# Patient Record
Sex: FEMALE | Race: WHITE | NOT HISPANIC OR LATINO | Employment: OTHER | URBAN - METROPOLITAN AREA
[De-identification: names, ages, dates, MRNs, and addresses within clinical notes are randomized per-mention and may not be internally consistent; named-entity substitution may affect disease eponyms.]

---

## 2017-01-03 ENCOUNTER — HISTORICAL (OUTPATIENT)
Dept: ADMINISTRATIVE | Facility: HOSPITAL | Age: 72
End: 2017-01-03

## 2017-01-20 ENCOUNTER — HISTORICAL (OUTPATIENT)
Dept: HEMATOLOGY/ONCOLOGY | Facility: CLINIC | Age: 72
End: 2017-01-20

## 2017-01-23 ENCOUNTER — HISTORICAL (OUTPATIENT)
Dept: RADIOLOGY | Facility: HOSPITAL | Age: 72
End: 2017-01-23

## 2017-01-25 ENCOUNTER — HISTORICAL (OUTPATIENT)
Dept: RADIOLOGY | Facility: HOSPITAL | Age: 72
End: 2017-01-25

## 2017-01-27 ENCOUNTER — HISTORICAL (OUTPATIENT)
Dept: RADIOLOGY | Facility: HOSPITAL | Age: 72
End: 2017-01-27

## 2017-04-28 ENCOUNTER — HISTORICAL (OUTPATIENT)
Dept: HEMATOLOGY/ONCOLOGY | Facility: CLINIC | Age: 72
End: 2017-04-28

## 2017-04-28 LAB
ABS NEUT (OLG): 5.53 X10(3)/MCL (ref 2.1–9.2)
ALBUMIN SERPL-MCNC: 3.5 GM/DL (ref 3.4–5)
ALBUMIN/GLOB SERPL: 1.2 {RATIO}
ALP SERPL-CCNC: 78 UNIT/L (ref 38–126)
ALT SERPL-CCNC: 15 UNIT/L (ref 12–78)
AST SERPL-CCNC: 7 UNIT/L (ref 15–37)
BASOPHILS # BLD AUTO: 0 X10(3)/MCL (ref 0–0.2)
BASOPHILS NFR BLD AUTO: 0.4 %
BILIRUB SERPL-MCNC: 0.4 MG/DL (ref 0.2–1)
BILIRUBIN DIRECT+TOT PNL SERPL-MCNC: 0.1 MG/DL (ref 0–0.2)
BILIRUBIN DIRECT+TOT PNL SERPL-MCNC: 0.3 MG/DL (ref 0–0.8)
BUN SERPL-MCNC: 15 MG/DL (ref 7–18)
CALCIUM SERPL-MCNC: 9 MG/DL (ref 8.5–10.1)
CHLORIDE SERPL-SCNC: 103 MMOL/L (ref 98–107)
CO2 SERPL-SCNC: 28 MMOL/L (ref 21–32)
CREAT SERPL-MCNC: 0.75 MG/DL (ref 0.55–1.02)
EOSINOPHIL # BLD AUTO: 0.2 X10(3)/MCL (ref 0–0.9)
EOSINOPHIL NFR BLD AUTO: 2 %
ERYTHROCYTE [DISTWIDTH] IN BLOOD BY AUTOMATED COUNT: 16.8 % (ref 11.5–17)
GLOBULIN SER-MCNC: 2.8 GM/DL (ref 2.4–3.5)
GLUCOSE SERPL-MCNC: 89 MG/DL (ref 74–106)
HCT VFR BLD AUTO: 37.1 % (ref 37–47)
HGB BLD-MCNC: 12 GM/DL (ref 12–16)
LYMPHOCYTES # BLD AUTO: 1.3 X10(3)/MCL (ref 0.6–4.6)
LYMPHOCYTES NFR BLD AUTO: 17 %
MCH RBC QN AUTO: 28.6 PG (ref 27–31)
MCHC RBC AUTO-ENTMCNC: 32.3 GM/DL (ref 33–36)
MCV RBC AUTO: 88.3 FL (ref 80–94)
MONOCYTES # BLD AUTO: 0.8 X10(3)/MCL (ref 0.1–1.3)
MONOCYTES NFR BLD AUTO: 10.5 %
NEUTROPHILS # BLD AUTO: 5.5 X10(3)/MCL (ref 2.1–9.2)
NEUTROPHILS NFR BLD AUTO: 70.1 %
PLATELET # BLD AUTO: 250 X10(3)/MCL (ref 130–400)
PMV BLD AUTO: 8.6 FL (ref 9.4–12.4)
POTASSIUM SERPL-SCNC: 4.7 MMOL/L (ref 3.5–5.1)
PROT SERPL-MCNC: 6.3 GM/DL (ref 6.4–8.2)
RBC # BLD AUTO: 4.2 X10(6)/MCL (ref 4.2–5.4)
SODIUM SERPL-SCNC: 138 MMOL/L (ref 136–145)
WBC # SPEC AUTO: 7.9 X10(3)/MCL (ref 4.5–11.5)

## 2017-05-15 ENCOUNTER — HISTORICAL (OUTPATIENT)
Dept: ANESTHESIOLOGY | Facility: HOSPITAL | Age: 72
End: 2017-05-15

## 2017-07-12 ENCOUNTER — HISTORICAL (OUTPATIENT)
Dept: HEMATOLOGY/ONCOLOGY | Facility: CLINIC | Age: 72
End: 2017-07-12

## 2017-07-12 LAB
ABS NEUT (OLG): 5.61 X10(3)/MCL (ref 2.1–9.2)
ALBUMIN SERPL-MCNC: 3.8 GM/DL (ref 3.4–5)
ALBUMIN/GLOB SERPL: 1.3 {RATIO}
ALP SERPL-CCNC: 85 UNIT/L (ref 38–126)
ALT SERPL-CCNC: 16 UNIT/L (ref 12–78)
AST SERPL-CCNC: 12 UNIT/L (ref 15–37)
BASOPHILS # BLD AUTO: 0 X10(3)/MCL (ref 0–0.2)
BASOPHILS NFR BLD AUTO: 0.4 %
BILIRUB SERPL-MCNC: 0.5 MG/DL (ref 0.2–1)
BILIRUBIN DIRECT+TOT PNL SERPL-MCNC: 0.2 MG/DL (ref 0–0.2)
BILIRUBIN DIRECT+TOT PNL SERPL-MCNC: 0.3 MG/DL (ref 0–0.8)
BUN SERPL-MCNC: 13 MG/DL (ref 7–18)
CALCIUM SERPL-MCNC: 9.3 MG/DL (ref 8.5–10.1)
CHLORIDE SERPL-SCNC: 103 MMOL/L (ref 98–107)
CO2 SERPL-SCNC: 28 MMOL/L (ref 21–32)
CREAT SERPL-MCNC: 0.69 MG/DL (ref 0.55–1.02)
EOSINOPHIL # BLD AUTO: 0.1 X10(3)/MCL (ref 0–0.9)
EOSINOPHIL NFR BLD AUTO: 1.6 %
ERYTHROCYTE [DISTWIDTH] IN BLOOD BY AUTOMATED COUNT: 15.8 % (ref 11.5–17)
GLOBULIN SER-MCNC: 2.9 GM/DL (ref 2.4–3.5)
GLUCOSE SERPL-MCNC: 88 MG/DL (ref 74–106)
HCT VFR BLD AUTO: 36.7 % (ref 37–47)
HGB BLD-MCNC: 11.9 GM/DL (ref 12–16)
LYMPHOCYTES # BLD AUTO: 1.9 X10(3)/MCL (ref 0.6–4.6)
LYMPHOCYTES NFR BLD AUTO: 22 %
MCH RBC QN AUTO: 29.5 PG (ref 27–31)
MCHC RBC AUTO-ENTMCNC: 32.4 GM/DL (ref 33–36)
MCV RBC AUTO: 90.8 FL (ref 80–94)
MONOCYTES # BLD AUTO: 0.9 X10(3)/MCL (ref 0.1–1.3)
MONOCYTES NFR BLD AUTO: 10.4 %
NEUTROPHILS # BLD AUTO: 5.6 X10(3)/MCL (ref 2.1–9.2)
NEUTROPHILS NFR BLD AUTO: 65.6 %
PLATELET # BLD AUTO: 275 X10(3)/MCL (ref 130–400)
PMV BLD AUTO: 8.5 FL (ref 9.4–12.4)
POTASSIUM SERPL-SCNC: 5 MMOL/L (ref 3.5–5.1)
PROT SERPL-MCNC: 6.7 GM/DL (ref 6.4–8.2)
RBC # BLD AUTO: 4.04 X10(6)/MCL (ref 4.2–5.4)
SODIUM SERPL-SCNC: 137 MMOL/L (ref 136–145)
WBC # SPEC AUTO: 8.6 X10(3)/MCL (ref 4.5–11.5)

## 2017-10-13 ENCOUNTER — HISTORICAL (OUTPATIENT)
Dept: INFUSION THERAPY | Facility: HOSPITAL | Age: 72
End: 2017-10-13

## 2017-10-17 ENCOUNTER — HISTORICAL (OUTPATIENT)
Dept: ADMINISTRATIVE | Facility: HOSPITAL | Age: 72
End: 2017-10-17

## 2017-10-17 LAB
ABS NEUT (OLG): 6.09 X10(3)/MCL (ref 2.1–9.2)
ALBUMIN SERPL-MCNC: 3.4 GM/DL (ref 3.4–5)
ALBUMIN/GLOB SERPL: 1.1 RATIO (ref 1.1–2)
ALP SERPL-CCNC: 79 UNIT/L (ref 38–126)
ALT SERPL-CCNC: 15 UNIT/L (ref 12–78)
AST SERPL-CCNC: 13 UNIT/L (ref 15–37)
BASOPHILS # BLD AUTO: 0 X10(3)/MCL (ref 0–0.2)
BASOPHILS NFR BLD AUTO: 0.2 %
BILIRUB SERPL-MCNC: 0.5 MG/DL (ref 0.2–1)
BILIRUBIN DIRECT+TOT PNL SERPL-MCNC: 0.2 MG/DL (ref 0–0.5)
BILIRUBIN DIRECT+TOT PNL SERPL-MCNC: 0.3 MG/DL (ref 0–0.8)
BUN SERPL-MCNC: 16 MG/DL (ref 7–18)
CALCIUM SERPL-MCNC: 9.1 MG/DL (ref 8.5–10.1)
CHLORIDE SERPL-SCNC: 102 MMOL/L (ref 98–107)
CO2 SERPL-SCNC: 26 MMOL/L (ref 21–32)
CREAT SERPL-MCNC: 0.76 MG/DL (ref 0.55–1.02)
EOSINOPHIL # BLD AUTO: 0.1 X10(3)/MCL (ref 0–0.9)
EOSINOPHIL NFR BLD AUTO: 1.5 %
ERYTHROCYTE [DISTWIDTH] IN BLOOD BY AUTOMATED COUNT: 15.4 % (ref 11.5–17)
GLOBULIN SER-MCNC: 3.1 GM/DL (ref 2.4–3.5)
GLUCOSE SERPL-MCNC: 91 MG/DL (ref 74–106)
HCT VFR BLD AUTO: 35.2 % (ref 37–47)
HGB BLD-MCNC: 11.5 GM/DL (ref 12–16)
LYMPHOCYTES # BLD AUTO: 1.7 X10(3)/MCL (ref 0.6–4.6)
LYMPHOCYTES NFR BLD AUTO: 19 %
MCH RBC QN AUTO: 29.8 PG (ref 27–31)
MCHC RBC AUTO-ENTMCNC: 32.7 GM/DL (ref 33–36)
MCV RBC AUTO: 91.2 FL (ref 80–94)
MONOCYTES # BLD AUTO: 0.9 X10(3)/MCL (ref 0.1–1.3)
MONOCYTES NFR BLD AUTO: 10.5 %
NEUTROPHILS # BLD AUTO: 6.1 X10(3)/MCL (ref 2.1–9.2)
NEUTROPHILS NFR BLD AUTO: 68.8 %
PLATELET # BLD AUTO: 267 X10(3)/MCL (ref 130–400)
PMV BLD AUTO: 8.4 FL (ref 9.4–12.4)
POTASSIUM SERPL-SCNC: 4.2 MMOL/L (ref 3.5–5.1)
PROT SERPL-MCNC: 6.5 GM/DL (ref 6.4–8.2)
RBC # BLD AUTO: 3.86 X10(6)/MCL (ref 4.2–5.4)
SODIUM SERPL-SCNC: 136 MMOL/L (ref 136–145)
WBC # SPEC AUTO: 8.8 X10(3)/MCL (ref 4.5–11.5)

## 2017-12-08 ENCOUNTER — HISTORICAL (OUTPATIENT)
Dept: INFUSION THERAPY | Facility: HOSPITAL | Age: 72
End: 2017-12-08

## 2017-12-08 ENCOUNTER — HISTORICAL (OUTPATIENT)
Dept: HEMATOLOGY/ONCOLOGY | Facility: CLINIC | Age: 72
End: 2017-12-08

## 2017-12-08 LAB
ABS NEUT (OLG): 4.69 X10(3)/MCL (ref 2.1–9.2)
ALBUMIN SERPL-MCNC: 3.8 GM/DL (ref 3.4–5)
ALBUMIN/GLOB SERPL: 1.2 {RATIO}
ALP SERPL-CCNC: 78 UNIT/L (ref 38–126)
ALT SERPL-CCNC: 16 UNIT/L (ref 12–78)
AST SERPL-CCNC: 11 UNIT/L (ref 15–37)
BASOPHILS # BLD AUTO: 0 X10(3)/MCL (ref 0–0.2)
BASOPHILS NFR BLD AUTO: 0.3 %
BILIRUB SERPL-MCNC: 0.4 MG/DL (ref 0.2–1)
BILIRUBIN DIRECT+TOT PNL SERPL-MCNC: 0.1 MG/DL (ref 0–0.2)
BILIRUBIN DIRECT+TOT PNL SERPL-MCNC: 0.3 MG/DL (ref 0–0.8)
BUN SERPL-MCNC: 15 MG/DL (ref 7–18)
CALCIUM SERPL-MCNC: 9.2 MG/DL (ref 8.5–10.1)
CHLORIDE SERPL-SCNC: 103 MMOL/L (ref 98–107)
CO2 SERPL-SCNC: 26 MMOL/L (ref 21–32)
CREAT SERPL-MCNC: 0.62 MG/DL (ref 0.55–1.02)
EOSINOPHIL # BLD AUTO: 0.2 X10(3)/MCL (ref 0–0.9)
EOSINOPHIL NFR BLD AUTO: 2.1 %
ERYTHROCYTE [DISTWIDTH] IN BLOOD BY AUTOMATED COUNT: 14.6 % (ref 11.5–17)
GLOBULIN SER-MCNC: 3.2 GM/DL (ref 2.4–3.5)
GLUCOSE SERPL-MCNC: 85 MG/DL (ref 74–106)
HCT VFR BLD AUTO: 36.3 % (ref 37–47)
HGB BLD-MCNC: 11.9 GM/DL (ref 12–16)
LYMPHOCYTES # BLD AUTO: 1.9 X10(3)/MCL (ref 0.6–4.6)
LYMPHOCYTES NFR BLD AUTO: 25.1 %
MCH RBC QN AUTO: 29.7 PG (ref 27–31)
MCHC RBC AUTO-ENTMCNC: 32.8 GM/DL (ref 33–36)
MCV RBC AUTO: 90.5 FL (ref 80–94)
MONOCYTES # BLD AUTO: 0.8 X10(3)/MCL (ref 0.1–1.3)
MONOCYTES NFR BLD AUTO: 11 %
NEUTROPHILS # BLD AUTO: 4.7 X10(3)/MCL (ref 2.1–9.2)
NEUTROPHILS NFR BLD AUTO: 61.5 %
PLATELET # BLD AUTO: 253 X10(3)/MCL (ref 130–400)
PMV BLD AUTO: 8.6 FL (ref 9.4–12.4)
POTASSIUM SERPL-SCNC: 4.2 MMOL/L (ref 3.5–5.1)
PROT SERPL-MCNC: 7 GM/DL (ref 6.4–8.2)
RBC # BLD AUTO: 4.01 X10(6)/MCL (ref 4.2–5.4)
SODIUM SERPL-SCNC: 135 MMOL/L (ref 136–145)
WBC # SPEC AUTO: 7.6 X10(3)/MCL (ref 4.5–11.5)

## 2017-12-29 ENCOUNTER — HISTORICAL (OUTPATIENT)
Dept: HEMATOLOGY/ONCOLOGY | Facility: CLINIC | Age: 72
End: 2017-12-29

## 2018-02-02 ENCOUNTER — HISTORICAL (OUTPATIENT)
Dept: INFUSION THERAPY | Facility: HOSPITAL | Age: 73
End: 2018-02-02

## 2018-04-06 ENCOUNTER — HISTORICAL (OUTPATIENT)
Dept: INFUSION THERAPY | Facility: HOSPITAL | Age: 73
End: 2018-04-06

## 2018-05-25 ENCOUNTER — HISTORICAL (OUTPATIENT)
Dept: INFUSION THERAPY | Facility: HOSPITAL | Age: 73
End: 2018-05-25

## 2018-06-08 LAB
ABS NEUT (OLG): 5.71 X10(3)/MCL (ref 2.1–9.2)
ALBUMIN SERPL-MCNC: 3.7 GM/DL (ref 3.4–5)
ALBUMIN/GLOB SERPL: 1.3 {RATIO}
ALP SERPL-CCNC: 73 UNIT/L (ref 38–126)
ALT SERPL-CCNC: 17 UNIT/L (ref 12–78)
AST SERPL-CCNC: 13 UNIT/L (ref 15–37)
BASOPHILS # BLD AUTO: 0 X10(3)/MCL (ref 0–0.2)
BASOPHILS NFR BLD AUTO: 0.2 %
BILIRUB SERPL-MCNC: 0.4 MG/DL (ref 0.2–1)
BILIRUBIN DIRECT+TOT PNL SERPL-MCNC: 0.2 MG/DL (ref 0–0.2)
BILIRUBIN DIRECT+TOT PNL SERPL-MCNC: 0.2 MG/DL (ref 0–0.8)
BUN SERPL-MCNC: 17 MG/DL (ref 7–18)
CALCIUM SERPL-MCNC: 9 MG/DL (ref 8.5–10.1)
CHLORIDE SERPL-SCNC: 105 MMOL/L (ref 98–107)
CO2 SERPL-SCNC: 25 MMOL/L (ref 21–32)
CREAT SERPL-MCNC: 0.84 MG/DL (ref 0.55–1.02)
EOSINOPHIL # BLD AUTO: 0.2 X10(3)/MCL (ref 0–0.9)
EOSINOPHIL NFR BLD AUTO: 2 %
ERYTHROCYTE [DISTWIDTH] IN BLOOD BY AUTOMATED COUNT: 14 % (ref 11.5–17)
GLOBULIN SER-MCNC: 2.9 GM/DL (ref 2.4–3.5)
GLUCOSE SERPL-MCNC: 95 MG/DL (ref 74–106)
HCT VFR BLD AUTO: 37.1 % (ref 37–47)
HGB BLD-MCNC: 12.4 GM/DL (ref 12–16)
LYMPHOCYTES # BLD AUTO: 2.2 X10(3)/MCL (ref 0.6–4.6)
LYMPHOCYTES NFR BLD AUTO: 24.6 %
MCH RBC QN AUTO: 30 PG (ref 27–31)
MCHC RBC AUTO-ENTMCNC: 33.4 GM/DL (ref 33–36)
MCV RBC AUTO: 89.6 FL (ref 80–94)
MONOCYTES # BLD AUTO: 0.8 X10(3)/MCL (ref 0.1–1.3)
MONOCYTES NFR BLD AUTO: 9.1 %
NEUTROPHILS # BLD AUTO: 5.7 X10(3)/MCL (ref 2.1–9.2)
NEUTROPHILS NFR BLD AUTO: 64.1 %
PLATELET # BLD AUTO: 238 X10(3)/MCL (ref 130–400)
PMV BLD AUTO: 8.3 FL (ref 9.4–12.4)
POTASSIUM SERPL-SCNC: 4.4 MMOL/L (ref 3.5–5.1)
PROT SERPL-MCNC: 6.6 GM/DL (ref 6.4–8.2)
RBC # BLD AUTO: 4.14 X10(6)/MCL (ref 4.2–5.4)
SODIUM SERPL-SCNC: 140 MMOL/L (ref 136–145)
WBC # SPEC AUTO: 8.9 X10(3)/MCL (ref 4.5–11.5)

## 2018-07-20 ENCOUNTER — HISTORICAL (OUTPATIENT)
Dept: INFUSION THERAPY | Facility: HOSPITAL | Age: 73
End: 2018-07-20

## 2018-11-09 ENCOUNTER — HISTORICAL (OUTPATIENT)
Dept: INFUSION THERAPY | Facility: HOSPITAL | Age: 73
End: 2018-11-09

## 2018-12-10 ENCOUNTER — HISTORICAL (OUTPATIENT)
Dept: RADIOLOGY | Facility: HOSPITAL | Age: 73
End: 2018-12-10

## 2019-01-04 ENCOUNTER — HISTORICAL (OUTPATIENT)
Dept: INFUSION THERAPY | Facility: HOSPITAL | Age: 74
End: 2019-01-04

## 2019-03-01 ENCOUNTER — HISTORICAL (OUTPATIENT)
Dept: INFUSION THERAPY | Facility: HOSPITAL | Age: 74
End: 2019-03-01

## 2019-04-26 ENCOUNTER — HISTORICAL (OUTPATIENT)
Dept: INFUSION THERAPY | Facility: HOSPITAL | Age: 74
End: 2019-04-26

## 2019-06-20 ENCOUNTER — HISTORICAL (OUTPATIENT)
Dept: RADIOLOGY | Facility: HOSPITAL | Age: 74
End: 2019-06-20

## 2019-06-20 ENCOUNTER — HISTORICAL (OUTPATIENT)
Dept: HEMATOLOGY/ONCOLOGY | Facility: CLINIC | Age: 74
End: 2019-06-20

## 2019-06-20 LAB
ABS NEUT (OLG): 4.4 X10(3)/MCL (ref 2.1–9.2)
ALBUMIN SERPL-MCNC: 3.8 GM/DL (ref 3.4–5)
ALBUMIN/GLOB SERPL: 1.2 {RATIO}
ALP SERPL-CCNC: 78 UNIT/L (ref 38–126)
ALT SERPL-CCNC: 15 UNIT/L (ref 12–78)
AST SERPL-CCNC: 11 UNIT/L (ref 15–37)
BASOPHILS # BLD AUTO: 0 X10(3)/MCL (ref 0–0.2)
BASOPHILS NFR BLD AUTO: 0.5 %
BILIRUB SERPL-MCNC: 0.5 MG/DL (ref 0.2–1)
BILIRUBIN DIRECT+TOT PNL SERPL-MCNC: 0.1 MG/DL (ref 0–0.2)
BILIRUBIN DIRECT+TOT PNL SERPL-MCNC: 0.4 MG/DL (ref 0–0.8)
BUN SERPL-MCNC: 17 MG/DL (ref 7–18)
CALCIUM SERPL-MCNC: 9.3 MG/DL (ref 8.5–10.1)
CHLORIDE SERPL-SCNC: 103 MMOL/L (ref 98–107)
CO2 SERPL-SCNC: 28 MMOL/L (ref 21–32)
CREAT SERPL-MCNC: 0.84 MG/DL (ref 0.55–1.02)
EOSINOPHIL # BLD AUTO: 0.2 X10(3)/MCL (ref 0–0.9)
EOSINOPHIL NFR BLD AUTO: 2.2 %
ERYTHROCYTE [DISTWIDTH] IN BLOOD BY AUTOMATED COUNT: 13.2 % (ref 11.5–17)
GLOBULIN SER-MCNC: 3.1 GM/DL (ref 2.4–3.5)
GLUCOSE SERPL-MCNC: 94 MG/DL (ref 74–106)
HCT VFR BLD AUTO: 38.4 % (ref 37–47)
HGB BLD-MCNC: 12.5 GM/DL (ref 12–16)
LYMPHOCYTES # BLD AUTO: 2.2 X10(3)/MCL (ref 0.6–4.6)
LYMPHOCYTES NFR BLD AUTO: 28.8 %
MCH RBC QN AUTO: 30.1 PG (ref 27–31)
MCHC RBC AUTO-ENTMCNC: 32.6 GM/DL (ref 33–36)
MCV RBC AUTO: 92.5 FL (ref 80–94)
MONOCYTES # BLD AUTO: 0.8 X10(3)/MCL (ref 0.1–1.3)
MONOCYTES NFR BLD AUTO: 10.6 %
NEUTROPHILS # BLD AUTO: 4.4 X10(3)/MCL (ref 2.1–9.2)
NEUTROPHILS NFR BLD AUTO: 57.8 %
PLATELET # BLD AUTO: 244 X10(3)/MCL (ref 130–400)
PMV BLD AUTO: 7.9 FL (ref 9.4–12.4)
POTASSIUM SERPL-SCNC: 4.4 MMOL/L (ref 3.5–5.1)
PROT SERPL-MCNC: 6.9 GM/DL (ref 6.4–8.2)
RBC # BLD AUTO: 4.15 X10(6)/MCL (ref 4.2–5.4)
SODIUM SERPL-SCNC: 135 MMOL/L (ref 136–145)
WBC # SPEC AUTO: 7.6 X10(3)/MCL (ref 4.5–11.5)

## 2019-06-21 ENCOUNTER — HISTORICAL (OUTPATIENT)
Dept: INFUSION THERAPY | Facility: HOSPITAL | Age: 74
End: 2019-06-21

## 2019-08-16 ENCOUNTER — HISTORICAL (OUTPATIENT)
Dept: INFUSION THERAPY | Facility: HOSPITAL | Age: 74
End: 2019-08-16

## 2019-08-26 ENCOUNTER — HISTORICAL (OUTPATIENT)
Dept: HEMATOLOGY/ONCOLOGY | Facility: CLINIC | Age: 74
End: 2019-08-26

## 2019-08-26 LAB
ABS NEUT (OLG): 5 X10(3)/MCL (ref 2.1–9.2)
ALBUMIN SERPL-MCNC: 3.5 GM/DL (ref 3.4–5)
ALBUMIN/GLOB SERPL: 1.4 {RATIO}
ALP SERPL-CCNC: 70 UNIT/L (ref 38–126)
ALT SERPL-CCNC: 14 UNIT/L (ref 12–78)
AST SERPL-CCNC: 12 UNIT/L (ref 15–37)
BASOPHILS # BLD AUTO: 0 X10(3)/MCL (ref 0–0.2)
BASOPHILS NFR BLD AUTO: 0.5 %
BILIRUB SERPL-MCNC: 0.6 MG/DL (ref 0.2–1)
BILIRUBIN DIRECT+TOT PNL SERPL-MCNC: 0.1 MG/DL (ref 0–0.2)
BILIRUBIN DIRECT+TOT PNL SERPL-MCNC: 0.5 MG/DL (ref 0–0.8)
BUN SERPL-MCNC: 17 MG/DL (ref 7–18)
CALCIUM SERPL-MCNC: 9 MG/DL (ref 8.5–10.1)
CHLORIDE SERPL-SCNC: 98 MMOL/L (ref 98–107)
CO2 SERPL-SCNC: 29 MMOL/L (ref 21–32)
CREAT SERPL-MCNC: 0.87 MG/DL (ref 0.55–1.02)
EOSINOPHIL # BLD AUTO: 0.1 X10(3)/MCL (ref 0–0.9)
EOSINOPHIL NFR BLD AUTO: 1.6 %
ERYTHROCYTE [DISTWIDTH] IN BLOOD BY AUTOMATED COUNT: 13.6 % (ref 11.5–17)
GLOBULIN SER-MCNC: 2.5 GM/DL (ref 2.4–3.5)
GLUCOSE SERPL-MCNC: 87 MG/DL (ref 74–106)
HCT VFR BLD AUTO: 35.2 % (ref 37–47)
HGB BLD-MCNC: 11.5 GM/DL (ref 12–16)
LYMPHOCYTES # BLD AUTO: 1.8 X10(3)/MCL (ref 0.6–4.6)
LYMPHOCYTES NFR BLD AUTO: 23.1 %
MCH RBC QN AUTO: 29.9 PG (ref 27–31)
MCHC RBC AUTO-ENTMCNC: 32.7 GM/DL (ref 33–36)
MCV RBC AUTO: 91.7 FL (ref 80–94)
MONOCYTES # BLD AUTO: 1 X10(3)/MCL (ref 0.1–1.3)
MONOCYTES NFR BLD AUTO: 11.9 %
NEUTROPHILS # BLD AUTO: 5 X10(3)/MCL (ref 2.1–9.2)
NEUTROPHILS NFR BLD AUTO: 62.8 %
PLATELET # BLD AUTO: 234 X10(3)/MCL (ref 130–400)
PMV BLD AUTO: 8.6 FL (ref 9.4–12.4)
POTASSIUM SERPL-SCNC: 4.1 MMOL/L (ref 3.5–5.1)
PROT SERPL-MCNC: 6 GM/DL (ref 6.4–8.2)
RBC # BLD AUTO: 3.84 X10(6)/MCL (ref 4.2–5.4)
SODIUM SERPL-SCNC: 135 MMOL/L (ref 136–145)
WBC # SPEC AUTO: 8 X10(3)/MCL (ref 4.5–11.5)

## 2019-09-10 ENCOUNTER — HISTORICAL (OUTPATIENT)
Dept: ADMINISTRATIVE | Facility: HOSPITAL | Age: 74
End: 2019-09-10

## 2019-09-10 LAB
ABS NEUT (OLG): 4.97 X10(3)/MCL (ref 2.1–9.2)
ANION GAP SERPL CALC-SCNC: 18 MMOL/L
BASOPHILS # BLD AUTO: 0 X10(3)/MCL (ref 0–0.2)
BASOPHILS NFR BLD AUTO: 0.4 %
BUN SERPL-MCNC: 19 MG/DL (ref 8–26)
CHLORIDE SERPL-SCNC: 97 MMOL/L (ref 98–109)
CREAT SERPL-MCNC: 1 MG/DL (ref 0.6–1.3)
EOSINOPHIL # BLD AUTO: 0.2 X10(3)/MCL (ref 0–0.9)
EOSINOPHIL NFR BLD AUTO: 2.1 %
ERYTHROCYTE [DISTWIDTH] IN BLOOD BY AUTOMATED COUNT: 14.2 % (ref 11.5–17)
GLUCOSE SERPL-MCNC: 78 MG/DL (ref 70–105)
HCT VFR BLD AUTO: 35.8 % (ref 37–47)
HCT VFR BLD CALC: 34 % (ref 38–51)
HGB BLD-MCNC: 11.5 GM/DL (ref 12–16)
HGB BLD-MCNC: 11.6 MG/DL (ref 12–17)
LYMPHOCYTES # BLD AUTO: 1.6 X10(3)/MCL (ref 0.6–4.6)
LYMPHOCYTES NFR BLD AUTO: 21 %
MCH RBC QN AUTO: 29.8 PG (ref 27–31)
MCHC RBC AUTO-ENTMCNC: 32.1 GM/DL (ref 33–36)
MCV RBC AUTO: 92.7 FL (ref 80–94)
MONOCYTES # BLD AUTO: 0.8 X10(3)/MCL (ref 0.1–1.3)
MONOCYTES NFR BLD AUTO: 10.2 %
NEUTROPHILS # BLD AUTO: 5 X10(3)/MCL (ref 2.1–9.2)
NEUTROPHILS NFR BLD AUTO: 66.2 %
PLATELET # BLD AUTO: 226 X10(3)/MCL (ref 130–400)
PMV BLD AUTO: 8.5 FL (ref 9.4–12.4)
POC IONIZED CALCIUM: 1.16 MMOL/L (ref 1.12–1.32)
POC TCO2: 25 MMOL/L (ref 24–29)
POTASSIUM BLD-SCNC: 3.9 MMOL/L (ref 3.5–4.9)
RBC # BLD AUTO: 3.86 X10(6)/MCL (ref 4.2–5.4)
SODIUM BLD-SCNC: 134 MMOL/L (ref 138–146)
WBC # SPEC AUTO: 7.5 X10(3)/MCL (ref 4.5–11.5)

## 2019-09-11 ENCOUNTER — HISTORICAL (OUTPATIENT)
Dept: INFUSION THERAPY | Facility: HOSPITAL | Age: 74
End: 2019-09-11

## 2019-09-17 ENCOUNTER — HISTORICAL (OUTPATIENT)
Dept: ADMINISTRATIVE | Facility: HOSPITAL | Age: 74
End: 2019-09-17

## 2019-09-17 LAB
ABS NEUT (OLG): 4.48 X10(3)/MCL (ref 2.1–9.2)
ANION GAP SERPL CALC-SCNC: 14 MMOL/L
BASOPHILS # BLD AUTO: 0 X10(3)/MCL (ref 0–0.2)
BASOPHILS NFR BLD AUTO: 0.5 %
BUN SERPL-MCNC: 27 MG/DL (ref 8–26)
CHLORIDE SERPL-SCNC: 95 MMOL/L (ref 98–109)
CREAT SERPL-MCNC: 1.1 MG/DL (ref 0.6–1.3)
EOSINOPHIL # BLD AUTO: 0.1 X10(3)/MCL (ref 0–0.9)
EOSINOPHIL NFR BLD AUTO: 2.2 %
ERYTHROCYTE [DISTWIDTH] IN BLOOD BY AUTOMATED COUNT: 13.8 % (ref 11.5–17)
GLUCOSE SERPL-MCNC: 86 MG/DL (ref 70–105)
HCT VFR BLD AUTO: 36.4 % (ref 37–47)
HCT VFR BLD CALC: 36 % (ref 38–51)
HGB BLD-MCNC: 11.9 GM/DL (ref 12–16)
HGB BLD-MCNC: 12.2 MG/DL (ref 12–17)
LYMPHOCYTES # BLD AUTO: 1.3 X10(3)/MCL (ref 0.6–4.6)
LYMPHOCYTES NFR BLD AUTO: 20.7 %
MCH RBC QN AUTO: 30.2 PG (ref 27–31)
MCHC RBC AUTO-ENTMCNC: 32.7 GM/DL (ref 33–36)
MCV RBC AUTO: 92.4 FL (ref 80–94)
MONOCYTES # BLD AUTO: 0.4 X10(3)/MCL (ref 0.1–1.3)
MONOCYTES NFR BLD AUTO: 6.7 %
NEUTROPHILS # BLD AUTO: 4.5 X10(3)/MCL (ref 2.1–9.2)
NEUTROPHILS NFR BLD AUTO: 69.3 %
PLATELET # BLD AUTO: 210 X10(3)/MCL (ref 130–400)
PMV BLD AUTO: 8.6 FL (ref 9.4–12.4)
POC IONIZED CALCIUM: 1.19 MMOL/L (ref 1.12–1.32)
POC TCO2: 26 MMOL/L (ref 24–29)
POTASSIUM BLD-SCNC: 4.4 MMOL/L (ref 3.5–4.9)
RBC # BLD AUTO: 3.94 X10(6)/MCL (ref 4.2–5.4)
SODIUM BLD-SCNC: 130 MMOL/L (ref 138–146)
WBC # SPEC AUTO: 6.5 X10(3)/MCL (ref 4.5–11.5)

## 2019-09-18 ENCOUNTER — HISTORICAL (OUTPATIENT)
Dept: INFUSION THERAPY | Facility: HOSPITAL | Age: 74
End: 2019-09-18

## 2019-09-25 ENCOUNTER — HISTORICAL (OUTPATIENT)
Dept: INFUSION THERAPY | Facility: HOSPITAL | Age: 74
End: 2019-09-25

## 2019-09-25 LAB
ABS NEUT (OLG): 3.55 X10(3)/MCL (ref 2.1–9.2)
ALBUMIN SERPL-MCNC: 3.4 GM/DL (ref 3.4–5)
ALP SERPL-CCNC: 60 UNIT/L (ref 38–126)
ALT SERPL-CCNC: 18 UNIT/L (ref 12–78)
ANION GAP SERPL CALC-SCNC: 15 MMOL/L
AST SERPL-CCNC: 12 UNIT/L (ref 15–37)
BASOPHILS # BLD AUTO: 0 X10(3)/MCL (ref 0–0.2)
BASOPHILS NFR BLD AUTO: 0.4 %
BILIRUB SERPL-MCNC: 0.5 MG/DL (ref 0.2–1)
BILIRUBIN DIRECT+TOT PNL SERPL-MCNC: 0.1 MG/DL (ref 0–0.2)
BILIRUBIN DIRECT+TOT PNL SERPL-MCNC: 0.4 MG/DL (ref 0–0.8)
BUN SERPL-MCNC: 18 MG/DL (ref 8–26)
CHLORIDE SERPL-SCNC: 97 MMOL/L (ref 98–109)
CREAT SERPL-MCNC: 0.9 MG/DL (ref 0.6–1.3)
EOSINOPHIL # BLD AUTO: 0.1 X10(3)/MCL (ref 0–0.9)
EOSINOPHIL NFR BLD AUTO: 1.6 %
ERYTHROCYTE [DISTWIDTH] IN BLOOD BY AUTOMATED COUNT: 14.3 % (ref 11.5–17)
GLUCOSE SERPL-MCNC: 100 MG/DL (ref 70–105)
HCT VFR BLD AUTO: 33.5 % (ref 37–47)
HCT VFR BLD CALC: 33 % (ref 38–51)
HGB BLD-MCNC: 10.9 GM/DL (ref 12–16)
HGB BLD-MCNC: 11.2 MG/DL (ref 12–17)
LIVER PROFILE INTERP: ABNORMAL
LYMPHOCYTES # BLD AUTO: 1.5 X10(3)/MCL (ref 0.6–4.6)
LYMPHOCYTES NFR BLD AUTO: 26.8 %
MCH RBC QN AUTO: 30.2 PG (ref 27–31)
MCHC RBC AUTO-ENTMCNC: 32.5 GM/DL (ref 33–36)
MCV RBC AUTO: 92.8 FL (ref 80–94)
MONOCYTES # BLD AUTO: 0.5 X10(3)/MCL (ref 0.1–1.3)
MONOCYTES NFR BLD AUTO: 8.4 %
NEUTROPHILS # BLD AUTO: 3.6 X10(3)/MCL (ref 2.1–9.2)
NEUTROPHILS NFR BLD AUTO: 62.1 %
PLATELET # BLD AUTO: 208 X10(3)/MCL (ref 130–400)
PMV BLD AUTO: 8.3 FL (ref 9.4–12.4)
POC IONIZED CALCIUM: 1.07 MMOL/L (ref 1.12–1.32)
POC TCO2: 25 MMOL/L (ref 24–29)
POTASSIUM BLD-SCNC: 3.9 MMOL/L (ref 3.5–4.9)
PROT SERPL-MCNC: 6.1 GM/DL (ref 6.4–8.2)
RBC # BLD AUTO: 3.61 X10(6)/MCL (ref 4.2–5.4)
SODIUM BLD-SCNC: 132 MMOL/L (ref 138–146)
WBC # SPEC AUTO: 5.7 X10(3)/MCL (ref 4.5–11.5)

## 2019-10-01 ENCOUNTER — HISTORICAL (OUTPATIENT)
Dept: HEMATOLOGY/ONCOLOGY | Facility: CLINIC | Age: 74
End: 2019-10-01

## 2019-10-01 LAB
ABS NEUT (OLG): 4.44 X10(3)/MCL (ref 2.1–9.2)
ALBUMIN SERPL-MCNC: 3.4 GM/DL (ref 3.4–5)
ALP SERPL-CCNC: 55 UNIT/L (ref 38–126)
ALT SERPL-CCNC: 21 UNIT/L (ref 12–78)
ANION GAP SERPL CALC-SCNC: 13 MMOL/L
AST SERPL-CCNC: 13 UNIT/L (ref 15–37)
BASOPHILS # BLD AUTO: 0 X10(3)/MCL (ref 0–0.2)
BASOPHILS NFR BLD AUTO: 0.5 %
BILIRUB SERPL-MCNC: 0.5 MG/DL (ref 0.2–1)
BILIRUBIN DIRECT+TOT PNL SERPL-MCNC: 0.1 MG/DL (ref 0–0.5)
BILIRUBIN DIRECT+TOT PNL SERPL-MCNC: 0.4 MG/DL (ref 0–0.8)
BUN SERPL-MCNC: 17 MG/DL (ref 8–26)
CHLORIDE SERPL-SCNC: 99 MMOL/L (ref 98–109)
CREAT SERPL-MCNC: 0.9 MG/DL (ref 0.6–1.3)
EOSINOPHIL # BLD AUTO: 0 X10(3)/MCL (ref 0–0.9)
EOSINOPHIL NFR BLD AUTO: 0.9 %
ERYTHROCYTE [DISTWIDTH] IN BLOOD BY AUTOMATED COUNT: 14.5 % (ref 11.5–17)
GLUCOSE SERPL-MCNC: 91 MG/DL (ref 70–105)
HCT VFR BLD AUTO: 33.2 % (ref 37–47)
HCT VFR BLD CALC: 33 % (ref 38–51)
HGB BLD-MCNC: 11.1 GM/DL (ref 12–16)
HGB BLD-MCNC: 11.2 MG/DL (ref 12–17)
LIVER PROFILE INTERP: ABNORMAL
LYMPHOCYTES # BLD AUTO: 0.9 X10(3)/MCL (ref 0.6–4.6)
LYMPHOCYTES NFR BLD AUTO: 15.3 %
MCH RBC QN AUTO: 30.9 PG (ref 27–31)
MCHC RBC AUTO-ENTMCNC: 33.4 GM/DL (ref 33–36)
MCV RBC AUTO: 92.5 FL (ref 80–94)
MONOCYTES # BLD AUTO: 0.3 X10(3)/MCL (ref 0.1–1.3)
MONOCYTES NFR BLD AUTO: 5.1 %
NEUTROPHILS # BLD AUTO: 4.4 X10(3)/MCL (ref 2.1–9.2)
NEUTROPHILS NFR BLD AUTO: 77.3 %
PLATELET # BLD AUTO: 194 X10(3)/MCL (ref 130–400)
PMV BLD AUTO: 8.2 FL (ref 9.4–12.4)
POC IONIZED CALCIUM: 1.23 MMOL/L (ref 1.12–1.32)
POC TCO2: 28 MMOL/L (ref 24–29)
POTASSIUM BLD-SCNC: 4.4 MMOL/L (ref 3.5–4.9)
PROT SERPL-MCNC: 6.3 GM/DL (ref 6.4–8.2)
RBC # BLD AUTO: 3.59 X10(6)/MCL (ref 4.2–5.4)
SODIUM BLD-SCNC: 135 MMOL/L (ref 138–146)
WBC # SPEC AUTO: 5.7 X10(3)/MCL (ref 4.5–11.5)

## 2019-10-02 ENCOUNTER — HISTORICAL (OUTPATIENT)
Dept: INFUSION THERAPY | Facility: HOSPITAL | Age: 74
End: 2019-10-02

## 2019-10-09 ENCOUNTER — HISTORICAL (OUTPATIENT)
Dept: INFUSION THERAPY | Facility: HOSPITAL | Age: 74
End: 2019-10-09

## 2019-10-09 LAB
ABS NEUT (OLG): 5.47 X10(3)/MCL (ref 2.1–9.2)
ALBUMIN SERPL-MCNC: 3.5 GM/DL (ref 3.4–5)
ALP SERPL-CCNC: 49 UNIT/L (ref 38–126)
ALT SERPL-CCNC: 25 UNIT/L (ref 12–78)
ANION GAP SERPL CALC-SCNC: 12 MMOL/L
AST SERPL-CCNC: 17 UNIT/L (ref 15–37)
BASOPHILS # BLD AUTO: 0 X10(3)/MCL (ref 0–0.2)
BASOPHILS NFR BLD AUTO: 0.6 %
BILIRUB SERPL-MCNC: 0.5 MG/DL (ref 0.2–1)
BILIRUBIN DIRECT+TOT PNL SERPL-MCNC: 0.1 MG/DL (ref 0–0.5)
BILIRUBIN DIRECT+TOT PNL SERPL-MCNC: 0.4 MG/DL (ref 0–0.8)
BUN SERPL-MCNC: 20 MG/DL (ref 8–26)
CHLORIDE SERPL-SCNC: 99 MMOL/L (ref 98–109)
CREAT SERPL-MCNC: 1.2 MG/DL (ref 0.6–1.3)
EOSINOPHIL # BLD AUTO: 0.1 X10(3)/MCL (ref 0–0.9)
EOSINOPHIL NFR BLD AUTO: 1.1 %
ERYTHROCYTE [DISTWIDTH] IN BLOOD BY AUTOMATED COUNT: 15.4 % (ref 11.5–17)
GLUCOSE SERPL-MCNC: 123 MG/DL (ref 70–105)
HCT VFR BLD AUTO: 33.4 % (ref 37–47)
HCT VFR BLD CALC: 34 % (ref 38–51)
HGB BLD-MCNC: 10.9 GM/DL (ref 12–16)
HGB BLD-MCNC: 11.6 MG/DL (ref 12–17)
LIVER PROFILE INTERP: ABNORMAL
LYMPHOCYTES # BLD AUTO: 0.9 X10(3)/MCL (ref 0.6–4.6)
LYMPHOCYTES NFR BLD AUTO: 13 %
MCH RBC QN AUTO: 30.6 PG (ref 27–31)
MCHC RBC AUTO-ENTMCNC: 32.6 GM/DL (ref 33–36)
MCV RBC AUTO: 93.8 FL (ref 80–94)
MONOCYTES # BLD AUTO: 0.5 X10(3)/MCL (ref 0.1–1.3)
MONOCYTES NFR BLD AUTO: 6.6 %
NEUTROPHILS # BLD AUTO: 5.5 X10(3)/MCL (ref 2.1–9.2)
NEUTROPHILS NFR BLD AUTO: 77.3 %
PLATELET # BLD AUTO: 187 X10(3)/MCL (ref 130–400)
PMV BLD AUTO: 8.5 FL (ref 9.4–12.4)
POC IONIZED CALCIUM: 1.24 MMOL/L (ref 1.12–1.32)
POC TCO2: 30 MMOL/L (ref 24–29)
POTASSIUM BLD-SCNC: 4.1 MMOL/L (ref 3.5–4.9)
PROT SERPL-MCNC: 6.3 GM/DL (ref 6.4–8.2)
RBC # BLD AUTO: 3.56 X10(6)/MCL (ref 4.2–5.4)
SODIUM BLD-SCNC: 136 MMOL/L (ref 138–146)
WBC # SPEC AUTO: 7.1 X10(3)/MCL (ref 4.5–11.5)

## 2019-10-15 ENCOUNTER — HISTORICAL (OUTPATIENT)
Dept: ADMINISTRATIVE | Facility: HOSPITAL | Age: 74
End: 2019-10-15

## 2019-10-15 LAB
ABS NEUT (OLG): 3.51 X10(3)/MCL (ref 2.1–9.2)
ANION GAP SERPL CALC-SCNC: 14 MMOL/L
BASOPHILS # BLD AUTO: 0 X10(3)/MCL (ref 0–0.2)
BASOPHILS NFR BLD AUTO: 0.4 %
BUN SERPL-MCNC: 23 MG/DL (ref 8–26)
CHLORIDE SERPL-SCNC: 101 MMOL/L (ref 98–109)
CREAT SERPL-MCNC: 1 MG/DL (ref 0.6–1.3)
EOSINOPHIL # BLD AUTO: 0.1 X10(3)/MCL (ref 0–0.9)
EOSINOPHIL NFR BLD AUTO: 1.4 %
ERYTHROCYTE [DISTWIDTH] IN BLOOD BY AUTOMATED COUNT: 15.9 % (ref 11.5–17)
GLUCOSE SERPL-MCNC: 94 MG/DL (ref 70–105)
HCT VFR BLD AUTO: 32.1 % (ref 37–47)
HCT VFR BLD CALC: 31 % (ref 38–51)
HGB BLD-MCNC: 10.4 GM/DL (ref 12–16)
HGB BLD-MCNC: 10.5 MG/DL (ref 12–17)
LYMPHOCYTES # BLD AUTO: 1.1 X10(3)/MCL (ref 0.6–4.6)
LYMPHOCYTES NFR BLD AUTO: 20.5 %
MCH RBC QN AUTO: 30.8 PG (ref 27–31)
MCHC RBC AUTO-ENTMCNC: 32.4 GM/DL (ref 33–36)
MCV RBC AUTO: 95 FL (ref 80–94)
MONOCYTES # BLD AUTO: 0.5 X10(3)/MCL (ref 0.1–1.3)
MONOCYTES NFR BLD AUTO: 9.5 %
NEUTROPHILS # BLD AUTO: 3.5 X10(3)/MCL (ref 2.1–9.2)
NEUTROPHILS NFR BLD AUTO: 67.6 %
PLATELET # BLD AUTO: 165 X10(3)/MCL (ref 130–400)
PMV BLD AUTO: 8.7 FL (ref 9.4–12.4)
POC IONIZED CALCIUM: 1.19 MMOL/L (ref 1.12–1.32)
POC TCO2: 27 MMOL/L (ref 24–29)
POTASSIUM BLD-SCNC: 4.2 MMOL/L (ref 3.5–4.9)
RBC # BLD AUTO: 3.38 X10(6)/MCL (ref 4.2–5.4)
SODIUM BLD-SCNC: 137 MMOL/L (ref 138–146)
WBC # SPEC AUTO: 5.2 X10(3)/MCL (ref 4.5–11.5)

## 2019-10-16 ENCOUNTER — HISTORICAL (OUTPATIENT)
Dept: INFUSION THERAPY | Facility: HOSPITAL | Age: 74
End: 2019-10-16

## 2019-10-23 ENCOUNTER — HISTORICAL (OUTPATIENT)
Dept: INFUSION THERAPY | Facility: HOSPITAL | Age: 74
End: 2019-10-23

## 2019-10-23 LAB
ABS NEUT (OLG): 2.22 X10(3)/MCL (ref 2.1–9.2)
ALBUMIN SERPL-MCNC: 3.4 GM/DL (ref 3.4–5)
ALP SERPL-CCNC: 47 UNIT/L (ref 38–126)
ALT SERPL-CCNC: 28 UNIT/L (ref 12–78)
ANION GAP SERPL CALC-SCNC: 17 MMOL/L
AST SERPL-CCNC: 20 UNIT/L (ref 15–37)
BASOPHILS # BLD AUTO: 0 X10(3)/MCL (ref 0–0.2)
BASOPHILS NFR BLD AUTO: 0.8 %
BILIRUB SERPL-MCNC: 0.4 MG/DL (ref 0.2–1)
BILIRUBIN DIRECT+TOT PNL SERPL-MCNC: 0.1 MG/DL (ref 0–0.5)
BILIRUBIN DIRECT+TOT PNL SERPL-MCNC: 0.3 MG/DL (ref 0–0.8)
BUN SERPL-MCNC: 17 MG/DL (ref 8–26)
CHLORIDE SERPL-SCNC: 99 MMOL/L (ref 98–109)
CREAT SERPL-MCNC: 0.9 MG/DL (ref 0.6–1.3)
EOSINOPHIL # BLD AUTO: 0 X10(3)/MCL (ref 0–0.9)
EOSINOPHIL NFR BLD AUTO: 1.4 %
ERYTHROCYTE [DISTWIDTH] IN BLOOD BY AUTOMATED COUNT: 16.7 % (ref 11.5–17)
GLUCOSE SERPL-MCNC: 131 MG/DL (ref 70–105)
HCT VFR BLD AUTO: 32.6 % (ref 37–47)
HCT VFR BLD CALC: 33 % (ref 38–51)
HGB BLD-MCNC: 10.6 GM/DL (ref 12–16)
HGB BLD-MCNC: 11.2 MG/DL (ref 12–17)
LIVER PROFILE INTERP: ABNORMAL
LYMPHOCYTES # BLD AUTO: 0.9 X10(3)/MCL (ref 0.6–4.6)
LYMPHOCYTES NFR BLD AUTO: 25.5 %
MCH RBC QN AUTO: 31.3 PG (ref 27–31)
MCHC RBC AUTO-ENTMCNC: 32.5 GM/DL (ref 33–36)
MCV RBC AUTO: 96.2 FL (ref 80–94)
MONOCYTES # BLD AUTO: 0.4 X10(3)/MCL (ref 0.1–1.3)
MONOCYTES NFR BLD AUTO: 9.9 %
NEUTROPHILS # BLD AUTO: 2.2 X10(3)/MCL (ref 2.1–9.2)
NEUTROPHILS NFR BLD AUTO: 60.8 %
PLATELET # BLD AUTO: 175 X10(3)/MCL (ref 130–400)
PMV BLD AUTO: 8.2 FL (ref 9.4–12.4)
POC IONIZED CALCIUM: 1.16 MMOL/L (ref 1.12–1.32)
POC TCO2: 25 MMOL/L (ref 24–29)
POTASSIUM BLD-SCNC: 4 MMOL/L (ref 3.5–4.9)
PROT SERPL-MCNC: 6.3 GM/DL (ref 6.4–8.2)
RBC # BLD AUTO: 3.39 X10(6)/MCL (ref 4.2–5.4)
SODIUM BLD-SCNC: 135 MMOL/L (ref 138–146)
WBC # SPEC AUTO: 3.6 X10(3)/MCL (ref 4.5–11.5)

## 2019-10-31 ENCOUNTER — HISTORICAL (OUTPATIENT)
Dept: HEMATOLOGY/ONCOLOGY | Facility: CLINIC | Age: 74
End: 2019-10-31

## 2019-10-31 LAB
ABS NEUT (OLG): 4.84 X10(3)/MCL (ref 2.1–9.2)
ALBUMIN SERPL-MCNC: 3.2 GM/DL (ref 3.4–5)
ALBUMIN/GLOB SERPL: 1.1 RATIO (ref 1.1–2)
ALP SERPL-CCNC: 45 UNIT/L (ref 38–126)
ALT SERPL-CCNC: 27 UNIT/L (ref 12–78)
AST SERPL-CCNC: 19 UNIT/L (ref 15–37)
BASOPHILS # BLD AUTO: 0 X10(3)/MCL (ref 0–0.2)
BASOPHILS NFR BLD AUTO: 0.3 %
BILIRUB SERPL-MCNC: 0.3 MG/DL (ref 0.2–1)
BILIRUBIN DIRECT+TOT PNL SERPL-MCNC: 0.1 MG/DL (ref 0–0.5)
BILIRUBIN DIRECT+TOT PNL SERPL-MCNC: 0.2 MG/DL (ref 0–0.8)
BUN SERPL-MCNC: 23 MG/DL (ref 7–18)
CALCIUM SERPL-MCNC: 8.7 MG/DL (ref 8.5–10.1)
CHLORIDE SERPL-SCNC: 102 MMOL/L (ref 98–107)
CO2 SERPL-SCNC: 31 MMOL/L (ref 21–32)
CREAT SERPL-MCNC: 0.92 MG/DL (ref 0.55–1.02)
EOSINOPHIL # BLD AUTO: 0 X10(3)/MCL (ref 0–0.9)
EOSINOPHIL NFR BLD AUTO: 0.6 %
ERYTHROCYTE [DISTWIDTH] IN BLOOD BY AUTOMATED COUNT: 17.6 % (ref 11.5–17)
GLOBULIN SER-MCNC: 2.8 GM/DL (ref 2.4–3.5)
GLUCOSE SERPL-MCNC: 94 MG/DL (ref 74–106)
HCT VFR BLD AUTO: 30.6 % (ref 37–47)
HGB BLD-MCNC: 10.1 GM/DL (ref 12–16)
LYMPHOCYTES # BLD AUTO: 1.2 X10(3)/MCL (ref 0.6–4.6)
LYMPHOCYTES NFR BLD AUTO: 17.5 %
MCH RBC QN AUTO: 32.1 PG (ref 27–31)
MCHC RBC AUTO-ENTMCNC: 33 GM/DL (ref 33–36)
MCV RBC AUTO: 97.1 FL (ref 80–94)
MONOCYTES # BLD AUTO: 0.8 X10(3)/MCL (ref 0.1–1.3)
MONOCYTES NFR BLD AUTO: 11.2 %
NEUTROPHILS # BLD AUTO: 4.8 X10(3)/MCL (ref 2.1–9.2)
NEUTROPHILS NFR BLD AUTO: 67.5 %
PLATELET # BLD AUTO: 247 X10(3)/MCL (ref 130–400)
PMV BLD AUTO: 8.3 FL (ref 9.4–12.4)
POTASSIUM SERPL-SCNC: 4.2 MMOL/L (ref 3.5–5.1)
PROT SERPL-MCNC: 6 GM/DL (ref 6.4–8.2)
RBC # BLD AUTO: 3.15 X10(6)/MCL (ref 4.2–5.4)
SODIUM SERPL-SCNC: 137 MMOL/L (ref 136–145)
TSH SERPL-ACNC: 3.92 MIU/L (ref 0.36–3.74)
WBC # SPEC AUTO: 7.2 X10(3)/MCL (ref 4.5–11.5)

## 2019-11-05 ENCOUNTER — HISTORICAL (OUTPATIENT)
Dept: INFUSION THERAPY | Facility: HOSPITAL | Age: 74
End: 2019-11-05

## 2019-12-03 ENCOUNTER — HISTORICAL (OUTPATIENT)
Dept: HEMATOLOGY/ONCOLOGY | Facility: CLINIC | Age: 74
End: 2019-12-03

## 2019-12-03 LAB
ABS NEUT (OLG): 3.59 X10(3)/MCL (ref 2.1–9.2)
ALBUMIN SERPL-MCNC: 3.1 GM/DL (ref 3.4–5)
ALBUMIN/GLOB SERPL: 1.1 RATIO (ref 1.1–2)
ALP SERPL-CCNC: 52 UNIT/L (ref 38–126)
ALT SERPL-CCNC: 19 UNIT/L (ref 12–78)
AST SERPL-CCNC: 17 UNIT/L (ref 15–37)
BASOPHILS # BLD AUTO: 0 X10(3)/MCL (ref 0–0.2)
BASOPHILS NFR BLD AUTO: 0.5 %
BILIRUB SERPL-MCNC: 0.4 MG/DL (ref 0.2–1)
BILIRUBIN DIRECT+TOT PNL SERPL-MCNC: 0.1 MG/DL (ref 0–0.5)
BILIRUBIN DIRECT+TOT PNL SERPL-MCNC: 0.3 MG/DL (ref 0–0.8)
BUN SERPL-MCNC: 12 MG/DL (ref 7–18)
CALCIUM SERPL-MCNC: 8.3 MG/DL (ref 8.5–10.1)
CHLORIDE SERPL-SCNC: 105 MMOL/L (ref 98–107)
CO2 SERPL-SCNC: 27 MMOL/L (ref 21–32)
CREAT SERPL-MCNC: 0.81 MG/DL (ref 0.55–1.02)
EOSINOPHIL # BLD AUTO: 0.1 X10(3)/MCL (ref 0–0.9)
EOSINOPHIL NFR BLD AUTO: 2.4 %
ERYTHROCYTE [DISTWIDTH] IN BLOOD BY AUTOMATED COUNT: 16.8 % (ref 11.5–17)
GLOBULIN SER-MCNC: 2.7 GM/DL (ref 2.4–3.5)
GLUCOSE SERPL-MCNC: 106 MG/DL (ref 74–106)
HCT VFR BLD AUTO: 32.9 % (ref 37–47)
HGB BLD-MCNC: 10.6 GM/DL (ref 12–16)
LYMPHOCYTES # BLD AUTO: 1.3 X10(3)/MCL (ref 0.6–4.6)
LYMPHOCYTES NFR BLD AUTO: 21.7 %
MCH RBC QN AUTO: 31.6 PG (ref 27–31)
MCHC RBC AUTO-ENTMCNC: 32.2 GM/DL (ref 33–36)
MCV RBC AUTO: 98.2 FL (ref 80–94)
MONOCYTES # BLD AUTO: 0.8 X10(3)/MCL (ref 0.1–1.3)
MONOCYTES NFR BLD AUTO: 13.7 %
NEUTROPHILS # BLD AUTO: 3.6 X10(3)/MCL (ref 2.1–9.2)
NEUTROPHILS NFR BLD AUTO: 61.4 %
PLATELET # BLD AUTO: 191 X10(3)/MCL (ref 130–400)
PMV BLD AUTO: 8.1 FL (ref 9.4–12.4)
POTASSIUM SERPL-SCNC: 3.7 MMOL/L (ref 3.5–5.1)
PROT SERPL-MCNC: 5.8 GM/DL (ref 6.4–8.2)
RBC # BLD AUTO: 3.35 X10(6)/MCL (ref 4.2–5.4)
SODIUM SERPL-SCNC: 138 MMOL/L (ref 136–145)
WBC # SPEC AUTO: 5.8 X10(3)/MCL (ref 4.5–11.5)

## 2020-01-09 ENCOUNTER — HISTORICAL (OUTPATIENT)
Dept: RADIOLOGY | Facility: HOSPITAL | Age: 75
End: 2020-01-09

## 2020-01-10 ENCOUNTER — HISTORICAL (OUTPATIENT)
Dept: INFUSION THERAPY | Facility: HOSPITAL | Age: 75
End: 2020-01-10

## 2020-01-10 LAB
ABS NEUT (OLG): 5.1 X10(3)/MCL (ref 2.1–9.2)
ALBUMIN SERPL-MCNC: 3.2 GM/DL (ref 3.4–5)
ALBUMIN/GLOB SERPL: 1.1 RATIO (ref 1.1–2)
ALP SERPL-CCNC: 57 UNIT/L (ref 38–126)
ALT SERPL-CCNC: 16 UNIT/L (ref 12–78)
AST SERPL-CCNC: 10 UNIT/L (ref 15–37)
BASOPHILS # BLD AUTO: 0 X10(3)/MCL (ref 0–0.2)
BASOPHILS NFR BLD AUTO: 0.6 %
BILIRUB SERPL-MCNC: 0.4 MG/DL (ref 0.2–1)
BILIRUBIN DIRECT+TOT PNL SERPL-MCNC: 0.1 MG/DL (ref 0–0.5)
BILIRUBIN DIRECT+TOT PNL SERPL-MCNC: 0.3 MG/DL (ref 0–0.8)
BUN SERPL-MCNC: 18 MG/DL (ref 7–18)
CALCIUM SERPL-MCNC: 8.6 MG/DL (ref 8.5–10.1)
CHLORIDE SERPL-SCNC: 102 MMOL/L (ref 98–107)
CO2 SERPL-SCNC: 27 MMOL/L (ref 21–32)
CREAT SERPL-MCNC: 0.9 MG/DL (ref 0.55–1.02)
EOSINOPHIL # BLD AUTO: 0.1 X10(3)/MCL (ref 0–0.9)
EOSINOPHIL NFR BLD AUTO: 1.5 %
ERYTHROCYTE [DISTWIDTH] IN BLOOD BY AUTOMATED COUNT: 13.8 % (ref 11.5–17)
GLOBULIN SER-MCNC: 2.8 GM/DL (ref 2.4–3.5)
GLUCOSE SERPL-MCNC: 96 MG/DL (ref 74–106)
HCT VFR BLD AUTO: 34.9 % (ref 37–47)
HGB BLD-MCNC: 11.3 GM/DL (ref 12–16)
LYMPHOCYTES # BLD AUTO: 1.1 X10(3)/MCL (ref 0.6–4.6)
LYMPHOCYTES NFR BLD AUTO: 15.5 %
MCH RBC QN AUTO: 31.4 PG (ref 27–31)
MCHC RBC AUTO-ENTMCNC: 32.4 GM/DL (ref 33–36)
MCV RBC AUTO: 96.9 FL (ref 80–94)
MONOCYTES # BLD AUTO: 0.9 X10(3)/MCL (ref 0.1–1.3)
MONOCYTES NFR BLD AUTO: 11.8 %
NEUTROPHILS # BLD AUTO: 5.1 X10(3)/MCL (ref 2.1–9.2)
NEUTROPHILS NFR BLD AUTO: 70.2 %
PLATELET # BLD AUTO: 219 X10(3)/MCL (ref 130–400)
PMV BLD AUTO: 8.9 FL (ref 9.4–12.4)
POTASSIUM SERPL-SCNC: 4.1 MMOL/L (ref 3.5–5.1)
PROT SERPL-MCNC: 6 GM/DL (ref 6.4–8.2)
RBC # BLD AUTO: 3.6 X10(6)/MCL (ref 4.2–5.4)
SODIUM SERPL-SCNC: 135 MMOL/L (ref 136–145)
WBC # SPEC AUTO: 7.3 X10(3)/MCL (ref 4.5–11.5)

## 2020-03-06 ENCOUNTER — HISTORICAL (OUTPATIENT)
Dept: INFUSION THERAPY | Facility: HOSPITAL | Age: 75
End: 2020-03-06

## 2020-04-09 ENCOUNTER — HISTORICAL (OUTPATIENT)
Dept: HEMATOLOGY/ONCOLOGY | Facility: CLINIC | Age: 75
End: 2020-04-09

## 2020-04-09 LAB
ABS NEUT (OLG): 5 X10(3)/MCL (ref 2.1–9.2)
ALBUMIN SERPL-MCNC: 3.8 GM/DL (ref 3.4–5)
ALBUMIN/GLOB SERPL: 1.4 RATIO (ref 1.1–2)
ALP SERPL-CCNC: 71 UNIT/L (ref 40–150)
ALT SERPL-CCNC: 8 UNIT/L (ref 0–55)
AST SERPL-CCNC: 13 UNIT/L (ref 5–34)
BASOPHILS # BLD AUTO: 0 X10(3)/MCL (ref 0–0.2)
BASOPHILS NFR BLD AUTO: 0.4 %
BILIRUB SERPL-MCNC: 0.6 MG/DL
BILIRUBIN DIRECT+TOT PNL SERPL-MCNC: 0.2 MG/DL (ref 0–0.5)
BILIRUBIN DIRECT+TOT PNL SERPL-MCNC: 0.4 MG/DL (ref 0–0.8)
BUN SERPL-MCNC: 16 MG/DL (ref 9.8–20.1)
CALCIUM SERPL-MCNC: 9 MG/DL (ref 8.4–10.2)
CHLORIDE SERPL-SCNC: 101 MMOL/L (ref 98–107)
CO2 SERPL-SCNC: 27 MMOL/L (ref 23–31)
CREAT SERPL-MCNC: 0.91 MG/DL (ref 0.55–1.02)
EOSINOPHIL # BLD AUTO: 0.1 X10(3)/MCL (ref 0–0.9)
EOSINOPHIL NFR BLD AUTO: 1.3 %
ERYTHROCYTE [DISTWIDTH] IN BLOOD BY AUTOMATED COUNT: 14.6 % (ref 11.5–17)
GLOBULIN SER-MCNC: 2.8 GM/DL (ref 2.4–3.5)
GLUCOSE SERPL-MCNC: 113 MG/DL (ref 82–115)
HCT VFR BLD AUTO: 39.8 % (ref 37–47)
HGB BLD-MCNC: 12.9 GM/DL (ref 12–16)
LYMPHOCYTES # BLD AUTO: 1.7 X10(3)/MCL (ref 0.6–4.6)
LYMPHOCYTES NFR BLD AUTO: 22.4 %
MCH RBC QN AUTO: 29.9 PG (ref 27–31)
MCHC RBC AUTO-ENTMCNC: 32.4 GM/DL (ref 33–36)
MCV RBC AUTO: 92.3 FL (ref 80–94)
MONOCYTES # BLD AUTO: 0.8 X10(3)/MCL (ref 0.1–1.3)
MONOCYTES NFR BLD AUTO: 10.2 %
NEUTROPHILS # BLD AUTO: 5 X10(3)/MCL (ref 2.1–9.2)
NEUTROPHILS NFR BLD AUTO: 65.6 %
PLATELET # BLD AUTO: 221 X10(3)/MCL (ref 130–400)
PMV BLD AUTO: 8.8 FL (ref 9.4–12.4)
POC CREATININE: 0.8 MG/DL (ref 0.6–1.3)
POTASSIUM SERPL-SCNC: 4.4 MMOL/L (ref 3.5–5.1)
PROT SERPL-MCNC: 6.6 GM/DL (ref 5.8–7.6)
RBC # BLD AUTO: 4.31 X10(6)/MCL (ref 4.2–5.4)
SODIUM SERPL-SCNC: 138 MMOL/L (ref 136–145)
TSH SERPL-ACNC: 4.11 UIU/ML (ref 0.35–4.94)
WBC # SPEC AUTO: 7.6 X10(3)/MCL (ref 4.5–11.5)

## 2020-05-01 ENCOUNTER — HISTORICAL (OUTPATIENT)
Dept: INFUSION THERAPY | Facility: HOSPITAL | Age: 75
End: 2020-05-01

## 2020-07-10 ENCOUNTER — HISTORICAL (OUTPATIENT)
Dept: INFUSION THERAPY | Facility: HOSPITAL | Age: 75
End: 2020-07-10

## 2020-07-10 LAB
ABS NEUT (OLG): 4.51 X10(3)/MCL (ref 2.1–9.2)
ALBUMIN SERPL-MCNC: 3.7 GM/DL (ref 3.4–5)
ALBUMIN/GLOB SERPL: 1.4 RATIO (ref 1.1–2)
ALP SERPL-CCNC: 77 UNIT/L (ref 40–150)
ALT SERPL-CCNC: 8 UNIT/L (ref 0–55)
AST SERPL-CCNC: 14 UNIT/L (ref 5–34)
BASOPHILS # BLD AUTO: 0 X10(3)/MCL (ref 0–0.2)
BASOPHILS NFR BLD AUTO: 0.4 %
BILIRUB SERPL-MCNC: 0.6 MG/DL
BILIRUBIN DIRECT+TOT PNL SERPL-MCNC: 0.3 MG/DL (ref 0–0.5)
BILIRUBIN DIRECT+TOT PNL SERPL-MCNC: 0.3 MG/DL (ref 0–0.8)
BUN SERPL-MCNC: 13.1 MG/DL (ref 9.8–20.1)
CALCIUM SERPL-MCNC: 8.8 MG/DL (ref 8.4–10.2)
CHLORIDE SERPL-SCNC: 104 MMOL/L (ref 98–107)
CO2 SERPL-SCNC: 27 MMOL/L (ref 23–31)
CREAT SERPL-MCNC: 0.81 MG/DL (ref 0.55–1.02)
EOSINOPHIL # BLD AUTO: 0.2 X10(3)/MCL (ref 0–0.9)
EOSINOPHIL NFR BLD AUTO: 2.2 %
ERYTHROCYTE [DISTWIDTH] IN BLOOD BY AUTOMATED COUNT: 13.8 % (ref 11.5–17)
GLOBULIN SER-MCNC: 2.7 GM/DL (ref 2.4–3.5)
GLUCOSE SERPL-MCNC: 90 MG/DL (ref 82–115)
HCT VFR BLD AUTO: 38.7 % (ref 37–47)
HGB BLD-MCNC: 12.5 GM/DL (ref 12–16)
LYMPHOCYTES # BLD AUTO: 1.5 X10(3)/MCL (ref 0.6–4.6)
LYMPHOCYTES NFR BLD AUTO: 21.7 %
MCH RBC QN AUTO: 30.5 PG (ref 27–31)
MCHC RBC AUTO-ENTMCNC: 32.3 GM/DL (ref 33–36)
MCV RBC AUTO: 94.4 FL (ref 80–94)
MONOCYTES # BLD AUTO: 0.7 X10(3)/MCL (ref 0.1–1.3)
MONOCYTES NFR BLD AUTO: 10.3 %
NEUTROPHILS # BLD AUTO: 4.5 X10(3)/MCL (ref 2.1–9.2)
NEUTROPHILS NFR BLD AUTO: 65.3 %
PLATELET # BLD AUTO: 216 X10(3)/MCL (ref 130–400)
PMV BLD AUTO: 8.7 FL (ref 9.4–12.4)
POTASSIUM SERPL-SCNC: 4.1 MMOL/L (ref 3.5–5.1)
PROT SERPL-MCNC: 6.4 GM/DL (ref 5.8–7.6)
RBC # BLD AUTO: 4.1 X10(6)/MCL (ref 4.2–5.4)
SODIUM SERPL-SCNC: 142 MMOL/L (ref 136–145)
WBC # SPEC AUTO: 6.9 X10(3)/MCL (ref 4.5–11.5)

## 2020-08-11 ENCOUNTER — HISTORICAL (OUTPATIENT)
Dept: ADMINISTRATIVE | Facility: HOSPITAL | Age: 75
End: 2020-08-11

## 2020-08-11 LAB
ABS NEUT (OLG): 6.91 X10(3)/MCL (ref 2.1–9.2)
ALBUMIN SERPL-MCNC: 3.7 GM/DL (ref 3.4–4.8)
ALP SERPL-CCNC: 65 UNIT/L (ref 40–150)
ALT SERPL-CCNC: 15 UNIT/L (ref 0–55)
ANION GAP SERPL CALC-SCNC: 17 MMOL/L
AST SERPL-CCNC: 14 UNIT/L (ref 5–34)
BASOPHILS # BLD AUTO: 0 X10(3)/MCL (ref 0–0.2)
BASOPHILS NFR BLD AUTO: 0.2 %
BILIRUB SERPL-MCNC: 0.5 MG/DL
BILIRUBIN DIRECT+TOT PNL SERPL-MCNC: 0.2 MG/DL (ref 0–0.5)
BILIRUBIN DIRECT+TOT PNL SERPL-MCNC: 0.3 MG/DL (ref 0–0.8)
BUN SERPL-MCNC: 21 MG/DL (ref 8–26)
CHLORIDE SERPL-SCNC: 102 MMOL/L (ref 98–109)
CREAT SERPL-MCNC: 0.9 MG/DL (ref 0.6–1.3)
EOSINOPHIL # BLD AUTO: 0.3 X10(3)/MCL (ref 0–0.9)
EOSINOPHIL NFR BLD AUTO: 2.8 %
ERYTHROCYTE [DISTWIDTH] IN BLOOD BY AUTOMATED COUNT: 14.4 % (ref 11.5–17)
GLUCOSE SERPL-MCNC: 134 MG/DL (ref 70–105)
HCT VFR BLD AUTO: 40 % (ref 37–47)
HCT VFR BLD CALC: 40 % (ref 38–51)
HGB BLD-MCNC: 12.9 GM/DL (ref 12–16)
HGB BLD-MCNC: 13.6 MG/DL (ref 12–17)
LIVER PROFILE INTERP: NORMAL
LYMPHOCYTES # BLD AUTO: 1.5 X10(3)/MCL (ref 0.6–4.6)
LYMPHOCYTES NFR BLD AUTO: 16.4 %
MCH RBC QN AUTO: 30.5 PG (ref 27–31)
MCHC RBC AUTO-ENTMCNC: 32.3 GM/DL (ref 33–36)
MCV RBC AUTO: 94.6 FL (ref 80–94)
MONOCYTES # BLD AUTO: 0.4 X10(3)/MCL (ref 0.1–1.3)
MONOCYTES NFR BLD AUTO: 4.9 %
NEUTROPHILS # BLD AUTO: 6.9 X10(3)/MCL (ref 2.1–9.2)
NEUTROPHILS NFR BLD AUTO: 75.4 %
PLATELET # BLD AUTO: 259 X10(3)/MCL (ref 130–400)
PMV BLD AUTO: 8.6 FL (ref 9.4–12.4)
POC IONIZED CALCIUM: 1.19 MMOL/L (ref 1.12–1.32)
POC TCO2: 27 MMOL/L (ref 24–29)
POTASSIUM BLD-SCNC: 4.2 MMOL/L (ref 3.5–4.9)
PROT SERPL-MCNC: 6.4 GM/DL (ref 5.8–7.6)
RBC # BLD AUTO: 4.23 X10(6)/MCL (ref 4.2–5.4)
SODIUM BLD-SCNC: 141 MMOL/L (ref 138–146)
WBC # SPEC AUTO: 9.2 X10(3)/MCL (ref 4.5–11.5)

## 2020-10-08 ENCOUNTER — HISTORICAL (OUTPATIENT)
Dept: RADIOLOGY | Facility: HOSPITAL | Age: 75
End: 2020-10-08

## 2020-10-12 ENCOUNTER — HISTORICAL (OUTPATIENT)
Dept: ADMINISTRATIVE | Facility: HOSPITAL | Age: 75
End: 2020-10-12

## 2020-10-12 LAB
ABS NEUT (OLG): 4.1 X10(3)/MCL (ref 2.1–9.2)
ALBUMIN SERPL-MCNC: 3.5 GM/DL (ref 3.4–4.8)
ALBUMIN/GLOB SERPL: 1.4 RATIO (ref 1.1–2)
ALP SERPL-CCNC: 77 UNIT/L (ref 40–150)
ALT SERPL-CCNC: 8 UNIT/L (ref 0–55)
AST SERPL-CCNC: 11 UNIT/L (ref 5–34)
BASOPHILS # BLD AUTO: 0 X10(3)/MCL (ref 0–0.2)
BASOPHILS NFR BLD AUTO: 0.5 %
BILIRUB SERPL-MCNC: 0.6 MG/DL
BILIRUBIN DIRECT+TOT PNL SERPL-MCNC: 0.3 MG/DL (ref 0–0.5)
BILIRUBIN DIRECT+TOT PNL SERPL-MCNC: 0.3 MG/DL (ref 0–0.8)
BUN SERPL-MCNC: 15.9 MG/DL (ref 9.8–20.1)
CALCIUM SERPL-MCNC: 8.4 MG/DL (ref 8.4–10.2)
CHLORIDE SERPL-SCNC: 104 MMOL/L (ref 98–107)
CO2 SERPL-SCNC: 27 MMOL/L (ref 23–31)
CREAT SERPL-MCNC: 0.85 MG/DL (ref 0.55–1.02)
EOSINOPHIL # BLD AUTO: 0.2 X10(3)/MCL (ref 0–0.9)
EOSINOPHIL NFR BLD AUTO: 2.4 %
ERYTHROCYTE [DISTWIDTH] IN BLOOD BY AUTOMATED COUNT: 13.6 % (ref 11.5–17)
GLOBULIN SER-MCNC: 2.5 GM/DL (ref 2.4–3.5)
GLUCOSE SERPL-MCNC: 93 MG/DL (ref 82–115)
HCT VFR BLD AUTO: 34.8 % (ref 37–47)
HGB BLD-MCNC: 11.6 GM/DL (ref 12–16)
LYMPHOCYTES # BLD AUTO: 1.4 X10(3)/MCL (ref 0.6–4.6)
LYMPHOCYTES NFR BLD AUTO: 21.9 %
MCH RBC QN AUTO: 30.4 PG (ref 27–31)
MCHC RBC AUTO-ENTMCNC: 33.3 GM/DL (ref 33–36)
MCV RBC AUTO: 91.1 FL (ref 80–94)
MONOCYTES # BLD AUTO: 0.7 X10(3)/MCL (ref 0.1–1.3)
MONOCYTES NFR BLD AUTO: 10.6 %
NEUTROPHILS # BLD AUTO: 4.1 X10(3)/MCL (ref 2.1–9.2)
NEUTROPHILS NFR BLD AUTO: 64.4 %
PLATELET # BLD AUTO: 225 X10(3)/MCL (ref 130–400)
PMV BLD AUTO: 8.6 FL (ref 9.4–12.4)
POTASSIUM SERPL-SCNC: 4 MMOL/L (ref 3.5–5.1)
PROT SERPL-MCNC: 6 GM/DL (ref 5.8–7.6)
RBC # BLD AUTO: 3.82 X10(6)/MCL (ref 4.2–5.4)
SODIUM SERPL-SCNC: 141 MMOL/L (ref 136–145)
WBC # SPEC AUTO: 6.4 X10(3)/MCL (ref 4.5–11.5)

## 2020-10-23 LAB
ERYTHROCYTE [DISTWIDTH] IN BLOOD BY AUTOMATED COUNT: 14.1 % (ref 11.5–17)
HCT VFR BLD AUTO: 35.2 % (ref 37–47)
HGB BLD-MCNC: 11.6 GM/DL (ref 12–16)
INR PPP: 1 (ref 0–1.3)
MCH RBC QN AUTO: 30.1 PG (ref 27–31)
MCHC RBC AUTO-ENTMCNC: 33 GM/DL (ref 33–36)
MCV RBC AUTO: 91.2 FL (ref 80–94)
PLATELET # BLD AUTO: 232 X10(3)/MCL (ref 130–400)
PMV BLD AUTO: 9 FL (ref 9.4–12.4)
PROTHROMBIN TIME: 13.4 SECOND(S) (ref 11.1–13.7)
RBC # BLD AUTO: 3.86 X10(6)/MCL (ref 4.2–5.4)
WBC # SPEC AUTO: 8.9 X10(3)/MCL (ref 4.5–11.5)

## 2020-10-27 ENCOUNTER — HISTORICAL (OUTPATIENT)
Dept: CARDIOLOGY | Facility: HOSPITAL | Age: 75
End: 2020-10-27

## 2020-10-30 ENCOUNTER — HISTORICAL (OUTPATIENT)
Dept: INFUSION THERAPY | Facility: HOSPITAL | Age: 75
End: 2020-10-30

## 2020-11-03 ENCOUNTER — HISTORICAL (OUTPATIENT)
Dept: CARDIOLOGY | Facility: HOSPITAL | Age: 75
End: 2020-11-03

## 2020-11-09 ENCOUNTER — HISTORICAL (OUTPATIENT)
Dept: ADMINISTRATIVE | Facility: HOSPITAL | Age: 75
End: 2020-11-09

## 2020-11-09 LAB
ABS NEUT (OLG): 5.01 X10(3)/MCL (ref 2.1–9.2)
ALBUMIN SERPL-MCNC: 3.7 GM/DL (ref 3.4–4.8)
ALBUMIN/GLOB SERPL: 1.3 RATIO (ref 1.1–2)
ALP SERPL-CCNC: 78 UNIT/L (ref 40–150)
ALT SERPL-CCNC: 7 UNIT/L (ref 0–55)
AST SERPL-CCNC: 12 UNIT/L (ref 5–34)
BASOPHILS # BLD AUTO: 0 X10(3)/MCL (ref 0–0.2)
BASOPHILS NFR BLD AUTO: 0.3 %
BILIRUB SERPL-MCNC: 0.4 MG/DL
BILIRUBIN DIRECT+TOT PNL SERPL-MCNC: 0.2 MG/DL (ref 0–0.5)
BILIRUBIN DIRECT+TOT PNL SERPL-MCNC: 0.2 MG/DL (ref 0–0.8)
BUN SERPL-MCNC: 16.7 MG/DL (ref 9.8–20.1)
CALCIUM SERPL-MCNC: 9 MG/DL (ref 8.4–10.2)
CHLORIDE SERPL-SCNC: 103 MMOL/L (ref 98–107)
CO2 SERPL-SCNC: 29 MMOL/L (ref 23–31)
CREAT SERPL-MCNC: 0.81 MG/DL (ref 0.55–1.02)
EOSINOPHIL # BLD AUTO: 0.1 X10(3)/MCL (ref 0–0.9)
EOSINOPHIL NFR BLD AUTO: 1.9 %
ERYTHROCYTE [DISTWIDTH] IN BLOOD BY AUTOMATED COUNT: 13.9 % (ref 11.5–17)
GLOBULIN SER-MCNC: 2.8 GM/DL (ref 2.4–3.5)
GLUCOSE SERPL-MCNC: 113 MG/DL (ref 82–115)
HCT VFR BLD AUTO: 36.1 % (ref 37–47)
HGB BLD-MCNC: 11.9 GM/DL (ref 12–16)
LYMPHOCYTES # BLD AUTO: 1.7 X10(3)/MCL (ref 0.6–4.6)
LYMPHOCYTES NFR BLD AUTO: 23 %
MCH RBC QN AUTO: 30.1 PG (ref 27–31)
MCHC RBC AUTO-ENTMCNC: 33 GM/DL (ref 33–36)
MCV RBC AUTO: 91.4 FL (ref 80–94)
MONOCYTES # BLD AUTO: 0.5 X10(3)/MCL (ref 0.1–1.3)
MONOCYTES NFR BLD AUTO: 6.9 %
NEUTROPHILS # BLD AUTO: 5 X10(3)/MCL (ref 2.1–9.2)
NEUTROPHILS NFR BLD AUTO: 67.6 %
PLATELET # BLD AUTO: 233 X10(3)/MCL (ref 130–400)
PMV BLD AUTO: 8.4 FL (ref 9.4–12.4)
POTASSIUM SERPL-SCNC: 4.2 MMOL/L (ref 3.5–5.1)
PROT SERPL-MCNC: 6.5 GM/DL (ref 5.8–7.6)
RBC # BLD AUTO: 3.95 X10(6)/MCL (ref 4.2–5.4)
SODIUM SERPL-SCNC: 141 MMOL/L (ref 136–145)
WBC # SPEC AUTO: 7.4 X10(3)/MCL (ref 4.5–11.5)

## 2020-12-22 ENCOUNTER — HISTORICAL (OUTPATIENT)
Dept: ADMINISTRATIVE | Facility: HOSPITAL | Age: 75
End: 2020-12-22

## 2020-12-22 LAB
ABS NEUT (OLG): 4.67 X10(3)/MCL (ref 2.1–9.2)
ALBUMIN SERPL-MCNC: 3.8 GM/DL (ref 3.4–4.8)
ALP SERPL-CCNC: 66 UNIT/L (ref 40–150)
ALT SERPL-CCNC: 9 UNIT/L (ref 0–55)
ANION GAP SERPL CALC-SCNC: 16 MMOL/L
AST SERPL-CCNC: 13 UNIT/L (ref 5–34)
BASOPHILS # BLD AUTO: 0 X10(3)/MCL (ref 0–0.2)
BASOPHILS NFR BLD AUTO: 0.4 %
BILIRUB SERPL-MCNC: 0.4 MG/DL
BILIRUBIN DIRECT+TOT PNL SERPL-MCNC: 0.2 MG/DL (ref 0–0.5)
BILIRUBIN DIRECT+TOT PNL SERPL-MCNC: 0.2 MG/DL (ref 0–0.8)
BUN SERPL-MCNC: 23 MG/DL (ref 8–26)
CHLORIDE SERPL-SCNC: 98 MMOL/L (ref 98–109)
CREAT SERPL-MCNC: 1 MG/DL (ref 0.6–1.3)
EOSINOPHIL # BLD AUTO: 0.2 X10(3)/MCL (ref 0–0.9)
EOSINOPHIL NFR BLD AUTO: 2.8 %
ERYTHROCYTE [DISTWIDTH] IN BLOOD BY AUTOMATED COUNT: 14.5 % (ref 11.5–17)
GLUCOSE SERPL-MCNC: 115 MG/DL (ref 70–105)
HCT VFR BLD AUTO: 35.2 % (ref 37–47)
HCT VFR BLD CALC: 36 % (ref 38–51)
HGB BLD-MCNC: 11.3 GM/DL (ref 12–16)
HGB BLD-MCNC: 12.2 MG/DL (ref 12–17)
LIVER PROFILE INTERP: NORMAL
LYMPHOCYTES # BLD AUTO: 1.5 X10(3)/MCL (ref 0.6–4.6)
LYMPHOCYTES NFR BLD AUTO: 21 %
MCH RBC QN AUTO: 29.5 PG (ref 27–31)
MCHC RBC AUTO-ENTMCNC: 32.1 GM/DL (ref 33–36)
MCV RBC AUTO: 91.9 FL (ref 80–94)
MONOCYTES # BLD AUTO: 0.8 X10(3)/MCL (ref 0.1–1.3)
MONOCYTES NFR BLD AUTO: 10.7 %
NEUTROPHILS # BLD AUTO: 4.7 X10(3)/MCL (ref 2.1–9.2)
NEUTROPHILS NFR BLD AUTO: 65 %
PLATELET # BLD AUTO: 216 X10(3)/MCL (ref 130–400)
PMV BLD AUTO: 8.3 FL (ref 9.4–12.4)
POC IONIZED CALCIUM: 1.2 MMOL/L (ref 1.12–1.32)
POC TCO2: 26 MMOL/L (ref 24–29)
POTASSIUM BLD-SCNC: 4.4 MMOL/L (ref 3.5–4.9)
PROT SERPL-MCNC: 6.5 GM/DL (ref 5.8–7.6)
RBC # BLD AUTO: 3.83 X10(6)/MCL (ref 4.2–5.4)
SODIUM BLD-SCNC: 134 MMOL/L (ref 138–146)
WBC # SPEC AUTO: 7.2 X10(3)/MCL (ref 4.5–11.5)

## 2020-12-28 ENCOUNTER — HISTORICAL (OUTPATIENT)
Dept: INFUSION THERAPY | Facility: HOSPITAL | Age: 75
End: 2020-12-28

## 2021-02-26 ENCOUNTER — HISTORICAL (OUTPATIENT)
Dept: INFUSION THERAPY | Facility: HOSPITAL | Age: 76
End: 2021-02-26

## 2021-03-26 ENCOUNTER — HISTORICAL (OUTPATIENT)
Dept: RADIOLOGY | Facility: HOSPITAL | Age: 76
End: 2021-03-26

## 2021-03-30 ENCOUNTER — HISTORICAL (OUTPATIENT)
Dept: ADMINISTRATIVE | Facility: HOSPITAL | Age: 76
End: 2021-03-30

## 2021-03-30 LAB
ABS NEUT (OLG): 3.31 X10(3)/MCL (ref 2.1–9.2)
ANION GAP SERPL CALC-SCNC: 17 MMOL/L
BASOPHILS # BLD AUTO: 0 X10(3)/MCL (ref 0–0.2)
BASOPHILS NFR BLD AUTO: 0.6 %
BUN SERPL-MCNC: 16 MG/DL (ref 8–26)
CHLORIDE SERPL-SCNC: 99 MMOL/L (ref 98–109)
CREAT SERPL-MCNC: 1.2 MG/DL (ref 0.6–1.3)
EOSINOPHIL # BLD AUTO: 0.2 X10(3)/MCL (ref 0–0.9)
EOSINOPHIL NFR BLD AUTO: 3.7 %
ERYTHROCYTE [DISTWIDTH] IN BLOOD BY AUTOMATED COUNT: 15.1 % (ref 11.5–17)
GLUCOSE SERPL-MCNC: 83 MG/DL (ref 70–105)
HCT VFR BLD AUTO: 35.4 % (ref 37–47)
HCT VFR BLD CALC: 36 % (ref 38–51)
HGB BLD-MCNC: 11.5 GM/DL (ref 12–16)
HGB BLD-MCNC: 12.2 MG/DL (ref 12–17)
LYMPHOCYTES # BLD AUTO: 1.2 X10(3)/MCL (ref 0.6–4.6)
LYMPHOCYTES NFR BLD AUTO: 22.9 %
MCH RBC QN AUTO: 29.6 PG (ref 27–31)
MCHC RBC AUTO-ENTMCNC: 32.5 GM/DL (ref 33–36)
MCV RBC AUTO: 91.2 FL (ref 80–94)
MONOCYTES # BLD AUTO: 0.6 X10(3)/MCL (ref 0.1–1.3)
MONOCYTES NFR BLD AUTO: 10.8 %
NEUTROPHILS # BLD AUTO: 3.3 X10(3)/MCL (ref 2.1–9.2)
NEUTROPHILS NFR BLD AUTO: 61.6 %
PLATELET # BLD AUTO: 204 X10(3)/MCL (ref 130–400)
PMV BLD AUTO: 8.9 FL (ref 9.4–12.4)
POC IONIZED CALCIUM: 1.24 MMOL/L (ref 1.12–1.32)
POC TCO2: 28 MMOL/L (ref 24–29)
POTASSIUM BLD-SCNC: 3.8 MMOL/L (ref 3.5–4.9)
RBC # BLD AUTO: 3.88 X10(6)/MCL (ref 4.2–5.4)
SODIUM BLD-SCNC: 138 MMOL/L (ref 138–146)
WBC # SPEC AUTO: 5.4 X10(3)/MCL (ref 4.5–11.5)

## 2021-04-23 ENCOUNTER — HISTORICAL (OUTPATIENT)
Dept: INFUSION THERAPY | Facility: HOSPITAL | Age: 76
End: 2021-04-23

## 2021-06-18 ENCOUNTER — HISTORICAL (OUTPATIENT)
Dept: INFUSION THERAPY | Facility: HOSPITAL | Age: 76
End: 2021-06-18

## 2021-06-28 ENCOUNTER — HISTORICAL (OUTPATIENT)
Dept: HEMATOLOGY/ONCOLOGY | Facility: CLINIC | Age: 76
End: 2021-06-28

## 2021-06-28 LAB
ABS NEUT (OLG): 4.08 X10(3)/MCL (ref 2.1–9.2)
ALBUMIN SERPL-MCNC: 3.2 GM/DL (ref 3.4–4.8)
ALBUMIN/GLOB SERPL: 1 RATIO (ref 1.1–2)
ALP SERPL-CCNC: 82 UNIT/L (ref 40–150)
ALT SERPL-CCNC: 12 UNIT/L (ref 0–55)
AST SERPL-CCNC: 16 UNIT/L (ref 5–34)
BASOPHILS # BLD AUTO: 0 X10(3)/MCL (ref 0–0.2)
BASOPHILS NFR BLD AUTO: 0.3 %
BILIRUB SERPL-MCNC: 0.5 MG/DL
BILIRUBIN DIRECT+TOT PNL SERPL-MCNC: 0.2 MG/DL (ref 0–0.8)
BILIRUBIN DIRECT+TOT PNL SERPL-MCNC: 0.3 MG/DL (ref 0–0.5)
BUN SERPL-MCNC: 9.8 MG/DL (ref 9.8–20.1)
CALCIUM SERPL-MCNC: 9.4 MG/DL (ref 8.4–10.2)
CHLORIDE SERPL-SCNC: 101 MMOL/L (ref 98–107)
CO2 SERPL-SCNC: 26 MMOL/L (ref 23–31)
CREAT SERPL-MCNC: 0.73 MG/DL (ref 0.55–1.02)
EOSINOPHIL # BLD AUTO: 0.2 X10(3)/MCL (ref 0–0.9)
EOSINOPHIL NFR BLD AUTO: 2.5 %
ERYTHROCYTE [DISTWIDTH] IN BLOOD BY AUTOMATED COUNT: 14.5 % (ref 11.5–17)
GLOBULIN SER-MCNC: 3.2 GM/DL (ref 2.4–3.5)
GLUCOSE SERPL-MCNC: 87 MG/DL (ref 82–115)
HCT VFR BLD AUTO: 36.3 % (ref 37–47)
HGB BLD-MCNC: 12 GM/DL (ref 12–16)
LYMPHOCYTES # BLD AUTO: 1.5 X10(3)/MCL (ref 0.6–4.6)
LYMPHOCYTES NFR BLD AUTO: 22.9 %
MCH RBC QN AUTO: 29.3 PG (ref 27–31)
MCHC RBC AUTO-ENTMCNC: 33.1 GM/DL (ref 33–36)
MCV RBC AUTO: 88.5 FL (ref 80–94)
MONOCYTES # BLD AUTO: 0.7 X10(3)/MCL (ref 0.1–1.3)
MONOCYTES NFR BLD AUTO: 11.3 %
NEUTROPHILS # BLD AUTO: 4.1 X10(3)/MCL (ref 2.1–9.2)
NEUTROPHILS NFR BLD AUTO: 62.5 %
PLATELET # BLD AUTO: 234 X10(3)/MCL (ref 130–400)
PMV BLD AUTO: 8.5 FL (ref 9.4–12.4)
POTASSIUM SERPL-SCNC: 4.6 MMOL/L (ref 3.5–5.1)
PROT SERPL-MCNC: 6.4 GM/DL (ref 5.8–7.6)
RBC # BLD AUTO: 4.1 X10(6)/MCL (ref 4.2–5.4)
SODIUM SERPL-SCNC: 136 MMOL/L (ref 136–145)
WBC # SPEC AUTO: 6.5 X10(3)/MCL (ref 4.5–11.5)

## 2021-08-27 ENCOUNTER — HISTORICAL (OUTPATIENT)
Dept: HEMATOLOGY/ONCOLOGY | Facility: CLINIC | Age: 76
End: 2021-08-27

## 2021-08-27 LAB
ABS NEUT (OLG): 4.36 X10(3)/MCL (ref 2.1–9.2)
ALBUMIN SERPL-MCNC: 4 GM/DL (ref 3.4–4.8)
ALBUMIN/GLOB SERPL: 1.4 RATIO (ref 1.1–2)
ALP SERPL-CCNC: 79 UNIT/L (ref 40–150)
ALT SERPL-CCNC: 7 UNIT/L (ref 0–55)
AST SERPL-CCNC: 13 UNIT/L (ref 5–34)
BASOPHILS # BLD AUTO: 0 X10(3)/MCL (ref 0–0.2)
BASOPHILS NFR BLD AUTO: 0.6 %
BILIRUB SERPL-MCNC: 0.5 MG/DL
BILIRUBIN DIRECT+TOT PNL SERPL-MCNC: 0.2 MG/DL (ref 0–0.5)
BILIRUBIN DIRECT+TOT PNL SERPL-MCNC: 0.3 MG/DL (ref 0–0.8)
BUN SERPL-MCNC: 14.9 MG/DL (ref 9.8–20.1)
CALCIUM SERPL-MCNC: 9.4 MG/DL (ref 8.4–10.2)
CHLORIDE SERPL-SCNC: 103 MMOL/L (ref 98–107)
CO2 SERPL-SCNC: 27 MMOL/L (ref 23–31)
CREAT SERPL-MCNC: 0.8 MG/DL (ref 0.55–1.02)
EOSINOPHIL # BLD AUTO: 0.2 X10(3)/MCL (ref 0–0.9)
EOSINOPHIL NFR BLD AUTO: 2.6 %
ERYTHROCYTE [DISTWIDTH] IN BLOOD BY AUTOMATED COUNT: 15.4 % (ref 11.5–17)
GLOBULIN SER-MCNC: 2.8 GM/DL (ref 2.4–3.5)
GLUCOSE SERPL-MCNC: 87 MG/DL (ref 82–115)
HCT VFR BLD AUTO: 35.2 % (ref 37–47)
HGB BLD-MCNC: 11.7 GM/DL (ref 12–16)
LYMPHOCYTES # BLD AUTO: 1.6 X10(3)/MCL (ref 0.6–4.6)
LYMPHOCYTES NFR BLD AUTO: 23.5 %
MCH RBC QN AUTO: 30.3 PG (ref 27–31)
MCHC RBC AUTO-ENTMCNC: 33.2 GM/DL (ref 33–36)
MCV RBC AUTO: 91.2 FL (ref 80–94)
MONOCYTES # BLD AUTO: 0.7 X10(3)/MCL (ref 0.1–1.3)
MONOCYTES NFR BLD AUTO: 9.7 %
NEUTROPHILS # BLD AUTO: 4.4 X10(3)/MCL (ref 2.1–9.2)
NEUTROPHILS NFR BLD AUTO: 63.3 %
PLATELET # BLD AUTO: 239 X10(3)/MCL (ref 130–400)
PMV BLD AUTO: 8.6 FL (ref 9.4–12.4)
POTASSIUM SERPL-SCNC: 4.6 MMOL/L (ref 3.5–5.1)
PROT SERPL-MCNC: 6.8 GM/DL (ref 5.8–7.6)
RBC # BLD AUTO: 3.86 X10(6)/MCL (ref 4.2–5.4)
SODIUM SERPL-SCNC: 140 MMOL/L (ref 136–145)
WBC # SPEC AUTO: 6.9 X10(3)/MCL (ref 4.5–11.5)

## 2021-11-10 ENCOUNTER — HISTORICAL (OUTPATIENT)
Dept: RADIOLOGY | Facility: HOSPITAL | Age: 76
End: 2021-11-10

## 2021-11-17 ENCOUNTER — HISTORICAL (OUTPATIENT)
Dept: INFUSION THERAPY | Facility: HOSPITAL | Age: 76
End: 2021-11-17

## 2021-11-17 LAB
ABS NEUT (OLG): 3.87 X10(3)/MCL (ref 2.1–9.2)
ALBUMIN SERPL-MCNC: 3.5 GM/DL (ref 3.4–4.8)
ALBUMIN/GLOB SERPL: 1.4 RATIO (ref 1.1–2)
ALP SERPL-CCNC: 74 UNIT/L (ref 40–150)
ALT SERPL-CCNC: 10 UNIT/L (ref 0–55)
AST SERPL-CCNC: 16 UNIT/L (ref 5–34)
BASOPHILS # BLD AUTO: 0 X10(3)/MCL (ref 0–0.2)
BASOPHILS NFR BLD AUTO: 0.6 %
BILIRUB SERPL-MCNC: 0.4 MG/DL
BILIRUBIN DIRECT+TOT PNL SERPL-MCNC: 0.2 MG/DL (ref 0–0.5)
BILIRUBIN DIRECT+TOT PNL SERPL-MCNC: 0.2 MG/DL (ref 0–0.8)
BUN SERPL-MCNC: 13.3 MG/DL (ref 9.8–20.1)
CALCIUM SERPL-MCNC: 9 MG/DL (ref 8.7–10.5)
CHLORIDE SERPL-SCNC: 103 MMOL/L (ref 98–107)
CO2 SERPL-SCNC: 27 MMOL/L (ref 23–31)
CREAT SERPL-MCNC: 0.93 MG/DL (ref 0.55–1.02)
EOSINOPHIL # BLD AUTO: 0.2 X10(3)/MCL (ref 0–0.9)
EOSINOPHIL NFR BLD AUTO: 3.7 %
ERYTHROCYTE [DISTWIDTH] IN BLOOD BY AUTOMATED COUNT: 14.6 % (ref 11.5–17)
GLOBULIN SER-MCNC: 2.5 GM/DL (ref 2.4–3.5)
GLUCOSE SERPL-MCNC: 153 MG/DL (ref 82–115)
HCT VFR BLD AUTO: 37.2 % (ref 37–47)
HGB BLD-MCNC: 12.3 GM/DL (ref 12–16)
LYMPHOCYTES # BLD AUTO: 1.5 X10(3)/MCL (ref 0.6–4.6)
LYMPHOCYTES NFR BLD AUTO: 24.3 %
MCH RBC QN AUTO: 30.1 PG (ref 27–31)
MCHC RBC AUTO-ENTMCNC: 33.1 GM/DL (ref 33–36)
MCV RBC AUTO: 91 FL (ref 80–94)
MONOCYTES # BLD AUTO: 0.6 X10(3)/MCL (ref 0.1–1.3)
MONOCYTES NFR BLD AUTO: 8.9 %
NEUTROPHILS # BLD AUTO: 3.9 X10(3)/MCL (ref 2.1–9.2)
NEUTROPHILS NFR BLD AUTO: 62.3 %
PLATELET # BLD AUTO: 208 X10(3)/MCL (ref 130–400)
PMV BLD AUTO: 8.5 FL (ref 9.4–12.4)
POTASSIUM SERPL-SCNC: 3.8 MMOL/L (ref 3.5–5.1)
PROT SERPL-MCNC: 6 GM/DL (ref 5.8–7.6)
RBC # BLD AUTO: 4.09 X10(6)/MCL (ref 4.2–5.4)
SODIUM SERPL-SCNC: 139 MMOL/L (ref 136–145)
WBC # SPEC AUTO: 6.2 X10(3)/MCL (ref 4.5–11.5)

## 2022-01-12 ENCOUNTER — HISTORICAL (OUTPATIENT)
Dept: INFUSION THERAPY | Facility: HOSPITAL | Age: 77
End: 2022-01-12

## 2022-03-15 ENCOUNTER — HISTORICAL (OUTPATIENT)
Dept: RADIOLOGY | Facility: HOSPITAL | Age: 77
End: 2022-03-15

## 2022-03-16 LAB — CBG: 84 (ref 70–115)

## 2022-03-24 ENCOUNTER — HISTORICAL (OUTPATIENT)
Dept: ADMINISTRATIVE | Facility: HOSPITAL | Age: 77
End: 2022-03-24

## 2022-03-24 LAB
ABS NEUT (OLG): 3.22 (ref 2.1–9.2)
ALBUMIN SERPL-MCNC: 3.4 G/DL (ref 3.4–4.8)
ALBUMIN/GLOB SERPL: 1.3 {RATIO} (ref 1.1–2)
ALP SERPL-CCNC: 81 U/L (ref 40–150)
ALT SERPL-CCNC: 10 U/L (ref 0–55)
AST SERPL-CCNC: 16 U/L (ref 5–34)
BASOPHILS # BLD AUTO: 0 10*3/UL (ref 0–0.2)
BASOPHILS NFR BLD AUTO: 0.7 %
BILIRUB SERPL-MCNC: 0.4 MG/DL
BILIRUBIN DIRECT+TOT PNL SERPL-MCNC: 0.2 (ref 0–0.5)
BILIRUBIN DIRECT+TOT PNL SERPL-MCNC: 0.2 (ref 0–0.8)
BUN SERPL-MCNC: 11.5 MG/DL (ref 9.8–20.1)
CALCIUM SERPL-MCNC: 9.1 MG/DL (ref 8.7–10.5)
CHLORIDE SERPL-SCNC: 102 MMOL/L (ref 98–107)
CO2 SERPL-SCNC: 27 MMOL/L (ref 23–31)
CREAT SERPL-MCNC: 0.78 MG/DL (ref 0.55–1.02)
EOSINOPHIL # BLD AUTO: 0.1 10*3/UL (ref 0–0.9)
EOSINOPHIL NFR BLD AUTO: 2.5 %
ERYTHROCYTE [DISTWIDTH] IN BLOOD BY AUTOMATED COUNT: 15.7 % (ref 11.5–17)
GLOBULIN SER-MCNC: 2.7 G/DL (ref 2.4–3.5)
GLUCOSE SERPL-MCNC: 128 MG/DL (ref 82–115)
HCT VFR BLD AUTO: 36 % (ref 37–47)
HEMOLYSIS INTERF INDEX SERPL-ACNC: 6
HGB BLD-MCNC: 11.6 G/DL (ref 12–16)
ICTERIC INTERF INDEX SERPL-ACNC: 0
LIPEMIC INTERF INDEX SERPL-ACNC: 0
LYMPHOCYTES # BLD AUTO: 1.5 10*3/UL (ref 0.6–4.6)
LYMPHOCYTES NFR BLD AUTO: 28 %
MANUAL DIFF? (OHS): NO
MCH RBC QN AUTO: 29.7 PG (ref 27–31)
MCHC RBC AUTO-ENTMCNC: 32.2 G/DL (ref 33–36)
MCV RBC AUTO: 92.3 FL (ref 80–94)
MONOCYTES # BLD AUTO: 0.6 10*3/UL (ref 0.1–1.3)
MONOCYTES NFR BLD AUTO: 10 %
NEUTROPHILS # BLD AUTO: 3.2 10*3/UL (ref 2.1–9.2)
NEUTROPHILS NFR BLD AUTO: 58.6 %
PLATELET # BLD AUTO: 242 10*3/UL (ref 130–400)
PMV BLD AUTO: 8.5 FL (ref 9.4–12.4)
POTASSIUM SERPL-SCNC: 4.1 MMOL/L (ref 3.5–5.1)
PROT SERPL-MCNC: 6.1 G/DL (ref 5.8–7.6)
RBC # BLD AUTO: 3.9 10*6/UL (ref 4.2–5.4)
SODIUM SERPL-SCNC: 138 MMOL/L (ref 136–145)
WBC # SPEC AUTO: 5.5 10*3/UL (ref 4.5–11.5)

## 2022-04-11 ENCOUNTER — HISTORICAL (OUTPATIENT)
Dept: ADMINISTRATIVE | Facility: HOSPITAL | Age: 77
End: 2022-04-11
Payer: MEDICARE

## 2022-04-28 VITALS
BODY MASS INDEX: 31.65 KG/M2 | OXYGEN SATURATION: 98 % | DIASTOLIC BLOOD PRESSURE: 72 MMHG | WEIGHT: 190 LBS | SYSTOLIC BLOOD PRESSURE: 124 MMHG | HEIGHT: 65 IN

## 2022-04-30 NOTE — PROGRESS NOTES
Patient:   Pratibha Patel            MRN: [id number]            FIN: [id number]               Age:   74 years     Sex:  Female     :  1945   Associated Diagnoses:   None   Author:   Marilou Patton NP      PCP: Graham Nguyen NP  Cardiologist: Dr. Danny Sánchez  Referring Physician: Dr. Anthony Hutson  Endocrine: Dr. Werner  Radiation oncology: Dr. Boss  ENT: Dr. Rosales      Visit Information   Problem List:  1. 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 ? metastatic findings could not be biopsied.    TTF negative/CK 7 +.    Diagnosed 2014  EGFR mutation negative/ALK gene rearrangement negative. Repeat NGS panel on 19 TP53 (+), BRAF (-), KRAS WT, ROS 1 (-), ALK (-), PDL1 <1%   2.  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 Patient has reportedly been referred to ENT for resection and lymph node dissection (per patient report).   Subsequent open biopsy done by Dr. Rosales on 2019 confirmed the same.  Patient was unable to undergo definitive surgical resection for oligo metastatic disease.  3.  Hoarseness and swallowing difficulties secondary to #2      Current Therapy:  observation      Treatment History:  1. RT with weekly Carbo/Taxol started 14--Completed 2014  2. Single agent 'Maintenance' Avastin q 3 weeks.  Started 4/9/15--last given 2016  3. Radiation + weekly Carbo/Taxol at 2AUC ----19-10/22/19    HPI:  Ms. Patel is a very pleasant 71-year-old female kindly referred by Dr. Anthony uHtson for newly diagnosed adenocarcinoma of the left  upper lobe. The patient's history is remarkable for vague left shoulder discomfort that began some time in the summer of 2013.  She also had associated unintentional weight loss which was partially attributed to hypothyroidism, in addition to some increasing shortness of breath and nonproductive cough.  These latter symptoms began over 3-4 month period prior to her presentation in June of 2014. Chest x-ray was done which showed left apical lung mass and subsequent CT scan done 05/26/2014 showed a large mass involving the apex of the left lung measuring 7.5 x 5.8 x 5 cm with localized obstruction of the left second rib and soft tissue extension superiorly and posteriorly. There is no evidence of hilar or mediastinal lymphadenopathy. There were also 2 additional noncalcified nodules measuring 6 mm in the left upper lobe and right middle lobe.  She then underwent bronchoscopic evaluation by Dr. Hutson on 06/10/2014. Pathology returned positive for moderately differentiated adenocarcinoma TTF-1 negative and CK 7 positive.   ALK gene rearrangement and EGFR mutation both negative.  She was then referred to Dr. Boss and Dr. Carl for further evaluation and treatment planning.  PET/CT done 06/25/2014 Showed extensive left upper lobe mass with extrathoracic chest wall involvement including destruction of the first and second ribs on the left, as well as uptake in the left lower and left infraclavicular region consistent with metastatic disease.  Additional 6 mm left upper lobe lesion  and  right middle lobe lesion without SUV uptake--Indeterminate in nature.  MRI of the brain done 06/25/2014 showed no evidence of intracranial metastases  She then completed radiation with weekly chemotherapy approximately the third week of August 2014   Restaging PET/CT done 10/21/2014 shows marked tumor regression with the bulk of the left upper lobe mass seen in the prior study essentially having resolved now. Near the apex there  still prominent soft tissue thickening involving the pleura with SUV now measuring 2.8 previously 17.2. No evidence of metastatic disease.  Restaging PET/CT done February 18, 2015 revealed stable/slightly decreased left apical pleural thickening compared to prior study with SUV uptake of 2.3, previously 2.8.  Thin linear opacity in the medial portion of the left upper lobe with SUV of 2.0.  Subcentimeter nodules in the medial portion of the left upper lobe are stable.  1 x 0.8 cm nodular soft tissue density anterior to the right external iliac artery has SUV of 3.6  MRI of the brain done February 12, 2015 shows no evidence of metastatic disease with chronic ischemic changes noted.  MRI of the left shoulder showed no definitive evidence of tumor infiltration with marked degenerative changes and tendinosis.  Restaging PET/CT from June 25, 2015 show stable findings with the left upper lobe with stable noncalcified soft tissue component measuring 2.9 x 2.3 cm.  Corresponding SUV of 3.8.  No new lesions.  No new sites of metastatic disease.  Colonoscopy done September 17, 2015 showed diverticulosis in the sigmoid colon only.  No bleeding lesions were found  PET/CT done September 18, 2015 showed further improvement in left apical lesion/residual soft tissue component with further improvement hypermetabolism.  Unchanged sclerotic appearance of left posterior first and second ribs with improvement hypermetabolism as well.  PET/CT done December 17, 2015 showed stable size and configuration of left apical soft tissue component and associated calcifications withi further improved metabolism indicative of positive response.  Stable heterogeneous sclerosis of the left posterior first and second ribs without significant difference of mild metabolism.    Restaging PET/CT and brain MRI done March 10, 2016 shows soft tissue within the left lung apex stable at 2.9 x 2.5 cm without any appreciable change. Current SUV 2.5 which is  stable. 5 mm nodule in the right minor fissure and linear density in the lateral portion of the left upper lobe are unchanged. Sclerosis involving the posterior portion of the left 1st and 2nd ribs are stable in appearance. No other new sites of metastatic disease noted.  MRI of the brain shows no evidence of intraparenchymal disease.  24-hour urine which was repeated on March 7, 2016 showed 24 urine protein of 1231, which is an improvement from prior 24-hour urine protein done January 15, 2016 which came back at 1878. The patient was also seen by Dr. Dunn who ordered the repeat testing  Repeat PET/CT done July 22, 2016 essentially shows stable disease.  No evidence of any increasing lesions.  Slight increase in FDG activity noted at the left lung apex/pleura compared to March 2016.  However no definitive increase in size of this mass.  Restaging PET/CT done October 28, 2016 shows stable pleural parenchymal findings in the left apex with interval decrease in FDG activity.  Sclerotic changes in the first through third ribs are stable with no FDG activity.  No additional sites of metastatic disease in the neck mediastinum right lung abdomen or pelvis  The Patient underwent left hip replacement on 11/28/2016.  Postop was complicated with an ileus requiring an open exploration with adhesion removals.  Rught shoulder XRAY negative for any mets.    PET/CT done 1/25/17 showed stable WILFREDO paramediastinal soft tissue.  No new dz.  Uptake in thryoid - decreased overall.        Ultrasound of the thyroid done 1/27/2017 showed multiple bilateral thyroid solid nodules, one of which in the right lobe appears to contain calcificationspatient referred back to Dr. Werner  Restaging PET/CT done April 20, 2017 shows overall stability with slight increase in SUV uptake at the left apex measuring 4.1 previously 3.1.  CT appearance is unchanged with stable pleural-parenchymal findings.  Thyroid is unchanged in appearance on CT.  Diffuse  activity in the right lobe persists with SUV of 5.2 previously 3.9  No other findings concerning for metastatic disease  Follow-up PET/CT done July 12, 2017 shows stable left upper lobe scarring in the apex with no evidence of disease recurrence.  Chest x-ray done October 13, 2017 shows changes in the left lung with apical pleural capping and soft tissue density in the paramediastinal region.  PET/CT done 12/8/17 showed stable WILFREDO pleural-parenchymal complex without change and with stable SUV of 3.19.   Stable RML nodule measuring 4mm, non FDG avid.   PET/CT done 6/8/18 showed stable treatment related changes in WILFREDO, without changes, with SUV of 2.9 (previously 3.2).  Stable 0.6cm density in right minor fissure, likely intrapulm LN.   PET/CT done 12/10/18:   persistent uptake in the right thyroid (nodule) with SUV now 9.8, previously 7.4.   Partially calcified soft tissue area left lung apex which is stable with SUV of 2.9  PET/CT on 6/20/19 showed: similar irregular left upper lung, consolidation, max SUV at 2.5, previously 2.9. Stable small right lung nodule; persistent hypermetabolic right thyroid lobe enhancement with max SUV 10.1 previously 9.8.   Patient reported seen by Dr Werner in early June for thyroid biopsy after c/o progressive dsyphagia over the last 2 months. FNA was performed on 6/3/19 which showed metastatic carcinoma, CK7 (+) and TTF1 (-) favored to be metastatic adenocarcinoma from patient's known lung primary. Per patient report, she has been referred to [facility] for tentative resection and lymph node biopsy- awaiting day/time of appt. Records request has been sent to Dr Werner's office as well.   Patient was referred to Dr. Rosales and underwent planned surgical resection on 7/29/2019.  However, at the time of surgery, the right lobe of the thyroid had diffuse desmoplasia and was extremely fibrotic from prior radiation and rockhard on palpation.  In reviewing the operative notes it was  difficult for Dr. Rosales to retract it medially and given the risk of recurrent laryngeal nerve sacrifice and injury to the esophagus, the decision was made to proceed with an open biopsy of the right thyroid rather than attempting resection of this area.  Biopsy was done on 7/29/2019 and came back showing poorly differentiated non-small cell carcinoma, consistent with patient's original diagnosis of metastatic lung cancer.  PDL 1 testing came back negative at less than 1%.  NGS panel was sent off with results pending.  Patient came back to see us in clinic on August 8, 2019  New baseline restaging CT scans of the neck chest abdomen and pelvis done 8/26/2019 showed diffuse enlargement of the left lobe of the thyroid with lesion measuring 4.4 x 3.6 cm extending to the paratracheal space.  Unchanged stable 0.4 cm left upper lobe nodule and unchanged 0.6 cm intrapulmonary lymph node along the right minor fissure.  No evidence of metastatic disease in the chest abdomen or pelvis.  Brain MRI also done 8/26/2019 shows no evidence of metastatic disease  Case discussed with radiation oncology with plan to start radiation with weekly chemotherapy with carboplatin and Taxol the week of September 9, 2019.  Patient states she had a swallowing evaluation done [facility] by Dr. Rosales- showed no aspiration during study  NGS panel with TP53 (+), BRAF (-), KRAS WT, ROS 1 (-), ALK (-), PDL1 <1%   Chemo/XRT started 9/11/19      Past medical history: Lung cancer with bone metastases, hypertension, COPD, hyperthyroidism, PVD, DVT2005, anxiety, depression, anemia, hypercholesterolemia, wears glasses  Procedure history: Rt. hip replacement. Mediport placement, cardiac stent to right leg, bladder lift ×2, bilateral knee surgery, hysterectomy, cholecystectomy, spinal surgery to lower back and neck  Family history: Motherlung cancer, brotherthroat cancer  Social history: Lives at home with spouse.  Daughter and grandson also lives with them.   "Patient is unemployed.  Denies alcohol or substance use.  Regular diet, no exercise.  Current every day smoker "1 pack last 2 days".  Patient has been smoking since the age of 13      Chief Complaint   Follow Up      Interval History   Patient here for scheduled follow-up. She is s/p completion of chemoradiation on 10/22/19. Doing well overall although she reports worsening fatigue and weakness since completion of treatment. Mildly hypotensive today with BP noted at 111/41. She reports that she get short of breath with exertion but denies dyspnea at rest. She denies HA, CP, N/V/C/D. She does note cough but reports it is not productive at this time. Denies fever or chills. Denies pain/discomfort at radiation site- mild erythema noted but no skin breakdown noted. Pre visit labs reviewed- mild TSH elevation at 3.9, remainder of labs stable/unremarkable overall as noted below. She is scheduled for upcoming follow up with both Rad/Onc and endocrinology.   No other questions or concerns at this time.       Review of Systems   14  point review of systems done in full with pertinent positives as described in interval history. Remainder of review of systems done in full and unremarkable.      Health Status   Allergies:    Allergic Reactions (Selected)  No Known Medication Allergies,    Allergies (1) Active Reaction  No Known Medication Allergies None Documented     Current medications:  (Selected)   Inpatient Medications  Ordered  Aloxi (for IVPB): 0.25 mg, form: Infusion, IV Piggyback, Once-chemo, Infuse over: 20 minute(s), first dose 08/07/14 9:00:00 CDT, stop date 08/07/14 9:00:00 CDT  Benadryl 50mg/ml Inj: 25 mg, form: Infusion, IV Piggyback, Once-chemo, Infuse over: 20 minute(s), first dose 08/07/14 9:00:00 CDT, stop date 08/07/14 9:00:00 CDT  Heparin Flush 100 U/mL - 5 mL: 500 units, form: Injection, IV Push, Once-chemo, first dose 08/07/14 11:20:00 CDT, stop date 08/07/14 11:20:00 CDT  Heparin Flush 100 U/mL - 5 mL: " 500 units, form: Injection, IV Push, Once-chemo, first dose 10/16/19 11:54:00 CDT, stop date 10/16/19 11:54:00 CDT, Routine, Week 6  Sodium Chloride 0.9% 500ml 500 mL: 500 mL, 500 mL, IV, 500 mL/hr, start date 12/16/15 20:11:00 CST  Taxol (for IVPB): 88 mg, form: Infusion, IV Piggyback, Once-chemo, Infuse over: 1 hr, first dose 08/07/14 9:20:00 CDT, stop date 08/07/14 9:20:00 CDT  Zantac additive for IV: 50 mg, form: Infusion, IV Piggyback, Once-chemo, Infuse over: 20 minute(s), first dose 08/07/14 9:00:00 CDT, stop date 08/07/14 9:00:00 CDT  carboplatin (for IVPB): 200 mg, form: Infusion, IV Piggyback, Once-chemo, Infuse over: 30 minute(s), first dose 08/07/14 10:20:00 CDT, stop date 08/07/14 10:20:00 CDT, Waste Sort Code: CHEMO  dexamethasone (for IVPB): 10 mg, form: Infusion, IV Piggyback, Once-chemo, Infuse over: 20 minute(s), first dose 08/07/14 9:00:00 CDT, stop date 08/07/14 9:00:00 CDT  heparin flush 100 units/mL (500 Unit  Flush): 500 units, form: Injection, IV Push, Once-chemo, first dose 07/20/18 11:00:00 CDT, stop date 07/20/18 11:00:00 CDT, Routine, Heparin Flush for Medi-port, Weeks 9  Future  heparin flush 100 units/mL (500 Unit  Flush): 500 units, form: Injection, IV Push, Once-chemo, *Est. first dose 02/05/20 14:23:00 CST, *Est. stop date 02/05/20 14:23:00 CST, Routine, Heparin Flush for Medi-port, Weeks 25, Future Order  heparin flush 100 units/mL (500 Unit  Flush): 500 units, form: Injection, IV Push, Once-chemo, *Est. first dose 08/21/19 14:23:00 CDT, *Est. stop date 08/21/19 14:23:00 CDT, Routine, Heparin Flush for Medi-port, Weeks 1, Future Order  heparin flush 100 units/mL (500 Unit  Flush): 500 units, form: Injection, IV Push, Once-chemo, *Est. first dose 10/16/19 14:23:00 CDT, *Est. stop date 10/16/19 14:23:00 CDT, Routine, Heparin Flush for Medi-port, Weeks 9, Future Order  heparin flush 100 units/mL (500 Unit  Flush): 500 units, form: Injection, IV Push, Once-chemo, *Est. first dose  19 14:23:00 CST, *Est. stop date 19 14:23:00 CST, Routine, Heparin Flush for Medi-port, Weeks 17, Future Order  Pending Complete  midazolam: 2 mg, form: Injection, IV Push, q5min, Order duration: 2 dose(s), first dose 04/09/15 9:16:00 CDT, stop date 04/09/15 9:25:00 CDT, (up to 5 mg for moderate anxiety), 30,025  Prescriptions  Prescribed  Bactroban 2% topical cream: 1 renate, TOP, TID, # 15 gm, 0 Refill(s)  Phenergan 12.5 mg Tab: 12.5 mg = 1 tab(s), Oral, q6hr, PRN PRN as needed for nausea/vomiting, # 60 tab(s), 0 Refill(s), Pharmacy: [facility]  Ultram 50 mg oral tablet: 50 mg = 1 tab(s), Oral, q8hr, PRN as needed for pain, # 12 tab(s), 0 Refill(s)  Zofran ODT 8 mg oral tablet, disintegratin mg = 1 tab(s), Oral, TID, PRN PRN nausea/vomiting, # 90 tab(s), 1 Refill(s), Pharmacy: [facility]  albuterol 0.083% inhalation solution: 5 mg = 6 mL, INH, q6hr, PRN PRN as needed for wheezing, # 60 EA, 0 Refill(s), other reason (Rx)  Documented Medications  Documented  Coreg 6.25 mg oral tablet: 6.25 mg = 1 tab(s), Oral, BID  Fe C 250 mg-100 mg oral tablet: 1 tab(s), Oral, BID, 0 Refill(s)  HYDROCHLOROT CAP 12.5M.5 mg = 1 cap(s), Oral, Daily  LOSARTAN POT TAB 50M mg = 1 tab(s), Oral, Daily  LYRICA CAP 50M mg = 1 cap(s), Oral, BID  SIMVASTATIN TAB 80M mg = 1 tab(s), Oral, Once a day (at bedtime)  SUPREP BOWEL SOL PREP: 177 mL, Oral  Vitamin D3 2000 intl units oral tablet: 2,000 IntUnit = 1 tab(s), Oral, Daily, 0 Refill(s)  aspirin 81 mg oral tablet, CHEWABLE: 81 mg = 1 tab(s), Oral, Daily  cilostazol 100 mg oral tablet: 100 mg = 1 tab(s), Oral, BID  citalopram 10 mg oral tablet: 10 mg = 1 tab(s), Oral, Daily, q am  citalopram 20 mg oral tablet: 20 mg = 1 tab(s), Oral, Once a day (at bedtime)  enalapril 20 mg oral tablet: 20 mg = 1 tab(s), Oral, Daily  felodipine 5 mg oral tablet, extended release: 5 mg = 1 tab(s), Oral, Daily  methIMAzole 5 mg  oral tablet: 5 mg = 1 tab(s), Oral, Daily  methimazole 10 mg oral tablet: 10 mg = 1 tab(s), Oral, Daily  oxycodone 5 mg oral tablet: 5 mg = 1 tab(s), Oral, q4-6hr  simvastatin 40 mg oral tablet: 40 mg = 1 tab(s), Oral, qPM   Problem list:    All Problems  Able to lie down / SNOMED CT 449190041 / Confirmed  Adenocarcinoma of lung / SNOMED CT 751525018 / Confirmed  Anemia / SNOMED CT 758129170 / Confirmed  currently on iron tabs  Anxiety depression / SNOMED CT 145774956 / Confirmed  Elevated serum cholesterol / SNOMED CT [id number] / Confirmed  Exercise tolerance / SNOMED CT 310935262 / Confirmed  able to walk 2 blocks w/o CP or SOB  Cancer of upper lobe of left lung / SNOMED CT 047422094 / Confirmed  Bone metastases / SNOMED CT 5905544886 / Confirmed  Obesity / SNOMED CT 5576902567 / Probable  Cough / SNOMED CT 8276856838 / Confirmed  Pancoast tumor / SNOMED CT 645574665 / Confirmed  Proteinuria / SNOMED CT 02735388 / Confirmed  Review of care plan / SNOMED CT 0593043014 / Confirmed  Tobacco user / SNOMED CT 993284488 / Probable  Wears glasses / SNOMED CT 289873073 / Confirmed,    Active Problems (15)  Able to lie down   Adenocarcinoma of lung   Anemia   Anxiety depression   Bone metastases   Cancer of upper lobe of left lung   Cough   Elevated serum cholesterol   Exercise tolerance   Obesity   Pancoast tumor   Proteinuria   Review of care plan   Tobacco user   Wears glasses         Physical Examination   Vital Signs   11/5/2019 13:28 CST      Temperature Oral          36.8 DegC                             Temperature Oral (calculated)             98.24 DegF                             Peripheral Pulse Rate     88 bpm                             Respiratory Rate          20 br/min                             SpO2                      97 %                             Oxygen Therapy            Room air                             Systolic Blood Pressure   111 mmHg                              Diastolic Blood Pressure  41 mmHg  LOW                             Blood Pressure Location   Right arm                             Manual Cuff BP            No                             O2 SAT at rest            97 %     Measurements from flowsheet : Measurements   11/5/2019 13:28 CST      Weight Dosing             90 kg                             Weight Measured           90 kg                             Weight Measured and Calculated in Lbs     198.41 lb                             Height/Length Dosing      175 cm                             Height/Length Measured    175 cm                             BSA Measured              2.09 m2                             Body Mass Index Measured  29.39 kg/m2        No qualifying data available   General:  Alert and oriented, No acute distress.    Eye:  Pupils are equal, round and reactive to light, Extraocular movements are intact, Normal conjunctiva.    HENT:  Normocephalic, Oral mucosa is moist, No pharyngeal erythema.    Neck:  Supple, No lymphadenopathy, Well-healing incisional site base of the neck.    Respiratory:  Respirations are non-labored, Breath sounds are equal, Symmetrical chest wall expansion, mild intermittent wheezing bilaterally .    Cardiovascular:  Normal rate, Regular rhythm, bilateral trace lower extremity edema.    Gastrointestinal:  Soft, Non-tender, Non-distended, Normal bowel sounds.    Musculoskeletal:  Normal strength, No swelling, Normal gait.    Integumentary:  Warm, Dry, Intact, mild erythema noted to base of throat s/p recent XRT- no skin breakdown or drainage noted.    Neurologic:  Alert, Oriented, No focal deficits.    Psychiatric:  Cooperative, Appropriate mood & affect.    Genitourinary:  No costovertebral angle tenderness.    Lymphatics:  No lymphadenopathy neck, axilla, groin.    Cognition and Speech:  Oriented, Speech clear and coherent.    ECOG Performance Scale: 1- Strenuous physical activity restricted; fully ambulatory and  able to carry out light work.      Review / Management   Results review:  Last 10 Days Lab Results : Lab View   10/31/2019 11:20 CDT     Sodium Lvl                137 mmol/L                             Potassium Lvl             4.2 mmol/L                             Chloride                  102 mmol/L                             CO2                       31.0 mmol/L                             Calcium Lvl               8.7 mg/dL                             Glucose Lvl               94 mg/dL                             BUN                       23.0 mg/dL  HI                             Creatinine                0.92 mg/dL                             eGFR-AA                   >60 mL/min/1.73 m2  NA                             eGFR-ROBLES                  >60 mL/min/1.73 m2  NA                             Bili Total                0.3 mg/dL                             Bili Direct               0.10 mg/dL                             Bili Indirect             0.20 mg/dL                             AST                       19 unit/L                             ALT                       27 unit/L                             Alk Phos                  45 unit/L                             Total Protein             6.0 gm/dL  LOW                             Albumin Lvl               3.20 gm/dL  LOW                             Globulin                  2.80 gm/dL                             A/G Ratio                 1.1 ratio                             TSH                       3.920 mIU/L  HI    10/31/2019 11:18 CDT     WBC                       7.2 x10(3)/mcL                             RBC                       3.15 x10(6)/mcL  LOW                             Hgb                       10.1 gm/dL  LOW                             Hct                       30.6 %  LOW                             Platelet                  247 x10(3)/mcL                             MCV                       97.1 fL  HI                              MCH                       32.1 pg  HI                             MCHC                      33.0 gm/dL                             RDW                       17.6 %  HI                             MPV                       8.3 fL  LOW                             Abs Neut                  4.84 x10(3)/mcL                             NEUT%                     67.5 %  NA                             NEUT#                     4.8 x10(3)/mcL                             LYMPH%                    17.5 %  NA                             LYMPH#                    1.2 x10(3)/mcL                             MONO%                     11.2 %  NA                             MONO#                     0.8 x10(3)/mcL                             EOS%                      0.6 %  NA                             EOS#                      0.0 x10(3)/mcL                             BASO%                     0.3 %  NA                             BASO#                     0.0 x10(3)/mcL  .       Impression and Plan   Diagnosis     1. 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 ? metastatic findings could not be biopsied.    TTF negative/CK 7 +.    Diagnosed June 2014  EGFR mutation negative/ALK gene rearrangement negative.  2.  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 Patient has reportedly been referred to ENT for resection and lymph node dissection (per patient report).   Subsequent open biopsy done by Dr. Rosales on 7/29/2019 confirmed the same.  Patient was unable to undergo definitive surgical resection for oligo metastatic  disease.  3.  Treatment induced anemia  4. Hoarseness .     Plan:  Mildy weak and hypotensive today- will set patient up for IV hydration today with 1000ml bolus  Instructed to maintain adequate hydration daily   Recommended patient start OTC Mucinex to help with congestion   Follow up with Rad/Onc and endocrinology as scheduled  Will plan for repeat PET/CT to be completed approximately 10-12 weeks after completion of XRT--- to be scheduled in early part of January, orders written for   RTC in 4 weeks for labs only -CBC, CMP   RTC in 8 weeks for OV and clinical examination to review PET/CT   [id number]   Call clinic with any questions or concerns     Marilou BEVERLY, KORIP-C.

## 2022-04-30 NOTE — PROGRESS NOTES
Patient:   Pratibha Patel            MRN: [id number]            FIN: [id number]               Age:   74 years     Sex:  Female     :  1945   Associated Diagnoses:   None   Author:   Marilou Patton NP      PCP: Graham Nguyen NP  Cardiologist: Dr. Danny Sánchez  Referring Physician: Dr. Anthony Hutson  Endocrine: Dr. Werner  Radiation oncology: Dr. Boss  ENT: Dr. Rosales      Visit Information   Problem List:  1. 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 ? metastatic findings could not be biopsied.    TTF negative/CK 7 +.    Diagnosed 2014  EGFR mutation negative/ALK gene rearrangement negative. Repeat NGS panel on 19 TP53 (+), BRAF (-), KRAS WT, ROS 1 (-), ALK (-), PDL1 <1%   2.  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 Patient has reportedly been referred to ENT for resection and lymph node dissection (per patient report).   Subsequent open biopsy done by Dr. Rosales on 2019 confirmed the same.  Patient was unable to undergo definitive surgical resection for oligo metastatic disease.  3.  Hoarseness and swallowing difficulties secondary to #2      Current Therapy:  Radiation x 30 fractions with weekly carboplatin and Taxol at 2 AUC and 45 mg/m² respectively, for isolated recurrence to the thyroid.  --- start date 19  Due for week #4 on 10/2/19       Treatment History:  1. RT with weekly Carbo/Taxol started 14--Completed 2014  2. Single agent 'Maintenance' Avastin q 3 weeks.  Started 4/9/15--last given 2016    HPI:  Ms. Patel is a very  pleasant 71-year-old female kindly referred by Dr. Anthony Hutson for newly diagnosed adenocarcinoma of the left upper lobe. The patient's history is remarkable for vague left shoulder discomfort that began some time in the summer of 2013.  She also had associated unintentional weight loss which was partially attributed to hypothyroidism, in addition to some increasing shortness of breath and nonproductive cough.  These latter symptoms began over 3-4 month period prior to her presentation in June of 2014. Chest x-ray was done which showed left apical lung mass and subsequent CT scan done 05/26/2014 showed a large mass involving the apex of the left lung measuring 7.5 x 5.8 x 5 cm with localized obstruction of the left second rib and soft tissue extension superiorly and posteriorly. There is no evidence of hilar or mediastinal lymphadenopathy. There were also 2 additional noncalcified nodules measuring 6 mm in the left upper lobe and right middle lobe.  She then underwent bronchoscopic evaluation by Dr. Hutson on 06/10/2014. Pathology returned positive for moderately differentiated adenocarcinoma TTF-1 negative and CK 7 positive.   ALK gene rearrangement and EGFR mutation both negative.  She was then referred to Dr. Boss and Dr. Carl for further evaluation and treatment planning.  PET/CT done 06/25/2014 Showed extensive left upper lobe mass with extrathoracic chest wall involvement including destruction of the first and second ribs on the left, as well as uptake in the left lower and left infraclavicular region consistent with metastatic disease.  Additional 6 mm left upper lobe lesion  and  right middle lobe lesion without SUV uptake--Indeterminate in nature.  MRI of the brain done 06/25/2014 showed no evidence of intracranial metastases  She then completed radiation with weekly chemotherapy approximately the third week of August 2014   Restaging PET/CT done 10/21/2014 shows marked tumor regression with the  bulk of the left upper lobe mass seen in the prior study essentially having resolved now. Near the apex there still prominent soft tissue thickening involving the pleura with SUV now measuring 2.8 previously 17.2. No evidence of metastatic disease.  Restaging PET/CT done February 18, 2015 revealed stable/slightly decreased left apical pleural thickening compared to prior study with SUV uptake of 2.3, previously 2.8.  Thin linear opacity in the medial portion of the left upper lobe with SUV of 2.0.  Subcentimeter nodules in the medial portion of the left upper lobe are stable.  1 x 0.8 cm nodular soft tissue density anterior to the right external iliac artery has SUV of 3.6  MRI of the brain done February 12, 2015 shows no evidence of metastatic disease with chronic ischemic changes noted.  MRI of the left shoulder showed no definitive evidence of tumor infiltration with marked degenerative changes and tendinosis.  Restaging PET/CT from June 25, 2015 show stable findings with the left upper lobe with stable noncalcified soft tissue component measuring 2.9 x 2.3 cm.  Corresponding SUV of 3.8.  No new lesions.  No new sites of metastatic disease.  Colonoscopy done September 17, 2015 showed diverticulosis in the sigmoid colon only.  No bleeding lesions were found  PET/CT done September 18, 2015 showed further improvement in left apical lesion/residual soft tissue component with further improvement hypermetabolism.  Unchanged sclerotic appearance of left posterior first and second ribs with improvement hypermetabolism as well.  PET/CT done December 17, 2015 showed stable size and configuration of left apical soft tissue component and associated calcifications withi further improved metabolism indicative of positive response.  Stable heterogeneous sclerosis of the left posterior first and second ribs without significant difference of mild metabolism.    Restaging PET/CT and brain MRI done March 10, 2016 shows soft tissue  within the left lung apex stable at 2.9 x 2.5 cm without any appreciable change. Current SUV 2.5 which is stable. 5 mm nodule in the right minor fissure and linear density in the lateral portion of the left upper lobe are unchanged. Sclerosis involving the posterior portion of the left 1st and 2nd ribs are stable in appearance. No other new sites of metastatic disease noted.  MRI of the brain shows no evidence of intraparenchymal disease.  24-hour urine which was repeated on March 7, 2016 showed 24 urine protein of 1231, which is an improvement from prior 24-hour urine protein done January 15, 2016 which came back at 1878. The patient was also seen by Dr. Dunn who ordered the repeat testing  Repeat PET/CT done July 22, 2016 essentially shows stable disease.  No evidence of any increasing lesions.  Slight increase in FDG activity noted at the left lung apex/pleura compared to March 2016.  However no definitive increase in size of this mass.  Restaging PET/CT done October 28, 2016 shows stable pleural parenchymal findings in the left apex with interval decrease in FDG activity.  Sclerotic changes in the first through third ribs are stable with no FDG activity.  No additional sites of metastatic disease in the neck mediastinum right lung abdomen or pelvis  The Patient underwent left hip replacement on 11/28/2016.  Postop was complicated with an ileus requiring an open exploration with adhesion removals.  Rught shoulder XRAY negative for any mets.    PET/CT done 1/25/17 showed stable WILFREDO paramediastinal soft tissue.  No new dz.  Uptake in thryoid - decreased overall.        Ultrasound of the thyroid done 1/27/2017 showed multiple bilateral thyroid solid nodules, one of which in the right lobe appears to contain calcificationspatient referred back to Dr. Werner  Restaging PET/CT done April 20, 2017 shows overall stability with slight increase in SUV uptake at the left apex measuring 4.1 previously 3.1.  CT appearance  is unchanged with stable pleural-parenchymal findings.  Thyroid is unchanged in appearance on CT.  Diffuse activity in the right lobe persists with SUV of 5.2 previously 3.9  No other findings concerning for metastatic disease  Follow-up PET/CT done July 12, 2017 shows stable left upper lobe scarring in the apex with no evidence of disease recurrence.  Chest x-ray done October 13, 2017 shows changes in the left lung with apical pleural capping and soft tissue density in the paramediastinal region.  PET/CT done 12/8/17 showed stable WILFREDO pleural-parenchymal complex without change and with stable SUV of 3.19.   Stable RML nodule measuring 4mm, non FDG avid.   PET/CT done 6/8/18 showed stable treatment related changes in WILFREDO, without changes, with SUV of 2.9 (previously 3.2).  Stable 0.6cm density in right minor fissure, likely intrapulm LN.   PET/CT done 12/10/18:   persistent uptake in the right thyroid (nodule) with SUV now 9.8, previously 7.4.   Partially calcified soft tissue area left lung apex which is stable with SUV of 2.9  PET/CT on 6/20/19 showed: similar irregular left upper lung, consolidation, max SUV at 2.5, previously 2.9. Stable small right lung nodule; persistent hypermetabolic right thyroid lobe enhancement with max SUV 10.1 previously 9.8.   Patient reported seen by Dr Werner in early June for thyroid biopsy after c/o progressive dsyphagia over the last 2 months. FNA was performed on 6/3/19 which showed metastatic carcinoma, CK7 (+) and TTF1 (-) favored to be metastatic adenocarcinoma from patient's known lung primary. Per patient report, she has been referred to [facility] for tentative resection and lymph node biopsy- awaiting day/time of appt. Records request has been sent to Dr Werner's office as well.   Patient was referred to Dr. Rosales and underwent planned surgical resection on 7/29/2019.  However, at the time of surgery, the right lobe of the thyroid had diffuse desmoplasia and was  extremely fibrotic from prior radiation and rockhard on palpation.  In reviewing the operative notes it was difficult for Dr. Rosales to retract it medially and given the risk of recurrent laryngeal nerve sacrifice and injury to the esophagus, the decision was made to proceed with an open biopsy of the right thyroid rather than attempting resection of this area.  Biopsy was done on 7/29/2019 and came back showing poorly differentiated non-small cell carcinoma, consistent with patient's original diagnosis of metastatic lung cancer.  PDL 1 testing came back negative at less than 1%.  NGS panel was sent off with results pending.  Patient came back to see us in clinic on August 8, 2019  New baseline restaging CT scans of the neck chest abdomen and pelvis done 8/26/2019 showed diffuse enlargement of the left lobe of the thyroid with lesion measuring 4.4 x 3.6 cm extending to the paratracheal space.  Unchanged stable 0.4 cm left upper lobe nodule and unchanged 0.6 cm intrapulmonary lymph node along the right minor fissure.  No evidence of metastatic disease in the chest abdomen or pelvis.  Brain MRI also done 8/26/2019 shows no evidence of metastatic disease  Case discussed with radiation oncology with plan to start radiation with weekly chemotherapy with carboplatin and Taxol the week of September 9, 2019.  Patient states she had a swallowing evaluation done [facility] by Dr. Rosales- showed no aspiration during study  NGS panel with TP53 (+), BRAF (-), KRAS WT, ROS 1 (-), ALK (-), PDL1 <1%       Past medical history: Lung cancer with bone metastases, hypertension, COPD, hyperthyroidism, PVD, DVT2005, anxiety, depression, anemia, hypercholesterolemia, wears glasses  Procedure history: Rt. hip replacement. Mediport placement, cardiac stent to right leg, bladder lift ×2, bilateral knee surgery, hysterectomy, cholecystectomy, spinal surgery to lower back and neck  Family history: Motherlung cancer, brotherthroat cancer  Social  "history: Lives at home with spouse.  Daughter and grandson also lives with them.  Patient is unemployed.  Denies alcohol or substance use.  Regular diet, no exercise.  Current every day smoker "1 pack last 2 days".  Patient has been smoking since the age of 13      Chief Complaint   Follow Up      Interval History   Patient here for scheduled follow-up- currently due for Week #4 of treatment tomorrow. She is currently about half way through planned 30 fractions of radiotherapy- tolerating well overall. Reports some mild dysphagia which is manageable to her at this time. She is maintaining adequate nutrition/hydration intake daily. Denies erythema. Denies mouth ulcerations. Tolerating weekly chemotherapy w/o issue. Denies HA, SOB, CP, N/V/C/D. Denies new neurological symptoms. Energy is stable overall. Reports intermittent cough but denies fevers, chills or other signs of infection. She is continuing to smoke. Pre-treatment labs are stable/unremarkble. Denies need for medication refill today. No other questions noted.       Review of Systems   14  point review of systems done in full with pertinent positives as described in interval history. Remainder of review of systems done in full and unremarkable.      Health Status   Allergies:    Allergic Reactions (Selected)  No Known Medication Allergies,    Allergies (1) Active Reaction  No Known Medication Allergies None Documented     Current medications:  (Selected)   Outpatient Medications  Ordered  Aloxi (for IVPB): 0.25 mg, form: Infusion, IV Piggyback, Once-chemo, Infuse over: 20 minute(s), first dose 08/07/14 9:00:00 CDT, stop date 08/07/14 9:00:00 CDT  Benadryl 50mg/ml Inj: 25 mg, form: Infusion, IV Piggyback, Once-chemo, Infuse over: 20 minute(s), first dose 08/07/14 9:00:00 CDT, stop date 08/07/14 9:00:00 CDT  Heparin Flush 100 U/mL - 5 mL: 500 units, form: Injection, IV Push, Once-chemo, first dose 08/07/14 11:20:00 CDT, stop date 08/07/14 11:20:00 CDT  Sodium " Chloride 0.9% 500ml 500 mL: 500 mL, 500 mL, IV, 500 mL/hr, start date 12/16/15 20:11:00 CST  Taxol (for IVPB): 88 mg, form: Infusion, IV Piggyback, Once-chemo, Infuse over: 1 hr, first dose 08/07/14 9:20:00 CDT, stop date 08/07/14 9:20:00 CDT  Zantac additive for IV: 50 mg, form: Infusion, IV Piggyback, Once-chemo, Infuse over: 20 minute(s), first dose 08/07/14 9:00:00 CDT, stop date 08/07/14 9:00:00 CDT  carboplatin (for IVPB): 200 mg, form: Infusion, IV Piggyback, Once-chemo, Infuse over: 30 minute(s), first dose 08/07/14 10:20:00 CDT, stop date 08/07/14 10:20:00 CDT, Waste Sort Code: CHEMO  dexamethasone (for IVPB): 10 mg, form: Infusion, IV Piggyback, Once-chemo, Infuse over: 20 minute(s), first dose 08/07/14 9:00:00 CDT, stop date 08/07/14 9:00:00 CDT  heparin flush 100 units/mL (500 Unit  Flush): 500 units, form: Injection, IV Push, Once-chemo, first dose 07/20/18 11:00:00 CDT, stop date 07/20/18 11:00:00 CDT, Routine, Heparin Flush for Medi-port, Weeks 9  Future  Aloxi (for IVPB): 0.25 mg, IV Piggyback, Once-chemo, Infuse over: 20 minute(s), first dose 10/02/19 10:00:00 CDT, stop date 10/02/19 10:00:00 CDT, Future Order, Week 4  Aloxi (for IVPB): 0.25 mg, IV Piggyback, Once-chemo, Infuse over: 20 minute(s), first dose 10/09/19 10:00:00 CDT, stop date 10/09/19 10:00:00 CDT, Future Order, Week 5  Aloxi (for IVPB): 0.25 mg, IV Piggyback, Once-chemo, Infuse over: 20 minute(s), first dose 10/16/19 10:00:00 CDT, stop date 10/16/19 10:00:00 CDT, Future Order, Week 6  Aloxi (for IVPB): 0.25 mg, IV Piggyback, Once-chemo, Infuse over: 20 minute(s), first dose 10/23/19 10:00:00 CDT, stop date 10/23/19 10:00:00 CDT, Future Order, Week 7  Aloxi (for IVPB): 0.25 mg, IV Piggyback, Once-chemo, Infuse over: 20 minute(s), first dose 10/30/19 10:00:00 CDT, stop date 10/30/19 10:00:00 CDT, Future Order, Week 8  Benadryl 50mg/ml Inj: 25 mg, IV Piggyback, Once-chemo, Infuse over: 20 minute(s), first dose 10/02/19 10:00:00 CDT,  stop date 10/02/19 10:00:00 CDT, Routine, Week 4, Future Order  Benadryl 50mg/ml Inj: 25 mg, IV Piggyback, Once-chemo, Infuse over: 20 minute(s), first dose 10/09/19 10:00:00 CDT, stop date 10/09/19 10:00:00 CDT, Routine, Week 5, Future Order  Benadryl 50mg/ml Inj: 25 mg, IV Piggyback, Once-chemo, Infuse over: 20 minute(s), first dose 10/16/19 10:00:00 CDT, stop date 10/16/19 10:00:00 CDT, Routine, Week 6, Future Order  Benadryl 50mg/ml Inj: 25 mg, IV Piggyback, Once-chemo, Infuse over: 20 minute(s), first dose 10/23/19 10:00:00 CDT, stop date 10/23/19 10:00:00 CDT, Routine, Week 7, Future Order  Benadryl 50mg/ml Inj: 25 mg, IV Piggyback, Once-chemo, Infuse over: 20 minute(s), first dose 10/30/19 10:00:00 CDT, stop date 10/30/19 10:00:00 CDT, Routine, Week 8, Future Order  Heparin Flush 100 U/mL - 5 mL: 500 units, form: Injection, IV Push, Once-chemo, first dose 10/02/19 12:20:00 CDT, stop date 10/02/19 12:20:00 CDT, Routine, Week 4, Future Order  Heparin Flush 100 U/mL - 5 mL: 500 units, form: Injection, IV Push, Once-chemo, first dose 10/09/19 12:20:00 CDT, stop date 10/09/19 12:20:00 CDT, Routine, Week 5, Future Order  Heparin Flush 100 U/mL - 5 mL: 500 units, form: Injection, IV Push, Once-chemo, first dose 10/16/19 12:20:00 CDT, stop date 10/16/19 12:20:00 CDT, Routine, Week 6, Future Order  Heparin Flush 100 U/mL - 5 mL: 500 units, form: Injection, IV Push, Once-chemo, first dose 10/23/19 12:20:00 CDT, stop date 10/23/19 12:20:00 CDT, Routine, Week 7, Future Order  Heparin Flush 100 U/mL - 5 mL: 500 units, form: Injection, IV Push, Once-chemo, first dose 10/30/19 12:20:00 CDT, stop date 10/30/19 12:20:00 CDT, Routine, Week 8, Future Order  Taxol (for IVPB): 84 mg, IV Piggyback, Once-chemo, Infuse over: 1 hr, first dose 10/02/19 10:20:00 CDT, stop date 10/02/19 10:20:00 CDT, Future Order, Week 4  Taxol (for IVPB): 84 mg, IV Piggyback, Once-chemo, Infuse over: 1 hr, first dose 10/09/19 10:20:00 CDT, stop date  10/09/19 10:20:00 CDT, Future Order, Week 5  Taxol (for IVPB): 84 mg, IV Piggyback, Once-chemo, Infuse over: 1 hr, first dose 10/16/19 10:20:00 CDT, stop date 10/16/19 10:20:00 CDT, Future Order, Week 6  Taxol (for IVPB): 84 mg, IV Piggyback, Once-chemo, Infuse over: 1 hr, first dose 10/23/19 10:20:00 CDT, stop date 10/23/19 10:20:00 CDT, Future Order, Week 7  Taxol (for IVPB): 84 mg, IV Piggyback, Once-chemo, Infuse over: 1 hr, first dose 10/30/19 10:20:00 CDT, stop date 10/30/19 10:20:00 CDT, Future Order, Week 8  Zantac additive for IV: 50 mg, IV Piggyback, Once-chemo, Infuse over: 20 minute(s), first dose 10/02/19 10:00:00 CDT, stop date 10/02/19 10:00:00 CDT, Routine, Week 4, Future Order  Zantac additive for IV: 50 mg, IV Piggyback, Once-chemo, Infuse over: 20 minute(s), first dose 10/09/19 10:00:00 CDT, stop date 10/09/19 10:00:00 CDT, Routine, Week 5, Future Order  Zantac additive for IV: 50 mg, IV Piggyback, Once-chemo, Infuse over: 20 minute(s), first dose 10/16/19 10:00:00 CDT, stop date 10/16/19 10:00:00 CDT, Routine, Week 6, Future Order  Zantac additive for IV: 50 mg, IV Piggyback, Once-chemo, Infuse over: 20 minute(s), first dose 10/23/19 10:00:00 CDT, stop date 10/23/19 10:00:00 CDT, Routine, Week 7, Future Order  Zantac additive for IV: 50 mg, IV Piggyback, Once-chemo, Infuse over: 20 minute(s), first dose 10/30/19 10:00:00 CDT, stop date 10/30/19 10:00:00 CDT, Routine, Week 8, Future Order  carboplatin (for IVPB): 170 mg, IV Piggyback, Once-chemo, Infuse over: 30 minute(s), first dose 10/02/19 11:20:00 CDT, stop date 10/02/19 11:20:00 CDT, Future Order, Waste Sort Code: CHEMO, Week 4  carboplatin (for IVPB): 170 mg, IV Piggyback, Once-chemo, Infuse over: 30 minute(s), first dose 10/09/19 11:20:00 CDT, stop date 10/09/19 11:20:00 CDT, Future Order, Waste Sort Code: CHEMO, Week 5  carboplatin (for IVPB): 170 mg, IV Piggyback, Once-chemo, Infuse over: 30 minute(s), first dose 10/16/19 11:20:00 CDT,  stop date 10/16/19 11:20:00 CDT, Future Order, Waste Sort Code: CHEMO, Week 6  carboplatin (for IVPB): 170 mg, IV Piggyback, Once-chemo, Infuse over: 30 minute(s), first dose 10/23/19 11:20:00 CDT, stop date 10/23/19 11:20:00 CDT, Future Order, Waste Sort Code: CHEMO, Week 7  carboplatin (for IVPB): 170 mg, IV Piggyback, Once-chemo, Infuse over: 30 minute(s), first dose 10/30/19 11:20:00 CDT, stop date 10/30/19 11:20:00 CDT, Future Order, Waste Sort Code: CHEMO, Week 8  dexamethasone (for IVPB): 10 mg, IV Piggyback, Once-chemo, Infuse over: 20 minute(s), first dose 10/02/19 10:00:00 CDT, stop date 10/02/19 10:00:00 CDT, Future Order, Week 4  dexamethasone (for IVPB): 10 mg, IV Piggyback, Once-chemo, Infuse over: 20 minute(s), first dose 10/09/19 10:00:00 CDT, stop date 10/09/19 10:00:00 CDT, Future Order, Week 5  dexamethasone (for IVPB): 10 mg, IV Piggyback, Once-chemo, Infuse over: 20 minute(s), first dose 10/16/19 10:00:00 CDT, stop date 10/16/19 10:00:00 CDT, Future Order, Week 6  dexamethasone (for IVPB): 10 mg, IV Piggyback, Once-chemo, Infuse over: 20 minute(s), first dose 10/23/19 10:00:00 CDT, stop date 10/23/19 10:00:00 CDT, Future Order, Week 7  dexamethasone (for IVPB): 10 mg, IV Piggyback, Once-chemo, Infuse over: 20 minute(s), first dose 10/30/19 10:00:00 CDT, stop date 10/30/19 10:00:00 CDT, Future Order, Week 8  heparin flush 100 units/mL (500 Unit  Flush): 500 units, form: Injection, IV Push, Once-chemo, *Est. first dose 02/05/20 14:23:00 CST, *Est. stop date 02/05/20 14:23:00 CST, Routine, Heparin Flush for Medi-port, Weeks 25, Future Order  heparin flush 100 units/mL (500 Unit  Flush): 500 units, form: Injection, IV Push, Once-chemo, *Est. first dose 08/21/19 14:23:00 CDT, *Est. stop date 08/21/19 14:23:00 CDT, Routine, Heparin Flush for Medi-port, Weeks 1, Future Order  heparin flush 100 units/mL (500 Unit  Flush): 500 units, form: Injection, IV Push, Once-chemo, *Est. first dose 10/16/19  14:23:00 CDT, *Est. stop date 10/16/19 14:23:00 CDT, Routine, Heparin Flush for Medi-port, Weeks 9, Future Order  heparin flush 100 units/mL (500 Unit  Flush): 500 units, form: Injection, IV Push, Once-chemo, *Est. first dose 19 14:23:00 CST, *Est. stop date 19 14:23:00 CST, Routine, Heparin Flush for Medi-port, Weeks 17, Future Order  Pending Complete  midazolam: 2 mg, form: Injection, IV Push, q5min, Order duration: 2 dose(s), first dose 04/09/15 9:16:00 CDT, stop date 04/09/15 9:25:00 CDT, (up to 5 mg for moderate anxiety), 30,025  Prescriptions  Prescribed  Bactroban 2% topical cream: 1 renate, TOP, TID, # 15 gm, 0 Refill(s)  Phenergan 12.5 mg Tab: 12.5 mg = 1 tab(s), Oral, q6hr, PRN PRN as needed for nausea/vomiting, # 60 tab(s), 0 Refill(s), Pharmacy: [facility]  Ultram 50 mg oral tablet: 50 mg = 1 tab(s), Oral, q8hr, PRN as needed for pain, # 12 tab(s), 0 Refill(s)  Zofran ODT 8 mg oral tablet, disintegratin mg = 1 tab(s), Oral, TID, PRN PRN nausea/vomiting, # 90 tab(s), 1 Refill(s), Pharmacy: [facility]  albuterol 0.083% inhalation solution: 5 mg = 6 mL, INH, q6hr, PRN PRN as needed for wheezing, # 60 EA, 0 Refill(s), other reason (Rx)  Documented Medications  Documented  Coreg 6.25 mg oral tablet: 6.25 mg = 1 tab(s), Oral, BID  Fe C 250 mg-100 mg oral tablet: 1 tab(s), Oral, BID, 0 Refill(s)  HYDROCHLOROT CAP 12.5M.5 mg = 1 cap(s), Oral, Daily  LOSARTAN POT TAB 50M mg = 1 tab(s), Oral, Daily  LYRICA CAP 50M mg = 1 cap(s), Oral, BID  SIMVASTATIN TAB 80M mg = 1 tab(s), Oral, Once a day (at bedtime)  SUPREP BOWEL SOL PREP: 177 mL, Oral  Vitamin D3 2000 intl units oral tablet: 2,000 IntUnit = 1 tab(s), Oral, Daily, 0 Refill(s)  aspirin 81 mg oral tablet, CHEWABLE: 81 mg = 1 tab(s), Oral, Daily  cilostazol 100 mg oral tablet: 100 mg = 1 tab(s), Oral, BID  citalopram 10 mg oral tablet: 10 mg = 1 tab(s), Oral, Daily, q  am  citalopram 20 mg oral tablet: 20 mg = 1 tab(s), Oral, Once a day (at bedtime)  enalapril 20 mg oral tablet: 20 mg = 1 tab(s), Oral, Daily  felodipine 5 mg oral tablet, extended release: 5 mg = 1 tab(s), Oral, Daily  methIMAzole 5 mg oral tablet: 5 mg = 1 tab(s), Oral, Daily  methimazole 10 mg oral tablet: 10 mg = 1 tab(s), Oral, Daily  oxycodone 5 mg oral tablet: 5 mg = 1 tab(s), Oral, q4-6hr  simvastatin 40 mg oral tablet: 40 mg = 1 tab(s), Oral, qPM   Problem list:    All Problems  Able to lie down / SNOMED CT [id number] / Confirmed  Adenocarcinoma of lung / SNOMED CT [id number] / Confirmed  Anemia / SNOMED CT 475665734 / Confirmed  currently on iron tabs  Anxiety depression / SNOMED CT 729179016 / Confirmed  Elevated serum cholesterol / SNOMED CT [id number] / Confirmed  Exercise tolerance / SNOMED CT 290614706 / Confirmed  able to walk 2 blocks w/o CP or SOB  Cancer of upper lobe of left lung / SNOMED CT 439373476 / Confirmed  Bone metastases / SNOMED CT 2702901449 / Confirmed  Obesity / SNOMED CT 4645634515 / Probable  Cough / SNOMED CT 9596694678 / Confirmed  Pancoast tumor / SNOMED CT 634307271 / Confirmed  Proteinuria / SNOMED CT 26927307 / Confirmed  Review of care plan / SNOMED CT 2610791049 / Confirmed  Tobacco user / SNOMED CT 613335214 / Probable  Wears glasses / SNOMED CT 625332722 / Confirmed  Resolved: ARTHROPATHY / SNOMED CT 3717569986  Resolved: Blood clot / SNOMED CT 057836154  left breast  Resolved: COPD W/ CHRONIC BRONCHITIS / SNOMED CT 2240423634  Resolved: HTN / SNOMED CT 03792722  Resolved: HYPERTHYROIDISM / SNOMED CT 03803992  Resolved: PVD / SNOMED CT 4864923217  stents placed. to groin and Rt leg  Resolved: Cancer of lung / SNOMED CT 668912544  diag 2014, treats complete  in the lymph nodes. only treated with chemo and radiation.  Canceled: BLOCKAGE TO LEFT LEG  Canceled: MIXED HYPERLIPIDEMIA,    Active Problems (15)  Able to lie down   Adenocarcinoma of lung    Anemia   Anxiety depression   Bone metastases   Cancer of upper lobe of left lung   Cough   Elevated serum cholesterol   Exercise tolerance   Obesity   Pancoast tumor   Proteinuria   Review of care plan   Tobacco user   Wears glasses         Physical Examination   Vital Signs   10/1/2019 12:51 CDT      Temperature Oral          36.8 DegC                             Temperature Oral (calculated)             98.24 DegF                             Peripheral Pulse Rate     87 bpm                             Respiratory Rate          20 br/min                             SpO2                      94 %                             Oxygen Therapy            Room air                             Systolic Blood Pressure   116 mmHg                             Diastolic Blood Pressure  54 mmHg  LOW                             Blood Pressure Location   Right arm                             Manual Cuff BP            No                             O2 SAT at rest            94 %     Measurements from flowsheet : Measurements   10/1/2019 12:51 CDT      Weight Dosing             89.6 kg                             Weight Measured           89.6 kg                             Weight Measured and Calculated in Lbs     197.53 lb                             Height/Length Dosing      175 cm                             Height/Length Measured    175 cm                             BSA Measured              2.09 m2                             Body Mass Index Measured  29.26 kg/m2               General:  Alert and oriented, No acute distress.    Eye:  Pupils are equal, round and reactive to light, Extraocular movements are intact, Normal conjunctiva.    HENT:  Normocephalic, Oral mucosa is moist, No pharyngeal erythema.    Neck:  Supple, No lymphadenopathy, Well-healing incisional site base of the neck.    Respiratory:  Respirations are non-labored, Breath sounds are equal, Symmetrical chest wall expansion, mild intermittent wheezing  bilaterally .    Cardiovascular:  Normal rate, Regular rhythm, Systolic murmur, bilateral trace lower extremity edema.    Gastrointestinal:  Soft, Non-tender, Non-distended, Normal bowel sounds.    Musculoskeletal:  Normal strength, No swelling, Normal gait.    Integumentary:  Warm, Dry, Intact.    Neurologic:  Alert, Oriented, No focal deficits.    Psychiatric:  Cooperative, Appropriate mood & affect.    Genitourinary:  No costovertebral angle tenderness.    Lymphatics:  No lymphadenopathy neck, axilla, groin.    Cognition and Speech:  Oriented, Speech clear and coherent.    ECOG Performance Scale: 0 - Fully active; no performance restrictions.      Review / Management   Results review:  Last 10 Days Lab Results : Lab View   10/1/2019 10:10 CDT      POC Sodium                135 mmol/L  LOW                             POC Potassium             4.4 mmol/L                             POC Chloride              99 mmol/L                             POC Ion Calcium           1.23 mmol/L                             POC Glucose               91 mg/dL                             POC BUN                   17.0 mg/dL                             POC Creatinine            0.9 mg/dL                             POC AGAP                  13.0  NA                             POC Hb                    11.2 mg/dL  LOW                             POC Hct                   33.0 %  LOW                             POC TCO2                  28.0 mmol/L    10/1/2019 9:44 CDT       WBC                       5.7 x10(3)/mcL                             RBC                       3.59 x10(6)/mcL  LOW                             Hgb                       11.1 gm/dL  LOW                             Hct                       33.2 %  LOW                             Platelet                  194 x10(3)/mcL                             MCV                       92.5 fL                             MCH                       30.9 pg                              MCHC                      33.4 gm/dL                             RDW                       14.5 %                             MPV                       8.2 fL  LOW                             Abs Neut                  4.44 x10(3)/mcL                             NEUT%                     77.3 %  NA                             NEUT#                     4.4 x10(3)/mcL                             LYMPH%                    15.3 %  NA                             LYMPH#                    0.9 x10(3)/mcL                             MONO%                     5.1 %  NA                             MONO#                     0.3 x10(3)/mcL                             EOS%                      0.9 %  NA                             EOS#                      0.0 x10(3)/mcL                             BASO%                     0.5 %  NA                             BASO#                     0.0 x10(3)/mcL  .    Radiology results      Impression and Plan   Diagnosis     1. 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 ? metastatic findings could not be biopsied.    TTF negative/CK 7 +.    Diagnosed June 2014  EGFR mutation negative/ALK gene rearrangement negative.  2.  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 Patient has reportedly been referred to ENT for resection and lymph node dissection (per patient report).   Subsequent open biopsy done by Dr. Rosales on 7/29/2019 confirmed the same.  Patient was unable to undergo definitive surgical resection for oligo metastatic disease.  3.  Hoarseness secondary to #2.     Plan:  OK  to proceed with Week #4 of weekly Carboplatin/Taxol tomorrow as schedule- continue with 10% dose reduction   Tolerated very well overall  Continue planned radiotherapy per Rad/Onc --- plan for total 30 fraction  Continue PRN antiemetics as needed   RTC in 2 weeks for TD visit prior to week #6  CBC, BMP same day lab   Call with questions or concerns     Marilou BEVERLY, FNP-C.

## 2022-06-01 DIAGNOSIS — C34.90 ADENOCARCINOMA OF LUNG, STAGE 4, UNSPECIFIED LATERALITY: Primary | ICD-10-CM

## 2022-07-05 ENCOUNTER — HOSPITAL ENCOUNTER (OUTPATIENT)
Dept: RADIOLOGY | Facility: HOSPITAL | Age: 77
Discharge: HOME | End: 2022-07-05
Attending: INTERNAL MEDICINE
Payer: MEDICARE

## 2022-07-05 DIAGNOSIS — C77.9 SECONDARY MALIGNANT NEOPLASM OF LYMPH NODES: ICD-10-CM

## 2022-07-05 DIAGNOSIS — C78.00 SECONDARY MALIGNANT NEOPLASM OF LUNG: ICD-10-CM

## 2022-07-05 DIAGNOSIS — C34.90 LUNG CANCER: ICD-10-CM

## 2022-07-05 DIAGNOSIS — C34.12 MALIGNANT NEOPLASM OF UPPER LOBE, LEFT BRONCHUS OR LUNG: ICD-10-CM

## 2022-07-05 PROCEDURE — 78815 PET IMAGE W/CT SKULL-THIGH: CPT | Mod: PS,TC

## 2022-07-08 NOTE — PROGRESS NOTES
Subjective:       Patient ID: Pratibha Patel is a 77 y.o. female.    Chief Complaint: Follow-up (Scan results )    PCP: Graham Nguyen NP  Cardiologist: Dr. Danny Sánchez  Referring Physician: Dr. Anthony Hutson  Endocrine: Dr. Werner  Radiation oncology: Dr. Boss  ENT: Dr. Rosales     Diagnosis:  1. 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 ? metastatic findings could not be biopsied.    TTF negative/CK 7 +.    Diagnosed June 2014  EGFR mutation negative/ALK gene rearrangement negative. Repeat NGS panel on 8/5/19 TP53 (+), BRAF (-), KRAS WT, ROS 1 (-), ALK (-), PDL1 <1%   2.  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 Patient has reportedly been referred to ENT for resection and lymph node dissection (per patient report).   Subsequent open biopsy done by Dr. Rosales on 7/29/2019 confirmed the same.  Patient was unable to undergo definitive surgical resection for oligo metastatic disease.  3.  Hoarseness and swallowing difficulties secondary to #2  4.  Biopsy of the left lung lesion showed recurrent non-small cell lung cancer, Caris was sent and showed positivity for PD-L1.  No other targetable or actionable mutations present.    Current Treatment:   1.  Observation     Treatment History:  1.  RT with weekly Carbo/Taxol started 7/2/14--Completed August 2014  2.  Single agent 'Maintenance' Avastin q 3 weeks.  Started 4/9/15--last given September 21, 2016  3.  Radiation + weekly Carbo/Taxol at 2AUC ----9/11/19-10/22/19  4.  Patient underwent radiation from 12/17/2020 through 12/21/2020.  Radiation to  the left and right lung per Dr. Robert Mortensen.    HPI   Please see Oncology history in the diagnosis of adenocarcinoma of the lung, stage IV on the problem list for full detail.  Patient originally seen in 2014 with unintentional weight loss, shortness of breath, nonproductive cough.  Patient had imaging findings suggestive of lung cancer, bronchoscopy done in June of 2014 revealed moderately differentiated adenocarcinoma of the lung.  She originally underwent chemo radiation from July 2014 through August 2014.  She was placed on maintenance Avastin from April 2015 through September 2016. Patient then underwent a thyroid biopsy in June of 2019 that showed metastatic carcinoma favored to be adenocarcinoma lung primary.  She underwent surgical resection in July 2019, however full surgical dissection was not able to be done and the patient underwent open biopsy of right thyroid gland.  Pathology revealed poorly differentiated non-small cell carcinoma consistent with metastatic lung cancer.  NGS panel done at that time unremarkable.  She started chemoradiation with carbo Taxol in September 2019, completed in October 2019. Patient was doing well, imaging in October 2020 revealed an abnormality in the left lung, CT-guided lung biopsy on 11/03/2020 showed recurrent non-small cell lung cancer, Caris revealed PDL1 positivity.  She underwent SPRT from 12/17/2020 through 12/21/2020.  Patient then placed on surveillance imaging, most recently done on 07/05/2022 showing worsening disease in the right lung with a previous lesion that measured 0.6 cm and had an SUV of 1.0 now measuring 1.5 cm and an SUV of 8.4.         Interval History:   Patient presents to clinic for scheduled follow up appointment to review scan results.  She is overall doing well, s/p right hip replacement; recovering well.  Denies CP, Shortness of breath, N/V/C/D, fever, chills, or night sweats.  She does have a cough which is chronic and unchanged.  Denies any  pain.  No other questions or concerns.       Past Medical History:   Diagnosis Date    Adenocarcinoma of lung     Anemia     Anxiety     COPD (chronic obstructive pulmonary disease)     Depression     HLD (hyperlipidemia)     Hypertension     Lung cancer     Secondary malignant neoplasm of lymph nodes     Secondary malignant neoplasm of right lung       Past Surgical History:   Procedure Laterality Date    BLADDER SURGERY      CHOLECYSTECTOMY      COLONOSCOPY  2018    HYSTERECTOMY      KNEE SURGERY Bilateral     PARTIAL HIP ARTHROPLASTY       Social History     Socioeconomic History    Marital status: Single   Tobacco Use    Smoking status: Current Every Day Smoker     Packs/day: 0.25     Types: Cigarettes    Smokeless tobacco: Never Used   Substance and Sexual Activity    Alcohol use: Not Currently    Drug use: Never      Family History   Problem Relation Age of Onset    Lung cancer Mother     Lung cancer Brother       Review of patient's allergies indicates:  No Known Allergies   Review of Systems   Constitutional: Negative for chills, diaphoresis, fatigue, fever and unexpected weight change.   HENT: Negative for nasal congestion, mouth sores, sinus pressure/congestion and sore throat.    Eyes: Negative for pain and visual disturbance.   Respiratory: Negative for cough, chest tightness and shortness of breath.    Cardiovascular: Negative for chest pain, palpitations and leg swelling.   Gastrointestinal: Negative for abdominal distention, abdominal pain, blood in stool, constipation and diarrhea.   Genitourinary: Negative for dysuria, frequency and hematuria.   Musculoskeletal: Negative for arthralgias and back pain.   Integumentary:  Negative for rash.   Neurological: Negative for dizziness, weakness, numbness and headaches.   Hematological: Negative for adenopathy.   Psychiatric/Behavioral: Negative for confusion.         Objective:      Physical Exam  Vitals reviewed. Exam conducted with a  chaperone present.   Constitutional:       General: She is not in acute distress.     Appearance: Normal appearance.   HENT:      Head: Normocephalic and atraumatic.      Nose: Nose normal.      Mouth/Throat:      Mouth: Mucous membranes are moist.   Eyes:      Extraocular Movements: Extraocular movements intact.      Conjunctiva/sclera: Conjunctivae normal.   Cardiovascular:      Rate and Rhythm: Normal rate and regular rhythm.      Pulses: Normal pulses.      Heart sounds: Normal heart sounds.   Pulmonary:      Effort: Pulmonary effort is normal.      Breath sounds: Normal breath sounds.   Abdominal:      General: Bowel sounds are normal.      Palpations: Abdomen is soft.   Musculoskeletal:         General: No swelling. Normal range of motion.      Cervical back: Normal range of motion and neck supple.      Right lower leg: No edema.      Left lower leg: No edema.   Lymphadenopathy:      Cervical: No cervical adenopathy.   Skin:     General: Skin is warm and dry.   Neurological:      General: No focal deficit present.      Mental Status: She is alert and oriented to person, place, and time. Mental status is at baseline.   Psychiatric:         Mood and Affect: Mood normal.         Behavior: Behavior normal.         LABS AND IMAGING REVIEWED IN EPIC          Assessment:     1. 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 ? metastatic findings could not be biopsied.    TTF negative/CK 7 +.    Diagnosed June 2014.  EGFR mutation negative/ALK gene rearrangement negative.  2.  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 Patient has reportedly been referred to ENT for resection and lymph node dissection (per patient report).   Subsequent open biopsy done by Dr. Rosales on 7/29/2019 confirmed the same.  Patient was unable to undergo definitive surgical resection for oligo metastatic disease.  3.  Treatment induced anemia   4. Hoarseness .      Plan:          Patient's biopsy did return as recurrent non-small cell lung cancer in the left lung.  I feel that the right lung will be similar pathology.  She completed radiation with Dr. Mortensen on 12/24/2020.     Caris did reveal that PD-L1 was 2% positive suggesting benefit from pembrolizumab or nivolumab/ipilimumab.  We will use 1 of these 2 regimens when she does need systemic therapy.     Clinically patient is stable w/o any complaints today.  Her PET CT scan showed worsening right lung nodule.    Will send referral to Dr. Robert Mortensen with radiation oncology for possible SBRT.    PET/CT scan prior to follow up    Return to clinic in 4 months to review scan results     Labs: CBC, CMP     All questions were answered to the best of my ability and she understands the plan moving forward.  She knows to call sooner if any new signs or symptoms occur.      Enrrique Gudino II, MD

## 2022-07-11 ENCOUNTER — OFFICE VISIT (OUTPATIENT)
Dept: HEMATOLOGY/ONCOLOGY | Facility: CLINIC | Age: 77
End: 2022-07-11
Payer: MEDICARE

## 2022-07-11 VITALS
HEART RATE: 60 BPM | WEIGHT: 167.13 LBS | BODY MASS INDEX: 28.53 KG/M2 | DIASTOLIC BLOOD PRESSURE: 69 MMHG | OXYGEN SATURATION: 97 % | HEIGHT: 64 IN | RESPIRATION RATE: 18 BRPM | SYSTOLIC BLOOD PRESSURE: 145 MMHG | TEMPERATURE: 98 F

## 2022-07-11 DIAGNOSIS — C34.90 ADENOCARCINOMA OF LUNG, STAGE 4, UNSPECIFIED LATERALITY: Primary | ICD-10-CM

## 2022-07-11 PROCEDURE — 99999 PR PBB SHADOW E&M-EST. PATIENT-LVL V: CPT | Mod: PBBFAC,,, | Performed by: INTERNAL MEDICINE

## 2022-07-11 PROCEDURE — 99214 PR OFFICE/OUTPT VISIT, EST, LEVL IV, 30-39 MIN: ICD-10-PCS | Mod: S$PBB,,, | Performed by: INTERNAL MEDICINE

## 2022-07-11 PROCEDURE — 99214 OFFICE O/P EST MOD 30 MIN: CPT | Mod: S$PBB,,, | Performed by: INTERNAL MEDICINE

## 2022-07-11 PROCEDURE — 99215 OFFICE O/P EST HI 40 MIN: CPT | Mod: PBBFAC | Performed by: INTERNAL MEDICINE

## 2022-07-11 PROCEDURE — 99999 PR PBB SHADOW E&M-EST. PATIENT-LVL V: ICD-10-PCS | Mod: PBBFAC,,, | Performed by: INTERNAL MEDICINE

## 2022-07-11 RX ORDER — CARVEDILOL 6.25 MG/1
6.25 TABLET ORAL 2 TIMES DAILY
COMMUNITY
Start: 2022-06-19

## 2022-07-11 RX ORDER — CILOSTAZOL 100 MG/1
100 TABLET ORAL 2 TIMES DAILY
Status: ON HOLD | COMMUNITY
Start: 2022-02-19 | End: 2022-11-16

## 2022-07-11 RX ORDER — FUROSEMIDE 20 MG/1
20 TABLET ORAL DAILY
Status: ON HOLD | COMMUNITY
Start: 2022-06-19 | End: 2024-03-12 | Stop reason: HOSPADM

## 2022-07-11 RX ORDER — TRAMADOL HYDROCHLORIDE 50 MG/1
50 TABLET ORAL EVERY 6 HOURS PRN
Status: ON HOLD | COMMUNITY
Start: 2022-05-28 | End: 2022-11-16

## 2022-07-11 RX ORDER — LEVOTHYROXINE SODIUM 75 UG/1
75 TABLET ORAL EVERY MORNING
COMMUNITY
Start: 2022-05-24 | End: 2023-03-13 | Stop reason: SDUPTHER

## 2022-07-11 RX ORDER — HYDROCODONE BITARTRATE AND ACETAMINOPHEN 10; 325 MG/1; MG/1
1 TABLET ORAL
Status: ON HOLD | COMMUNITY
Start: 2022-06-21 | End: 2022-11-16

## 2022-07-11 RX ORDER — SIMVASTATIN 40 MG/1
40 TABLET, FILM COATED ORAL NIGHTLY
COMMUNITY
Start: 2022-06-19

## 2022-07-11 RX ORDER — IPRATROPIUM BROMIDE AND ALBUTEROL SULFATE 2.5; .5 MG/3ML; MG/3ML
SOLUTION RESPIRATORY (INHALATION) 4 TIMES DAILY
COMMUNITY
Start: 2022-02-08 | End: 2022-07-21 | Stop reason: SDUPTHER

## 2022-07-11 RX ORDER — LEVOCETIRIZINE DIHYDROCHLORIDE 5 MG/1
TABLET, FILM COATED ORAL
Status: ON HOLD | COMMUNITY
Start: 2022-04-25 | End: 2022-11-16

## 2022-07-11 RX ORDER — CHOLECALCIFEROL (VITAMIN D3) 50 MCG
TABLET ORAL
COMMUNITY
End: 2024-03-09 | Stop reason: CLARIF

## 2022-07-11 RX ORDER — LOSARTAN POTASSIUM 50 MG/1
50 TABLET ORAL DAILY
Status: ON HOLD | COMMUNITY
Start: 2022-06-19 | End: 2024-03-12 | Stop reason: HOSPADM

## 2022-07-11 RX ORDER — ONDANSETRON 4 MG/1
4 TABLET, FILM COATED ORAL EVERY 8 HOURS PRN
Status: ON HOLD | COMMUNITY
Start: 2022-02-03 | End: 2022-11-16

## 2022-09-27 DIAGNOSIS — C34.10: Primary | ICD-10-CM

## 2022-11-08 ENCOUNTER — HOSPITAL ENCOUNTER (OUTPATIENT)
Dept: RADIOLOGY | Facility: HOSPITAL | Age: 77
Discharge: HOME | End: 2022-11-08
Attending: INTERNAL MEDICINE
Payer: MEDICARE

## 2022-11-08 DIAGNOSIS — C34.90 ADENOCARCINOMA OF LUNG, STAGE 4, UNSPECIFIED LATERALITY: ICD-10-CM

## 2022-11-08 PROCEDURE — 78815 PET IMAGE W/CT SKULL-THIGH: CPT | Mod: TC

## 2022-11-10 ENCOUNTER — OFFICE VISIT (OUTPATIENT)
Dept: HEMATOLOGY/ONCOLOGY | Facility: CLINIC | Age: 77
End: 2022-11-10
Payer: MEDICARE

## 2022-11-10 VITALS
DIASTOLIC BLOOD PRESSURE: 72 MMHG | SYSTOLIC BLOOD PRESSURE: 190 MMHG | WEIGHT: 167.13 LBS | TEMPERATURE: 98 F | OXYGEN SATURATION: 97 % | HEIGHT: 64 IN | BODY MASS INDEX: 28.53 KG/M2 | RESPIRATION RATE: 18 BRPM | HEART RATE: 62 BPM

## 2022-11-10 DIAGNOSIS — C34.90 ADENOCARCINOMA OF LUNG, STAGE 4, UNSPECIFIED LATERALITY: Primary | ICD-10-CM

## 2022-11-10 PROCEDURE — 99999 PR PBB SHADOW E&M-EST. PATIENT-LVL V: ICD-10-PCS | Mod: PBBFAC,,, | Performed by: INTERNAL MEDICINE

## 2022-11-10 PROCEDURE — 99214 PR OFFICE/OUTPT VISIT, EST, LEVL IV, 30-39 MIN: ICD-10-PCS | Mod: S$PBB,,, | Performed by: INTERNAL MEDICINE

## 2022-11-10 PROCEDURE — 99215 OFFICE O/P EST HI 40 MIN: CPT | Mod: PBBFAC | Performed by: INTERNAL MEDICINE

## 2022-11-10 PROCEDURE — 99214 OFFICE O/P EST MOD 30 MIN: CPT | Mod: S$PBB,,, | Performed by: INTERNAL MEDICINE

## 2022-11-10 PROCEDURE — 99999 PR PBB SHADOW E&M-EST. PATIENT-LVL V: CPT | Mod: PBBFAC,,, | Performed by: INTERNAL MEDICINE

## 2022-11-10 NOTE — PROGRESS NOTES
Subjective:       Patient ID: Pratibha Patel is a 77 y.o. female.    Chief Complaint: Follow-up (Patient stated no new problems )    PCP: Graham Nguyen NP  Cardiologist: Dr. Danny Sánchez  Referring Physician: Dr. Anthony Hutson  Endocrine: Dr. Werner  Radiation oncology: Dr. Boss  ENT: Dr. Rosales     Diagnosis:  1. 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 ? metastatic findings could not be biopsied.    TTF negative/CK 7 +.    Diagnosed June 2014  EGFR mutation negative/ALK gene rearrangement negative. Repeat NGS panel on 8/5/19 TP53 (+), BRAF (-), KRAS WT, ROS 1 (-), ALK (-), PDL1 <1%   2.  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 Patient has reportedly been referred to ENT for resection and lymph node dissection (per patient report).   Subsequent open biopsy done by Dr. Rosales on 7/29/2019 confirmed the same.  Patient was unable to undergo definitive surgical resection for oligo metastatic disease.  3.  Hoarseness and swallowing difficulties secondary to #2  4.  Biopsy of the left lung lesion showed recurrent non-small cell lung cancer, Caris was sent and showed positivity for PD-L1, 2%.  No other targetable or actionable mutations present.    Current Treatment:   1.  Observation     Treatment History:  1.  RT with weekly Carbo/Taxol started 7/2/14--Completed August 2014  2.  Single agent 'Maintenance' Avastin q 3 weeks.  Started 4/9/15--last given September 21, 2016  3.  Radiation + weekly Carbo/Taxol at 2AUC ----9/11/19-10/22/19  4.  Patient underwent radiation from 12/17/2020 through  12/21/2020.  Radiation to the left and right lung per Dr. Robert Mortensen.    HPI:   Please see Oncology history in the diagnosis of adenocarcinoma of the lung, stage IV on the problem list for full detail.  Patient originally seen in 2014 with unintentional weight loss, shortness of breath, nonproductive cough.  Patient had imaging findings suggestive of lung cancer, bronchoscopy done in June of 2014 revealed moderately differentiated adenocarcinoma of the lung.  She originally underwent chemo radiation from July 2014 through August 2014.  She was placed on maintenance Avastin from April 2015 through September 2016. Patient then underwent a thyroid biopsy in June of 2019 that showed metastatic carcinoma favored to be adenocarcinoma lung primary.  She underwent surgical resection in July 2019, however full surgical dissection was not able to be done and the patient underwent open biopsy of right thyroid gland.  Pathology revealed poorly differentiated non-small cell carcinoma consistent with metastatic lung cancer.  NGS panel done at that time unremarkable.  She started chemoradiation with carbo Taxol in September 2019, completed in October 2019. Patient was doing well, imaging in October 2020 revealed an abnormality in the left lung, CT-guided lung biopsy on 11/03/2020 showed recurrent non-small cell lung cancer, Caris revealed PDL1 positivity.  She underwent SPRT from 12/17/2020 through 12/21/2020.  Patient then placed on surveillance imaging, most recently done on 07/05/2022 showing worsening disease in the right lung with a previous lesion that measured 0.6 cm and had an SUV of 1.0 now measuring 1.5 cm and an SUV of 8.4. PET/CT scan done on 11/8/2022 showed mild increased radiotracer activity within the left apical consolidation, SUV 5.5.  There was a newly hypermetabolic subcentimeter subcarinal lymph node.  A 1 cm right lower lobe nodule was smaller, he right upper lobe nodule was slightly larger measuring 6 mm.   No sites of FDG avid metastatic disease in the neck, abdomen, or pelvis.    Interval History:   Patient presents to clinic for scheduled follow up appointment to review scan results.  She is doing well overall. However, she reports a fall about two months ago in her home, where she passed out on the floor and hit her head. She doesn't know what caused this or how long she was out.  Otherwise, denies shortness of breath, chest pain, and headaches.       Past Medical History:   Diagnosis Date    Adenocarcinoma of lung     Anemia     Anxiety     COPD (chronic obstructive pulmonary disease)     Depression     HLD (hyperlipidemia)     Hypertension     Lung cancer     Secondary malignant neoplasm of lymph nodes     Secondary malignant neoplasm of right lung       Past Surgical History:   Procedure Laterality Date    BLADDER SURGERY      CHOLECYSTECTOMY      COLONOSCOPY  2018    HYSTERECTOMY      KNEE SURGERY Bilateral     PARTIAL HIP ARTHROPLASTY       Social History     Socioeconomic History    Marital status: Single   Tobacco Use    Smoking status: Every Day     Packs/day: 0.25     Types: Cigarettes    Smokeless tobacco: Never   Substance and Sexual Activity    Alcohol use: Not Currently    Drug use: Never      Family History   Problem Relation Age of Onset    Lung cancer Mother     Lung cancer Brother       Review of patient's allergies indicates:  No Known Allergies   Review of Systems   Constitutional:  Negative for chills, diaphoresis, fatigue, fever and unexpected weight change.   HENT:  Negative for nasal congestion, mouth sores, sinus pressure/congestion and sore throat.    Eyes:  Negative for pain and visual disturbance.   Respiratory:  Negative for cough, chest tightness and shortness of breath.    Cardiovascular:  Negative for chest pain, palpitations and leg swelling.   Gastrointestinal:  Negative for abdominal distention, abdominal pain, blood in stool, constipation and diarrhea.   Genitourinary:  Negative  for dysuria, frequency and hematuria.   Musculoskeletal:  Negative for arthralgias and back pain.   Integumentary:  Negative for rash.   Neurological:  Negative for dizziness, weakness, numbness and headaches.   Hematological:  Negative for adenopathy.   Psychiatric/Behavioral:  Negative for confusion.        Objective:      Physical Exam  Vitals reviewed. Exam conducted with a chaperone present.   Constitutional:       General: She is not in acute distress.     Appearance: Normal appearance.   HENT:      Head: Normocephalic and atraumatic.      Nose: Nose normal.      Mouth/Throat:      Mouth: Mucous membranes are moist.   Eyes:      Extraocular Movements: Extraocular movements intact.      Conjunctiva/sclera: Conjunctivae normal.   Cardiovascular:      Rate and Rhythm: Normal rate and regular rhythm.      Pulses: Normal pulses.      Heart sounds: Normal heart sounds.   Pulmonary:      Effort: Pulmonary effort is normal.      Breath sounds: Normal breath sounds.   Abdominal:      General: Bowel sounds are normal.      Palpations: Abdomen is soft.   Musculoskeletal:         General: No swelling. Normal range of motion.      Cervical back: Normal range of motion and neck supple.      Right lower leg: No edema.      Left lower leg: No edema.   Lymphadenopathy:      Cervical: No cervical adenopathy.   Skin:     General: Skin is warm and dry.   Neurological:      General: No focal deficit present.      Mental Status: She is alert and oriented to person, place, and time. Mental status is at baseline.   Psychiatric:         Mood and Affect: Mood normal.         Behavior: Behavior normal.       LABS AND IMAGING REVIEWED IN EPIC          Assessment:     1. Stage IV-- T3NxM1 moderately differentiated adenocarcinoma of the [place]O with contiguous extrathoracic extension, left infraclavicular and axillary hypermetabolic metastatic lymph nodes and destruction of the left first and second ribs compatible with metastatic involvement  per baseline PET/CT. ? metastatic findings could not be biopsied.    TTF negative/CK 7 +.    Diagnosed June 2014.  EGFR mutation negative/ALK gene rearrangement negative.  2.  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 Patient has reportedly been referred to ENT for resection and lymph node dissection (per patient report).   Subsequent open biopsy done by Dr. Rosales on 7/29/2019 confirmed the same.  Patient was unable to undergo definitive surgical resection for oligo metastatic disease.  3.  Treatment induced anemia   4. Hoarseness .      Plan:          Patient's biopsy did return as recurrent non-small cell lung cancer in the left lung.  I feel that the right lung will be similar pathology.  She completed radiation with Dr. Mortensen on 12/24/2020.     Caris did reveal that PD-L1 was 2% positive suggesting benefit from pembrolizumab or nivolumab/ipilimumab.  We will use 1 of these 2 regimens when she does need systemic therapy.     Her PET/CT scan does show a new hypermetabolic subcentimeter subcarinal lymph node and increase in a left apical consolidation.  The left apical consolidation is close to the aorta and I do not think would be amenable to biopsy, however this subcarinal lymph node may be amenable to bronchoscopy.  We will refer to Pulmonary.    Return to clinic in 4 weeks to review results        All questions were answered to the best of my ability and she understands the plan moving forward.  She knows to call sooner if any new signs or symptoms occur.      Enrrique Gudino II, MD

## 2022-11-14 ENCOUNTER — ANESTHESIA EVENT (OUTPATIENT)
Dept: ENDOSCOPY | Facility: HOSPITAL | Age: 77
End: 2022-11-14
Payer: MEDICARE

## 2022-11-16 ENCOUNTER — HOSPITAL ENCOUNTER (OUTPATIENT)
Facility: HOSPITAL | Age: 77
Discharge: HOME | End: 2022-11-16
Attending: INTERNAL MEDICINE | Admitting: INTERNAL MEDICINE
Payer: MEDICARE

## 2022-11-16 ENCOUNTER — ANESTHESIA (OUTPATIENT)
Dept: ENDOSCOPY | Facility: HOSPITAL | Age: 77
End: 2022-11-16
Payer: MEDICARE

## 2022-11-16 PROCEDURE — 88161 CYTOPATH SMEAR OTHER SOURCE: CPT | Mod: 59 | Performed by: INTERNAL MEDICINE

## 2022-11-16 PROCEDURE — 25000003 PHARM REV CODE 250: Performed by: NURSE ANESTHETIST, CERTIFIED REGISTERED

## 2022-11-16 PROCEDURE — 27201423 OPTIME MED/SURG SUP & DEVICES STERILE SUPPLY: Performed by: INTERNAL MEDICINE

## 2022-11-16 PROCEDURE — 25000003 PHARM REV CODE 250: Performed by: INTERNAL MEDICINE

## 2022-11-16 PROCEDURE — 63600175 PHARM REV CODE 636 W HCPCS: Performed by: NURSE ANESTHETIST, CERTIFIED REGISTERED

## 2022-11-16 PROCEDURE — 37000008 HC ANESTHESIA 1ST 15 MINUTES: Performed by: INTERNAL MEDICINE

## 2022-11-16 PROCEDURE — C1726 CATH, BAL DIL, NON-VASCULAR: HCPCS | Performed by: INTERNAL MEDICINE

## 2022-11-16 PROCEDURE — 88173 CYTOPATH EVAL FNA REPORT: CPT | Mod: 59 | Performed by: INTERNAL MEDICINE

## 2022-11-16 PROCEDURE — 37000009 HC ANESTHESIA EA ADD 15 MINS: Performed by: INTERNAL MEDICINE

## 2022-11-16 PROCEDURE — 31652 BRONCH EBUS SAMPLNG 1/2 NODE: CPT | Performed by: INTERNAL MEDICINE

## 2022-11-16 RX ORDER — SODIUM CHLORIDE, SODIUM GLUCONATE, SODIUM ACETATE, POTASSIUM CHLORIDE AND MAGNESIUM CHLORIDE 30; 37; 368; 526; 502 MG/100ML; MG/100ML; MG/100ML; MG/100ML; MG/100ML
INJECTION, SOLUTION INTRAVENOUS CONTINUOUS
Status: CANCELLED | OUTPATIENT
Start: 2022-11-16 | End: 2022-12-16

## 2022-11-16 RX ORDER — LIDOCAINE HYDROCHLORIDE 40 MG/ML
4 SOLUTION TOPICAL
Status: DISCONTINUED | OUTPATIENT
Start: 2022-11-16 | End: 2022-11-16 | Stop reason: HOSPADM

## 2022-11-16 RX ORDER — PHENYLEPHRINE HCL IN 0.9% NACL 1 MG/10 ML
SYRINGE (ML) INTRAVENOUS
Status: DISCONTINUED | OUTPATIENT
Start: 2022-11-16 | End: 2022-11-16

## 2022-11-16 RX ORDER — SODIUM CHLORIDE, SODIUM LACTATE, POTASSIUM CHLORIDE, CALCIUM CHLORIDE 600; 310; 30; 20 MG/100ML; MG/100ML; MG/100ML; MG/100ML
INJECTION, SOLUTION INTRAVENOUS CONTINUOUS
Status: DISCONTINUED | OUTPATIENT
Start: 2022-11-16 | End: 2022-11-16 | Stop reason: HOSPADM

## 2022-11-16 RX ORDER — PROPOFOL 10 MG/ML
VIAL (ML) INTRAVENOUS CONTINUOUS PRN
Status: DISCONTINUED | OUTPATIENT
Start: 2022-11-16 | End: 2022-11-16

## 2022-11-16 RX ORDER — PROPOFOL 10 MG/ML
INJECTION, EMULSION INTRAVENOUS
Status: DISCONTINUED | OUTPATIENT
Start: 2022-11-16 | End: 2022-11-16

## 2022-11-16 RX ORDER — GLYCOPYRROLATE 0.2 MG/ML
INJECTION INTRAMUSCULAR; INTRAVENOUS
Status: DISCONTINUED | OUTPATIENT
Start: 2022-11-16 | End: 2022-11-16

## 2022-11-16 RX ORDER — LIDOCAINE HYDROCHLORIDE 20 MG/ML
15 SOLUTION OROPHARYNGEAL ONCE
Status: COMPLETED | OUTPATIENT
Start: 2022-11-16 | End: 2022-11-16

## 2022-11-16 RX ORDER — SODIUM CHLORIDE, SODIUM GLUCONATE, SODIUM ACETATE, POTASSIUM CHLORIDE AND MAGNESIUM CHLORIDE 30; 37; 368; 526; 502 MG/100ML; MG/100ML; MG/100ML; MG/100ML; MG/100ML
INJECTION, SOLUTION INTRAVENOUS CONTINUOUS
Status: DISCONTINUED | OUTPATIENT
Start: 2022-11-16 | End: 2022-11-16 | Stop reason: HOSPADM

## 2022-11-16 RX ORDER — ONDANSETRON 2 MG/ML
4 INJECTION INTRAMUSCULAR; INTRAVENOUS DAILY PRN
Status: CANCELLED | OUTPATIENT
Start: 2022-11-16

## 2022-11-16 RX ADMIN — PROPOFOL 125 MCG/KG/MIN: 10 INJECTION, EMULSION INTRAVENOUS at 09:11

## 2022-11-16 RX ADMIN — PROPOFOL 50 MG: 10 INJECTION, EMULSION INTRAVENOUS at 09:11

## 2022-11-16 RX ADMIN — PROPOFOL 40 MG: 10 INJECTION, EMULSION INTRAVENOUS at 09:11

## 2022-11-16 RX ADMIN — GLYCOPYRROLATE 0.2 MG: 0.2 INJECTION INTRAMUSCULAR; INTRAVENOUS at 09:11

## 2022-11-16 RX ADMIN — PROPOFOL 120 MG: 10 INJECTION, EMULSION INTRAVENOUS at 09:11

## 2022-11-16 RX ADMIN — LIDOCAINE HYDROCHLORIDE 15 ML: 20 SOLUTION ORAL at 09:11

## 2022-11-16 RX ADMIN — SODIUM CHLORIDE, SODIUM GLUCONATE, SODIUM ACETATE, POTASSIUM CHLORIDE AND MAGNESIUM CHLORIDE: 526; 502; 368; 37; 30 INJECTION, SOLUTION INTRAVENOUS at 09:11

## 2022-11-16 RX ADMIN — Medication 100 MCG: at 09:11

## 2022-11-16 RX ADMIN — LIDOCAINE HYDROCHLORIDE 4 ML: 40 SOLUTION TOPICAL at 09:11

## 2022-11-16 NOTE — DISCHARGE INSTRUCTIONS
No driving or alcohol consumption for 24 hours after procedure.    Monitor for signs of infection such as fever and chills.    Frequent coughing and light blood tinged sputum may be common for the first 24-48 hours. Notify your doctor immediately with any increased shortness of breath/difficulty breathing, coughing up of a large amount of blood, or continued coughing up of blood after 48 hours.    Your doctor's office should be reaching out to you with your results/follow up care.

## 2022-11-16 NOTE — ANESTHESIA PREPROCEDURE EVALUATION
"                                                                                                             11/16/2022  Pratibha Patel is a 77 y.o., female with ----------------------------  Adenocarcinoma of lung  Anemia  Anxiety  Arthritis  COPD (chronic obstructive pulmonary disease)  Depression  HLD (hyperlipidemia)  Hypertension  Lung cancer  Secondary malignant neoplasm of lymph nodes  Secondary malignant neoplasm of right lung  Thyroid disease      Comment:  lymph nodes malignant    And ----------------------------  Bladder surgery  Cholecystectomy  Colonoscopy  Hysterectomy  Knee surgery      Comment:  replaced knees  Partial hip arthroplasty    Referred by oncologist for EBUS with known metastatic adenocarcinoma from 2014    "  Assessment:      1. 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 ? metastatic findings could not be biopsied.    TTF negative/CK 7 +.    Diagnosed June 2014.  EGFR mutation negative/ALK gene rearrangement negative.  2.  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 Patient has reportedly been referred to ENT for resection and lymph node dissection (per patient report).   Subsequent open biopsy done by Dr. Rosales on 7/29/2019 confirmed the same.  Patient was unable to undergo definitive surgical resection for oligo metastatic disease.  3.  Treatment induced anemia      4. Hoarseness .      Plan:          Patient's biopsy did return as recurrent non-small cell lung cancer in the left lung.  I feel that the right lung will be similar pathology.  She completed radiation with Dr. Mortensen " "on 12/24/2020.     Caris did reveal that PD-L1 was 2% positive suggesting benefit from pembrolizumab or nivolumab/ipilimumab.  We will use 1 of these 2 regimens when she does need systemic therapy.     Her PET/CT scan does show a new hypermetabolic subcentimeter subcarinal lymph node and increase in a left apical consolidation.  The left apical consolidation is close to the aorta and I do not think would be amenable to biopsy, however this subcarinal lymph node may be amenable to bronchoscopy.  We will refer to Pulmonary."        Pre-op Assessment    I have reviewed the NPO Status.      Review of Systems      Physical Exam  General: Well nourished, Cooperative, Alert and Oriented    Airway:  Mallampati: II   Mouth Opening: Normal  TM Distance: Normal  Tongue: Normal  Neck ROM: Normal ROM  Hoarse voice   Dental:  Edentulous    Chest/Lungs:  Clear to auscultation, Normal Respiratory Rate    Heart:  Rate: Normal  Rhythm: Regular Rhythm        Anesthesia Plan  Type of Anesthesia, risks & benefits discussed:    Anesthesia Type: Gen Supraglottic Airway  Intra-op Monitoring Plan: Standard ASA Monitors  Post Op Pain Control Plan: IV/PO Opioids PRN  Induction:  IV  Airway Plan: Direct  Informed Consent: Informed consent signed with the Patient and all parties understand the risks and agree with anesthesia plan.  All questions answered. Patient consented to blood products? No  ASA Score: 4  Day of Surgery Review of History & Physical: H&P Update referred to the surgeon/provider.  Anesthesia Plan Notes: Premedication: Nebulizer given by Respiratory Therapist Preprocedure  Technique: General IV with propfol/remifentanyl infusion  Airway: I-Gel LMA compatible with bronchoscope      Ready For Surgery From Anesthesia Perspective.     .      "

## 2022-11-16 NOTE — TRANSFER OF CARE
"Anesthesia Transfer of Care Note    Patient: Pratibha Patel    Procedure(s) Performed: Procedure(s) (LRB):  ENDOBRONCHIAL ULTRASOUND (EBUS) (N/A)    Patient location: OPS    Anesthesia Type: general    Transport from OR: Transported from OR on room air with adequate spontaneous ventilation    Post pain: adequate analgesia    Post assessment: no apparent anesthetic complications and tolerated procedure well    Post vital signs: stable    Level of consciousness: awake, alert and oriented    Nausea/Vomiting: no nausea/vomiting    Complications: none    Transfer of care protocol was followed      Last vitals:   Visit Vitals  BP (!) 175/71   Pulse 64   Temp 36.3 °C (97.4 °F) (Oral)   Resp 19   Ht 5' 4" (1.626 m)   Wt 74.3 kg (163 lb 12.8 oz)   SpO2 98%   Breastfeeding No   BMI 28.12 kg/m²     "

## 2022-11-16 NOTE — PROCEDURES
[facility]  Bronchoscopy   Procedure Note    SUMMARY     Date of Procedure: 11/16/2022    Procedure: Bronchoscopy, Diagnostic  Garcia Needle biopsy  EBUS    Performed by: Anthony Hutson MD    Pre-Procedure Diagnosis:  Hypermetabolic subcarinal lymph node    Post-Procedure Diagnosis:  Hypermetabolic subcarinal lymph node    Anesthesia: General Anesthesia    ASA 3    Description of the Findings of the Procedure:     Patient was consented for the procedure with all risk and benefit of the procedure explained in detail.  Patient was given the opportunity to ask questions and all concerns were answered.  The bronchocope was inserted into the main airway via the LMA. An anatomical survey was done of the main airways and the subsegmental bronchus.  There was a moderate amount of tenuous mucus scattered throughout all of the airways.  This was suctioned clear.  An EBUS enabled bronchoscope was used to visualize the subcarinal area.  Particular attention was paid to the corresponding area that was hypermetabolic on the PET scan.  A poorly defined  area appearing to be lymph node was identified and biopsied once and sent for rapid path. that call back was nondiagnostic.  Repeat sample was taken and sent for rapid path which returned suggestive of adenocarcinoma.  Procedure was concluded at that point      Complications: None; patient tolerated the procedure well.    Estimated Blood Loss (EBL): Minimal           Specimens: 7R LN aspirate and biopsy       Condition: Good        Disposition: PACU - hemodynamically stable.    Oncology to follow up on path results    Anthony Hutson MD  [facility]

## 2022-11-16 NOTE — ANESTHESIA POSTPROCEDURE EVALUATION
Anesthesia Post Evaluation    Patient: Pratibha Patel    Procedure(s) Performed: Procedure(s) (LRB):  ENDOBRONCHIAL ULTRASOUND (EBUS) (N/A)    Final Anesthesia Type: general      Patient location during evaluation: PACU  Patient participation: Yes- Able to Participate  Level of consciousness: awake and alert  Post-procedure vital signs: reviewed and stable  Pain management: adequate  Airway patency: patent    PONV status at discharge: No PONV  Anesthetic complications: no      Cardiovascular status: hemodynamically stable  Respiratory status: unassisted  Hydration status: euvolemic  Follow-up not needed.      No case tracking events are documented in the log.      Pain/Pavel Score: Pavel Score: 10 (11/16/2022 10:52 AM)

## 2022-11-17 VITALS
HEIGHT: 64 IN | OXYGEN SATURATION: 92 % | SYSTOLIC BLOOD PRESSURE: 162 MMHG | DIASTOLIC BLOOD PRESSURE: 82 MMHG | TEMPERATURE: 97 F | BODY MASS INDEX: 27.96 KG/M2 | HEART RATE: 71 BPM | RESPIRATION RATE: 19 BRPM | WEIGHT: 163.81 LBS

## 2022-11-25 LAB
DHEA SERPL-MCNC: NORMAL
ESTROGEN SERPL-MCNC: NORMAL PG/ML
INSULIN SERPL-ACNC: NORMAL U[IU]/ML
LAB AP CLINICAL INFORMATION: NORMAL
LAB AP GROSS DESCRIPTION: NORMAL
LAB AP INTRA OP: NORMAL
LAB AP REPORT FOOTNOTES: NORMAL
T3RU NFR SERPL: NORMAL %

## 2022-12-09 ENCOUNTER — OFFICE VISIT (OUTPATIENT)
Dept: HEMATOLOGY/ONCOLOGY | Facility: CLINIC | Age: 77
End: 2022-12-09
Payer: MEDICARE

## 2022-12-09 ENCOUNTER — INFUSION (OUTPATIENT)
Dept: INFUSION THERAPY | Facility: HOSPITAL | Age: 77
End: 2022-12-09
Attending: NURSE PRACTITIONER
Payer: MEDICARE

## 2022-12-09 VITALS
TEMPERATURE: 98 F | SYSTOLIC BLOOD PRESSURE: 194 MMHG | OXYGEN SATURATION: 99 % | DIASTOLIC BLOOD PRESSURE: 72 MMHG | BODY MASS INDEX: 28.49 KG/M2 | WEIGHT: 166.88 LBS | HEART RATE: 65 BPM | RESPIRATION RATE: 16 BRPM | HEIGHT: 64 IN

## 2022-12-09 DIAGNOSIS — E03.2 HYPOTHYROIDISM DUE TO DRUGS: ICD-10-CM

## 2022-12-09 DIAGNOSIS — C34.90 ADENOCARCINOMA OF LUNG, STAGE 4, UNSPECIFIED LATERALITY: Primary | ICD-10-CM

## 2022-12-09 PROCEDURE — 99999 PR PBB SHADOW E&M-EST. PATIENT-LVL V: ICD-10-PCS | Mod: PBBFAC,,, | Performed by: INTERNAL MEDICINE

## 2022-12-09 PROCEDURE — 99214 PR OFFICE/OUTPT VISIT, EST, LEVL IV, 30-39 MIN: ICD-10-PCS | Mod: S$PBB,,, | Performed by: INTERNAL MEDICINE

## 2022-12-09 PROCEDURE — 63600175 PHARM REV CODE 636 W HCPCS: Performed by: INTERNAL MEDICINE

## 2022-12-09 PROCEDURE — 99214 OFFICE O/P EST MOD 30 MIN: CPT | Mod: S$PBB,,, | Performed by: INTERNAL MEDICINE

## 2022-12-09 PROCEDURE — 99215 OFFICE O/P EST HI 40 MIN: CPT | Mod: PBBFAC | Performed by: INTERNAL MEDICINE

## 2022-12-09 PROCEDURE — 96523 IRRIG DRUG DELIVERY DEVICE: CPT | Mod: 59

## 2022-12-09 PROCEDURE — 99999 PR PBB SHADOW E&M-EST. PATIENT-LVL V: CPT | Mod: PBBFAC,,, | Performed by: INTERNAL MEDICINE

## 2022-12-09 RX ORDER — SODIUM CHLORIDE 0.9 % (FLUSH) 0.9 %
10 SYRINGE (ML) INJECTION
Status: DISCONTINUED | OUTPATIENT
Start: 2022-12-09 | End: 2022-12-09 | Stop reason: HOSPADM

## 2022-12-09 RX ORDER — SODIUM CHLORIDE 0.9 % (FLUSH) 0.9 %
10 SYRINGE (ML) INJECTION
Status: CANCELLED | OUTPATIENT
Start: 2022-12-09

## 2022-12-09 RX ORDER — HEPARIN 100 UNIT/ML
500 SYRINGE INTRAVENOUS
Status: DISCONTINUED | OUTPATIENT
Start: 2022-12-09 | End: 2022-12-09 | Stop reason: HOSPADM

## 2022-12-09 RX ORDER — HEPARIN 100 UNIT/ML
500 SYRINGE INTRAVENOUS
Status: CANCELLED | OUTPATIENT
Start: 2022-12-09

## 2022-12-09 RX ADMIN — HEPARIN 500 UNITS: 100 SYRINGE at 11:12

## 2022-12-09 NOTE — PROGRESS NOTES
Subjective:       Patient ID: Pratibha Patel is a 77 y.o. female.    Chief Complaint: Follow-up (Patient stated no new problems )    PCP: Graham Nguyen NP  Cardiologist: Dr. Danny Sánchez  Referring Physician: Dr. Anthony Hutson  Endocrine: Dr. Werner  Radiation oncology: Dr. Boss  ENT: Dr. Rosales     Diagnosis:  1. 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 ? metastatic findings could not be biopsied.    TTF negative/CK 7 +.    Diagnosed June 2014  EGFR mutation negative/ALK gene rearrangement negative. Repeat NGS panel on 8/5/19 TP53 (+), BRAF (-), KRAS WT, ROS 1 (-), ALK (-), PDL1 <1%   2.  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 Patient has reportedly been referred to ENT for resection and lymph node dissection (per patient report).   Subsequent open biopsy done by Dr. Rosales on 7/29/2019 confirmed the same.  Patient was unable to undergo definitive surgical resection for oligo metastatic disease.  3.  Hoarseness and swallowing difficulties secondary to #2  4.  Biopsy of the left lung lesion showed recurrent non-small cell lung cancer, Caris was sent and showed positivity for PD-L1, 2%.  No other targetable or actionable mutations present.    Current Treatment:   Combination chemo immunotherapy with carboplatin, pemetrexed, nivolumab, ipilimumab based off of checkmate [id number]     Treatment History:  1.  RT with weekly Carbo/Taxol started 7/2/14--Completed August 2014  2.  Single agent 'Maintenance' Avastin q 3 weeks.  Started 4/9/15--last given September 21, 2016  3.  Radiation + weekly  Carbo/Taxol at 2AUC ----9/11/19-10/22/19  4.  Patient underwent radiation from 12/17/2020 through 12/21/2020.  Radiation to the left and right lung per Dr. Robert Mortensen.    HPI:   Please see Oncology history in the diagnosis of adenocarcinoma of the lung, stage IV on the problem list for full detail.  Patient originally seen in 2014 with unintentional weight loss, shortness of breath, nonproductive cough.  Patient had imaging findings suggestive of lung cancer, bronchoscopy done in June of 2014 revealed moderately differentiated adenocarcinoma of the lung.  She originally underwent chemo radiation from July 2014 through August 2014.  She was placed on maintenance Avastin from April 2015 through September 2016. Patient then underwent a thyroid biopsy in June of 2019 that showed metastatic carcinoma favored to be adenocarcinoma lung primary.  She underwent surgical resection in July 2019, however full surgical dissection was not able to be done and the patient underwent open biopsy of right thyroid gland.  Pathology revealed poorly differentiated non-small cell carcinoma consistent with metastatic lung cancer.  NGS panel done at that time unremarkable.  She started chemoradiation with carbo Taxol in September 2019, completed in October 2019. Patient was doing well, imaging in October 2020 revealed an abnormality in the left lung, CT-guided lung biopsy on 11/03/2020 showed recurrent non-small cell lung cancer, Caris revealed PDL1 positivity.  She underwent SBRT from 12/17/2020 through 12/21/2020.  Patient then placed on surveillance imaging, most recently done on 07/05/2022 showing worsening disease in the right lung with a previous lesion that measured 0.6 cm and had an SUV of 1.0 now measuring 1.5 cm and an SUV of 8.4.  This was treated with SBRT in August of 2022.  PET/CT scan done on 11/8/2022 showed mild increased radiotracer activity within the left apical consolidation, SUV 5.5.  There was a newly hypermetabolic  subcentimeter subcarinal lymph node.  A 1 cm right lower lobe nodule was smaller, he right upper lobe nodule was slightly larger measuring 6 mm.  No sites of FDG avid metastatic disease in the neck, abdomen, or pelvis. Bronchoscopy done on 11/16/2022, pathology revealed malignant cells consistent with adenocarcinoma.  Planning on chemo immunotherapy with carboplatin, pemetrexed, nivolumab, ipilimumab based off of checkmate [id number].      Interval History:   Patient presents to clinic for scheduled follow up appointment to review pathology results. Overall, she is doing well.  No other issues since I last saw her.         Past Medical History:   Diagnosis Date    Adenocarcinoma of lung     Anemia     Anxiety     Arthritis     COPD (chronic obstructive pulmonary disease)     Depression     HLD (hyperlipidemia)     Hypertension     Lung cancer     Secondary malignant neoplasm of lymph nodes     Secondary malignant neoplasm of right lung     Thyroid disease     lymph nodes malignant      Past Surgical History:   Procedure Laterality Date    BLADDER SURGERY      CHOLECYSTECTOMY      COLONOSCOPY  2018    ENDOBRONCHIAL ULTRASOUND N/A 11/16/2022    Procedure: ENDOBRONCHIAL ULTRASOUND (EBUS);  Surgeon: Anthony Hutson MD;  Location: [facility];  Service: Pulmonary;  Laterality: N/A;    HYSTERECTOMY      KNEE SURGERY Bilateral     replaced knees    PARTIAL HIP ARTHROPLASTY Bilateral      Social History     Socioeconomic History    Marital status:    Tobacco Use    Smoking status: Every Day     Packs/day: 0.25     Types: Cigarettes    Smokeless tobacco: Never   Substance and Sexual Activity    Alcohol use: Not Currently    Drug use: Never      Family History   Problem Relation Age of Onset    Lung cancer Mother     Lung cancer Brother       Review of patient's allergies indicates:  No Known Allergies   Review of Systems   Constitutional:  Negative for chills, diaphoresis, fatigue, fever and unexpected weight  change.   HENT:  Negative for nasal congestion, mouth sores, sinus pressure/congestion and sore throat.    Eyes:  Negative for pain and visual disturbance.   Respiratory:  Negative for cough, chest tightness and shortness of breath.    Cardiovascular:  Negative for chest pain, palpitations and leg swelling.   Gastrointestinal:  Negative for abdominal distention, abdominal pain, blood in stool, constipation and diarrhea.   Genitourinary:  Negative for dysuria, frequency and hematuria.   Musculoskeletal:  Negative for arthralgias and back pain.   Integumentary:  Negative for rash.   Neurological:  Negative for dizziness, weakness, numbness and headaches.   Hematological:  Negative for adenopathy.   Psychiatric/Behavioral:  Negative for confusion.        Objective:      Physical Exam  Vitals reviewed. Exam conducted with a chaperone present.   Constitutional:       General: She is not in acute distress.     Appearance: Normal appearance.   HENT:      Head: Normocephalic and atraumatic.      Nose: Nose normal.      Mouth/Throat:      Mouth: Mucous membranes are moist.   Eyes:      Extraocular Movements: Extraocular movements intact.      Conjunctiva/sclera: Conjunctivae normal.   Cardiovascular:      Rate and Rhythm: Normal rate and regular rhythm.      Pulses: Normal pulses.      Heart sounds: Normal heart sounds.   Pulmonary:      Effort: Pulmonary effort is normal.      Breath sounds: Normal breath sounds.   Abdominal:      General: Bowel sounds are normal.      Palpations: Abdomen is soft.   Musculoskeletal:         General: No swelling. Normal range of motion.      Cervical back: Normal range of motion and neck supple.      Right lower leg: No edema.      Left lower leg: No edema.   Lymphadenopathy:      Cervical: No cervical adenopathy.   Skin:     General: Skin is warm and dry.   Neurological:      General: No focal deficit present.      Mental Status: She is alert and oriented to person, place, and time. Mental  status is at baseline.   Psychiatric:         Mood and Affect: Mood normal.         Behavior: Behavior normal.       LABS AND IMAGING REVIEWED IN EPIC          Assessment:     1. 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 ? metastatic findings could not be biopsied.    TTF negative/CK 7 +.    Diagnosed June 2014.  EGFR mutation negative/ALK gene rearrangement negative.  2.  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 Patient has reportedly been referred to ENT for resection and lymph node dissection (per patient report).   Subsequent open biopsy done by Dr. Rosales on 7/29/2019 confirmed the same.  Patient was unable to undergo definitive surgical resection for oligo metastatic disease.  3.  Treatment induced anemia   4. Hoarseness .      Plan:          Patient's biopsy did return as recurrent non-small cell lung cancer in the left lung.  I feel that the right lung will be similar pathology.  She completed radiation with Dr. Mortensen on 12/24/2020.     Caris did reveal that PD-L1 was 2% positive suggesting benefit from pembrolizumab or nivolumab/ipilimumab.      Her PET/CT scan does show a new hypermetabolic subcentimeter subcarinal lymph node and increase in a left apical consolidation.  The left apical consolidation is close to the aorta and I do not think would be amenable to biopsy, however this subcarinal lymph node may be amenable to bronchoscopy.      Bronchoscopy done on 11/16/2022 showed metastatic adenocarcinoma.  She is no longer a candidate for full dose SBRT.  We will treat with carboplatin,  pemetrexed, nivolumab, and ipilimumab based on the CHECKMATE 9LA study.      Follow up with radiation oncology as scheduled, Dr. Mortensen    Port-A-Cath in place, will set up for a flush today    Plan to start treatment on 12/20/2022.  Will set up for patient education and labs.    Return to clinic prior to cycle 2, same day labs    Consented the patient to the treatment plan and the patient was educated on the planned duration of the treatment and schedule of the treatment administration.     Enrrique Gudino II, MD I, Makenzie Gaytan LPN, acted solely as a scribe for and in the presence of, Dr. Enrrique Gudino who performed the service.

## 2022-12-14 NOTE — PATIENT INSTRUCTIONS
OPDIVO (NIVOLUMAB)/YERVOY (IPILUMUMAB)/CARBOPLATIN/ALIMTA (PEMETREXED)  Instructions for Anti nausea regimen/reaction prevention  NO NSAIDS (Ibuprofen, naproxen sodium, Advil, Aleve, Motrin) for 5 days before and for 2 days after chemotherapy. It can increase side effects of the Alimta affecting the kidneys.  Day before treatment: Dexamethasone (Decadron) 4mg tab 1 with breakfast and tab 1 with lunch  Day 1 of treatment: Eat breakfast. Take your regular medication and folic acid. If you have a mediport: Wear a button down shirt and apply LMX numbing cream to mediport site and least 30 minutes prior to treatment. Cover with plastic wrap or a large band aid. Bring something to read, listen to music or laptop computer. There is free wireless internet available.  Eating ice starting 30 minutes before chemotherapy and continuing for at least 30 minutes after completion may help reduce risk of mouth sores.  Day 1 (night of treatment): Olanzapine (Zyprexa) tab 1 at bedtime.  Day 2, 3, 4 of treatment: Dexamethasone (Decadron) 4mg tab 1 with breakfast and tab 1 with lunch      Ondansetron (Zofran) 8mg 1 tab in AM. Continue taking 3 times a day as  needed throughout treatment.  Olanzapine (Zyprexa) tab 1 at bedtime.  Continue throughout treatment:    Folic Acid 1 daily throughout treatment and after  Nausea: Ondansetron (Zofran) 8mg three times a day as needed for nausea  Diarrhea: Imodium AD as needed  Constipation: Stool softener twice a day (Dulcolax, Senokot S, Colace, Surfak, Miralax) may take generic of any medication. If no bowel movement in 2 days, take a laxative like Milk of Magnesia, Ex-lax or Magnesium citrate  Indigestion/Heartburn: Over the counter Pepcid, Prilosec, Rolaids, Tums, Mylanta   Mouth rinse 3 to 4 times a day. Swish and spit every day throughout treatment  1 cup warm water  ½ tsp salt & ½ tsp baking soda  And/or Use Biotene toothpaste and/or mouthwash or any alcohol free mouthwash  Rash and itching:  Hydrocortisone 1% to rash 4 times a day    Benadryl Itch Stopping Cream    Emollients (creams for extra dry skin or Aquaphor, Petroleum Jelly)  Use sensitive skin soaps with moisturizer: Examples: Dove, Cetaphil, Aveeno    Itching scalp: Head & Shoulders shampoo, Selsun Blue    Aveeno Oatmeal bath for itching    Benadryl 25mg every 4 hours, Claritin or Zyrtec 10mg daily as needed for itching  If rash and/or itching worsens and not relieved with over the counter products, call the clinic.  Drink 4-6 pints of liquids a day (non-caffeine, non alcohol). Can have caffeine but does not count toward liquid intake.    What safety precautions must I take while on chemotherapy?  Chemotherapy medicines are very strong. They will be in all of your bodily fluids, including your urine, stool, saliva, sweat, tears, vaginal secretions, and semen. You must carefully follow some safety precautions to prevent harm to others while you are using these medicines. Here are some recommended precautions:     Make sure that people who help care for you wear disposable gloves if they are going to come into contact with any of your bodily fluids for 48 hours. Women who are pregnant or breastfeeding should not handle any of your bodily fluids.     Wash any clothes, towels, and linens that may have your bodily fluids on them twice in a washing machine using very hot water.     Dispose of adult diapers, tampons, and sanitary napkins by first sealing them in a plastic bag.     Use a condom when having sex for at least 2 weeks after receiving your chemotherapy.     Do not share beverages or food.     If you are issued a hazardous waste container, make sure you understand the directions for using it.     Wash your hands thoroughly with warm water and soap after using the bathroom. Dry your hands with disposable paper towels.    When using the toilet for 48 hours after chemotherapy:     Close the lid of the toilet prior to flushing. This helps to  avoid splashing.     Both men and women should sit to use the toilet. This helps avoid splashing.      Flush it twice after each use, including after vomiting.        Patient Treatment Information  Pemetrexed + Carboplatin  Your chemotherapy treatment is called pemetrexed + carboplatin. It is commonly used to treat lung cancer and  has also been used to treat other diseases. This treatment is made up of two drugs:   pemetrexed (ogj-r-YRFT-ed) or AlimtaÂ® (uh-MOYER-tuh)   carboplatin (KEM-zee-xtc-tin)  These drugs prevent cancer cells from dividing and growing, and can cause the cancer cells to shrink and die.  What Do I Need to Know Before Starting Treatment?  Be sure to tell your healthcare provider about any prescription or over-the-counter products you are taking,  including dietary supplements, vitamins, herbal medicines and homeopathic remedies.  Use an effective birth control method during your treatment. These drugs can cause harm to a fetus, so tell your  healthcare provider right away if you or your partner becomes pregnant.  Do not breastfeed during treatment. It is not known if these drugs pass into breast milk.  Some chemotherapy drugs can cause sterility. Talk with your healthcare provider about your options if you want  to have children in the future.  Do not get any immunizations or vaccinations during your treatment without the approval of your healthcare  provider.  Chemotherapy can sometimes cause changes in the amount of electrolytes in your body, such as sodium,  potassium and magnesium. Your healthcare provider will check your blood for these changes and treat any  problems that are found.  What Do I Need to Know Before Starting Carboplatin + Pemetrexed?  This treatment can cause an allergic reaction. Tell your healthcare provider right away if you have a rash,  itching, facial flushing, chest pain, trouble breathing, dizziness, fainting, or swelling of the lips, throat or tongue.  This treatment  can increase your risk of anemia, bleeding and infection, including pneumonia (see What Are  the Possible Side Effects?). Symptoms can include paleness; shortness of breath; stomach pain or fullness;  sudden loss of vision; fever; cough; flu-like symptoms; tiredness; painful or frequent urination; warm, red or  painful skin; or red bumps or bruises on your skin. Tell your healthcare provider if you have any of these  symptoms. Also tell your healthcare provider if you have an illness that puts you at high risk for infections, such  as other cancers, HIV or diabetes that is not well controlled. Your healthcare provider will check your blood cells  during treatment and decide if your doses need to be changed or stopped.  Tell your healthcare provider if you have a history of kidney disease or kidney problems or if you have ever  used the drug cisplatin. This treatment can cause kidney problems, including kidney failure. You may have a  higher risk for side effects or kidney problems if you have used cisplatin in the past, already have kidney  problems, take pemetrexed with non-steroidal anti-inflammatory drugs (NSAIDS), or use carboplatin with other  drugs that can harm your kidneys (see Your treatment can interact with other substances, including:). Your  healthcare provider will check your kidneys during your treatment and decide if your doses need to be changed  or stopped.  Tell your healthcare provider if you have a history of liver disease or liver problems. In some cases, this  treatment can cause liver problems. Tell your healthcare provider if your skin or the whites of your eyes look  yellow, your urine turns a tea color, or you have pain on the right side of your stomach. Your healthcare provider  will check your liver during treatment and decide if your doses need to be changed or stopped.  [facility]  Phone Number: [phone]  Pemetrexed + Carboplatin (NSCLC) 2  Pemetrexed can cause  a skin rash (see What Are the Possible Side Effects?). Your healthcare provider will  give you a steroid before treatment to reduce your chance of a skin rash. It can also help lessen the severity of  the rash (see How Is the Treatment Given?).  In very rare cases, pemetrexed can cause Mcconnell-Saul syndrome or toxic epidermal necrolysis, two  serious skin conditions. Tell your healthcare provider right away if you have swelling around the eyes or a rash  that looks like pimples or blisters on the skin, in the mouth or on the genitals.  In rare cases, carboplatin can cause some of the nerve cells in your hands and feet to stop working properly.  This condition is called neuropathy (see What Are the Possible Side Effects?). You may have a higher risk for  severe neuropathy if you already had this condition before starting treatment, are age 65 or older, or have used  the drug cisplatin in the past. Neuropathy usually improves slowly a few weeks after treatment is done, but it  can be permanent. Tell your healthcare provider if you have numbness, tingling, pain or a burning sensation in  your hands or feet during treatment.  Carboplatin commonly causes damage to the ear. You may have an increased risk of ear damage if you take  high doses of carboplatin or use carboplatin over a period of time. The hearing loss first occurs in the high  frequency range. You may also have trouble hearing normal conversation. Tell your healthcare provider if you  have ringing in the ears, hearing loss in one or both ears, dizziness or vertigo.  In rare cases, carboplatin can cause loss of vision, especially of light and colors. This symptom usually gets  better a few weeks after treatment ends.  In rare cases, some of the side effects caused by this treatment can be severe and life threatening.  Your treatment can interact with other substances, including:   Drugs that cause harm to the kidney, such as amphotericin B and  aminoglycosides (tobramycin, gentamicin  or amikacin)   Aspirin and other NSAIDs, such as ibuprofen (AdvilÂ® or MotrinÂ®) or naproxen (AleveÂ®, NaprosynÂ® or  AnaproxÂ®). You should stop taking aspirin and NSAIDs for at least five days before treatment and not take  them again until at least two days after treatment. You may be able to take ibuprofen, a short-acting NSAID,  during treatment if you have normal kidney function. If you are taking an NSAID, talk with your healthcare  provider about your options.  Please note this list is a summary and does not contain all possible interactions. Tell your healthcare provider if  you are taking any substances that can interact with your treatment.  You should not take this treatment if you:   Are allergic to carboplatin, cisplatin, other platinum-containing medicines, mannitol or any components of  these drugs. Not all forms of carboplatin contain mannitol, so be sure to ask your healthcare provider for an  alternative if you have an allergy to mannitol.   Are allergic to pemetrexed or any of its components.   Have kidney disease or very poor kidney function.   Have hearing loss.  How Is the Treatment Given?  Your healthcare provider will give you your treatment by injection into a vein. Your doses will be based on your  weight, height and how well your kidneys work. Your healthcare provider will determine your number of  treatments.  Your healthcare provider will prescribe folic acid and vitamin B12 to reduce the side effects of pemetrexed. You  will take folic acid by mouth and vitamin B12 by injection into the muscle. It is very important to begin taking folic  acid seven days before your first dose of pemetrexed and continue taking it every day until 21 days after your  last dose. Your healthcare provider will give you a vitamin B12 injection about every nine weeks starting one  week before your first dose of pemetrexed. Tell your healthcare provider if you have an  allergy to folic acid or  vitamin B12.  Your healthcare provider will prescribe a medicine called dexamethasone (dex-a-METH-a-sone) for you to take  by mouth at home. This medicine also helps prevent side effects from pemetrexed. It is important to take  dexamethasone exactly as prescribed. If you miss a dose or take the medicine at the wrong time, tell your  healthcare provider before your treatment. Tell your healthcare provider if you have an allergy to  dexamethasone.  Pemetrexed + Carboplatin (NSCLC) 3  Your healthcare provider may give you medicines before your treatment for nausea and vomiting. You may take  these medicines either by mouth or by injection into a vein.  Store folic acid and dexamethasone at room temperature away from children and pets. If you take too much  folic acid or dexamethasone, call your healthcare provider, local poison control center or emergency room right  away.  Do not share any medicine with others. Sharing medicine with anyone else could be harmful.  When Should I Call My Healthcare Provider?  Call your healthcare provider right away if you have any of the following symptoms:   Shaking chills or fever of 100.5Âº F or higher   Unusual bleeding, easy bruising or pinpoint red spots on your skin   Vomiting that is severe or that lasts several hours   Painful or frequent urination or blood in your urine   Urinating less or not being able to urinate   Constipation that lasts more than two to three days or constipation with stomach pain   Diarrhea that causes an extra four bowel movements a day, diarrhea that lasts more than one day, diarrhea  at night, or diarrhea with fever, cramps or bloody stools   Irregular or rapid heartbeat, chest pain, chest tightness, shortness of breath or difficulty breathing   Severe fluid retention that causes sudden weight gain or swelling in the stomach, hands or feet   Pain or blisters in your mouth, in your stomach or on your genitals   Swelling around  "the eyes   Pain in the arms or legs or sudden shortness of breath   Rash with itching, trouble breathing, or swelling of the lips, throat or tongue   Paralysis or drooping on one side of the body or face   Dizziness or feeling lightheaded   Inability to eat or weight loss   Depression or thoughts of suicide  What Are the Possible Side Effects?  All drugs can cause side effects, but every person reacts differently to each drug. The following chart lists the  possible side effects that can occur with your treatment, how to recognize and minimize symptoms, and  possible treatments. Side effects listed as Common usually occur in more than 25 percent of patients; those  listed as Less Common usually occur in about 5 to 25 percent of patients; and those that are "Rare" usually  occur in fewer than 5 percent of patients. Other side effects may vary in how often they occur.  Side Effect How to Minimize Side Effect Possible Treatments  Risk of Infection (Common)   Fever and chills   Painful urination   Sore throat   Cough, shortness of breath or  difficulty breathing   Nasal congestion or runny nose   Swelling or redness of the skin at the  site of a wound   Wash your hands often.   Brush and floss your teeth daily.   Clean cuts right away with warm water, soap  and antiseptic.   When your white blood cell count is low, stay  away from crowds and people with colds or  other illnesses.   Your healthcare provider may give you  an antibiotic to treat or prevent infection  or a medicine to increase your white  blood cell count.   Your healthcare provider may decrease  your dose or delay further treatment.  Nausea/Vomiting (Common.  Symptoms can be mild, moderate or  severe.)   Feeling queasy or sick to your  stomach   Eat small, frequent meals and bland foods,  such as bananas, rice, applesauce and toast.   Eat food cold or at room temperature so the  smell of food will not bother you.   Do not eat fried, spicy or fatty " foods.   Eat and drink slowly.   Drink plenty of liquids during the day, but to  avoid bloating, drink small amounts of liquid  during meals.   Your healthcare provider will give you  medicine to help reduce nausea and  vomiting.  Pemetrexed + Carboplatin (NSCLC) 4  Side Effect How to Minimize Side Effect Possible Treatments  Mouth Sores and Pain (Common.  Symptoms are generally mild to  moderate but can be severe.)   Pain, swelling or redness of the  mouth, tongue and throat   Coated tongue   Difficulty talking, swallowing or eating   Bleeding ulcers and infection   Brush teeth two to four times a day using a  soft bristle brush and fluoride toothpaste.   Use dental floss daily.   Ask your healthcare provider to suggest a  mouthwash that does not contain alcohol.   Sip water during the day and use sugar-free  candy or gum to keep your mouth wet.   Eat food cold or at room temperature.   Eat soft or pureed food.   Do not eat food that is acidic, spicy, salty, dry  or rough, such as toast.   Your healthcare provider may give you  medicine to help treat pain.   Your healthcare provider may give you  medicine to treat fungal or viral  infections.  Diarrhea (Common. Symptoms are  generally mild to moderate but can  be severe.)   Loose or watery stools several times  a day   Stomach cramping, gas and bloating   Eat small, frequent meals and bland foods,  such as bananas, rice, applesauce and toast.   Do not have caffeine; alcohol; raw fruits and  vegetables; raw eggs; undercooked meats;  spicy, fatty and greasy foods; milk and dairy  products; foods that cause gas, such as beans  and other legumes; high fiber and high-fat  foods; foods left un-refrigerated for more than  two hours (one hour for egg dishes and cream  or mayonnaise-based foods); bulk laxatives;  and stool softeners.   Drink eight to ten glasses of clear  liquids every day.   Your healthcare provider may prescribe  medicine to help treat  diarrhea.  Alopecia or Hair Loss (Common.  Hair loss is usually minimal, but can  cause complete hair loss.)   More than normal amount of hair loss  in your brush, in the shower or on  your pillow after sleeping   Loss of body hair  You may not be able to prevent alopecia, but here are tips to help with hair loss:   Use a soft hairbrush. Do not use brush rollers, color treat your hair or get a permanent.   Do not wash your hair every day. When you do wash, use a mild shampoo.   Do not use a hairdryer, or use a low setting if you must use one.   Have your hair cut short; this will make it look sewell.   Your insurance might cover a wig. Ask your healthcare provider for a prescription for a hair  prosthesis. You can match your hair color and style more easily if you shop for a wig before  losing a lot of hair.   Use sunscreen or wear a hat or scarf to protect your scalp from the sun.  Neuropathy (Less Common.  Symptoms are generally mild to  moderate. )   Numbness or tingling feeling in the  hands or feet   Muscle cramps   Loss of balance   Difficulty buttoning buttons or picking  up objects   Decreased awareness of heat or  cold in fingertips and toes   Difficulty hearing   Try to stay out of the cold and extreme heat.   Wear mittens or gloves, and socks and  scarves.   If your fingers are numb, be careful with sharp  objects.   Beware of hot coffee mugs, pots and pans  and dishwater--you may not feel the heat  until you are burned.   If you feel unsteady, be careful on stairs and  in the shower.   Your healthcare provider may decrease  your dose or delay further treatment.  Constipation (Less Common.  Symptoms are generally mild to  moderate.)   No bowel movement for one to two  days   Small, hard, dry stools   Bloating, gas, cramps and pain   Drink plenty of fluids to help loosen your  bowels. Drink warm or hot liquids if you do not  have mouth sores.   Your healthcare provider may suggest eating  foods that are  high in fiber, such as bran,  vegetables, whole wheat breads and fruit.   Have prunes or prune juice, which act as  laxatives.   Try exercising to help loosen bowels.   Your healthcare provider may suggest a  stool softener.  Anemia (Less Common)   Fatigue or weakness   Dizziness   Pale skin   Feeling out of breath   Feeling cold   Plan rest periods throughout the day.   Organize daily activities to conserve your  energy.   Try to eat a well balanced diet and drink  plenty of fluids.   Stand up slowly to avoid getting dizzy.   Your healthcare provider may give you  a medicine to increase your red blood  cell count.   Your healthcare provider may decrease  your dose or delay further treatment.  Pemetrexed + Carboplatin (NSCLC) 5  Side Effect How to Minimize Side Effect Possible Treatments  Rash (Less Common. Symptoms are  generally mild to moderate.)   Usually mild and short-lived   Generally appears on the arms and  trunk (occasionally on the face)   May be itchy   May appear as a flat, discolored area  on the skin or as a small raised bump   Do not stay in the heat for long periods of time.   Use creams or moisturizers often. Try wearing  cotton gloves.   Do not use perfume, cologne or aftershave;  these products can irritate the skin.   Your healthcare provider may prescribe  creams (mild steroids, antihistamines or  antibiotics) to help treat the rash.   The rash may improve on its own  without any treatment.  Anorexia or Appetite Loss (Less  Common. Symptoms are generally  mild to moderate.)   Not having an appetite   Feeling too nauseated to eat   Metallic or medicinal taste   Change in taste causing dislike for  certain foods   Try eating six to eight small meals or snacks  each day instead of three larger meals.   Vary your diet and try new foods and recipes.   Take a walk before meals, when possible.  This may make you feel hungrier.   Eat with friends or family. When eating alone,  listen to the radio or  watch TV.   Cook dinners ahead of time and freeze  them in small portions to minimize  cooking smells.   Let others help with food, but ask that  foods be prepared in small portions  that can be frozen. Do not hesitate to  let them know which foods you do not  like.   Add mild spices to change flavor.   Have meals delivered to you through a  program such as Meals on Wheels.  Risk of Bleeding (Less Common)   Unusual bleeding or easy bruising   Black or tar-like stools   Blood in your urine   Pinpoint red spots on your skin   Bleeding gums or nosebleeds   Do not take aspirin or aspirin-like drugs, such  as ibuprofen.   Use caution with sharp objects, such as  razors and nail cutters.   Do not do any activities that can cause cuts,  bumps and bruises.   Your healthcare provider may give you  medicine to increase your platelet  count.   Your healthcare provider may decrease  your dose or delay further treatment.  The following side effects can occur with the individual drugs in this treatment but were not reported in the clinical  trials.  Fluid Retention   Swelling around the eyes, lower legs,  ankles, feet or stomach area   Slight weight gain   Check your weight often.   Try not to eat salty foods, as this can cause  fluid retention.   Your healthcare provider may give you  a diuretic (water pill) to reduce the  amount of fluid in your body.   Your healthcare provider may decrease  your dose or delay further treatment.  Radiation Recall   Redness, tenderness or swelling on  areas of the skin that have been  treated with radiation in the past   May cause wet sores, peeling skin or  discoloration after the skin has healed   Stay out of the sun and do not use tanning  beds.   If you are in the sun, wear protective clothing  and use sunscreen with an SPF of 30 or  higher.   Your healthcare provider may give you  a corticosteroid to reduce swelling.  What Are the Other Possible Side Effects?  The following chart lists  other side effects found with the individual drugs in this treatment. It does not list all  possible side effects. For more information, talk with your healthcare provider.  Common Side Effects Less Common Side Effects Rare Side Effects   Watery eyes   Fatigue   Weakness   Generalized pain   Stomach pain   Dry eyes   Irritable bowel or swelling of the colon   Pancreatitis   Lung swelling and scarring   Eye irritation, increased tearing or conjunctivitis   Abnormal heartbeat or heart attack   Dehydration or fainting   Injection site reactions   High blood pressure   Depression   Blood clots in the lungs  Pemetrexed + Carboplatin (NSCLC) 6  Notes  The information presented herein is prepared by [employer] and/or its affiliates (collectively, [employer]) and is furnished for informational purposes  only and for your personal use only. All rights herein are reserved by [employer] and you may not publish, copy or otherwise use or disseminate the contents  hereof without permission.  Disclaimer. The drug information is presented as a guide for reference purposes only and is not intended as a substitute for specific advice from a physician or  other health care professional. Each patients situation is unique and the information presented herein may not be applicable to your situation. In addition,  [employer] makes no warranty as to the accuracy or completeness of information contained herein. This document was prepared as of the date indicated below.  You should be aware that information regarding medical conditions and their treatment, including the information in this document, changes frequently, and  [employer] undertakes no responsibility to update the information contained in this document. This document is not a substitute for the expertise, skill,  knowledge and judgment of healthcare practitioners. Additional, important information about this product, including black box warnings and precautions,  is  available from the  and other sources. THIS DRUG INFORMATION DOES NOT OFFER OR PROVIDE, AND IS NOT A REPLACEMENT FOR, MEDICAL  EVALUATION, ADVICE, DIAGNOSIS, OR TREATMENT. ADDITIONALLY, NO PART OF THIS DOCUMENT SHALL BE DEEMED TO CREATE A PHYSICIAN-PATIENT  RELATIONSHIP OR SIMILAR RELATIONSHIP BETWEEN [facility] (OR ANY [facility] AFFILIATED PARTY) AND YOU. YOU SHOULD CONSULT WITH AN  APPROPRIATE PROFESSIONAL FOR SPECIFIC EVALUATION, ADVICE, DIAGNOSIS, AND TREATMENT (AS APPLICABLE) TAILORED TO YOUR  SITUATION.[facility] PROVIDES THIS DRUG INFORMATION ON AN AS IS BASIS. ALL WARRANTIES OF ANY KIND, EXPRESS OR IMPLIED, INCLUDING BUT  NOT LIMITED TO THE IMPLIED WARRANTIES OF MERCHANTABILITY AND FITNESS FOR A PARTICULAR PURPOSE ARE DISCLAIMED.  Limitation of Liability. Neither [employer] nor any of its employees, agents, contractors, directors or affiliates shall be liable for any improper or incorrect use of  the information described and contained herein, nor do any of them assume any responsibility for anyones use of the information. IN NO EVENT SHALL  [facility] BE LIABLE FOR ANY DIRECT, INDIRECT, INCIDENTAL, SPECIAL, EXEMPLARY, OR CONSEQUENTIAL DAMAGES (INCLUDING, BUT NOT LIMITED TO,  PROCUREMENT OR SUBSTITUTE GOODS OR SERVICES; LOSS OF USE, DATA OR PROFITS; OR BUSINESS INTERRUPTION) HOWEVER CAUSED AND ON ANY  THEORY OF LIABILITY, WHETHER IN CONTRACT, STRICT LIABILITY, OR TORT (INCLUDING NEGLIGENCE OR OTHERWISE) ARISING IN ANY WAY OUT OF THE  USE OF THIS INFORMATION, EVEN IF ADVISED OF THE POSSIBILITY OF SUCH DAMAGE.  January 2014  Â© 2014 [employer]. All rights reserved.  References:  1. Alimta [package insert]. [place], [place]: [employer].; 2013.  2. Carboplatin [package insert]. [place], [place]: [employer]; 2010.  3. Luh FONATNEZ, [place] LEVIN, Rob O, et al. Phase III Study by the [facility] Study [facility]: Pemetrexed  Plus Carboplatin Compared With Gemcitabine Plus  "Carboplatin As First-Line Chemotherapy in Advanced Non-Small-  Cell Lung Cancer. J Clin Oncol. 2009; 27:1297-7058.  4. April RG, Brenden FV, Russell GW, et al. Phase II study of pemetrexed in combination with carboplatin in the first line  treatment of advanced nonsmall cell lung cancer. Cancer. 2005; 104:3453-2149.    Opdivo      Generic Name: Nivolumab  (reggie VOL ue mab)  Trade name: Opdivo®  Nivolumab is the generic name for the trade drug name Opdivo®. In some cases, health care professionals may use the generic name nivolumab when referring to the trade name Opdivo®.  Nivolumab is a targeted therapy. It is a human programmed death receptor-1 (PD-1) blocking antibody. (For more detail, see "How this drugs works," below).  What This Drug Is Used For:  For the treatment of patients with unresectable or metastatic melanoma and disease progression in combination with ipilimumab and, if BRAF V600 mutation positive, a BRAF inhibitor.  For the treatment of patients with metastatic non-small cell lung cancer (NSCLC) with progression on or after platinum-based chemotherapy.  Treatment of advanced renal cell cancer in patients who have received prior anti-angiogenic therapy. Or in combination with Ipilimumab.  Treatment of patients with classical Hodgkins lymphoma (cHL) that has relapsed or progressed after autologous hematopoietic stem cell transplantation (HSCT) and post-transplantation brentuximab vedotin.  Treatment of patients with recurrent or metastatic squamous cell carcinoma of the head and neck (SCCHN) with disease progression on or after a platinum-based therapy.  Treatment of locally advanced or metastatic urothelial carcinoma who have disease progression during or following platinum-containing chemotherapy.  Treatment of metastatic small cell lung cancer (SCLC) with progression after platinum-based chemotherapy and at least one other line of therapy.  For treatment of adult and pediatric patients with " unresectable or metastatic, mircosatellite instability-high (MSI-H) or mismatch repair deficient solid tumors or colorectal cancer that have progressed following prior treatment.  For treatment of patients with hepatocellular cancer (HCC) who have been previously treated with sorafenib.  Note: if a drug has been approved for one use, physicians may elect to use this same drug for other problems if they believe it may be helpful.  How This Drug Is Given:  Nivolumab is administered intravenously (IV) into the vein over 60 minutes every 2 weeks.  The amount of nivolumab that you will receive depends on many factors, your general health or other health problems, and the type of cancer or condition being treated. Higher doses do not give a better response and may cause increased toxicity. Your doctor will determine your dose and schedule.  If you miss any appointments call your healthcare provider as soon as possible to reschedule your appointment.  Side Effects:  Things to remember about the side effects of nivolumab:  Most people do not experience all of the side effects listed.  Side effects are often predictable in terms of their onset and duration.  Side effects are almost always reversible and will go away after treatment is complete.  There are many options to help minimize or prevent side effects.  There is no relationship between the presence or severity or side effects and the effectiveness of the medications  The following side effects are common (occurring in greater than 30%) for patients taking nivolumab:  Fatigue  Lymphocytopenia (low white blood cells)  Low sodium  Shortness of breath  Musculoskeletal pain  Decreased appetite  Cough  These side effects are less common side effects (occurring in about 10-29%) of patients receiving nivolumab:  Nausea  Anemia  Constipation  Increased serum creatinine  Colitis  Low potassium  Low magnesium  High calcium  Vomiting  Weakness  Diarrhea  High potassium  Low  calcium  Swelling  Fever  Rash  Abdominal pain  Increased serum AST  Thrombocytopenia  Increased serum alkaline phosphatase  Chest pain  Weight loss  Joint pain  Increased serum ALT  Itching  Pneumonia  Pain  Not all side effects are listed above. Some that are rare (occurring in less than 10% of patients) are not listed here. However, you should always inform your health care provider if you experience any unusual symptoms.  A few rare but severe side effects include:  Immune-mediated Pneumonitis  Immune-mediated Colitis  Immune-mediated Hepatitis  Immune-mediated Nephritis and Renal Dysfunction  Immune-mediated Hypothyroidism and Hyperthyroidism  When to contact your doctor or health care provider:  Contact your health care provider immediately, day or night, if you should experience any of the following symptoms:  Fever of 100.4° F (38° C) or higher, chills  Signs of an allergic reaction, like rash; hives; itching; red, peeling, or blistered skin with or without fever; tightness in the chest or throat; wheezing; trouble breathing or talking; unusual hoarseness; or swelling of the lips, mouth, face, throat, or tongue.  Always inform your health care provider if you experience any unusual symptoms.  The following symptoms require medical attention, but are not an emergency. Contact your health care provider within 24 hours of noticing any of the following:  Diarrhea should be reported to your health care provider  Nausea (interferes with ability to eat and unrelieved with prescribed medication)  Vomiting (vomiting more than 4-5 times in a 24 hour period)  Unable to eat or drink for 24 hours or have signs of dehydration: tiredness, thirst, dry mouth, dark and decrease amount of urine, or dizziness  Sudden change in eyesight  Sudden onset of shortness of breath, accompanied by cough and/or fever  Skin or whites of your eyes turn yellow  Light colored stools  Blood in stools  Dark, tarry or sticky stools  Urine turns  dark or brown (tea color)  Blood in urine  Big weight gain  Unable to pass urine or change in the amount of urine passed  Decreased appetite  Stomach pain or upset stomach  Bleed or bruise more easily than normal  Fast heartbeat  Cough with or without mucus  Any skin change, irritation, itching or rash  Very bad muscle or weakness  Very bad joint pain  Swelling in the arms or legs  Signs of trouble with your thyroid or pituitary gland (change in mood or the way you act, change in weight, constipation, dizziness, deeper voice, feeling cold, fainting, hair loss, feeling very tired, headache or loss of sex drive)  Always inform your health care provider if you experience any unusual symptoms.  Precautions:  Before starting nivolumab treatment, make sure you tell your doctor about any other medications you are taking (including prescription, over-the-counter, vitamins, herbal remedies, etc.).  Do not receive any kind of immunization or vaccination without your doctor's approval while taking nivolumab.  Inform your health care professional if you are pregnant or may be pregnant prior to starting this treatment. Pregnancy category D (nivolumab may be hazardous to the fetus. Women who are pregnant or become pregnant must be advised of the potential hazard to the fetus).  For both men and women: Do not conceive a child (get pregnant) while taking nivolumab. Barrier methods of contraception, such as condoms, are recommended while you are taking this drug and for 5 months after you stop taking it. Discuss with your doctor when you may safely become pregnant or conceive a child after therapy.  Do not breast feed while taking this medication.  Self-Care Tips:  Drink at least two to three quarts of fluid every 24 hours, unless you are instructed otherwise.  Nivolumab can cause visual changes, dizziness and tiredness. If you have any of these symptoms, use caution when driving a car, using machinery, or anything that requires you  to be alert.  Wash your hands often.  Avoid contact sports or activities that could cause injury  To reduce any potential nausea eat small, frequent meals. Sucking on lozenges and chewing gum may also help.  Eat foods that may help reduce diarrhea (see managing side effects - diarrhea).  Avoid sun exposure. Wear SPF 30 (or higher) sunblock and protective clothing.  In general, drinking alcoholic beverages should be kept to a minimum or avoided completely. You should discuss this with your doctor.  Get plenty of rest.  Maintain good nutrition. (see eating well during chemotherapy)  If you experience symptoms or side effects, be sure to discuss them with your health care team. They can prescribe medications and/or offer other suggestions that are effective in managing such problems.  Monitoring and Testing:  You will be checked regularly by your health care professional while you are taking nivolumab to monitor side effects and check your response to therapy.  How This Drug Works:  Targeted therapy is the result of about 100 years of research dedicated to understanding the difference between cancer cells and normal cells. To date, cancer treatment has focused primarily on killing rapidly dividing cells because one feature of cancer cells is that they divide rapidly. Unfortunately, some of our normal cells divide rapidly too, causing multiple side effects.  Targeted therapy is about identifying other features of cancer cells. Scientists look for specific difference in the cancer cells and the normal cells. This information is used to create a targeted therapy to attack the cancer cells without damaging the normal cells, thus leading to fewer side effects. Each type of targeted therapy works a little bit differently but all interfere with the ability of the cancer cell to grow, divide, repair and/or communicate with other cells.  There are different types of targeted therapies, defined in three broad categories. Some  targeted therapies focus on the internal components and function of the cancer cell. The targeted therapies use small molecules that can get into the cell and disrupt the function of the cells, causing them to die. There are several types of targeted therapy that focus on the inner parts of the cells. Other targeted therapies target receptors that are on the outside of the cell. Therapies that target receptors are also known as monoclonal antibodies. Antiangiogenisis inhibitors target the blood vessels that supply oxygen to the cells, ultimately causing the cells to starve.  Nivolumab is a human monoclonal antibody that blocks the interaction between PD-1 and it's ligands, PD-L1 and PD-L2. Binding these ligands to the PD-1 receptor found on T cells, inhibits T cell proliferation and cytokine production. Upregulation of the PD-1 ligands occurs in some tumors and signaling through this pathway can contribute to inhibition of active T-cell immune surveillance of tumors. Nivolumab is a human immunoglobulin G4 (IgG4) monoclonal antibody that binds to the PD-1 receptor and blocks its interaction with PD-L1 and PD-L2, releasing PD-1 pathway-mediated inhibition of the immune response, including the anti-tumor immune response, resulting in decreased tumor growth.  Note: We strongly encourage you to talk with your health care professional about your specific medical condition and treatments. The information contained in this website is meant to be helpful and educational, but is not a substitute for medical advice.      Patient Treatment Information  Ipilimumab  Your chemotherapy treatment is called ipilimumab (z-[id number]-ZXL-[id number]-mab) or YervoyÂ® (YUR-voi). It is commonly used  to treat advanced melanoma and has also been used to treat other diseases. Ipilimumab is a type of medicine  called a monoclonal antibody, which targets cancer cells more precisely than chemotherapy drugs. This drug  prevents cancer cells from dividing and  growing, and can eventually cause the cancer cells to shrink and die.  What Do I Need to Know Before Starting Treatment?  Be sure to tell your healthcare provider about any prescription or over-the-counter products you are taking,  including dietary supplements, vitamins, herbal medicines and homeopathic remedies.  Use an effective birth control method while you are taking these drugs. Ipilimumab may cause harm to a fetus,  so be sure to tell your healthcare provider right away if you or your partner becomes pregnant.  Avoid breastfeeding during treatment. It is not known if ipilimumab passes into breast milk.  Some chemotherapy drugs can cause sterility. Talk with your healthcare provider about your options if you want  to have children in the future.  Do not get any immunizations or vaccinations while taking chemotherapy drugs without the approval of your  healthcare provider. You should also avoid contact with anyone receiving a live vaccination during treatment.  Chemotherapy can sometimes cause changes in the amount of electrolytes in your body, such as sodium,  calcium, potassium and magnesium. Your healthcare provider will check your blood for these changes and will  treat any problems that are found.  What Do I Need to Know Before Starting Ipilimumab?  Tell your healthcare provider if you have any condition that can affect your immune system, such as ulcerative  colitis, Crohns disease, lupus or sarcoidosis. Also tell your healthcare provider if you have had an organ  transplant, such as a kidney transplant. You may be at higher risk for side effects from ipilimumab if you have a  condition that affects your immune system.  Ipilimumab can cause swelling of the intestines, called enterocolitis. Tell your healthcare provider immediately if  you have any changes in your bowel movements, especially if you have an additional four bowel movements in  one day, if you have diarrhea in the middle of the night or if  your diarrhea is severe (see What Are the Possible  Side Effects?). Also tell your healthcare provider right away if you have bloating or swelling of the stomach,  constipation, vomiting, fever, abdominal pain, mucus in your stool, or dark, black or bloody stools. Your  healthcare provider will check you for enterocolitis and decide if your treatment needs to be stopped.  Tell your healthcare provider if you have a history of liver disease or decreased liver function. Ipilimumab can  cause problems with liver function or make liver problems worse. Tell your healthcare provider if your skin or the  whites of your eyes look yellow, if you have pain on the right side of your stomach, if your urine is dark or teacolored,  if you are nauseated or start vomiting or if you bruise more easily than normal. Your healthcare provider  will check your liver during treatment and decide if your dose needs to be changed or stopped.  In very rare cases, ipilimumab can cause serious skin conditions, such as Mcconnell-Saul syndrome, toxic  epidermal necrolysis, or a rash with open or bleeding sores. This side effect usually occurs about three weeks  after starting treatment, but you may notice it as late as four months after beginning treatment. Tell your  healthcare provider right away if you have any skin changes, such as skin ulcers, peeling skin, swelling around  the eyes or a rash that looks like pimples or blisters on the skin, in the mouth or on the genitals.  In very rare cases, ipilimumab can cause Gullain-Barr© syndrome or myasthenia gravis. It can also cause some  of the nerve cells in your hands and feet to stop working properly. This condition is called neuropathy (see What  Are the Possible Side Effects?). These conditions cause severe muscle weakness. Tell your healthcare provider  right away if you have vision changes or difficulty breathing, chewing food, swallowing, talking, standing up or  walking. Also tell  your healthcare provider if you have numbness, tingling, pain or a burning sensation in your  hands or feet.  [facility]  Phone Number: [phone]  Ipilimumab 2  Ipilimumab can cause swelling of certain hormone glands, including the pituitary, adrenal and thyroid glands.  This usually occurs about three months after starting treatment, but can start as late as five months after  beginning treatment. Tell your healthcare provider if you have constipation, abdominal pain, dizziness, constant  headaches, weight gain or a decreased sex drive. Also tell your healthcare provider if you feel irritable or more  tired or cold than usual.  In very rare cases, ipilimumab can cause swelling of the eyes. Tell your healthcare provider if you have eye pain,  redness, irritation, tearing or vision changes, such as blurred vision or double vision. Your healthcare provider  may give you eye drops for these symptoms.  In some cases, the enterocolitis and liver, muscle and hormonal problems caused by ipilimumab can be severe  and life threatening.  It is not known if this drug interacts with any other medicines.  You should not take this treatment if you are allergic to ipilimumab or any of its components.  How Is the Treatment Given?  Your healthcare provider will give you your treatment by injection into a vein. The dose you receive will be based  on your weight and height. Your healthcare provider will determine the number of treatments you receive.  You may be given medicines to help prevent and control nausea and vomiting before you receive your  treatment. These medicines may be given either by mouth or by injection into a vein.  If you are given any medicine to take at home, do not share it with others. Sharing this medication with anyone  else could be harmful.  When Should I Call My Healthcare Provider?  Call your healthcare provider right away if you have any of the following symptoms:  Â· Any changes in  bowel movements such as diarrhea that causes an additional four bowel movements a day,  diarrhea that lasts more than one day, diarrhea at night or diarrhea with fever, cramps or bloody stools  Â· Abdominal pain with nausea, vomiting, blood in your stool, constipation or fever  Â· Dizziness, lightheadedness, muscle weakness, paralysis, vision changes, or difficulty breathing, speaking or  chewing food  Â· Any skin changes such as pain or blisters in your mouth or on your stomach or genitals  Â· Shaking chills or fever of 100.5Âº F or higher  Â· Vomiting that is severe, lasts several hours or contains blood  Â· Decreased urination, not being able to urinate or dark, tea-colored urine  Â· Constipation that lasts more than two to three days or constipation with abdominal pain  Â· Swelling around the eyes  Â· Inability to eat or weight loss  What Are the Possible Side Effects?  All drugs can cause side effects, but every person reacts differently to each drug. The following chart lists the  possible side effects that can occur with your treatment, how to recognize and minimize symptoms, and possible  treatments. The side effects are grouped by how often the side effect occurs: Common (occurs in more than 25  percent of patients), Less Common (occurs in 5 to 25 percent of patients) or Rare (occurs in less than 5 percent  of patients).Side Effect How to Minimize Side Effect Possible Treatments  Diarrhea (Common. Symptoms are  generally mild to moderate but can  be severe.)  Â· Loose or watery stools several times  a day  Â· Abdominal cramping, gas and  bloating  Â· Eat small, frequent meals and bland foods--  such as bananas, rice, applesauce and toast.  Â· Avoid caffeine; alcohol; raw fruits and  vegetables; raw eggs; undercooked meats;  spicy, fatty and greasy foods; milk and dairy  products; foods that cause gas, such as  beans and other legumes; high fiber and  high-fat foods; foods left un-refrigerated for  more than  two hours (one hour for egg dishes  and cream or mayonnaise-based foods); bulk  laxatives; and stool softeners.  Â· Tell your healthcare provider  immediately about any changes in  bowel movements.  Â· Drink eight to ten glasses of clear  liquids every day.  Â· Your healthcare provider may prescribe  corticosteroids to help treat diarrhea.  Â· Your healthcare provider may decrease  your dose or delay further therapy.  Ipilimumab 3  Side Effect How to Minimize Side Effect Possible Treatments  Nausea/Vomiting (Common.  Symptoms are generally mild to  moderate but can be severe.)  Â· Feeling queasy or sick to your  stomach  Â· Eat small, frequent meals and bland foods--  such as bananas, rice, applesauce and toast.  Â· Eat food cold or at room temperature so the  smell of food will not bother you.  Â· Avoid fried, spicy or fatty foods.  Â· Eat and drink slowly.  Â· Drink plenty of liquids during the day, but to  avoid bloating, drink small amounts of liquid  during meals.  Â· You will be given medicine to help  reduce nausea and vomiting.  Rash (Common. Symptoms are  generally mild to moderate but can  be severe.)  Â· Usually mild and short-lived  Â· Generally appears on the arms and  trunk (occasionally on the face)  Â· May be itchy  Â· May appear as a flat, discolored area  on the skin or as a small raised bump  Â· Avoid prolonged exposure to heat.  Â· Use creams or moisturizers regularly. Try  wearing cotton gloves on your hands.  Â· Avoid using perfume, cologne or aftershave  since these products can be irritating to the  skin.  Â· Your healthcare provider may prescribe  corticosteroids to use on your skin or  take by mouth to help treat the rash.  Anorexia or Appetite Loss (Common.  Symptoms are generally mild to  moderate but can be severe.)  Â· Not having an appetite  Â· Feeling too nauseated to eat  Â· Metallic or medicinal taste  Â· Change in taste causing dislike for  certain foods  Â· Try eating six to eight  small meals or snacks  each day instead of three larger meals.  Â· Vary your diet and try new foods and recipes.  Â· Take a walk before meals, when possible.  This may make you feel hungrier.  Â· Eat with friends or family. When eating alone,  listen to the radio or watch TV.  Â· Cook dinners ahead of time and freeze  them in small portions so that cooking  smells are minimized.  Â· Let others help with food, but ask that  foods be prepared in small portions that  can be frozen. And dont hesitate to let  them know which foods to avoid.  Â· Add mild spices to change flavor.  Â· It might be helpful to have a program,  such as Meals on Wheels, deliver food  to you.  Constipation (Less Common.  Symptoms are generally mild to  moderate but can be severe.)  Â· No bowel movement for one to two  days  Â· Small, hard, dry stools  Â· Bloating, gas, cramps and pain  Â· Drink plenty of fluids to help loosen your  bowels.  Â· Drink warm or hot liquids if you do not have  mouth sores.  Â· Your healthcare provider may suggest eating  foods that are high in fiber, such as bran,  vegetables, whole wheat breads and fruit.  Â· Have prunes or prune juice, which act like  laxatives.  Â· Exercise can help loosen bowels.  Â· Your healthcare provider may  recommend a stool softener.  Neuropathy  Â· Numbness or tingling feeling in the  hands or feet  Â· Muscle cramps  Â· Loss of balance  Â· Difficulty buttoning buttons or picking  up objects  Â· Decreased awareness of heat or  cold in fingertips and toes  Â· Difficulty hearing  Â· Try to avoid the cold or extreme heat.  Â· Wear mittens or gloves, socks and scarves.  Â· If your fingers are numb, be careful with sharp  objects.  Â· Beware of hot coffee mugs, pots and pans  and dishwater--you may not feel the heat  until you are burned.  Â· If you feel unsteady, be careful on stairs and  in the shower.  Â· Your healthcare provider may decrease  your dose or delay further treatment.  Anemia  (Rare)  Â· Fatigue or weakness  Â· Dizziness  Â· Pale skin  Â· Feeling out of breath  Â· Feeling cold  Â· Plan rest periods throughout the day.  Â· Organize daily activities so that you conserve  your energy.  Â· Try to eat a well balanced diet and drink  plenty of fluids.  Â· Stand up slowly to avoid getting dizzy.  Â· You may be given medicine to increase  your red blood cell count.  Â· Your healthcare provider may decrease  your dose or delay further treatment.  What Are the Other Possible Side Effects?  The chart below lists additional side effects found with this treatment. It does not list all possible side effects. For  more information, talk with your healthcare provider.  Ipilimumab 4  Common Side Effects Less Common Side Effects Rare Side Effects  Â· Fatigue Â· Abdominal pain  Â· Fever  Â· Headache  Â· Cough or shortness of breath  Â· Lung failure  Â· Kidney failure  Â· Loss of skin color (vitiligo)  Notes  The information presented herein is prepared by [employer] and/or its affiliates (collectively, [employer]) and is furnished for informational purposes  only and for your personal use only. All rights herein are reserved by [employer] and you may not publish, copy or otherwise use or disseminate the contents hereof  without permission.  Disclaimer. The drug information is presented as a guide for reference purposes only and is not intended as a substitute for specific advice from a physician or  other health care professional. Each patients situation is unique and the information presented herein may not be applicable to your situation. In addition,  [employer] makes no warranty as to the accuracy or completeness of information contained herein. This document was prepared as of the date indicated below.  You should be aware that information regarding medical conditions and their treatment, including the information in this document, changes frequently, and  [employer] undertakes no responsibility  to update the information contained in this document. This document is not a substitute for the expertise, skill, knowledge  and judgment of healthcare practitioners. Additional, important information about this product, including black box warnings and precautions, is available from  the  and other sources. THIS DRUG INFORMATION DOES NOT OFFER OR PROVIDE, AND IS NOT A REPLACEMENT FOR, MEDICAL EVALUATION,  ADVICE, DIAGNOSIS, OR TREATMENT. ADDITIONALLY, NO PART OF THIS DOCUMENT SHALL BE DEEMED TO CREATE A PHYSICIAN-PATIENT RELATIONSHIP OR  SIMILAR RELATIONSHIP BETWEEN [employer] (OR ANY [employer] AFFILIATED PARTY) AND YOU. YOU SHOULD CONSULT WITH AN APPROPRIATE  PROFESSIONAL FOR SPECIFIC EVALUATION, ADVICE, DIAGNOSIS, AND TREATMENT (AS APPLICABLE) TAILORED TO YOUR SITUATION. [employer] PROVIDES  THIS DRUG INFORMATION ON AN AS IS BASIS. ALL WARRANTIES OF ANY KIND, EXPRESS OR IMPLIED, INCLUDING BUT NOT LIMITED TO THE IMPLIED  WARRANTIES OF MERCHANTABILITY AND FITNESS FOR A PARTICULAR PURPOSE ARE DISCLAIMED.  Limitation of Liability. Neither [employer] nor any of its employees, agents, contractors, directors or affiliates shall be liable for any improper or incorrect use of the  information described and contained herein, nor do any of them assume any responsibility for anyones use of the information. IN NO EVENT SHALL [employer]  BE LIABLE FOR ANY DIRECT, INDIRECT, INCIDENTAL, SPECIAL, EXEMPLARY, OR CONSEQUENTIAL DAMAGES (INCLUDING, BUT NOT LIMITED TO,  PROCUREMENT OR SUBSTITUTE GOODS OR SERVICES; LOSS OF USE, DATA OR PROFITS; OR BUSINESS INTERRUPTION) HOWEVER CAUSED AND ON ANY  THEORY OF LIABILITY, WHETHER IN CONTRACT, STRICT LIABILITY, OR TORT (INCLUDING NEGLIGENCE OR OTHERWISE) ARISING IN ANY WAY OUT OF THE  USE OF THIS INFORMATION, EVEN IF ADVISED OF THE POSSIBILITY OF SUCH DAMAGE.  Â© 2011 [employer]. All rights reserved. July 2011  References:  1. Norma [package insert]. [place], [place]:  [employer]; 2011.  2. Sarita FS, Jamaal SJ, Ruma DF, et al. Improved survival with ipilimumab in patients with metastatic melanoma. N Engl J Med.  2010; 363:711-723.

## 2022-12-15 ENCOUNTER — CLINICAL SUPPORT (OUTPATIENT)
Dept: HEMATOLOGY/ONCOLOGY | Facility: CLINIC | Age: 77
End: 2022-12-15
Payer: MEDICARE

## 2022-12-15 ENCOUNTER — OFFICE VISIT (OUTPATIENT)
Dept: HEMATOLOGY/ONCOLOGY | Facility: CLINIC | Age: 77
End: 2022-12-15
Payer: MEDICARE

## 2022-12-15 VITALS
BODY MASS INDEX: 28.53 KG/M2 | WEIGHT: 167.13 LBS | HEIGHT: 64 IN | DIASTOLIC BLOOD PRESSURE: 59 MMHG | SYSTOLIC BLOOD PRESSURE: 128 MMHG | RESPIRATION RATE: 18 BRPM | TEMPERATURE: 97 F | HEART RATE: 72 BPM | OXYGEN SATURATION: 99 %

## 2022-12-15 DIAGNOSIS — C34.90 ADENOCARCINOMA OF LUNG, STAGE 4, UNSPECIFIED LATERALITY: ICD-10-CM

## 2022-12-15 PROCEDURE — 99215 OFFICE O/P EST HI 40 MIN: CPT | Mod: S$PBB,,, | Performed by: CLINICAL NURSE SPECIALIST

## 2022-12-15 PROCEDURE — 99999 PR PBB SHADOW E&M-EST. PATIENT-LVL IV: CPT | Mod: PBBFAC,,, | Performed by: CLINICAL NURSE SPECIALIST

## 2022-12-15 PROCEDURE — 99215 PR OFFICE/OUTPT VISIT, EST, LEVL V, 40-54 MIN: ICD-10-PCS | Mod: S$PBB,,, | Performed by: CLINICAL NURSE SPECIALIST

## 2022-12-15 PROCEDURE — 99999 PR PBB SHADOW E&M-EST. PATIENT-LVL IV: ICD-10-PCS | Mod: PBBFAC,,, | Performed by: CLINICAL NURSE SPECIALIST

## 2022-12-15 PROCEDURE — 99214 OFFICE O/P EST MOD 30 MIN: CPT | Mod: PBBFAC | Performed by: CLINICAL NURSE SPECIALIST

## 2022-12-15 RX ORDER — CILOSTAZOL 100 MG/1
50 TABLET ORAL 2 TIMES DAILY
COMMUNITY
End: 2024-03-09 | Stop reason: CLARIF

## 2022-12-15 RX ORDER — ONDANSETRON 8 MG/1
8 TABLET, ORALLY DISINTEGRATING ORAL SEE ADMIN INSTRUCTIONS
COMMUNITY
End: 2023-03-13

## 2022-12-15 RX ORDER — OLANZAPINE 5 MG/1
5 TABLET ORAL SEE ADMIN INSTRUCTIONS
COMMUNITY
End: 2023-03-13

## 2022-12-15 RX ORDER — FOLIC ACID 1 MG/1
1 TABLET ORAL DAILY
COMMUNITY
End: 2023-03-13

## 2022-12-15 RX ORDER — DEXAMETHASONE 4 MG/1
4 TABLET ORAL SEE ADMIN INSTRUCTIONS
COMMUNITY
End: 2023-03-13

## 2022-12-15 NOTE — PROGRESS NOTES
Subjective:       Patient ID: Pratibha Patel is a 77 y.o. female.      Chief Complaint: Chemotherapy Education       Diagnosis:  1. 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 ? metastatic findings could not be biopsied.    TTF negative/CK 7 +.    Diagnosed June 2014  EGFR mutation negative/ALK gene rearrangement negative. Repeat NGS panel on 8/5/19 TP53 (+), BRAF (-), KRAS WT, ROS 1 (-), ALK (-), PDL1 <1%   2.  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 Patient has reportedly been referred to ENT for resection and lymph node dissection (per patient report).   Subsequent open biopsy done by Dr. Rosales on 7/29/2019 confirmed the same.  Patient was unable to undergo definitive surgical resection for oligo metastatic disease.  3.  Hoarseness and swallowing difficulties secondary to #2  4.  Biopsy of the left lung lesion showed recurrent non-small cell lung cancer, Caris was sent and showed positivity for PD-L1, 2%.  No other targetable or actionable mutations present.     Current Treatment:   Combination chemo immunotherapy with carboplatin, pemetrexed, nivolumab, ipilimumab based off of checkmate [id number]     Treatment History:  1.  RT with weekly Carbo/Taxol started 7/2/14--Completed August 2014  2.  Single agent 'Maintenance' Avastin q 3 weeks.  Started 4/9/15--last given September 21, 2016  3.  Radiation + weekly Carbo/Taxol at 2AUC ----9/11/19-10/22/19  4.  Patient underwent radiation from 12/17/2020 through 12/21/2020.  Radiation to the left and right lung per Dr. Robert Mortensen.    HPI:   Please see Oncology history  in the diagnosis of adenocarcinoma of the lung, stage IV on the problem list for full detail.  Patient originally seen in 2014 with unintentional weight loss, shortness of breath, nonproductive cough.  Patient had imaging findings suggestive of lung cancer, bronchoscopy done in June of 2014 revealed moderately differentiated adenocarcinoma of the lung.  She originally underwent chemo radiation from July 2014 through August 2014.  She was placed on maintenance Avastin from April 2015 through September 2016. Patient then underwent a thyroid biopsy in June of 2019 that showed metastatic carcinoma favored to be adenocarcinoma lung primary.  She underwent surgical resection in July 2019, however full surgical dissection was not able to be done and the patient underwent open biopsy of right thyroid gland.  Pathology revealed poorly differentiated non-small cell carcinoma consistent with metastatic lung cancer.  NGS panel done at that time unremarkable.  She started chemoradiation with carbo Taxol in September 2019, completed in October 2019. Patient was doing well, imaging in October 2020 revealed an abnormality in the left lung, CT-guided lung biopsy on 11/03/2020 showed recurrent non-small cell lung cancer, Caris revealed PDL1 positivity.  She underwent SBRT from 12/17/2020 through 12/21/2020.  Patient then placed on surveillance imaging, most recently done on 07/05/2022 showing worsening disease in the right lung with a previous lesion that measured 0.6 cm and had an SUV of 1.0 now measuring 1.5 cm and an SUV of 8.4.  This was treated with SBRT in August of 2022.  PET/CT scan done on 11/8/2022 showed mild increased radiotracer activity within the left apical consolidation, SUV 5.5.  There was a newly hypermetabolic subcentimeter subcarinal lymph node.  A 1 cm right lower lobe nodule was smaller, he right upper lobe nodule was slightly larger measuring 6 mm.  No sites of FDG avid metastatic disease in the neck, abdomen,  or pelvis. Bronchoscopy done on 11/16/2022, pathology revealed malignant cells consistent with adenocarcinoma.  Planning on chemo immunotherapy with carboplatin, pemetrexed, nivolumab, ipilimumab based off of checkmate [id number].       Past Medical History:   Diagnosis Date    Adenocarcinoma of lung     Anemia     Anxiety     Arthritis     COPD (chronic obstructive pulmonary disease)     Depression     HLD (hyperlipidemia)     Hypertension     Lung cancer     Secondary malignant neoplasm of lymph nodes     Secondary malignant neoplasm of right lung     Thyroid disease     lymph nodes malignant      Review of patient's allergies indicates:  No Known Allergies     Current Outpatient Medications:     albuterol-ipratropium (DUO-NEB) 2.5 mg-0.5 mg/3 mL nebulizer solution, Take 3 mLs by nebulization 4 (four) times daily., Disp: 75 mL, Rfl: 6    carvediloL (COREG) 6.25 MG tablet, , Disp: , Rfl:     cholecalciferol, vitamin D3, (VITAMIN D3) 50 mcg (2,000 unit) Tab, Take by mouth., Disp: , Rfl:     cilostazoL (PLETAL) 100 MG Tab, Take 50 mg by mouth 2 (two) times daily., Disp: , Rfl:     dexAMETHasone (DECADRON) 4 MG Tab, Take 4 mg by mouth As instructed. Tab 1 with breakfast and tab 1 with lunch day before and for 3 days after chemotherapy., Disp: , Rfl:     folic acid (FOLVITE) 1 MG tablet, Take 1 mg by mouth once daily., Disp: , Rfl:     furosemide (LASIX) 20 MG tablet, , Disp: , Rfl:     levothyroxine (SYNTHROID) 75 MCG tablet, Take 75 mcg by mouth every morning., Disp: , Rfl:     losartan (COZAAR) 50 MG tablet, , Disp: , Rfl:     OLANZapine (ZYPREXA) 5 MG tablet, Take 5 mg by mouth As instructed. Tab 1 at HS Days 1-4 of chemotherapy, Disp: , Rfl:     ondansetron (ZOFRAN-ODT) 8 MG TbDL, Take 8 mg by mouth As instructed. Tab 1 in AM for 3 days after chemotherapy and every 8 hours PRN nausea, Disp: , Rfl:     simvastatin (ZOCOR) 40 MG tablet, , Disp: , Rfl:   Review of Systems   Constitutional:  Negative for activity change,  appetite change, chills, diaphoresis, fatigue, fever and unexpected weight change.   HENT: Negative.  Negative for mouth dryness, mouth sores, sore throat and trouble swallowing.    Eyes:  Negative for photophobia and visual disturbance.   Respiratory:  Positive for cough, shortness of breath and wheezing. Negative for chest tightness.         SKAGGS. Wheezing relieved with nebulizer treatment   Cardiovascular:  Negative for chest pain and leg swelling.        Right upper chest wall mediport   Gastrointestinal: Negative.  Negative for abdominal pain, constipation, diarrhea and nausea.   Endocrine: Negative.    Genitourinary: Negative.  Negative for difficulty urinating and hot flashes.   Musculoskeletal:  Positive for arthralgias. Negative for back pain.        Wears a lower right arm brace for arthritis pain and swelling   Integumentary:  Negative.   Allergic/Immunologic: Negative.  Negative for food allergies.   Neurological: Negative.  Negative for dizziness, weakness, light-headedness and headaches.   Hematological: Negative.    Psychiatric/Behavioral: Negative.  Negative for sleep disturbance. The patient is not nervous/anxious.             ECOG SCORE    1 - Restricted in strenuous activity-ambulatory and able to carry out work of a light nature       Lab Visit on 12/15/2022   Component Date Value    WBC 12/15/2022 14.4 (H)     RBC 12/15/2022 3.90 (L)     Hgb 12/15/2022 11.4 (L)     Hct 12/15/2022 35.6 (L)     MCV 12/15/2022 91.3     MCH 12/15/2022 29.2     MCHC 12/15/2022 32.0 (L)     RDW 12/15/2022 16.1 (H)     Platelet 12/15/2022 261     MPV 12/15/2022 8.8 (L)     Neut % 12/15/2022 81.6     Lymph % 12/15/2022 10.7     Mono % 12/15/2022 7.1     Eos % 12/15/2022 0.4     Basophil % 12/15/2022 0.0     Lymph # 12/15/2022 1.55     Neut # 12/15/2022 11.77 (H)     Mono # 12/15/2022 1.03     Eos # 12/15/2022 0.06     Baso # 12/15/2022 0.00     IG# 12/15/2022 0.03     IG% 12/15/2022 0.2             Chemotherapy Patient  Education  Education  Chemo Medications: Ipilumumab/Nivolumab/Carboplatin/Pemetrexed  Intoduction to Unit Hours, Services, Routines, After Hours Telephone Number: Verbal Instructions given to patient/family, Written Instructions given to patient/family, Patient/Family verbalizes understanding  Consent Form Signed: Verbal Instructions given to patient/family, Written Instructions given to patient/family, Patient/Family verbalizes understanding  Chemo Teaching Packet: Verbal Instructions given to patient/family, Written Instructions given to patient/family, Patient/Family verbalizes understanding  Community Resources: Verbal Instructions given to patient/family, Patient/Family verbalizes understanding  Dental Care During Chemo: Verbal Instructions given to patient/family, Written Instructions given to patient/family, Patient/Family verbalizes understanding  Vascular Access/Intravenous Therapy: Verbal Instructions given to patient/family, Written Instructions given to patient/family, Patient/Family verbalizes understanding  Bowel Prophylaxis/Protocol: Verbal Instructions given to patient/family, Written Instructions given to patient/family, Patient/Family verbalizes understanding    Instructions in expected side effects  Nausea/Vomiting : Verbal Instructions given to patient/family, Written Instructions given to patient/family, Patient/Family verbalizes understanding  Myelosuppression: Verbal Instructions given to patient/family, Written Instructions given to patient/family, Patient/Family verbalizes understanding  Fatigue: Verbal Instructions given to patient/family, Written Instructions given to patient/family, Patient/Family verbalizes understanding  Stomatitis: Verbal Instructions given to patient/family, Written Instructions given to patient/family, Patient/Family verbalizes understanding  Diarrhea: Verbal Instructions given to patient/family, Written Instructions given to patient/family, Patient/Family verbalizes  understanding  Gastritis: Verbal Instructions given to patient/family, Written Instructions given to patient/family, Patient/Family verbalizes understanding  Instructed to report increase of 2-3 loose stools/day over pretreatment: Verbal Instructions given to patient/family, Written Instructions given to patient/family, Patient/Family verbalizes understanding    S/S Infection   Report Temperature of 100.4 or >: Verbal Instructions given to patient/family, Written Instructions given to patient/family, Patient/Family verbalizes understanding    Skin Care  Rash, Hyperkeratosis: Written Instructions given to patient/family, Patient/Family verbalizes understanding    Nutritional Screening   Nutritional Screening for Dietary Consult:  (Seen by Dietician)         Hydration Instructions  Renal Toxicity: Verbal Instructions given to patient/family, Written Instructions given to patient/family, Patient/Family verbalizes understanding  Drink 2 Liters of fluids daily: Verbal Instructions given to patient/family, Written Instructions given to patient/family, Patient/Family verbalizes understanding    Thrombocytopenia Precautions  Avoid ASA: Verbal Instructions given to patient/family, Written Instructions given to patient/family, Patient/Family verbalizes understanding    Assessment:       Problem List Items Addressed This Visit          Oncology    Adenocarcinoma of lung, stage 4          Plan:           Prescriptions per Dr. Gudino called in to [facility] in [place]: Dexamethasone 4mg tab 1 with breakfast and tab 1 with lunch day before and for 3 days after chemotherapy. Dispense 24 refill 0; Zofran ODT tab 1 PO in AM for 3 days after chemotherapy and every 8 hours PRN nausea. Dispense 30 refill 4; Folic acid 1mg tab 1 PO Daily. Dispense 90 refill 1.      RTC 12/20/2022 at 1000 cycle #1 of chemotherapy. RTC 1/9/2022 at 0900 lab and at 0930 visit with KASI Myrick NP.      Treatment Plan Information   OP NSCLC NIVOLUMAB 360 MG  D1 & D22 WITH IPILIMUMAB 1 MG/KG Q6W PLUS CARBOPLATIN (AUC) PEMETREXED Q3W X2 DOSES   Enrrique Gudino II, MD   Upcoming Treatment Dates - OP NSCLC NIVOLUMAB 360 MG D1 & D22 WITH IPILIMUMAB 1 MG/KG Q6W PLUS CARBOPLATIN (AUC) PEMETREXED Q3W X2 DOSES    12/20/2022       Chemotherapy       PEMEtrexed disodium (ALIMTA) 925 mg in sodium chloride 0.9% 100 mL chemo infusion       CARBOplatin (PARAPLATIN) in sodium chloride 0.9% 250 mL chemo infusion       Supportive Care       cyanocobalamin injection 1,000 mcg       Antiemetics       aprepitant (CINVANTI) injection 130 mg       palonosetron (ALOXI) 0.25 mg with Dexamethasone (DECADRON) 12 mg in NS 50 mL IVPB       Immunotherapy       nivolumab 360 mg in sodium chloride 0.9% 100 mL infusion       ipilimumab (YERVOY) 1 mg/kg = 75.5 mg in sodium chloride 0.9% 100 mL chemo infusion  1/10/2023       Chemotherapy       PEMEtrexed disodium (ALIMTA) 925 mg in sodium chloride 0.9% 100 mL chemo infusion       CARBOplatin (PARAPLATIN) in sodium chloride 0.9% 250 mL chemo infusion       Antiemetics       aprepitant (CINVANTI) injection 130 mg       palonosetron (ALOXI) 0.25 mg with Dexamethasone (DECADRON) 12 mg in NS 50 mL IVPB       Immunotherapy       nivolumab 360 mg in sodium chloride 0.9% 100 mL infusion  1/31/2023       Immunotherapy       nivolumab 360 mg in sodium chloride 0.9% 100 mL infusion       ipilimumab (YERVOY) 1 mg/kg = 75.5 mg in sodium chloride 0.9% 100 mL chemo infusion  2/21/2023       Immunotherapy       nivolumab 360 mg in sodium chloride 0.9% 100 mL infusion    Therapy Plan Information  None         I spent 20 minutes preparing for education session reviewing clinic note, labs, scans, pathology and treatment plan.        I spent 40 minutes face-to-face with patient.

## 2022-12-15 NOTE — PROGRESS NOTES
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  RT with weekly Carbo/Taxol started 7/2/14--Completed August 2014  2.  Single agent 'Maintenance' Avastin q 3 weeks.  Started 4/9/15--last given September 21, 2016  3.  Radiation + weekly Carbo/Taxol at 2AUC ----9/11/19-10/22/19  4.  Patient underwent radiation from 12/17/2020 through 12/21/2020.  Radiation to the left and right lung per Dr. Robert Mortensen.    Nutrition Recommendations:  1. Regular diet as tolerated    Nutrition Assessment    12/15/22: This is a 77 y.o.female with a medical diagnosis of metastatic lung CA. She reports good appetite and po intake. No c/o n/v/c/d. Wt has been stable.    Nutrition Factors Affecting Intake  none identified    PMHx: COPD, HLD, HTN, jeremiah    Allergies: Patient has no known allergies.    Current Medications:    Current Outpatient Medications:     albuterol-ipratropium (DUO-NEB) 2.5 mg-0.5 mg/3 mL nebulizer solution, Take 3 mLs by nebulization 4 (four) times daily., Disp: 75 mL, Rfl: 6    carvediloL (COREG) 6.25 MG tablet, , Disp: , Rfl:     cholecalciferol, vitamin D3, (VITAMIN D3) 50 mcg (2,000 unit) Tab, Take by mouth., Disp: , Rfl:     cilostazoL (PLETAL) 100 MG Tab, Take 50 mg by mouth 2 (two) times daily., Disp: , Rfl:     dexAMETHasone (DECADRON) 4 MG Tab, Take 4 mg by mouth As instructed. Tab 1 with breakfast and tab 1 with lunch day before and for 3 days after chemotherapy., Disp: , Rfl:     folic acid (FOLVITE) 1 MG tablet, Take 1 mg by mouth once daily., Disp: , Rfl:     furosemide (LASIX) 20 MG tablet, , Disp: , Rfl:     levothyroxine (SYNTHROID) 75 MCG tablet, Take 75 mcg by mouth every  "morning., Disp: , Rfl:     losartan (COZAAR) 50 MG tablet, , Disp: , Rfl:     OLANZapine (ZYPREXA) 5 MG tablet, Take 5 mg by mouth As instructed. Tab 1 at HS Days 1-4 of chemotherapy, Disp: , Rfl:     ondansetron (ZOFRAN-ODT) 8 MG TbDL, Take 8 mg by mouth As instructed. Tab 1 in AM for 3 days after chemotherapy and every 8 hours PRN nausea, Disp: , Rfl:     simvastatin (ZOCOR) 40 MG tablet, , Disp: , Rfl:     Labs: 12/15 BUN 22.7 (H)    Anthropometrics    Height:   Ht Readings from Last 1 Encounters:   12/15/22 5' 4" (1.626 m)      Weight:   Wt Readings from Last 3 Encounters:   12/15/22 75.8 kg (167 lb 1.6 oz)   12/09/22 75.7 kg (166 lb 14.2 oz)   11/16/22 74.3 kg (163 lb 12.8 oz)        Usual Body Weight: 75.8 kg (167 lb)  % Weight Change: 0    BMI: 28.6 (overweight)    Ideal Weight: 54.5 kg (120 lb)  % Ideal Weight: 139%      Nutrition Diagnosis    PES: No nutrition diagnosis at this time.     Nutrition Risk  low    Nutrition Intervention    Interventions(treatment strategy):  None at this time.      Nutrition Monitoring and Evaluation    Ongoing monitoring not warranted at this time. Please consult RAMBO natarajann.        Chani Bojorquez MS, RD, , LDN                                                                                                                                                                                                                                                                                 "

## 2022-12-15 NOTE — NURSING
I met with Pratibha for her patient education to introduce myself, explain my role and discuss her emotional well being. She stated she was handling things fine. She feels hopeful she stated. I provided her with a gas card. She knows she can reach out if any needs arise.

## 2022-12-20 ENCOUNTER — INFUSION (OUTPATIENT)
Dept: INFUSION THERAPY | Facility: HOSPITAL | Age: 77
End: 2022-12-20
Attending: NURSE PRACTITIONER
Payer: MEDICARE

## 2022-12-20 VITALS
DIASTOLIC BLOOD PRESSURE: 72 MMHG | RESPIRATION RATE: 18 BRPM | HEART RATE: 75 BPM | BODY MASS INDEX: 28.67 KG/M2 | OXYGEN SATURATION: 96 % | TEMPERATURE: 98 F | SYSTOLIC BLOOD PRESSURE: 176 MMHG | WEIGHT: 167 LBS

## 2022-12-20 DIAGNOSIS — C34.90 ADENOCARCINOMA OF LUNG, STAGE 4, UNSPECIFIED LATERALITY: Primary | ICD-10-CM

## 2022-12-20 PROCEDURE — 96376 TX/PRO/DX INJ SAME DRUG ADON: CPT

## 2022-12-20 PROCEDURE — 63600175 PHARM REV CODE 636 W HCPCS: Performed by: INTERNAL MEDICINE

## 2022-12-20 PROCEDURE — 96365 THER/PROPH/DIAG IV INF INIT: CPT

## 2022-12-20 PROCEDURE — 96417 CHEMO IV INFUS EACH ADDL SEQ: CPT

## 2022-12-20 PROCEDURE — 96413 CHEMO IV INFUSION 1 HR: CPT

## 2022-12-20 PROCEDURE — 25000003 PHARM REV CODE 250: Performed by: INTERNAL MEDICINE

## 2022-12-20 PROCEDURE — 96367 TX/PROPH/DG ADDL SEQ IV INF: CPT

## 2022-12-20 RX ORDER — DEXAMETHASONE 4 MG/1
8 TABLET ORAL SEE ADMIN INSTRUCTIONS
Qty: 24 TABLET | Refills: 0 | Status: SHIPPED | OUTPATIENT
Start: 2022-12-20 | End: 2024-03-09 | Stop reason: CLARIF

## 2022-12-20 RX ORDER — EPINEPHRINE 0.3 MG/.3ML
0.3 INJECTION SUBCUTANEOUS ONCE AS NEEDED
Status: DISCONTINUED | OUTPATIENT
Start: 2022-12-20 | End: 2022-12-20 | Stop reason: HOSPADM

## 2022-12-20 RX ORDER — CYANOCOBALAMIN 1000 UG/ML
1000 INJECTION, SOLUTION INTRAMUSCULAR; SUBCUTANEOUS
Status: COMPLETED | OUTPATIENT
Start: 2022-12-20 | End: 2022-12-20

## 2022-12-20 RX ORDER — OLANZAPINE 5 MG/1
5 TABLET ORAL NIGHTLY
Qty: 30 TABLET | Refills: 1 | Status: SHIPPED | OUTPATIENT
Start: 2022-12-20 | End: 2024-03-09 | Stop reason: CLARIF

## 2022-12-20 RX ORDER — CYANOCOBALAMIN 1000 UG/ML
1000 INJECTION, SOLUTION INTRAMUSCULAR; SUBCUTANEOUS
Status: CANCELLED | OUTPATIENT
Start: 2022-12-20

## 2022-12-20 RX ORDER — FOLIC ACID 1 MG/1
1 TABLET ORAL DAILY
Qty: 90 TABLET | Refills: 0 | Status: SHIPPED | OUTPATIENT
Start: 2022-12-20 | End: 2024-03-09 | Stop reason: CLARIF

## 2022-12-20 RX ORDER — FOLIC ACID 1 MG/1
1 TABLET ORAL DAILY
Qty: 90 TABLET | Refills: 0 | Status: CANCELLED | OUTPATIENT
Start: 2022-12-20

## 2022-12-20 RX ORDER — DIPHENHYDRAMINE HYDROCHLORIDE 50 MG/ML
50 INJECTION INTRAMUSCULAR; INTRAVENOUS ONCE AS NEEDED
Status: DISCONTINUED | OUTPATIENT
Start: 2022-12-20 | End: 2022-12-20 | Stop reason: HOSPADM

## 2022-12-20 RX ORDER — HEPARIN 100 UNIT/ML
500 SYRINGE INTRAVENOUS
Status: CANCELLED | OUTPATIENT
Start: 2022-12-20

## 2022-12-20 RX ORDER — OLANZAPINE 5 MG/1
5 TABLET ORAL NIGHTLY
Qty: 30 TABLET | Refills: 1 | Status: CANCELLED | OUTPATIENT
Start: 2022-12-20

## 2022-12-20 RX ORDER — SODIUM CHLORIDE 0.9 % (FLUSH) 0.9 %
10 SYRINGE (ML) INJECTION
Status: DISCONTINUED | OUTPATIENT
Start: 2022-12-20 | End: 2022-12-20 | Stop reason: HOSPADM

## 2022-12-20 RX ORDER — DIPHENHYDRAMINE HYDROCHLORIDE 50 MG/ML
50 INJECTION INTRAMUSCULAR; INTRAVENOUS ONCE AS NEEDED
Status: CANCELLED | OUTPATIENT
Start: 2022-12-20

## 2022-12-20 RX ORDER — ONDANSETRON 8 MG/1
8 TABLET, ORALLY DISINTEGRATING ORAL EVERY 8 HOURS PRN
Qty: 60 TABLET | Refills: 5 | Status: SHIPPED | OUTPATIENT
Start: 2022-12-20 | End: 2024-03-09 | Stop reason: CLARIF

## 2022-12-20 RX ORDER — HEPARIN 100 UNIT/ML
500 SYRINGE INTRAVENOUS
Status: DISCONTINUED | OUTPATIENT
Start: 2022-12-20 | End: 2022-12-20 | Stop reason: HOSPADM

## 2022-12-20 RX ORDER — ONDANSETRON 8 MG/1
8 TABLET, ORALLY DISINTEGRATING ORAL EVERY 8 HOURS PRN
Qty: 60 TABLET | Refills: 5 | Status: CANCELLED | OUTPATIENT
Start: 2022-12-20

## 2022-12-20 RX ORDER — DEXAMETHASONE 4 MG/1
8 TABLET ORAL SEE ADMIN INSTRUCTIONS
Qty: 24 TABLET | Refills: 0 | Status: CANCELLED | OUTPATIENT
Start: 2022-12-20

## 2022-12-20 RX ORDER — DIPHENHYDRAMINE HYDROCHLORIDE 50 MG/ML
50 INJECTION INTRAMUSCULAR; INTRAVENOUS ONCE AS NEEDED
Status: CANCELLED | OUTPATIENT
Start: 2023-01-10

## 2022-12-20 RX ORDER — HEPARIN 100 UNIT/ML
500 SYRINGE INTRAVENOUS
Status: CANCELLED | OUTPATIENT
Start: 2023-01-10

## 2022-12-20 RX ORDER — SODIUM CHLORIDE 0.9 % (FLUSH) 0.9 %
10 SYRINGE (ML) INJECTION
Status: CANCELLED | OUTPATIENT
Start: 2023-01-10

## 2022-12-20 RX ORDER — EPINEPHRINE 0.3 MG/.3ML
0.3 INJECTION SUBCUTANEOUS ONCE AS NEEDED
Status: CANCELLED | OUTPATIENT
Start: 2023-01-10

## 2022-12-20 RX ORDER — EPINEPHRINE 0.3 MG/.3ML
0.3 INJECTION SUBCUTANEOUS ONCE AS NEEDED
Status: CANCELLED | OUTPATIENT
Start: 2022-12-20

## 2022-12-20 RX ORDER — SODIUM CHLORIDE 0.9 % (FLUSH) 0.9 %
10 SYRINGE (ML) INJECTION
Status: CANCELLED | OUTPATIENT
Start: 2022-12-20

## 2022-12-20 RX ADMIN — SODIUM CHLORIDE 75.5 MG: 9 INJECTION, SOLUTION INTRAVENOUS at 12:12

## 2022-12-20 RX ADMIN — CARBOPLATIN 485 MG: 10 INJECTION, SOLUTION INTRAVENOUS at 01:12

## 2022-12-20 RX ADMIN — SODIUM CHLORIDE 900 MG: 9 INJECTION, SOLUTION INTRAVENOUS at 01:12

## 2022-12-20 RX ADMIN — HEPARIN 500 UNITS: 100 SYRINGE at 01:12

## 2022-12-20 RX ADMIN — PALONOSETRON 0.25 MG: 0.25 INJECTION, SOLUTION INTRAVENOUS at 10:12

## 2022-12-20 RX ADMIN — CYANOCOBALAMIN 1000 MCG: 1000 INJECTION, SOLUTION INTRAMUSCULAR at 01:12

## 2022-12-20 RX ADMIN — SODIUM CHLORIDE 360 MG: 9 INJECTION, SOLUTION INTRAVENOUS at 11:12

## 2022-12-20 RX ADMIN — APREPITANT 130 MG: 130 INJECTION, EMULSION INTRAVENOUS at 10:12

## 2022-12-29 ENCOUNTER — DOCUMENTATION ONLY (OUTPATIENT)
Dept: HEMATOLOGY/ONCOLOGY | Facility: CLINIC | Age: 77
End: 2022-12-29
Payer: MEDICARE

## 2022-12-29 NOTE — PROGRESS NOTES
Patients daughter, Melvi, called to inform us that patient is currently vomiting, off balanced and has temp of 100.5. Per Lauren Myrick, it is recommended for patient to report to the ER for further evaluation and possibly getting fluids. Patients daughter voiced understanding and stated she will bring patient in.

## 2023-01-08 NOTE — PROGRESS NOTES
Please see the Oncology office note 11/10/22 for full details. No changes to the exam or condition prior to the procedure

## 2023-01-09 ENCOUNTER — OFFICE VISIT (OUTPATIENT)
Dept: HEMATOLOGY/ONCOLOGY | Facility: CLINIC | Age: 78
End: 2023-01-09
Payer: MEDICARE

## 2023-01-09 VITALS — HEIGHT: 64 IN | WEIGHT: 164.88 LBS | BODY MASS INDEX: 28.15 KG/M2

## 2023-01-09 DIAGNOSIS — C34.90 ADENOCARCINOMA OF LUNG, UNSPECIFIED LATERALITY: Primary | ICD-10-CM

## 2023-01-09 DIAGNOSIS — K13.79 MOUTH SORES: ICD-10-CM

## 2023-01-09 DIAGNOSIS — Z51.12 ENCOUNTER FOR ANTINEOPLASTIC CHEMOTHERAPY AND IMMUNOTHERAPY: ICD-10-CM

## 2023-01-09 DIAGNOSIS — Z51.11 ENCOUNTER FOR ANTINEOPLASTIC CHEMOTHERAPY AND IMMUNOTHERAPY: ICD-10-CM

## 2023-01-09 DIAGNOSIS — D64.9 ANEMIA, UNSPECIFIED TYPE: ICD-10-CM

## 2023-01-09 DIAGNOSIS — C79.51 MALIGNANT NEOPLASM METASTATIC TO BONE: ICD-10-CM

## 2023-01-09 DIAGNOSIS — C77.9 MALIGNANT NEOPLASM METASTATIC TO LYMPH NODES, UNSPECIFIED LYMPH NODE REGION: ICD-10-CM

## 2023-01-09 DIAGNOSIS — E03.2 HYPOTHYROIDISM DUE TO DRUGS: ICD-10-CM

## 2023-01-09 PROBLEM — C73 MALIGNANT NEOPLASM OF THYROID GLAND: Status: ACTIVE | Noted: 2023-01-09

## 2023-01-09 PROBLEM — C78.00 MALIGNANT NEOPLASM METASTATIC TO LUNG: Status: ACTIVE | Noted: 2023-01-09

## 2023-01-09 PROBLEM — C34.10 MALIGNANT NEOPLASM OF UPPER LOBE OF LUNG: Status: ACTIVE | Noted: 2023-01-09

## 2023-01-09 PROBLEM — R91.8 LUNG MASS: Status: ACTIVE | Noted: 2023-01-09

## 2023-01-09 PROBLEM — Z90.710 H/O TOTAL HYSTERECTOMY: Status: ACTIVE | Noted: 2023-01-09

## 2023-01-09 PROBLEM — C34.12 PRIMARY MALIGNANT NEOPLASM OF LEFT UPPER LOBE OF LUNG: Status: ACTIVE | Noted: 2023-01-09

## 2023-01-09 PROCEDURE — 99215 PR OFFICE/OUTPT VISIT, EST, LEVL V, 40-54 MIN: ICD-10-PCS | Mod: S$PBB,,, | Performed by: NURSE PRACTITIONER

## 2023-01-09 PROCEDURE — 99214 OFFICE O/P EST MOD 30 MIN: CPT | Mod: PBBFAC | Performed by: NURSE PRACTITIONER

## 2023-01-09 PROCEDURE — 99999 PR PBB SHADOW E&M-EST. PATIENT-LVL IV: CPT | Mod: PBBFAC,,, | Performed by: NURSE PRACTITIONER

## 2023-01-09 PROCEDURE — 99999 PR PBB SHADOW E&M-EST. PATIENT-LVL IV: ICD-10-PCS | Mod: PBBFAC,,, | Performed by: NURSE PRACTITIONER

## 2023-01-09 PROCEDURE — 99215 OFFICE O/P EST HI 40 MIN: CPT | Mod: S$PBB,,, | Performed by: NURSE PRACTITIONER

## 2023-01-09 NOTE — PROGRESS NOTES
Subjective:       Patient ID: Pratibha Patel is a 77 y.o. female.    Chief Complaint: Follow-up (Patient stated no new problems )      PCP: Graham Nguyen NP  Cardiologist: Dr. Danny Sánchez  Referring Physician: Dr. Anthony Hutson  Endocrine: Dr. Werner  Radiation oncology: Dr. Boss  ENT: Dr. Rosales     Diagnosis:  1. 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 ? metastatic findings could not be biopsied.    TTF negative/CK 7 +.    Diagnosed June 2014  EGFR mutation negative/ALK gene rearrangement negative. Repeat NGS panel on 8/5/19 TP53 (+), BRAF (-), KRAS WT, ROS 1 (-), ALK (-), PDL1 <1%   2.  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 Patient has reportedly been referred to ENT for resection and lymph node dissection (per patient report).   Subsequent open biopsy done by Dr. Rosales on 7/29/2019 confirmed the same.  Patient was unable to undergo definitive surgical resection for oligo metastatic disease.  3.  Hoarseness and swallowing difficulties secondary to #2  4.  Biopsy of the left lung lesion showed recurrent non-small cell lung cancer, Caris was sent and showed positivity for PD-L1, 2%.  No other targetable or actionable mutations present.    Current Treatment:   Combination chemo immunotherapy with carboplatin, pemetrexed, nivolumab, ipilimumab based off of checkmate [id number]     Treatment History:  1.  RT with weekly Carbo/Taxol started 7/2/14--Completed August 2014  2.  Single agent 'Maintenance' Avastin q 3 weeks.  Started 4/9/15--last given September 21, 2016  3.  Radiation + weekly  Carbo/Taxol at 2AUC ----9/11/19-10/22/19  4.  Patient underwent radiation from 12/17/2020 through 12/21/2020.  Radiation to the left and right lung per Dr. Robert Mortensen.    HPI:   Please see Oncology history in the diagnosis of adenocarcinoma of the lung, stage IV on the problem list for full detail.  Patient originally seen in 2014 with unintentional weight loss, shortness of breath, nonproductive cough.  Patient had imaging findings suggestive of lung cancer, bronchoscopy done in June of 2014 revealed moderately differentiated adenocarcinoma of the lung.  She originally underwent chemo radiation from July 2014 through August 2014.  She was placed on maintenance Avastin from April 2015 through September 2016. Patient then underwent a thyroid biopsy in June of 2019 that showed metastatic carcinoma favored to be adenocarcinoma lung primary.  She underwent surgical resection in July 2019, however full surgical dissection was not able to be done and the patient underwent open biopsy of right thyroid gland.  Pathology revealed poorly differentiated non-small cell carcinoma consistent with metastatic lung cancer.  NGS panel done at that time unremarkable.  She started chemoradiation with carbo Taxol in September 2019, completed in October 2019. Patient was doing well, imaging in October 2020 revealed an abnormality in the left lung, CT-guided lung biopsy on 11/03/2020 showed recurrent non-small cell lung cancer, Caris revealed PDL1 positivity.  She underwent SBRT from 12/17/2020 through 12/21/2020.  Patient then placed on surveillance imaging, most recently done on 07/05/2022 showing worsening disease in the right lung with a previous lesion that measured 0.6 cm and had an SUV of 1.0 now measuring 1.5 cm and an SUV of 8.4.  This was treated with SBRT in August of 2022.  PET/CT scan done on 11/8/2022 showed mild increased radiotracer activity within the left apical consolidation, SUV 5.5.  There was a newly hypermetabolic  subcentimeter subcarinal lymph node.  A 1 cm right lower lobe nodule was smaller, he right upper lobe nodule was slightly larger measuring 6 mm.  No sites of FDG avid metastatic disease in the neck, abdomen, or pelvis. Bronchoscopy done on 11/16/2022, pathology revealed malignant cells consistent with adenocarcinoma.  Planning on chemo immunotherapy with carboplatin, pemetrexed, nivolumab, ipilimumab based off of checkmate [id number].      Interval History:   Patient presents to clinic for scheduled treatment visit.  She is on treatment with chemo and immunotherapy for lung cancer.  She does report a few side effects from treatment including mouth sores, elevated temps, and diarrhea. The diarrhea only lasted 1 day and she had 4 episodes of loose stool. She used magic mouthwash, which helped with the mouth sores. Her highest temp was 100.4 and she denies any associated signs of infection. Overall today she is feeling good.          Past Medical History:   Diagnosis Date    Adenocarcinoma of lung     Anemia     Anxiety     Arthritis     COPD (chronic obstructive pulmonary disease)     Depression     HLD (hyperlipidemia)     Hypertension     Lung cancer     Secondary malignant neoplasm of lymph nodes     Secondary malignant neoplasm of right lung     Thyroid disease     lymph nodes malignant      Past Surgical History:   Procedure Laterality Date    BLADDER SURGERY      CHOLECYSTECTOMY      COLONOSCOPY  2018    ENDOBRONCHIAL ULTRASOUND N/A 11/16/2022    Procedure: ENDOBRONCHIAL ULTRASOUND (EBUS);  Surgeon: Anthony Hutson MD;  Location: [facility]ONCHOSCOPY [facility];  Service: Pulmonary;  Laterality: N/A;    HYSTERECTOMY      KNEE SURGERY Bilateral     replaced knees    PARTIAL HIP ARTHROPLASTY Bilateral      Social History     Socioeconomic History    Marital status:    Tobacco Use    Smoking status: Every Day     Packs/day: 0.25     Types: Cigarettes    Smokeless tobacco: Never   Substance and Sexual Activity    Alcohol  use: Not Currently    Drug use: Never      Family History   Problem Relation Age of Onset    Lung cancer Mother     Lung cancer Brother       Review of patient's allergies indicates:  No Known Allergies   Review of Systems   Constitutional:  Negative for chills, diaphoresis, fatigue, fever and unexpected weight change.   HENT:  Negative for nasal congestion, mouth sores, sinus pressure/congestion and sore throat.    Eyes:  Negative for pain and visual disturbance.   Respiratory:  Negative for cough, chest tightness and shortness of breath.    Cardiovascular:  Negative for chest pain, palpitations and leg swelling.   Gastrointestinal:  Negative for abdominal distention, abdominal pain, blood in stool, constipation and diarrhea.   Genitourinary:  Negative for dysuria, frequency and hematuria.   Musculoskeletal:  Negative for arthralgias and back pain.   Integumentary:  Negative for rash.   Neurological:  Negative for dizziness, weakness, numbness and headaches.   Hematological:  Negative for adenopathy.   Psychiatric/Behavioral:  Negative for confusion.        Objective:      Physical Exam  Vitals reviewed.   Constitutional:       General: She is not in acute distress.     Appearance: Normal appearance.   HENT:      Head: Normocephalic and atraumatic.      Nose: Nose normal.      Mouth/Throat:      Mouth: Mucous membranes are moist.   Eyes:      Extraocular Movements: Extraocular movements intact.      Conjunctiva/sclera: Conjunctivae normal.   Cardiovascular:      Rate and Rhythm: Normal rate and regular rhythm.      Pulses: Normal pulses.      Heart sounds: Normal heart sounds.   Pulmonary:      Effort: Pulmonary effort is normal.      Breath sounds: Normal breath sounds.   Abdominal:      General: Bowel sounds are normal.      Palpations: Abdomen is soft.   Musculoskeletal:         General: No swelling. Normal range of motion.      Cervical back: Normal range of motion and neck supple.      Right lower leg: No  edema.      Left lower leg: No edema.   Lymphadenopathy:      Cervical: No cervical adenopathy.   Skin:     General: Skin is warm and dry.   Neurological:      General: No focal deficit present.      Mental Status: She is alert and oriented to person, place, and time. Mental status is at baseline.   Psychiatric:         Mood and Affect: Mood normal.         Behavior: Behavior normal.       LABS AND IMAGING REVIEWED IN EPIC          Assessment:     1. 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 ? metastatic findings could not be biopsied.    TTF negative/CK 7 +.    Diagnosed June 2014.  EGFR mutation negative/ALK gene rearrangement negative.  2.  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 Patient has reportedly been referred to ENT for resection and lymph node dissection (per patient report).   Subsequent open biopsy done by Dr. Rosales on 7/29/2019 confirmed the same.  Patient was unable to undergo definitive surgical resection for oligo metastatic disease.  3.  Treatment induced anemia   4. Hoarseness .      Plan:          Patient's biopsy did return as recurrent non-small cell lung cancer in the left lung.  I feel that the right lung will be similar pathology.  She completed radiation with Dr. Mortensen on 12/24/2020.     Caris did reveal that PD-L1 was 2% positive suggesting benefit from pembrolizumab or nivolumab/ipilimumab.      Her PET/CT scan does show a new hypermetabolic subcentimeter subcarinal lymph node and increase in a left apical consolidation.  The left apical consolidation is close to the aorta and  I do not think would be amenable to biopsy, however this subcarinal lymph node may be amenable to bronchoscopy.      Bronchoscopy done on 11/16/2022 showed metastatic adenocarcinoma.  She is no longer a candidate for full dose SBRT.  We will treat with carboplatin, pemetrexed, nivolumab, and ipilimumab based on the CHECKMATE 9LA study.      Follow up with radiation oncology as scheduled, Dr. Mortensen    Treatment started on 12/20/2022.     Continue Magic mouthwash as needed for mouth sores.   Imodium as needed for diarrhea    She knows to call for any temps >100.4    Return to clinic prior to cycle 3, same day labs    GADIEL Martinez

## 2023-01-10 ENCOUNTER — INFUSION (OUTPATIENT)
Dept: INFUSION THERAPY | Facility: HOSPITAL | Age: 78
End: 2023-01-10
Attending: NURSE PRACTITIONER
Payer: MEDICARE

## 2023-01-10 VITALS
DIASTOLIC BLOOD PRESSURE: 70 MMHG | SYSTOLIC BLOOD PRESSURE: 161 MMHG | TEMPERATURE: 98 F | BODY MASS INDEX: 28.47 KG/M2 | HEART RATE: 67 BPM | OXYGEN SATURATION: 97 % | WEIGHT: 164.69 LBS

## 2023-01-10 DIAGNOSIS — C34.90 ADENOCARCINOMA OF LUNG, STAGE 4, UNSPECIFIED LATERALITY: Primary | ICD-10-CM

## 2023-01-10 PROCEDURE — 96375 TX/PRO/DX INJ NEW DRUG ADDON: CPT

## 2023-01-10 PROCEDURE — 63600175 PHARM REV CODE 636 W HCPCS: Performed by: INTERNAL MEDICINE

## 2023-01-10 PROCEDURE — 96367 TX/PROPH/DG ADDL SEQ IV INF: CPT

## 2023-01-10 PROCEDURE — 25000003 PHARM REV CODE 250: Performed by: INTERNAL MEDICINE

## 2023-01-10 PROCEDURE — 96411 CHEMO IV PUSH ADDL DRUG: CPT

## 2023-01-10 PROCEDURE — 63600175 PHARM REV CODE 636 W HCPCS: Mod: JG | Performed by: NURSE PRACTITIONER

## 2023-01-10 PROCEDURE — 96417 CHEMO IV INFUS EACH ADDL SEQ: CPT

## 2023-01-10 PROCEDURE — 25000003 PHARM REV CODE 250: Performed by: NURSE PRACTITIONER

## 2023-01-10 PROCEDURE — 96413 CHEMO IV INFUSION 1 HR: CPT

## 2023-01-10 RX ORDER — EPINEPHRINE 0.3 MG/.3ML
0.3 INJECTION SUBCUTANEOUS ONCE AS NEEDED
Status: DISCONTINUED | OUTPATIENT
Start: 2023-01-10 | End: 2023-01-10 | Stop reason: HOSPADM

## 2023-01-10 RX ORDER — HEPARIN 100 UNIT/ML
500 SYRINGE INTRAVENOUS
Status: DISCONTINUED | OUTPATIENT
Start: 2023-01-10 | End: 2023-01-10 | Stop reason: HOSPADM

## 2023-01-10 RX ORDER — PROCHLORPERAZINE EDISYLATE 5 MG/ML
10 INJECTION INTRAMUSCULAR; INTRAVENOUS
Status: COMPLETED | OUTPATIENT
Start: 2023-01-10 | End: 2023-01-10

## 2023-01-10 RX ORDER — DIPHENHYDRAMINE HYDROCHLORIDE 50 MG/ML
50 INJECTION INTRAMUSCULAR; INTRAVENOUS ONCE AS NEEDED
Status: DISCONTINUED | OUTPATIENT
Start: 2023-01-10 | End: 2023-01-10 | Stop reason: HOSPADM

## 2023-01-10 RX ORDER — SODIUM CHLORIDE 0.9 % (FLUSH) 0.9 %
10 SYRINGE (ML) INJECTION
Status: DISCONTINUED | OUTPATIENT
Start: 2023-01-10 | End: 2023-01-10 | Stop reason: HOSPADM

## 2023-01-10 RX ADMIN — PROCHLORPERAZINE EDISYLATE 10 MG: 5 INJECTION INTRAMUSCULAR; INTRAVENOUS at 12:01

## 2023-01-10 RX ADMIN — PALONOSETRON 0.25 MG: 0.25 INJECTION, SOLUTION INTRAVENOUS at 11:01

## 2023-01-10 RX ADMIN — APREPITANT 130 MG: 130 INJECTION, EMULSION INTRAVENOUS at 10:01

## 2023-01-10 RX ADMIN — SODIUM CHLORIDE 360 MG: 9 INJECTION, SOLUTION INTRAVENOUS at 10:01

## 2023-01-10 RX ADMIN — HEPARIN 500 UNITS: 100 SYRINGE at 01:01

## 2023-01-10 RX ADMIN — CARBOPLATIN 510 MG: 10 INJECTION, SOLUTION INTRAVENOUS at 11:01

## 2023-01-10 RX ADMIN — SODIUM CHLORIDE 900 MG: 9 INJECTION, SOLUTION INTRAVENOUS at 11:01

## 2023-01-10 NOTE — PLAN OF CARE
Pt received c1 d22; did get nauseated toward end of carbo gtt, given additional compazine iv x1 per MD instruction, tolerated remainder of tx.

## 2023-01-10 NOTE — NURSING
1230-pt sitting up in infusion chair, c/c nausea, pt appears uncomfortable, carbo paused.  Pt denies sob, cp, flushing.  Vs obtained 134/61 hr 88.  Offered ice chips and comforted.    1235-message left for MD/NP/LPN.  Awaiting response, pt states nausea about the same, starting to gag at times, no further symptoms.  Awaiting response.  Continuing to address pt needs for comfort.      1250- able to reach Dr. Gudino per cellphone.  RBVO received for compazine 10 mg iv x1. See mar for details.    1305-pt stating starting to feel better.  Carbo restarted at 150cc/hr, approx 50 cc remaining.  Will monitor closely.    1340-pt tolerated remaining tx, appears well and states feeling much better, dc home per fly.  Instructed on s/s to call and verb unstg.

## 2023-01-27 ENCOUNTER — HOSPITAL ENCOUNTER (OUTPATIENT)
Dept: RADIOLOGY | Facility: HOSPITAL | Age: 78
Discharge: HOME | End: 2023-01-27
Attending: RADIOLOGY
Payer: MEDICARE

## 2023-01-27 DIAGNOSIS — C34.10: ICD-10-CM

## 2023-01-27 PROCEDURE — 71260 CT THORAX DX C+: CPT | Mod: TC

## 2023-01-27 PROCEDURE — 25500020 PHARM REV CODE 255

## 2023-01-27 RX ADMIN — IOPAMIDOL 100 ML: 755 INJECTION, SOLUTION INTRAVENOUS at 08:01

## 2023-01-30 ENCOUNTER — OFFICE VISIT (OUTPATIENT)
Dept: HEMATOLOGY/ONCOLOGY | Facility: CLINIC | Age: 78
End: 2023-01-30
Payer: MEDICARE

## 2023-01-30 VITALS
HEIGHT: 64 IN | HEART RATE: 77 BPM | SYSTOLIC BLOOD PRESSURE: 105 MMHG | WEIGHT: 167.19 LBS | OXYGEN SATURATION: 96 % | TEMPERATURE: 98 F | BODY MASS INDEX: 28.54 KG/M2 | RESPIRATION RATE: 18 BRPM | DIASTOLIC BLOOD PRESSURE: 63 MMHG

## 2023-01-30 DIAGNOSIS — C77.9 MALIGNANT NEOPLASM METASTATIC TO LYMPH NODES, UNSPECIFIED LYMPH NODE REGION: ICD-10-CM

## 2023-01-30 DIAGNOSIS — C34.90 ADENOCARCINOMA OF LUNG, UNSPECIFIED LATERALITY: Primary | ICD-10-CM

## 2023-01-30 DIAGNOSIS — E03.2 HYPOTHYROIDISM DUE TO DRUGS: ICD-10-CM

## 2023-01-30 PROCEDURE — 99214 OFFICE O/P EST MOD 30 MIN: CPT | Mod: S$PBB,,, | Performed by: INTERNAL MEDICINE

## 2023-01-30 PROCEDURE — 99999 PR PBB SHADOW E&M-EST. PATIENT-LVL V: CPT | Mod: PBBFAC,,, | Performed by: INTERNAL MEDICINE

## 2023-01-30 PROCEDURE — 99214 PR OFFICE/OUTPT VISIT, EST, LEVL IV, 30-39 MIN: ICD-10-PCS | Mod: S$PBB,,, | Performed by: INTERNAL MEDICINE

## 2023-01-30 PROCEDURE — 99999 PR PBB SHADOW E&M-EST. PATIENT-LVL V: ICD-10-PCS | Mod: PBBFAC,,, | Performed by: INTERNAL MEDICINE

## 2023-01-30 PROCEDURE — 99215 OFFICE O/P EST HI 40 MIN: CPT | Mod: PBBFAC | Performed by: INTERNAL MEDICINE

## 2023-01-30 RX ORDER — SODIUM CHLORIDE 0.9 % (FLUSH) 0.9 %
10 SYRINGE (ML) INJECTION
Status: CANCELLED | OUTPATIENT
Start: 2023-01-31

## 2023-01-30 RX ORDER — HEPARIN 100 UNIT/ML
500 SYRINGE INTRAVENOUS
Status: CANCELLED | OUTPATIENT
Start: 2023-02-21

## 2023-01-30 RX ORDER — HEPARIN 100 UNIT/ML
500 SYRINGE INTRAVENOUS
Status: CANCELLED | OUTPATIENT
Start: 2023-01-31

## 2023-01-30 RX ORDER — DIPHENHYDRAMINE HYDROCHLORIDE 50 MG/ML
50 INJECTION INTRAMUSCULAR; INTRAVENOUS ONCE AS NEEDED
Status: CANCELLED | OUTPATIENT
Start: 2023-02-21

## 2023-01-30 RX ORDER — EPINEPHRINE 0.3 MG/.3ML
0.3 INJECTION SUBCUTANEOUS ONCE AS NEEDED
Status: CANCELLED | OUTPATIENT
Start: 2023-02-21

## 2023-01-30 RX ORDER — HEPARIN 100 UNIT/ML
500 SYRINGE INTRAVENOUS
Status: CANCELLED | OUTPATIENT
Start: 2023-02-02

## 2023-01-30 RX ORDER — SODIUM CHLORIDE 0.9 % (FLUSH) 0.9 %
10 SYRINGE (ML) INJECTION
Status: CANCELLED | OUTPATIENT
Start: 2023-02-02

## 2023-01-30 RX ORDER — DIPHENHYDRAMINE HYDROCHLORIDE 50 MG/ML
50 INJECTION INTRAMUSCULAR; INTRAVENOUS ONCE AS NEEDED
Status: CANCELLED | OUTPATIENT
Start: 2023-01-31

## 2023-01-30 RX ORDER — SODIUM CHLORIDE 0.9 % (FLUSH) 0.9 %
10 SYRINGE (ML) INJECTION
Status: CANCELLED | OUTPATIENT
Start: 2023-02-21

## 2023-01-30 RX ORDER — EPINEPHRINE 0.3 MG/.3ML
0.3 INJECTION SUBCUTANEOUS ONCE AS NEEDED
Status: CANCELLED | OUTPATIENT
Start: 2023-01-31

## 2023-01-30 NOTE — PROGRESS NOTES
Subjective:       Patient ID: Pratibha Patel is a 77 y.o. female.    Chief Complaint: Follow-up (Patient stated no new problems )      PCP: Graham Nguyen NP  Cardiologist: Dr. Danny Sánchez  Referring Physician: Dr. Anthony Hutson  Endocrine: Dr. Werner  Radiation oncology: Dr. Boss in the past, now Dr. Mortensen.  ENT: Dr. Rosales     Diagnosis:  1. 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 ? metastatic findings could not be biopsied.    TTF negative/CK 7 +.    Diagnosed June 2014  EGFR mutation negative/ALK gene rearrangement negative. Repeat NGS panel on 8/5/19 TP53 (+), BRAF (-), KRAS WT, ROS 1 (-), ALK (-), PDL1 <1%   2.  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 Patient has reportedly been referred to ENT for resection and lymph node dissection (per patient report).   Subsequent open biopsy done by Dr. Rosales on 7/29/2019 confirmed the same.  Patient was unable to undergo definitive surgical resection for oligo metastatic disease.  3.  Hoarseness and swallowing difficulties secondary to #2  4.  Biopsy of the left lung lesion showed recurrent non-small cell lung cancer, Caris was sent and showed positivity for PD-L1, 2%.  No other targetable or actionable mutations present.    Current Treatment:   Combination chemo immunotherapy with carboplatin, pemetrexed, nivolumab, ipilimumab based off of checkmate [id number]     Treatment History:  1.  RT with weekly Carbo/Taxol started 7/2/14--Completed August 2014  2.  Single agent 'Maintenance' Avastin q 3 weeks.  Started 4/9/15--last given September 21,  2016  3.  Radiation + weekly Carbo/Taxol at 2AUC ----9/11/19-10/22/19  4.  Patient underwent radiation from 12/17/2020 through 12/21/2020.  Radiation to the left and right lung per Dr. Robert Mortensen.    HPI:   Please see Oncology history in the diagnosis of adenocarcinoma of the lung, stage IV on the problem list for full detail.  Patient originally seen in 2014 with unintentional weight loss, shortness of breath, nonproductive cough.  Patient had imaging findings suggestive of lung cancer, bronchoscopy done in June of 2014 revealed moderately differentiated adenocarcinoma of the lung.  She originally underwent chemo radiation from July 2014 through August 2014.  She was placed on maintenance Avastin from April 2015 through September 2016. Patient then underwent a thyroid biopsy in June of 2019 that showed metastatic carcinoma favored to be adenocarcinoma lung primary.  She underwent surgical resection in July 2019, however full surgical dissection was not able to be done and the patient underwent open biopsy of right thyroid gland.  Pathology revealed poorly differentiated non-small cell carcinoma consistent with metastatic lung cancer.  NGS panel done at that time unremarkable.  She started chemoradiation with carbo Taxol in September 2019, completed in October 2019. Patient was doing well, imaging in October 2020 revealed an abnormality in the left lung, CT-guided lung biopsy on 11/03/2020 showed recurrent non-small cell lung cancer, Caris revealed PDL1 positivity.  She underwent SBRT from 12/17/2020 through 12/21/2020.  Patient then placed on surveillance imaging, most recently done on 07/05/2022 showing worsening disease in the right lung with a previous lesion that measured 0.6 cm and had an SUV of 1.0 now measuring 1.5 cm and an SUV of 8.4.  This was treated with SBRT in August of 2022.  PET/CT scan done on 11/8/2022 showed mild increased radiotracer activity within the left apical consolidation, SUV 5.5.  There  was a newly hypermetabolic subcentimeter subcarinal lymph node.  A 1 cm right lower lobe nodule was smaller, he right upper lobe nodule was slightly larger measuring 6 mm.  No sites of FDG avid metastatic disease in the neck, abdomen, or pelvis. Bronchoscopy done on 11/16/2022, pathology revealed malignant cells consistent with adenocarcinoma.  Planning on chemo immunotherapy with carboplatin, pemetrexed, nivolumab, ipilimumab based off of checkmate [id number].      Interval History:   Patient presents to clinic for scheduled treatment visit.  She is on treatment with chemo and immunotherapy for lung cancer.  She does report a few side effects from treatment. Otherwise, she is doing well.        Past Medical History:   Diagnosis Date    Adenocarcinoma of lung     Anemia     Anxiety     Arthritis     COPD (chronic obstructive pulmonary disease)     Depression     HLD (hyperlipidemia)     Hypertension     Lung cancer     Secondary malignant neoplasm of lymph nodes     Secondary malignant neoplasm of right lung     Thyroid disease     lymph nodes malignant      Past Surgical History:   Procedure Laterality Date    BLADDER SURGERY      CHOLECYSTECTOMY      COLONOSCOPY  2018    ENDOBRONCHIAL ULTRASOUND N/A 11/16/2022    Procedure: ENDOBRONCHIAL ULTRASOUND (EBUS);  Surgeon: Anthony Hutson MD;  Location: [facility];  Service: Pulmonary;  Laterality: N/A;    HYSTERECTOMY      KNEE SURGERY Bilateral     replaced knees    PARTIAL HIP ARTHROPLASTY Bilateral      Social History     Socioeconomic History    Marital status:    Tobacco Use    Smoking status: Every Day     Packs/day: 0.25     Types: Cigarettes    Smokeless tobacco: Never   Substance and Sexual Activity    Alcohol use: Not Currently    Drug use: Never      Family History   Problem Relation Age of Onset    Lung cancer Mother     Lung cancer Brother       Review of patient's allergies indicates:  No Known Allergies   Review of Systems   Constitutional:   Negative for chills, diaphoresis, fatigue, fever and unexpected weight change.   HENT:  Negative for nasal congestion, mouth sores, sinus pressure/congestion and sore throat.    Eyes:  Negative for pain and visual disturbance.   Respiratory:  Negative for cough, chest tightness and shortness of breath.    Cardiovascular:  Negative for chest pain, palpitations and leg swelling.   Gastrointestinal:  Negative for abdominal distention, abdominal pain, blood in stool, constipation and diarrhea.   Genitourinary:  Negative for dysuria, frequency and hematuria.   Musculoskeletal:  Negative for arthralgias and back pain.   Integumentary:  Negative for rash.   Neurological:  Negative for dizziness, weakness, numbness and headaches.   Hematological:  Negative for adenopathy.   Psychiatric/Behavioral:  Negative for confusion.        Objective:      Physical Exam  Vitals reviewed.   Constitutional:       General: She is not in acute distress.     Appearance: Normal appearance.   HENT:      Head: Normocephalic and atraumatic.      Nose: Nose normal.      Mouth/Throat:      Mouth: Mucous membranes are moist.   Eyes:      Extraocular Movements: Extraocular movements intact.      Conjunctiva/sclera: Conjunctivae normal.   Cardiovascular:      Rate and Rhythm: Normal rate and regular rhythm.      Pulses: Normal pulses.      Heart sounds: Normal heart sounds.   Pulmonary:      Effort: Pulmonary effort is normal.      Breath sounds: Normal breath sounds.   Abdominal:      General: Bowel sounds are normal.      Palpations: Abdomen is soft.   Musculoskeletal:         General: No swelling. Normal range of motion.      Cervical back: Normal range of motion and neck supple.      Right lower leg: No edema.      Left lower leg: No edema.   Lymphadenopathy:      Cervical: No cervical adenopathy.   Skin:     General: Skin is warm and dry.   Neurological:      General: No focal deficit present.      Mental Status: She is alert and oriented to  person, place, and time. Mental status is at baseline.   Psychiatric:         Mood and Affect: Mood normal.         Behavior: Behavior normal.       LABS AND IMAGING REVIEWED IN EPIC          Assessment:     1. 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 ? metastatic findings could not be biopsied.    TTF negative/CK 7 +.    Diagnosed June 2014.  EGFR mutation negative/ALK gene rearrangement negative.  2.  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 Patient has reportedly been referred to ENT for resection and lymph node dissection (per patient report).   Subsequent open biopsy done by Dr. Rosales on 7/29/2019 confirmed the same.  Patient was unable to undergo definitive surgical resection for oligo metastatic disease.  3.  Treatment induced anemia   4. Hoarseness .      Plan:          Patient's biopsy did return as recurrent non-small cell lung cancer in the left lung.  I feel that the right lung will be similar pathology.  She completed radiation with Dr. Mortensen on 12/24/2020.     Caris did reveal that PD-L1 was 2% positive suggesting benefit from pembrolizumab or nivolumab/ipilimumab.      Her PET/CT scan does show a new hypermetabolic subcentimeter subcarinal lymph node and increase in a left apical consolidation.  The left apical consolidation is close to the aorta and I do not think would be amenable to biopsy, however this subcarinal lymph node may be amenable to bronchoscopy.      Bronchoscopy done on 11/16/2022 showed metastatic adenocarcinoma.  She is no longer a candidate for full dose SBRT.  We started  with carboplatin, pemetrexed, nivolumab, and ipilimumab based on the CHECKMATE 9LA study.  Cycle 1 day 1 given 12/20/2022.     Follow up with radiation oncology as scheduled, Dr. Mortensen.    Continue Magic mouthwash as needed for mouth sores.   Imodium as needed for diarrhea    She knows to call for any temps >100.4    Will set up for MVI for Thursdays X 3 doses    Return to clinic prior to cycle 3, same day labs    Enrrique Gudino II, MD I, Makenzie Gaytan LPN, acted solely as a scribe for and in the presence of, Dr. Enrrique Gudino who performed the service.

## 2023-02-02 ENCOUNTER — INFUSION (OUTPATIENT)
Dept: INFUSION THERAPY | Facility: HOSPITAL | Age: 78
End: 2023-02-02
Attending: NURSE PRACTITIONER
Payer: MEDICARE

## 2023-02-02 VITALS
RESPIRATION RATE: 18 BRPM | HEART RATE: 71 BPM | DIASTOLIC BLOOD PRESSURE: 85 MMHG | OXYGEN SATURATION: 94 % | TEMPERATURE: 98 F | SYSTOLIC BLOOD PRESSURE: 173 MMHG

## 2023-02-02 DIAGNOSIS — C34.90 ADENOCARCINOMA OF LUNG, STAGE 4, UNSPECIFIED LATERALITY: Primary | ICD-10-CM

## 2023-02-02 PROCEDURE — 96417 CHEMO IV INFUS EACH ADDL SEQ: CPT

## 2023-02-02 PROCEDURE — 63600175 PHARM REV CODE 636 W HCPCS: Performed by: INTERNAL MEDICINE

## 2023-02-02 PROCEDURE — 96413 CHEMO IV INFUSION 1 HR: CPT

## 2023-02-02 PROCEDURE — 25000003 PHARM REV CODE 250: Performed by: INTERNAL MEDICINE

## 2023-02-02 PROCEDURE — A4216 STERILE WATER/SALINE, 10 ML: HCPCS | Performed by: INTERNAL MEDICINE

## 2023-02-02 RX ORDER — SODIUM CHLORIDE 0.9 % (FLUSH) 0.9 %
10 SYRINGE (ML) INJECTION
Status: DISCONTINUED | OUTPATIENT
Start: 2023-02-02 | End: 2023-02-02 | Stop reason: HOSPADM

## 2023-02-02 RX ORDER — HEPARIN 100 UNIT/ML
500 SYRINGE INTRAVENOUS
Status: DISCONTINUED | OUTPATIENT
Start: 2023-02-02 | End: 2023-02-02 | Stop reason: HOSPADM

## 2023-02-02 RX ORDER — HEPARIN 100 UNIT/ML
500 SYRINGE INTRAVENOUS
Status: CANCELLED | OUTPATIENT
Start: 2023-02-16

## 2023-02-02 RX ORDER — HEPARIN 100 UNIT/ML
500 SYRINGE INTRAVENOUS
Status: CANCELLED | OUTPATIENT
Start: 2023-02-09

## 2023-02-02 RX ORDER — SODIUM CHLORIDE 0.9 % (FLUSH) 0.9 %
10 SYRINGE (ML) INJECTION
Status: CANCELLED | OUTPATIENT
Start: 2023-02-09

## 2023-02-02 RX ORDER — SODIUM CHLORIDE 0.9 % (FLUSH) 0.9 %
10 SYRINGE (ML) INJECTION
Status: CANCELLED | OUTPATIENT
Start: 2023-02-16

## 2023-02-02 RX ORDER — HEPARIN 100 UNIT/ML
500 SYRINGE INTRAVENOUS
OUTPATIENT
Start: 2023-02-23

## 2023-02-02 RX ORDER — HYDRALAZINE HYDROCHLORIDE 25 MG/1
25 TABLET, FILM COATED ORAL
Status: COMPLETED | OUTPATIENT
Start: 2023-02-02 | End: 2023-02-02

## 2023-02-02 RX ORDER — DIPHENHYDRAMINE HYDROCHLORIDE 50 MG/ML
50 INJECTION INTRAMUSCULAR; INTRAVENOUS ONCE AS NEEDED
Status: DISCONTINUED | OUTPATIENT
Start: 2023-02-02 | End: 2023-02-02 | Stop reason: HOSPADM

## 2023-02-02 RX ORDER — EPINEPHRINE 0.3 MG/.3ML
0.3 INJECTION SUBCUTANEOUS ONCE AS NEEDED
Status: DISCONTINUED | OUTPATIENT
Start: 2023-02-02 | End: 2023-02-02 | Stop reason: HOSPADM

## 2023-02-02 RX ORDER — SODIUM CHLORIDE 0.9 % (FLUSH) 0.9 %
10 SYRINGE (ML) INJECTION
OUTPATIENT
Start: 2023-02-23

## 2023-02-02 RX ADMIN — HYDRALAZINE HYDROCHLORIDE 25 MG: 25 TABLET, FILM COATED ORAL at 02:02

## 2023-02-02 RX ADMIN — SODIUM CHLORIDE 360 MG: 9 INJECTION, SOLUTION INTRAVENOUS at 02:02

## 2023-02-02 RX ADMIN — SODIUM CHLORIDE 75.5 MG: 9 INJECTION, SOLUTION INTRAVENOUS at 03:02

## 2023-02-02 RX ADMIN — HEPARIN 500 UNITS: 100 SYRINGE at 03:02

## 2023-02-02 RX ADMIN — Medication 10 ML: at 03:02

## 2023-02-02 NOTE — PLAN OF CARE
C2D1 Opdivo/Yervoy given. Tolerated well. BP elevated on arrival. MD notified and stated to give Hydralazine and continue with treatment. Next appt confirmed with pt. Dc'd home in stable condition.

## 2023-02-03 ENCOUNTER — INFUSION (OUTPATIENT)
Dept: INFUSION THERAPY | Facility: HOSPITAL | Age: 78
End: 2023-02-03
Attending: NURSE PRACTITIONER
Payer: MEDICARE

## 2023-02-03 VITALS
RESPIRATION RATE: 20 BRPM | BODY MASS INDEX: 28.49 KG/M2 | TEMPERATURE: 98 F | WEIGHT: 166.88 LBS | SYSTOLIC BLOOD PRESSURE: 167 MMHG | DIASTOLIC BLOOD PRESSURE: 76 MMHG | HEART RATE: 78 BPM | HEIGHT: 64 IN

## 2023-02-03 DIAGNOSIS — C77.9 MALIGNANT NEOPLASM METASTATIC TO LYMPH NODES, UNSPECIFIED LYMPH NODE REGION: ICD-10-CM

## 2023-02-03 DIAGNOSIS — C34.10 MALIGNANT NEOPLASM OF UPPER LOBE OF LUNG, UNSPECIFIED LATERALITY: ICD-10-CM

## 2023-02-03 DIAGNOSIS — C79.51 MALIGNANT NEOPLASM METASTATIC TO BONE: Primary | ICD-10-CM

## 2023-02-03 DIAGNOSIS — C73 MALIGNANT NEOPLASM OF THYROID GLAND: ICD-10-CM

## 2023-02-03 PROCEDURE — 96365 THER/PROPH/DIAG IV INF INIT: CPT

## 2023-02-03 PROCEDURE — 96366 THER/PROPH/DIAG IV INF ADDON: CPT

## 2023-02-03 PROCEDURE — 25000003 PHARM REV CODE 250: Performed by: NURSE PRACTITIONER

## 2023-02-03 RX ORDER — SODIUM CHLORIDE 0.9 % (FLUSH) 0.9 %
10 SYRINGE (ML) INJECTION
Status: DISCONTINUED | OUTPATIENT
Start: 2023-02-03 | End: 2023-02-03 | Stop reason: HOSPADM

## 2023-02-03 RX ORDER — HEPARIN 100 UNIT/ML
500 SYRINGE INTRAVENOUS
Status: DISCONTINUED | OUTPATIENT
Start: 2023-02-03 | End: 2023-02-03 | Stop reason: HOSPADM

## 2023-02-03 RX ADMIN — ASCORBIC ACID, VITAMIN A PALMITATE, CHOLECALCIFEROL, THIAMINE HYDROCHLORIDE, RIBOFLAVIN-5 PHOSPHATE SODIUM, PYRIDOXINE HYDROCHLORIDE, NIACINAMIDE, DEXPANTHENOL, ALPHA-TOCOPHEROL ACETATE, VITAMIN K1, FOLIC ACID, BIOTIN, CYANOCOBALAMIN: 200; 3300; 200; 6; 3.6; 6; 40; 15; 10; 150; 600; 60; 5 INJECTION, SOLUTION INTRAVENOUS at 11:02

## 2023-02-09 ENCOUNTER — INFUSION (OUTPATIENT)
Dept: INFUSION THERAPY | Facility: HOSPITAL | Age: 78
End: 2023-02-09
Attending: NURSE PRACTITIONER
Payer: MEDICARE

## 2023-02-09 VITALS
SYSTOLIC BLOOD PRESSURE: 180 MMHG | RESPIRATION RATE: 20 BRPM | TEMPERATURE: 98 F | HEART RATE: 79 BPM | DIASTOLIC BLOOD PRESSURE: 64 MMHG

## 2023-02-09 DIAGNOSIS — C34.10 MALIGNANT NEOPLASM OF UPPER LOBE OF LUNG, UNSPECIFIED LATERALITY: ICD-10-CM

## 2023-02-09 DIAGNOSIS — C79.51 MALIGNANT NEOPLASM METASTATIC TO BONE: Primary | ICD-10-CM

## 2023-02-09 DIAGNOSIS — C73 MALIGNANT NEOPLASM OF THYROID GLAND: ICD-10-CM

## 2023-02-09 DIAGNOSIS — C77.9 MALIGNANT NEOPLASM METASTATIC TO LYMPH NODES, UNSPECIFIED LYMPH NODE REGION: ICD-10-CM

## 2023-02-09 PROCEDURE — 96365 THER/PROPH/DIAG IV INF INIT: CPT

## 2023-02-09 PROCEDURE — 25000003 PHARM REV CODE 250: Performed by: NURSE PRACTITIONER

## 2023-02-09 PROCEDURE — 96366 THER/PROPH/DIAG IV INF ADDON: CPT

## 2023-02-09 RX ORDER — SODIUM CHLORIDE 0.9 % (FLUSH) 0.9 %
10 SYRINGE (ML) INJECTION
Status: DISCONTINUED | OUTPATIENT
Start: 2023-02-09 | End: 2023-02-09 | Stop reason: HOSPADM

## 2023-02-09 RX ORDER — HEPARIN 100 UNIT/ML
500 SYRINGE INTRAVENOUS
Status: DISCONTINUED | OUTPATIENT
Start: 2023-02-09 | End: 2023-02-09 | Stop reason: HOSPADM

## 2023-02-09 RX ADMIN — ASCORBIC ACID, VITAMIN A PALMITATE, CHOLECALCIFEROL, THIAMINE HYDROCHLORIDE, RIBOFLAVIN-5 PHOSPHATE SODIUM, PYRIDOXINE HYDROCHLORIDE, NIACINAMIDE, DEXPANTHENOL, ALPHA-TOCOPHEROL ACETATE, VITAMIN K1, FOLIC ACID, BIOTIN, CYANOCOBALAMIN: 200; 3300; 200; 6; 3.6; 6; 40; 15; 10; 150; 600; 60; 5 INJECTION, SOLUTION INTRAVENOUS at 08:02

## 2023-02-17 ENCOUNTER — INFUSION (OUTPATIENT)
Dept: INFUSION THERAPY | Facility: HOSPITAL | Age: 78
End: 2023-02-17
Attending: NURSE PRACTITIONER
Payer: MEDICARE

## 2023-02-17 VITALS
WEIGHT: 164 LBS | OXYGEN SATURATION: 95 % | TEMPERATURE: 98 F | HEIGHT: 64 IN | RESPIRATION RATE: 20 BRPM | DIASTOLIC BLOOD PRESSURE: 65 MMHG | HEART RATE: 71 BPM | BODY MASS INDEX: 28 KG/M2 | SYSTOLIC BLOOD PRESSURE: 148 MMHG

## 2023-02-17 DIAGNOSIS — C77.9 MALIGNANT NEOPLASM METASTATIC TO LYMPH NODES, UNSPECIFIED LYMPH NODE REGION: ICD-10-CM

## 2023-02-17 DIAGNOSIS — C73 MALIGNANT NEOPLASM OF THYROID GLAND: ICD-10-CM

## 2023-02-17 DIAGNOSIS — C79.51 MALIGNANT NEOPLASM METASTATIC TO BONE: Primary | ICD-10-CM

## 2023-02-17 DIAGNOSIS — C34.10 MALIGNANT NEOPLASM OF UPPER LOBE OF LUNG, UNSPECIFIED LATERALITY: ICD-10-CM

## 2023-02-17 PROCEDURE — A4216 STERILE WATER/SALINE, 10 ML: HCPCS | Performed by: NURSE PRACTITIONER

## 2023-02-17 PROCEDURE — 63600175 PHARM REV CODE 636 W HCPCS: Performed by: NURSE PRACTITIONER

## 2023-02-17 PROCEDURE — 96366 THER/PROPH/DIAG IV INF ADDON: CPT

## 2023-02-17 PROCEDURE — 25000003 PHARM REV CODE 250: Performed by: NURSE PRACTITIONER

## 2023-02-17 PROCEDURE — 96365 THER/PROPH/DIAG IV INF INIT: CPT

## 2023-02-17 RX ORDER — SODIUM CHLORIDE 0.9 % (FLUSH) 0.9 %
10 SYRINGE (ML) INJECTION
Status: DISCONTINUED | OUTPATIENT
Start: 2023-02-17 | End: 2023-02-17 | Stop reason: HOSPADM

## 2023-02-17 RX ORDER — HEPARIN 100 UNIT/ML
500 SYRINGE INTRAVENOUS
Status: DISCONTINUED | OUTPATIENT
Start: 2023-02-17 | End: 2023-02-17 | Stop reason: HOSPADM

## 2023-02-17 RX ADMIN — ASCORBIC ACID, VITAMIN A PALMITATE, CHOLECALCIFEROL, THIAMINE HYDROCHLORIDE, RIBOFLAVIN-5 PHOSPHATE SODIUM, PYRIDOXINE HYDROCHLORIDE, NIACINAMIDE, DEXPANTHENOL, ALPHA-TOCOPHEROL ACETATE, VITAMIN K1, FOLIC ACID, BIOTIN, CYANOCOBALAMIN: 200; 3300; 200; 6; 3.6; 6; 40; 15; 10; 150; 600; 60; 5 INJECTION, SOLUTION INTRAVENOUS at 09:02

## 2023-02-17 RX ADMIN — HEPARIN 500 UNITS: 100 SYRINGE at 11:02

## 2023-02-17 RX ADMIN — Medication 10 ML: at 11:02

## 2023-02-20 ENCOUNTER — LAB VISIT (OUTPATIENT)
Dept: LAB | Facility: HOSPITAL | Age: 78
End: 2023-02-20
Attending: INTERNAL MEDICINE
Payer: MEDICARE

## 2023-02-20 DIAGNOSIS — E03.2 HYPOTHYROIDISM DUE TO DRUGS: ICD-10-CM

## 2023-02-20 DIAGNOSIS — C34.90 ADENOCARCINOMA OF LUNG, UNSPECIFIED LATERALITY: ICD-10-CM

## 2023-02-20 DIAGNOSIS — C77.9 MALIGNANT NEOPLASM METASTATIC TO LYMPH NODES, UNSPECIFIED LYMPH NODE REGION: ICD-10-CM

## 2023-02-20 LAB
ALBUMIN SERPL-MCNC: 2.8 G/DL (ref 3.4–4.8)
ALBUMIN/GLOB SERPL: 1.1 RATIO (ref 1.1–2)
ALP SERPL-CCNC: 58 UNIT/L (ref 40–150)
ALT SERPL-CCNC: 19 UNIT/L (ref 0–55)
AST SERPL-CCNC: 18 UNIT/L (ref 5–34)
BASOPHILS # BLD AUTO: 0.03 X10(3)/MCL (ref 0–0.2)
BASOPHILS NFR BLD AUTO: 0.2 %
BILIRUBIN DIRECT+TOT PNL SERPL-MCNC: 0.4 MG/DL
BUN SERPL-MCNC: 28.7 MG/DL (ref 9.8–20.1)
CALCIUM SERPL-MCNC: 8.6 MG/DL (ref 8.4–10.2)
CHLORIDE SERPL-SCNC: 99 MMOL/L (ref 98–107)
CO2 SERPL-SCNC: 30 MMOL/L (ref 23–31)
CORTIS SERPL-SCNC: 11.4 UG/DL
CREAT SERPL-MCNC: 0.76 MG/DL (ref 0.55–1.02)
EOSINOPHIL # BLD AUTO: 0.25 X10(3)/MCL (ref 0–0.9)
EOSINOPHIL NFR BLD AUTO: 2 %
ERYTHROCYTE [DISTWIDTH] IN BLOOD BY AUTOMATED COUNT: 19.6 % (ref 11.5–17)
GFR SERPLBLD CREATININE-BSD FMLA CKD-EPI: >60 MLS/MIN/1.73/M2
GLOBULIN SER-MCNC: 2.5 GM/DL (ref 2.4–3.5)
GLUCOSE SERPL-MCNC: 85 MG/DL (ref 82–115)
HCT VFR BLD AUTO: 33 % (ref 37–47)
HGB BLD-MCNC: 10.3 G/DL (ref 12–16)
IMM GRANULOCYTES # BLD AUTO: 0.12 X10(3)/MCL (ref 0–0.04)
IMM GRANULOCYTES NFR BLD AUTO: 1 %
LYMPHOCYTES # BLD AUTO: 0.79 X10(3)/MCL (ref 0.6–4.6)
LYMPHOCYTES NFR BLD AUTO: 6.4 %
MCH RBC QN AUTO: 29.8 PG
MCHC RBC AUTO-ENTMCNC: 31.2 G/DL (ref 33–36)
MCV RBC AUTO: 95.4 FL (ref 80–94)
MONOCYTES # BLD AUTO: 0.68 X10(3)/MCL (ref 0.1–1.3)
MONOCYTES NFR BLD AUTO: 5.5 %
NEUTROPHILS # BLD AUTO: 10.51 X10(3)/MCL (ref 2.1–9.2)
NEUTROPHILS NFR BLD AUTO: 84.9 %
PLATELET # BLD AUTO: 196 X10(3)/MCL (ref 130–400)
PMV BLD AUTO: 9.8 FL (ref 7.4–10.4)
POTASSIUM SERPL-SCNC: 4.5 MMOL/L (ref 3.5–5.1)
PROT SERPL-MCNC: 5.3 GM/DL (ref 5.8–7.6)
RBC # BLD AUTO: 3.46 X10(6)/MCL (ref 4.2–5.4)
SODIUM SERPL-SCNC: 137 MMOL/L (ref 136–145)
TSH SERPL-ACNC: 3.61 UIU/ML (ref 0.35–4.94)
WBC # SPEC AUTO: 12.4 X10(3)/MCL (ref 4.5–11.5)

## 2023-02-20 PROCEDURE — 84443 ASSAY THYROID STIM HORMONE: CPT

## 2023-02-20 PROCEDURE — 82533 TOTAL CORTISOL: CPT

## 2023-02-20 PROCEDURE — 36415 COLL VENOUS BLD VENIPUNCTURE: CPT

## 2023-02-20 PROCEDURE — 85025 COMPLETE CBC W/AUTO DIFF WBC: CPT

## 2023-02-20 PROCEDURE — 80053 COMPREHEN METABOLIC PANEL: CPT

## 2023-02-22 ENCOUNTER — INFUSION (OUTPATIENT)
Dept: INFUSION THERAPY | Facility: HOSPITAL | Age: 78
End: 2023-02-22
Attending: NURSE PRACTITIONER
Payer: MEDICARE

## 2023-02-22 VITALS
WEIGHT: 166.88 LBS | HEIGHT: 64 IN | RESPIRATION RATE: 20 BRPM | BODY MASS INDEX: 28.49 KG/M2 | DIASTOLIC BLOOD PRESSURE: 62 MMHG | HEART RATE: 74 BPM | SYSTOLIC BLOOD PRESSURE: 136 MMHG

## 2023-02-22 DIAGNOSIS — C34.90 ADENOCARCINOMA OF LUNG, STAGE 4, UNSPECIFIED LATERALITY: Primary | ICD-10-CM

## 2023-02-22 PROCEDURE — 63600175 PHARM REV CODE 636 W HCPCS: Mod: JG | Performed by: INTERNAL MEDICINE

## 2023-02-22 PROCEDURE — 96413 CHEMO IV INFUSION 1 HR: CPT

## 2023-02-22 PROCEDURE — 25000003 PHARM REV CODE 250: Performed by: INTERNAL MEDICINE

## 2023-02-22 RX ORDER — EPINEPHRINE 0.3 MG/.3ML
0.3 INJECTION SUBCUTANEOUS ONCE AS NEEDED
Status: DISCONTINUED | OUTPATIENT
Start: 2023-02-22 | End: 2023-02-22 | Stop reason: HOSPADM

## 2023-02-22 RX ORDER — DIPHENHYDRAMINE HYDROCHLORIDE 50 MG/ML
50 INJECTION INTRAMUSCULAR; INTRAVENOUS ONCE AS NEEDED
Status: DISCONTINUED | OUTPATIENT
Start: 2023-02-22 | End: 2023-02-22 | Stop reason: HOSPADM

## 2023-02-22 RX ORDER — HEPARIN 100 UNIT/ML
500 SYRINGE INTRAVENOUS
Status: DISCONTINUED | OUTPATIENT
Start: 2023-02-22 | End: 2023-02-22 | Stop reason: HOSPADM

## 2023-02-22 RX ORDER — SODIUM CHLORIDE 0.9 % (FLUSH) 0.9 %
10 SYRINGE (ML) INJECTION
Status: DISCONTINUED | OUTPATIENT
Start: 2023-02-22 | End: 2023-02-22 | Stop reason: HOSPADM

## 2023-02-22 RX ADMIN — HEPARIN 500 UNITS: 100 SYRINGE at 11:02

## 2023-02-22 RX ADMIN — SODIUM CHLORIDE 360 MG: 9 INJECTION, SOLUTION INTRAVENOUS at 10:02

## 2023-03-13 ENCOUNTER — HOSPITAL ENCOUNTER (OUTPATIENT)
Dept: CARDIOLOGY | Facility: HOSPITAL | Age: 78
Discharge: HOME | End: 2023-03-13
Attending: NURSE PRACTITIONER
Payer: MEDICARE

## 2023-03-13 ENCOUNTER — OFFICE VISIT (OUTPATIENT)
Dept: HEMATOLOGY/ONCOLOGY | Facility: CLINIC | Age: 78
End: 2023-03-13
Payer: MEDICARE

## 2023-03-13 VITALS
HEIGHT: 64 IN | WEIGHT: 170.63 LBS | OXYGEN SATURATION: 96 % | BODY MASS INDEX: 29.13 KG/M2 | SYSTOLIC BLOOD PRESSURE: 115 MMHG | TEMPERATURE: 98 F | HEART RATE: 72 BPM | DIASTOLIC BLOOD PRESSURE: 63 MMHG

## 2023-03-13 DIAGNOSIS — R60.1 GENERALIZED EDEMA: ICD-10-CM

## 2023-03-13 DIAGNOSIS — C34.90 ADENOCARCINOMA OF LUNG, UNSPECIFIED LATERALITY: ICD-10-CM

## 2023-03-13 DIAGNOSIS — M79.89 LEG SWELLING: ICD-10-CM

## 2023-03-13 DIAGNOSIS — E03.2 HYPOTHYROIDISM DUE TO DRUGS: ICD-10-CM

## 2023-03-13 DIAGNOSIS — M79.89 LEG SWELLING: Primary | ICD-10-CM

## 2023-03-13 DIAGNOSIS — Z51.12 ENCOUNTER FOR ANTINEOPLASTIC IMMUNOTHERAPY: ICD-10-CM

## 2023-03-13 DIAGNOSIS — C79.51 MALIGNANT NEOPLASM METASTATIC TO BONE: ICD-10-CM

## 2023-03-13 PROCEDURE — 93970 EXTREMITY STUDY: CPT | Mod: 26,,, | Performed by: SURGERY

## 2023-03-13 PROCEDURE — 99999 PR PBB SHADOW E&M-EST. PATIENT-LVL IV: CPT | Mod: PBBFAC,,, | Performed by: NURSE PRACTITIONER

## 2023-03-13 PROCEDURE — 99214 OFFICE O/P EST MOD 30 MIN: CPT | Mod: PBBFAC,25 | Performed by: NURSE PRACTITIONER

## 2023-03-13 PROCEDURE — 99215 OFFICE O/P EST HI 40 MIN: CPT | Mod: S$PBB,,, | Performed by: NURSE PRACTITIONER

## 2023-03-13 PROCEDURE — 99999 PR PBB SHADOW E&M-EST. PATIENT-LVL IV: ICD-10-PCS | Mod: PBBFAC,,, | Performed by: NURSE PRACTITIONER

## 2023-03-13 PROCEDURE — 99215 PR OFFICE/OUTPT VISIT, EST, LEVL V, 40-54 MIN: ICD-10-PCS | Mod: S$PBB,,, | Performed by: NURSE PRACTITIONER

## 2023-03-13 PROCEDURE — 93970 CV US DOPPLER VENOUS LEGS BILATERAL (CUPID ONLY): ICD-10-PCS | Mod: 26,,, | Performed by: SURGERY

## 2023-03-13 PROCEDURE — 93970 EXTREMITY STUDY: CPT

## 2023-03-13 RX ORDER — DIPHENHYDRAMINE HYDROCHLORIDE 50 MG/ML
50 INJECTION INTRAMUSCULAR; INTRAVENOUS ONCE AS NEEDED
Status: CANCELLED | OUTPATIENT
Start: 2023-04-04

## 2023-03-13 RX ORDER — HEPARIN 100 UNIT/ML
500 SYRINGE INTRAVENOUS
Status: CANCELLED | OUTPATIENT
Start: 2023-04-04

## 2023-03-13 RX ORDER — EPINEPHRINE 0.3 MG/.3ML
0.3 INJECTION SUBCUTANEOUS ONCE AS NEEDED
Status: CANCELLED | OUTPATIENT
Start: 2023-03-14

## 2023-03-13 RX ORDER — EPINEPHRINE 0.3 MG/.3ML
0.3 INJECTION SUBCUTANEOUS ONCE AS NEEDED
Status: CANCELLED | OUTPATIENT
Start: 2023-04-04

## 2023-03-13 RX ORDER — SODIUM CHLORIDE 0.9 % (FLUSH) 0.9 %
10 SYRINGE (ML) INJECTION
Status: CANCELLED | OUTPATIENT
Start: 2023-04-04

## 2023-03-13 RX ORDER — LEVOTHYROXINE SODIUM 88 UG/1
88 TABLET ORAL EVERY MORNING
Qty: 30 TABLET | Refills: 2 | Status: SHIPPED | OUTPATIENT
Start: 2023-03-13 | End: 2023-08-22 | Stop reason: SDUPTHER

## 2023-03-13 RX ORDER — SODIUM CHLORIDE 0.9 % (FLUSH) 0.9 %
10 SYRINGE (ML) INJECTION
Status: CANCELLED | OUTPATIENT
Start: 2023-03-14

## 2023-03-13 RX ORDER — HEPARIN 100 UNIT/ML
500 SYRINGE INTRAVENOUS
Status: CANCELLED | OUTPATIENT
Start: 2023-03-14

## 2023-03-13 RX ORDER — DIPHENHYDRAMINE HYDROCHLORIDE 50 MG/ML
50 INJECTION INTRAMUSCULAR; INTRAVENOUS ONCE AS NEEDED
Status: CANCELLED | OUTPATIENT
Start: 2023-03-14

## 2023-03-13 NOTE — PROGRESS NOTES
Subjective:       Patient ID: Pratibha Patel is a 78 y.o. female.    Chief Complaint: Follow-up (6 week f/u)      PCP: Graham Nguyen NP  Cardiologist: Dr. Danny Sánchez  Referring Physician: Dr. Anthony Hutson  Endocrine: Dr. Werner  Radiation oncology: Dr. Boss in the past, now Dr. Mortensen.  ENT: Dr. Rosales     Diagnosis:  1. 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 ? metastatic findings could not be biopsied.    TTF negative/CK 7 +.    Diagnosed June 2014  EGFR mutation negative/ALK gene rearrangement negative. Repeat NGS panel on 8/5/19 TP53 (+), BRAF (-), KRAS WT, ROS 1 (-), ALK (-), PDL1 <1%   2.  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 Patient has reportedly been referred to ENT for resection and lymph node dissection (per patient report).   Subsequent open biopsy done by Dr. Rosales on 7/29/2019 confirmed the same.  Patient was unable to undergo definitive surgical resection for oligo metastatic disease.  3.  Hoarseness and swallowing difficulties secondary to #2  4.  Biopsy of the left lung lesion showed recurrent non-small cell lung cancer, Caris was sent and showed positivity for PD-L1, 2%.  No other targetable or actionable mutations present.    Current Treatment:   Combination chemo immunotherapy with carboplatin, pemetrexed, nivolumab, ipilimumab based off of checkmate [id number]     Treatment History:  1.  RT with weekly Carbo/Taxol started 7/2/14--Completed August 2014  2.  Single agent 'Maintenance' Avastin q 3 weeks.  Started 4/9/15--last given September 21, 2016  3.  Radiation + weekly  Carbo/Taxol at 2AUC ----9/11/19-10/22/19  4.  Patient underwent radiation from 12/17/2020 through 12/21/2020.  Radiation to the left and right lung per Dr. Robert Mortensen.    HPI:   Please see Oncology history in the diagnosis of adenocarcinoma of the lung, stage IV on the problem list for full detail.  Patient originally seen in 2014 with unintentional weight loss, shortness of breath, nonproductive cough.  Patient had imaging findings suggestive of lung cancer, bronchoscopy done in June of 2014 revealed moderately differentiated adenocarcinoma of the lung.  She originally underwent chemo radiation from July 2014 through August 2014.  She was placed on maintenance Avastin from April 2015 through September 2016. Patient then underwent a thyroid biopsy in June of 2019 that showed metastatic carcinoma favored to be adenocarcinoma lung primary.  She underwent surgical resection in July 2019, however full surgical dissection was not able to be done and the patient underwent open biopsy of right thyroid gland.  Pathology revealed poorly differentiated non-small cell carcinoma consistent with metastatic lung cancer.  NGS panel done at that time unremarkable.  She started chemoradiation with carbo Taxol in September 2019, completed in October 2019. Patient was doing well, imaging in October 2020 revealed an abnormality in the left lung, CT-guided lung biopsy on 11/03/2020 showed recurrent non-small cell lung cancer, Caris revealed PDL1 positivity.  She underwent SBRT from 12/17/2020 through 12/21/2020.  Patient then placed on surveillance imaging, most recently done on 07/05/2022 showing worsening disease in the right lung with a previous lesion that measured 0.6 cm and had an SUV of 1.0 now measuring 1.5 cm and an SUV of 8.4.  This was treated with SBRT in August of 2022.  PET/CT scan done on 11/8/2022 showed mild increased radiotracer activity within the left apical consolidation, SUV 5.5.  There was a newly hypermetabolic  subcentimeter subcarinal lymph node.  A 1 cm right lower lobe nodule was smaller, he right upper lobe nodule was slightly larger measuring 6 mm.  No sites of FDG avid metastatic disease in the neck, abdomen, or pelvis. Bronchoscopy done on 11/16/2022, pathology revealed malignant cells consistent with adenocarcinoma.  Planning on chemo immunotherapy with carboplatin, pemetrexed, nivolumab, ipilimumab based off of checkmate [id number].      Interval History:   Patient presents to clinic for scheduled treatment visit.  She is on treatment with chemo and immunotherapy for lung cancer.  She does report a few side effects from treatment. Otherwise, she is doing well.        Past Medical History:   Diagnosis Date    Adenocarcinoma of lung     Anemia     Anxiety     Arthritis     COPD (chronic obstructive pulmonary disease)     Depression     HLD (hyperlipidemia)     Hypertension     Lung cancer     Secondary malignant neoplasm of lymph nodes     Secondary malignant neoplasm of right lung     Thyroid disease     lymph nodes malignant      Past Surgical History:   Procedure Laterality Date    BLADDER SURGERY      CHOLECYSTECTOMY      COLONOSCOPY  2018    ENDOBRONCHIAL ULTRASOUND N/A 11/16/2022    Procedure: ENDOBRONCHIAL ULTRASOUND (EBUS);  Surgeon: Anthony Hutson MD;  Location: [facility];  Service: Pulmonary;  Laterality: N/A;    HYSTERECTOMY      KNEE SURGERY Bilateral     replaced knees    PARTIAL HIP ARTHROPLASTY Bilateral      Social History     Socioeconomic History    Marital status:    Tobacco Use    Smoking status: Every Day     Packs/day: 0.25     Types: Cigarettes    Smokeless tobacco: Never   Substance and Sexual Activity    Alcohol use: Not Currently    Drug use: Never      Family History   Problem Relation Age of Onset    Lung cancer Mother     Lung cancer Brother       Review of patient's allergies indicates:  No Known Allergies   Review of Systems   Constitutional:  Negative for chills,  diaphoresis, fatigue, fever and unexpected weight change.   HENT:  Negative for nasal congestion, mouth sores, sinus pressure/congestion and sore throat.    Eyes:  Negative for pain and visual disturbance.   Respiratory:  Negative for cough, chest tightness and shortness of breath.    Cardiovascular:  Negative for chest pain, palpitations and leg swelling.   Gastrointestinal:  Negative for abdominal distention, abdominal pain, blood in stool, constipation and diarrhea.   Genitourinary:  Negative for dysuria, frequency and hematuria.   Musculoskeletal:  Negative for arthralgias and back pain.   Integumentary:  Negative for rash.   Neurological:  Negative for dizziness, weakness, numbness and headaches.   Hematological:  Negative for adenopathy.   Psychiatric/Behavioral:  Negative for confusion.        Objective:      Physical Exam  Vitals reviewed.   Constitutional:       General: She is not in acute distress.     Appearance: Normal appearance.   HENT:      Head: Normocephalic and atraumatic.      Nose: Nose normal.      Mouth/Throat:      Mouth: Mucous membranes are moist.   Eyes:      Extraocular Movements: Extraocular movements intact.      Conjunctiva/sclera: Conjunctivae normal.   Cardiovascular:      Rate and Rhythm: Normal rate and regular rhythm.      Pulses: Normal pulses.      Heart sounds: Normal heart sounds.   Pulmonary:      Effort: Pulmonary effort is normal.      Breath sounds: Normal breath sounds.   Abdominal:      General: Bowel sounds are normal.      Palpations: Abdomen is soft.   Musculoskeletal:         General: No swelling. Normal range of motion.      Cervical back: Normal range of motion and neck supple.      Right lower leg: No edema.      Left lower leg: No edema.   Lymphadenopathy:      Cervical: No cervical adenopathy.   Skin:     General: Skin is warm and dry.   Neurological:      General: No focal deficit present.      Mental Status: She is alert and oriented to person, place, and  time. Mental status is at baseline.   Psychiatric:         Mood and Affect: Mood normal.         Behavior: Behavior normal.       LABS AND IMAGING REVIEWED IN EPIC          Assessment:     1. 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 ? metastatic findings could not be biopsied.    TTF negative/CK 7 +.    Diagnosed June 2014.  EGFR mutation negative/ALK gene rearrangement negative.  2.  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 Patient has reportedly been referred to ENT for resection and lymph node dissection (per patient report).   Subsequent open biopsy done by Dr. Rosales on 7/29/2019 confirmed the same.  Patient was unable to undergo definitive surgical resection for oligo metastatic disease.  3.  Treatment induced anemia   4. Hoarseness .      Plan:          Patient's biopsy did return as recurrent non-small cell lung cancer in the left lung.  I feel that the right lung will be similar pathology.  She completed radiation with Dr. Mortensen on 12/24/2020.     Caris did reveal that PD-L1 was 2% positive suggesting benefit from pembrolizumab or nivolumab/ipilimumab.      Her PET/CT scan does show a new hypermetabolic subcentimeter subcarinal lymph node and increase in a left apical consolidation.  The left apical consolidation is close to the aorta and I do not think would be amenable to biopsy, however this subcarinal lymph node may be amenable to bronchoscopy.      Bronchoscopy done on 11/16/2022 showed metastatic adenocarcinoma.  She is no longer a candidate for full dose SBRT.  We started with carboplatin,  pemetrexed, nivolumab, and ipilimumab based on the CHECKMATE 9LA study.  Cycle 1 day 1 given 12/20/2022.     Follow up with radiation oncology as scheduled, Dr. Mortensen.    We will get lower extremity ultrasounds of both legs today due to swelling.    Will increase levothyroxine to 88mcg daily.       Return to clinic prior to cycle 3, same day labs, plan to repeat scans before return visit    GADIEL Martinez

## 2023-03-14 ENCOUNTER — INFUSION (OUTPATIENT)
Dept: INFUSION THERAPY | Facility: HOSPITAL | Age: 78
End: 2023-03-14
Attending: NURSE PRACTITIONER
Payer: MEDICARE

## 2023-03-14 VITALS
TEMPERATURE: 97 F | WEIGHT: 170.63 LBS | OXYGEN SATURATION: 96 % | DIASTOLIC BLOOD PRESSURE: 74 MMHG | RESPIRATION RATE: 18 BRPM | SYSTOLIC BLOOD PRESSURE: 148 MMHG | HEIGHT: 64 IN | BODY MASS INDEX: 29.13 KG/M2 | HEART RATE: 71 BPM

## 2023-03-14 DIAGNOSIS — C34.90 ADENOCARCINOMA OF LUNG, STAGE 4, UNSPECIFIED LATERALITY: Primary | ICD-10-CM

## 2023-03-14 PROCEDURE — 25000003 PHARM REV CODE 250: Performed by: NURSE PRACTITIONER

## 2023-03-14 PROCEDURE — 96417 CHEMO IV INFUS EACH ADDL SEQ: CPT

## 2023-03-14 PROCEDURE — 96413 CHEMO IV INFUSION 1 HR: CPT

## 2023-03-14 PROCEDURE — 63600175 PHARM REV CODE 636 W HCPCS: Mod: JZ,JG | Performed by: NURSE PRACTITIONER

## 2023-03-14 RX ORDER — SODIUM CHLORIDE 0.9 % (FLUSH) 0.9 %
10 SYRINGE (ML) INJECTION
Status: DISCONTINUED | OUTPATIENT
Start: 2023-03-14 | End: 2023-03-14 | Stop reason: HOSPADM

## 2023-03-14 RX ORDER — EPINEPHRINE 0.3 MG/.3ML
0.3 INJECTION SUBCUTANEOUS ONCE AS NEEDED
Status: DISCONTINUED | OUTPATIENT
Start: 2023-03-14 | End: 2023-03-14 | Stop reason: HOSPADM

## 2023-03-14 RX ORDER — DIPHENHYDRAMINE HYDROCHLORIDE 50 MG/ML
50 INJECTION INTRAMUSCULAR; INTRAVENOUS ONCE AS NEEDED
Status: DISCONTINUED | OUTPATIENT
Start: 2023-03-14 | End: 2023-03-14 | Stop reason: HOSPADM

## 2023-03-14 RX ORDER — HEPARIN 100 UNIT/ML
500 SYRINGE INTRAVENOUS
Status: DISCONTINUED | OUTPATIENT
Start: 2023-03-14 | End: 2023-03-14 | Stop reason: HOSPADM

## 2023-03-14 RX ADMIN — SODIUM CHLORIDE 360 MG: 9 INJECTION, SOLUTION INTRAVENOUS at 12:03

## 2023-03-14 RX ADMIN — HEPARIN 500 UNITS: 100 SYRINGE at 01:03

## 2023-03-14 RX ADMIN — SODIUM CHLORIDE: 9 INJECTION, SOLUTION INTRAVENOUS at 11:03

## 2023-03-14 RX ADMIN — SODIUM CHLORIDE 75.5 MG: 9 INJECTION, SOLUTION INTRAVENOUS at 12:03

## 2023-03-29 ENCOUNTER — HOSPITAL ENCOUNTER (OUTPATIENT)
Dept: RADIOLOGY | Facility: HOSPITAL | Age: 78
Discharge: HOME | End: 2023-03-29
Attending: NURSE PRACTITIONER
Payer: MEDICARE

## 2023-03-29 DIAGNOSIS — C34.90 ADENOCARCINOMA OF LUNG, UNSPECIFIED LATERALITY: ICD-10-CM

## 2023-03-29 DIAGNOSIS — E03.2 HYPOTHYROIDISM DUE TO DRUGS: ICD-10-CM

## 2023-03-29 DIAGNOSIS — R60.1 GENERALIZED EDEMA: ICD-10-CM

## 2023-03-29 DIAGNOSIS — C79.51 MALIGNANT NEOPLASM METASTATIC TO BONE: ICD-10-CM

## 2023-03-29 DIAGNOSIS — M79.89 LEG SWELLING: ICD-10-CM

## 2023-03-29 PROCEDURE — 78815 PET IMAGE W/CT SKULL-THIGH: CPT | Mod: TC,PS

## 2023-03-29 PROCEDURE — A9552 F18 FDG: HCPCS

## 2023-04-03 ENCOUNTER — OFFICE VISIT (OUTPATIENT)
Dept: HEMATOLOGY/ONCOLOGY | Facility: CLINIC | Age: 78
End: 2023-04-03
Payer: MEDICARE

## 2023-04-03 ENCOUNTER — LAB VISIT (OUTPATIENT)
Dept: LAB | Facility: HOSPITAL | Age: 78
End: 2023-04-03
Attending: NURSE PRACTITIONER
Payer: MEDICARE

## 2023-04-03 VITALS
OXYGEN SATURATION: 95 % | BODY MASS INDEX: 29.34 KG/M2 | HEIGHT: 64 IN | TEMPERATURE: 97 F | HEART RATE: 70 BPM | WEIGHT: 171.88 LBS | SYSTOLIC BLOOD PRESSURE: 121 MMHG | RESPIRATION RATE: 18 BRPM | DIASTOLIC BLOOD PRESSURE: 68 MMHG

## 2023-04-03 DIAGNOSIS — E03.2 HYPOTHYROIDISM DUE TO DRUGS: ICD-10-CM

## 2023-04-03 DIAGNOSIS — M79.89 LEG SWELLING: ICD-10-CM

## 2023-04-03 DIAGNOSIS — C79.51 MALIGNANT NEOPLASM METASTATIC TO BONE: ICD-10-CM

## 2023-04-03 DIAGNOSIS — C34.90 ADENOCARCINOMA OF LUNG, UNSPECIFIED LATERALITY: ICD-10-CM

## 2023-04-03 DIAGNOSIS — R60.1 GENERALIZED EDEMA: ICD-10-CM

## 2023-04-03 DIAGNOSIS — C34.90 ADENOCARCINOMA OF LUNG, STAGE 4, UNSPECIFIED LATERALITY: Primary | ICD-10-CM

## 2023-04-03 LAB
ALBUMIN SERPL-MCNC: 3 G/DL (ref 3.4–4.8)
ALBUMIN/GLOB SERPL: 1.1 RATIO (ref 1.1–2)
ALP SERPL-CCNC: 69 UNIT/L (ref 40–150)
ALT SERPL-CCNC: 9 UNIT/L (ref 0–55)
AST SERPL-CCNC: 16 UNIT/L (ref 5–34)
BASOPHILS # BLD AUTO: 0.03 X10(3)/MCL (ref 0–0.2)
BASOPHILS NFR BLD AUTO: 0.3 %
BILIRUBIN DIRECT+TOT PNL SERPL-MCNC: 0.4 MG/DL
BUN SERPL-MCNC: 10.5 MG/DL (ref 9.8–20.1)
CALCIUM SERPL-MCNC: 9.2 MG/DL (ref 8.4–10.2)
CHLORIDE SERPL-SCNC: 101 MMOL/L (ref 98–107)
CO2 SERPL-SCNC: 30 MMOL/L (ref 23–31)
CORTIS SERPL-SCNC: 12.7 UG/DL
CREAT SERPL-MCNC: 0.82 MG/DL (ref 0.55–1.02)
EOSINOPHIL # BLD AUTO: 0.13 X10(3)/MCL (ref 0–0.9)
EOSINOPHIL NFR BLD AUTO: 1.5 %
ERYTHROCYTE [DISTWIDTH] IN BLOOD BY AUTOMATED COUNT: 17 % (ref 11.5–17)
GFR SERPLBLD CREATININE-BSD FMLA CKD-EPI: >60 MLS/MIN/1.73/M2
GLOBULIN SER-MCNC: 2.7 GM/DL (ref 2.4–3.5)
GLUCOSE SERPL-MCNC: 127 MG/DL (ref 82–115)
HCT VFR BLD AUTO: 34.7 % (ref 37–47)
HGB BLD-MCNC: 10.8 G/DL (ref 12–16)
IMM GRANULOCYTES # BLD AUTO: 0.07 X10(3)/MCL (ref 0–0.04)
IMM GRANULOCYTES NFR BLD AUTO: 0.8 %
LYMPHOCYTES # BLD AUTO: 1.64 X10(3)/MCL (ref 0.6–4.6)
LYMPHOCYTES NFR BLD AUTO: 18.3 %
MCH RBC QN AUTO: 29.6 PG (ref 27–31)
MCHC RBC AUTO-ENTMCNC: 31.1 G/DL (ref 33–36)
MCV RBC AUTO: 95.1 FL (ref 80–94)
MONOCYTES # BLD AUTO: 0.85 X10(3)/MCL (ref 0.1–1.3)
MONOCYTES NFR BLD AUTO: 9.5 %
NEUTROPHILS # BLD AUTO: 6.24 X10(3)/MCL (ref 2.1–9.2)
NEUTROPHILS NFR BLD AUTO: 69.6 %
PLATELET # BLD AUTO: 250 X10(3)/MCL (ref 130–400)
PMV BLD AUTO: 8.5 FL (ref 7.4–10.4)
POTASSIUM SERPL-SCNC: 4.5 MMOL/L (ref 3.5–5.1)
PROT SERPL-MCNC: 5.7 GM/DL (ref 5.8–7.6)
RBC # BLD AUTO: 3.65 X10(6)/MCL (ref 4.2–5.4)
SODIUM SERPL-SCNC: 138 MMOL/L (ref 136–145)
TSH SERPL-ACNC: 0.92 UIU/ML (ref 0.35–4.94)
WBC # SPEC AUTO: 9 X10(3)/MCL (ref 4.5–11.5)

## 2023-04-03 PROCEDURE — 99999 PR PBB SHADOW E&M-EST. PATIENT-LVL IV: CPT | Mod: PBBFAC,,, | Performed by: INTERNAL MEDICINE

## 2023-04-03 PROCEDURE — 99214 OFFICE O/P EST MOD 30 MIN: CPT | Mod: PBBFAC | Performed by: INTERNAL MEDICINE

## 2023-04-03 PROCEDURE — 99999 PR PBB SHADOW E&M-EST. PATIENT-LVL IV: ICD-10-PCS | Mod: PBBFAC,,, | Performed by: INTERNAL MEDICINE

## 2023-04-03 PROCEDURE — 84443 ASSAY THYROID STIM HORMONE: CPT

## 2023-04-03 PROCEDURE — 99214 PR OFFICE/OUTPT VISIT, EST, LEVL IV, 30-39 MIN: ICD-10-PCS | Mod: S$PBB,,, | Performed by: INTERNAL MEDICINE

## 2023-04-03 PROCEDURE — 80053 COMPREHEN METABOLIC PANEL: CPT

## 2023-04-03 PROCEDURE — 99214 OFFICE O/P EST MOD 30 MIN: CPT | Mod: S$PBB,,, | Performed by: INTERNAL MEDICINE

## 2023-04-03 PROCEDURE — 36415 COLL VENOUS BLD VENIPUNCTURE: CPT

## 2023-04-03 PROCEDURE — 85025 COMPLETE CBC W/AUTO DIFF WBC: CPT

## 2023-04-03 PROCEDURE — 82533 TOTAL CORTISOL: CPT

## 2023-04-03 NOTE — PROGRESS NOTES
Subjective:       Patient ID: Pratibha Patel is a 78 y.o. female.    Chief Complaint: Lung Cancer (No complaints.)      PCP: Graham Nguyen NP  Cardiologist: Dr. Danny Sánchez  Referring Physician: Dr. Anthony Hutson  Endocrine: Dr. Werner  Radiation oncology: Dr. Boss in the past, now Dr. Mortensen.  ENT: Dr. 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 ? metastatic findings could not be biopsied. TTF negative/CK 7 +.    Diagnosed June 2014 EGFR mutation negative/ALK gene rearrangement negative. 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 Patient has reportedly been referred to ENT for resection and lymph node dissection (per patient report).   Subsequent open biopsy done by Dr. Rosales on 7/29/2019 confirmed the same.  Patient was unable to undergo definitive surgical resection for oligo metastatic disease.  Hoarseness and swallowing difficulties secondary to #2  Biopsy of the left lung lesion showed recurrent non-small cell lung cancer, Caris was sent and showed positivity for PD-L1, 2%.  No other targetable or actionable mutations present.    Current Treatment:   Combination chemo immunotherapy with carboplatin, pemetrexed, nivolumab, ipilimumab based off of checkmate [id number]     Treatment History:  RT with weekly Carbo/Taxol started 7/2/14--Completed August 2014  Single agent 'Maintenance' Avastin q 3 weeks.  Started 4/9/15--last given September 21, 2016  Radiation + weekly Carbo/Taxol at 2AUC  ----9/11/19-10/22/19  Patient underwent radiation from 12/17/2020 through 12/21/2020.  Radiation to the left and right lung per Dr. Robert Mortensen.    HPI:   Please see Oncology history in the diagnosis of adenocarcinoma of the lung, stage IV on the problem list for full detail.  Patient originally seen in 2014 with unintentional weight loss, shortness of breath, nonproductive cough.  Patient had imaging findings suggestive of lung cancer, bronchoscopy done in June of 2014 revealed moderately differentiated adenocarcinoma of the lung.  She originally underwent chemo radiation from July 2014 through August 2014.  She was placed on maintenance Avastin from April 2015 through September 2016. Patient then underwent a thyroid biopsy in June of 2019 that showed metastatic carcinoma favored to be adenocarcinoma lung primary.  She underwent surgical resection in July 2019, however full surgical dissection was not able to be done and the patient underwent open biopsy of right thyroid gland.  Pathology revealed poorly differentiated non-small cell carcinoma consistent with metastatic lung cancer.  NGS panel done at that time unremarkable.  She started chemoradiation with carbo Taxol in September 2019, completed in October 2019. Patient was doing well, imaging in October 2020 revealed an abnormality in the left lung, CT-guided lung biopsy on 11/03/2020 showed recurrent non-small cell lung cancer, Caris revealed PDL1 positivity.  She underwent SBRT from 12/17/2020 through 12/21/2020.  Patient then placed on surveillance imaging, most recently done on 07/05/2022 showing worsening disease in the right lung with a previous lesion that measured 0.6 cm and had an SUV of 1.0 now measuring 1.5 cm and an SUV of 8.4.  This was treated with SBRT in August of 2022.  PET/CT scan done on 11/8/2022 showed mild increased radiotracer activity within the left apical consolidation, SUV 5.5.  There was a newly hypermetabolic subcentimeter subcarinal  lymph node.  A 1 cm right lower lobe nodule was smaller, he right upper lobe nodule was slightly larger measuring 6 mm.  No sites of FDG avid metastatic disease in the neck, abdomen, or pelvis. Bronchoscopy done on 11/16/2022, pathology revealed malignant cells consistent with adenocarcinoma.  Planning on chemo immunotherapy with carboplatin, pemetrexed, nivolumab, ipilimumab based off of checkmate 9LA.  PET/CT on 03/29/2023 showed similar left apical consolidative changes in right lower lobe nodules with similar FDG uptake and slight decrease in size of the subcarinal lymph node.  There was some new right perihilar uptake with associated bronchial wall thickening representing infectious or inflammatory changes.      Interval History:   Patient presents to clinic for scheduled treatment visit.  She is now on immunotherapy alone, completed the chemotherapy arm of the treatment protocol.  She is tolerating immunotherapy well.       Past Medical History:   Diagnosis Date    Adenocarcinoma of lung     Anemia     Anxiety     Arthritis     COPD (chronic obstructive pulmonary disease)     Depression     HLD (hyperlipidemia)     Hypertension     Lung cancer     Secondary malignant neoplasm of lymph nodes     Secondary malignant neoplasm of right lung     Thyroid disease     lymph nodes malignant      Past Surgical History:   Procedure Laterality Date    BLADDER SURGERY      CHOLECYSTECTOMY      COLONOSCOPY  2018    ENDOBRONCHIAL ULTRASOUND N/A 11/16/2022    Procedure: ENDOBRONCHIAL ULTRASOUND (EBUS);  Surgeon: Anthony Hutosn MD;  Location: [facility]ONCH[facility]COPY [facility];  Service: Pulmonary;  Laterality: N/A;    HYSTERECTOMY      KNEE SURGERY Bilateral     replaced knees    PARTIAL HIP ARTHROPLASTY Bilateral      Social History     Socioeconomic History    Marital status:    Tobacco Use    Smoking status: Every Day     Packs/day: 0.25     Types: Cigarettes    Smokeless tobacco: Never   Substance and Sexual Activity     Alcohol use: Not Currently    Drug use: Never      Family History   Problem Relation Age of Onset    Lung cancer Mother     Lung cancer Brother       Review of patient's allergies indicates:  No Known Allergies   Review of Systems   Constitutional:  Negative for chills, diaphoresis, fatigue, fever and unexpected weight change.   HENT:  Negative for nasal congestion, mouth sores, sinus pressure/congestion and sore throat.    Eyes:  Negative for pain and visual disturbance.   Respiratory:  Negative for cough, chest tightness and shortness of breath.    Cardiovascular:  Negative for chest pain, palpitations and leg swelling.   Gastrointestinal:  Negative for abdominal distention, abdominal pain, blood in stool, constipation and diarrhea.   Genitourinary:  Negative for dysuria, frequency and hematuria.   Musculoskeletal:  Negative for arthralgias and back pain.   Integumentary:  Negative for rash.   Neurological:  Negative for dizziness, weakness, numbness and headaches.   Hematological:  Negative for adenopathy.   Psychiatric/Behavioral:  Negative for confusion.        Objective:      Physical Exam  Vitals reviewed.   Constitutional:       General: She is not in acute distress.     Appearance: Normal appearance.   HENT:      Head: Normocephalic and atraumatic.      Nose: Nose normal.      Mouth/Throat:      Mouth: Mucous membranes are moist.   Eyes:      Extraocular Movements: Extraocular movements intact.      Conjunctiva/sclera: Conjunctivae normal.   Cardiovascular:      Rate and Rhythm: Normal rate and regular rhythm.      Pulses: Normal pulses.      Heart sounds: Normal heart sounds.   Pulmonary:      Effort: Pulmonary effort is normal.      Breath sounds: Normal breath sounds.   Abdominal:      General: Bowel sounds are normal.      Palpations: Abdomen is soft.   Musculoskeletal:         General: No swelling. Normal range of motion.      Cervical back: Normal range of motion and neck supple.      Right lower leg:  No edema.      Left lower leg: No edema.   Lymphadenopathy:      Cervical: No cervical adenopathy.   Skin:     General: Skin is warm and dry.   Neurological:      General: No focal deficit present.      Mental Status: She is alert and oriented to person, place, and time. Mental status is at baseline.   Psychiatric:         Mood and Affect: Mood normal.         Behavior: Behavior normal.       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 ? metastatic findings could not be biopsied.    TTF negative/CK 7 +.    Diagnosed June 2014.  EGFR mutation negative/ALK gene rearrangement negative.  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 Patient has reportedly been referred to ENT for resection and lymph node dissection (per patient report). Subsequent open biopsy done by Dr. Rosales on 7/29/2019 confirmed the same.  Patient was unable to undergo definitive surgical resection for oligo metastatic disease.  Treatment induced anemia  Hoarseness .      Plan:          Patient's biopsy did return as recurrent non-small cell lung cancer in the left lung.  I feel that the right lung will be similar pathology.  She completed radiation with Dr. Mortensen on 12/24/2020.     Caris did reveal that PD-L1 was 2% positive suggesting benefit from pembrolizumab or nivolumab/ipilimumab.      Her PET/CT scan does show a new hypermetabolic subcentimeter subcarinal lymph node and increase in a left apical consolidation.  The left apical consolidation is close to the aorta and I do not think  would be amenable to biopsy, however this subcarinal lymph node may be amenable to bronchoscopy.      Bronchoscopy done on 11/16/2022 showed metastatic adenocarcinoma.  She is no longer a candidate for full dose SBRT.  We started with carboplatin, pemetrexed, nivolumab, and ipilimumab based on the CHECKMATE 9LA study.  Cycle 1 day 1 given 12/20/2022.     Follow up with radiation oncology as scheduled, Dr. Mortensen.    Continue levothyroxine to 88mcg daily.     Return to clinic prior to cycle 4, same day labs    Enrrique Gudino II, MD

## 2023-04-04 ENCOUNTER — INFUSION (OUTPATIENT)
Dept: INFUSION THERAPY | Facility: HOSPITAL | Age: 78
End: 2023-04-04
Attending: NURSE PRACTITIONER
Payer: MEDICARE

## 2023-04-04 VITALS
SYSTOLIC BLOOD PRESSURE: 146 MMHG | OXYGEN SATURATION: 99 % | HEIGHT: 64 IN | BODY MASS INDEX: 29.32 KG/M2 | TEMPERATURE: 98 F | HEART RATE: 65 BPM | RESPIRATION RATE: 18 BRPM | DIASTOLIC BLOOD PRESSURE: 72 MMHG | WEIGHT: 171.75 LBS

## 2023-04-04 DIAGNOSIS — C34.90 ADENOCARCINOMA OF LUNG, STAGE 4, UNSPECIFIED LATERALITY: Primary | ICD-10-CM

## 2023-04-04 PROCEDURE — 25000003 PHARM REV CODE 250: Performed by: NURSE PRACTITIONER

## 2023-04-04 PROCEDURE — 63600175 PHARM REV CODE 636 W HCPCS: Mod: JZ,JG | Performed by: NURSE PRACTITIONER

## 2023-04-04 PROCEDURE — 96413 CHEMO IV INFUSION 1 HR: CPT

## 2023-04-04 RX ORDER — HEPARIN 100 UNIT/ML
500 SYRINGE INTRAVENOUS
Status: DISCONTINUED | OUTPATIENT
Start: 2023-04-04 | End: 2023-04-04 | Stop reason: HOSPADM

## 2023-04-04 RX ORDER — EPINEPHRINE 0.3 MG/.3ML
0.3 INJECTION SUBCUTANEOUS ONCE AS NEEDED
Status: DISCONTINUED | OUTPATIENT
Start: 2023-04-04 | End: 2023-04-04 | Stop reason: HOSPADM

## 2023-04-04 RX ORDER — DIPHENHYDRAMINE HYDROCHLORIDE 50 MG/ML
50 INJECTION INTRAMUSCULAR; INTRAVENOUS ONCE AS NEEDED
Status: DISCONTINUED | OUTPATIENT
Start: 2023-04-04 | End: 2023-04-04 | Stop reason: HOSPADM

## 2023-04-04 RX ORDER — SODIUM CHLORIDE 0.9 % (FLUSH) 0.9 %
10 SYRINGE (ML) INJECTION
Status: DISCONTINUED | OUTPATIENT
Start: 2023-04-04 | End: 2023-04-04 | Stop reason: HOSPADM

## 2023-04-04 RX ADMIN — SODIUM CHLORIDE: 9 INJECTION, SOLUTION INTRAVENOUS at 11:04

## 2023-04-04 RX ADMIN — HEPARIN 500 UNITS: 100 SYRINGE at 12:04

## 2023-04-24 ENCOUNTER — OFFICE VISIT (OUTPATIENT)
Dept: HEMATOLOGY/ONCOLOGY | Facility: CLINIC | Age: 78
End: 2023-04-24
Payer: MEDICARE

## 2023-04-24 VITALS
WEIGHT: 167 LBS | HEIGHT: 64 IN | RESPIRATION RATE: 18 BRPM | SYSTOLIC BLOOD PRESSURE: 123 MMHG | HEART RATE: 62 BPM | OXYGEN SATURATION: 95 % | BODY MASS INDEX: 28.51 KG/M2 | DIASTOLIC BLOOD PRESSURE: 61 MMHG | TEMPERATURE: 98 F

## 2023-04-24 DIAGNOSIS — E03.2 HYPOTHYROIDISM DUE TO DRUGS: ICD-10-CM

## 2023-04-24 DIAGNOSIS — C34.90 ADENOCARCINOMA OF LUNG, UNSPECIFIED LATERALITY: Primary | ICD-10-CM

## 2023-04-24 DIAGNOSIS — Z51.12 ENCOUNTER FOR ANTINEOPLASTIC IMMUNOTHERAPY: ICD-10-CM

## 2023-04-24 DIAGNOSIS — C79.51 MALIGNANT NEOPLASM METASTATIC TO BONE: ICD-10-CM

## 2023-04-24 DIAGNOSIS — D64.9 ANEMIA, UNSPECIFIED TYPE: ICD-10-CM

## 2023-04-24 PROCEDURE — 99999 PR PBB SHADOW E&M-EST. PATIENT-LVL IV: CPT | Mod: PBBFAC,,, | Performed by: NURSE PRACTITIONER

## 2023-04-24 PROCEDURE — 99214 OFFICE O/P EST MOD 30 MIN: CPT | Mod: S$PBB,,, | Performed by: NURSE PRACTITIONER

## 2023-04-24 PROCEDURE — 99999 PR PBB SHADOW E&M-EST. PATIENT-LVL IV: ICD-10-PCS | Mod: PBBFAC,,, | Performed by: NURSE PRACTITIONER

## 2023-04-24 PROCEDURE — 99214 OFFICE O/P EST MOD 30 MIN: CPT | Mod: PBBFAC | Performed by: NURSE PRACTITIONER

## 2023-04-24 PROCEDURE — 99214 PR OFFICE/OUTPT VISIT, EST, LEVL IV, 30-39 MIN: ICD-10-PCS | Mod: S$PBB,,, | Performed by: NURSE PRACTITIONER

## 2023-04-24 RX ORDER — DIPHENHYDRAMINE HYDROCHLORIDE 50 MG/ML
50 INJECTION INTRAMUSCULAR; INTRAVENOUS ONCE AS NEEDED
Status: CANCELLED | OUTPATIENT
Start: 2023-04-25

## 2023-04-24 RX ORDER — SODIUM CHLORIDE 0.9 % (FLUSH) 0.9 %
10 SYRINGE (ML) INJECTION
Status: CANCELLED | OUTPATIENT
Start: 2023-05-16

## 2023-04-24 RX ORDER — DIPHENHYDRAMINE HYDROCHLORIDE 50 MG/ML
50 INJECTION INTRAMUSCULAR; INTRAVENOUS ONCE AS NEEDED
Status: CANCELLED | OUTPATIENT
Start: 2023-05-16

## 2023-04-24 RX ORDER — HEPARIN 100 UNIT/ML
500 SYRINGE INTRAVENOUS
Status: CANCELLED | OUTPATIENT
Start: 2023-04-25

## 2023-04-24 RX ORDER — EPINEPHRINE 0.3 MG/.3ML
0.3 INJECTION SUBCUTANEOUS ONCE AS NEEDED
Status: CANCELLED | OUTPATIENT
Start: 2023-04-25

## 2023-04-24 RX ORDER — HEPARIN 100 UNIT/ML
500 SYRINGE INTRAVENOUS
Status: CANCELLED | OUTPATIENT
Start: 2023-05-16

## 2023-04-24 RX ORDER — SODIUM CHLORIDE 0.9 % (FLUSH) 0.9 %
10 SYRINGE (ML) INJECTION
Status: CANCELLED | OUTPATIENT
Start: 2023-04-25

## 2023-04-24 RX ORDER — EPINEPHRINE 0.3 MG/.3ML
0.3 INJECTION SUBCUTANEOUS ONCE AS NEEDED
Status: CANCELLED | OUTPATIENT
Start: 2023-05-16

## 2023-04-24 NOTE — PROGRESS NOTES
Subjective:       Patient ID: Pratibha Patel is a 78 y.o. female.    Chief Complaint: Lung Cancer (Pt reports no issues or concerns today)      PCP: Graham Nguyen NP  Cardiologist: Dr. Danny Sánchez  Referring Physician: Dr. Anthony Hutson  Endocrine: Dr. Werner  Radiation oncology: Dr. Boss in the past, now Dr. Mortensen.  ENT: Dr. 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 ? metastatic findings could not be biopsied. TTF negative/CK 7 +.    Diagnosed June 2014 EGFR mutation negative/ALK gene rearrangement negative. 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 Patient has reportedly been referred to ENT for resection and lymph node dissection (per patient report).   Subsequent open biopsy done by Dr. Rosales on 7/29/2019 confirmed the same.  Patient was unable to undergo definitive surgical resection for oligo metastatic disease.  Hoarseness and swallowing difficulties secondary to #2  Biopsy of the left lung lesion showed recurrent non-small cell lung cancer, Caris was sent and showed positivity for PD-L1, 2%.  No other targetable or actionable mutations present.    Current Treatment:   Combination chemo immunotherapy with carboplatin, pemetrexed, nivolumab, ipilimumab based off of checkmate 9LA     Treatment History:  RT with weekly Carbo/Taxol started 7/2/14--Completed August 2014  Single agent 'Maintenance' Avastin q 3 weeks.  Started 4/9/15--last given September 21, 2016  Radiation + weekly  Carbo/Taxol at 2AUC ----9/11/19-10/22/19  Patient underwent radiation from 12/17/2020 through 12/21/2020.  Radiation to the left and right lung per Dr. Robert Mortensen.    HPI:   Please see Oncology history in the diagnosis of adenocarcinoma of the lung, stage IV on the problem list for full detail.  Patient originally seen in 2014 with unintentional weight loss, shortness of breath, nonproductive cough.  Patient had imaging findings suggestive of lung cancer, bronchoscopy done in June of 2014 revealed moderately differentiated adenocarcinoma of the lung.  She originally underwent chemo radiation from July 2014 through August 2014.  She was placed on maintenance Avastin from April 2015 through September 2016. Patient then underwent a thyroid biopsy in June of 2019 that showed metastatic carcinoma favored to be adenocarcinoma lung primary.  She underwent surgical resection in July 2019, however full surgical dissection was not able to be done and the patient underwent open biopsy of right thyroid gland.  Pathology revealed poorly differentiated non-small cell carcinoma consistent with metastatic lung cancer.  NGS panel done at that time unremarkable.  She started chemoradiation with carbo Taxol in September 2019, completed in October 2019. Patient was doing well, imaging in October 2020 revealed an abnormality in the left lung, CT-guided lung biopsy on 11/03/2020 showed recurrent non-small cell lung cancer, Caris revealed PDL1 positivity.  She underwent SBRT from 12/17/2020 through 12/21/2020.  Patient then placed on surveillance imaging, most recently done on 07/05/2022 showing worsening disease in the right lung with a previous lesion that measured 0.6 cm and had an SUV of 1.0 now measuring 1.5 cm and an SUV of 8.4.  This was treated with SBRT in August of 2022.  PET/CT scan done on 11/8/2022 showed mild increased radiotracer activity within the left apical consolidation, SUV 5.5.  There was a newly hypermetabolic  subcentimeter subcarinal lymph node.  A 1 cm right lower lobe nodule was smaller, he right upper lobe nodule was slightly larger measuring 6 mm.  No sites of FDG avid metastatic disease in the neck, abdomen, or pelvis. Bronchoscopy done on 11/16/2022, pathology revealed malignant cells consistent with adenocarcinoma.  Planning on chemo immunotherapy with carboplatin, pemetrexed, nivolumab, ipilimumab based off of checkmate 9LA.  PET/CT on 03/29/2023 showed similar left apical consolidative changes in right lower lobe nodules with similar FDG uptake and slight decrease in size of the subcarinal lymph node.  There was some new right perihilar uptake with associated bronchial wall thickening representing infectious or inflammatory changes.      Interval History:   Patient presents to clinic for scheduled treatment visit.  She is now on immunotherapy alone, completed the chemotherapy arm of the treatment protocol.  She is tolerating immunotherapy well. She feels well overall and denies complaints. Appetite and energy level are good.       Past Medical History:   Diagnosis Date    Adenocarcinoma of lung     Anemia     Anxiety     Arthritis     COPD (chronic obstructive pulmonary disease)     Depression     HLD (hyperlipidemia)     Hypertension     Lung cancer     Secondary malignant neoplasm of lymph nodes     Secondary malignant neoplasm of right lung     Thyroid disease     lymph nodes malignant      Past Surgical History:   Procedure Laterality Date    BLADDER SURGERY      CHOLECYSTECTOMY      COLONOSCOPY  2018    ENDOBRONCHIAL ULTRASOUND N/A 11/16/2022    Procedure: ENDOBRONCHIAL ULTRASOUND (EBUS);  Surgeon: Anthony Hutson MD;  Location: [facility]CH[facility]COPY [facility];  Service: Pulmonary;  Laterality: N/A;    HYSTERECTOMY      KNEE SURGERY Bilateral     replaced knees    PARTIAL HIP ARTHROPLASTY Bilateral      Social History     Socioeconomic History    Marital status:    Tobacco Use    Smoking status: Every Day      Packs/day: 0.25     Types: Cigarettes    Smokeless tobacco: Never   Substance and Sexual Activity    Alcohol use: Not Currently    Drug use: Never      Family History   Problem Relation Age of Onset    Lung cancer Mother     Lung cancer Brother       Review of patient's allergies indicates:  No Known Allergies   Review of Systems   Constitutional:  Negative for chills, diaphoresis, fatigue, fever and unexpected weight change.   HENT:  Negative for nasal congestion, mouth sores, sinus pressure/congestion and sore throat.    Eyes:  Negative for pain and visual disturbance.   Respiratory:  Negative for cough, chest tightness and shortness of breath.    Cardiovascular:  Negative for chest pain, palpitations and leg swelling.   Gastrointestinal:  Negative for abdominal distention, abdominal pain, blood in stool, constipation and diarrhea.   Genitourinary:  Negative for dysuria, frequency and hematuria.   Musculoskeletal:  Negative for arthralgias and back pain.   Integumentary:  Negative for rash.   Neurological:  Negative for dizziness, weakness, numbness and headaches.   Hematological:  Negative for adenopathy.   Psychiatric/Behavioral:  Negative for confusion.        Objective:      Physical Exam  Vitals reviewed.   Constitutional:       General: She is not in acute distress.     Appearance: Normal appearance.   HENT:      Head: Normocephalic and atraumatic.      Nose: Nose normal.      Mouth/Throat:      Mouth: Mucous membranes are moist.   Eyes:      Extraocular Movements: Extraocular movements intact.      Conjunctiva/sclera: Conjunctivae normal.   Cardiovascular:      Rate and Rhythm: Normal rate and regular rhythm.      Pulses: Normal pulses.      Heart sounds: Normal heart sounds.   Pulmonary:      Effort: Pulmonary effort is normal.      Breath sounds: Examination of the right-lower field reveals decreased breath sounds. Examination of the left-lower field reveals decreased breath sounds. Decreased breath  sounds and wheezing present.   Abdominal:      General: Bowel sounds are normal.      Palpations: Abdomen is soft.   Musculoskeletal:         General: No swelling. Normal range of motion.      Cervical back: Normal range of motion and neck supple.      Right lower leg: No edema.      Left lower leg: No edema.   Lymphadenopathy:      Cervical: No cervical adenopathy.      Upper Body:      Right upper body: No axillary adenopathy.      Left upper body: No axillary adenopathy.   Skin:     General: Skin is warm and dry.   Neurological:      General: No focal deficit present.      Mental Status: She is alert and oriented to person, place, and time. Mental status is at baseline.   Psychiatric:         Mood and Affect: Mood normal.         Behavior: Behavior normal.       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 ? metastatic findings could not be biopsied.    TTF negative/CK 7 +.    Diagnosed June 2014.  EGFR mutation negative/ALK gene rearrangement negative.  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 Patient has reportedly been referred to ENT for resection and lymph node dissection (per patient report). Subsequent open biopsy done by Dr. Rosales on 7/29/2019 confirmed the same.  Patient was unable to undergo definitive surgical resection for oligo metastatic disease.  Treatment induced anemia  Hoarseness .      Plan:          Patient's biopsy did return as recurrent non-small cell lung cancer in the left lung.  I feel that the right lung will be similar  pathology.  She completed radiation with Dr. Mortensen on 12/24/2020.     Caris did reveal that PD-L1 was 2% positive suggesting benefit from pembrolizumab or nivolumab/ipilimumab.      Her PET/CT scan does show a new hypermetabolic subcentimeter subcarinal lymph node and increase in a left apical consolidation.  The left apical consolidation is close to the aorta and I do not think would be amenable to biopsy, however this subcarinal lymph node may be amenable to bronchoscopy.      Bronchoscopy done on 11/16/2022 showed metastatic adenocarcinoma.  She is no longer a candidate for full dose SBRT.  We started with carboplatin, pemetrexed, nivolumab, and ipilimumab based on the CHECKMATE 9LA study.  Cycle 1 day 1 given 12/20/2022.     Follow up with radiation oncology as scheduled, Dr. oMrtensen.    Continue levothyroxine to 88mcg daily.     Return to clinic prior to cycle 5, same day labs      GADIEL Martinez

## 2023-04-25 ENCOUNTER — INFUSION (OUTPATIENT)
Dept: INFUSION THERAPY | Facility: HOSPITAL | Age: 78
End: 2023-04-25
Attending: NURSE PRACTITIONER
Payer: MEDICARE

## 2023-04-25 VITALS
SYSTOLIC BLOOD PRESSURE: 123 MMHG | RESPIRATION RATE: 18 BRPM | TEMPERATURE: 98 F | HEART RATE: 68 BPM | DIASTOLIC BLOOD PRESSURE: 65 MMHG

## 2023-04-25 DIAGNOSIS — C34.90 ADENOCARCINOMA OF LUNG, STAGE 4, UNSPECIFIED LATERALITY: Primary | ICD-10-CM

## 2023-04-25 PROCEDURE — 25000003 PHARM REV CODE 250: Performed by: NURSE PRACTITIONER

## 2023-04-25 PROCEDURE — 96417 CHEMO IV INFUS EACH ADDL SEQ: CPT

## 2023-04-25 PROCEDURE — 96413 CHEMO IV INFUSION 1 HR: CPT

## 2023-04-25 PROCEDURE — 63600175 PHARM REV CODE 636 W HCPCS: Mod: JZ,JG | Performed by: NURSE PRACTITIONER

## 2023-04-25 RX ORDER — EPINEPHRINE 0.3 MG/.3ML
0.3 INJECTION SUBCUTANEOUS ONCE AS NEEDED
Status: DISCONTINUED | OUTPATIENT
Start: 2023-04-25 | End: 2023-04-25 | Stop reason: HOSPADM

## 2023-04-25 RX ORDER — SODIUM CHLORIDE 0.9 % (FLUSH) 0.9 %
10 SYRINGE (ML) INJECTION
Status: DISCONTINUED | OUTPATIENT
Start: 2023-04-25 | End: 2023-04-25 | Stop reason: HOSPADM

## 2023-04-25 RX ORDER — DIPHENHYDRAMINE HYDROCHLORIDE 50 MG/ML
50 INJECTION INTRAMUSCULAR; INTRAVENOUS ONCE AS NEEDED
Status: DISCONTINUED | OUTPATIENT
Start: 2023-04-25 | End: 2023-04-25 | Stop reason: HOSPADM

## 2023-04-25 RX ORDER — HEPARIN 100 UNIT/ML
500 SYRINGE INTRAVENOUS
Status: DISCONTINUED | OUTPATIENT
Start: 2023-04-25 | End: 2023-04-25 | Stop reason: HOSPADM

## 2023-04-25 RX ADMIN — SODIUM CHLORIDE 360 MG: 9 INJECTION, SOLUTION INTRAVENOUS at 11:04

## 2023-04-25 RX ADMIN — SODIUM CHLORIDE 75.5 MG: 9 INJECTION, SOLUTION INTRAVENOUS at 12:04

## 2023-05-15 ENCOUNTER — LAB VISIT (OUTPATIENT)
Dept: LAB | Facility: HOSPITAL | Age: 78
End: 2023-05-15
Attending: NURSE PRACTITIONER
Payer: MEDICARE

## 2023-05-15 DIAGNOSIS — C79.51 MALIGNANT NEOPLASM METASTATIC TO BONE: ICD-10-CM

## 2023-05-15 DIAGNOSIS — Z51.12 ENCOUNTER FOR ANTINEOPLASTIC IMMUNOTHERAPY: ICD-10-CM

## 2023-05-15 DIAGNOSIS — D64.9 ANEMIA, UNSPECIFIED TYPE: ICD-10-CM

## 2023-05-15 DIAGNOSIS — C34.90 ADENOCARCINOMA OF LUNG, UNSPECIFIED LATERALITY: ICD-10-CM

## 2023-05-15 DIAGNOSIS — E03.2 HYPOTHYROIDISM DUE TO DRUGS: ICD-10-CM

## 2023-05-15 LAB
ALBUMIN SERPL-MCNC: 3.5 G/DL (ref 3.4–4.8)
ALBUMIN/GLOB SERPL: 1.2 RATIO (ref 1.1–2)
ALP SERPL-CCNC: 80 UNIT/L (ref 40–150)
ALT SERPL-CCNC: 6 UNIT/L (ref 0–55)
AST SERPL-CCNC: 15 UNIT/L (ref 5–34)
BASOPHILS # BLD AUTO: 0.04 X10(3)/MCL
BASOPHILS NFR BLD AUTO: 0.5 %
BILIRUBIN DIRECT+TOT PNL SERPL-MCNC: 0.3 MG/DL
BUN SERPL-MCNC: 12 MG/DL (ref 9.8–20.1)
CALCIUM SERPL-MCNC: 10.3 MG/DL (ref 8.4–10.2)
CHLORIDE SERPL-SCNC: 99 MMOL/L (ref 98–107)
CO2 SERPL-SCNC: 29 MMOL/L (ref 23–31)
CORTIS SERPL-SCNC: 9.2 UG/DL
CREAT SERPL-MCNC: 0.85 MG/DL (ref 0.55–1.02)
EOSINOPHIL # BLD AUTO: 0.24 X10(3)/MCL (ref 0–0.9)
EOSINOPHIL NFR BLD AUTO: 3.2 %
ERYTHROCYTE [DISTWIDTH] IN BLOOD BY AUTOMATED COUNT: 14.4 % (ref 11.5–17)
GFR SERPLBLD CREATININE-BSD FMLA CKD-EPI: >60 MLS/MIN/1.73/M2
GLOBULIN SER-MCNC: 2.9 GM/DL (ref 2.4–3.5)
GLUCOSE SERPL-MCNC: 86 MG/DL (ref 82–115)
HCT VFR BLD AUTO: 36.7 % (ref 37–47)
HGB BLD-MCNC: 11.5 G/DL (ref 12–16)
IMM GRANULOCYTES # BLD AUTO: 0.01 X10(3)/MCL (ref 0–0.04)
IMM GRANULOCYTES NFR BLD AUTO: 0.1 %
LYMPHOCYTES # BLD AUTO: 1.89 X10(3)/MCL (ref 0.6–4.6)
LYMPHOCYTES NFR BLD AUTO: 25.2 %
MCH RBC QN AUTO: 29.3 PG (ref 27–31)
MCHC RBC AUTO-ENTMCNC: 31.3 G/DL (ref 33–36)
MCV RBC AUTO: 93.4 FL (ref 80–94)
MONOCYTES # BLD AUTO: 0.94 X10(3)/MCL (ref 0.1–1.3)
MONOCYTES NFR BLD AUTO: 12.5 %
NEUTROPHILS # BLD AUTO: 4.39 X10(3)/MCL (ref 2.1–9.2)
NEUTROPHILS NFR BLD AUTO: 58.5 %
PLATELET # BLD AUTO: 237 X10(3)/MCL (ref 130–400)
PMV BLD AUTO: 9.2 FL (ref 7.4–10.4)
POTASSIUM SERPL-SCNC: 4.6 MMOL/L (ref 3.5–5.1)
PROT SERPL-MCNC: 6.4 GM/DL (ref 5.8–7.6)
RBC # BLD AUTO: 3.93 X10(6)/MCL (ref 4.2–5.4)
SODIUM SERPL-SCNC: 138 MMOL/L (ref 136–145)
TSH SERPL-ACNC: 1.41 UIU/ML (ref 0.35–4.94)
WBC # SPEC AUTO: 7.51 X10(3)/MCL (ref 4.5–11.5)

## 2023-05-15 PROCEDURE — 82533 TOTAL CORTISOL: CPT

## 2023-05-15 PROCEDURE — 84443 ASSAY THYROID STIM HORMONE: CPT

## 2023-05-15 PROCEDURE — 85025 COMPLETE CBC W/AUTO DIFF WBC: CPT

## 2023-05-15 PROCEDURE — 36415 COLL VENOUS BLD VENIPUNCTURE: CPT

## 2023-05-15 PROCEDURE — 80053 COMPREHEN METABOLIC PANEL: CPT

## 2023-05-16 ENCOUNTER — INFUSION (OUTPATIENT)
Dept: INFUSION THERAPY | Facility: HOSPITAL | Age: 78
End: 2023-05-16
Attending: NURSE PRACTITIONER
Payer: MEDICARE

## 2023-05-16 VITALS
HEART RATE: 103 BPM | SYSTOLIC BLOOD PRESSURE: 125 MMHG | DIASTOLIC BLOOD PRESSURE: 61 MMHG | TEMPERATURE: 98 F | RESPIRATION RATE: 18 BRPM

## 2023-05-16 DIAGNOSIS — C34.90 ADENOCARCINOMA OF LUNG, STAGE 4, UNSPECIFIED LATERALITY: Primary | ICD-10-CM

## 2023-05-16 PROCEDURE — 63600175 PHARM REV CODE 636 W HCPCS: Mod: JZ,JG | Performed by: NURSE PRACTITIONER

## 2023-05-16 PROCEDURE — 25000003 PHARM REV CODE 250: Performed by: NURSE PRACTITIONER

## 2023-05-16 PROCEDURE — 96413 CHEMO IV INFUSION 1 HR: CPT

## 2023-05-16 RX ORDER — SODIUM CHLORIDE 0.9 % (FLUSH) 0.9 %
10 SYRINGE (ML) INJECTION
Status: DISCONTINUED | OUTPATIENT
Start: 2023-05-16 | End: 2023-05-16 | Stop reason: HOSPADM

## 2023-05-16 RX ORDER — EPINEPHRINE 0.3 MG/.3ML
0.3 INJECTION SUBCUTANEOUS ONCE AS NEEDED
Status: DISCONTINUED | OUTPATIENT
Start: 2023-05-16 | End: 2023-05-16 | Stop reason: HOSPADM

## 2023-05-16 RX ORDER — DIPHENHYDRAMINE HYDROCHLORIDE 50 MG/ML
50 INJECTION INTRAMUSCULAR; INTRAVENOUS ONCE AS NEEDED
Status: DISCONTINUED | OUTPATIENT
Start: 2023-05-16 | End: 2023-05-16 | Stop reason: HOSPADM

## 2023-05-16 RX ORDER — HEPARIN 100 UNIT/ML
500 SYRINGE INTRAVENOUS
Status: DISCONTINUED | OUTPATIENT
Start: 2023-05-16 | End: 2023-05-16 | Stop reason: HOSPADM

## 2023-05-16 RX ADMIN — SODIUM CHLORIDE 360 MG: 9 INJECTION, SOLUTION INTRAVENOUS at 10:05

## 2023-05-31 ENCOUNTER — TELEPHONE (OUTPATIENT)
Dept: HEMATOLOGY/ONCOLOGY | Facility: CLINIC | Age: 78
End: 2023-05-31
Payer: MEDICARE

## 2023-06-15 ENCOUNTER — OFFICE VISIT (OUTPATIENT)
Dept: HEMATOLOGY/ONCOLOGY | Facility: CLINIC | Age: 78
End: 2023-06-15
Payer: MEDICARE

## 2023-06-15 VITALS
SYSTOLIC BLOOD PRESSURE: 108 MMHG | HEIGHT: 64 IN | BODY MASS INDEX: 27.83 KG/M2 | DIASTOLIC BLOOD PRESSURE: 64 MMHG | OXYGEN SATURATION: 95 % | TEMPERATURE: 98 F | WEIGHT: 163 LBS | RESPIRATION RATE: 14 BRPM | HEART RATE: 65 BPM

## 2023-06-15 DIAGNOSIS — Z51.12 ENCOUNTER FOR ANTINEOPLASTIC IMMUNOTHERAPY: ICD-10-CM

## 2023-06-15 DIAGNOSIS — C34.90 ADENOCARCINOMA OF LUNG, UNSPECIFIED LATERALITY: Primary | ICD-10-CM

## 2023-06-15 DIAGNOSIS — C79.51 MALIGNANT NEOPLASM METASTATIC TO BONE: ICD-10-CM

## 2023-06-15 DIAGNOSIS — E03.2 HYPOTHYROIDISM DUE TO DRUGS: ICD-10-CM

## 2023-06-15 PROCEDURE — 99999 PR PBB SHADOW E&M-EST. PATIENT-LVL IV: ICD-10-PCS | Mod: PBBFAC,,, | Performed by: NURSE PRACTITIONER

## 2023-06-15 PROCEDURE — 99214 OFFICE O/P EST MOD 30 MIN: CPT | Mod: S$PBB,,, | Performed by: NURSE PRACTITIONER

## 2023-06-15 PROCEDURE — 99214 OFFICE O/P EST MOD 30 MIN: CPT | Mod: PBBFAC | Performed by: NURSE PRACTITIONER

## 2023-06-15 PROCEDURE — 99214 PR OFFICE/OUTPT VISIT, EST, LEVL IV, 30-39 MIN: ICD-10-PCS | Mod: S$PBB,,, | Performed by: NURSE PRACTITIONER

## 2023-06-15 PROCEDURE — 99999 PR PBB SHADOW E&M-EST. PATIENT-LVL IV: CPT | Mod: PBBFAC,,, | Performed by: NURSE PRACTITIONER

## 2023-06-15 RX ORDER — SODIUM CHLORIDE 0.9 % (FLUSH) 0.9 %
10 SYRINGE (ML) INJECTION
Status: CANCELLED | OUTPATIENT
Start: 2023-06-15

## 2023-06-15 RX ORDER — HEPARIN 100 UNIT/ML
500 SYRINGE INTRAVENOUS
Status: CANCELLED | OUTPATIENT
Start: 2023-06-15

## 2023-06-15 RX ORDER — EPINEPHRINE 0.3 MG/.3ML
0.3 INJECTION SUBCUTANEOUS ONCE AS NEEDED
Status: CANCELLED | OUTPATIENT
Start: 2023-06-27

## 2023-06-15 RX ORDER — DIPHENHYDRAMINE HYDROCHLORIDE 50 MG/ML
50 INJECTION INTRAMUSCULAR; INTRAVENOUS ONCE AS NEEDED
Status: CANCELLED | OUTPATIENT
Start: 2023-06-15

## 2023-06-15 RX ORDER — SODIUM CHLORIDE 0.9 % (FLUSH) 0.9 %
10 SYRINGE (ML) INJECTION
Status: CANCELLED | OUTPATIENT
Start: 2023-06-27

## 2023-06-15 RX ORDER — EPINEPHRINE 0.3 MG/.3ML
0.3 INJECTION SUBCUTANEOUS ONCE AS NEEDED
Status: CANCELLED | OUTPATIENT
Start: 2023-06-15

## 2023-06-15 RX ORDER — DIPHENHYDRAMINE HYDROCHLORIDE 50 MG/ML
50 INJECTION INTRAMUSCULAR; INTRAVENOUS ONCE AS NEEDED
Status: CANCELLED | OUTPATIENT
Start: 2023-06-27

## 2023-06-15 RX ORDER — HEPARIN 100 UNIT/ML
500 SYRINGE INTRAVENOUS
Status: CANCELLED | OUTPATIENT
Start: 2023-06-27

## 2023-06-15 NOTE — PROGRESS NOTES
Subjective:       Patient ID: Pratibha Patel is a 78 y.o. female.    Chief Complaint: Other Misc (Pt reports no new concerns today.)      PCP: Graham Nguyen NP  Cardiologist: Dr. Danny Sánchez  Referring Physician: Dr. Anthony Hutson  Endocrine: Dr. Werner  Radiation oncology: Dr. Boss in the past, now Dr. Mortensen.  ENT: Dr. 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 ? metastatic findings could not be biopsied. TTF negative/CK 7 +.    Diagnosed June 2014 EGFR mutation negative/ALK gene rearrangement negative. 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 Patient has reportedly been referred to ENT for resection and lymph node dissection (per patient report).   Subsequent open biopsy done by Dr. Rosales on 7/29/2019 confirmed the same.  Patient was unable to undergo definitive surgical resection for oligo metastatic disease.  Hoarseness and swallowing difficulties secondary to #2  Biopsy of the left lung lesion showed recurrent non-small cell lung cancer, Caris was sent and showed positivity for PD-L1, 2%.  No other targetable or actionable mutations present.    Current Treatment:   Combination chemo immunotherapy with carboplatin, pemetrexed, nivolumab, ipilimumab based off of checkmate 9LA     Treatment History:  RT with weekly Carbo/Taxol started 7/2/14--Completed August 2014  Single agent 'Maintenance' Avastin q 3 weeks.  Started 4/9/15--last given September 21, 2016  Radiation + weekly  Carbo/Taxol at 2AUC ----9/11/19-10/22/19  Patient underwent radiation from 12/17/2020 through 12/21/2020.  Radiation to the left and right lung per Dr. Robert Mortensen.    HPI:   Please see Oncology history in the diagnosis of adenocarcinoma of the lung, stage IV on the problem list for full detail.  Patient originally seen in 2014 with unintentional weight loss, shortness of breath, nonproductive cough.  Patient had imaging findings suggestive of lung cancer, bronchoscopy done in June of 2014 revealed moderately differentiated adenocarcinoma of the lung.  She originally underwent chemo radiation from July 2014 through August 2014.  She was placed on maintenance Avastin from April 2015 through September 2016. Patient then underwent a thyroid biopsy in June of 2019 that showed metastatic carcinoma favored to be adenocarcinoma lung primary.  She underwent surgical resection in July 2019, however full surgical dissection was not able to be done and the patient underwent open biopsy of right thyroid gland.  Pathology revealed poorly differentiated non-small cell carcinoma consistent with metastatic lung cancer.  NGS panel done at that time unremarkable.  She started chemoradiation with carbo Taxol in September 2019, completed in October 2019. Patient was doing well, imaging in October 2020 revealed an abnormality in the left lung, CT-guided lung biopsy on 11/03/2020 showed recurrent non-small cell lung cancer, Caris revealed PDL1 positivity.  She underwent SBRT from 12/17/2020 through 12/21/2020.  Patient then placed on surveillance imaging, most recently done on 07/05/2022 showing worsening disease in the right lung with a previous lesion that measured 0.6 cm and had an SUV of 1.0 now measuring 1.5 cm and an SUV of 8.4.  This was treated with SBRT in August of 2022.  PET/CT scan done on 11/8/2022 showed mild increased radiotracer activity within the left apical consolidation, SUV 5.5.  There was a newly hypermetabolic  subcentimeter subcarinal lymph node.  A 1 cm right lower lobe nodule was smaller, he right upper lobe nodule was slightly larger measuring 6 mm.  No sites of FDG avid metastatic disease in the neck, abdomen, or pelvis. Bronchoscopy done on 11/16/2022, pathology revealed malignant cells consistent with adenocarcinoma.  Planning on chemo immunotherapy with carboplatin, pemetrexed, nivolumab, ipilimumab based off of checkmate 9LA.  PET/CT on 03/29/2023 showed similar left apical consolidative changes in right lower lobe nodules with similar FDG uptake and slight decrease in size of the subcarinal lymph node.  There was some new right perihilar uptake with associated bronchial wall thickening representing infectious or inflammatory changes.      Interval History:   Patient presents to clinic for scheduled treatment visit.  She is now on immunotherapy alone, completed the chemotherapy arm of the treatment protocol.  She continues to tolerate immunotherapy well. She feels well overall and denies and new complaints. Appetite and energy level are good.  Often shortness of breath remains stable.  She denies any pain.       Past Medical History:   Diagnosis Date    Adenocarcinoma of lung     Anemia     Anxiety     Arthritis     COPD (chronic obstructive pulmonary disease)     Depression     HLD (hyperlipidemia)     Hypertension     Lung cancer     Secondary malignant neoplasm of lymph nodes     Secondary malignant neoplasm of right lung     Thyroid disease     lymph nodes malignant      Past Surgical History:   Procedure Laterality Date    BLADDER SURGERY      CHOLECYSTECTOMY      COLONOSCOPY  2018    ENDOBRONCHIAL ULTRASOUND N/A 11/16/2022    Procedure: ENDOBRONCHIAL ULTRASOUND (EBUS);  Surgeon: Anthony Hutson MD;  Location: [facility];  Service: Pulmonary;  Laterality: N/A;    HYSTERECTOMY      KNEE SURGERY Bilateral     replaced knees    PARTIAL HIP ARTHROPLASTY Bilateral      Social History     Socioeconomic  History    Marital status:    Tobacco Use    Smoking status: Every Day     Packs/day: 0.25     Types: Cigarettes    Smokeless tobacco: Never   Substance and Sexual Activity    Alcohol use: Not Currently    Drug use: Never      Family History   Problem Relation Age of Onset    Lung cancer Mother     Lung cancer Brother       Review of patient's allergies indicates:  No Known Allergies   Review of Systems   Constitutional:  Negative for chills, diaphoresis, fatigue, fever and unexpected weight change.   HENT:  Negative for nasal congestion, mouth sores, sinus pressure/congestion and sore throat.    Eyes:  Negative for pain and visual disturbance.   Respiratory:  Negative for cough, chest tightness and shortness of breath.    Cardiovascular:  Negative for chest pain, palpitations and leg swelling.   Gastrointestinal:  Negative for abdominal distention, abdominal pain, blood in stool, constipation and diarrhea.   Genitourinary:  Negative for dysuria, frequency and hematuria.   Musculoskeletal:  Negative for arthralgias and back pain.   Integumentary:  Negative for rash.   Neurological:  Negative for dizziness, weakness, numbness and headaches.   Hematological:  Negative for adenopathy.   Psychiatric/Behavioral:  Negative for confusion.        Objective:      Physical Exam  Vitals reviewed.   Constitutional:       General: She is not in acute distress.     Appearance: Normal appearance.   HENT:      Head: Normocephalic and atraumatic.      Nose: Nose normal.      Mouth/Throat:      Mouth: Mucous membranes are moist.   Eyes:      Extraocular Movements: Extraocular movements intact.      Conjunctiva/sclera: Conjunctivae normal.   Neck:      Comments: Radiation changes to the left supraclavicular area  Cardiovascular:      Rate and Rhythm: Normal rate and regular rhythm.      Pulses: Normal pulses.      Heart sounds: Normal heart sounds.   Pulmonary:      Effort: Pulmonary effort is normal.      Breath sounds:  Examination of the right-lower field reveals decreased breath sounds. Examination of the left-lower field reveals decreased breath sounds. Decreased breath sounds and wheezing present.   Abdominal:      General: Bowel sounds are normal.      Palpations: Abdomen is soft.   Musculoskeletal:         General: No swelling. Normal range of motion.      Cervical back: Normal range of motion and neck supple.      Right lower leg: No edema.      Left lower leg: No edema.   Lymphadenopathy:      Cervical: No cervical adenopathy.      Upper Body:      Right upper body: No axillary adenopathy.      Left upper body: No axillary adenopathy.   Skin:     General: Skin is warm and dry.   Neurological:      General: No focal deficit present.      Mental Status: She is alert and oriented to person, place, and time. Mental status is at baseline.   Psychiatric:         Mood and Affect: Mood normal.         Behavior: Behavior normal.       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 ? metastatic findings could not be biopsied.    TTF negative/CK 7 +.    Diagnosed June 2014.  EGFR mutation negative/ALK gene rearrangement negative.  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 Patient has reportedly been referred to ENT for resection and lymph node dissection (per patient report). Subsequent open biopsy done by Dr. Rosales on 7/29/2019 confirmed the same.  Patient was unable to undergo definitive surgical resection for oligo metastatic disease.  Treatment induced  anemia  Hoarseness .      Plan:          Patient's biopsy did return as recurrent non-small cell lung cancer in the left lung.  I feel that the right lung will be similar pathology.  She completed radiation with Dr. Mortensen on 12/24/2020.     Caris did reveal that PD-L1 was 2% positive suggesting benefit from pembrolizumab or nivolumab/ipilimumab.      Her PET/CT scan does show a new hypermetabolic subcentimeter subcarinal lymph node and increase in a left apical consolidation.  The left apical consolidation is close to the aorta and I do not think would be amenable to biopsy, however this subcarinal lymph node may be amenable to bronchoscopy.      Bronchoscopy done on 11/16/2022 showed metastatic adenocarcinoma.  She is no longer a candidate for full dose SBRT.  We started with carboplatin, pemetrexed, nivolumab, and ipilimumab based on the CHECKMATE 9LA study.  Cycle 1 day 1 given 12/20/2022.     Follow up with radiation oncology as scheduled, Dr. Mortensen.    Continue levothyroxine to 88mcg daily.     Return to clinic prior to cycle 6, same day labs, repeat PET prior      GADIEL Martinez

## 2023-06-16 ENCOUNTER — INFUSION (OUTPATIENT)
Dept: INFUSION THERAPY | Facility: HOSPITAL | Age: 78
End: 2023-06-16
Attending: NURSE PRACTITIONER
Payer: MEDICARE

## 2023-06-16 VITALS
BODY MASS INDEX: 27.82 KG/M2 | OXYGEN SATURATION: 97 % | HEART RATE: 80 BPM | HEIGHT: 64 IN | TEMPERATURE: 98 F | WEIGHT: 162.94 LBS | DIASTOLIC BLOOD PRESSURE: 53 MMHG | SYSTOLIC BLOOD PRESSURE: 114 MMHG | RESPIRATION RATE: 18 BRPM

## 2023-06-16 DIAGNOSIS — C34.90 ADENOCARCINOMA OF LUNG, STAGE 4, UNSPECIFIED LATERALITY: Primary | ICD-10-CM

## 2023-06-16 PROCEDURE — 96413 CHEMO IV INFUSION 1 HR: CPT

## 2023-06-16 PROCEDURE — 25000003 PHARM REV CODE 250: Performed by: NURSE PRACTITIONER

## 2023-06-16 PROCEDURE — 96417 CHEMO IV INFUS EACH ADDL SEQ: CPT

## 2023-06-16 PROCEDURE — 63600175 PHARM REV CODE 636 W HCPCS: Mod: JZ,JG | Performed by: NURSE PRACTITIONER

## 2023-06-16 RX ORDER — SODIUM CHLORIDE 0.9 % (FLUSH) 0.9 %
10 SYRINGE (ML) INJECTION
Status: DISCONTINUED | OUTPATIENT
Start: 2023-06-16 | End: 2023-06-16 | Stop reason: HOSPADM

## 2023-06-16 RX ORDER — HEPARIN 100 UNIT/ML
500 SYRINGE INTRAVENOUS
Status: DISCONTINUED | OUTPATIENT
Start: 2023-06-16 | End: 2023-06-16 | Stop reason: HOSPADM

## 2023-06-16 RX ORDER — EPINEPHRINE 0.3 MG/.3ML
0.3 INJECTION SUBCUTANEOUS ONCE AS NEEDED
Status: DISCONTINUED | OUTPATIENT
Start: 2023-06-16 | End: 2023-06-16 | Stop reason: HOSPADM

## 2023-06-16 RX ORDER — DIPHENHYDRAMINE HYDROCHLORIDE 50 MG/ML
50 INJECTION INTRAMUSCULAR; INTRAVENOUS ONCE AS NEEDED
Status: DISCONTINUED | OUTPATIENT
Start: 2023-06-16 | End: 2023-06-16 | Stop reason: HOSPADM

## 2023-06-16 RX ADMIN — SODIUM CHLORIDE: 9 INJECTION, SOLUTION INTRAVENOUS at 10:06

## 2023-06-16 RX ADMIN — HEPARIN 500 UNITS: 100 SYRINGE at 12:06

## 2023-06-16 RX ADMIN — SODIUM CHLORIDE 75.5 MG: 9 INJECTION, SOLUTION INTRAVENOUS at 11:06

## 2023-06-16 RX ADMIN — SODIUM CHLORIDE 360 MG: 9 INJECTION, SOLUTION INTRAVENOUS at 10:06

## 2023-07-07 ENCOUNTER — INFUSION (OUTPATIENT)
Dept: INFUSION THERAPY | Facility: HOSPITAL | Age: 78
End: 2023-07-07
Attending: NURSE PRACTITIONER
Payer: MEDICARE

## 2023-07-07 VITALS
DIASTOLIC BLOOD PRESSURE: 58 MMHG | HEART RATE: 62 BPM | OXYGEN SATURATION: 96 % | WEIGHT: 165 LBS | BODY MASS INDEX: 28.17 KG/M2 | SYSTOLIC BLOOD PRESSURE: 103 MMHG | HEIGHT: 64 IN | RESPIRATION RATE: 18 BRPM | TEMPERATURE: 97 F

## 2023-07-07 DIAGNOSIS — C34.90 ADENOCARCINOMA OF LUNG, STAGE 4, UNSPECIFIED LATERALITY: Primary | ICD-10-CM

## 2023-07-07 PROCEDURE — 96413 CHEMO IV INFUSION 1 HR: CPT

## 2023-07-07 PROCEDURE — 25000003 PHARM REV CODE 250: Performed by: NURSE PRACTITIONER

## 2023-07-07 PROCEDURE — 63600175 PHARM REV CODE 636 W HCPCS: Mod: JZ,JG | Performed by: NURSE PRACTITIONER

## 2023-07-07 PROCEDURE — A4216 STERILE WATER/SALINE, 10 ML: HCPCS | Performed by: NURSE PRACTITIONER

## 2023-07-07 RX ORDER — SODIUM CHLORIDE 0.9 % (FLUSH) 0.9 %
10 SYRINGE (ML) INJECTION
Status: DISCONTINUED | OUTPATIENT
Start: 2023-07-07 | End: 2023-07-07 | Stop reason: HOSPADM

## 2023-07-07 RX ORDER — HEPARIN 100 UNIT/ML
500 SYRINGE INTRAVENOUS
Status: DISCONTINUED | OUTPATIENT
Start: 2023-07-07 | End: 2023-07-07 | Stop reason: HOSPADM

## 2023-07-07 RX ORDER — EPINEPHRINE 0.3 MG/.3ML
0.3 INJECTION SUBCUTANEOUS ONCE AS NEEDED
Status: DISCONTINUED | OUTPATIENT
Start: 2023-07-07 | End: 2023-07-07 | Stop reason: HOSPADM

## 2023-07-07 RX ORDER — DIPHENHYDRAMINE HYDROCHLORIDE 50 MG/ML
50 INJECTION INTRAMUSCULAR; INTRAVENOUS ONCE AS NEEDED
Status: DISCONTINUED | OUTPATIENT
Start: 2023-07-07 | End: 2023-07-07 | Stop reason: HOSPADM

## 2023-07-07 RX ADMIN — Medication 10 ML: at 10:07

## 2023-07-07 RX ADMIN — SODIUM CHLORIDE 360 MG: 9 INJECTION, SOLUTION INTRAVENOUS at 09:07

## 2023-07-07 RX ADMIN — SODIUM CHLORIDE: 9 INJECTION, SOLUTION INTRAVENOUS at 09:07

## 2023-07-07 RX ADMIN — HEPARIN 500 UNITS: 100 SYRINGE at 10:07

## 2023-07-07 NOTE — PLAN OF CARE
Plan of care reviewed with patient; patient in agreement. [id number] Opdivo tolerated. Appts printed and given to patient.

## 2023-07-21 ENCOUNTER — HOSPITAL ENCOUNTER (OUTPATIENT)
Dept: RADIOLOGY | Facility: HOSPITAL | Age: 78
Discharge: HOME | End: 2023-07-21
Attending: NURSE PRACTITIONER
Payer: MEDICARE

## 2023-07-21 DIAGNOSIS — E03.2 HYPOTHYROIDISM DUE TO DRUGS: ICD-10-CM

## 2023-07-21 DIAGNOSIS — Z51.12 ENCOUNTER FOR ANTINEOPLASTIC IMMUNOTHERAPY: ICD-10-CM

## 2023-07-21 DIAGNOSIS — C79.51 MALIGNANT NEOPLASM METASTATIC TO BONE: ICD-10-CM

## 2023-07-21 DIAGNOSIS — C34.90 ADENOCARCINOMA OF LUNG, UNSPECIFIED LATERALITY: ICD-10-CM

## 2023-07-21 PROCEDURE — A9552 F18 FDG: HCPCS

## 2023-07-26 NOTE — PROGRESS NOTES
Subjective:       Patient ID: Pratibha Patel is a 78 y.o. female.    Chief Complaint: Follow-up (No concerns today)      PCP: Graham Nguyen NP  Cardiologist: Dr. Danny Sánchez  Referring Physician: Dr. Anthony Hutson  Endocrine: Dr. Werner  Radiation oncology: Dr. Boss in the past, now Dr. Mortensen.  ENT: Dr. 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 ? metastatic findings could not be biopsied. TTF negative/CK 7 +.    Diagnosed June 2014 EGFR mutation negative/ALK gene rearrangement negative. 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 Patient has reportedly been referred to ENT for resection and lymph node dissection (per patient report).   Subsequent open biopsy done by Dr. Rosales on 7/29/2019 confirmed the same.  Patient was unable to undergo definitive surgical resection for oligo metastatic disease.  Hoarseness and swallowing difficulties secondary to #2  Biopsy of the left lung lesion showed recurrent non-small cell lung cancer, Caris was sent and showed positivity for PD-L1, 2%.  No other targetable or actionable mutations present.    Current Treatment:   Combination chemo immunotherapy with carboplatin, pemetrexed, nivolumab, ipilimumab based off of checkmate 9LA     Treatment History:  RT with weekly Carbo/Taxol started 7/2/14--Completed August 2014  Single agent 'Maintenance' Avastin q 3 weeks.  Started 4/9/15--last given September 21, 2016  Radiation + weekly Carbo/Taxol at 2AUC  ----9/11/19-10/22/19  Patient underwent radiation from 12/17/2020 through 12/21/2020.  Radiation to the left and right lung per Dr. Robert Mortensen.    HPI:   Please see Oncology history in the diagnosis of adenocarcinoma of the lung, stage IV on the problem list for full detail.  Patient originally seen in 2014 with unintentional weight loss, shortness of breath, nonproductive cough.  Patient had imaging findings suggestive of lung cancer, bronchoscopy done in June of 2014 revealed moderately differentiated adenocarcinoma of the lung.  She originally underwent chemo radiation from July 2014 through August 2014.  She was placed on maintenance Avastin from April 2015 through September 2016. Patient then underwent a thyroid biopsy in June of 2019 that showed metastatic carcinoma favored to be adenocarcinoma lung primary.  She underwent surgical resection in July 2019, however full surgical dissection was not able to be done and the patient underwent open biopsy of right thyroid gland.  Pathology revealed poorly differentiated non-small cell carcinoma consistent with metastatic lung cancer.  NGS panel done at that time unremarkable.  She started chemoradiation with carbo Taxol in September 2019, completed in October 2019. Patient was doing well, imaging in October 2020 revealed an abnormality in the left lung, CT-guided lung biopsy on 11/03/2020 showed recurrent non-small cell lung cancer, Caris revealed PDL1 positivity.  She underwent SBRT from 12/17/2020 through 12/21/2020.  Patient then placed on surveillance imaging, most recently done on 07/05/2022 showing worsening disease in the right lung with a previous lesion that measured 0.6 cm and had an SUV of 1.0 now measuring 1.5 cm and an SUV of 8.4.  This was treated with SBRT in August of 2022.  PET/CT scan done on 11/8/2022 showed mild increased radiotracer activity within the left apical consolidation, SUV 5.5.  There was a newly hypermetabolic subcentimeter subcarinal  lymph node.  A 1 cm right lower lobe nodule was smaller, he right upper lobe nodule was slightly larger measuring 6 mm.  No sites of FDG avid metastatic disease in the neck, abdomen, or pelvis. Bronchoscopy done on 11/16/2022, pathology revealed malignant cells consistent with adenocarcinoma.  Planning on chemo immunotherapy with carboplatin, pemetrexed, nivolumab, ipilimumab based off of checkmate 9LA.  PET/CT on 07/25/2023 showed infectious or inflammatory changes in the right lower lobe with no other findings to suggest new or worsening disease.      Interval History:   Patient presents to clinic for scheduled treatment visit.  She is now on immunotherapy alone, completed the chemotherapy arm of the treatment protocol.  She continues to tolerate immunotherapy well. She feels well overall and denies and new complaints. Appetite and energy level are good.  She does have some family members with her today, they stated that she does get a little bit weak for a few days after treatment.  Also, she has had some signs and symptoms of aspiration.       Past Medical History:   Diagnosis Date    Adenocarcinoma of lung     Anemia     Anxiety     Arthritis     COPD (chronic obstructive pulmonary disease)     Depression     HLD (hyperlipidemia)     Hypertension     Lung cancer     Secondary malignant neoplasm of lymph nodes     Secondary malignant neoplasm of right lung     Thyroid disease     lymph nodes malignant      Past Surgical History:   Procedure Laterality Date    BLADDER SURGERY      CHOLECYSTECTOMY      COLONOSCOPY  2018    ENDOBRONCHIAL ULTRASOUND N/A 11/16/2022    Procedure: ENDOBRONCHIAL ULTRASOUND (EBUS);  Surgeon: Anthony Hutson MD;  Location: [facility]CH[facility];  Service: Pulmonary;  Laterality: N/A;    HYSTERECTOMY      KNEE SURGERY Bilateral     replaced knees    PARTIAL HIP ARTHROPLASTY Bilateral      Social History     Socioeconomic History    Marital status:    Tobacco Use    Smoking status:  Every Day     Packs/day: 0.25     Types: Cigarettes    Smokeless tobacco: Never   Substance and Sexual Activity    Alcohol use: Not Currently    Drug use: Never      Family History   Problem Relation Age of Onset    Lung cancer Mother     Lung cancer Brother       Review of patient's allergies indicates:  No Known Allergies   Review of Systems   Constitutional:  Negative for chills, diaphoresis, fatigue, fever and unexpected weight change.   HENT:  Negative for nasal congestion, mouth sores, sinus pressure/congestion and sore throat.    Eyes:  Negative for pain and visual disturbance.   Respiratory:  Negative for cough, chest tightness and shortness of breath.    Cardiovascular:  Negative for chest pain, palpitations and leg swelling.   Gastrointestinal:  Negative for abdominal distention, abdominal pain, blood in stool, constipation and diarrhea.   Genitourinary:  Negative for dysuria, frequency and hematuria.   Musculoskeletal:  Negative for arthralgias and back pain.   Integumentary:  Negative for rash.   Neurological:  Negative for dizziness, weakness, numbness and headaches.   Hematological:  Negative for adenopathy.   Psychiatric/Behavioral:  Negative for confusion.        Objective:      Physical Exam  Vitals reviewed.   Constitutional:       General: She is not in acute distress.     Appearance: Normal appearance.   HENT:      Head: Normocephalic and atraumatic.      Nose: Nose normal.      Mouth/Throat:      Mouth: Mucous membranes are moist.   Eyes:      Extraocular Movements: Extraocular movements intact.      Conjunctiva/sclera: Conjunctivae normal.   Neck:      Comments: Radiation changes to the left supraclavicular area  Cardiovascular:      Rate and Rhythm: Normal rate and regular rhythm.      Pulses: Normal pulses.      Heart sounds: Normal heart sounds.   Pulmonary:      Effort: Pulmonary effort is normal.      Breath sounds: Examination of the right-lower field reveals decreased breath sounds.  Examination of the left-lower field reveals decreased breath sounds. Decreased breath sounds and wheezing present.   Abdominal:      General: Bowel sounds are normal.      Palpations: Abdomen is soft.   Musculoskeletal:         General: No swelling. Normal range of motion.      Cervical back: Normal range of motion and neck supple.      Right lower leg: No edema.      Left lower leg: No edema.   Lymphadenopathy:      Cervical: No cervical adenopathy.      Upper Body:      Right upper body: No axillary adenopathy.      Left upper body: No axillary adenopathy.   Skin:     General: Skin is warm and dry.   Neurological:      General: No focal deficit present.      Mental Status: She is alert and oriented to person, place, and time. Mental status is at baseline.   Psychiatric:         Mood and Affect: Mood normal.         Behavior: Behavior normal.       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 ? metastatic findings could not be biopsied.    TTF negative/CK 7 +.    Diagnosed June 2014.  EGFR mutation negative/ALK gene rearrangement negative.  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 Patient has reportedly been referred to ENT for resection and lymph node dissection (per patient report). Subsequent open biopsy done by Dr. Rosales on 7/29/2019 confirmed the same.  Patient was unable to undergo definitive surgical resection for oligo metastatic disease.  Treatment induced anemia  Hoarseness .      Plan:          Patient's biopsy did return as recurrent  non-small cell lung cancer in the left lung.  I feel that the right lung will be similar pathology.  She completed radiation with Dr. Mortensen on 12/24/2020.     Caris did reveal that PD-L1 was 2% positive suggesting benefit from pembrolizumab or nivolumab/ipilimumab.      Her PET/CT scan does show a new hypermetabolic subcentimeter subcarinal lymph node and increase in a left apical consolidation.  The left apical consolidation is close to the aorta and I do not think would be amenable to biopsy, however this subcarinal lymph node may be amenable to bronchoscopy.      Bronchoscopy done on 11/16/2022 showed metastatic adenocarcinoma.  She is no longer a candidate for full dose SBRT.  We started with carboplatin, pemetrexed, nivolumab, and ipilimumab based on the CHECKMATE 9LA study.  Cycle 1 day 1 given 12/20/2022.     Follow up with radiation oncology as scheduled, Dr. Mortensen.    Continue levothyroxine to 88mcg daily.     We will go ahead and continue with immunotherapy.  It is okay for the patient to have surgery with orthopedics while on immunotherapy.    We will get a swallow study and due to inflammatory changes in the lung, treat with 10 days of levofloxacin.    We will repeat a PET/CT scan in 3 months.    I will also call in a PPI.    Return to clinic prior to cycle 6, same day labs, repeat PET prior    Enrrique Gudino II, MD

## 2023-07-27 ENCOUNTER — OFFICE VISIT (OUTPATIENT)
Dept: HEMATOLOGY/ONCOLOGY | Facility: CLINIC | Age: 78
End: 2023-07-27
Payer: MEDICARE

## 2023-07-27 VITALS
SYSTOLIC BLOOD PRESSURE: 136 MMHG | TEMPERATURE: 97 F | DIASTOLIC BLOOD PRESSURE: 69 MMHG | WEIGHT: 158.75 LBS | BODY MASS INDEX: 27.1 KG/M2 | HEIGHT: 64 IN | RESPIRATION RATE: 14 BRPM | OXYGEN SATURATION: 93 % | HEART RATE: 61 BPM

## 2023-07-27 DIAGNOSIS — C79.51 MALIGNANT NEOPLASM METASTATIC TO BONE: ICD-10-CM

## 2023-07-27 DIAGNOSIS — K21.9 GASTROESOPHAGEAL REFLUX DISEASE, UNSPECIFIED WHETHER ESOPHAGITIS PRESENT: ICD-10-CM

## 2023-07-27 DIAGNOSIS — R13.10 DYSPHAGIA, UNSPECIFIED TYPE: ICD-10-CM

## 2023-07-27 DIAGNOSIS — R53.83 FATIGUE, UNSPECIFIED TYPE: ICD-10-CM

## 2023-07-27 DIAGNOSIS — C34.90 ADENOCARCINOMA OF LUNG, STAGE 4, UNSPECIFIED LATERALITY: Primary | ICD-10-CM

## 2023-07-27 DIAGNOSIS — C77.9 MALIGNANT NEOPLASM METASTATIC TO LYMPH NODES, UNSPECIFIED LYMPH NODE REGION: ICD-10-CM

## 2023-07-27 PROCEDURE — 99214 OFFICE O/P EST MOD 30 MIN: CPT | Mod: PBBFAC | Performed by: INTERNAL MEDICINE

## 2023-07-27 PROCEDURE — 99215 PR OFFICE/OUTPT VISIT, EST, LEVL V, 40-54 MIN: ICD-10-PCS | Mod: S$PBB,,, | Performed by: INTERNAL MEDICINE

## 2023-07-27 PROCEDURE — 99999 PR PBB SHADOW E&M-EST. PATIENT-LVL IV: ICD-10-PCS | Mod: PBBFAC,,, | Performed by: INTERNAL MEDICINE

## 2023-07-27 PROCEDURE — 99999 PR PBB SHADOW E&M-EST. PATIENT-LVL IV: CPT | Mod: PBBFAC,,, | Performed by: INTERNAL MEDICINE

## 2023-07-27 PROCEDURE — 99215 OFFICE O/P EST HI 40 MIN: CPT | Mod: S$PBB,,, | Performed by: INTERNAL MEDICINE

## 2023-07-27 RX ORDER — PREDNISONE 10 MG/1
10 TABLET ORAL 2 TIMES DAILY
COMMUNITY
Start: 2023-06-23 | End: 2024-03-09 | Stop reason: CLARIF

## 2023-07-27 RX ORDER — HEPARIN 100 UNIT/ML
500 SYRINGE INTRAVENOUS
Status: CANCELLED | OUTPATIENT
Start: 2023-08-18

## 2023-07-27 RX ORDER — EPINEPHRINE 0.3 MG/.3ML
0.3 INJECTION SUBCUTANEOUS ONCE AS NEEDED
Status: CANCELLED | OUTPATIENT
Start: 2023-07-28

## 2023-07-27 RX ORDER — SODIUM CHLORIDE 0.9 % (FLUSH) 0.9 %
10 SYRINGE (ML) INJECTION
Status: CANCELLED | OUTPATIENT
Start: 2023-07-28

## 2023-07-27 RX ORDER — DIPHENHYDRAMINE HYDROCHLORIDE 50 MG/ML
50 INJECTION INTRAMUSCULAR; INTRAVENOUS ONCE AS NEEDED
Status: CANCELLED | OUTPATIENT
Start: 2023-08-18

## 2023-07-27 RX ORDER — PANTOPRAZOLE SODIUM 40 MG/1
40 TABLET, DELAYED RELEASE ORAL DAILY
Qty: 30 TABLET | Refills: 1 | Status: SHIPPED | OUTPATIENT
Start: 2023-07-27 | End: 2023-09-25

## 2023-07-27 RX ORDER — LEVOFLOXACIN 750 MG/1
750 TABLET ORAL DAILY
Qty: 10 TABLET | Refills: 0 | Status: SHIPPED | OUTPATIENT
Start: 2023-07-27 | End: 2023-08-06

## 2023-07-27 RX ORDER — EPINEPHRINE 0.3 MG/.3ML
0.3 INJECTION SUBCUTANEOUS ONCE AS NEEDED
Status: CANCELLED | OUTPATIENT
Start: 2023-08-18

## 2023-07-27 RX ORDER — SODIUM CHLORIDE 0.9 % (FLUSH) 0.9 %
10 SYRINGE (ML) INJECTION
Status: CANCELLED | OUTPATIENT
Start: 2023-08-18

## 2023-07-27 RX ORDER — DIPHENHYDRAMINE HYDROCHLORIDE 50 MG/ML
50 INJECTION INTRAMUSCULAR; INTRAVENOUS ONCE AS NEEDED
Status: CANCELLED | OUTPATIENT
Start: 2023-07-28

## 2023-07-27 RX ORDER — HEPARIN 100 UNIT/ML
500 SYRINGE INTRAVENOUS
Status: CANCELLED | OUTPATIENT
Start: 2023-07-28

## 2023-07-28 ENCOUNTER — INFUSION (OUTPATIENT)
Dept: INFUSION THERAPY | Facility: HOSPITAL | Age: 78
End: 2023-07-28
Attending: NURSE PRACTITIONER
Payer: MEDICARE

## 2023-07-28 VITALS
DIASTOLIC BLOOD PRESSURE: 75 MMHG | RESPIRATION RATE: 16 BRPM | BODY MASS INDEX: 27.35 KG/M2 | HEIGHT: 64 IN | HEART RATE: 70 BPM | WEIGHT: 160.19 LBS | OXYGEN SATURATION: 95 % | TEMPERATURE: 98 F | SYSTOLIC BLOOD PRESSURE: 150 MMHG

## 2023-07-28 DIAGNOSIS — C34.90 ADENOCARCINOMA OF LUNG, STAGE 4, UNSPECIFIED LATERALITY: Primary | ICD-10-CM

## 2023-07-28 PROCEDURE — 25000003 PHARM REV CODE 250: Performed by: INTERNAL MEDICINE

## 2023-07-28 PROCEDURE — A4216 STERILE WATER/SALINE, 10 ML: HCPCS | Performed by: INTERNAL MEDICINE

## 2023-07-28 PROCEDURE — 96417 CHEMO IV INFUS EACH ADDL SEQ: CPT

## 2023-07-28 PROCEDURE — 63600175 PHARM REV CODE 636 W HCPCS: Mod: JZ,JG | Performed by: INTERNAL MEDICINE

## 2023-07-28 PROCEDURE — 96413 CHEMO IV INFUSION 1 HR: CPT

## 2023-07-28 RX ORDER — EPINEPHRINE 0.3 MG/.3ML
0.3 INJECTION SUBCUTANEOUS ONCE AS NEEDED
Status: DISCONTINUED | OUTPATIENT
Start: 2023-07-28 | End: 2023-07-28 | Stop reason: HOSPADM

## 2023-07-28 RX ORDER — SODIUM CHLORIDE 0.9 % (FLUSH) 0.9 %
10 SYRINGE (ML) INJECTION
Status: DISCONTINUED | OUTPATIENT
Start: 2023-07-28 | End: 2023-07-28 | Stop reason: HOSPADM

## 2023-07-28 RX ORDER — HEPARIN 100 UNIT/ML
500 SYRINGE INTRAVENOUS
Status: DISCONTINUED | OUTPATIENT
Start: 2023-07-28 | End: 2023-07-28 | Stop reason: HOSPADM

## 2023-07-28 RX ORDER — DIPHENHYDRAMINE HYDROCHLORIDE 50 MG/ML
50 INJECTION INTRAMUSCULAR; INTRAVENOUS ONCE AS NEEDED
Status: DISCONTINUED | OUTPATIENT
Start: 2023-07-28 | End: 2023-07-28 | Stop reason: HOSPADM

## 2023-07-28 RX ADMIN — SODIUM CHLORIDE: 9 INJECTION, SOLUTION INTRAVENOUS at 09:07

## 2023-07-28 RX ADMIN — SODIUM CHLORIDE 360 MG: 9 INJECTION, SOLUTION INTRAVENOUS at 09:07

## 2023-07-28 RX ADMIN — Medication 10 ML: at 10:07

## 2023-07-28 RX ADMIN — HEPARIN 500 UNITS: 100 SYRINGE at 10:07

## 2023-07-28 RX ADMIN — SODIUM CHLORIDE 75.5 MG: 9 INJECTION, SOLUTION INTRAVENOUS at 10:07

## 2023-07-28 NOTE — NURSING
Pt tolerated treatment with no complaints. Sellers needle removed from mediport. Site without signs and symptoms of complications. Dressing applied.

## 2023-08-02 ENCOUNTER — CLINICAL SUPPORT (OUTPATIENT)
Dept: REHABILITATION | Facility: HOSPITAL | Age: 78
End: 2023-08-02
Attending: INTERNAL MEDICINE
Payer: MEDICARE

## 2023-08-02 ENCOUNTER — HOSPITAL ENCOUNTER (OUTPATIENT)
Dept: RADIOLOGY | Facility: HOSPITAL | Age: 78
Discharge: HOME | End: 2023-08-02
Attending: INTERNAL MEDICINE
Payer: MEDICARE

## 2023-08-02 DIAGNOSIS — K21.9 GASTROESOPHAGEAL REFLUX DISEASE, UNSPECIFIED WHETHER ESOPHAGITIS PRESENT: ICD-10-CM

## 2023-08-02 DIAGNOSIS — R13.10 DYSPHAGIA, UNSPECIFIED TYPE: ICD-10-CM

## 2023-08-02 DIAGNOSIS — R53.83 FATIGUE, UNSPECIFIED TYPE: ICD-10-CM

## 2023-08-02 DIAGNOSIS — C34.90 ADENOCARCINOMA OF LUNG, STAGE 4, UNSPECIFIED LATERALITY: ICD-10-CM

## 2023-08-02 DIAGNOSIS — C77.9 MALIGNANT NEOPLASM METASTATIC TO LYMPH NODES, UNSPECIFIED LYMPH NODE REGION: ICD-10-CM

## 2023-08-02 DIAGNOSIS — C79.51 MALIGNANT NEOPLASM METASTATIC TO BONE: ICD-10-CM

## 2023-08-02 PROCEDURE — 92611 MOTION FLUOROSCOPY/SWALLOW: CPT

## 2023-08-02 PROCEDURE — 74230 X-RAY XM SWLNG FUNCJ C+: CPT | Mod: TC

## 2023-08-02 NOTE — PROGRESS NOTES
"Speech Language Pathology Department  Modified Barium Swallow (MBS) Study    Patient Name:  Pratibha Patel   MRN:  [id number]    Recommendations:     General recommendations:  dysphagia therapy and MD may consider GI consult to rule out esophageal dysphagia (retrograde movement of swallowed material visualized to pharynx as well as esophageal residue noted).  Repeat MBS study: as needed  Diet recommendations:  pureed solids, thin liquids  Medications: crushed in puree  Swallow strategies/precautions: small bites/sips, alternate solids/liquids, NO straws, and upright for PO intake  General precautions: Standard,      History/Reason for Referral:     Pratibha Patel is a/n 78 y.o. female presents for MBS due to complaints of difficulty swallowing.  Pt endorses coughing with PO intake, unable to swallow any type of meat without difficulty, and "coughing up most things after I try to eat."    Pt with lung cancer, mets to bone and lymph nodes.  Per chart review, pt met with Dr. Rosales ENT in July 2021 for resection and lymph node dissection but pt reports procedure was unable to be completed due to location of lymph node.  Pt has received both chemo and radiation treatments.    Past Medical History:   Diagnosis Date    Adenocarcinoma of lung     Anemia     Anxiety     Arthritis     COPD (chronic obstructive pulmonary disease)     Depression     HLD (hyperlipidemia)     Hypertension     Lung cancer     Secondary malignant neoplasm of lymph nodes     Secondary malignant neoplasm of right lung     Thyroid disease     lymph nodes malignant     Past Surgical History:   Procedure Laterality Date    BLADDER SURGERY      CHOLECYSTECTOMY      COLONOSCOPY  2018    ENDOBRONCHIAL ULTRASOUND N/A 11/16/2022    Procedure: ENDOBRONCHIAL ULTRASOUND (EBUS);  Surgeon: Anthony Hutson MD;  Location: [facility]CH[facility]COPY [facility];  Service: Pulmonary;  Laterality: N/A;    HYSTERECTOMY      KNEE SURGERY Bilateral     replaced knees    PARTIAL HIP " ARTHROPLASTY Bilateral      A MBS Study was completed to assess the efficiency of her swallow function, rule out aspiration and make recommendations regarding safe dietary consistencies, effective compensatory strategies, and safe eating environment.     Home Diet: Soft & Bite-sized and thin liquids  Current Method of Nutrition: PO diet      Patient complaint: difficulty with PO intake    Imaging   No results found for this or any previous visit.    Results for orders placed during the hospital encounter of 01/27/23    CT Chest With Contrast    Narrative  EXAMINATION  CT CHEST WITH CONTRAST    CLINICAL HISTORY  C34.01; Malignant neoplasm of upper lobe, unspecified bronchus or lung    TECHNIQUE  Post-contrast helical-acquisition CT images were obtained and multiplanar reformats accomplished by a CT technologist at a separate workstation, pushed to PACS for physician review.    CONTRAST  IV: ISOVUE-370, 100 mL    COMPARISON  8 November 2022 PET-CT    FINDINGS  Images were reviewed in lung, soft tissue, and bone windows.    Exam quality: adequate for evaluation    Lines/tubes: Similar appearance of right chest wall subcutaneous port and associated catheter.    Cardiovascular: Stable heart chamber size. No pericardial effusion. Vasculature is without significant interval change.    Lungs/Pleura: The trachea and large central airways are widely patent, with a small amount of retained mucoid material noted at the right mainstem bronchus.  Additionally, there is luminal opacification of multiple basilar segmental/subsegmental left lower lobe bronchi with more pronounced reticulonodular densities and pleural-based irregular medial consolidation.  Small spiculated nodule at the subpleural posterior left lower lobe is again noted, measuring approximately 9 mm x 8 mm (series 3, image 29); previously 10 mm x 9 mm.  The slightly superior and lateral left lower lobe nodular density with irregular margins and internal cavitation  is also again appreciated, of grossly unchanged size and overall appearance (series 3, image 23); the area currently measures approximately 18 mm x 10 mm versus 17 mm x 10 mm by my remeasurement of the prior study.  No definite new aggressive appearing focal lung lesion is identified.  There is no evidence of lobar airspace consolidation.  No pleural thickening, significant fluid, or evidence of pneumothorax.    Lymph Nodes: Scattered mediastinal lymph nodes are of similar size and distribution.  For example, 9 mm short axis node is present at the right lower paratracheal level (series 2, image 16), previously 8 mm.  Representative subcarinal node measures 10 mm short axis (series 2, image 23), previously 10 mm.  No definite newly enlarged or necrotic adenopathy is identified.    Other Findings: No significant interval changes of the heterogeneous, multinodular thyroid.  No suspicious findings of the esophagus, and no acute process or new focal abnormality within the visualized upper abdomen. Superficial soft tissues, regional musculature, and included osseous structures are without acute or new focal finding.    IMPRESSION  1. No definite findings to suggest interval disease progression.  2. Grossly similar size and overall appearance of scattered right lung nodules and mediastinal nonenlarged lymph nodes, when allowing for differences in imaging protocol and slice selection.  3. Persistent distal left lower lobe airway opacification with increased reticulonodular densities and subpleural irregular consolidation, favored to represent sequela of aspiration or other infectious/inflammatory etiology.  Close attention on follow-up imaging is needed in order to ensure ongoing stability versus resolution.  4. Additional secondary details discussed above are grossly stable to the 8 November 2022 PET-CT.    RADIATION DOSE  Automated tube current modulation, weight-based exposure dosing, and/or iterative reconstruction  technique utilized to reach lowest reasonably achievable exposure rate.    DLP: 469 mGy*cm      Electronically signed by: Brian Monserrat  Date:    01/30/2023  Time:    14:18    No results found for this or any previous visit.    Subjective:     Patient awake, alert, calm, and cooperative.    Spiritual/Cultural/Sabianism Beliefs/Practices that affect care:  none  Pain/Comfort:  none    Respiratory Status: room air  Restraints/positioning devices: none    Radiologist: Juan Taylor MD    Fluoroscopic Results:     Oral Musculature  Dentition: upper dentures and lower dentures  Secretion Management: adequate  Mucosal Quality: good  Facial Movement: WFL  Buccal Strength & Mobility: WFL  Mandibular Strength & Mobility: WFL  Oral Labial Strength & Mobility: WFL  Lingual Strength & Mobility: WFL  Vocal Quality: hoarse      Positioning:  upright in chair  Visualization  Patient was seen in the lateral view    Oral Phase:   Disorganized lingual movement  Decreased rotary chew  Prolonged mastication  Loss of bolus control with prespill to the pyriform sinuses    Pharyngeal Phase:   Swallow delay with spill to the pyriform sinuses  Reduced base of tongue retraction  Reduced epiglottic deflection  Reduced hyolaryngeal excursion  Reduced airway protection  Laryngeal penetration with thin liquids via cup (shallow, cleared from the laryngeal vestibule), thin liquids via straw (did NOT clear from the laryngeal vestibule), and chewable solids (did NOT clear from the laryngeal vestibule).   Consistency Laryngeal Penetration Aspiration Residue   Mildly thick liquid by spoon None None Mild  Valleculae, Pyriform sinus, and Posterior pharyngeal wall  Reduced with additional swallow   Puree None None Moderate  Base of tongue, Valleculae, and Pyriform sinus  Reduced with additional swallow and liquid wash   Mildly thick liquid by cup None None Mild  Valleculae, Pyriform sinus, and Posterior pharyngeal wall  Reduced with additional swallow    Thin liquid by cup During the swallow  Shallow, cleared None Mild  Valleculae, Pyriform sinus, and Posterior pharyngeal wall  Reduced with additional swallow   Chewable solid Before the swallow  Did NOT clear None Moderate  Base of tongue, Valleculae, Pyriform sinus, and Posterior pharyngeal wall  Reduced with additional swallow   Thin liquid by straw Before the swallow  During the swallow  Did NOT clear None Mild  Base of tongue, Pyriform sinus, and Posterior pharyngeal wall  Reduced with additional swallow       Cervical Esophageal Phase:   residual material visualized  retrograde movement of the bolus to the pharynx visualized  Pt endorses globus sensation and demonstrating throat clearing due to esophageal residue      Assessment:     Pt exhibited moderate-severe oropharyngeal dysphagia characterized by the findings noted above.  Laryngeal penetration of thin liquids via straw and chewable solids did NOT clear from the laryngeal vestibule.  Laryngeal penetration of thin liquids was shallow and cleared from the laryngeal vestibule.  Both swallow safety and efficiency are impaired.     Patient appears to be at moderate risk for aspiration related pneumonia when considering oral health status, reduced overall health/immune status, reduced laryngeal vestibule closure, and severity of dysphagia.  Prognosis for behavioral swallow rehabilitation is good.    Goals:     Patient Education:     Patient and spouse provided with verbal education regarding MBS results and safe swallow strategies of pureed solid recommendations, small bites/sips, NO straws, alternating bites/sips, and crushing medications in puree as appropriate.  Understanding was verbalized and teachback performed by patient.  Pt/spouse unable to read, however printed education provided for family members whom assist with patient's care and are not present at this time for appointment.    Plan:     Skilled SLP services warranted to tx oropharyngeal  dysphagia.    MD may consider GI consult to rule out esophageal dysphagia (retrograde movement of swallowed material visualized to pharynx as well as esophageal residue noted).  Pt endorses globus sensation and demonstrating throat clearing due to esophageal residue    Time Tracking:     SLP Treatment Date:    8/2/2023  Speech Start Time:   0915  Speech Stop Time:     0945  Speech Total Time (min):   30 min    Billable minutes:   Motion Fluoroscopic Evaluation, Video Recording, 30 minutes     08/02/2023

## 2023-08-18 ENCOUNTER — INFUSION (OUTPATIENT)
Dept: INFUSION THERAPY | Facility: HOSPITAL | Age: 78
End: 2023-08-18
Attending: NURSE PRACTITIONER
Payer: MEDICARE

## 2023-08-18 VITALS
BODY MASS INDEX: 27.11 KG/M2 | HEART RATE: 56 BPM | TEMPERATURE: 98 F | HEIGHT: 64 IN | DIASTOLIC BLOOD PRESSURE: 49 MMHG | OXYGEN SATURATION: 98 % | WEIGHT: 158.81 LBS | RESPIRATION RATE: 16 BRPM | SYSTOLIC BLOOD PRESSURE: 161 MMHG

## 2023-08-18 DIAGNOSIS — C79.51 MALIGNANT NEOPLASM METASTATIC TO BONE: ICD-10-CM

## 2023-08-18 DIAGNOSIS — C77.9 MALIGNANT NEOPLASM METASTATIC TO LYMPH NODES, UNSPECIFIED LYMPH NODE REGION: ICD-10-CM

## 2023-08-18 DIAGNOSIS — C34.90 ADENOCARCINOMA OF LUNG, STAGE 4, UNSPECIFIED LATERALITY: Primary | ICD-10-CM

## 2023-08-18 DIAGNOSIS — R53.83 FATIGUE, UNSPECIFIED TYPE: ICD-10-CM

## 2023-08-18 LAB
ALBUMIN SERPL-MCNC: 3.3 G/DL (ref 3.4–4.8)
ALBUMIN/GLOB SERPL: 1.2 RATIO (ref 1.1–2)
ALP SERPL-CCNC: 79 UNIT/L (ref 40–150)
ALT SERPL-CCNC: 7 UNIT/L (ref 0–55)
AST SERPL-CCNC: 15 UNIT/L (ref 5–34)
BASOPHILS # BLD AUTO: 0.03 X10(3)/MCL
BASOPHILS NFR BLD AUTO: 0.4 %
BILIRUB SERPL-MCNC: 0.4 MG/DL
BUN SERPL-MCNC: 11.8 MG/DL (ref 9.8–20.1)
CALCIUM SERPL-MCNC: 9.1 MG/DL (ref 8.4–10.2)
CHLORIDE SERPL-SCNC: 99 MMOL/L (ref 98–107)
CO2 SERPL-SCNC: 29 MMOL/L (ref 23–31)
CREAT SERPL-MCNC: 0.83 MG/DL (ref 0.55–1.02)
EOSINOPHIL # BLD AUTO: 0.31 X10(3)/MCL (ref 0–0.9)
EOSINOPHIL NFR BLD AUTO: 4.1 %
ERYTHROCYTE [DISTWIDTH] IN BLOOD BY AUTOMATED COUNT: 16.6 % (ref 11.5–17)
GFR SERPLBLD CREATININE-BSD FMLA CKD-EPI: >60 MLS/MIN/1.73/M2
GLOBULIN SER-MCNC: 2.7 GM/DL (ref 2.4–3.5)
GLUCOSE SERPL-MCNC: 125 MG/DL (ref 82–115)
HCT VFR BLD AUTO: 36.3 % (ref 37–47)
HGB BLD-MCNC: 11.4 G/DL (ref 12–16)
IMM GRANULOCYTES # BLD AUTO: 0.01 X10(3)/MCL (ref 0–0.04)
IMM GRANULOCYTES NFR BLD AUTO: 0.1 %
LYMPHOCYTES # BLD AUTO: 1.8 X10(3)/MCL (ref 0.6–4.6)
LYMPHOCYTES NFR BLD AUTO: 23.7 %
MCH RBC QN AUTO: 28.4 PG (ref 27–31)
MCHC RBC AUTO-ENTMCNC: 31.4 G/DL (ref 33–36)
MCV RBC AUTO: 90.3 FL (ref 80–94)
MONOCYTES # BLD AUTO: 0.82 X10(3)/MCL (ref 0.1–1.3)
MONOCYTES NFR BLD AUTO: 10.8 %
NEUTROPHILS # BLD AUTO: 4.64 X10(3)/MCL (ref 2.1–9.2)
NEUTROPHILS NFR BLD AUTO: 60.9 %
PLATELET # BLD AUTO: 228 X10(3)/MCL (ref 130–400)
PMV BLD AUTO: 8.6 FL (ref 7.4–10.4)
POTASSIUM SERPL-SCNC: 4 MMOL/L (ref 3.5–5.1)
PROT SERPL-MCNC: 6 GM/DL (ref 5.8–7.6)
RBC # BLD AUTO: 4.02 X10(6)/MCL (ref 4.2–5.4)
SODIUM SERPL-SCNC: 136 MMOL/L (ref 136–145)
TSH SERPL-ACNC: 2.29 UIU/ML (ref 0.35–4.94)
WBC # SPEC AUTO: 7.61 X10(3)/MCL (ref 4.5–11.5)

## 2023-08-18 PROCEDURE — 63600175 PHARM REV CODE 636 W HCPCS: Performed by: INTERNAL MEDICINE

## 2023-08-18 PROCEDURE — 36415 COLL VENOUS BLD VENIPUNCTURE: CPT

## 2023-08-18 PROCEDURE — A4216 STERILE WATER/SALINE, 10 ML: HCPCS | Performed by: INTERNAL MEDICINE

## 2023-08-18 PROCEDURE — 96413 CHEMO IV INFUSION 1 HR: CPT

## 2023-08-18 PROCEDURE — 85025 COMPLETE CBC W/AUTO DIFF WBC: CPT

## 2023-08-18 PROCEDURE — 80053 COMPREHEN METABOLIC PANEL: CPT

## 2023-08-18 PROCEDURE — 82533 TOTAL CORTISOL: CPT

## 2023-08-18 PROCEDURE — 25000003 PHARM REV CODE 250: Performed by: INTERNAL MEDICINE

## 2023-08-18 PROCEDURE — 84443 ASSAY THYROID STIM HORMONE: CPT

## 2023-08-18 RX ORDER — HEPARIN 100 UNIT/ML
500 SYRINGE INTRAVENOUS
Status: DISCONTINUED | OUTPATIENT
Start: 2023-08-18 | End: 2023-08-18 | Stop reason: HOSPADM

## 2023-08-18 RX ORDER — SODIUM CHLORIDE 0.9 % (FLUSH) 0.9 %
10 SYRINGE (ML) INJECTION
Status: DISCONTINUED | OUTPATIENT
Start: 2023-08-18 | End: 2023-08-18 | Stop reason: HOSPADM

## 2023-08-18 RX ADMIN — SODIUM CHLORIDE: 9 INJECTION, SOLUTION INTRAVENOUS at 09:08

## 2023-08-18 RX ADMIN — SODIUM CHLORIDE, PRESERVATIVE FREE 10 ML: 5 INJECTION INTRAVENOUS at 10:08

## 2023-08-18 RX ADMIN — SODIUM CHLORIDE 360 MG: 9 INJECTION, SOLUTION INTRAVENOUS at 09:08

## 2023-08-18 RX ADMIN — Medication 500 UNITS: at 10:08

## 2023-08-22 DIAGNOSIS — M79.89 LEG SWELLING: ICD-10-CM

## 2023-08-22 DIAGNOSIS — R60.1 GENERALIZED EDEMA: ICD-10-CM

## 2023-08-22 DIAGNOSIS — C34.90 ADENOCARCINOMA OF LUNG, UNSPECIFIED LATERALITY: ICD-10-CM

## 2023-08-22 DIAGNOSIS — C79.51 MALIGNANT NEOPLASM METASTATIC TO BONE: ICD-10-CM

## 2023-08-22 DIAGNOSIS — Z51.12 ENCOUNTER FOR ANTINEOPLASTIC IMMUNOTHERAPY: ICD-10-CM

## 2023-08-22 DIAGNOSIS — E03.2 HYPOTHYROIDISM DUE TO DRUGS: ICD-10-CM

## 2023-08-22 RX ORDER — LEVOTHYROXINE SODIUM 88 UG/1
88 TABLET ORAL EVERY MORNING
Qty: 30 TABLET | Refills: 2 | Status: SHIPPED | OUTPATIENT
Start: 2023-08-22

## 2023-09-07 ENCOUNTER — OFFICE VISIT (OUTPATIENT)
Dept: HEMATOLOGY/ONCOLOGY | Facility: CLINIC | Age: 78
End: 2023-09-07
Payer: MEDICARE

## 2023-09-07 VITALS
TEMPERATURE: 97 F | OXYGEN SATURATION: 94 % | DIASTOLIC BLOOD PRESSURE: 68 MMHG | BODY MASS INDEX: 27.11 KG/M2 | SYSTOLIC BLOOD PRESSURE: 148 MMHG | RESPIRATION RATE: 15 BRPM | HEIGHT: 64 IN | WEIGHT: 158.81 LBS | HEART RATE: 59 BPM

## 2023-09-07 DIAGNOSIS — C34.90 ADENOCARCINOMA OF LUNG, STAGE 4, UNSPECIFIED LATERALITY: Primary | ICD-10-CM

## 2023-09-07 DIAGNOSIS — C77.9 MALIGNANT NEOPLASM METASTATIC TO LYMPH NODES, UNSPECIFIED LYMPH NODE REGION: ICD-10-CM

## 2023-09-07 DIAGNOSIS — C79.51 MALIGNANT NEOPLASM METASTATIC TO BONE: ICD-10-CM

## 2023-09-07 DIAGNOSIS — E03.2 DRUG-INDUCED HYPOTHYROIDISM: ICD-10-CM

## 2023-09-07 PROCEDURE — 99215 PR OFFICE/OUTPT VISIT, EST, LEVL V, 40-54 MIN: ICD-10-PCS | Mod: S$PBB,,, | Performed by: NURSE PRACTITIONER

## 2023-09-07 PROCEDURE — 99214 OFFICE O/P EST MOD 30 MIN: CPT | Mod: PBBFAC | Performed by: NURSE PRACTITIONER

## 2023-09-07 PROCEDURE — 99215 OFFICE O/P EST HI 40 MIN: CPT | Mod: S$PBB,,, | Performed by: NURSE PRACTITIONER

## 2023-09-07 PROCEDURE — 99999 PR PBB SHADOW E&M-EST. PATIENT-LVL IV: CPT | Mod: PBBFAC,,, | Performed by: NURSE PRACTITIONER

## 2023-09-07 PROCEDURE — 99999 PR PBB SHADOW E&M-EST. PATIENT-LVL IV: ICD-10-PCS | Mod: PBBFAC,,, | Performed by: NURSE PRACTITIONER

## 2023-09-07 RX ORDER — DIPHENHYDRAMINE HYDROCHLORIDE 50 MG/ML
50 INJECTION INTRAMUSCULAR; INTRAVENOUS ONCE AS NEEDED
Status: CANCELLED | OUTPATIENT
Start: 2023-09-29

## 2023-09-07 RX ORDER — HEPARIN 100 UNIT/ML
500 SYRINGE INTRAVENOUS
Status: CANCELLED | OUTPATIENT
Start: 2023-09-29

## 2023-09-07 RX ORDER — DIPHENHYDRAMINE HYDROCHLORIDE 50 MG/ML
50 INJECTION INTRAMUSCULAR; INTRAVENOUS ONCE AS NEEDED
Status: CANCELLED | OUTPATIENT
Start: 2023-09-08

## 2023-09-07 RX ORDER — EPINEPHRINE 0.3 MG/.3ML
0.3 INJECTION SUBCUTANEOUS ONCE AS NEEDED
Status: CANCELLED | OUTPATIENT
Start: 2023-09-29

## 2023-09-07 RX ORDER — HEPARIN 100 UNIT/ML
500 SYRINGE INTRAVENOUS
Status: CANCELLED | OUTPATIENT
Start: 2023-09-08

## 2023-09-07 RX ORDER — SODIUM CHLORIDE 0.9 % (FLUSH) 0.9 %
10 SYRINGE (ML) INJECTION
Status: CANCELLED | OUTPATIENT
Start: 2023-09-08

## 2023-09-07 RX ORDER — EPINEPHRINE 0.3 MG/.3ML
0.3 INJECTION SUBCUTANEOUS ONCE AS NEEDED
Status: CANCELLED | OUTPATIENT
Start: 2023-09-08

## 2023-09-07 RX ORDER — SODIUM CHLORIDE 0.9 % (FLUSH) 0.9 %
10 SYRINGE (ML) INJECTION
Status: CANCELLED | OUTPATIENT
Start: 2023-09-29

## 2023-09-07 NOTE — PROGRESS NOTES
Subjective:       Patient ID: Pratibha Patel is a 78 y.o. female.    Chief Complaint: Follow-up (No concerns today)      PCP: Graham Nguyen NP  Cardiologist: Dr. Danny Sánchez  Referring Physician: Dr. Anthony Hutson  Endocrine: Dr. Werner  Radiation oncology: Dr. Boss in the past, now Dr. Mortensen.  ENT: Dr. 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 ? metastatic findings could not be biopsied. TTF negative/CK 7 +.    Diagnosed June 2014 EGFR mutation negative/ALK gene rearrangement negative. 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 Patient has reportedly been referred to ENT for resection and lymph node dissection (per patient report).   Subsequent open biopsy done by Dr. Rosales on 7/29/2019 confirmed the same.  Patient was unable to undergo definitive surgical resection for oligo metastatic disease.  Hoarseness and swallowing difficulties secondary to #2  Biopsy of the left lung lesion showed recurrent non-small cell lung cancer, Caris was sent and showed positivity for PD-L1, 2%.  No other targetable or actionable mutations present.    Current Treatment:   Combination chemo immunotherapy with carboplatin, pemetrexed, nivolumab, ipilimumab based off of checkmate [id number]     Treatment History:  RT with weekly Carbo/Taxol started 7/2/14--Completed August 2014  Single agent 'Maintenance' Avastin q 3 weeks.  Started 4/9/15--last given September 21, 2016  Radiation + weekly Carbo/Taxol at 2AUC  ----9/11/19-10/22/19  Patient underwent radiation from 12/17/2020 through 12/21/2020.  Radiation to the left and right lung per Dr. Robert Mortensen.    HPI:   Please see Oncology history in the diagnosis of adenocarcinoma of the lung, stage IV on the problem list for full detail.  Patient originally seen in 2014 with unintentional weight loss, shortness of breath, nonproductive cough.  Patient had imaging findings suggestive of lung cancer, bronchoscopy done in June of 2014 revealed moderately differentiated adenocarcinoma of the lung.  She originally underwent chemo radiation from July 2014 through August 2014.  She was placed on maintenance Avastin from April 2015 through September 2016. Patient then underwent a thyroid biopsy in June of 2019 that showed metastatic carcinoma favored to be adenocarcinoma lung primary.  She underwent surgical resection in July 2019, however full surgical dissection was not able to be done and the patient underwent open biopsy of right thyroid gland.  Pathology revealed poorly differentiated non-small cell carcinoma consistent with metastatic lung cancer.  NGS panel done at that time unremarkable.  She started chemoradiation with carbo Taxol in September 2019, completed in October 2019. Patient was doing well, imaging in October 2020 revealed an abnormality in the left lung, CT-guided lung biopsy on 11/03/2020 showed recurrent non-small cell lung cancer, Caris revealed PDL1 positivity.  She underwent SBRT from 12/17/2020 through 12/21/2020.  Patient then placed on surveillance imaging, most recently done on 07/05/2022 showing worsening disease in the right lung with a previous lesion that measured 0.6 cm and had an SUV of 1.0 now measuring 1.5 cm and an SUV of 8.4.  This was treated with SBRT in August of 2022.  PET/CT scan done on 11/8/2022 showed mild increased radiotracer activity within the left apical consolidation, SUV 5.5.  There was a newly hypermetabolic subcentimeter subcarinal  lymph node.  A 1 cm right lower lobe nodule was smaller, he right upper lobe nodule was slightly larger measuring 6 mm.  No sites of FDG avid metastatic disease in the neck, abdomen, or pelvis. Bronchoscopy done on 11/16/2022, pathology revealed malignant cells consistent with adenocarcinoma.  Planning on chemo immunotherapy with carboplatin, pemetrexed, nivolumab, ipilimumab based off of checkmate 9LA.  PET/CT on 07/25/2023 showed infectious or inflammatory changes in the right lower lobe with no other findings to suggest new or worsening disease.      Interval History:   Patient presents to clinic for scheduled treatment visit.  She is now on immunotherapy alone, completed the chemotherapy arm of the treatment protocol.  She continues to tolerate immunotherapy well. She feels well overall and denies and new complaints. Appetite and energy level are good.       Past Medical History:   Diagnosis Date    Adenocarcinoma of lung     Anemia     Anxiety     Arthritis     COPD (chronic obstructive pulmonary disease)     Depression     HLD (hyperlipidemia)     Hypertension     Lung cancer     Secondary malignant neoplasm of lymph nodes     Secondary malignant neoplasm of right lung     Thyroid disease     lymph nodes malignant      Past Surgical History:   Procedure Laterality Date    BLADDER SURGERY      CHOLECYSTECTOMY      COLONOSCOPY  2018    ENDOBRONCHIAL ULTRASOUND N/A 11/16/2022    Procedure: ENDOBRONCHIAL ULTRASOUND (EBUS);  Surgeon: Anthony Hutson MD;  Location: [facility];  Service: Pulmonary;  Laterality: N/A;    HYSTERECTOMY      KNEE SURGERY Bilateral     replaced knees    PARTIAL HIP ARTHROPLASTY Bilateral      Social History     Socioeconomic History    Marital status:    Tobacco Use    Smoking status: Every Day     Current packs/day: 0.25     Types: Cigarettes    Smokeless tobacco: Never   Substance and Sexual Activity    Alcohol use: Not Currently    Drug use: Never      Family History    Problem Relation Age of Onset    Lung cancer Mother     Lung cancer Brother       Review of patient's allergies indicates:  No Known Allergies   Review of Systems   Constitutional:  Negative for chills, diaphoresis, fatigue, fever and unexpected weight change.   HENT:  Negative for nasal congestion, mouth sores, sinus pressure/congestion and sore throat.    Eyes:  Negative for pain and visual disturbance.   Respiratory:  Negative for cough, chest tightness and shortness of breath.    Cardiovascular:  Negative for chest pain, palpitations and leg swelling.   Gastrointestinal:  Negative for abdominal distention, abdominal pain, blood in stool, constipation and diarrhea.   Genitourinary:  Negative for dysuria, frequency and hematuria.   Musculoskeletal:  Negative for arthralgias and back pain.   Integumentary:  Negative for rash.   Neurological:  Negative for dizziness, weakness, numbness and headaches.   Hematological:  Negative for adenopathy.   Psychiatric/Behavioral:  Negative for confusion.          Objective:      Physical Exam  Vitals reviewed.   Constitutional:       General: She is not in acute distress.     Appearance: Normal appearance.   HENT:      Head: Normocephalic and atraumatic.      Nose: Nose normal.      Mouth/Throat:      Mouth: Mucous membranes are moist.   Eyes:      Extraocular Movements: Extraocular movements intact.      Conjunctiva/sclera: Conjunctivae normal.   Neck:      Comments: Radiation changes to the left supraclavicular area  Cardiovascular:      Rate and Rhythm: Normal rate and regular rhythm.      Pulses: Normal pulses.      Heart sounds: Normal heart sounds.   Pulmonary:      Effort: Pulmonary effort is normal.      Breath sounds: Examination of the right-lower field reveals decreased breath sounds. Examination of the left-lower field reveals decreased breath sounds. Decreased breath sounds and wheezing present.   Abdominal:      General: Bowel sounds are normal.      Palpations:  Abdomen is soft.   Musculoskeletal:         General: No swelling. Normal range of motion.      Cervical back: Normal range of motion and neck supple.      Right lower leg: No edema.      Left lower leg: No edema.   Lymphadenopathy:      Cervical: No cervical adenopathy.      Upper Body:      Right upper body: No axillary adenopathy.      Left upper body: No axillary adenopathy.   Skin:     General: Skin is warm and dry.   Neurological:      General: No focal deficit present.      Mental Status: She is alert and oriented to person, place, and time. Mental status is at baseline.   Psychiatric:         Mood and Affect: Mood normal.         Behavior: Behavior normal.         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 ? metastatic findings could not be biopsied.    TTF negative/CK 7 +.    Diagnosed June 2014.  EGFR mutation negative/ALK gene rearrangement negative.  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 Patient has reportedly been referred to ENT for resection and lymph node dissection (per patient report). Subsequent open biopsy done by Dr. Rosales on 7/29/2019 confirmed the same.  Patient was unable to undergo definitive surgical resection for oligo metastatic disease.  Treatment induced anemia  Hoarseness .      Plan:          Patient's biopsy did return as recurrent non-small cell lung cancer in the left lung.  I feel that the right lung will be similar pathology.  She completed radiation with Dr. Mortensen on 12/24/2020.     Caris did reveal that PD-L1  was 2% positive suggesting benefit from pembrolizumab or nivolumab/ipilimumab.      Her PET/CT scan does show a new hypermetabolic subcentimeter subcarinal lymph node and increase in a left apical consolidation.  The left apical consolidation is close to the aorta and I do not think would be amenable to biopsy, however this subcarinal lymph node may be amenable to bronchoscopy.      Bronchoscopy done on 11/16/2022 showed metastatic adenocarcinoma.  She is no longer a candidate for full dose SBRT.  We started with carboplatin, pemetrexed, nivolumab, and ipilimumab based on the CHECKMATE 9LA study.  Cycle 1 day 1 given 12/20/2022.     Follow up with radiation oncology as scheduled, Dr. Mortensen.    Continue levothyroxine to 88mcg daily.     We will go ahead and continue with immunotherapy.  It is okay for the patient to have surgery with orthopedics while on immunotherapy.    We will get a swallow study and due to inflammatory changes in the lung, treated with 10 days of levofloxacin.    We will repeat a PET/CT scan prior to her next visit    Started on PPI    Labs and treat q3w    Return to clinic in 6 weeks, same day labs, repeat PET prior    GADIEL Martinez

## 2023-09-08 ENCOUNTER — INFUSION (OUTPATIENT)
Dept: INFUSION THERAPY | Facility: HOSPITAL | Age: 78
End: 2023-09-08
Attending: NURSE PRACTITIONER
Payer: MEDICARE

## 2023-09-08 VITALS
RESPIRATION RATE: 16 BRPM | HEIGHT: 64 IN | TEMPERATURE: 98 F | WEIGHT: 158.81 LBS | BODY MASS INDEX: 27.11 KG/M2 | SYSTOLIC BLOOD PRESSURE: 154 MMHG | DIASTOLIC BLOOD PRESSURE: 67 MMHG | HEART RATE: 54 BPM

## 2023-09-08 DIAGNOSIS — C34.90 ADENOCARCINOMA OF LUNG, STAGE 4, UNSPECIFIED LATERALITY: Primary | ICD-10-CM

## 2023-09-08 PROCEDURE — 96413 CHEMO IV INFUSION 1 HR: CPT

## 2023-09-08 PROCEDURE — 63600175 PHARM REV CODE 636 W HCPCS: Mod: JZ,JG | Performed by: NURSE PRACTITIONER

## 2023-09-08 PROCEDURE — 96417 CHEMO IV INFUS EACH ADDL SEQ: CPT

## 2023-09-08 PROCEDURE — 25000003 PHARM REV CODE 250: Performed by: NURSE PRACTITIONER

## 2023-09-08 RX ORDER — SODIUM CHLORIDE 0.9 % (FLUSH) 0.9 %
10 SYRINGE (ML) INJECTION
Status: DISCONTINUED | OUTPATIENT
Start: 2023-09-08 | End: 2023-09-08 | Stop reason: HOSPADM

## 2023-09-08 RX ORDER — HEPARIN 100 UNIT/ML
500 SYRINGE INTRAVENOUS
Status: DISCONTINUED | OUTPATIENT
Start: 2023-09-08 | End: 2023-09-08 | Stop reason: HOSPADM

## 2023-09-08 RX ORDER — EPINEPHRINE 0.3 MG/.3ML
0.3 INJECTION SUBCUTANEOUS ONCE AS NEEDED
Status: DISCONTINUED | OUTPATIENT
Start: 2023-09-08 | End: 2023-09-08 | Stop reason: HOSPADM

## 2023-09-08 RX ORDER — DIPHENHYDRAMINE HYDROCHLORIDE 50 MG/ML
50 INJECTION INTRAMUSCULAR; INTRAVENOUS ONCE AS NEEDED
Status: DISCONTINUED | OUTPATIENT
Start: 2023-09-08 | End: 2023-09-08 | Stop reason: HOSPADM

## 2023-09-08 RX ADMIN — SODIUM CHLORIDE 75.5 MG: 9 INJECTION, SOLUTION INTRAVENOUS at 09:09

## 2023-09-08 RX ADMIN — SODIUM CHLORIDE 360 MG: 9 INJECTION, SOLUTION INTRAVENOUS at 09:09

## 2023-09-11 ENCOUNTER — TELEPHONE (OUTPATIENT)
Dept: HEMATOLOGY/ONCOLOGY | Facility: CLINIC | Age: 78
End: 2023-09-11
Payer: MEDICARE

## 2023-09-11 NOTE — TELEPHONE ENCOUNTER
Patient's daughter is stating that she is tentatively scheduled to have hip surgery 9/20/23. She has an infusion scheduled 9/29/23 for Opdivo and Norma. Should she move this infusion back?

## 2023-10-06 ENCOUNTER — INFUSION (OUTPATIENT)
Dept: INFUSION THERAPY | Facility: HOSPITAL | Age: 78
End: 2023-10-06
Attending: NURSE PRACTITIONER
Payer: MEDICARE

## 2023-10-06 ENCOUNTER — LAB VISIT (OUTPATIENT)
Dept: LAB | Facility: HOSPITAL | Age: 78
End: 2023-10-06
Attending: NURSE PRACTITIONER
Payer: MEDICARE

## 2023-10-06 VITALS
SYSTOLIC BLOOD PRESSURE: 177 MMHG | HEIGHT: 64 IN | WEIGHT: 166.88 LBS | DIASTOLIC BLOOD PRESSURE: 77 MMHG | BODY MASS INDEX: 28.49 KG/M2 | TEMPERATURE: 98 F | RESPIRATION RATE: 18 BRPM | HEART RATE: 55 BPM

## 2023-10-06 DIAGNOSIS — C79.51 MALIGNANT NEOPLASM METASTATIC TO BONE: ICD-10-CM

## 2023-10-06 DIAGNOSIS — C34.90 ADENOCARCINOMA OF LUNG, STAGE 4, UNSPECIFIED LATERALITY: Primary | ICD-10-CM

## 2023-10-06 DIAGNOSIS — E03.2 DRUG-INDUCED HYPOTHYROIDISM: ICD-10-CM

## 2023-10-06 DIAGNOSIS — C34.90 ADENOCARCINOMA OF LUNG, STAGE 4, UNSPECIFIED LATERALITY: ICD-10-CM

## 2023-10-06 DIAGNOSIS — C77.9 MALIGNANT NEOPLASM METASTATIC TO LYMPH NODES, UNSPECIFIED LYMPH NODE REGION: ICD-10-CM

## 2023-10-06 LAB
ALBUMIN SERPL-MCNC: 3.2 G/DL (ref 3.4–4.8)
ALBUMIN/GLOB SERPL: 1.3 RATIO (ref 1.1–2)
ALP SERPL-CCNC: 68 UNIT/L (ref 40–150)
ALT SERPL-CCNC: 6 UNIT/L (ref 0–55)
AST SERPL-CCNC: 13 UNIT/L (ref 5–34)
BASOPHILS # BLD AUTO: 0.03 X10(3)/MCL
BASOPHILS NFR BLD AUTO: 0.3 %
BILIRUB SERPL-MCNC: 0.3 MG/DL
BUN SERPL-MCNC: 14.5 MG/DL (ref 9.8–20.1)
CALCIUM SERPL-MCNC: 9.4 MG/DL (ref 8.4–10.2)
CHLORIDE SERPL-SCNC: 102 MMOL/L (ref 98–107)
CO2 SERPL-SCNC: 29 MMOL/L (ref 23–31)
CORTIS SERPL-SCNC: 10.9 UG/DL
CREAT SERPL-MCNC: 0.77 MG/DL (ref 0.55–1.02)
EOSINOPHIL # BLD AUTO: 0.27 X10(3)/MCL (ref 0–0.9)
EOSINOPHIL NFR BLD AUTO: 3.1 %
ERYTHROCYTE [DISTWIDTH] IN BLOOD BY AUTOMATED COUNT: 15.8 % (ref 11.5–17)
GFR SERPLBLD CREATININE-BSD FMLA CKD-EPI: >60 MLS/MIN/1.73/M2
GLOBULIN SER-MCNC: 2.5 GM/DL (ref 2.4–3.5)
GLUCOSE SERPL-MCNC: 91 MG/DL (ref 82–115)
HCT VFR BLD AUTO: 30.2 % (ref 37–47)
HGB BLD-MCNC: 9.3 G/DL (ref 12–16)
IMM GRANULOCYTES # BLD AUTO: 0.02 X10(3)/MCL (ref 0–0.04)
IMM GRANULOCYTES NFR BLD AUTO: 0.2 %
LYMPHOCYTES # BLD AUTO: 2.05 X10(3)/MCL (ref 0.6–4.6)
LYMPHOCYTES NFR BLD AUTO: 23.6 %
MCH RBC QN AUTO: 28.4 PG (ref 27–31)
MCHC RBC AUTO-ENTMCNC: 30.8 G/DL (ref 33–36)
MCV RBC AUTO: 92.1 FL (ref 80–94)
MONOCYTES # BLD AUTO: 0.99 X10(3)/MCL (ref 0.1–1.3)
MONOCYTES NFR BLD AUTO: 11.4 %
NEUTROPHILS # BLD AUTO: 5.31 X10(3)/MCL (ref 2.1–9.2)
NEUTROPHILS NFR BLD AUTO: 61.4 %
PLATELET # BLD AUTO: 324 X10(3)/MCL (ref 130–400)
PMV BLD AUTO: 8.3 FL (ref 7.4–10.4)
POTASSIUM SERPL-SCNC: 4.7 MMOL/L (ref 3.5–5.1)
PROT SERPL-MCNC: 5.7 GM/DL (ref 5.8–7.6)
RBC # BLD AUTO: 3.28 X10(6)/MCL (ref 4.2–5.4)
SODIUM SERPL-SCNC: 138 MMOL/L (ref 136–145)
TSH SERPL-ACNC: 4.07 UIU/ML (ref 0.35–4.94)
WBC # SPEC AUTO: 8.67 X10(3)/MCL (ref 4.5–11.5)

## 2023-10-06 PROCEDURE — 85025 COMPLETE CBC W/AUTO DIFF WBC: CPT

## 2023-10-06 PROCEDURE — 25000003 PHARM REV CODE 250: Performed by: NURSE PRACTITIONER

## 2023-10-06 PROCEDURE — 96413 CHEMO IV INFUSION 1 HR: CPT

## 2023-10-06 PROCEDURE — 84443 ASSAY THYROID STIM HORMONE: CPT

## 2023-10-06 PROCEDURE — 63600175 PHARM REV CODE 636 W HCPCS: Performed by: NURSE PRACTITIONER

## 2023-10-06 PROCEDURE — 36415 COLL VENOUS BLD VENIPUNCTURE: CPT

## 2023-10-06 PROCEDURE — 82533 TOTAL CORTISOL: CPT

## 2023-10-06 PROCEDURE — 80053 COMPREHEN METABOLIC PANEL: CPT

## 2023-10-06 RX ORDER — HEPARIN 100 UNIT/ML
500 SYRINGE INTRAVENOUS
Status: DISCONTINUED | OUTPATIENT
Start: 2023-10-06 | End: 2023-10-06 | Stop reason: HOSPADM

## 2023-10-06 RX ORDER — DIPHENHYDRAMINE HYDROCHLORIDE 50 MG/ML
50 INJECTION INTRAMUSCULAR; INTRAVENOUS ONCE AS NEEDED
Status: DISCONTINUED | OUTPATIENT
Start: 2023-10-06 | End: 2023-10-06 | Stop reason: HOSPADM

## 2023-10-06 RX ORDER — SODIUM CHLORIDE 0.9 % (FLUSH) 0.9 %
10 SYRINGE (ML) INJECTION
Status: DISCONTINUED | OUTPATIENT
Start: 2023-10-06 | End: 2023-10-06 | Stop reason: HOSPADM

## 2023-10-06 RX ORDER — EPINEPHRINE 0.3 MG/.3ML
0.3 INJECTION SUBCUTANEOUS ONCE AS NEEDED
Status: DISCONTINUED | OUTPATIENT
Start: 2023-10-06 | End: 2023-10-06 | Stop reason: HOSPADM

## 2023-10-06 RX ADMIN — HEPARIN 500 UNITS: 100 SYRINGE at 09:10

## 2023-10-06 RX ADMIN — SODIUM CHLORIDE 360 MG: 9 INJECTION, SOLUTION INTRAVENOUS at 08:10

## 2023-10-12 ENCOUNTER — HOSPITAL ENCOUNTER (OUTPATIENT)
Dept: RADIOLOGY | Facility: HOSPITAL | Age: 78
Discharge: HOME | End: 2023-10-12
Attending: NURSE PRACTITIONER
Payer: MEDICARE

## 2023-10-12 DIAGNOSIS — C79.51 MALIGNANT NEOPLASM METASTATIC TO BONE: ICD-10-CM

## 2023-10-12 DIAGNOSIS — C34.90 ADENOCARCINOMA OF LUNG, STAGE 4, UNSPECIFIED LATERALITY: ICD-10-CM

## 2023-10-12 DIAGNOSIS — E03.2 DRUG-INDUCED HYPOTHYROIDISM: ICD-10-CM

## 2023-10-12 DIAGNOSIS — C77.9 MALIGNANT NEOPLASM METASTATIC TO LYMPH NODES, UNSPECIFIED LYMPH NODE REGION: ICD-10-CM

## 2023-10-12 PROCEDURE — A9552 F18 FDG: HCPCS

## 2023-10-17 NOTE — PROGRESS NOTES
Subjective:       Patient ID: Pratibha Patel is a 78 y.o. female.    Chief Complaint: Follow-up (No concerns today)      PCP: Graham Nguyen NP  Cardiologist: Dr. Danny Sánchez  Referring Physician: Dr. Anthony Hutson  Endocrine: Dr. Werner  Radiation oncology: Dr. Boss in the past, now Dr. Mortensen.  ENT: Dr. 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 ? metastatic findings could not be biopsied. TTF negative/CK 7 +.    Diagnosed June 2014 EGFR mutation negative/ALK gene rearrangement negative. 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 Patient has reportedly been referred to ENT for resection and lymph node dissection (per patient report).   Subsequent open biopsy done by Dr. Rosales on 7/29/2019 confirmed the same.  Patient was unable to undergo definitive surgical resection for oligo metastatic disease.  Hoarseness and swallowing difficulties secondary to #2  Biopsy of the left lung lesion showed recurrent non-small cell lung cancer, Caris was sent and showed positivity for PD-L1, 2%.  No other targetable or actionable mutations present.    Current Treatment:   Combination chemo immunotherapy with carboplatin, pemetrexed, nivolumab, ipilimumab based off of checkmate 9LA     Treatment History:  RT with weekly Carbo/Taxol started 7/2/14--Completed August 2014  Single agent 'Maintenance' Avastin q 3 weeks.  Started 4/9/15--last given September 21, 2016  Radiation + weekly Carbo/Taxol at 2AUC  ----9/11/19-10/22/19  Patient underwent radiation from 12/17/2020 through 12/21/2020.  Radiation to the left and right lung per Dr. Robert Mortensen.    HPI:   Please see Oncology history in the diagnosis of adenocarcinoma of the lung, stage IV on the problem list for full detail.  Patient originally seen in 2014 with unintentional weight loss, shortness of breath, nonproductive cough.  Patient had imaging findings suggestive of lung cancer, bronchoscopy done in June of 2014 revealed moderately differentiated adenocarcinoma of the lung.  She originally underwent chemo radiation from July 2014 through August 2014.  She was placed on maintenance Avastin from April 2015 through September 2016. Patient then underwent a thyroid biopsy in June of 2019 that showed metastatic carcinoma favored to be adenocarcinoma lung primary.  She underwent surgical resection in July 2019, however full surgical dissection was not able to be done and the patient underwent open biopsy of right thyroid gland.  Pathology revealed poorly differentiated non-small cell carcinoma consistent with metastatic lung cancer.  NGS panel done at that time unremarkable.  She started chemoradiation with carbo Taxol in September 2019, completed in October 2019. Patient was doing well, imaging in October 2020 revealed an abnormality in the left lung, CT-guided lung biopsy on 11/03/2020 showed recurrent non-small cell lung cancer, Caris revealed PDL1 positivity.  She underwent SBRT from 12/17/2020 through 12/21/2020.  Patient then placed on surveillance imaging, most recently done on 07/05/2022 showing worsening disease in the right lung with a previous lesion that measured 0.6 cm and had an SUV of 1.0 now measuring 1.5 cm and an SUV of 8.4.  This was treated with SBRT in August of 2022.  PET/CT scan done on 11/8/2022 showed mild increased radiotracer activity within the left apical consolidation, SUV 5.5.  There was a newly hypermetabolic subcentimeter subcarinal  lymph node.  A 1 cm right lower lobe nodule was smaller, he right upper lobe nodule was slightly larger measuring 6 mm.  No sites of FDG avid metastatic disease in the neck, abdomen, or pelvis. Bronchoscopy done on 11/16/2022, pathology revealed malignant cells consistent with adenocarcinoma.  Planning on chemo immunotherapy with carboplatin, pemetrexed, nivolumab, ipilimumab based off of checkmate 9LA.  PET/CT on 07/25/2023 showed infectious or inflammatory changes in the right lower lobe with no other findings to suggest new or worsening disease.    PET/CT scan done on 10/12/2023 ***    Interval History:   Patient presents to clinic for scheduled treatment visit.  She is now on immunotherapy alone, completed the chemotherapy arm of the treatment protocol.  She continues to tolerate immunotherapy well. She feels well overall and denies and new complaints. Appetite and energy level are good.         Past Medical History:   Diagnosis Date    Adenocarcinoma of lung     Anemia     Anxiety     Arthritis     COPD (chronic obstructive pulmonary disease)     Depression     HLD (hyperlipidemia)     Hypertension     Lung cancer     Secondary malignant neoplasm of lymph nodes     Secondary malignant neoplasm of right lung     Thyroid disease     lymph nodes malignant      Past Surgical History:   Procedure Laterality Date    BLADDER SURGERY      CHOLECYSTECTOMY      COLONOSCOPY  2018    ENDOBRONCHIAL ULTRASOUND N/A 11/16/2022    Procedure: ENDOBRONCHIAL ULTRASOUND (EBUS);  Surgeon: Anthony Hutson MD;  Location: [facility];  Service: Pulmonary;  Laterality: N/A;    HYSTERECTOMY      KNEE SURGERY Bilateral     replaced knees    PARTIAL HIP ARTHROPLASTY Bilateral      Social History     Socioeconomic History    Marital status:    Tobacco Use    Smoking status: Every Day     Current packs/day: 0.25     Types: Cigarettes    Smokeless tobacco: Never    Tobacco comments:     Patient states she is trying to quit  smoking.   Substance and Sexual Activity    Alcohol use: Not Currently    Drug use: Never      Family History   Problem Relation Age of Onset    Lung cancer Mother     Lung cancer Brother       Review of patient's allergies indicates:  No Known Allergies   Review of Systems   Constitutional:  Negative for chills, diaphoresis, fatigue, fever and unexpected weight change.   HENT:  Negative for nasal congestion, mouth sores, sinus pressure/congestion and sore throat.    Eyes:  Negative for pain and visual disturbance.   Respiratory:  Negative for cough, chest tightness and shortness of breath.    Cardiovascular:  Negative for chest pain, palpitations and leg swelling.   Gastrointestinal:  Negative for abdominal distention, abdominal pain, blood in stool, constipation and diarrhea.   Genitourinary:  Negative for dysuria, frequency and hematuria.   Musculoskeletal:  Negative for arthralgias and back pain.   Integumentary:  Negative for rash.   Neurological:  Negative for dizziness, weakness, numbness and headaches.   Hematological:  Negative for adenopathy.   Psychiatric/Behavioral:  Negative for confusion.          Objective:      Physical Exam  Vitals reviewed.   Constitutional:       General: She is not in acute distress.     Appearance: Normal appearance.   HENT:      Head: Normocephalic and atraumatic.      Nose: Nose normal.      Mouth/Throat:      Mouth: Mucous membranes are moist.   Eyes:      Extraocular Movements: Extraocular movements intact.      Conjunctiva/sclera: Conjunctivae normal.   Neck:      Comments: Radiation changes to the left supraclavicular area  Cardiovascular:      Rate and Rhythm: Normal rate and regular rhythm.      Pulses: Normal pulses.      Heart sounds: Normal heart sounds.   Pulmonary:      Effort: Pulmonary effort is normal.      Breath sounds: Examination of the right-lower field reveals decreased breath sounds. Examination of the left-lower field reveals decreased breath sounds.  Decreased breath sounds and wheezing present.   Abdominal:      General: Bowel sounds are normal.      Palpations: Abdomen is soft.   Musculoskeletal:         General: No swelling. Normal range of motion.      Cervical back: Normal range of motion and neck supple.      Right lower leg: No edema.      Left lower leg: No edema.   Lymphadenopathy:      Cervical: No cervical adenopathy.      Upper Body:      Right upper body: No axillary adenopathy.      Left upper body: No axillary adenopathy.   Skin:     General: Skin is warm and dry.   Neurological:      General: No focal deficit present.      Mental Status: She is alert and oriented to person, place, and time. Mental status is at baseline.   Psychiatric:         Mood and Affect: Mood normal.         Behavior: Behavior normal.         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 ? metastatic findings could not be biopsied.    TTF negative/CK 7 +.    Diagnosed June 2014.  EGFR mutation negative/ALK gene rearrangement negative.  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 Patient has reportedly been referred to ENT for resection and lymph node dissection (per patient report). Subsequent open biopsy done by Dr. Rosales on 7/29/2019 confirmed the same.  Patient was unable to undergo definitive surgical resection for oligo metastatic disease.  Treatment induced anemia  Hoarseness .      Plan:          Patient's biopsy did return as recurrent non-small cell lung cancer in the left lung.  I feel that the right lung  will be similar pathology.  She completed radiation with Dr. Mortensen on 12/24/2020.     Caris did reveal that PD-L1 was 2% positive suggesting benefit from pembrolizumab or nivolumab/ipilimumab.      Her PET/CT scan does show a new hypermetabolic subcentimeter subcarinal lymph node and increase in a left apical consolidation.  The left apical consolidation is close to the aorta and I do not think would be amenable to biopsy, however this subcarinal lymph node may be amenable to bronchoscopy.      Bronchoscopy done on 11/16/2022 showed metastatic adenocarcinoma.  She is no longer a candidate for full dose SBRT.  We started with carboplatin, pemetrexed, nivolumab, and ipilimumab based on the CHECKMATE 9LA study.  Cycle 1 day 1 given 12/20/2022.     Follow up with radiation oncology as scheduled, Dr. Mortensen.    Continue levothyroxine to 88mcg daily.     We will go ahead and continue with immunotherapy.  It is okay for the patient to have surgery with orthopedics while on immunotherapy.    We will get a swallow study and due to inflammatory changes in the lung, treated with 10 days of levofloxacin.    PET/CT scan done on 10/12/2023 ***    Started on PPI    Labs and treat q3w    Return to clinic in 6 weeks, same day labs    GADIEL Martinez

## 2023-10-19 ENCOUNTER — OFFICE VISIT (OUTPATIENT)
Dept: HEMATOLOGY/ONCOLOGY | Facility: CLINIC | Age: 78
End: 2023-10-19
Payer: MEDICARE

## 2023-10-19 VITALS
WEIGHT: 159.19 LBS | SYSTOLIC BLOOD PRESSURE: 192 MMHG | RESPIRATION RATE: 15 BRPM | BODY MASS INDEX: 27.18 KG/M2 | OXYGEN SATURATION: 95 % | DIASTOLIC BLOOD PRESSURE: 69 MMHG | HEART RATE: 57 BPM | HEIGHT: 64 IN

## 2023-10-19 DIAGNOSIS — E03.2 DRUG-INDUCED HYPOTHYROIDISM: ICD-10-CM

## 2023-10-19 DIAGNOSIS — C34.90 ADENOCARCINOMA OF LUNG, STAGE 4, UNSPECIFIED LATERALITY: Primary | ICD-10-CM

## 2023-10-19 DIAGNOSIS — C77.9 MALIGNANT NEOPLASM METASTATIC TO LYMPH NODES, UNSPECIFIED LYMPH NODE REGION: ICD-10-CM

## 2023-10-19 DIAGNOSIS — C79.51 MALIGNANT NEOPLASM METASTATIC TO BONE: ICD-10-CM

## 2023-10-19 PROCEDURE — 99214 OFFICE O/P EST MOD 30 MIN: CPT | Mod: PBBFAC | Performed by: INTERNAL MEDICINE

## 2023-10-19 PROCEDURE — 99999 PR PBB SHADOW E&M-EST. PATIENT-LVL IV: CPT | Mod: PBBFAC,,, | Performed by: INTERNAL MEDICINE

## 2023-10-19 PROCEDURE — 99999 PR PBB SHADOW E&M-EST. PATIENT-LVL IV: ICD-10-PCS | Mod: PBBFAC,,, | Performed by: INTERNAL MEDICINE

## 2023-10-19 PROCEDURE — 99215 PR OFFICE/OUTPT VISIT, EST, LEVL V, 40-54 MIN: ICD-10-PCS | Mod: S$PBB,,, | Performed by: NURSE PRACTITIONER

## 2023-10-19 PROCEDURE — 99215 OFFICE O/P EST HI 40 MIN: CPT | Mod: S$PBB,,, | Performed by: NURSE PRACTITIONER

## 2023-10-19 RX ORDER — SODIUM CHLORIDE 0.9 % (FLUSH) 0.9 %
10 SYRINGE (ML) INJECTION
Status: CANCELLED | OUTPATIENT
Start: 2023-11-17

## 2023-10-19 RX ORDER — CLOPIDOGREL BISULFATE 75 MG/1
TABLET ORAL
COMMUNITY
Start: 2023-10-12

## 2023-10-19 RX ORDER — HEPARIN 100 UNIT/ML
500 SYRINGE INTRAVENOUS
Status: CANCELLED | OUTPATIENT
Start: 2023-11-17

## 2023-10-19 RX ORDER — DIPHENHYDRAMINE HYDROCHLORIDE 50 MG/ML
50 INJECTION INTRAMUSCULAR; INTRAVENOUS ONCE AS NEEDED
Status: CANCELLED | OUTPATIENT
Start: 2023-10-20

## 2023-10-19 RX ORDER — SODIUM CHLORIDE 0.9 % (FLUSH) 0.9 %
10 SYRINGE (ML) INJECTION
Status: CANCELLED | OUTPATIENT
Start: 2023-10-20

## 2023-10-19 RX ORDER — EPINEPHRINE 0.3 MG/.3ML
0.3 INJECTION SUBCUTANEOUS ONCE AS NEEDED
Status: CANCELLED | OUTPATIENT
Start: 2023-10-20

## 2023-10-19 RX ORDER — HEPARIN 100 UNIT/ML
500 SYRINGE INTRAVENOUS
Status: CANCELLED | OUTPATIENT
Start: 2023-10-20

## 2023-10-19 RX ORDER — EPINEPHRINE 0.3 MG/.3ML
0.3 INJECTION SUBCUTANEOUS ONCE AS NEEDED
Status: CANCELLED | OUTPATIENT
Start: 2023-11-17

## 2023-10-19 RX ORDER — DIPHENHYDRAMINE HYDROCHLORIDE 50 MG/ML
50 INJECTION INTRAMUSCULAR; INTRAVENOUS ONCE AS NEEDED
Status: CANCELLED | OUTPATIENT
Start: 2023-11-17

## 2023-10-19 RX ORDER — OXYCODONE AND ACETAMINOPHEN 5; 325 MG/1; MG/1
1 TABLET ORAL
COMMUNITY
Start: 2023-09-21 | End: 2024-03-09 | Stop reason: CLARIF

## 2023-10-19 NOTE — PROGRESS NOTES
Subjective:       Patient ID: Pratibha Patel is a 78 y.o. female.    Chief Complaint: Follow-up (No concerns today)      PCP: Graham Nguyen NP  Cardiologist: Dr. Danny Sánchez  Referring Physician: Dr. Anthony Hutson  Endocrine: Dr. Werner  Radiation oncology: Dr. Boss in the past, now Dr. Mortensen.  ENT: Dr. 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 ? metastatic findings could not be biopsied. TTF negative/CK 7 +.    Diagnosed June 2014 EGFR mutation negative/ALK gene rearrangement negative. 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 Patient has reportedly been referred to ENT for resection and lymph node dissection (per patient report).   Subsequent open biopsy done by Dr. Rosales on 7/29/2019 confirmed the same.  Patient was unable to undergo definitive surgical resection for oligo metastatic disease.  Hoarseness and swallowing difficulties secondary to #2  Biopsy of the left lung lesion showed recurrent non-small cell lung cancer, Caris was sent and showed positivity for PD-L1, 2%.  No other targetable or actionable mutations present.    Current Treatment:   Combination chemo immunotherapy with carboplatin, pemetrexed, nivolumab, ipilimumab based off of checkmate 9LA     Treatment History:  RT with weekly Carbo/Taxol started 7/2/14--Completed August 2014  Single agent 'Maintenance' Avastin q 3 weeks.  Started 4/9/15--last given September 21, 2016  Radiation + weekly Carbo/Taxol at 2AUC  ----9/11/19-10/22/19  Patient underwent radiation from 12/17/2020 through 12/21/2020.  Radiation to the left and right lung per Dr. Robert Mortensen.    HPI:   Please see Oncology history in the diagnosis of adenocarcinoma of the lung, stage IV on the problem list for full detail.  Patient originally seen in 2014 with unintentional weight loss, shortness of breath, nonproductive cough.  Patient had imaging findings suggestive of lung cancer, bronchoscopy done in June of 2014 revealed moderately differentiated adenocarcinoma of the lung.  She originally underwent chemo radiation from July 2014 through August 2014.  She was placed on maintenance Avastin from April 2015 through September 2016. Patient then underwent a thyroid biopsy in June of 2019 that showed metastatic carcinoma favored to be adenocarcinoma lung primary.  She underwent surgical resection in July 2019, however full surgical dissection was not able to be done and the patient underwent open biopsy of right thyroid gland.  Pathology revealed poorly differentiated non-small cell carcinoma consistent with metastatic lung cancer.  NGS panel done at that time unremarkable.  She started chemoradiation with carbo Taxol in September 2019, completed in October 2019. Patient was doing well, imaging in October 2020 revealed an abnormality in the left lung, CT-guided lung biopsy on 11/03/2020 showed recurrent non-small cell lung cancer, Caris revealed PDL1 positivity.  She underwent SBRT from 12/17/2020 through 12/21/2020.  Patient then placed on surveillance imaging, most recently done on 07/05/2022 showing worsening disease in the right lung with a previous lesion that measured 0.6 cm and had an SUV of 1.0 now measuring 1.5 cm and an SUV of 8.4.  This was treated with SBRT in August of 2022.  PET/CT scan done on 11/8/2022 showed mild increased radiotracer activity within the left apical consolidation, SUV 5.5.  There was a newly hypermetabolic subcentimeter subcarinal  lymph node.  A 1 cm right lower lobe nodule was smaller, he right upper lobe nodule was slightly larger measuring 6 mm.  No sites of FDG avid metastatic disease in the neck, abdomen, or pelvis. Bronchoscopy done on 11/16/2022, pathology revealed malignant cells consistent with adenocarcinoma.  Planning on chemo immunotherapy with carboplatin, pemetrexed, nivolumab, ipilimumab based off of checkmate 9LA.  PET/CT on 07/25/2023 showed infectious or inflammatory changes in the right lower lobe with no other findings to suggest new or worsening disease.    PET/CT scan done on 10/12/2023 showed mixed response to therapy with decrease right hilar and left upper lobe consolidation uptake.  Subcarinal lymph node demonstrates significant increased uptake compared to the prior study.  Stable subcentimeter right upper lobe nodule demonstrates slight increased uptake compared to prior study    Interval History:   Patient presents to clinic for scan results  She is now on immunotherapy alone, completed the chemotherapy arm of the treatment protocol.  She continues to tolerate immunotherapy well. She feels well overall.  Appetite and energy level are good.  She denies any new complaints.        Past Medical History:   Diagnosis Date    Adenocarcinoma of lung     Anemia     Anxiety     Arthritis     COPD (chronic obstructive pulmonary disease)     Depression     HLD (hyperlipidemia)     Hypertension     Lung cancer     Secondary malignant neoplasm of lymph nodes     Secondary malignant neoplasm of right lung     Thyroid disease     lymph nodes malignant      Past Surgical History:   Procedure Laterality Date    BLADDER SURGERY      CHOLECYSTECTOMY      COLONOSCOPY  2018    ENDOBRONCHIAL ULTRASOUND N/A 11/16/2022    Procedure: ENDOBRONCHIAL ULTRASOUND (EBUS);  Surgeon: Anthony Hutson MD;  Location: [facility];  Service: Pulmonary;  Laterality: N/A;    HYSTERECTOMY      KNEE SURGERY Bilateral     replaced knees     PARTIAL HIP ARTHROPLASTY Bilateral      Social History     Socioeconomic History    Marital status:    Tobacco Use    Smoking status: Every Day     Current packs/day: 0.25     Types: Cigarettes    Smokeless tobacco: Never    Tobacco comments:     Patient states she is trying to quit smoking.   Substance and Sexual Activity    Alcohol use: Not Currently    Drug use: Never      Family History   Problem Relation Age of Onset    Lung cancer Mother     Lung cancer Brother       Review of patient's allergies indicates:  No Known Allergies   Review of Systems   Constitutional:  Negative for chills, diaphoresis, fatigue, fever and unexpected weight change.   HENT:  Negative for nasal congestion, mouth sores, sinus pressure/congestion and sore throat.    Eyes:  Negative for pain and visual disturbance.   Respiratory:  Negative for cough, chest tightness and shortness of breath.    Cardiovascular:  Negative for chest pain, palpitations and leg swelling.   Gastrointestinal:  Negative for abdominal distention, abdominal pain, blood in stool, constipation and diarrhea.   Genitourinary:  Negative for dysuria, frequency and hematuria.   Musculoskeletal:  Negative for arthralgias and back pain.   Integumentary:  Negative for rash.   Neurological:  Negative for dizziness, weakness, numbness and headaches.   Hematological:  Negative for adenopathy.   Psychiatric/Behavioral:  Negative for confusion.          Objective:      Physical Exam  Vitals reviewed.   Constitutional:       General: She is not in acute distress.     Appearance: Normal appearance.   HENT:      Head: Normocephalic and atraumatic.      Nose: Nose normal.      Mouth/Throat:      Mouth: Mucous membranes are moist.   Eyes:      Extraocular Movements: Extraocular movements intact.      Conjunctiva/sclera: Conjunctivae normal.   Neck:      Comments: Radiation changes to the left supraclavicular area  Cardiovascular:      Rate and Rhythm: Normal rate and regular  rhythm.      Pulses: Normal pulses.      Heart sounds: Normal heart sounds.   Pulmonary:      Effort: Pulmonary effort is normal.      Breath sounds: Examination of the right-lower field reveals decreased breath sounds. Examination of the left-lower field reveals decreased breath sounds. Decreased breath sounds and wheezing present.   Abdominal:      General: Bowel sounds are normal.      Palpations: Abdomen is soft.   Musculoskeletal:         General: No swelling. Normal range of motion.      Cervical back: Normal range of motion and neck supple.      Right lower leg: No edema.      Left lower leg: No edema.   Skin:     General: Skin is warm and dry.   Neurological:      General: No focal deficit present.      Mental Status: She is alert and oriented to person, place, and time. Mental status is at baseline.   Psychiatric:         Mood and Affect: Mood normal.         Behavior: Behavior normal.         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 ? metastatic findings could not be biopsied.    TTF negative/CK 7 +.    Diagnosed June 2014.  EGFR mutation negative/ALK gene rearrangement negative.  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 Patient has reportedly been referred to ENT for resection and lymph node dissection (per patient report). Subsequent open biopsy done by Dr. Rosales on 7/29/2019 confirmed the same.  Patient was unable to undergo definitive surgical resection for oligo metastatic disease.  Treatment induced anemia  Hoarseness .      Plan:           Patient's biopsy did return as recurrent non-small cell lung cancer in the left lung.  I feel that the right lung will be similar pathology.  She completed radiation with Dr. Mortensen on 12/24/2020.     Caris did reveal that PD-L1 was 2% positive suggesting benefit from pembrolizumab or nivolumab/ipilimumab.      Her PET/CT scan does show a new hypermetabolic subcentimeter subcarinal lymph node and increase in a left apical consolidation.  The left apical consolidation is close to the aorta and I do not think would be amenable to biopsy, however this subcarinal lymph node may be amenable to bronchoscopy.      Bronchoscopy done on 11/16/2022 showed metastatic adenocarcinoma.  She is no longer a candidate for full dose SBRT.  We started with carboplatin, pemetrexed, nivolumab, and ipilimumab based on the CHECKMATE 9LA study.  Cycle 1 day 1 given 12/20/2022.     Follow up with radiation oncology as scheduled, Dr. Mortensen.    Continue levothyroxine to 88mcg daily.      It is okay for the patient to have surgery with orthopedics while on immunotherapy.    We will get a swallow study and due to inflammatory changes in the lung, treated with 10 days of levofloxacin.    PET/CT scan done on 10/12/2023 showed mixed response to therapy with decrease right hilar and left upper lobe consolidation uptake.  Subcarinal lymph node demonstrates significant increased uptake compared to the prior study.  Stable subcentimeter right upper lobe nodule demonstrates slight increased uptake compared to prior study.    We will go ahead and continue with immunotherapy.     Started on PPI    Labs and treat q3w    Return to clinic in 7 weeks, same day labs    GADIEL Martinez

## 2023-10-27 ENCOUNTER — INFUSION (OUTPATIENT)
Dept: INFUSION THERAPY | Facility: HOSPITAL | Age: 78
End: 2023-10-27
Attending: NURSE PRACTITIONER
Payer: MEDICARE

## 2023-10-27 VITALS
HEART RATE: 69 BPM | SYSTOLIC BLOOD PRESSURE: 157 MMHG | TEMPERATURE: 98 F | RESPIRATION RATE: 16 BRPM | OXYGEN SATURATION: 94 % | DIASTOLIC BLOOD PRESSURE: 62 MMHG

## 2023-10-27 DIAGNOSIS — C34.90 ADENOCARCINOMA OF LUNG, STAGE 4, UNSPECIFIED LATERALITY: Primary | ICD-10-CM

## 2023-10-27 DIAGNOSIS — C79.51 MALIGNANT NEOPLASM METASTATIC TO BONE: ICD-10-CM

## 2023-10-27 DIAGNOSIS — C77.9 MALIGNANT NEOPLASM METASTATIC TO LYMPH NODES, UNSPECIFIED LYMPH NODE REGION: ICD-10-CM

## 2023-10-27 LAB
BASOPHILS # BLD AUTO: 0.04 X10(3)/MCL
BASOPHILS NFR BLD AUTO: 0.5 %
EOSINOPHIL # BLD AUTO: 0.28 X10(3)/MCL (ref 0–0.9)
EOSINOPHIL NFR BLD AUTO: 3.3 %
ERYTHROCYTE [DISTWIDTH] IN BLOOD BY AUTOMATED COUNT: 15 % (ref 11.5–17)
HCT VFR BLD AUTO: 29.7 % (ref 37–47)
HGB BLD-MCNC: 9.1 G/DL (ref 12–16)
IMM GRANULOCYTES # BLD AUTO: 0.01 X10(3)/MCL (ref 0–0.04)
IMM GRANULOCYTES NFR BLD AUTO: 0.1 %
LYMPHOCYTES # BLD AUTO: 1.91 X10(3)/MCL (ref 0.6–4.6)
LYMPHOCYTES NFR BLD AUTO: 22.2 %
MCH RBC QN AUTO: 28.1 PG (ref 27–31)
MCHC RBC AUTO-ENTMCNC: 30.6 G/DL (ref 33–36)
MCV RBC AUTO: 91.7 FL (ref 80–94)
MONOCYTES # BLD AUTO: 0.9 X10(3)/MCL (ref 0.1–1.3)
MONOCYTES NFR BLD AUTO: 10.5 %
NEUTROPHILS # BLD AUTO: 5.46 X10(3)/MCL (ref 2.1–9.2)
NEUTROPHILS NFR BLD AUTO: 63.4 %
PLATELET # BLD AUTO: 213 X10(3)/MCL (ref 130–400)
PMV BLD AUTO: 8.6 FL (ref 7.4–10.4)
RBC # BLD AUTO: 3.24 X10(6)/MCL (ref 4.2–5.4)
WBC # SPEC AUTO: 8.6 X10(3)/MCL (ref 4.5–11.5)

## 2023-10-27 PROCEDURE — 96415 CHEMO IV INFUSION ADDL HR: CPT

## 2023-10-27 PROCEDURE — 25000003 PHARM REV CODE 250: Performed by: NURSE PRACTITIONER

## 2023-10-27 PROCEDURE — 96417 CHEMO IV INFUS EACH ADDL SEQ: CPT

## 2023-10-27 PROCEDURE — 36415 COLL VENOUS BLD VENIPUNCTURE: CPT

## 2023-10-27 PROCEDURE — 85025 COMPLETE CBC W/AUTO DIFF WBC: CPT

## 2023-10-27 PROCEDURE — 63600175 PHARM REV CODE 636 W HCPCS: Mod: JZ,JG | Performed by: NURSE PRACTITIONER

## 2023-10-27 PROCEDURE — 96413 CHEMO IV INFUSION 1 HR: CPT

## 2023-10-27 RX ORDER — SODIUM CHLORIDE 0.9 % (FLUSH) 0.9 %
10 SYRINGE (ML) INJECTION
Status: DISCONTINUED | OUTPATIENT
Start: 2023-10-27 | End: 2023-10-27 | Stop reason: HOSPADM

## 2023-10-27 RX ORDER — HEPARIN 100 UNIT/ML
500 SYRINGE INTRAVENOUS
Status: DISCONTINUED | OUTPATIENT
Start: 2023-10-27 | End: 2023-10-27 | Stop reason: HOSPADM

## 2023-10-27 RX ORDER — EPINEPHRINE 0.3 MG/.3ML
0.3 INJECTION SUBCUTANEOUS ONCE AS NEEDED
Status: DISCONTINUED | OUTPATIENT
Start: 2023-10-27 | End: 2023-10-27 | Stop reason: HOSPADM

## 2023-10-27 RX ORDER — DIPHENHYDRAMINE HYDROCHLORIDE 50 MG/ML
50 INJECTION INTRAMUSCULAR; INTRAVENOUS ONCE AS NEEDED
Status: DISCONTINUED | OUTPATIENT
Start: 2023-10-27 | End: 2023-10-27 | Stop reason: HOSPADM

## 2023-10-27 RX ADMIN — SODIUM CHLORIDE 75.5 MG: 9 INJECTION, SOLUTION INTRAVENOUS at 10:10

## 2023-10-27 RX ADMIN — HEPARIN 500 UNITS: 100 SYRINGE at 11:10

## 2023-10-27 RX ADMIN — SODIUM CHLORIDE 360 MG: 9 INJECTION, SOLUTION INTRAVENOUS at 09:10

## 2023-10-27 NOTE — PLAN OF CARE
Problem: Adult Inpatient Plan of Care  Goal: Plan of Care Review  Outcome: Met  Flowsheets (Taken 10/27/2023 1105)  Plan of Care Reviewed With: patient  Goal: Absence of Hospital-Acquired Illness or Injury  Outcome: Met  Intervention: Identify and Manage Fall Risk  Flowsheets (Taken 10/27/2023 1105)  Safety Promotion/Fall Prevention:   assistive device/personal item within reach   Fall Risk reviewed with patient/family   in recliner, wheels locked     Pt tolerated infusion well. Next appt reviewed; pt denied questions or further needs at the time of discharge.

## 2023-11-16 ENCOUNTER — LAB VISIT (OUTPATIENT)
Dept: LAB | Facility: HOSPITAL | Age: 78
End: 2023-11-16
Attending: NURSE PRACTITIONER
Payer: MEDICARE

## 2023-11-16 DIAGNOSIS — C79.51 MALIGNANT NEOPLASM METASTATIC TO BONE: ICD-10-CM

## 2023-11-16 DIAGNOSIS — C34.90 ADENOCARCINOMA OF LUNG, STAGE 4, UNSPECIFIED LATERALITY: ICD-10-CM

## 2023-11-16 DIAGNOSIS — C77.9 MALIGNANT NEOPLASM METASTATIC TO LYMPH NODES, UNSPECIFIED LYMPH NODE REGION: ICD-10-CM

## 2023-11-16 DIAGNOSIS — E03.2 DRUG-INDUCED HYPOTHYROIDISM: ICD-10-CM

## 2023-11-16 LAB
ALBUMIN SERPL-MCNC: 3.5 G/DL (ref 3.4–4.8)
ALBUMIN/GLOB SERPL: 1.3 RATIO (ref 1.1–2)
ALP SERPL-CCNC: 81 UNIT/L (ref 40–150)
ALT SERPL-CCNC: 7 UNIT/L (ref 0–55)
AST SERPL-CCNC: 14 UNIT/L (ref 5–34)
BASOPHILS # BLD AUTO: 0.06 X10(3)/MCL
BASOPHILS NFR BLD AUTO: 0.6 %
BILIRUB SERPL-MCNC: 0.3 MG/DL
BUN SERPL-MCNC: 19.6 MG/DL (ref 9.8–20.1)
CALCIUM SERPL-MCNC: 9.8 MG/DL (ref 8.4–10.2)
CHLORIDE SERPL-SCNC: 101 MMOL/L (ref 98–107)
CO2 SERPL-SCNC: 30 MMOL/L (ref 23–31)
CORTIS SERPL-SCNC: 10.8 UG/DL
CREAT SERPL-MCNC: 0.86 MG/DL (ref 0.55–1.02)
EOSINOPHIL # BLD AUTO: 0.33 X10(3)/MCL (ref 0–0.9)
EOSINOPHIL NFR BLD AUTO: 3.3 %
ERYTHROCYTE [DISTWIDTH] IN BLOOD BY AUTOMATED COUNT: 14.5 % (ref 11.5–17)
GFR SERPLBLD CREATININE-BSD FMLA CKD-EPI: >60 MLS/MIN/1.73/M2
GLOBULIN SER-MCNC: 2.8 GM/DL (ref 2.4–3.5)
GLUCOSE SERPL-MCNC: 93 MG/DL (ref 82–115)
HCT VFR BLD AUTO: 30.4 % (ref 37–47)
HGB BLD-MCNC: 9.4 G/DL (ref 12–16)
IMM GRANULOCYTES # BLD AUTO: 0.01 X10(3)/MCL (ref 0–0.04)
IMM GRANULOCYTES NFR BLD AUTO: 0.1 %
LYMPHOCYTES # BLD AUTO: 2.29 X10(3)/MCL (ref 0.6–4.6)
LYMPHOCYTES NFR BLD AUTO: 22.6 %
MCH RBC QN AUTO: 26.9 PG (ref 27–31)
MCHC RBC AUTO-ENTMCNC: 30.9 G/DL (ref 33–36)
MCV RBC AUTO: 87.1 FL (ref 80–94)
MONOCYTES # BLD AUTO: 1.23 X10(3)/MCL (ref 0.1–1.3)
MONOCYTES NFR BLD AUTO: 12.1 %
NEUTROPHILS # BLD AUTO: 6.22 X10(3)/MCL (ref 2.1–9.2)
NEUTROPHILS NFR BLD AUTO: 61.3 %
PLATELET # BLD AUTO: 242 X10(3)/MCL (ref 130–400)
PMV BLD AUTO: 8.8 FL (ref 7.4–10.4)
POTASSIUM SERPL-SCNC: 4.2 MMOL/L (ref 3.5–5.1)
PROT SERPL-MCNC: 6.3 GM/DL (ref 5.8–7.6)
RBC # BLD AUTO: 3.49 X10(6)/MCL (ref 4.2–5.4)
SODIUM SERPL-SCNC: 137 MMOL/L (ref 136–145)
TSH SERPL-ACNC: 1.15 UIU/ML (ref 0.35–4.94)
WBC # SPEC AUTO: 10.14 X10(3)/MCL (ref 4.5–11.5)

## 2023-11-16 PROCEDURE — 84443 ASSAY THYROID STIM HORMONE: CPT

## 2023-11-16 PROCEDURE — 80053 COMPREHEN METABOLIC PANEL: CPT

## 2023-11-16 PROCEDURE — 36415 COLL VENOUS BLD VENIPUNCTURE: CPT

## 2023-11-16 PROCEDURE — 82533 TOTAL CORTISOL: CPT

## 2023-11-16 PROCEDURE — 85025 COMPLETE CBC W/AUTO DIFF WBC: CPT

## 2023-11-17 ENCOUNTER — INFUSION (OUTPATIENT)
Dept: INFUSION THERAPY | Facility: HOSPITAL | Age: 78
End: 2023-11-17
Attending: NURSE PRACTITIONER
Payer: MEDICARE

## 2023-11-17 VITALS
SYSTOLIC BLOOD PRESSURE: 183 MMHG | HEIGHT: 64 IN | BODY MASS INDEX: 27.18 KG/M2 | DIASTOLIC BLOOD PRESSURE: 65 MMHG | TEMPERATURE: 98 F | RESPIRATION RATE: 18 BRPM | HEART RATE: 79 BPM | WEIGHT: 159.19 LBS

## 2023-11-17 DIAGNOSIS — C34.90 ADENOCARCINOMA OF LUNG, STAGE 4, UNSPECIFIED LATERALITY: Primary | ICD-10-CM

## 2023-11-17 PROCEDURE — 63600175 PHARM REV CODE 636 W HCPCS: Mod: JZ,JG | Performed by: NURSE PRACTITIONER

## 2023-11-17 PROCEDURE — 96413 CHEMO IV INFUSION 1 HR: CPT

## 2023-11-17 PROCEDURE — 25000003 PHARM REV CODE 250: Performed by: NURSE PRACTITIONER

## 2023-11-17 RX ORDER — EPINEPHRINE 0.3 MG/.3ML
0.3 INJECTION SUBCUTANEOUS ONCE AS NEEDED
Status: DISCONTINUED | OUTPATIENT
Start: 2023-11-17 | End: 2023-11-17 | Stop reason: HOSPADM

## 2023-11-17 RX ORDER — DIPHENHYDRAMINE HYDROCHLORIDE 50 MG/ML
50 INJECTION INTRAMUSCULAR; INTRAVENOUS ONCE AS NEEDED
Status: DISCONTINUED | OUTPATIENT
Start: 2023-11-17 | End: 2023-11-17 | Stop reason: HOSPADM

## 2023-11-17 RX ORDER — HEPARIN 100 UNIT/ML
500 SYRINGE INTRAVENOUS
Status: DISCONTINUED | OUTPATIENT
Start: 2023-11-17 | End: 2023-11-17 | Stop reason: HOSPADM

## 2023-11-17 RX ORDER — SODIUM CHLORIDE 0.9 % (FLUSH) 0.9 %
10 SYRINGE (ML) INJECTION
Status: DISCONTINUED | OUTPATIENT
Start: 2023-11-17 | End: 2023-11-17 | Stop reason: HOSPADM

## 2023-11-17 RX ADMIN — SODIUM CHLORIDE 360 MG: 9 INJECTION, SOLUTION INTRAVENOUS at 09:11

## 2023-11-17 NOTE — NURSING
Patient presents for scheduled opdivo treatment. Pt c/o right jaw pain that started after she woke up this morning. Pt states difficulty chewing breakfast. States has not gotten better since awakening. Denies taking anything for pain. Denies chest pain, SOB, pain in arms. Dr. Gudino notified, informed of complaints and v/s. OK to proceed with treatment per MD. Instructed pt to monitor symptoms and present to ER if symptoms worsen. Verbalized understanding.

## 2023-12-07 ENCOUNTER — OFFICE VISIT (OUTPATIENT)
Dept: HEMATOLOGY/ONCOLOGY | Facility: CLINIC | Age: 78
End: 2023-12-07
Payer: MEDICARE

## 2023-12-07 VITALS
SYSTOLIC BLOOD PRESSURE: 138 MMHG | WEIGHT: 157.94 LBS | DIASTOLIC BLOOD PRESSURE: 74 MMHG | HEIGHT: 64 IN | OXYGEN SATURATION: 94 % | BODY MASS INDEX: 26.96 KG/M2 | RESPIRATION RATE: 14 BRPM | HEART RATE: 72 BPM

## 2023-12-07 DIAGNOSIS — C79.51 MALIGNANT NEOPLASM METASTATIC TO BONE: ICD-10-CM

## 2023-12-07 DIAGNOSIS — C77.9 MALIGNANT NEOPLASM METASTATIC TO LYMPH NODES, UNSPECIFIED LYMPH NODE REGION: ICD-10-CM

## 2023-12-07 DIAGNOSIS — E03.2 HYPOTHYROIDISM DUE TO DRUGS: ICD-10-CM

## 2023-12-07 DIAGNOSIS — C34.90 ADENOCARCINOMA OF LUNG, STAGE 4, UNSPECIFIED LATERALITY: Primary | ICD-10-CM

## 2023-12-07 PROCEDURE — 99999 PR PBB SHADOW E&M-EST. PATIENT-LVL IV: CPT | Mod: PBBFAC,,, | Performed by: NURSE PRACTITIONER

## 2023-12-07 PROCEDURE — 99214 OFFICE O/P EST MOD 30 MIN: CPT | Mod: PBBFAC | Performed by: NURSE PRACTITIONER

## 2023-12-07 PROCEDURE — 99999 PR PBB SHADOW E&M-EST. PATIENT-LVL IV: ICD-10-PCS | Mod: PBBFAC,,, | Performed by: NURSE PRACTITIONER

## 2023-12-07 PROCEDURE — 99215 OFFICE O/P EST HI 40 MIN: CPT | Mod: S$PBB,,, | Performed by: NURSE PRACTITIONER

## 2023-12-07 PROCEDURE — 99215 PR OFFICE/OUTPT VISIT, EST, LEVL V, 40-54 MIN: ICD-10-PCS | Mod: S$PBB,,, | Performed by: NURSE PRACTITIONER

## 2023-12-07 RX ORDER — SODIUM CHLORIDE 0.9 % (FLUSH) 0.9 %
10 SYRINGE (ML) INJECTION
Status: CANCELLED | OUTPATIENT
Start: 2023-12-29

## 2023-12-07 RX ORDER — HEPARIN 100 UNIT/ML
500 SYRINGE INTRAVENOUS
Status: CANCELLED | OUTPATIENT
Start: 2023-12-29

## 2023-12-07 RX ORDER — DIPHENHYDRAMINE HYDROCHLORIDE 50 MG/ML
50 INJECTION INTRAMUSCULAR; INTRAVENOUS ONCE AS NEEDED
Status: CANCELLED | OUTPATIENT
Start: 2023-12-29

## 2023-12-07 RX ORDER — TRAMADOL HYDROCHLORIDE 50 MG/1
50 TABLET ORAL EVERY 8 HOURS PRN
COMMUNITY
Start: 2023-11-18 | End: 2024-03-09 | Stop reason: CLARIF

## 2023-12-07 RX ORDER — HEPARIN 100 UNIT/ML
500 SYRINGE INTRAVENOUS
Status: CANCELLED | OUTPATIENT
Start: 2023-12-08

## 2023-12-07 RX ORDER — EPINEPHRINE 0.3 MG/.3ML
0.3 INJECTION SUBCUTANEOUS ONCE AS NEEDED
Status: CANCELLED | OUTPATIENT
Start: 2023-12-29

## 2023-12-07 RX ORDER — DIPHENHYDRAMINE HYDROCHLORIDE 50 MG/ML
50 INJECTION INTRAMUSCULAR; INTRAVENOUS ONCE AS NEEDED
Status: CANCELLED | OUTPATIENT
Start: 2023-12-08

## 2023-12-07 RX ORDER — SODIUM CHLORIDE 0.9 % (FLUSH) 0.9 %
10 SYRINGE (ML) INJECTION
Status: CANCELLED | OUTPATIENT
Start: 2023-12-08

## 2023-12-07 RX ORDER — EPINEPHRINE 0.3 MG/.3ML
0.3 INJECTION SUBCUTANEOUS ONCE AS NEEDED
Status: CANCELLED | OUTPATIENT
Start: 2023-12-08

## 2023-12-07 NOTE — PROGRESS NOTES
Subjective:       Patient ID: Pratibha Patel is a 78 y.o. female.    Chief Complaint: Follow-up (No concerns today)      PCP: Graham Nguyen NP  Cardiologist: Dr. Danny Sánchez  Referring Physician: Dr. Anthony Hutson  Endocrine: Dr. Werner  Radiation oncology: Dr. Boss in the past, now Dr. Mortensen.  ENT: Dr. 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 ? metastatic findings could not be biopsied. TTF negative/CK 7 +.    Diagnosed June 2014 EGFR mutation negative/ALK gene rearrangement negative. 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 Patient has reportedly been referred to ENT for resection and lymph node dissection (per patient report).   Subsequent open biopsy done by Dr. Rosales on 7/29/2019 confirmed the same.  Patient was unable to undergo definitive surgical resection for oligo metastatic disease.  Hoarseness and swallowing difficulties secondary to #2  Biopsy of the left lung lesion showed recurrent non-small cell lung cancer, Caris was sent and showed positivity for PD-L1, 2%.  No other targetable or actionable mutations present.    Current Treatment:   Combination chemo immunotherapy with carboplatin, pemetrexed, nivolumab, ipilimumab based off of checkmate 9LA     Treatment History:  RT with weekly Carbo/Taxol started 7/2/14--Completed August 2014  Single agent 'Maintenance' Avastin q 3 weeks.  Started 4/9/15--last given September 21, 2016  Radiation + weekly Carbo/Taxol at [facility]  ----9/11/19-10/22/19  Patient underwent radiation from 12/17/2020 through 12/21/2020.  Radiation to the left and right lung per Dr. Robert Mortensen.    HPI:   Please see Oncology history in the diagnosis of adenocarcinoma of the lung, stage IV on the problem list for full detail.  Patient originally seen in 2014 with unintentional weight loss, shortness of breath, nonproductive cough.  Patient had imaging findings suggestive of lung cancer, bronchoscopy done in June of 2014 revealed moderately differentiated adenocarcinoma of the lung.  She originally underwent chemo radiation from July 2014 through August 2014.  She was placed on maintenance Avastin from April 2015 through September 2016. Patient then underwent a thyroid biopsy in June of 2019 that showed metastatic carcinoma favored to be adenocarcinoma lung primary.  She underwent surgical resection in July 2019, however full surgical dissection was not able to be done and the patient underwent open biopsy of right thyroid gland.  Pathology revealed poorly differentiated non-small cell carcinoma consistent with metastatic lung cancer.  NGS panel done at that time unremarkable.  She started chemoradiation with carbo Taxol in September 2019, completed in October 2019. Patient was doing well, imaging in October 2020 revealed an abnormality in the left lung, CT-guided lung biopsy on 11/03/2020 showed recurrent non-small cell lung cancer, Caris revealed PDL1 positivity.  She underwent SBRT from 12/17/2020 through 12/21/2020.  Patient then placed on surveillance imaging, most recently done on 07/05/2022 showing worsening disease in the right lung with a previous lesion that measured 0.6 cm and had an SUV of 1.0 now measuring 1.5 cm and an SUV of 8.4.  This was treated with SBRT in August of 2022.  PET/CT scan done on 11/8/2022 showed mild increased radiotracer activity within the left apical consolidation, SUV 5.5.  There was a newly hypermetabolic subcentimeter subcarinal  lymph node.  A 1 cm right lower lobe nodule was smaller, he right upper lobe nodule was slightly larger measuring 6 mm.  No sites of FDG avid metastatic disease in the neck, abdomen, or pelvis. Bronchoscopy done on 11/16/2022, pathology revealed malignant cells consistent with adenocarcinoma.  Planning on chemo immunotherapy with carboplatin, pemetrexed, nivolumab, ipilimumab based off of checkmate 9LA.  PET/CT on 07/25/2023 showed infectious or inflammatory changes in the right lower lobe with no other findings to suggest new or worsening disease.    PET/CT scan done on 10/12/2023 showed mixed response to therapy with decrease right hilar and left upper lobe consolidation uptake.  Subcarinal lymph node demonstrates significant increased uptake compared to the prior study.  Stable subcentimeter right upper lobe nodule demonstrates slight increased uptake compared to prior study    Interval History:   Patient presents to clinic for a treatment visit. She is now on immunotherapy alone, completed the chemotherapy arm of the treatment protocol.  She continues to tolerate immunotherapy well. She feels well overall with no new complaints.  Appetite and energy level are good.  No new respiratory complaints.     Past Medical History:   Diagnosis Date    Adenocarcinoma of lung     Anemia     Anxiety     Arthritis     COPD (chronic obstructive pulmonary disease)     Depression     HLD (hyperlipidemia)     Hypertension     Lung cancer     Secondary malignant neoplasm of lymph nodes     Secondary malignant neoplasm of right lung     Thyroid disease     lymph nodes malignant      Past Surgical History:   Procedure Laterality Date    BLADDER SURGERY      CHOLECYSTECTOMY      COLONOSCOPY  2018    ENDOBRONCHIAL ULTRASOUND N/A 11/16/2022    Procedure: ENDOBRONCHIAL ULTRASOUND (EBUS);  Surgeon: Anthony Hutson MD;  Location: [facility]CH[facility]COPY [facility];  Service: Pulmonary;  Laterality: N/A;    HYSTERECTOMY      KNEE SURGERY Bilateral      replaced knees    PARTIAL HIP ARTHROPLASTY Bilateral      Social History     Socioeconomic History    Marital status:    Tobacco Use    Smoking status: Every Day     Current packs/day: 0.25     Types: Cigarettes    Smokeless tobacco: Never    Tobacco comments:     Patient states she is trying to quit smoking.   Substance and Sexual Activity    Alcohol use: Not Currently    Drug use: Never      Family History   Problem Relation Age of Onset    Lung cancer Mother     Lung cancer Brother       Review of patient's allergies indicates:  No Known Allergies   Review of Systems   Constitutional:  Negative for chills, diaphoresis, fatigue, fever and unexpected weight change.   HENT:  Negative for nasal congestion, mouth sores, sinus pressure/congestion and sore throat.    Eyes:  Negative for pain and visual disturbance.   Respiratory:  Negative for cough, chest tightness and shortness of breath.    Cardiovascular:  Negative for chest pain, palpitations and leg swelling.   Gastrointestinal:  Negative for abdominal distention, abdominal pain, blood in stool, constipation and diarrhea.   Genitourinary:  Negative for dysuria, frequency and hematuria.   Musculoskeletal:  Negative for arthralgias and back pain.   Integumentary:  Negative for rash.   Neurological:  Negative for dizziness, weakness, numbness and headaches.   Hematological:  Negative for adenopathy.   Psychiatric/Behavioral:  Negative for confusion.          Objective:      Physical Exam  Vitals reviewed.   Constitutional:       General: She is not in acute distress.     Appearance: Normal appearance.   HENT:      Head: Normocephalic and atraumatic.      Nose: Nose normal.      Mouth/Throat:      Mouth: Mucous membranes are moist.   Eyes:      Extraocular Movements: Extraocular movements intact.      Conjunctiva/sclera: Conjunctivae normal.   Neck:      Comments: Radiation changes to the left supraclavicular area  Cardiovascular:      Rate and Rhythm:  Normal rate and regular rhythm.      Pulses: Normal pulses.      Heart sounds: Normal heart sounds.   Pulmonary:      Effort: Pulmonary effort is normal.      Breath sounds: Examination of the right-lower field reveals decreased breath sounds. Examination of the left-lower field reveals decreased breath sounds. Decreased breath sounds present.   Abdominal:      General: Bowel sounds are normal.      Palpations: Abdomen is soft.   Musculoskeletal:         General: No swelling. Normal range of motion.      Cervical back: Normal range of motion and neck supple.      Right lower leg: No edema.      Left lower leg: No edema.   Skin:     General: Skin is warm and dry.   Neurological:      General: No focal deficit present.      Mental Status: She is alert and oriented to person, place, and time. Mental status is at baseline.   Psychiatric:         Mood and Affect: Mood normal.         Behavior: Behavior normal.         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 ? metastatic findings could not be biopsied.    TTF negative/CK 7 +.    Diagnosed June 2014.  EGFR mutation negative/ALK gene rearrangement negative.  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 Patient has reportedly been referred to ENT for resection and lymph node dissection (per patient report). Subsequent open biopsy done by Dr. Rosales on 7/29/2019 confirmed the same.  Patient was unable to undergo definitive surgical resection for oligo metastatic disease.  Treatment induced anemia  Hoarseness .       Plan:          Patient's biopsy did return as recurrent non-small cell lung cancer in the left lung.  I feel that the right lung will be similar pathology.  She completed radiation with Dr. Mortensen on 12/24/2020.     Caris did reveal that PD-L1 was 2% positive suggesting benefit from pembrolizumab or nivolumab/ipilimumab.      Her PET/CT scan does show a new hypermetabolic subcentimeter subcarinal lymph node and increase in a left apical consolidation.  The left apical consolidation is close to the aorta and I do not think would be amenable to biopsy, however this subcarinal lymph node may be amenable to bronchoscopy.      Bronchoscopy done on 11/16/2022 showed metastatic adenocarcinoma.  She is no longer a candidate for full dose SBRT.  We started with carboplatin, pemetrexed, nivolumab, and ipilimumab based on the CHECKMATE 9LA study.  Cycle 1 day 1 given 12/20/2022.     Follow up with radiation oncology as scheduled, Dr. Mortensen.    Continue levothyroxine to 88mcg daily.      It is okay for the patient to have surgery with orthopedics while on immunotherapy.    We will get a swallow study and due to inflammatory changes in the lung, treated with 10 days of levofloxacin.    PET/CT scan done on 10/12/2023 showed mixed response to therapy with decrease right hilar and left upper lobe consolidation uptake.  Subcarinal lymph node demonstrates significant increased uptake compared to the prior study.  Stable subcentimeter right upper lobe nodule demonstrates slight increased uptake compared to prior study.    We will go ahead and continue with immunotherapy.     Started on PPI    Labs and treat q3w    Return to clinic in 6 weeks, same day labs, repeat PET prior    GADIEL Martinez

## 2023-12-08 ENCOUNTER — INFUSION (OUTPATIENT)
Dept: INFUSION THERAPY | Facility: HOSPITAL | Age: 78
End: 2023-12-08
Attending: NURSE PRACTITIONER
Payer: MEDICARE

## 2023-12-08 VITALS
DIASTOLIC BLOOD PRESSURE: 82 MMHG | HEART RATE: 71 BPM | TEMPERATURE: 98 F | OXYGEN SATURATION: 98 % | RESPIRATION RATE: 16 BRPM | SYSTOLIC BLOOD PRESSURE: 121 MMHG

## 2023-12-08 DIAGNOSIS — C34.90 ADENOCARCINOMA OF LUNG, STAGE 4, UNSPECIFIED LATERALITY: Primary | ICD-10-CM

## 2023-12-08 PROCEDURE — 96413 CHEMO IV INFUSION 1 HR: CPT

## 2023-12-08 PROCEDURE — 63600175 PHARM REV CODE 636 W HCPCS: Mod: JZ,JG | Performed by: NURSE PRACTITIONER

## 2023-12-08 PROCEDURE — 25000003 PHARM REV CODE 250: Performed by: NURSE PRACTITIONER

## 2023-12-08 PROCEDURE — 96417 CHEMO IV INFUS EACH ADDL SEQ: CPT

## 2023-12-08 PROCEDURE — A4216 STERILE WATER/SALINE, 10 ML: HCPCS | Performed by: NURSE PRACTITIONER

## 2023-12-08 RX ORDER — HEPARIN 100 UNIT/ML
500 SYRINGE INTRAVENOUS
Status: DISCONTINUED | OUTPATIENT
Start: 2023-12-08 | End: 2023-12-08 | Stop reason: HOSPADM

## 2023-12-08 RX ORDER — EPINEPHRINE 0.3 MG/.3ML
0.3 INJECTION SUBCUTANEOUS ONCE AS NEEDED
Status: DISCONTINUED | OUTPATIENT
Start: 2023-12-08 | End: 2023-12-08 | Stop reason: HOSPADM

## 2023-12-08 RX ORDER — SODIUM CHLORIDE 0.9 % (FLUSH) 0.9 %
10 SYRINGE (ML) INJECTION
Status: DISCONTINUED | OUTPATIENT
Start: 2023-12-08 | End: 2023-12-08 | Stop reason: HOSPADM

## 2023-12-08 RX ORDER — DIPHENHYDRAMINE HYDROCHLORIDE 50 MG/ML
50 INJECTION INTRAMUSCULAR; INTRAVENOUS ONCE AS NEEDED
Status: DISCONTINUED | OUTPATIENT
Start: 2023-12-08 | End: 2023-12-08 | Stop reason: HOSPADM

## 2023-12-08 RX ADMIN — Medication 10 ML: at 11:12

## 2023-12-08 RX ADMIN — HEPARIN 500 UNITS: 100 SYRINGE at 11:12

## 2023-12-08 RX ADMIN — SODIUM CHLORIDE 360 MG: 9 INJECTION, SOLUTION INTRAVENOUS at 10:12

## 2023-12-08 RX ADMIN — SODIUM CHLORIDE 75 MG: 9 INJECTION, SOLUTION INTRAVENOUS at 10:12

## 2023-12-08 RX ADMIN — SODIUM CHLORIDE: 9 INJECTION, SOLUTION INTRAVENOUS at 10:12

## 2023-12-28 ENCOUNTER — LAB VISIT (OUTPATIENT)
Dept: LAB | Facility: HOSPITAL | Age: 78
End: 2023-12-28
Attending: INTERNAL MEDICINE
Payer: MEDICARE

## 2023-12-28 DIAGNOSIS — C79.51 MALIGNANT NEOPLASM METASTATIC TO BONE: ICD-10-CM

## 2023-12-28 DIAGNOSIS — C34.90 ADENOCARCINOMA OF LUNG, STAGE 4, UNSPECIFIED LATERALITY: ICD-10-CM

## 2023-12-28 DIAGNOSIS — R53.83 FATIGUE, UNSPECIFIED TYPE: ICD-10-CM

## 2023-12-28 DIAGNOSIS — C77.9 MALIGNANT NEOPLASM METASTATIC TO LYMPH NODES, UNSPECIFIED LYMPH NODE REGION: ICD-10-CM

## 2023-12-28 LAB
ALBUMIN SERPL-MCNC: 2.7 G/DL (ref 3.4–4.8)
ALBUMIN/GLOB SERPL: 0.9 RATIO (ref 1.1–2)
ALP SERPL-CCNC: 98 UNIT/L (ref 40–150)
ALT SERPL-CCNC: 18 UNIT/L (ref 0–55)
AST SERPL-CCNC: 20 UNIT/L (ref 5–34)
BASOPHILS # BLD AUTO: 0.05 X10(3)/MCL
BASOPHILS NFR BLD AUTO: 0.5 %
BILIRUB SERPL-MCNC: 0.2 MG/DL
BUN SERPL-MCNC: 19.7 MG/DL (ref 9.8–20.1)
CALCIUM SERPL-MCNC: 9.9 MG/DL (ref 8.4–10.2)
CHLORIDE SERPL-SCNC: 100 MMOL/L (ref 98–107)
CO2 SERPL-SCNC: 27 MMOL/L (ref 23–31)
CORTIS SERPL-SCNC: 16 UG/DL
CREAT SERPL-MCNC: 0.87 MG/DL (ref 0.55–1.02)
EOSINOPHIL # BLD AUTO: 0.11 X10(3)/MCL (ref 0–0.9)
EOSINOPHIL NFR BLD AUTO: 1 %
ERYTHROCYTE [DISTWIDTH] IN BLOOD BY AUTOMATED COUNT: 16.2 % (ref 11.5–17)
GFR SERPLBLD CREATININE-BSD FMLA CKD-EPI: >60 MLS/MIN/1.73/M2
GLOBULIN SER-MCNC: 3 GM/DL (ref 2.4–3.5)
GLUCOSE SERPL-MCNC: 93 MG/DL (ref 82–115)
HCT VFR BLD AUTO: 30.7 % (ref 37–47)
HGB BLD-MCNC: 9.6 G/DL (ref 12–16)
IMM GRANULOCYTES # BLD AUTO: 0.08 X10(3)/MCL (ref 0–0.04)
IMM GRANULOCYTES NFR BLD AUTO: 0.7 %
LYMPHOCYTES # BLD AUTO: 1.41 X10(3)/MCL (ref 0.6–4.6)
LYMPHOCYTES NFR BLD AUTO: 12.9 %
MCH RBC QN AUTO: 25.9 PG (ref 27–31)
MCHC RBC AUTO-ENTMCNC: 31.3 G/DL (ref 33–36)
MCV RBC AUTO: 83 FL (ref 80–94)
MONOCYTES # BLD AUTO: 1.31 X10(3)/MCL (ref 0.1–1.3)
MONOCYTES NFR BLD AUTO: 12 %
NEUTROPHILS # BLD AUTO: 8 X10(3)/MCL (ref 2.1–9.2)
NEUTROPHILS NFR BLD AUTO: 72.9 %
PLATELET # BLD AUTO: 407 X10(3)/MCL (ref 130–400)
PMV BLD AUTO: 8.4 FL (ref 7.4–10.4)
POTASSIUM SERPL-SCNC: 5 MMOL/L (ref 3.5–5.1)
PROT SERPL-MCNC: 5.7 GM/DL (ref 5.8–7.6)
RBC # BLD AUTO: 3.7 X10(6)/MCL (ref 4.2–5.4)
SODIUM SERPL-SCNC: 137 MMOL/L (ref 136–145)
TSH SERPL-ACNC: 6.79 UIU/ML (ref 0.35–4.94)
WBC # SPEC AUTO: 10.96 X10(3)/MCL (ref 4.5–11.5)

## 2023-12-28 PROCEDURE — 82533 TOTAL CORTISOL: CPT

## 2023-12-28 PROCEDURE — 85025 COMPLETE CBC W/AUTO DIFF WBC: CPT

## 2023-12-28 PROCEDURE — 36415 COLL VENOUS BLD VENIPUNCTURE: CPT

## 2023-12-28 PROCEDURE — 84443 ASSAY THYROID STIM HORMONE: CPT

## 2023-12-28 PROCEDURE — 80053 COMPREHEN METABOLIC PANEL: CPT

## 2023-12-29 ENCOUNTER — INFUSION (OUTPATIENT)
Dept: INFUSION THERAPY | Facility: HOSPITAL | Age: 78
End: 2023-12-29
Attending: NURSE PRACTITIONER
Payer: MEDICARE

## 2023-12-29 VITALS
HEART RATE: 73 BPM | OXYGEN SATURATION: 100 % | WEIGHT: 152 LBS | TEMPERATURE: 98 F | SYSTOLIC BLOOD PRESSURE: 145 MMHG | BODY MASS INDEX: 26.09 KG/M2 | DIASTOLIC BLOOD PRESSURE: 63 MMHG

## 2023-12-29 DIAGNOSIS — C34.90 ADENOCARCINOMA OF LUNG, STAGE 4, UNSPECIFIED LATERALITY: Primary | ICD-10-CM

## 2023-12-29 PROCEDURE — 25000003 PHARM REV CODE 250: Performed by: NURSE PRACTITIONER

## 2023-12-29 PROCEDURE — A4216 STERILE WATER/SALINE, 10 ML: HCPCS | Performed by: NURSE PRACTITIONER

## 2023-12-29 PROCEDURE — 96413 CHEMO IV INFUSION 1 HR: CPT

## 2023-12-29 PROCEDURE — 63600175 PHARM REV CODE 636 W HCPCS: Performed by: NURSE PRACTITIONER

## 2023-12-29 RX ORDER — EPINEPHRINE 0.3 MG/.3ML
0.3 INJECTION SUBCUTANEOUS ONCE AS NEEDED
Status: DISCONTINUED | OUTPATIENT
Start: 2023-12-29 | End: 2023-12-29 | Stop reason: HOSPADM

## 2023-12-29 RX ORDER — SODIUM CHLORIDE 0.9 % (FLUSH) 0.9 %
10 SYRINGE (ML) INJECTION
Status: DISCONTINUED | OUTPATIENT
Start: 2023-12-29 | End: 2023-12-29 | Stop reason: HOSPADM

## 2023-12-29 RX ORDER — HEPARIN 100 UNIT/ML
500 SYRINGE INTRAVENOUS
Status: DISCONTINUED | OUTPATIENT
Start: 2023-12-29 | End: 2023-12-29 | Stop reason: HOSPADM

## 2023-12-29 RX ORDER — DIPHENHYDRAMINE HYDROCHLORIDE 50 MG/ML
50 INJECTION INTRAMUSCULAR; INTRAVENOUS ONCE AS NEEDED
Status: DISCONTINUED | OUTPATIENT
Start: 2023-12-29 | End: 2023-12-29 | Stop reason: HOSPADM

## 2023-12-29 RX ADMIN — HEPARIN 500 UNITS: 100 SYRINGE at 10:12

## 2023-12-29 RX ADMIN — Medication 10 ML: at 10:12

## 2023-12-29 RX ADMIN — SODIUM CHLORIDE 360 MG: 9 INJECTION, SOLUTION INTRAVENOUS at 09:12

## 2023-12-29 RX ADMIN — SODIUM CHLORIDE: 9 INJECTION, SOLUTION INTRAVENOUS at 09:12

## 2024-01-18 ENCOUNTER — HOSPITAL ENCOUNTER (OUTPATIENT)
Dept: RADIOLOGY | Facility: HOSPITAL | Age: 79
Discharge: HOME | End: 2024-01-18
Attending: NURSE PRACTITIONER
Payer: MEDICARE

## 2024-01-18 DIAGNOSIS — C79.51 MALIGNANT NEOPLASM METASTATIC TO BONE: ICD-10-CM

## 2024-01-18 DIAGNOSIS — C34.90 ADENOCARCINOMA OF LUNG, STAGE 4, UNSPECIFIED LATERALITY: ICD-10-CM

## 2024-01-18 DIAGNOSIS — E03.2 HYPOTHYROIDISM DUE TO DRUGS: ICD-10-CM

## 2024-01-18 DIAGNOSIS — C77.9 MALIGNANT NEOPLASM METASTATIC TO LYMPH NODES, UNSPECIFIED LYMPH NODE REGION: ICD-10-CM

## 2024-01-18 PROCEDURE — A9552 F18 FDG: HCPCS

## 2024-01-18 PROCEDURE — 78815 PET IMAGE W/CT SKULL-THIGH: CPT | Mod: TC,PS

## 2024-01-18 NOTE — PROGRESS NOTES
Subjective:       Patient ID: Pratibha Patel is a 78 y.o. female.    Chief Complaint: Follow-up (Patient has no concerns today)      PCP: Graham Nguyen NP  Cardiologist: Dr. Danny Sánchez  Referring Physician: Dr. Anthony Hutson  Endocrine: Dr. Werner  Radiation oncology: Dr. Boss in the past, now Dr. Mortensen.  ENT: Dr. 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 ? metastatic findings could not be biopsied. TTF negative/CK 7 +.    Diagnosed June 2014 EGFR mutation negative/ALK gene rearrangement negative. 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 Patient has reportedly been referred to ENT for resection and lymph node dissection (per patient report).   Subsequent open biopsy done by Dr. Rosales on 7/29/2019 confirmed the same.  Patient was unable to undergo definitive surgical resection for oligo metastatic disease.  Hoarseness and swallowing difficulties secondary to #2  Biopsy of the left lung lesion showed recurrent non-small cell lung cancer, Caris was sent and showed positivity for PD-L1, 2%.  No other targetable or actionable mutations present.    Current Treatment:   Combination chemo immunotherapy with carboplatin, pemetrexed, nivolumab, ipilimumab based off of checkmate 9LA     Treatment History:  RT with weekly Carbo/Taxol started 7/2/14--Completed August 2014  Single agent 'Maintenance' Avastin q 3 weeks.  Started 4/9/15--last given September 21, 2016  Radiation + weekly Carbo/Taxol  at [facility] ----9/11/19-10/22/19  Patient underwent radiation from 12/17/2020 through 12/21/2020.  Radiation to the left and right lung per Dr. Robert Mortensen.    HPI:   Please see Oncology history in the diagnosis of adenocarcinoma of the lung, stage IV on the problem list for full detail.  Patient originally seen in 2014 with unintentional weight loss, shortness of breath, nonproductive cough.  Patient had imaging findings suggestive of lung cancer, bronchoscopy done in June of 2014 revealed moderately differentiated adenocarcinoma of the lung.  She originally underwent chemo radiation from July 2014 through August 2014.  She was placed on maintenance Avastin from April 2015 through September 2016. Patient then underwent a thyroid biopsy in June of 2019 that showed metastatic carcinoma favored to be adenocarcinoma lung primary.  She underwent surgical resection in July 2019, however full surgical dissection was not able to be done and the patient underwent open biopsy of right thyroid gland.  Pathology revealed poorly differentiated non-small cell carcinoma consistent with metastatic lung cancer.  NGS panel done at that time unremarkable.  She started chemoradiation with carbo Taxol in September 2019, completed in October 2019. Patient was doing well, imaging in October 2020 revealed an abnormality in the left lung, CT-guided lung biopsy on 11/03/2020 showed recurrent non-small cell lung cancer, Caris revealed PDL1 positivity.  She underwent SBRT from 12/17/2020 through 12/21/2020.  Patient then placed on surveillance imaging, most recently done on 07/05/2022 showing worsening disease in the right lung with a previous lesion that measured 0.6 cm and had an SUV of 1.0 now measuring 1.5 cm and an SUV of 8.4.  This was treated with SBRT in August of 2022.  PET/CT scan done on 11/8/2022 showed mild increased radiotracer activity within the left apical consolidation, SUV 5.5.  There was a newly hypermetabolic subcentimeter  subcarinal lymph node.  A 1 cm right lower lobe nodule was smaller, he right upper lobe nodule was slightly larger measuring 6 mm.  No sites of FDG avid metastatic disease in the neck, abdomen, or pelvis. Bronchoscopy done on 11/16/2022, pathology revealed malignant cells consistent with adenocarcinoma.  Planning on chemo immunotherapy with carboplatin, pemetrexed, nivolumab, ipilimumab based off of checkmate 9LA.  PET/CT on 07/25/2023 showed infectious or inflammatory changes in the right lower lobe with no other findings to suggest new or worsening disease.    PET/CT scan done on 10/12/2023 showed mixed response to therapy with decrease right hilar and left upper lobe consolidation uptake.  Subcarinal lymph node demonstrates significant increased uptake compared to the prior study.  Stable subcentimeter right upper lobe nodule demonstrates slight increased uptake compared to prior study    Most recent PET/CT scan done on 01/18/2024 showed mixed response to therapy with decrease in size of the right upper lobe nodule and maximum SUV of subcarinal lymph node.  There was right hilar uptake that was increased, however there was also patchy airspace opacities that could be infectious or inflammatory.    Interval History:   Patient presents to clinic for a treatment visit.  Overall she was doing okay.  She has had an upper respiratory like infection and been having a cough.  She has no major issues to discuss.    Past Medical History:   Diagnosis Date    Adenocarcinoma of lung     Anemia     Anxiety     Arthritis     COPD (chronic obstructive pulmonary disease)     Depression     HLD (hyperlipidemia)     Hypertension     Lung cancer     Secondary malignant neoplasm of lymph nodes     Secondary malignant neoplasm of right lung     Thyroid disease     lymph nodes malignant      Past Surgical History:   Procedure Laterality Date    BLADDER SURGERY      CHOLECYSTECTOMY      COLONOSCOPY  2018    ENDOBRONCHIAL ULTRASOUND N/A  11/16/2022    Procedure: ENDOBRONCHIAL ULTRASOUND (EBUS);  Surgeon: Anthony Hutson MD;  Location: [facility]CH[facility]COPY [facility];  Service: Pulmonary;  Laterality: N/A;    HYSTERECTOMY      KNEE SURGERY Bilateral     replaced knees    PARTIAL HIP ARTHROPLASTY Bilateral      Social History     Socioeconomic History    Marital status:    Tobacco Use    Smoking status: Every Day     Current packs/day: 0.25     Types: Cigarettes    Smokeless tobacco: Never    Tobacco comments:     Patient states she is trying to quit smoking.   Substance and Sexual Activity    Alcohol use: Not Currently    Drug use: Never      Family History   Problem Relation Age of Onset    Lung cancer Mother     Lung cancer Brother       Review of patient's allergies indicates:  No Known Allergies   Review of Systems   Constitutional:  Negative for chills, diaphoresis, fatigue, fever and unexpected weight change.   HENT:  Negative for nasal congestion, mouth sores, sinus pressure/congestion and sore throat.    Eyes:  Negative for pain and visual disturbance.   Respiratory:  Negative for cough, chest tightness and shortness of breath.    Cardiovascular:  Negative for chest pain, palpitations and leg swelling.   Gastrointestinal:  Negative for abdominal distention, abdominal pain, blood in stool, constipation and diarrhea.   Genitourinary:  Negative for dysuria, frequency and hematuria.   Musculoskeletal:  Negative for arthralgias and back pain.   Integumentary:  Negative for rash.   Neurological:  Negative for dizziness, weakness, numbness and headaches.   Hematological:  Negative for adenopathy.   Psychiatric/Behavioral:  Negative for confusion.          Objective:      Physical Exam  Vitals reviewed.   Constitutional:       General: She is not in acute distress.     Appearance: Normal appearance.   HENT:      Head: Normocephalic and atraumatic.      Nose: Nose normal.      Mouth/Throat:      Mouth: Mucous membranes are moist.   Eyes:       Extraocular Movements: Extraocular movements intact.      Conjunctiva/sclera: Conjunctivae normal.   Neck:      Comments: Radiation changes to the left supraclavicular area  Cardiovascular:      Rate and Rhythm: Normal rate and regular rhythm.      Pulses: Normal pulses.      Heart sounds: Normal heart sounds.   Pulmonary:      Effort: Pulmonary effort is normal.      Breath sounds: Examination of the right-lower field reveals decreased breath sounds. Examination of the left-lower field reveals decreased breath sounds. Decreased breath sounds present.   Abdominal:      General: Bowel sounds are normal.      Palpations: Abdomen is soft.   Musculoskeletal:         General: No swelling. Normal range of motion.      Cervical back: Normal range of motion and neck supple.      Right lower leg: No edema.      Left lower leg: No edema.   Skin:     General: Skin is warm and dry.   Neurological:      General: No focal deficit present.      Mental Status: She is alert and oriented to person, place, and time. Mental status is at baseline.   Psychiatric:         Mood and Affect: Mood normal.         Behavior: Behavior normal.         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 ? metastatic findings could not be biopsied.    TTF negative/CK 7 +.    Diagnosed June 2014.  EGFR mutation negative/ALK gene rearrangement negative.  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 Patient has reportedly been referred to ENT for resection and lymph node dissection (per patient  report). Subsequent open biopsy done by Dr. Rosales on 7/29/2019 confirmed the same.  Patient was unable to undergo definitive surgical resection for oligo metastatic disease.  Treatment induced anemia  Hoarseness .      Plan:          Patient's biopsy did return as recurrent non-small cell lung cancer in the left lung.  I feel that the right lung will be similar pathology.  She completed radiation with Dr. Mortensen on 12/24/2020.     Caris did reveal that PD-L1 was 2% positive suggesting benefit from pembrolizumab or nivolumab/ipilimumab.      Her PET/CT scan does show a new hypermetabolic subcentimeter subcarinal lymph node and increase in a left apical consolidation.  The left apical consolidation is close to the aorta and I do not think would be amenable to biopsy, however this subcarinal lymph node may be amenable to bronchoscopy.      Bronchoscopy done on 11/16/2022 showed metastatic adenocarcinoma.  She is no longer a candidate for full dose SBRT.  We started with carboplatin, pemetrexed, nivolumab, and ipilimumab based on the CHECKMATE 9LA study.  Cycle 1 day 1 given 12/20/2022.     Follow up with radiation oncology as scheduled, Dr. Mortensen.    Continue levothyroxine to 88mcg daily.     PET/CT scan done on 01/18/2024 showed mixed response to therapy.  Overall, the patient was asymptomatic from a cancer standpoint, however she does have a cough that has been rattling.  We will try another round of antibiotics, and continue with immunotherapy.    There is no evidence of overt progression.    Started on PPI    Labs and treat q3w    Return to clinic in 6 weeks, same day labs    Enrrique Gudino II, MD

## 2024-01-19 ENCOUNTER — INFUSION (OUTPATIENT)
Dept: INFUSION THERAPY | Facility: HOSPITAL | Age: 79
End: 2024-01-19
Attending: NURSE PRACTITIONER
Payer: MEDICARE

## 2024-01-19 VITALS
HEART RATE: 67 BPM | DIASTOLIC BLOOD PRESSURE: 61 MMHG | SYSTOLIC BLOOD PRESSURE: 122 MMHG | WEIGHT: 154.88 LBS | TEMPERATURE: 97 F | HEIGHT: 64 IN | BODY MASS INDEX: 26.44 KG/M2 | OXYGEN SATURATION: 96 % | RESPIRATION RATE: 18 BRPM

## 2024-01-19 DIAGNOSIS — C34.90 ADENOCARCINOMA OF LUNG, STAGE 4, UNSPECIFIED LATERALITY: Primary | ICD-10-CM

## 2024-01-19 PROCEDURE — 63600175 PHARM REV CODE 636 W HCPCS: Mod: JZ,JG | Performed by: NURSE PRACTITIONER

## 2024-01-19 PROCEDURE — 96417 CHEMO IV INFUS EACH ADDL SEQ: CPT

## 2024-01-19 PROCEDURE — 25000003 PHARM REV CODE 250: Performed by: NURSE PRACTITIONER

## 2024-01-19 PROCEDURE — 96413 CHEMO IV INFUSION 1 HR: CPT

## 2024-01-19 PROCEDURE — A4216 STERILE WATER/SALINE, 10 ML: HCPCS | Performed by: NURSE PRACTITIONER

## 2024-01-19 RX ORDER — DIPHENHYDRAMINE HYDROCHLORIDE 50 MG/ML
50 INJECTION INTRAMUSCULAR; INTRAVENOUS ONCE AS NEEDED
Status: DISCONTINUED | OUTPATIENT
Start: 2024-01-19 | End: 2024-01-19 | Stop reason: HOSPADM

## 2024-01-19 RX ORDER — EPINEPHRINE 0.3 MG/.3ML
0.3 INJECTION SUBCUTANEOUS ONCE AS NEEDED
Status: CANCELLED | OUTPATIENT
Start: 2024-02-09

## 2024-01-19 RX ORDER — EPINEPHRINE 0.3 MG/.3ML
0.3 INJECTION SUBCUTANEOUS ONCE AS NEEDED
Status: CANCELLED | OUTPATIENT
Start: 2024-01-19

## 2024-01-19 RX ORDER — SODIUM CHLORIDE 0.9 % (FLUSH) 0.9 %
10 SYRINGE (ML) INJECTION
Status: CANCELLED | OUTPATIENT
Start: 2024-02-09

## 2024-01-19 RX ORDER — EPINEPHRINE 0.3 MG/.3ML
0.3 INJECTION SUBCUTANEOUS ONCE AS NEEDED
Status: DISCONTINUED | OUTPATIENT
Start: 2024-01-19 | End: 2024-01-19 | Stop reason: HOSPADM

## 2024-01-19 RX ORDER — SODIUM CHLORIDE 0.9 % (FLUSH) 0.9 %
10 SYRINGE (ML) INJECTION
Status: DISCONTINUED | OUTPATIENT
Start: 2024-01-19 | End: 2024-01-19 | Stop reason: HOSPADM

## 2024-01-19 RX ORDER — HEPARIN 100 UNIT/ML
500 SYRINGE INTRAVENOUS
Status: CANCELLED | OUTPATIENT
Start: 2024-02-09

## 2024-01-19 RX ORDER — DIPHENHYDRAMINE HYDROCHLORIDE 50 MG/ML
50 INJECTION INTRAMUSCULAR; INTRAVENOUS ONCE AS NEEDED
Status: CANCELLED | OUTPATIENT
Start: 2024-01-19

## 2024-01-19 RX ORDER — SODIUM CHLORIDE 0.9 % (FLUSH) 0.9 %
10 SYRINGE (ML) INJECTION
Status: CANCELLED | OUTPATIENT
Start: 2024-01-19

## 2024-01-19 RX ORDER — HEPARIN 100 UNIT/ML
500 SYRINGE INTRAVENOUS
Status: DISCONTINUED | OUTPATIENT
Start: 2024-01-19 | End: 2024-01-19 | Stop reason: HOSPADM

## 2024-01-19 RX ORDER — DIPHENHYDRAMINE HYDROCHLORIDE 50 MG/ML
50 INJECTION INTRAMUSCULAR; INTRAVENOUS ONCE AS NEEDED
Status: CANCELLED | OUTPATIENT
Start: 2024-02-09

## 2024-01-19 RX ORDER — HEPARIN 100 UNIT/ML
500 SYRINGE INTRAVENOUS
Status: CANCELLED | OUTPATIENT
Start: 2024-01-19

## 2024-01-19 RX ADMIN — SODIUM CHLORIDE 360 MG: 9 INJECTION, SOLUTION INTRAVENOUS at 09:01

## 2024-01-19 RX ADMIN — SODIUM CHLORIDE 75 MG: 9 INJECTION, SOLUTION INTRAVENOUS at 10:01

## 2024-01-19 RX ADMIN — SODIUM CHLORIDE: 9 INJECTION, SOLUTION INTRAVENOUS at 09:01

## 2024-01-19 RX ADMIN — Medication 10 ML: at 10:01

## 2024-01-19 RX ADMIN — HEPARIN 500 UNITS: 100 SYRINGE at 10:01

## 2024-01-19 NOTE — PLAN OF CARE
C10 D1 Opdivo/Yervoy given. Tolerated well. Next appts given to pt. Dc'd home in stable condition.

## 2024-01-25 ENCOUNTER — OFFICE VISIT (OUTPATIENT)
Dept: HEMATOLOGY/ONCOLOGY | Facility: CLINIC | Age: 79
End: 2024-01-25
Payer: MEDICARE

## 2024-01-25 VITALS
HEIGHT: 64 IN | SYSTOLIC BLOOD PRESSURE: 117 MMHG | WEIGHT: 154.44 LBS | RESPIRATION RATE: 15 BRPM | DIASTOLIC BLOOD PRESSURE: 73 MMHG | BODY MASS INDEX: 26.37 KG/M2 | OXYGEN SATURATION: 95 % | HEART RATE: 76 BPM

## 2024-01-25 DIAGNOSIS — J98.8 RESPIRATORY INFECTION: Primary | ICD-10-CM

## 2024-01-25 DIAGNOSIS — C34.90 ADENOCARCINOMA OF LUNG, STAGE 4, UNSPECIFIED LATERALITY: ICD-10-CM

## 2024-01-25 DIAGNOSIS — C34.90 ADENOCARCINOMA OF LUNG, STAGE 4, UNSPECIFIED LATERALITY: Primary | ICD-10-CM

## 2024-01-25 DIAGNOSIS — R05.8 OTHER COUGH: ICD-10-CM

## 2024-01-25 DIAGNOSIS — C79.51 MALIGNANT NEOPLASM METASTATIC TO BONE: ICD-10-CM

## 2024-01-25 PROCEDURE — 99214 OFFICE O/P EST MOD 30 MIN: CPT | Mod: S$PBB,,, | Performed by: INTERNAL MEDICINE

## 2024-01-25 PROCEDURE — 99215 OFFICE O/P EST HI 40 MIN: CPT | Mod: PBBFAC | Performed by: INTERNAL MEDICINE

## 2024-01-25 PROCEDURE — 99999 PR PBB SHADOW E&M-EST. PATIENT-LVL V: CPT | Mod: PBBFAC,,, | Performed by: INTERNAL MEDICINE

## 2024-01-25 RX ORDER — PREDNISONE 20 MG/1
TABLET ORAL
COMMUNITY
Start: 2024-01-04 | End: 2024-03-09 | Stop reason: CLARIF

## 2024-01-25 RX ORDER — LEVOFLOXACIN 750 MG/1
750 TABLET ORAL DAILY
Qty: 7 TABLET | Refills: 0 | Status: SHIPPED | OUTPATIENT
Start: 2024-01-25 | End: 2024-02-01

## 2024-01-25 RX ORDER — LEVOFLOXACIN 750 MG/1
750 TABLET ORAL DAILY
Qty: 7 TABLET | Refills: 0 | Status: CANCELLED | OUTPATIENT
Start: 2024-01-25 | End: 2024-02-01

## 2024-02-08 ENCOUNTER — LAB VISIT (OUTPATIENT)
Dept: LAB | Facility: HOSPITAL | Age: 79
End: 2024-02-08
Attending: INTERNAL MEDICINE
Payer: MEDICARE

## 2024-02-08 DIAGNOSIS — C34.90 ADENOCARCINOMA OF LUNG, STAGE 4, UNSPECIFIED LATERALITY: ICD-10-CM

## 2024-02-08 DIAGNOSIS — E03.2 HYPOTHYROIDISM DUE TO DRUGS: ICD-10-CM

## 2024-02-08 DIAGNOSIS — C79.51 MALIGNANT NEOPLASM METASTATIC TO BONE: ICD-10-CM

## 2024-02-08 DIAGNOSIS — C77.9 MALIGNANT NEOPLASM METASTATIC TO LYMPH NODES, UNSPECIFIED LYMPH NODE REGION: ICD-10-CM

## 2024-02-08 LAB
ALBUMIN SERPL-MCNC: 3 G/DL (ref 3.4–4.8)
ALBUMIN/GLOB SERPL: 1.2 RATIO (ref 1.1–2)
ALP SERPL-CCNC: 83 UNIT/L (ref 40–150)
ALT SERPL-CCNC: 10 UNIT/L (ref 0–55)
AST SERPL-CCNC: 16 UNIT/L (ref 5–34)
BASOPHILS # BLD AUTO: 0.06 X10(3)/MCL
BASOPHILS NFR BLD AUTO: 0.7 %
BILIRUB SERPL-MCNC: 0.3 MG/DL
BUN SERPL-MCNC: 18.5 MG/DL (ref 9.8–20.1)
CALCIUM SERPL-MCNC: 9.2 MG/DL (ref 8.4–10.2)
CHLORIDE SERPL-SCNC: 101 MMOL/L (ref 98–107)
CO2 SERPL-SCNC: 30 MMOL/L (ref 23–31)
CORTIS SERPL-SCNC: 10.6 UG/DL
CREAT SERPL-MCNC: 0.76 MG/DL (ref 0.55–1.02)
EOSINOPHIL # BLD AUTO: 0.16 X10(3)/MCL (ref 0–0.9)
EOSINOPHIL NFR BLD AUTO: 1.9 %
ERYTHROCYTE [DISTWIDTH] IN BLOOD BY AUTOMATED COUNT: 18.3 % (ref 11.5–17)
GFR SERPLBLD CREATININE-BSD FMLA CKD-EPI: >60 MLS/MIN/1.73/M2
GLOBULIN SER-MCNC: 2.5 GM/DL (ref 2.4–3.5)
GLUCOSE SERPL-MCNC: 95 MG/DL (ref 82–115)
HCT VFR BLD AUTO: 29.3 % (ref 37–47)
HGB BLD-MCNC: 9.3 G/DL (ref 12–16)
IMM GRANULOCYTES # BLD AUTO: 0.02 X10(3)/MCL (ref 0–0.04)
IMM GRANULOCYTES NFR BLD AUTO: 0.2 %
LYMPHOCYTES # BLD AUTO: 1.83 X10(3)/MCL (ref 0.6–4.6)
LYMPHOCYTES NFR BLD AUTO: 21.3 %
MCH RBC QN AUTO: 26.1 PG (ref 27–31)
MCHC RBC AUTO-ENTMCNC: 31.7 G/DL (ref 33–36)
MCV RBC AUTO: 82.3 FL (ref 80–94)
MONOCYTES # BLD AUTO: 1.25 X10(3)/MCL (ref 0.1–1.3)
MONOCYTES NFR BLD AUTO: 14.6 %
NEUTROPHILS # BLD AUTO: 5.26 X10(3)/MCL (ref 2.1–9.2)
NEUTROPHILS NFR BLD AUTO: 61.3 %
PLATELET # BLD AUTO: 292 X10(3)/MCL (ref 130–400)
PMV BLD AUTO: 8.3 FL (ref 7.4–10.4)
POTASSIUM SERPL-SCNC: 4.4 MMOL/L (ref 3.5–5.1)
PROT SERPL-MCNC: 5.5 GM/DL (ref 5.8–7.6)
RBC # BLD AUTO: 3.56 X10(6)/MCL (ref 4.2–5.4)
SODIUM SERPL-SCNC: 139 MMOL/L (ref 136–145)
TSH SERPL-ACNC: 2.02 UIU/ML (ref 0.35–4.94)
WBC # SPEC AUTO: 8.58 X10(3)/MCL (ref 4.5–11.5)

## 2024-02-08 PROCEDURE — 85025 COMPLETE CBC W/AUTO DIFF WBC: CPT

## 2024-02-08 PROCEDURE — 82533 TOTAL CORTISOL: CPT

## 2024-02-08 PROCEDURE — 84443 ASSAY THYROID STIM HORMONE: CPT

## 2024-02-08 PROCEDURE — 36415 COLL VENOUS BLD VENIPUNCTURE: CPT

## 2024-02-08 PROCEDURE — 80053 COMPREHEN METABOLIC PANEL: CPT

## 2024-02-09 ENCOUNTER — INFUSION (OUTPATIENT)
Dept: INFUSION THERAPY | Facility: HOSPITAL | Age: 79
End: 2024-02-09
Attending: NURSE PRACTITIONER
Payer: MEDICARE

## 2024-02-09 VITALS
RESPIRATION RATE: 16 BRPM | BODY MASS INDEX: 26.37 KG/M2 | WEIGHT: 154.44 LBS | DIASTOLIC BLOOD PRESSURE: 58 MMHG | HEIGHT: 64 IN | HEART RATE: 73 BPM | SYSTOLIC BLOOD PRESSURE: 152 MMHG | TEMPERATURE: 98 F

## 2024-02-09 DIAGNOSIS — C34.90 ADENOCARCINOMA OF LUNG, STAGE 4, UNSPECIFIED LATERALITY: Primary | ICD-10-CM

## 2024-02-09 PROCEDURE — 96413 CHEMO IV INFUSION 1 HR: CPT

## 2024-02-09 PROCEDURE — 25000003 PHARM REV CODE 250: Performed by: NURSE PRACTITIONER

## 2024-02-09 PROCEDURE — 63600175 PHARM REV CODE 636 W HCPCS: Mod: JZ,JG | Performed by: NURSE PRACTITIONER

## 2024-02-09 RX ORDER — HEPARIN 100 UNIT/ML
500 SYRINGE INTRAVENOUS
Status: DISCONTINUED | OUTPATIENT
Start: 2024-02-09 | End: 2024-02-09 | Stop reason: HOSPADM

## 2024-02-09 RX ORDER — SODIUM CHLORIDE 0.9 % (FLUSH) 0.9 %
10 SYRINGE (ML) INJECTION
Status: DISCONTINUED | OUTPATIENT
Start: 2024-02-09 | End: 2024-02-09 | Stop reason: HOSPADM

## 2024-02-09 RX ORDER — EPINEPHRINE 0.3 MG/.3ML
0.3 INJECTION SUBCUTANEOUS ONCE AS NEEDED
Status: DISCONTINUED | OUTPATIENT
Start: 2024-02-09 | End: 2024-02-09 | Stop reason: HOSPADM

## 2024-02-09 RX ORDER — DIPHENHYDRAMINE HYDROCHLORIDE 50 MG/ML
50 INJECTION INTRAMUSCULAR; INTRAVENOUS ONCE AS NEEDED
Status: DISCONTINUED | OUTPATIENT
Start: 2024-02-09 | End: 2024-02-09 | Stop reason: HOSPADM

## 2024-02-09 RX ADMIN — SODIUM CHLORIDE 360 MG: 9 INJECTION, SOLUTION INTRAVENOUS at 08:02

## 2024-02-29 ENCOUNTER — LAB VISIT (OUTPATIENT)
Dept: LAB | Facility: HOSPITAL | Age: 79
End: 2024-02-29
Attending: INTERNAL MEDICINE
Payer: MEDICARE

## 2024-02-29 ENCOUNTER — OFFICE VISIT (OUTPATIENT)
Dept: HEMATOLOGY/ONCOLOGY | Facility: CLINIC | Age: 79
End: 2024-02-29
Payer: MEDICARE

## 2024-02-29 VITALS
HEIGHT: 64 IN | DIASTOLIC BLOOD PRESSURE: 71 MMHG | OXYGEN SATURATION: 93 % | HEART RATE: 66 BPM | SYSTOLIC BLOOD PRESSURE: 127 MMHG | BODY MASS INDEX: 26.21 KG/M2 | WEIGHT: 153.56 LBS

## 2024-02-29 DIAGNOSIS — R06.02 SOB (SHORTNESS OF BREATH): ICD-10-CM

## 2024-02-29 DIAGNOSIS — C34.90 ADENOCARCINOMA OF LUNG, STAGE 4, UNSPECIFIED LATERALITY: Primary | ICD-10-CM

## 2024-02-29 DIAGNOSIS — C77.9 MALIGNANT NEOPLASM METASTATIC TO LYMPH NODES, UNSPECIFIED LYMPH NODE REGION: ICD-10-CM

## 2024-02-29 DIAGNOSIS — R53.83 FATIGUE, UNSPECIFIED TYPE: ICD-10-CM

## 2024-02-29 DIAGNOSIS — C79.51 MALIGNANT NEOPLASM METASTATIC TO BONE: ICD-10-CM

## 2024-02-29 DIAGNOSIS — C34.90 ADENOCARCINOMA OF LUNG, STAGE 4, UNSPECIFIED LATERALITY: ICD-10-CM

## 2024-02-29 DIAGNOSIS — E03.2 HYPOTHYROIDISM DUE TO DRUGS: ICD-10-CM

## 2024-02-29 LAB
ALBUMIN SERPL-MCNC: 3 G/DL (ref 3.4–4.8)
ALBUMIN/GLOB SERPL: 1 RATIO (ref 1.1–2)
ALP SERPL-CCNC: 81 UNIT/L (ref 40–150)
ALT SERPL-CCNC: 27 UNIT/L (ref 0–55)
AST SERPL-CCNC: 30 UNIT/L (ref 5–34)
BASOPHILS # BLD AUTO: 0.01 X10(3)/MCL
BASOPHILS NFR BLD AUTO: 0.1 %
BILIRUB SERPL-MCNC: 0.2 MG/DL
BUN SERPL-MCNC: 22.5 MG/DL (ref 9.8–20.1)
CALCIUM SERPL-MCNC: 9 MG/DL (ref 8.4–10.2)
CHLORIDE SERPL-SCNC: 100 MMOL/L (ref 98–107)
CO2 SERPL-SCNC: 31 MMOL/L (ref 23–31)
CORTIS SERPL-SCNC: 6.1 UG/DL
CREAT SERPL-MCNC: 0.76 MG/DL (ref 0.55–1.02)
EOSINOPHIL # BLD AUTO: 0.05 X10(3)/MCL (ref 0–0.9)
EOSINOPHIL NFR BLD AUTO: 0.3 %
ERYTHROCYTE [DISTWIDTH] IN BLOOD BY AUTOMATED COUNT: 17.9 % (ref 11.5–17)
GFR SERPLBLD CREATININE-BSD FMLA CKD-EPI: >60 MLS/MIN/1.73/M2
GLOBULIN SER-MCNC: 2.9 GM/DL (ref 2.4–3.5)
GLUCOSE SERPL-MCNC: 84 MG/DL (ref 82–115)
HCT VFR BLD AUTO: 30.4 % (ref 37–47)
HGB BLD-MCNC: 9.7 G/DL (ref 12–16)
IMM GRANULOCYTES # BLD AUTO: 0.04 X10(3)/MCL (ref 0–0.04)
IMM GRANULOCYTES NFR BLD AUTO: 0.2 %
LYMPHOCYTES # BLD AUTO: 1.04 X10(3)/MCL (ref 0.6–4.6)
LYMPHOCYTES NFR BLD AUTO: 6.5 %
MCH RBC QN AUTO: 26 PG (ref 27–31)
MCHC RBC AUTO-ENTMCNC: 31.9 G/DL (ref 33–36)
MCV RBC AUTO: 81.5 FL (ref 80–94)
MONOCYTES # BLD AUTO: 1.34 X10(3)/MCL (ref 0.1–1.3)
MONOCYTES NFR BLD AUTO: 8.3 %
NEUTROPHILS # BLD AUTO: 13.61 X10(3)/MCL (ref 2.1–9.2)
NEUTROPHILS NFR BLD AUTO: 84.6 %
PLATELET # BLD AUTO: 358 X10(3)/MCL (ref 130–400)
PMV BLD AUTO: 8.3 FL (ref 7.4–10.4)
POTASSIUM SERPL-SCNC: 4.5 MMOL/L (ref 3.5–5.1)
PROT SERPL-MCNC: 5.9 GM/DL (ref 5.8–7.6)
RBC # BLD AUTO: 3.73 X10(6)/MCL (ref 4.2–5.4)
SODIUM SERPL-SCNC: 138 MMOL/L (ref 136–145)
TSH SERPL-ACNC: 1.79 UIU/ML (ref 0.35–4.94)
WBC # SPEC AUTO: 16.09 X10(3)/MCL (ref 4.5–11.5)

## 2024-02-29 PROCEDURE — 99215 OFFICE O/P EST HI 40 MIN: CPT | Mod: S$PBB,,, | Performed by: NURSE PRACTITIONER

## 2024-02-29 PROCEDURE — 82533 TOTAL CORTISOL: CPT

## 2024-02-29 PROCEDURE — 84443 ASSAY THYROID STIM HORMONE: CPT

## 2024-02-29 PROCEDURE — 85025 COMPLETE CBC W/AUTO DIFF WBC: CPT

## 2024-02-29 PROCEDURE — 80053 COMPREHEN METABOLIC PANEL: CPT

## 2024-02-29 PROCEDURE — 36415 COLL VENOUS BLD VENIPUNCTURE: CPT

## 2024-02-29 PROCEDURE — 99215 OFFICE O/P EST HI 40 MIN: CPT | Mod: PBBFAC | Performed by: NURSE PRACTITIONER

## 2024-02-29 PROCEDURE — 99999 PR PBB SHADOW E&M-EST. PATIENT-LVL V: CPT | Mod: PBBFAC,,, | Performed by: NURSE PRACTITIONER

## 2024-02-29 RX ORDER — AZITHROMYCIN 250 MG/1
TABLET, FILM COATED ORAL
COMMUNITY
Start: 2024-02-27 | End: 2024-03-09 | Stop reason: CLARIF

## 2024-02-29 RX ORDER — AMOXICILLIN AND CLAVULANATE POTASSIUM 500; 125 MG/1; MG/1
1 TABLET, FILM COATED ORAL EVERY 8 HOURS
COMMUNITY
Start: 2024-02-27 | End: 2024-03-09 | Stop reason: CLARIF

## 2024-02-29 NOTE — PROGRESS NOTES
Subjective:       Patient ID: Pratibha Patel is a 78 y.o. female.    Chief Complaint: No chief complaint on file.      PCP: Graham Nguyen NP  Cardiologist: Dr. Danny Sánchez  Referring Physician: Dr. Anthony Hutson  Endocrine: Dr. Werner  Radiation oncology: Dr. Boss in the past, now Dr. Mortensen.  ENT: Dr. 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 ? metastatic findings could not be biopsied. TTF negative/CK 7 +.    Diagnosed June 2014 EGFR mutation negative/ALK gene rearrangement negative. 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 Patient has reportedly been referred to ENT for resection and lymph node dissection (per patient report).   Subsequent open biopsy done by Dr. Rosales on 7/29/2019 confirmed the same.  Patient was unable to undergo definitive surgical resection for oligo metastatic disease.  Hoarseness and swallowing difficulties secondary to #2  Biopsy of the left lung lesion showed recurrent non-small cell lung cancer, Caris was sent and showed positivity for PD-L1, 2%.  No other targetable or actionable mutations present.    Current Treatment:   Combination chemo immunotherapy with carboplatin, pemetrexed, nivolumab, ipilimumab based off of checkmate 9LA     Treatment History:  RT with weekly Carbo/Taxol started 7/2/14--Completed August 2014  Single agent 'Maintenance' Avastin q 3 weeks.  Started 4/9/15--last given September 21, 2016  Radiation + weekly Carbo/Taxol at 2AUC  ----9/11/19-10/22/19  Patient underwent radiation from 12/17/2020 through 12/21/2020.  Radiation to the left and right lung per Dr. Robert Mortensen.    HPI:   Please see Oncology history in the diagnosis of adenocarcinoma of the lung, stage IV on the problem list for full detail.  Patient originally seen in 2014 with unintentional weight loss, shortness of breath, nonproductive cough.  Patient had imaging findings suggestive of lung cancer, bronchoscopy done in June of 2014 revealed moderately differentiated adenocarcinoma of the lung.  She originally underwent chemo radiation from July 2014 through August 2014.  She was placed on maintenance Avastin from April 2015 through September 2016. Patient then underwent a thyroid biopsy in June of 2019 that showed metastatic carcinoma favored to be adenocarcinoma lung primary.  She underwent surgical resection in July 2019, however full surgical dissection was not able to be done and the patient underwent open biopsy of right thyroid gland.  Pathology revealed poorly differentiated non-small cell carcinoma consistent with metastatic lung cancer.  NGS panel done at that time unremarkable.  She started chemoradiation with carbo Taxol in September 2019, completed in October 2019. Patient was doing well, imaging in October 2020 revealed an abnormality in the left lung, CT-guided lung biopsy on 11/03/2020 showed recurrent non-small cell lung cancer, Caris revealed PDL1 positivity.  She underwent SBRT from 12/17/2020 through 12/21/2020.  Patient then placed on surveillance imaging, most recently done on 07/05/2022 showing worsening disease in the right lung with a previous lesion that measured 0.6 cm and had an SUV of 1.0 now measuring 1.5 cm and an SUV of 8.4.  This was treated with SBRT in August of 2022.  PET/CT scan done on 11/8/2022 showed mild increased radiotracer activity within the left apical consolidation, SUV 5.5.  There was a newly hypermetabolic subcentimeter subcarinal  lymph node.  A 1 cm right lower lobe nodule was smaller, he right upper lobe nodule was slightly larger measuring 6 mm.  No sites of FDG avid metastatic disease in the neck, abdomen, or pelvis. Bronchoscopy done on 11/16/2022, pathology revealed malignant cells consistent with adenocarcinoma.  Planning on chemo immunotherapy with carboplatin, pemetrexed, nivolumab, ipilimumab based off of checkmate 9LA.  PET/CT on 07/25/2023 showed infectious or inflammatory changes in the right lower lobe with no other findings to suggest new or worsening disease.    PET/CT scan done on 10/12/2023 showed mixed response to therapy with decrease right hilar and left upper lobe consolidation uptake.  Subcarinal lymph node demonstrates significant increased uptake compared to the prior study.  Stable subcentimeter right upper lobe nodule demonstrates slight increased uptake compared to prior study    Most recent PET/CT scan done on 01/18/2024 showed mixed response to therapy with decrease in size of the right upper lobe nodule and maximum SUV of subcarinal lymph node.  There was right hilar uptake that was increased, however there was also patchy airspace opacities that could be infectious or inflammatory.    Interval History:   Patient presents to clinic for a treatment visit.  She has not feeling well today.  She reports a recent upper respiratory infection.  She was given  a steroid shot last week and has been on antibiotics for about 4 days.  She is having a cough and shortness of breath. She denies any fevers.         Past Medical History:   Diagnosis Date    Adenocarcinoma of lung     Anemia     Anxiety     Arthritis     COPD (chronic obstructive pulmonary disease)     Depression     HLD (hyperlipidemia)     Hypertension     Lung cancer     Secondary malignant neoplasm of lymph nodes     Secondary malignant neoplasm of right lung     Thyroid disease     lymph nodes malignant      Past Surgical History:   Procedure Laterality Date     BLADDER SURGERY      CHOLECYSTECTOMY      COLONOSCOPY  2018    ENDOBRONCHIAL ULTRASOUND N/A 11/16/2022    Procedure: ENDOBRONCHIAL ULTRASOUND (EBUS);  Surgeon: Anthony Hutson MD;  Location: [facility];  Service: Pulmonary;  Laterality: N/A;    HYSTERECTOMY      KNEE SURGERY Bilateral     replaced knees    PARTIAL HIP ARTHROPLASTY Bilateral      Social History     Socioeconomic History    Marital status:    Tobacco Use    Smoking status: Every Day     Current packs/day: 0.25     Types: Cigarettes    Smokeless tobacco: Never    Tobacco comments:     Patient states she is trying to quit smoking.   Substance and Sexual Activity    Alcohol use: Not Currently    Drug use: Never      Family History   Problem Relation Age of Onset    Lung cancer Mother     Lung cancer Brother       Review of patient's allergies indicates:  No Known Allergies   Review of Systems   Constitutional:  Negative for chills, diaphoresis, fatigue, fever and unexpected weight change.   HENT:  Negative for nasal congestion, mouth sores, sinus pressure/congestion and sore throat.    Eyes:  Negative for pain and visual disturbance.   Respiratory:  Negative for cough, chest tightness and shortness of breath.    Cardiovascular:  Negative for chest pain, palpitations and leg swelling.   Gastrointestinal:  Negative for abdominal distention, abdominal pain, blood in stool, constipation and diarrhea.   Genitourinary:  Negative for dysuria, frequency and hematuria.   Musculoskeletal:  Negative for arthralgias and back pain.   Integumentary:  Negative for rash.   Neurological:  Negative for dizziness, weakness, numbness and headaches.   Hematological:  Negative for adenopathy.   Psychiatric/Behavioral:  Negative for confusion.          Objective:      Physical Exam  Vitals reviewed.   Constitutional:       General: She is not in acute distress.     Appearance: Normal appearance.   HENT:      Head: Normocephalic and atraumatic.      Nose:  Nose normal.      Mouth/Throat:      Mouth: Mucous membranes are moist.   Eyes:      Extraocular Movements: Extraocular movements intact.      Conjunctiva/sclera: Conjunctivae normal.   Neck:      Comments: Radiation changes to the left supraclavicular area  Cardiovascular:      Rate and Rhythm: Normal rate and regular rhythm.      Pulses: Normal pulses.      Heart sounds: Normal heart sounds.   Pulmonary:      Effort: Pulmonary effort is normal.      Breath sounds: Examination of the right-lower field reveals decreased breath sounds. Examination of the left-lower field reveals decreased breath sounds. Decreased breath sounds present.   Abdominal:      General: Bowel sounds are normal.      Palpations: Abdomen is soft.   Musculoskeletal:         General: No swelling. Normal range of motion.      Cervical back: Normal range of motion and neck supple.      Right lower leg: No edema.      Left lower leg: No edema.   Skin:     General: Skin is warm and dry.   Neurological:      General: No focal deficit present.      Mental Status: She is alert and oriented to person, place, and time. Mental status is at baseline.   Psychiatric:         Mood and Affect: Mood normal.         Behavior: Behavior normal.         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 ? metastatic findings could not be biopsied.    TTF negative/CK 7 +.    Diagnosed June 2014.  EGFR mutation negative/ALK gene rearrangement negative.  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 Patient has  reportedly been referred to ENT for resection and lymph node dissection (per patient report). Subsequent open biopsy done by Dr. Rosales on 7/29/2019 confirmed the same.  Patient was unable to undergo definitive surgical resection for oligo metastatic disease.  Treatment induced anemia  Hoarseness .      Plan:          Patient's biopsy did return as recurrent non-small cell lung cancer in the left lung.  I feel that the right lung will be similar pathology.  She completed radiation with Dr. Mortensen on 12/24/2020.     Caris did reveal that PD-L1 was 2% positive suggesting benefit from pembrolizumab or nivolumab/ipilimumab.      Her PET/CT scan does show a new hypermetabolic subcentimeter subcarinal lymph node and increase in a left apical consolidation.  The left apical consolidation is close to the aorta and I do not think would be amenable to biopsy, however this subcarinal lymph node may be amenable to bronchoscopy.      Bronchoscopy done on 11/16/2022 showed metastatic adenocarcinoma.  She is no longer a candidate for full dose SBRT.  We started with carboplatin, pemetrexed, nivolumab, and ipilimumab based on the CHECKMATE 9LA study.  Cycle 1 day 1 given 12/20/2022.     Follow up with radiation oncology as scheduled, Dr. Mortensen.    Continue levothyroxine to 88mcg daily.     PET/CT scan done on 01/18/2024 showed mixed response to therapy.  Overall, the patient was asymptomatic from a cancer standpoint, however she is still having a cough and SOB.  We will hold treatment for now, have her complete antibiotics and we will get a CT of the chest to rule out immunotherapy induced pneumonitis.    There is no evidence of overt progression.    Started on PPI    Repeat PET scan in 6 weeks-ordered    Labs and treat q3w    Return to clinic in 3 weeks, same day labs    GADIEL Martinez

## 2024-03-04 ENCOUNTER — TELEPHONE (OUTPATIENT)
Dept: HEMATOLOGY/ONCOLOGY | Facility: CLINIC | Age: 79
End: 2024-03-04
Payer: MEDICARE

## 2024-03-04 DIAGNOSIS — R93.89 ABNORMAL FINDING ON CT SCAN: ICD-10-CM

## 2024-03-04 DIAGNOSIS — J98.8 RESPIRATORY INFECTION: ICD-10-CM

## 2024-03-04 DIAGNOSIS — C34.90 ADENOCARCINOMA OF LUNG, STAGE 4, UNSPECIFIED LATERALITY: Primary | ICD-10-CM

## 2024-03-04 NOTE — TELEPHONE ENCOUNTER
"Patient's granddaughter calling for CT chest results. Patient still with fatigue and some sob. Finished zpak, but still taking the augmentin. States patient was taking the augmentin only once daily by accident. Now she is "back on track" taking as prescribed. She is only on neb tx every 6 hours. No fever.  "

## 2024-03-08 ENCOUNTER — HOSPITAL ENCOUNTER (INPATIENT)
Facility: HOSPITAL | Age: 79
LOS: 4 days | Discharge: HOME | DRG: 194 | End: 2024-03-12
Attending: STUDENT IN AN ORGANIZED HEALTH CARE EDUCATION/TRAINING PROGRAM | Admitting: INTERNAL MEDICINE
Payer: MEDICARE

## 2024-03-08 DIAGNOSIS — R09.02 HYPOXIA: ICD-10-CM

## 2024-03-08 DIAGNOSIS — R07.9 CHEST PAIN: ICD-10-CM

## 2024-03-08 DIAGNOSIS — R06.02 SOB (SHORTNESS OF BREATH): ICD-10-CM

## 2024-03-08 PROBLEM — I16.0 HYPERTENSIVE URGENCY: Status: ACTIVE | Noted: 2024-03-08

## 2024-03-08 LAB
ALBUMIN SERPL-MCNC: 3.5 G/DL (ref 3.4–4.8)
ALBUMIN/GLOB SERPL: 1 RATIO (ref 1.1–2)
ALP SERPL-CCNC: 75 UNIT/L (ref 40–150)
ALT SERPL-CCNC: 27 UNIT/L (ref 0–55)
APPEARANCE UR: CLEAR
AST SERPL-CCNC: 22 UNIT/L (ref 5–34)
BACTERIA #/AREA URNS AUTO: NORMAL /HPF
BASOPHILS # BLD AUTO: 0.02 X10(3)/MCL
BASOPHILS NFR BLD AUTO: 0.1 %
BILIRUB SERPL-MCNC: 0.4 MG/DL
BILIRUB UR QL STRIP.AUTO: NEGATIVE
BNP BLD-MCNC: 341.9 PG/ML
BUN SERPL-MCNC: 30.9 MG/DL (ref 9.8–20.1)
CALCIUM SERPL-MCNC: 9.1 MG/DL (ref 8.4–10.2)
CHLORIDE SERPL-SCNC: 98 MMOL/L (ref 98–107)
CO2 SERPL-SCNC: 30 MMOL/L (ref 23–31)
COLOR UR AUTO: COLORLESS
CREAT SERPL-MCNC: 0.83 MG/DL (ref 0.55–1.02)
EOSINOPHIL # BLD AUTO: 0 X10(3)/MCL (ref 0–0.9)
EOSINOPHIL NFR BLD AUTO: 0 %
ERYTHROCYTE [DISTWIDTH] IN BLOOD BY AUTOMATED COUNT: 18.5 % (ref 11.5–17)
GFR SERPLBLD CREATININE-BSD FMLA CKD-EPI: >60 MLS/MIN/1.73/M2
GLOBULIN SER-MCNC: 3.6 GM/DL (ref 2.4–3.5)
GLUCOSE SERPL-MCNC: 100 MG/DL (ref 82–115)
GLUCOSE UR QL STRIP.AUTO: NORMAL
HCT VFR BLD AUTO: 37.3 % (ref 37–47)
HGB BLD-MCNC: 11.6 G/DL (ref 12–16)
IMM GRANULOCYTES # BLD AUTO: 0.08 X10(3)/MCL (ref 0–0.04)
IMM GRANULOCYTES NFR BLD AUTO: 0.6 %
KETONES UR QL STRIP.AUTO: NEGATIVE
LACTATE SERPL-SCNC: 1.8 MMOL/L (ref 0.5–2.2)
LEUKOCYTE ESTERASE UR QL STRIP.AUTO: NEGATIVE
LYMPHOCYTES # BLD AUTO: 0.98 X10(3)/MCL (ref 0.6–4.6)
LYMPHOCYTES NFR BLD AUTO: 7.1 %
MAGNESIUM SERPL-MCNC: 2.1 MG/DL (ref 1.6–2.6)
MCH RBC QN AUTO: 25.4 PG (ref 27–31)
MCHC RBC AUTO-ENTMCNC: 31.1 G/DL (ref 33–36)
MCV RBC AUTO: 81.6 FL (ref 80–94)
MONOCYTES # BLD AUTO: 0.12 X10(3)/MCL (ref 0.1–1.3)
MONOCYTES NFR BLD AUTO: 0.9 %
NEUTROPHILS # BLD AUTO: 12.61 X10(3)/MCL (ref 2.1–9.2)
NEUTROPHILS NFR BLD AUTO: 91.3 %
NITRITE UR QL STRIP.AUTO: NEGATIVE
NRBC BLD AUTO-RTO: 0 %
OHS QRS DURATION: 86 MS
OHS QTC CALCULATION: 418 MS
PH UR STRIP.AUTO: 7.5 [PH]
PLATELET # BLD AUTO: 426 X10(3)/MCL (ref 130–400)
PMV BLD AUTO: 8.6 FL (ref 7.4–10.4)
POTASSIUM SERPL-SCNC: 4.6 MMOL/L (ref 3.5–5.1)
PROT SERPL-MCNC: 7.1 GM/DL (ref 5.8–7.6)
PROT UR QL STRIP.AUTO: NEGATIVE
RBC # BLD AUTO: 4.57 X10(6)/MCL (ref 4.2–5.4)
RBC #/AREA URNS AUTO: NORMAL /HPF
RBC UR QL AUTO: NEGATIVE
SODIUM SERPL-SCNC: 137 MMOL/L (ref 136–145)
SP GR UR STRIP.AUTO: 1.01 (ref 1–1.03)
SQUAMOUS #/AREA URNS LPF: NORMAL /HPF
TROPONIN I SERPL-MCNC: <0.01 NG/ML (ref 0–0.04)
UROBILINOGEN UR STRIP-ACNC: NORMAL
WBC # SPEC AUTO: 13.81 X10(3)/MCL (ref 4.5–11.5)
WBC #/AREA URNS AUTO: NORMAL /HPF

## 2024-03-08 PROCEDURE — 81001 URINALYSIS AUTO W/SCOPE: CPT

## 2024-03-08 PROCEDURE — 25000003 PHARM REV CODE 250: Performed by: INTERNAL MEDICINE

## 2024-03-08 PROCEDURE — 83735 ASSAY OF MAGNESIUM: CPT

## 2024-03-08 PROCEDURE — 96376 TX/PRO/DX INJ SAME DRUG ADON: CPT

## 2024-03-08 PROCEDURE — 93005 ELECTROCARDIOGRAM TRACING: CPT

## 2024-03-08 PROCEDURE — 96361 HYDRATE IV INFUSION ADD-ON: CPT

## 2024-03-08 PROCEDURE — 99285 EMERGENCY DEPT VISIT HI MDM: CPT | Mod: 25

## 2024-03-08 PROCEDURE — 63600175 PHARM REV CODE 636 W HCPCS: Performed by: STUDENT IN AN ORGANIZED HEALTH CARE EDUCATION/TRAINING PROGRAM

## 2024-03-08 PROCEDURE — 96365 THER/PROPH/DIAG IV INF INIT: CPT

## 2024-03-08 PROCEDURE — 96367 TX/PROPH/DG ADDL SEQ IV INF: CPT

## 2024-03-08 PROCEDURE — 25500020 PHARM REV CODE 255: Performed by: STUDENT IN AN ORGANIZED HEALTH CARE EDUCATION/TRAINING PROGRAM

## 2024-03-08 PROCEDURE — 83605 ASSAY OF LACTIC ACID: CPT

## 2024-03-08 PROCEDURE — 83880 ASSAY OF NATRIURETIC PEPTIDE: CPT | Performed by: INTERNAL MEDICINE

## 2024-03-08 PROCEDURE — 11000001 HC ACUTE MED/SURG PRIVATE ROOM

## 2024-03-08 PROCEDURE — 80053 COMPREHEN METABOLIC PANEL: CPT

## 2024-03-08 PROCEDURE — 96375 TX/PRO/DX INJ NEW DRUG ADDON: CPT

## 2024-03-08 PROCEDURE — 25000003 PHARM REV CODE 250: Performed by: STUDENT IN AN ORGANIZED HEALTH CARE EDUCATION/TRAINING PROGRAM

## 2024-03-08 PROCEDURE — 87040 BLOOD CULTURE FOR BACTERIA: CPT

## 2024-03-08 PROCEDURE — 85025 COMPLETE CBC W/AUTO DIFF WBC: CPT

## 2024-03-08 PROCEDURE — 84484 ASSAY OF TROPONIN QUANT: CPT

## 2024-03-08 PROCEDURE — 93010 ELECTROCARDIOGRAM REPORT: CPT | Mod: ,,, | Performed by: INTERNAL MEDICINE

## 2024-03-08 RX ORDER — FUROSEMIDE 10 MG/ML
40 INJECTION INTRAMUSCULAR; INTRAVENOUS ONCE
Status: COMPLETED | OUTPATIENT
Start: 2024-03-08 | End: 2024-03-09

## 2024-03-08 RX ORDER — LABETALOL HYDROCHLORIDE 5 MG/ML
20 INJECTION, SOLUTION INTRAVENOUS EVERY 4 HOURS PRN
Status: DISCONTINUED | OUTPATIENT
Start: 2024-03-08 | End: 2024-03-12 | Stop reason: HOSPADM

## 2024-03-08 RX ORDER — HYDRALAZINE HYDROCHLORIDE 20 MG/ML
10 INJECTION INTRAMUSCULAR; INTRAVENOUS
Status: COMPLETED | OUTPATIENT
Start: 2024-03-08 | End: 2024-03-08

## 2024-03-08 RX ORDER — CLONIDINE HYDROCHLORIDE 0.2 MG/1
0.2 TABLET ORAL 3 TIMES DAILY PRN
Status: DISCONTINUED | OUTPATIENT
Start: 2024-03-08 | End: 2024-03-12 | Stop reason: HOSPADM

## 2024-03-08 RX ORDER — HYDRALAZINE HYDROCHLORIDE 20 MG/ML
20 INJECTION INTRAMUSCULAR; INTRAVENOUS EVERY 4 HOURS PRN
Status: DISCONTINUED | OUTPATIENT
Start: 2024-03-08 | End: 2024-03-12 | Stop reason: HOSPADM

## 2024-03-08 RX ORDER — MUPIROCIN 20 MG/G
OINTMENT TOPICAL 2 TIMES DAILY
Status: DISCONTINUED | OUTPATIENT
Start: 2024-03-08 | End: 2024-03-12 | Stop reason: HOSPADM

## 2024-03-08 RX ADMIN — AZITHROMYCIN MONOHYDRATE 500 MG: 500 INJECTION, POWDER, LYOPHILIZED, FOR SOLUTION INTRAVENOUS at 09:03

## 2024-03-08 RX ADMIN — HYDRALAZINE HYDROCHLORIDE 10 MG: 20 INJECTION INTRAMUSCULAR; INTRAVENOUS at 05:03

## 2024-03-08 RX ADMIN — IOHEXOL 90 ML: 350 INJECTION, SOLUTION INTRAVENOUS at 05:03

## 2024-03-08 RX ADMIN — HYDRALAZINE HYDROCHLORIDE 10 MG: 20 INJECTION INTRAMUSCULAR; INTRAVENOUS at 06:03

## 2024-03-08 RX ADMIN — CEFTRIAXONE SODIUM 1 G: 1 INJECTION, POWDER, FOR SOLUTION INTRAMUSCULAR; INTRAVENOUS at 06:03

## 2024-03-08 RX ADMIN — SODIUM CHLORIDE, POTASSIUM CHLORIDE, SODIUM LACTATE AND CALCIUM CHLORIDE 1000 ML: 600; 310; 30; 20 INJECTION, SOLUTION INTRAVENOUS at 04:03

## 2024-03-08 RX ADMIN — MUPIROCIN: 20 OINTMENT TOPICAL at 11:03

## 2024-03-08 RX ADMIN — CLONIDINE HYDROCHLORIDE 0.2 MG: 0.2 TABLET ORAL at 10:03

## 2024-03-08 NOTE — ED PROVIDER NOTES
Encounter Date: 3/8/2024    SCRIBE #1 NOTE: I, Jack Adams, am scribing for, and in the presence of,  Matthew Young MD. I have scribed the following portions of the note - Other sections scribed: HPI, ROS, PE.       History     Chief Complaint   Patient presents with    Shortness of Breath     Pt. C/o sob being treated for pneumonia.. reports having recent spot on lungs and is scheduled for a bronchoscopy.. reports today home 02 at 86.. denies fever.. hx of lung CA     80 y/o female with a hx of anemia, COPD, HLD, HTN, and current lung CA presents to the ED for SOB. Pt notes she's had SOB for many weeks but it has significantly worsened since this morning. Pt's daughter notes the pt has been receiving immunotherapy and radiation. She also notes she received a CT scan on Friday (3/1). Pt denies any symptoms of fever, chills, chest pain, or swelling.     Oncologist: Enrrique Gudino MD    The history is provided by the patient, medical records and a relative. No  was used.     Review of patient's allergies indicates:  No Known Allergies  Past Medical History:   Diagnosis Date    Adenocarcinoma of lung     Anemia     Anxiety     Arthritis     COPD (chronic obstructive pulmonary disease)     Depression     HLD (hyperlipidemia)     Hypertension     Lung cancer     Secondary malignant neoplasm of lymph nodes     Secondary malignant neoplasm of right lung     Thyroid disease     lymph nodes malignant     Past Surgical History:   Procedure Laterality Date    BLADDER SURGERY      CHOLECYSTECTOMY      COLONOSCOPY  2018    ENDOBRONCHIAL ULTRASOUND N/A 11/16/2022    Procedure: ENDOBRONCHIAL ULTRASOUND (EBUS);  Surgeon: Anthony Hutson MD;  Location: [facility];  Service: Pulmonary;  Laterality: N/A;    HYSTERECTOMY      KNEE SURGERY Bilateral     replaced knees    PARTIAL HIP ARTHROPLASTY Bilateral      Family History   Problem Relation Age of Onset    Lung cancer Mother     Lung  cancer Brother      Social History     Tobacco Use    Smoking status: Every Day     Current packs/day: 0.25     Types: Cigarettes    Smokeless tobacco: Never    Tobacco comments:     Patient states she is trying to quit smoking.   Substance Use Topics    Alcohol use: Not Currently    Drug use: Never     Review of Systems   Constitutional:  Negative for chills and fever.   HENT:  Negative for congestion, drooling and sore throat.    Eyes:  Negative for pain and visual disturbance.   Respiratory:  Positive for shortness of breath. Negative for chest tightness and wheezing.    Cardiovascular:  Negative for chest pain, palpitations and leg swelling.   Gastrointestinal:  Negative for abdominal pain, nausea and vomiting.   Genitourinary:  Negative for dysuria and hematuria.   Musculoskeletal:  Negative for back pain, neck pain and neck stiffness.   Skin:  Negative for pallor and rash.   Allergic/Immunologic: Positive for immunocompromised state.   Neurological:  Negative for weakness and numbness.   Hematological:  Does not bruise/bleed easily.       Physical Exam     Initial Vitals [03/08/24 1532]   BP Pulse Resp Temp SpO2   (!) 149/73 68 (!) 28 97.5 °F (36.4 °C) (!) 92 %      MAP       --         Physical Exam    Nursing note and vitals reviewed.  Constitutional: She is not diaphoretic. She appears ill. No distress.   HENT:   Head: Normocephalic and atraumatic.   Nose: Nose normal.   Mouth/Throat: Oropharynx is clear and moist. Mucous membranes are dry.   Nasal cannula   Eyes: EOM are normal. Pupils are equal, round, and reactive to light.   Neck: Neck supple.   Normal range of motion.  Cardiovascular:  Normal rate and regular rhythm.           No murmur heard.  Pulmonary/Chest: No respiratory distress. She has no wheezes.   Crackles bilaterally   Abdominal: Abdomen is soft. She exhibits no distension. There is no abdominal tenderness.   Well healed abdominal scar   Musculoskeletal:      Cervical back: Normal range of  motion and neck supple.      Comments: bitemporal muscle wasting     Neurological: She is alert and oriented to person, place, and time. She has normal strength. No cranial nerve deficit or sensory deficit.   Skin: Skin is warm. Capillary refill takes less than 2 seconds. No rash noted.         ED Course   Procedures  Labs Reviewed   COMPREHENSIVE METABOLIC PANEL - Abnormal; Notable for the following components:       Result Value    Blood Urea Nitrogen 30.9 (*)     Globulin 3.6 (*)     Albumin/Globulin Ratio 1.0 (*)     All other components within normal limits   CBC WITH DIFFERENTIAL - Abnormal; Notable for the following components:    WBC 13.81 (*)     Hgb 11.6 (*)     MCH 25.4 (*)     MCHC 31.1 (*)     RDW 18.5 (*)     Platelet 426 (*)     Neut # 12.61 (*)     IG# 0.08 (*)     All other components within normal limits   B-TYPE NATRIURETIC PEPTIDE - Abnormal; Notable for the following components:    Natriuretic Peptide 341.9 (*)     All other components within normal limits   LACTIC ACID, PLASMA - Normal   TROPONIN I - Normal   URINALYSIS, REFLEX TO URINE CULTURE - Normal   MAGNESIUM - Normal   MRSA PCR - Normal    Narrative:     The Xpert MRSA Assay utilizes automated real-time polymerase chain reaction (PCR) to detect MRSA DNA.  A positive test result does not necessarily indicate the presence of viable organism.  It is however, presumptive for the presence of MRSA.   CBC W/ AUTO DIFFERENTIAL    Narrative:     The following orders were created for panel order CBC auto differential.  Procedure                               Abnormality         Status                     ---------                               -----------         ------                     CBC with Differential[[id number]]       Abnormal            Final result                 Please view results for these tests on the individual orders.        ECG Results              EKG 12-lead (Final result)        Collection Time Result Time QRS Duration OHS  QTC Calculation    03/08/24 15:29:31 03/08/24 17:07:20 86 418                     Final result by Interface, Lab In [facility] (03/08/24 17:07:31)                   Narrative:    Test Reason : R06.02,    Vent. Rate : 067 BPM     Atrial Rate : 067 BPM     P-R Int : 142 ms          QRS Dur : 086 ms      QT Int : 396 ms       P-R-T Axes : 074 061 063 degrees     QTc Int : 418 ms    Normal sinus rhythm  Normal ECG  No previous ECGs available  Confirmed by Inocente Gibbs MD ([id number]) on 3/8/2024 5:07:19 PM    Referred By:             Confirmed By:Inocente Gibbs MD                                     EKG 12-LEAD (Final result)  Result time 03/15/24 15:11:03      Final result by Unknown User (03/15/24 15:11:03)                                      Imaging Results              CTA Chest Non-Coronary (PE Studies) (Final result)  Result time 03/08/24 17:56:05      Final result by Mp Ahumada MD (03/08/24 17:56:05)                   Impression:      No pulmonary embolism seen    Reticulonodular infiltrates in the lung bases bilaterally    Areas of scarring in the left lung apex and right lung apex with associated calcification on the left side    Chronic interstitial lung changes bilaterally      Electronically signed by: Rainer Ahumada  Date:    03/08/2024  Time:    17:56               Narrative:    EXAMINATION:  CTA CHEST NON CORONARY (PE STUDIES)    CLINICAL HISTORY:  Pulmonary embolism (PE) suspected, high prob;    TECHNIQUE:  Low dose axial images, sagittal and coronal reformations were obtained from the thoracic inlet to the lung bases following the IV administration of contrast..  Contrast timing was optimized to evaluate the pulmonary arteries.  MIP images were performed.  Automated exposure control was utilized    COMPARISON:  None    FINDINGS:  There are changes seen consistent with COPD and emphysema in the lungs bilaterally with some areas of bronchiectasis in the upper and lower lobes.  There is some  scarring in the lung apices.  There is dense area of calcification and pleural thickening in the left upper hemithorax which was seen on the prior examination as well.  Areas of interstitial fibrosis are seen in the right upper lobe laterally.  There is a reticulonodular infiltrates seen in the lung bases bilaterally..  No mass is seen.  No pleural effusion is seen.  No pleural thickening is seen.  No pneumothorax is seen.    No pulmonary embolism is seen.    The thoracic aorta appears normal.  No mediastinal lymphadenopathy is seen.  The heart appears normal.    Upper abdomen shows no acute abnormality.                                       X-Ray Chest AP Portable (Final result)  Result time 03/08/24 17:51:23      Final result by Mp Ahumada MD (03/08/24 17:51:23)                   Impression:      Chronic changes seen no acute process      Electronically signed by: Rainer Ahumada  Date:    03/08/2024  Time:    17:51               Narrative:    EXAMINATION:  XR CHEST AP PORTABLE    CLINICAL HISTORY:  Shortness of breath    TECHNIQUE:  Single frontal view of the chest was performed.    COMPARISON:  11/03/2020    FINDINGS:  There are chronic appearing lung changes seen bilaterally. No evidence of acute infiltrate is seen. No mass is seen. No lesion is seen. No pleural effusion is seen. The heart appears normal. The aorta appears normal. The bones and joints show no acute abnormality.  There is a port in the right anterior chest wall with the tip in the SVC                                       Medications   lactated ringers bolus 1,000 mL (0 mLs Intravenous Stopped 3/8/24 1715)   iohexoL (OMNIPAQUE 350) injection 90 mL (90 mLs Intravenous Given 3/8/24 1750)   hydrALAZINE injection 10 mg (10 mg Intravenous Given 3/8/24 1752)   cefTRIAXone (Rocephin) 1 g in dextrose 5 % in water (D5W) 100 mL IVPB (MB+) (0 g Intravenous Stopped 3/8/24 1900)   azithromycin 500 mg in dextrose 5 % 250 mL IVPB (ready to mix) (0  mg Intravenous Stopped 3/8/24 2241)   hydrALAZINE injection 10 mg (10 mg Intravenous Given 3/8/24 1831)   furosemide injection 40 mg (40 mg Intravenous Given 3/9/24 0012)   sodium chloride 0.9% bolus 500 mL 500 mL (0 mLs Intravenous Stopped 3/9/24 1233)     Medical Decision Making  Problems Addressed:  Hypoxia: acute illness or injury  SOB (shortness of breath): acute illness or injury    Amount and/or Complexity of Data Reviewed  Radiology: ordered.    Risk  Prescription drug management.  Decision regarding hospitalization.    Differential diagnosis (includes but is not limited to):   ACS, arrhythmia, COPD, CHF, volume overload, pneumonia, infection, sepsis, viral URI, COVID, flu, dehydration, kidney injury, electrolyte abnormalities    MDM Narrative  79-year-old female presents for evaluation of shortness of breath over the past couple of days but severely worsened today, stating that she was hypoxic at home into the low to mid 80s.  She does report a history of lung cancer.  Labs are pending.  Chest x-ray pending.  EKG reviewed.  CTA of the chest pending to rule out PE.  Blood cultures and lactic acid pending.  Low threshold to initiate antibiotics.    Update:  Labs reviewed.  CT is negative for PE but does show some reticulonodular infiltrates, antibiotics initiated with Rocephin and azithromycin, continue to follow up blood cultures.  Patient was currently maintaining normal oxygen saturations with 2 L by nasal cannula, will admit for further evaluation and management of her symptoms.  Patient agrees with plan of care and has no questions at this time.    Dispo: Admit    My independent radiology interpretation: as above  Point of care US (independently performed and interpreted):   Decision rules/clinical scoring:     Sepsis Perfusion Assessment:     Amount and/or Complexity of Data Reviewed  Independent historian: daughter    Summary of history:  Pt's daughter notes the pt has been receiving immunotherapy and  radiation. She also notes she received a CT scan on Friday (3/1).   External data reviewed: notes from previous admissions, notes from previous ED visits, prior labs, prior EKGs, prior imaging, and prescription medications   Summary of data reviewed:  Prior records reviewed  Risk and benefits of testing: discussed   Labs: ordered and reviewed  Radiology: ordered and independent interpretation performed (see above or ED course)  ECG/medicine tests: ordered and independent interpretation performed (see above or ED course)  Discussion of management or test interpretation with external provider(s): discussed with hospitalist physician   Summary of discussion: as above    Risk  Parenteral controlled substances   Drug therapy requiring intense monitoring for toxicity   Decision regarding hospitalization  Shared decision making     Critical Care  none    Data Reviewed/Counseling: I have personally reviewed the patient's vital signs, nursing notes, and other relevant tests, information, and imaging. I had a detailed discussion regarding the historical points, exam findings, and any diagnostic results supporting the discharge diagnosis. I personally performed the history, PE, MDM and procedures as documented above and agree with the scribe's documentation.    Portions of this note were dictated using voice recognition software. Although it was reviewed for accuracy, some inherent voice recognition errors may have occurred and may be present in this document.          Scribe Attestation:   Scribe #1: I performed the above scribed service and the documentation accurately describes the services I performed. I attest to the accuracy of the note.    Attending Attestation:           Physician Attestation for Scribe:  Physician Attestation Statement for Scribe #1: I, Matthew Young MD, reviewed documentation, as scribed by Jack Adams in my presence, and it is both accurate and complete.             ED Course as of 03/24/24  2146   Fri Mar 08, 2024   1545 EKG independently interpreted by me.  EKG: NSR @ 67, no STEMI, Qtc 418 []   1650 X-Ray Chest AP Portable  Independently visualized/reviewed by me during the ED visit.  - Perihilar infiltrates, no PTX []   1827 Paged hospitalist []      ED Course User Index  [] Jack Adams  [] Matthew Young MD                           Clinical Impression:  Final diagnoses:  [R06.02] SOB (shortness of breath)  [R09.02] Hypoxia          ED Disposition Condition    Admit Stable                Matthew Young MD  03/24/24 2146

## 2024-03-08 NOTE — FIRST PROVIDER EVALUATION
"Medical screening examination initiated.  I have conducted a focused provider triage encounter, findings are as follows:    Brief history of present illness:  79 year old female presents to ER with c/o SOB x 2 weeks. Daughter reports patient has "spot" on her lung that was diagnosed as pneumonia but is not getting better. She reports several rounds of antibiotics. Scheduled for bronchoscopy with Dr. Hutson on Tuesday. Oncologist: Dr. Gudino. Patient on immunotherapy. Last treatment "within the last two weeks"    Vitals:    03/08/24 1532   BP: (!) 149/73   Pulse: 68   Resp: (!) 28   Temp: 97.5 °F (36.4 °C)   SpO2: (!) 92%       Pertinent physical exam:  Awake and alert, NAD    Brief workup plan:  Labs, EKG, CXR     Preliminary workup initiated; this workup will be continued and followed by the physician or advanced practice provider that is assigned to the patient when roomed.  "

## 2024-03-09 LAB
B PERT.PT PRMT NPH QL NAA+NON-PROBE: NOT DETECTED
C PNEUM DNA NPH QL NAA+NON-PROBE: NOT DETECTED
FLUAV AG UPPER RESP QL IA.RAPID: NOT DETECTED
FLUBV AG UPPER RESP QL IA.RAPID: NOT DETECTED
HADV DNA NPH QL NAA+NON-PROBE: NOT DETECTED
HCOV 229E RNA NPH QL NAA+NON-PROBE: NOT DETECTED
HCOV HKU1 RNA NPH QL NAA+NON-PROBE: NOT DETECTED
HCOV NL63 RNA NPH QL NAA+NON-PROBE: DETECTED
HCOV OC43 RNA NPH QL NAA+NON-PROBE: NOT DETECTED
HMPV RNA NPH QL NAA+NON-PROBE: NOT DETECTED
HPIV1 RNA NPH QL NAA+NON-PROBE: NOT DETECTED
HPIV2 RNA NPH QL NAA+NON-PROBE: NOT DETECTED
HPIV3 RNA NPH QL NAA+NON-PROBE: NOT DETECTED
HPIV4 RNA NPH QL NAA+NON-PROBE: NOT DETECTED
M PNEUMO DNA NPH QL NAA+NON-PROBE: NOT DETECTED
MRSA PCR SCRN (OHS): NOT DETECTED
RSV A 5' UTR RNA NPH QL NAA+PROBE: NOT DETECTED
RSV RNA NPH QL NAA+NON-PROBE: NOT DETECTED
RV+EV RNA NPH QL NAA+NON-PROBE: DETECTED
SARS-COV-2 RNA RESP QL NAA+PROBE: NOT DETECTED

## 2024-03-09 PROCEDURE — 25000003 PHARM REV CODE 250: Performed by: NURSE PRACTITIONER

## 2024-03-09 PROCEDURE — 63600175 PHARM REV CODE 636 W HCPCS: Performed by: INTERNAL MEDICINE

## 2024-03-09 PROCEDURE — 87798 DETECT AGENT NOS DNA AMP: CPT | Performed by: INTERNAL MEDICINE

## 2024-03-09 PROCEDURE — 27000221 HC OXYGEN, UP TO 24 HOURS

## 2024-03-09 PROCEDURE — 27000207 HC ISOLATION

## 2024-03-09 PROCEDURE — 21400001 HC TELEMETRY ROOM

## 2024-03-09 PROCEDURE — 25000003 PHARM REV CODE 250: Performed by: INTERNAL MEDICINE

## 2024-03-09 PROCEDURE — 11000001 HC ACUTE MED/SURG PRIVATE ROOM

## 2024-03-09 PROCEDURE — 63600175 PHARM REV CODE 636 W HCPCS: Performed by: NURSE PRACTITIONER

## 2024-03-09 PROCEDURE — 0241U COVID/RSV/FLU A&B PCR: CPT | Performed by: INTERNAL MEDICINE

## 2024-03-09 PROCEDURE — S4991 NICOTINE PATCH NONLEGEND: HCPCS | Performed by: NURSE PRACTITIONER

## 2024-03-09 PROCEDURE — S5010 5% DEXTROSE AND 0.45% SALINE: HCPCS | Performed by: INTERNAL MEDICINE

## 2024-03-09 PROCEDURE — 87449 NOS EACH ORGANISM AG IA: CPT | Performed by: INTERNAL MEDICINE

## 2024-03-09 PROCEDURE — 99222 1ST HOSP IP/OBS MODERATE 55: CPT | Mod: ,,, | Performed by: INTERNAL MEDICINE

## 2024-03-09 PROCEDURE — 87641 MR-STAPH DNA AMP PROBE: CPT | Performed by: NURSE PRACTITIONER

## 2024-03-09 RX ORDER — DEXAMETHASONE 4 MG/1
12 TABLET ORAL EVERY 24 HOURS
Status: DISCONTINUED | OUTPATIENT
Start: 2024-03-09 | End: 2024-03-11

## 2024-03-09 RX ORDER — IPRATROPIUM BROMIDE AND ALBUTEROL SULFATE 2.5; .5 MG/3ML; MG/3ML
3 SOLUTION RESPIRATORY (INHALATION) EVERY 4 HOURS PRN
Status: DISCONTINUED | OUTPATIENT
Start: 2024-03-09 | End: 2024-03-12 | Stop reason: HOSPADM

## 2024-03-09 RX ORDER — METHYLPREDNISOLONE SOD SUCC 125 MG
60 VIAL (EA) INJECTION 3 TIMES DAILY
Status: DISCONTINUED | OUTPATIENT
Start: 2024-03-09 | End: 2024-03-09

## 2024-03-09 RX ORDER — DEXTROSE MONOHYDRATE AND SODIUM CHLORIDE 5; .45 G/100ML; G/100ML
INJECTION, SOLUTION INTRAVENOUS CONTINUOUS
Status: DISCONTINUED | OUTPATIENT
Start: 2024-03-09 | End: 2024-03-10

## 2024-03-09 RX ORDER — BENZONATATE 100 MG/1
200 CAPSULE ORAL 3 TIMES DAILY PRN
Status: DISCONTINUED | OUTPATIENT
Start: 2024-03-09 | End: 2024-03-12 | Stop reason: HOSPADM

## 2024-03-09 RX ORDER — ENOXAPARIN SODIUM 100 MG/ML
40 INJECTION SUBCUTANEOUS EVERY 24 HOURS
Status: DISCONTINUED | OUTPATIENT
Start: 2024-03-09 | End: 2024-03-12 | Stop reason: HOSPADM

## 2024-03-09 RX ORDER — IBUPROFEN 200 MG
1 TABLET ORAL DAILY
Status: DISCONTINUED | OUTPATIENT
Start: 2024-03-09 | End: 2024-03-12 | Stop reason: HOSPADM

## 2024-03-09 RX ORDER — TALC
6 POWDER (GRAM) TOPICAL NIGHTLY PRN
Status: DISCONTINUED | OUTPATIENT
Start: 2024-03-09 | End: 2024-03-12 | Stop reason: HOSPADM

## 2024-03-09 RX ORDER — PREDNISONE 20 MG/1
40 TABLET ORAL DAILY
Status: DISCONTINUED | OUTPATIENT
Start: 2024-03-09 | End: 2024-03-09

## 2024-03-09 RX ORDER — SODIUM CHLORIDE 0.9 % (FLUSH) 0.9 %
10 SYRINGE (ML) INJECTION
Status: DISCONTINUED | OUTPATIENT
Start: 2024-03-09 | End: 2024-03-12 | Stop reason: HOSPADM

## 2024-03-09 RX ORDER — GUAIFENESIN AND DEXTROMETHORPHAN HYDROBROMIDE 10; 100 MG/5ML; MG/5ML
10 SYRUP ORAL EVERY 4 HOURS PRN
Status: DISCONTINUED | OUTPATIENT
Start: 2024-03-09 | End: 2024-03-12 | Stop reason: HOSPADM

## 2024-03-09 RX ORDER — ONDANSETRON HYDROCHLORIDE 2 MG/ML
4 INJECTION, SOLUTION INTRAVENOUS EVERY 4 HOURS PRN
Status: DISCONTINUED | OUTPATIENT
Start: 2024-03-09 | End: 2024-03-12 | Stop reason: HOSPADM

## 2024-03-09 RX ORDER — AMOXICILLIN 250 MG
2 CAPSULE ORAL 2 TIMES DAILY PRN
Status: DISCONTINUED | OUTPATIENT
Start: 2024-03-09 | End: 2024-03-12 | Stop reason: HOSPADM

## 2024-03-09 RX ORDER — LEVOFLOXACIN 250 MG/1
500 TABLET ORAL DAILY
Status: DISCONTINUED | OUTPATIENT
Start: 2024-03-09 | End: 2024-03-09

## 2024-03-09 RX ORDER — PROCHLORPERAZINE EDISYLATE 5 MG/ML
5 INJECTION INTRAMUSCULAR; INTRAVENOUS EVERY 6 HOURS PRN
Status: DISCONTINUED | OUTPATIENT
Start: 2024-03-09 | End: 2024-03-12 | Stop reason: HOSPADM

## 2024-03-09 RX ORDER — CARVEDILOL 3.12 MG/1
6.25 TABLET ORAL 2 TIMES DAILY
Status: DISCONTINUED | OUTPATIENT
Start: 2024-03-09 | End: 2024-03-12 | Stop reason: HOSPADM

## 2024-03-09 RX ORDER — LOSARTAN POTASSIUM 50 MG/1
50 TABLET ORAL DAILY
Status: DISCONTINUED | OUTPATIENT
Start: 2024-03-09 | End: 2024-03-09

## 2024-03-09 RX ORDER — POLYETHYLENE GLYCOL 3350 17 G/17G
17 POWDER, FOR SOLUTION ORAL 2 TIMES DAILY PRN
Status: DISCONTINUED | OUTPATIENT
Start: 2024-03-09 | End: 2024-03-12 | Stop reason: HOSPADM

## 2024-03-09 RX ORDER — LEVOTHYROXINE SODIUM 88 UG/1
88 TABLET ORAL EVERY MORNING
Status: DISCONTINUED | OUTPATIENT
Start: 2024-03-09 | End: 2024-03-12 | Stop reason: HOSPADM

## 2024-03-09 RX ORDER — ALUMINUM HYDROXIDE, MAGNESIUM HYDROXIDE, AND SIMETHICONE 1200; 120; 1200 MG/30ML; MG/30ML; MG/30ML
30 SUSPENSION ORAL 4 TIMES DAILY PRN
Status: DISCONTINUED | OUTPATIENT
Start: 2024-03-09 | End: 2024-03-12 | Stop reason: HOSPADM

## 2024-03-09 RX ORDER — ATORVASTATIN CALCIUM 40 MG/1
40 TABLET, FILM COATED ORAL DAILY
Status: DISCONTINUED | OUTPATIENT
Start: 2024-03-09 | End: 2024-03-12 | Stop reason: HOSPADM

## 2024-03-09 RX ORDER — CLOPIDOGREL BISULFATE 75 MG/1
75 TABLET ORAL DAILY
Status: DISCONTINUED | OUTPATIENT
Start: 2024-03-09 | End: 2024-03-12 | Stop reason: HOSPADM

## 2024-03-09 RX ORDER — FUROSEMIDE 20 MG/1
20 TABLET ORAL DAILY
Status: DISCONTINUED | OUTPATIENT
Start: 2024-03-09 | End: 2024-03-09

## 2024-03-09 RX ORDER — ACETAMINOPHEN 325 MG/1
650 TABLET ORAL EVERY 4 HOURS PRN
Status: DISCONTINUED | OUTPATIENT
Start: 2024-03-09 | End: 2024-03-12 | Stop reason: HOSPADM

## 2024-03-09 RX ADMIN — PIPERACILLIN SODIUM AND TAZOBACTAM SODIUM 4.5 G: 4; .5 INJECTION, POWDER, LYOPHILIZED, FOR SOLUTION INTRAVENOUS at 08:03

## 2024-03-09 RX ADMIN — DEXTROSE AND SODIUM CHLORIDE: 5; 450 INJECTION, SOLUTION INTRAVENOUS at 04:03

## 2024-03-09 RX ADMIN — ENOXAPARIN SODIUM 40 MG: 40 INJECTION SUBCUTANEOUS at 04:03

## 2024-03-09 RX ADMIN — CLOPIDOGREL BISULFATE 75 MG: 75 TABLET ORAL at 08:03

## 2024-03-09 RX ADMIN — FUROSEMIDE 20 MG: 20 TABLET ORAL at 08:03

## 2024-03-09 RX ADMIN — SODIUM CHLORIDE 500 ML: 9 INJECTION, SOLUTION INTRAVENOUS at 11:03

## 2024-03-09 RX ADMIN — LEVOFLOXACIN 750 MG: 250 TABLET, FILM COATED ORAL at 06:03

## 2024-03-09 RX ADMIN — PIPERACILLIN SODIUM AND TAZOBACTAM SODIUM 4.5 G: 4; .5 INJECTION, POWDER, LYOPHILIZED, FOR SOLUTION INTRAVENOUS at 11:03

## 2024-03-09 RX ADMIN — MUPIROCIN: 20 OINTMENT TOPICAL at 08:03

## 2024-03-09 RX ADMIN — PREDNISONE 40 MG: 20 TABLET ORAL at 08:03

## 2024-03-09 RX ADMIN — FUROSEMIDE 40 MG: 10 INJECTION, SOLUTION INTRAMUSCULAR; INTRAVENOUS at 12:03

## 2024-03-09 RX ADMIN — LOSARTAN POTASSIUM 50 MG: 50 TABLET, FILM COATED ORAL at 08:03

## 2024-03-09 RX ADMIN — ATORVASTATIN CALCIUM 40 MG: 40 TABLET, FILM COATED ORAL at 08:03

## 2024-03-09 RX ADMIN — LEVOTHYROXINE SODIUM 88 MCG: 88 TABLET ORAL at 07:03

## 2024-03-09 RX ADMIN — DEXAMETHASONE 12 MG: 4 TABLET ORAL at 08:03

## 2024-03-09 RX ADMIN — CARVEDILOL 6.25 MG: 3.12 TABLET, FILM COATED ORAL at 08:03

## 2024-03-09 NOTE — NURSING
Nurses Note -- 4 Eyes      3/9/2024   11:32 AM      Skin assessed during: Admit      [x] No Altered Skin Integrity Present    []Prevention Measures Documented      [] Yes- Altered Skin Integrity Present or Discovered   [] LDA Added if Not in Epic (Describe Wound)   [] New Altered Skin Integrity was Present on Admit and Documented in LDA   [] Wound Image Taken    Wound Care Consulted? No    Attending Nurse:  Stacy Rios RN    Second RN/Staff Member:   Emeterio Adams RN

## 2024-03-09 NOTE — PLAN OF CARE
Problem: Adult Inpatient Plan of Care  Goal: Readiness for Transition of Care  Outcome: Ongoing, Progressing      <<----- Click to add NO significant Past Surgical History

## 2024-03-09 NOTE — H&P
[facility] Hospital Medicine - H&P Note    Patient Name: Pratibha Patel  : 1945  MRN: [id number]  PCP: Graham Nguyen NP  Admitting Physician:   Admission Class: IP- Inpatient   Code status: --    Allergies   Patient has no known allergies.    Chief Complaint   Worsening SOB    History of Present Illness   79 yr old female whose history includes COPD, HTN and recurrent metastatic lung cancer. Presented to ED with c/o worsening SOB. Endorses chronic SOB but has been progressively worse over the last 24 hours. Also has a productive cough with yellow sputum however this is chronic and no worse than usual. Denies any fever, chills, vomiting or diarrhea. Treated in January with course of Levaquin and in February with Augmentin and zithromax, for pneumonia according to patient. PO prednisone prescribed on 3/4. Not on home oxygen. Continues to smoke.     VS on arrival: T 97.5, P 68, R 26, B/P 149/73, Sats 92% on room air. Initial labs: WBC 13.81, Hgb 11.6, Hct 37.3, platelets 426 and otherwise unremarkable. No evidence of UTI. CTA chest shows reticulonodular infiltrates in bilateral lung bases, scarring and chronic interstitial lung changes bilaterally. No evidence of PE. Meds given in ED include Rocephin, Zithromax, Lasix 40mg IV and one liter LR.     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 Currently on immunotherapy.  COPD  Hypertension  Dyslipidemia  Hypothyroidism   Peripheral artery disease   Anxiety     Past Surgical History   Hysterectomy  Cholecystectomy  Bilateral knee replacements  Bilateral hip replacements  Bilateral LE PTA/intervention - details unknown    Social History   Current everyday smoker, 1/2 to 1 PPD for last 50 yrs. Denies  "alcohol or illicit drug use. Lives at home with her .      Family History   Reviewed and negative    Home Medications     Prior to Admission medications    Medication Sig Start Date End Date Taking? Authorizing Provider   carvediloL (COREG) 6.25 MG tablet BID 6/19/22  Yes Provider, Historical   clopidogreL (PLAVIX) 75 mg tablet Daily 10/12/23  Yes Provider, Historical   furosemide (LASIX) 20 MG tablet Daily 6/19/22  Yes Provider, Historical   losartan (COZAAR) 50 MG tablet Daily 6/19/22  Yes Provider, Historical   simvastatin (ZOCOR) 40 MG tablet Daily 6/19/22  Yes Provider, Historical                                                           levothyroxine (SYNTHROID) 88 MCG tablet Take 1 tablet (88 mcg total) by mouth every morning. 8/22/23   Enrrique Gudino II, MD                                                                Physical Exam   Vital Signs  Temp:  [97.5 °F (36.4 °C)]   Pulse:  [59-80]   Resp:  [12-28]   BP: (149-199)/(68-82)   SpO2:  [92 %-99 %]    General: Appears comfortable  HEENT: NC/AT  Neck:  No JVD  Chest: diminished all fields with inspiratory wheezing  CVS: Regular rhythm. Normal S1/S2.  Abdomen: nondistended, normoactive BS, soft and non-tender.  MSK: No obvious deformity or joint swelling  Skin: Warm and dry  Neuro: AAOx3, no focal neurological deficit  Psych: Cooperative    Labs     Recent Labs     03/08/24  1551   WBC 13.81*   RBC 4.57   HGB 11.6*   HCT 37.3   MCV 81.6   MCH 25.4*   MCHC 31.1*   RDW 18.5*   *     No results for input(s): "PROTIME", "INR", "PTT", "D-DIMER", "FERRITIN", "IRON", "TRANS", "TIBC", "LABIRON", "[id number]", "FOLATE", "LDH", "HAPTOGLOBIN", "RETICCNTAUTO", "RETABS", "PERIPSMEAREV" in the last 72 hours.   Recent Labs     03/08/24  1551      K 4.6   CHLORIDE 98   CO2 30   BUN 30.9*   CREATININE 0.83   EGFRNORACEVR >60   GLUCOSE 100   CALCIUM 9.1   MG 2.10   ALBUMIN 3.5   GLOBULIN 3.6*   ALKPHOS 75   ALT 27   AST 22   BILITOT 0.4     Recent " Labs     03/08/24  1551   LACTIC 1.8     Recent Labs     03/08/24  1551   TROPONINI <0.010        Microbiology Results (last 7 days)       Procedure Component Value Units Date/Time    Blood culture #2 **CANNOT BE ORDERED STAT** [[id number]] Collected: 03/08/24 1551    Order Status: Resulted Specimen: Blood Updated: 03/08/24 1917    Blood culture #1 **CANNOT BE ORDERED STAT** [[id number]] Collected: 03/08/24 1551    Order Status: Resulted Specimen: Blood Updated: 03/08/24 1917           Imaging   CTA Chest Non-Coronary (PE Studies)  Narrative: EXAMINATION:  CTA CHEST NON CORONARY (PE STUDIES)    CLINICAL HISTORY:  Pulmonary embolism (PE) suspected, high prob;    TECHNIQUE:  Low dose axial images, sagittal and coronal reformations were obtained from the thoracic inlet to the lung bases following the IV administration of contrast..  Contrast timing was optimized to evaluate the pulmonary arteries.  MIP images were performed.  Automated exposure control was utilized    COMPARISON:  None    FINDINGS:  There are changes seen consistent with COPD and emphysema in the lungs bilaterally with some areas of bronchiectasis in the upper and lower lobes.  There is some scarring in the lung apices.  There is dense area of calcification and pleural thickening in the left upper hemithorax which was seen on the prior examination as well.  Areas of interstitial fibrosis are seen in the right upper lobe laterally.  There is a reticulonodular infiltrates seen in the lung bases bilaterally..  No mass is seen.  No pleural effusion is seen.  No pleural thickening is seen.  No pneumothorax is seen.    No pulmonary embolism is seen.    The thoracic aorta appears normal.  No mediastinal lymphadenopathy is seen.  The heart appears normal.    Upper abdomen shows no acute abnormality.  Impression: No pulmonary embolism seen    Reticulonodular infiltrates in the lung bases bilaterally    Areas of scarring in the left lung apex and right lung apex  with associated calcification on the left side    Chronic interstitial lung changes bilaterally    Electronically signed by: Rainer Ahumada  Date:    03/08/2024  Time:    17:56  X-Ray Chest AP Portable  Narrative: EXAMINATION:  XR CHEST AP PORTABLE    CLINICAL HISTORY:  Shortness of breath    TECHNIQUE:  Single frontal view of the chest was performed.    COMPARISON:  11/03/2020    FINDINGS:  There are chronic appearing lung changes seen bilaterally. No evidence of acute infiltrate is seen. No mass is seen. No lesion is seen. No pleural effusion is seen. The heart appears normal. The aorta appears normal. The bones and joints show no acute abnormality.  There is a port in the right anterior chest wall with the tip in the SVC  Impression: Chronic changes seen no acute process    Electronically signed by: Rainer Ahumada  Date:    03/08/2024  Time:    17:51    Assessment & Plan     Community acquired pneumonia   - Levaquin 500mg PO daily  - f/u on blood and sputum cultures  - MRSA swab pending    COPD exacerbation   - Solumedrol 60 mg IV q 8 hours  - titrate oxygen to keep sats > 92 %    Recurrent adenocarcinoma of lung, stage IV, with metastatic disease  - current treatment is immunotherapy  - keep scheduled appts with Dr. Gudino  - smoking cessation reinforced    Hypertension - stable  - home meds resumed    PMH: hypothyroidism, dyslipidemia, Anxiety, PAD      VTE Prophylaxis: Lashawn Redmond, JAIRO-BC have discussed this patients case with Dr. Gentile who agrees with the diagnosis and treatment plan.

## 2024-03-09 NOTE — PROGRESS NOTES
[facility]  Emergency Dept  [facility] MEDICINE ~ PROGRESS NOTE        CHIEF COMPLAINT   Hospital follow up    HOSPITAL COURSE   79 yr old female whose history includes COPD, HTN and recurrent metastatic lung cancer. Presented to ED with c/o worsening SOB. Endorses chronic SOB but has been progressively worse over the last 24 hours. Also has a productive cough with yellow sputum however this is chronic and no worse than usual. Denies any fever, chills, vomiting or diarrhea. Treated in January with course of Levaquin and in February with Augmentin and zithromax, for pneumonia according to patient. PO prednisone prescribed on 3/4. Not on home oxygen. Continues to smoke.      VS on arrival: T 97.5, P 68, R 26, B/P 149/73, Sats 92% on room air. Initial labs: WBC 13.81, Hgb 11.6, Hct 37.3, platelets 426 and otherwise unremarkable. No evidence of UTI. CTA chest shows reticulonodular infiltrates in bilateral lung bases, scarring and chronic interstitial lung changes bilaterally. No evidence of PE. Meds given in ED include Rocephin, Zithromax, Lasix 40mg IV and one liter LR.     Today  Seen examined this morning.  Continues to complain of shortness a breath of ongoing about 2 weeks but worse over yesterday.  She has received multiple rounds of antibiotics including Augmentin and azithromycin combination and did not respond.  There was question about immunotherapy induced pneumonitis as well from Oncology note.  I will ask Pulmonary to evaluate for this.  She states that she is feeling better today but she has not walked.        OBJECTIVE/PHYSICAL EXAM     VITAL SIGNS (MOST RECENT):  Temp: 97.5 °F (36.4 °C) (03/08/24 1532)  Pulse: 72 (03/09/24 0752)  Resp: 18 (03/09/24 0752)  BP: (!) 132/49 (03/09/24 0752)  SpO2: (!) 92 % (03/09/24 0752) VITAL SIGNS (24 HOUR RANGE):  Temp:  [97.5 °F (36.4 °C)] 97.5 °F (36.4 °C)  Pulse:  [57-80] 72  Resp:  [12-28] 18  SpO2:  [92 %-99 %] 92 %  BP: (132-199)/(49-82) 132/49    GENERAL: In no acute distress, afebrile  HEENT:  CHEST:  Bibasilar crackle  HEART: S1, S2, no appreciable murmur  ABDOMEN: Soft, nontender, BS +  MSK: Warm, no lower extremity edema, no clubbing or cyanosis  NEUROLOGIC: Alert and oriented x4, moving all extremities with good strength   INTEGUMENTARY:  PSYCHIATRY:        ASSESSMENT/PLAN   Steroid induced leukocytosis   Bibasilar reticulonodular infiltrate  Biapical scarring    History of: As listed above      I think that this is less likely infectious process given failing appropriate antibiotics outpatient.  Oncology brought up possibility of immunotherapy induced pneumonitis.  I will ask Pulmonary to evaluate.  Can keep Levaquin going for now.  Discontinue prednisone, start dexamethasone 12 mg daily  Discontinue furosemide, already azotemic    DVT prophylaxis:  Lovenox 40    Anticipated discharge and disposition:   __________________________________________________________________________    LABS/MICRO/MEDS/DIAGNOSTICS       LABS  Recent Labs     03/08/24  1551      K 4.6   CHLORIDE 98   CO2 30   BUN 30.9*   CREATININE 0.83   GLUCOSE 100   CALCIUM 9.1   ALKPHOS 75   AST 22   ALT 27   ALBUMIN 3.5     Recent Labs     03/08/24  1551   WBC 13.81*   RBC 4.57   HCT 37.3   MCV 81.6   *       MICROBIOLOGY  Microbiology Results (last 7 days)       Procedure Component Value Units Date/Time    Respiratory Culture [[id number]]     Order Status: Sent Specimen: Sputum     Blood culture #2 **CANNOT BE ORDERED STAT** [[id number]] Collected: 03/08/24 1551    Order Status: Resulted Specimen: Blood Updated: 03/08/24 1917    Blood culture #1 **CANNOT BE ORDERED STAT** [[id number]] Collected: 03/08/24 1551    Order Status: Resulted Specimen: Blood Updated: 03/08/24 1917               MEDICATIONS   atorvastatin  40 mg Oral Daily    carvediloL  6.25 mg Oral BID    clopidogreL  75 mg Oral Daily    enoxparin  40 mg Subcutaneous Q24H (prophylaxis, 1700)    furosemide  20 mg  "Oral Daily    levoFLOXacin  750 mg Oral Daily    levothyroxine  88 mcg Oral QAM    losartan  50 mg Oral Daily    mupirocin   Nasal BID    nicotine  1 patch Transdermal Daily    predniSONE  40 mg Oral Daily         INFUSIONS         DIAGNOSTIC TESTS  CTA Chest Non-Coronary (PE Studies)   Final Result      No pulmonary embolism seen      Reticulonodular infiltrates in the lung bases bilaterally      Areas of scarring in the left lung apex and right lung apex with associated calcification on the left side      Chronic interstitial lung changes bilaterally         Electronically signed by: Rainer Auhmada   Date:    03/08/2024   Time:    17:56      X-Ray Chest AP Portable   Final Result      Chronic changes seen no acute process         Electronically signed by: Rainer Ahumada   Date:    03/08/2024   Time:    17:51           No results found for: "EF"       NUTRITION STATUS  Patient meets ASPEN criteria for   malnutrition of   per RD assessment as evidenced by:                       A minimum of two characteristics is recommended for diagnosis of either severe or non-severe malnutrition.       Case related differential diagnoses have been reviewed; assessment and plan has been documented. I have personally reviewed the labs and test results that are currently available; I have reviewed the patients medication list. I have reviewed the consulting providers recommendations. I have reviewed or attempted to review medical records based upon their availability.  All of the patient's and/or family's questions have been addressed and answered to the best of my ability.  I will continue to monitor closely and make adjustments to medical management as needed.  This document was created using M*Modal Fluency Direct.  Transcription errors may have been made.  Please contact me if any questions may rise regarding documentation to clarify transcription.        Terry Clarke MD   Internal Medicine  Department of [facility] " Medicine  [facility] - Emergency Dept

## 2024-03-09 NOTE — CONSULTS
Subjective:       Patient ID: Pratibha Patel is a 79 y.o. female.    Chief Complaint: Shortness of Breath (Pt. C/o sob being treated for pneumonia.. reports having recent spot on lungs and is scheduled for a bronchoscopy.. reports today home 02 at 86.. denies fever.. hx of lung CA)      PCP: Graham Nguyen NP  Cardiologist: Dr. Danny Sánchez  Referring Physician: Dr. Anthony Hutson  Endocrine: Dr. Werner  Radiation oncology: Dr. Boss in the past, now Dr. Mortensen.  ENT: Dr. 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 ? metastatic findings could not be biopsied. TTF negative/CK 7 +.    Diagnosed June 2014 EGFR mutation negative/ALK gene rearrangement negative. 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 Patient has reportedly been referred to ENT for resection and lymph node dissection (per patient report).   Subsequent open biopsy done by Dr. Rosales on 7/29/2019 confirmed the same.  Patient was unable to undergo definitive surgical resection for oligo metastatic disease.  Hoarseness and swallowing difficulties secondary to #2  Biopsy of the left lung lesion showed recurrent non-small cell lung cancer, Caris was sent and showed positivity for PD-L1, 2%.  No other targetable or actionable mutations present.    Current Treatment:   Combination chemo immunotherapy with carboplatin, pemetrexed, nivolumab, ipilimumab based off of checkmate 9LA     Treatment History:  RT with weekly Carbo/Taxol started  7/2/14--Completed August 2014  Single agent 'Maintenance' Avastin q 3 weeks.  Started 4/9/15--last given September 21, 2016  Radiation + weekly Carbo/Taxol at 2AUC ----9/11/19-10/22/19  Patient underwent radiation from 12/17/2020 through 12/21/2020.  Radiation to the left and right lung per Dr. Robert Mortensen.    HPI:   Please see Oncology history in the diagnosis of adenocarcinoma of the lung, stage IV on the problem list for full detail.  Patient originally seen in 2014 with unintentional weight loss, shortness of breath, nonproductive cough.  Patient had imaging findings suggestive of lung cancer, bronchoscopy done in June of 2014 revealed moderately differentiated adenocarcinoma of the lung.  She originally underwent chemo radiation from July 2014 through August 2014.  She was placed on maintenance Avastin from April 2015 through September 2016. Patient then underwent a thyroid biopsy in June of 2019 that showed metastatic carcinoma favored to be adenocarcinoma lung primary.  She underwent surgical resection in July 2019, however full surgical dissection was not able to be done and the patient underwent open biopsy of right thyroid gland.  Pathology revealed poorly differentiated non-small cell carcinoma consistent with metastatic lung cancer.  NGS panel done at that time unremarkable.  She started chemoradiation with carbo Taxol in September 2019, completed in October 2019. Patient was doing well, imaging in October 2020 revealed an abnormality in the left lung, CT-guided lung biopsy on 11/03/2020 showed recurrent non-small cell lung cancer, Caris revealed PDL1 positivity.  She underwent SBRT from 12/17/2020 through 12/21/2020.  Patient then placed on surveillance imaging, most recently done on 07/05/2022 showing worsening disease in the right lung with a previous lesion that measured 0.6 cm and had an SUV of 1.0 now measuring 1.5 cm and an SUV of 8.4.  This was treated with SBRT in August of 2022.  PET/CT scan  done on 11/8/2022 showed mild increased radiotracer activity within the left apical consolidation, SUV 5.5.  There was a newly hypermetabolic subcentimeter subcarinal lymph node.  A 1 cm right lower lobe nodule was smaller, he right upper lobe nodule was slightly larger measuring 6 mm.  No sites of FDG avid metastatic disease in the neck, abdomen, or pelvis. Bronchoscopy done on 11/16/2022, pathology revealed malignant cells consistent with adenocarcinoma.  Planning on chemo immunotherapy with carboplatin, pemetrexed, nivolumab, ipilimumab based off of checkmate 9LA.  PET/CT on 07/25/2023 showed infectious or inflammatory changes in the right lower lobe with no other findings to suggest new or worsening disease.    PET/CT scan done on 10/12/2023 showed mixed response to therapy with decrease right hilar and left upper lobe consolidation uptake.  Subcarinal lymph node demonstrates significant increased uptake compared to the prior study.  Stable subcentimeter right upper lobe nodule demonstrates slight increased uptake compared to prior study    Most recent PET/CT scan done on 01/18/2024 showed mixed response to therapy with decrease in size of the right upper lobe nodule and maximum SUV of subcarinal lymph node.  There was right hilar uptake that was increased, however there was also patchy airspace opacities that could be infectious or inflammatory.    Interval History:   Patient well known to me who presented to the emergency department with worsening shortness of breath.  She had a CT scan of the chest on 03/01/2024 that showed no progression of disease with progressive pulmonary changes of bilateral multifocal infectious/inflammatory disease, likely atypical infectious process.  This was discussed with Radiology, it was not felt that this was pneumonitis from her immunotherapy.  CT angiogram of the chest on 03/08/2024 to rule out pulmonary embolism showed no evidence of pulmonary embolus but there were  reticulonodular infiltrates of the lung bases bilaterally.  Oncology consulted as patient known to me.    Today, 03/09/2024:  Patient seen and examined.  She stated that she was feeling better.  She had no major issues to discuss.      Past Medical History:   Diagnosis Date    Adenocarcinoma of lung     Anemia     Anxiety     Arthritis     COPD (chronic obstructive pulmonary disease)     Depression     HLD (hyperlipidemia)     Hypertension     Lung cancer     Secondary malignant neoplasm of lymph nodes     Secondary malignant neoplasm of right lung     Thyroid disease     lymph nodes malignant      Past Surgical History:   Procedure Laterality Date    BLADDER SURGERY      CHOLECYSTECTOMY      COLONOSCOPY  2018    ENDOBRONCHIAL ULTRASOUND N/A 11/16/2022    Procedure: ENDOBRONCHIAL ULTRASOUND (EBUS);  Surgeon: Anthony Hutson MD;  Location: [facility] BRONCHOSCOPY LAB;  Service: Pulmonary;  Laterality: N/A;    HYSTERECTOMY      KNEE SURGERY Bilateral     replaced knees    PARTIAL HIP ARTHROPLASTY Bilateral      Social History     Socioeconomic History    Marital status:    Tobacco Use    Smoking status: Every Day     Current packs/day: 0.25     Types: Cigarettes    Smokeless tobacco: Never    Tobacco comments:     Patient states she is trying to quit smoking.   Substance and Sexual Activity    Alcohol use: Not Currently    Drug use: Never      Family History   Problem Relation Age of Onset    Lung cancer Mother     Lung cancer Brother       Review of patient's allergies indicates:  No Known Allergies   Review of Systems   Constitutional:  Negative for chills, diaphoresis, fatigue, fever and unexpected weight change.   HENT:  Negative for nasal congestion, mouth sores, sinus pressure/congestion and sore throat.    Eyes:  Negative for pain and visual disturbance.   Respiratory:  Negative for cough, chest tightness and shortness of breath.    Cardiovascular:  Negative for chest pain, palpitations and leg swelling.    Gastrointestinal:  Negative for abdominal distention, abdominal pain, blood in stool, constipation and diarrhea.   Genitourinary:  Negative for dysuria, frequency and hematuria.   Musculoskeletal:  Negative for arthralgias and back pain.   Integumentary:  Negative for rash.   Neurological:  Negative for dizziness, weakness, numbness and headaches.   Hematological:  Negative for adenopathy.   Psychiatric/Behavioral:  Negative for confusion.          Objective:      Physical Exam  Vitals reviewed.   Constitutional:       General: She is not in acute distress.     Appearance: Normal appearance.   HENT:      Head: Normocephalic and atraumatic.      Nose: Nose normal.      Mouth/Throat:      Mouth: Mucous membranes are moist.   Eyes:      Extraocular Movements: Extraocular movements intact.      Conjunctiva/sclera: Conjunctivae normal.   Neck:      Comments: Radiation changes to the left supraclavicular area  Cardiovascular:      Rate and Rhythm: Normal rate and regular rhythm.      Pulses: Normal pulses.      Heart sounds: Normal heart sounds.   Pulmonary:      Effort: Pulmonary effort is normal.      Breath sounds: Examination of the right-lower field reveals decreased breath sounds. Examination of the left-lower field reveals decreased breath sounds. Decreased breath sounds present.   Abdominal:      General: Bowel sounds are normal.      Palpations: Abdomen is soft.   Musculoskeletal:         General: No swelling. Normal range of motion.      Cervical back: Normal range of motion and neck supple.      Right lower leg: No edema.      Left lower leg: No edema.   Skin:     General: Skin is warm and dry.   Neurological:      General: No focal deficit present.      Mental Status: She is alert and oriented to person, place, and time. Mental status is at baseline.   Psychiatric:         Mood and Affect: Mood normal.         Behavior: Behavior normal.         LABS AND IMAGING REVIEWED IN EPIC          Assessment:   Stage  IV-- T3NxM1 moderately differentiated adenocarcinoma of the WILFREDO with contiguous extrathoracic extension, left infraclavicular and axillary hypermetabolic metastatic lymph nodes and destruction of the left first and second ribs compatible with metastatic involvement per baseline PET/CT. ? metastatic findings could not be biopsied.    TTF negative/CK 7 +.    Diagnosed June 2014.  EGFR mutation negative/ALK gene rearrangement negative.  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 Patient has reportedly been referred to ENT for resection and lymph node dissection (per patient report). Subsequent open biopsy done by Dr. Rosales on 7/29/2019 confirmed the same.  Patient was unable to undergo definitive surgical resection for oligo metastatic disease.  Treatment induced anemia  Hoarseness .  Shortness of breath      Plan:          Patient's biopsy did return as recurrent non-small cell lung cancer in the left lung.  I feel that the right lung will be similar pathology.  She completed radiation with Dr. Mortensen on 12/24/2020.     Caris did reveal that PD-L1 was 2% positive suggesting benefit from pembrolizumab or nivolumab/ipilimumab.      Her PET/CT scan does show a new hypermetabolic subcentimeter subcarinal lymph node and increase in a left apical consolidation.  The left apical consolidation is close to the aorta and I do not think would be amenable to biopsy, however this subcarinal lymph node may be amenable to bronchoscopy.      Bronchoscopy done on 11/16/2022 showed metastatic adenocarcinoma.  She is no longer a candidate for full dose SBRT.  We started with carboplatin, pemetrexed, nivolumab, and ipilimumab based on the CHECKMATE 9LA study.  Cycle 1 day 1 given 12/20/2022.     Follow up with radiation oncology as scheduled, Dr. Mortensen.    Continue  levothyroxine to 88mcg daily.     PET/CT scan done on 01/18/2024 showed mixed response to therapy.  Overall, the patient was asymptomatic from a cancer standpoint, however she is still having a cough and SOB.     CT scan of the chest on 03/01/2024 was not suggestive of pneumonitis.    CT angiogram on 03/08/2024 showed reticulonodular infiltrates pulmonary Medicine saw the patient, I discussed the case with them, they feel that pneumonitis is of low likelihood.  They feel that this is likely chronic aspiration.    Continue with antibiotics.    Enrrique Gudino II, MD

## 2024-03-09 NOTE — CONSULTS
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  She underwent chemotherapy and radiation, now currently on combination chemo immunotherapy with carboplatin, pemetrexed, nivolumab, ipilimumab.  She also has a history of COPD and ongoing tobacco abuse.  She presented to the ED on 3/8 dear worsening shortness of breath.  She was reportedly treated with a course of Levaquin in January and then Augmentin in Zithromax in February due to pneumonia.  She was also given a course of prednisone on 3/4.  WBC 13.8, .  Blood cultures collected, respiratory panel in progress. COVID/RSV/Flu pending as well.  CTA of chest was negative for pulmonary embolus, demonstrated reticulonodular infiltrates in the lung bases bilaterally with areas of scarring in the left lung apex and right lung apex, along with chronic interstitial lung changes bilaterally.  Currently afebrile and on room air.  She has been started on Levaquin and dexamethasone.  Pulmonology has been consulted for pneumonitis secondary to possible immunotherapy versus pneumonia.      Hospital Course/Significant events:  As noted above.    24 Hour Interval History:  Patient is saturating well on room air. Reports her shortness of breath is much improved today. She reports occasional dry cough, no sputum production.     Past Medical History:   Diagnosis Date    Adenocarcinoma of lung     Anemia     Anxiety     Arthritis     COPD (chronic obstructive pulmonary disease)      Depression     HLD (hyperlipidemia)     Hypertension     Lung cancer     Secondary malignant neoplasm of lymph nodes     Secondary malignant neoplasm of right lung     Thyroid disease     lymph nodes malignant       Past Surgical History:   Procedure Laterality Date    BLADDER SURGERY      CHOLECYSTECTOMY      COLONOSCOPY  2018    ENDOBRONCHIAL ULTRASOUND N/A 11/16/2022    Procedure: ENDOBRONCHIAL ULTRASOUND (EBUS);  Surgeon: Anthony Hutson MD;  Location: [facility] BRONCHOSCOPY LAB;  Service: Pulmonary;  Laterality: N/A;    HYSTERECTOMY      KNEE SURGERY Bilateral     replaced knees    PARTIAL HIP ARTHROPLASTY Bilateral        Social History     Socioeconomic History    Marital status:    Tobacco Use    Smoking status: Every Day     Current packs/day: 0.25     Types: Cigarettes    Smokeless tobacco: Never    Tobacco comments:     Patient states she is trying to quit smoking.   Substance and Sexual Activity    Alcohol use: Not Currently    Drug use: Never           Current Outpatient Medications   Medication Instructions    carvediloL (COREG) 6.25 mg, Oral, 2 times daily    clopidogreL (PLAVIX) 75 mg tablet No dose, route, or frequency recorded.    furosemide (LASIX) 20 mg, Oral, Daily    levothyroxine (SYNTHROID) 88 mcg, Oral, Every morning    losartan (COZAAR) 50 mg, Oral, Daily    pantoprazole (PROTONIX) 40 mg, Oral, Daily    simvastatin (ZOCOR) 40 mg, Oral, Nightly       Current Inpatient Medications   atorvastatin  40 mg Oral Daily    carvediloL  6.25 mg Oral BID    clopidogreL  75 mg Oral Daily    dexAMETHasone  12 mg Oral Daily    enoxparin  40 mg Subcutaneous Q24H (prophylaxis, 1700)    levoFLOXacin  750 mg Oral Daily    levothyroxine  88 mcg Oral QAM    losartan  50 mg Oral Daily    mupirocin   Nasal BID    nicotine  1 patch Transdermal Daily       Current Intravenous Infusions        Review of Systems   Respiratory:  Positive for shortness of breath.           Objective:       Intake/Output Summary  (Last 24 hours) at 3/9/2024 0847  Last data filed at 3/9/2024 0337  Gross per 24 hour   Intake 1099 ml   Output 1200 ml   Net -101 ml         Vital Signs (Most Recent):  Temp: 97.5 °F (36.4 °C) (03/08/24 1532)  Pulse: 72 (03/09/24 0752)  Resp: 18 (03/09/24 0752)  BP: (!) 132/49 (03/09/24 0752)  SpO2: (!) 92 % (03/09/24 0752)  There is no height or weight on file to calculate BMI.    Vital Signs (24h Range):  Temp:  [97.5 °F (36.4 °C)] 97.5 °F (36.4 °C)  Pulse:  [57-80] 72  Resp:  [12-28] 18  SpO2:  [92 %-99 %] 92 %  BP: (132-199)/(49-82) 132/49     Physical Exam  Vitals reviewed.   Constitutional:       Appearance: Normal appearance.   HENT:      Head: Normocephalic and atraumatic.   Cardiovascular:      Rate and Rhythm: Normal rate and regular rhythm.   Pulmonary:      Effort: Pulmonary effort is normal.      Breath sounds: Decreased breath sounds present.   Abdominal:      General: Bowel sounds are normal.      Palpations: Abdomen is soft.   Neurological:      General: No focal deficit present.      Mental Status: She is alert.   Psychiatric:         Mood and Affect: Mood normal.           Lines/Drains/Airways       Central Venous Catheter Line  Duration                  PowerPort A Cath Single Lumen right atrial -- days              Drain  Duration             Female External Urinary Catheter w/ Suction 03/09/24 0714 <1 day              Peripheral Intravenous Line  Duration                  Peripheral IV - Single Lumen 03/08/24 1615 20 G Left Antecubital <1 day                    Significant Labs:    Lab Results   Component Value Date    WBC 13.81 (H) 03/08/2024    HGB 11.6 (L) 03/08/2024    HCT 37.3 03/08/2024    MCV 81.6 03/08/2024     (H) 03/08/2024           BMP  Lab Results   Component Value Date     03/08/2024    K 4.6 03/08/2024    CHLORIDE 98 03/08/2024    CO2 30 03/08/2024    BUN 30.9 (H) 03/08/2024    CREATININE 0.83 03/08/2024    CALCIUM 9.1 03/08/2024    ESTGFRAFRICA 89 03/02/2022     "EGFRNONAA >60 07/05/2022         ABG  No results for input(s): "PH", "PO2", "PCO2", "HCO3", "POCBASEDEF" in the last 168 hours.    Mechanical Ventilation Support:         Significant Imaging:  I have reviewed the pertinent imaging within the past 24 hours.        Assessment/Plan:     Assessment  Stage IV metastatic adenocarcinoma of the left lung; now currently on combination chemo immunotherapy with carboplatin, pemetrexed, nivolumab, ipilimumab  Abnormal CT of chest with reticulonodular infiltrates s/t possible drug induced pneumonitis versus infectious process      Plan  Awaiting results of respiratory panel  Can continue antibiotics for now. Recommend continuing steroids.   Will have MD review imaging, further recommendations to follow         JAIRO Gautam  Pulmonary Critical Care Medicine  [facility] - Emergency Dept  DOS: 03/09/2024    "

## 2024-03-10 PROCEDURE — S4991 NICOTINE PATCH NONLEGEND: HCPCS | Performed by: NURSE PRACTITIONER

## 2024-03-10 PROCEDURE — 92610 EVALUATE SWALLOWING FUNCTION: CPT

## 2024-03-10 PROCEDURE — 27000207 HC ISOLATION

## 2024-03-10 PROCEDURE — 63600175 PHARM REV CODE 636 W HCPCS: Performed by: NURSE PRACTITIONER

## 2024-03-10 PROCEDURE — 21400001 HC TELEMETRY ROOM

## 2024-03-10 PROCEDURE — 25000003 PHARM REV CODE 250: Performed by: INTERNAL MEDICINE

## 2024-03-10 PROCEDURE — 99232 SBSQ HOSP IP/OBS MODERATE 35: CPT | Mod: ,,, | Performed by: INTERNAL MEDICINE

## 2024-03-10 PROCEDURE — 63600175 PHARM REV CODE 636 W HCPCS: Performed by: INTERNAL MEDICINE

## 2024-03-10 PROCEDURE — 25000003 PHARM REV CODE 250: Performed by: NURSE PRACTITIONER

## 2024-03-10 RX ADMIN — DEXAMETHASONE 12 MG: 4 TABLET ORAL at 08:03

## 2024-03-10 RX ADMIN — CLOPIDOGREL BISULFATE 75 MG: 75 TABLET ORAL at 08:03

## 2024-03-10 RX ADMIN — ENOXAPARIN SODIUM 40 MG: 40 INJECTION SUBCUTANEOUS at 04:03

## 2024-03-10 RX ADMIN — ATORVASTATIN CALCIUM 40 MG: 40 TABLET, FILM COATED ORAL at 08:03

## 2024-03-10 RX ADMIN — PIPERACILLIN SODIUM AND TAZOBACTAM SODIUM 4.5 G: 4; .5 INJECTION, POWDER, LYOPHILIZED, FOR SOLUTION INTRAVENOUS at 04:03

## 2024-03-10 RX ADMIN — NICOTINE 1 PATCH: 21 PATCH, EXTENDED RELEASE TRANSDERMAL at 08:03

## 2024-03-10 RX ADMIN — CARVEDILOL 6.25 MG: 3.12 TABLET, FILM COATED ORAL at 07:03

## 2024-03-10 RX ADMIN — CARVEDILOL 6.25 MG: 3.12 TABLET, FILM COATED ORAL at 08:03

## 2024-03-10 RX ADMIN — PIPERACILLIN SODIUM AND TAZOBACTAM SODIUM 4.5 G: 4; .5 INJECTION, POWDER, LYOPHILIZED, FOR SOLUTION INTRAVENOUS at 12:03

## 2024-03-10 NOTE — PROGRESS NOTES
[facility] - Emergency Dept  [facility] MEDICINE ~ PROGRESS NOTE        CHIEF COMPLAINT   Hospital follow up    HOSPITAL COURSE   79 yr old female whose history includes COPD, HTN and recurrent metastatic lung cancer. Presented to ED with c/o worsening SOB. Endorses chronic SOB but has been progressively worse over the last 24 hours. Also has a productive cough with yellow sputum however this is chronic and no worse than usual. Denies any fever, chills, vomiting or diarrhea. Treated in January with course of Levaquin and in February with Augmentin and zithromax, for pneumonia according to patient. PO prednisone prescribed on 3/4. Not on home oxygen. Continues to smoke.      VS on arrival: T 97.5, P 68, R 26, B/P 149/73, Sats 92% on room air. Initial labs: WBC 13.81, Hgb 11.6, Hct 37.3, platelets 426 and otherwise unremarkable. No evidence of UTI. CTA chest shows reticulonodular infiltrates in bilateral lung bases, scarring and chronic interstitial lung changes bilaterally. No evidence of PE. Meds given in ED include Rocephin, Zithromax, Lasix 40mg IV and one liter LR.     Pulmonary consulted and felt CT findings could be from chronic aspiration so SLP was consulted and placed on Zosyn.     Today  Doing well, states she feels better, remains NPO right now.         OBJECTIVE/PHYSICAL EXAM     VITAL SIGNS (MOST RECENT):  Temp: 97.5 °F (36.4 °C) (03/10/24 0822)  Pulse: 65 (03/10/24 0822)  Resp: 18 (03/10/24 0822)  BP: 137/69 (03/10/24 0822)  SpO2: 97 % (03/10/24 0822) VITAL SIGNS (24 HOUR RANGE):  Temp:  [97.3 °F (36.3 °C)-97.6 °F (36.4 °C)] 97.5 °F (36.4 °C)  Pulse:  [58-69] 65  Resp:  [18-20] 18  SpO2:  [95 %-98 %] 97 %  BP: ()/(44-69) 137/69   GENERAL: In no acute distress, afebrile  HEENT:  CHEST:  Bibasilar crackle  HEART: S1, S2, no appreciable murmur  ABDOMEN: Soft, nontender, BS +  MSK: Warm, no lower extremity edema, no clubbing or cyanosis  NEUROLOGIC: Alert and oriented x4, moving all  extremities with good strength   INTEGUMENTARY:  PSYCHIATRY:        ASSESSMENT/PLAN   Steroid induced leukocytosis   Bibasilar reticulonodular infiltrate  Biapical scarring  Possible chronic aspiration changes  Coronavirus (non COVID) and Rhinovirus URI    History of: As listed above      Pulmonary following.  Suspect chronic aspiration, NPO, SLP consulted  Oncology following.  Zosyn day 2., continue dex 12 mg daily  Check labs tomorrow    DVT prophylaxis:  Lovenox 40    Anticipated discharge and disposition:   __________________________________________________________________________    LABS/MICRO/MEDS/DIAGNOSTICS       LABS  Recent Labs     03/08/24  1551      K 4.6   CHLORIDE 98   CO2 30   BUN 30.9*   CREATININE 0.83   GLUCOSE 100   CALCIUM 9.1   ALKPHOS 75   AST 22   ALT 27   ALBUMIN 3.5       Recent Labs     03/08/24  1551   WBC 13.81*   RBC 4.57   HCT 37.3   MCV 81.6   *         MICROBIOLOGY  Microbiology Results (last 7 days)       Procedure Component Value Units Date/Time    Blood culture #1 **CANNOT BE ORDERED STAT** [[id number]]  (Normal) Collected: 03/08/24 1551    Order Status: Completed Specimen: Blood Updated: 03/09/24 2000     CULTURE, BLOOD ([facility]) No Growth At 24 Hours    Blood culture #2 **CANNOT BE ORDERED STAT** [[id number]]  (Normal) Collected: 03/08/24 1551    Order Status: Completed Specimen: Blood Updated: 03/09/24 2000     CULTURE, BLOOD ([facility]) No Growth At 24 Hours    Fungal Culture [[id number]]     Order Status: Sent Specimen: Respiratory from Sputum, Expectorated     Mycobacteria and Nocardia Culture [[id number]]     Order Status: Sent Specimen: Respiratory from Sputum, Expectorated     Respiratory Culture [[id number]]     Order Status: Sent Specimen: Sputum     Respiratory Culture [[id number]] Collected: 03/09/24 0915    Order Status: Sent Specimen: Sputum from Nasopharyngeal                MEDICATIONS   atorvastatin  40 mg Oral Daily    carvediloL  6.25 mg Oral BID     "clopidogreL  75 mg Oral Daily    dexAMETHasone  12 mg Oral Daily    enoxparin  40 mg Subcutaneous Q24H (prophylaxis, 1700)    levothyroxine  88 mcg Oral QAM    mupirocin   Nasal BID    nicotine  1 patch Transdermal Daily    piperacillin-tazobactam (Zosyn) IV (PEDS and ADULTS) (extended infusion is not appropriate)  4.5 g Intravenous Q8H         INFUSIONS   dextrose 5 % and 0.45 % NaCl 75 mL/hr at 03/10/24 0000          DIAGNOSTIC TESTS  CTA Chest Non-Coronary (PE Studies)   Final Result      No pulmonary embolism seen      Reticulonodular infiltrates in the lung bases bilaterally      Areas of scarring in the left lung apex and right lung apex with associated calcification on the left side      Chronic interstitial lung changes bilaterally         Electronically signed by: Rainer Ahumada   Date:    03/08/2024   Time:    17:56      X-Ray Chest AP Portable   Final Result      Chronic changes seen no acute process         Electronically signed by: Rainer Ahumada   Date:    03/08/2024   Time:    17:51           No results found for: "EF"       NUTRITION STATUS  Patient meets ASPEN criteria for   malnutrition of   per RD assessment as evidenced by:                       A minimum of two characteristics is recommended for diagnosis of either severe or non-severe malnutrition.       Case related differential diagnoses have been reviewed; assessment and plan has been documented. I have personally reviewed the labs and test results that are currently available; I have reviewed the patients medication list. I have reviewed the consulting providers recommendations. I have reviewed or attempted to review medical records based upon their availability.  All of the patient's and/or family's questions have been addressed and answered to the best of my ability.  I will continue to monitor closely and make adjustments to medical management as needed.  This document was created using M*Modal Fluency Direct.  Transcription errors may " have been made.  Please contact me if any questions may rise regarding documentation to clarify transcription.        Terry Clarke MD   Internal Medicine  Department of Hospital Medicine [facility] - Emergency Dept

## 2024-03-10 NOTE — PT/OT/SLP EVAL
[facility]  Speech Language Pathology Department  Clinical Swallow Evaluation    Patient Name:  Pratibha Patel   MRN:  [id number]    Recommendations     General recommendations:  SLP follow up x1  Diet texture/consistency recommendations:  Easy to Chew solids (IDDSI 7) and thin liquids (IDDSI 0)  Medications: whole in puree  Swallow strategies/precautions: small bites/sips, slow rate, and upright for PO intake  Precautions: Standard,      History     Past Medical History:   Diagnosis Date    Adenocarcinoma of lung     Anemia     Anxiety     Arthritis     COPD (chronic obstructive pulmonary disease)     Depression     HLD (hyperlipidemia)     Hypertension     Lung cancer     Secondary malignant neoplasm of lymph nodes     Secondary malignant neoplasm of right lung     Thyroid disease     lymph nodes malignant     Past Surgical History:   Procedure Laterality Date    BLADDER SURGERY      CHOLECYSTECTOMY      COLONOSCOPY  2018    ENDOBRONCHIAL ULTRASOUND N/A 11/16/2022    Procedure: ENDOBRONCHIAL ULTRASOUND (EBUS);  Surgeon: Anthony Hutson MD;  Location: [facility] BRONCHOSCOPY LAB;  Service: Pulmonary;  Laterality: N/A;    HYSTERECTOMY      KNEE SURGERY Bilateral     replaced knees    PARTIAL HIP ARTHROPLASTY Bilateral      Home diet texture/consistency: Regular and thin liquids  Current method of nutrition: NPO    Imaging   No results found for this or any previous visit.    Results for orders placed in visit on 03/01/24    CT Chest With Contrast    Narrative  EXAMINATION:  CT CHEST WITH CONTRAST    CLINICAL HISTORY:  cough/SOB, r/o immunotherapy induced pneumonitis;Malignant neoplasm of unspecified part of unspecified bronchus or lung    TECHNIQUE:  Thin slice helical CT imaging was performed from above the lung apices through the diaphragm following the administration 75 mL Omnipaque 350 low osmolar intravenous contrast and with coronal and sagittal reformatted images generated from the  axial data set per routine protocol.  Automatic dose modulation and/or weight based mA/kv utilized to achieve as low as reasonable radiation dose.    COMPARISON:  Chest CT with contrast 01/27/2023    FINDINGS:  The chronic infiltrative left apical lung mass is similar in appearance and extent.  Chronic right apical pleural/parenchymal scarring/consolidation is also unchanged.  There is diffuse central lobular emphysematous change.  There is no new pulmonary mass.  The previously demonstrated small mediastinal nodes are unchanged with no intrathoracic or axillary adenopathy.  No pleural effusion.  There is diffuse bilateral peribronchial thickening, most advanced at the left lower lobe.  There is partial mucous plugging of several left lower lobe segmental and subsegmental bronchi.  There has been interval worsening of wedge-shaped tree-in-bud type nodularity in the posterior left lower lobe.  There is a new wedge-shaped area of small tree-in-bud type nodularity at the peripheral anterolateral right lower lobe with mild interval worsening of the previously demonstrated irregular consolidation at the posterior peripheral right lower lobe.  There is also new mild and irregular thin reticular interstitial thickening along the peripheral right upper lobe.    There is diffuse heterogeneity at the right thyroid lobe.  The heart and great vessels are normal in size without pericardial effusion.  There is moderate diffuse coronary artery atherosclerotic calcification and mild atherosclerotic calcification and plaque diffusely along the thoracic aorta and proximal great vessels.  No pathology is identified along the esophagus.  Limited arterial phase imaging through the upper abdomen demonstrates no acute pathology or metastatic disease.  Post cholecystectomy.  Moderate atherosclerotic calcification and plaque along the abdominal aorta and splenic artery without aneurysm.  No new skeletal abnormality.  The chronic left upper  rib destruction associated with the left apical mass is unchanged without pathologic fracture.    Impression  No appreciable progression of the left apical primary lung malignancy and no new metastatic disease identified since 01/27/2023.  Progressive pulmonary changes of bilateral multifocal infectious/inflammatory disease, likely atypical infectious process.      Electronically signed by: Stephan Gamino  Date:    03/01/2024  Time:    12:52    No results found for this or any previous visit.    Subjective     Patient awake, alert, and cooperative.    Spiritual/Cultural/Oriental orthodox Beliefs/Practices that affect care: no    Pain/Comfort: Pain Rating 1: 0/10    Objective     ORAL MUSCULATURE  Dentition: own teeth  Secretion Management: adequate  Mucosal Quality: good  Facial Movement: WFL  Buccal Strength & Mobility: WFL  Mandibular Strength & Mobility: WFL  Oral Labial Strength & Mobility: WFL  Lingual Strength & Mobility: WFL  Vocal Quality: adequate  Volitional Cough: Able to clear secretions, Productive, and Strong    Consistency Fed By Oral Symptoms Pharyngeal Symptoms   Thin liquid by spoon SLP None None   Thin liquid by cup Self None None   Thin liquid by straw Self None None   Puree SLP None None   Chewable solid Self None None     Assessment     Pt presents with no overt signs/sx of aspiration or difficulties with PO intake. Pt states she takes medication in puree at home. OK to initiate home diet of easy to chew with thin liquids and meds whole in puree. SLP to follow up x1 regarding diet tolerance.     Education     Patient and spouse were provided with verbal education regarding POC.  Understanding was verbalized.    Plan     SLP Follow-Up:  Yes   Plan of Care reviewed with:  patient, spouse     Time Tracking     SLP Treatment Date:   03/10/24  Speech Start Time:  1135  Speech Stop Time:  1150     Speech Total Time (min):  15 min    Billable minutes:  Swallow and Oral Function Evaluation, 15 minutes      03/10/2024

## 2024-03-10 NOTE — PROGRESS NOTES
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  She underwent chemotherapy and radiation, now currently on combination chemo immunotherapy with carboplatin, pemetrexed, nivolumab, ipilimumab.  She also has a history of COPD and ongoing tobacco abuse.  She presented to the ED on 3/8 dear worsening shortness of breath.  She was reportedly treated with a course of Levaquin in January and then Augmentin in Zithromax in February due to pneumonia.  She was also given a course of prednisone on 3/4.  WBC 13.8, .  Blood cultures collected, respiratory panel in progress. COVID/RSV/Flu pending as well.  CTA of chest was negative for pulmonary embolus, demonstrated reticulonodular infiltrates in the lung bases bilaterally with areas of scarring in the left lung apex and right lung apex, along with chronic interstitial lung changes bilaterally.  Currently afebrile and on room air.  She has been started on Levaquin and dexamethasone.  Pulmonology has been consulted for pneumonitis secondary to possible immunotherapy versus pneumonia.      Hospital Course/Significant events:  As noted above.    24 Hour Interval History:  Respiratory PCR positive for Coronavirus and rhinovirus/enterovirus. Respiratory, fungal and mycobacteria cultures have yet to be collected. Patient has been unable to collect a sputum sample. No acute events over night. Shortness of breath improved.     Past Medical History:   Diagnosis Date    Adenocarcinoma of  lung     Anemia     Anxiety     Arthritis     COPD (chronic obstructive pulmonary disease)     Depression     HLD (hyperlipidemia)     Hypertension     Lung cancer     Secondary malignant neoplasm of lymph nodes     Secondary malignant neoplasm of right lung     Thyroid disease     lymph nodes malignant       Past Surgical History:   Procedure Laterality Date    BLADDER SURGERY      CHOLECYSTECTOMY      COLONOSCOPY  2018    ENDOBRONCHIAL ULTRASOUND N/A 11/16/2022    Procedure: ENDOBRONCHIAL ULTRASOUND (EBUS);  Surgeon: Anthony Hutson MD;  Location: [facility] BRONCHOSCOPY LAB;  Service: Pulmonary;  Laterality: N/A;    HYSTERECTOMY      KNEE SURGERY Bilateral     replaced knees    PARTIAL HIP ARTHROPLASTY Bilateral        Social History     Socioeconomic History    Marital status:    Tobacco Use    Smoking status: Every Day     Current packs/day: 0.25     Types: Cigarettes    Smokeless tobacco: Never    Tobacco comments:     Patient states she is trying to quit smoking.   Substance and Sexual Activity    Alcohol use: Not Currently    Drug use: Never           Current Outpatient Medications   Medication Instructions    carvediloL (COREG) 6.25 mg, Oral, 2 times daily    clopidogreL (PLAVIX) 75 mg tablet No dose, route, or frequency recorded.    furosemide (LASIX) 20 mg, Oral, Daily    levothyroxine (SYNTHROID) 88 mcg, Oral, Every morning    losartan (COZAAR) 50 mg, Oral, Daily    pantoprazole (PROTONIX) 40 mg, Oral, Daily    simvastatin (ZOCOR) 40 mg, Oral, Nightly       Current Inpatient Medications   atorvastatin  40 mg Oral Daily    carvediloL  6.25 mg Oral BID    clopidogreL  75 mg Oral Daily    dexAMETHasone  12 mg Oral Daily    enoxparin  40 mg Subcutaneous Q24H (prophylaxis, 1700)    levothyroxine  88 mcg Oral QAM    mupirocin   Nasal BID    nicotine  1 patch Transdermal Daily    piperacillin-tazobactam (Zosyn) IV (PEDS and ADULTS) (extended infusion is not appropriate)  4.5 g Intravenous Q8H       Current  Intravenous Infusions   dextrose 5 % and 0.45 % NaCl 75 mL/hr at 03/10/24 0000         Review of Systems   Respiratory:  Positive for shortness of breath.           Objective:       Intake/Output Summary (Last 24 hours) at 3/10/2024 0915  Last data filed at 3/10/2024 0722  Gross per 24 hour   Intake 716.59 ml   Output 1400 ml   Net -683.41 ml           Vital Signs (Most Recent):  Temp: 97.5 °F (36.4 °C) (03/10/24 0822)  Pulse: 65 (03/10/24 0822)  Resp: 18 (03/10/24 0822)  BP: 137/69 (03/10/24 0822)  SpO2: 97 % (03/10/24 0822)  Body mass index is 28.55 kg/m².  Weight: 73.1 kg (161 lb 2.5 oz) Vital Signs (24h Range):  Temp:  [97.3 °F (36.3 °C)-97.6 °F (36.4 °C)] 97.5 °F (36.4 °C)  Pulse:  [58-69] 65  Resp:  [18-21] 18  SpO2:  [95 %-98 %] 97 %  BP: ()/(44-69) 137/69     Physical Exam  Vitals reviewed.   Constitutional:       Appearance: Normal appearance.   HENT:      Head: Normocephalic and atraumatic.   Cardiovascular:      Rate and Rhythm: Normal rate and regular rhythm.   Pulmonary:      Effort: Pulmonary effort is normal.      Breath sounds: Decreased breath sounds present.   Abdominal:      General: Bowel sounds are normal.      Palpations: Abdomen is soft.   Neurological:      General: No focal deficit present.      Mental Status: She is alert.   Psychiatric:         Mood and Affect: Mood normal.           Lines/Drains/Airways       Peripheral Intravenous Line  Duration                  Peripheral IV - Single Lumen 03/08/24 1615 20 G Left Antecubital 1 day                    Significant Labs:    Lab Results   Component Value Date    WBC 13.81 (H) 03/08/2024    HGB 11.6 (L) 03/08/2024    HCT 37.3 03/08/2024    MCV 81.6 03/08/2024     (H) 03/08/2024           BMP  Lab Results   Component Value Date     03/08/2024    K 4.6 03/08/2024    CHLORIDE 98 03/08/2024    CO2 30 03/08/2024    BUN 30.9 (H) 03/08/2024    CREATININE 0.83 03/08/2024    CALCIUM 9.1 03/08/2024    ESTGFRAFRICA 89 03/02/2022     "EGFRNONAA >60 07/05/2022         ABG  No results for input(s): "PH", "PO2", "PCO2", "HCO3", "POCBASEDEF" in the last 168 hours.    Mechanical Ventilation Support:  Oxygen Concentration (%): 2 (03/09/24 2000)      Significant Imaging:  I have reviewed the pertinent imaging within the past 24 hours.        Assessment/Plan:     Assessment  Moderately differentiated adenocarcinoma lung diagnosed 2014  - Chemotherapy with carboplatin/Taxol, Avastin [id number] 06/2019:  Thyroid nodule diagnosed as adenocarcinoma lung primary, post surgical resection 07/2019, and chemoradiation with carboplatin/Taxol 09/2019  - New left lung lesion 10/2020, CT-guided biopsy consistent with recurrent non-small-cell lung cancer, PD-L1 positive.  SPRT 12/2020  - PET/CT 07/05/2022 progression of disease right lung  - Currently receiving carboplatin, pemetrexed, nivolumab, and ipilimumab  Chronic cough with purulent sputum production and bilateral basilar bronchiectasis with centrilobular tree-in-bud distribution micronodular; suspicious for chronic aspiration, low suspicion for nivolumab or ipilimumab pneumonitis       Plan  Continue Zosyn, day #2.  Respiratory cultures pending. Can try giving guaifenesin to help with expectoration.  Awaiting speech evaluation.          JAIRO Gautam  Pulmonary Critical Care Medicine  [facility] - Emergency Dept  DOS: 03/10/2024    "

## 2024-03-10 NOTE — PLAN OF CARE
Problem: Adult Inpatient Plan of Care  Goal: Readiness for Transition of Care  Outcome: Ongoing, Progressing     Problem: Infection  Goal: Absence of Infection Signs and Symptoms  Outcome: Ongoing, Progressing     Problem: Fall Injury Risk  Goal: Absence of Fall and Fall-Related Injury  Outcome: Ongoing, Progressing

## 2024-03-10 NOTE — PROGRESS NOTES
Subjective:       Patient ID: Pratibha Patel is a 79 y.o. female.    Chief Complaint: Shortness of Breath (Pt. C/o sob being treated for pneumonia.. reports having recent spot on lungs and is scheduled for a bronchoscopy.. reports today home 02 at 86.. denies fever.. hx of lung CA)      PCP: Graham Nguyen NP  Cardiologist: Dr. Danny Sánchez  Referring Physician: Dr. Anthony Hutson  Endocrine: Dr. Werner  Radiation oncology: Dr. Boss in the past, now Dr. Mortensen.  ENT: Dr. 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 ? metastatic findings could not be biopsied. TTF negative/CK 7 +.    Diagnosed June 2014 EGFR mutation negative/ALK gene rearrangement negative. 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 Patient has reportedly been referred to ENT for resection and lymph node dissection (per patient report).   Subsequent open biopsy done by Dr. Rosales on 7/29/2019 confirmed the same.  Patient was unable to undergo definitive surgical resection for oligo metastatic disease.  Hoarseness and swallowing difficulties secondary to #2  Biopsy of the left lung lesion showed recurrent non-small cell lung cancer, Caris was sent and showed positivity for PD-L1, 2%.  No other targetable or actionable mutations present.    Current Treatment:   Combination chemo immunotherapy with carboplatin, pemetrexed, nivolumab, ipilimumab based off of checkmate 9LA     Treatment History:  RT with weekly Carbo/Taxol started  7/2/14--Completed August 2014  Single agent 'Maintenance' Avastin q 3 weeks.  Started 4/9/15--last given September 21, 2016  Radiation + weekly Carbo/Taxol at 2AUC ----9/11/19-10/22/19  Patient underwent radiation from 12/17/2020 through 12/21/2020.  Radiation to the left and right lung per Dr. Robert Mortensen.    HPI:   Please see Oncology history in the diagnosis of adenocarcinoma of the lung, stage IV on the problem list for full detail.  Patient originally seen in 2014 with unintentional weight loss, shortness of breath, nonproductive cough.  Patient had imaging findings suggestive of lung cancer, bronchoscopy done in June of 2014 revealed moderately differentiated adenocarcinoma of the lung.  She originally underwent chemo radiation from July 2014 through August 2014.  She was placed on maintenance Avastin from April 2015 through September 2016. Patient then underwent a thyroid biopsy in June of 2019 that showed metastatic carcinoma favored to be adenocarcinoma lung primary.  She underwent surgical resection in July 2019, however full surgical dissection was not able to be done and the patient underwent open biopsy of right thyroid gland.  Pathology revealed poorly differentiated non-small cell carcinoma consistent with metastatic lung cancer.  NGS panel done at that time unremarkable.  She started chemoradiation with carbo Taxol in September 2019, completed in October 2019. Patient was doing well, imaging in October 2020 revealed an abnormality in the left lung, CT-guided lung biopsy on 11/03/2020 showed recurrent non-small cell lung cancer, Caris revealed PDL1 positivity.  She underwent SBRT from 12/17/2020 through 12/21/2020.  Patient then placed on surveillance imaging, most recently done on 07/05/2022 showing worsening disease in the right lung with a previous lesion that measured 0.6 cm and had an SUV of 1.0 now measuring 1.5 cm and an SUV of 8.4.  This was treated with SBRT in August of 2022.  PET/CT scan  done on 11/8/2022 showed mild increased radiotracer activity within the left apical consolidation, SUV 5.5.  There was a newly hypermetabolic subcentimeter subcarinal lymph node.  A 1 cm right lower lobe nodule was smaller, he right upper lobe nodule was slightly larger measuring 6 mm.  No sites of FDG avid metastatic disease in the neck, abdomen, or pelvis. Bronchoscopy done on 11/16/2022, pathology revealed malignant cells consistent with adenocarcinoma.  Planning on chemo immunotherapy with carboplatin, pemetrexed, nivolumab, ipilimumab based off of checkmate 9LA.  PET/CT on 07/25/2023 showed infectious or inflammatory changes in the right lower lobe with no other findings to suggest new or worsening disease.    PET/CT scan done on 10/12/2023 showed mixed response to therapy with decrease right hilar and left upper lobe consolidation uptake.  Subcarinal lymph node demonstrates significant increased uptake compared to the prior study.  Stable subcentimeter right upper lobe nodule demonstrates slight increased uptake compared to prior study    Most recent PET/CT scan done on 01/18/2024 showed mixed response to therapy with decrease in size of the right upper lobe nodule and maximum SUV of subcarinal lymph node.  There was right hilar uptake that was increased, however there was also patchy airspace opacities that could be infectious or inflammatory.    Interval History:   Patient well known to me who presented to the emergency department with worsening shortness of breath.  She had a CT scan of the chest on 03/01/2024 that showed no progression of disease with progressive pulmonary changes of bilateral multifocal infectious/inflammatory disease, likely atypical infectious process.  This was discussed with Radiology, it was not felt that this was pneumonitis from her immunotherapy.  CT angiogram of the chest on 03/08/2024 to rule out pulmonary embolism showed no evidence of pulmonary embolus but there were  reticulonodular infiltrates of the lung bases bilaterally.  Oncology consulted as patient known to me.    Today, 03/10/2024:  Patient seen and examined.  She stated that she was feeling much better.  She was wondering if she could have some food.  She stated that she was very hungry and had a great appetite.      Past Medical History:   Diagnosis Date    Adenocarcinoma of lung     Anemia     Anxiety     Arthritis     COPD (chronic obstructive pulmonary disease)     Depression     HLD (hyperlipidemia)     Hypertension     Lung cancer     Secondary malignant neoplasm of lymph nodes     Secondary malignant neoplasm of right lung     Thyroid disease     lymph nodes malignant      Past Surgical History:   Procedure Laterality Date    BLADDER SURGERY      CHOLECYSTECTOMY      COLONOSCOPY  2018    ENDOBRONCHIAL ULTRASOUND N/A 11/16/2022    Procedure: ENDOBRONCHIAL ULTRASOUND (EBUS);  Surgeon: Anthony Hutson MD;  Location: [facility] BRONCHOSCOPY LAB;  Service: Pulmonary;  Laterality: N/A;    HYSTERECTOMY      KNEE SURGERY Bilateral     replaced knees    PARTIAL HIP ARTHROPLASTY Bilateral      Social History     Socioeconomic History    Marital status:    Tobacco Use    Smoking status: Every Day     Current packs/day: 0.25     Types: Cigarettes    Smokeless tobacco: Never    Tobacco comments:     Patient states she is trying to quit smoking.   Substance and Sexual Activity    Alcohol use: Not Currently    Drug use: Never      Family History   Problem Relation Age of Onset    Lung cancer Mother     Lung cancer Brother       Review of patient's allergies indicates:  No Known Allergies   Review of Systems   Constitutional:  Negative for chills, diaphoresis, fatigue, fever and unexpected weight change.   HENT:  Negative for nasal congestion, mouth sores, sinus pressure/congestion and sore throat.    Eyes:  Negative for pain and visual disturbance.   Respiratory:  Negative for cough, chest tightness and shortness of breath.     Cardiovascular:  Negative for chest pain, palpitations and leg swelling.   Gastrointestinal:  Negative for abdominal distention, abdominal pain, blood in stool, constipation and diarrhea.   Genitourinary:  Negative for dysuria, frequency and hematuria.   Musculoskeletal:  Negative for arthralgias and back pain.   Integumentary:  Negative for rash.   Neurological:  Negative for dizziness, weakness, numbness and headaches.   Hematological:  Negative for adenopathy.   Psychiatric/Behavioral:  Negative for confusion.          Objective:      Physical Exam  Vitals reviewed.   Constitutional:       General: She is not in acute distress.     Appearance: Normal appearance.   HENT:      Head: Normocephalic and atraumatic.      Nose: Nose normal.      Mouth/Throat:      Mouth: Mucous membranes are moist.   Eyes:      Extraocular Movements: Extraocular movements intact.      Conjunctiva/sclera: Conjunctivae normal.   Neck:      Comments: Radiation changes to the left supraclavicular area  Cardiovascular:      Rate and Rhythm: Normal rate and regular rhythm.      Pulses: Normal pulses.      Heart sounds: Normal heart sounds.   Pulmonary:      Effort: Pulmonary effort is normal.      Breath sounds: Examination of the right-lower field reveals decreased breath sounds. Examination of the left-lower field reveals decreased breath sounds. Decreased breath sounds present.   Abdominal:      General: Bowel sounds are normal.      Palpations: Abdomen is soft.   Musculoskeletal:         General: No swelling. Normal range of motion.      Cervical back: Normal range of motion and neck supple.      Right lower leg: No edema.      Left lower leg: No edema.   Skin:     General: Skin is warm and dry.   Neurological:      General: No focal deficit present.      Mental Status: She is alert and oriented to person, place, and time. Mental status is at baseline.   Psychiatric:         Mood and Affect: Mood normal.         Behavior: Behavior  normal.         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 ? metastatic findings could not be biopsied.    TTF negative/CK 7 +.    Diagnosed June 2014.  EGFR mutation negative/ALK gene rearrangement negative.  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 Patient has reportedly been referred to ENT for resection and lymph node dissection (per patient report). Subsequent open biopsy done by Dr. Rosales on 7/29/2019 confirmed the same.  Patient was unable to undergo definitive surgical resection for oligo metastatic disease.  Treatment induced anemia  Hoarseness .  Shortness of breath      Plan:          Patient's biopsy did return as recurrent non-small cell lung cancer in the left lung.  I feel that the right lung will be similar pathology.  She completed radiation with Dr. Mortensen on 12/24/2020.     Caris did reveal that PD-L1 was 2% positive suggesting benefit from pembrolizumab or nivolumab/ipilimumab.      Her PET/CT scan does show a new hypermetabolic subcentimeter subcarinal lymph node and increase in a left apical consolidation.  The left apical consolidation is close to the aorta and I do not think would be amenable to biopsy, however this subcarinal lymph node may be amenable to bronchoscopy.      Bronchoscopy done on 11/16/2022 showed metastatic adenocarcinoma.  She is no longer a candidate for full dose SBRT.  We started with carboplatin, pemetrexed, nivolumab, and ipilimumab based on the CHECKMATE 9LA study.  Cycle 1 day 1 given 12/20/2022.      Follow up with radiation oncology as scheduled, Dr. Mortensen.    Continue levothyroxine to 88mcg daily.     PET/CT scan done on 01/18/2024 showed mixed response to therapy.  Overall, the patient was asymptomatic from a cancer standpoint, however she is still having a cough and SOB.     CT scan of the chest on 03/01/2024 was not suggestive of pneumonitis.    CT angiogram on 03/08/2024 showed reticulonodular infiltrates pulmonary Medicine saw the patient, I discussed the case with them, they feel that pneumonitis is of low likelihood.  They feel that this is likely chronic aspiration.    Continue with antibiotics.  Appreciate Pulmonary Medicine recommendations.    Enrrique Gudino II, MD

## 2024-03-11 LAB
ANION GAP SERPL CALC-SCNC: 10 MEQ/L
BASOPHILS # BLD AUTO: 0.02 X10(3)/MCL
BASOPHILS NFR BLD AUTO: 0.1 %
BUN SERPL-MCNC: 34.7 MG/DL (ref 9.8–20.1)
CALCIUM SERPL-MCNC: 8.9 MG/DL (ref 8.4–10.2)
CHLORIDE SERPL-SCNC: 101 MMOL/L (ref 98–107)
CO2 SERPL-SCNC: 26 MMOL/L (ref 23–31)
CREAT SERPL-MCNC: 0.88 MG/DL (ref 0.55–1.02)
CREAT/UREA NIT SERPL: 39
EOSINOPHIL # BLD AUTO: 0 X10(3)/MCL (ref 0–0.9)
EOSINOPHIL NFR BLD AUTO: 0 %
ERYTHROCYTE [DISTWIDTH] IN BLOOD BY AUTOMATED COUNT: 18.6 % (ref 11.5–17)
GFR SERPLBLD CREATININE-BSD FMLA CKD-EPI: >60 MLS/MIN/1.73/M2
GLUCOSE SERPL-MCNC: 83 MG/DL (ref 82–115)
HCT VFR BLD AUTO: 33.8 % (ref 37–47)
HGB BLD-MCNC: 11.1 G/DL (ref 12–16)
IMM GRANULOCYTES # BLD AUTO: 0.07 X10(3)/MCL (ref 0–0.04)
IMM GRANULOCYTES NFR BLD AUTO: 0.5 %
LYMPHOCYTES # BLD AUTO: 1.25 X10(3)/MCL (ref 0.6–4.6)
LYMPHOCYTES NFR BLD AUTO: 9.1 %
MAGNESIUM SERPL-MCNC: 2.2 MG/DL (ref 1.6–2.6)
MCH RBC QN AUTO: 25.9 PG (ref 27–31)
MCHC RBC AUTO-ENTMCNC: 32.8 G/DL (ref 33–36)
MCV RBC AUTO: 79 FL (ref 80–94)
MONOCYTES # BLD AUTO: 0.91 X10(3)/MCL (ref 0.1–1.3)
MONOCYTES NFR BLD AUTO: 6.6 %
NEUTROPHILS # BLD AUTO: 11.51 X10(3)/MCL (ref 2.1–9.2)
NEUTROPHILS NFR BLD AUTO: 83.7 %
NRBC BLD AUTO-RTO: 0 %
PHOSPHATE SERPL-MCNC: 3.6 MG/DL (ref 2.3–4.7)
PLATELET # BLD AUTO: 383 X10(3)/MCL (ref 130–400)
PMV BLD AUTO: 8.8 FL (ref 7.4–10.4)
POTASSIUM SERPL-SCNC: 4.3 MMOL/L (ref 3.5–5.1)
RBC # BLD AUTO: 4.28 X10(6)/MCL (ref 4.2–5.4)
SODIUM SERPL-SCNC: 137 MMOL/L (ref 136–145)
WBC # SPEC AUTO: 13.76 X10(3)/MCL (ref 4.5–11.5)

## 2024-03-11 PROCEDURE — 63600175 PHARM REV CODE 636 W HCPCS: Performed by: INTERNAL MEDICINE

## 2024-03-11 PROCEDURE — 25000003 PHARM REV CODE 250: Performed by: NURSE PRACTITIONER

## 2024-03-11 PROCEDURE — 63600175 PHARM REV CODE 636 W HCPCS: Performed by: NURSE PRACTITIONER

## 2024-03-11 PROCEDURE — 94760 N-INVAS EAR/PLS OXIMETRY 1: CPT

## 2024-03-11 PROCEDURE — 85025 COMPLETE CBC W/AUTO DIFF WBC: CPT | Performed by: INTERNAL MEDICINE

## 2024-03-11 PROCEDURE — 80048 BASIC METABOLIC PNL TOTAL CA: CPT | Performed by: INTERNAL MEDICINE

## 2024-03-11 PROCEDURE — 83735 ASSAY OF MAGNESIUM: CPT | Performed by: INTERNAL MEDICINE

## 2024-03-11 PROCEDURE — S4991 NICOTINE PATCH NONLEGEND: HCPCS | Performed by: NURSE PRACTITIONER

## 2024-03-11 PROCEDURE — 27000221 HC OXYGEN, UP TO 24 HOURS

## 2024-03-11 PROCEDURE — 21400001 HC TELEMETRY ROOM

## 2024-03-11 PROCEDURE — 84100 ASSAY OF PHOSPHORUS: CPT | Performed by: INTERNAL MEDICINE

## 2024-03-11 PROCEDURE — 27000207 HC ISOLATION

## 2024-03-11 PROCEDURE — 25000003 PHARM REV CODE 250: Performed by: INTERNAL MEDICINE

## 2024-03-11 PROCEDURE — 25000242 PHARM REV CODE 250 ALT 637 W/ HCPCS: Performed by: NURSE PRACTITIONER

## 2024-03-11 RX ORDER — DEXAMETHASONE 4 MG/1
8 TABLET ORAL EVERY 24 HOURS
Status: DISCONTINUED | OUTPATIENT
Start: 2024-03-12 | End: 2024-03-12 | Stop reason: HOSPADM

## 2024-03-11 RX ORDER — LEVOFLOXACIN 500 MG/1
500 TABLET, FILM COATED ORAL DAILY
Status: DISCONTINUED | OUTPATIENT
Start: 2024-03-11 | End: 2024-03-12 | Stop reason: HOSPADM

## 2024-03-11 RX ORDER — FLUTICASONE FUROATE AND VILANTEROL 200; 25 UG/1; UG/1
1 POWDER RESPIRATORY (INHALATION) DAILY
Status: DISCONTINUED | OUTPATIENT
Start: 2024-03-11 | End: 2024-03-12 | Stop reason: HOSPADM

## 2024-03-11 RX ORDER — METRONIDAZOLE 500 MG/1
500 TABLET ORAL EVERY 8 HOURS
Status: DISCONTINUED | OUTPATIENT
Start: 2024-03-11 | End: 2024-03-12 | Stop reason: HOSPADM

## 2024-03-11 RX ADMIN — LEVOTHYROXINE SODIUM 88 MCG: 88 TABLET ORAL at 08:03

## 2024-03-11 RX ADMIN — FLUTICASONE FUROATE AND VILANTEROL TRIFENATATE 1 PUFF: 200; 25 POWDER RESPIRATORY (INHALATION) at 10:03

## 2024-03-11 RX ADMIN — METRONIDAZOLE 500 MG: 500 TABLET ORAL at 08:03

## 2024-03-11 RX ADMIN — CARVEDILOL 6.25 MG: 3.12 TABLET, FILM COATED ORAL at 08:03

## 2024-03-11 RX ADMIN — METRONIDAZOLE 500 MG: 500 TABLET ORAL at 01:03

## 2024-03-11 RX ADMIN — PIPERACILLIN SODIUM AND TAZOBACTAM SODIUM 4.5 G: 4; .5 INJECTION, POWDER, LYOPHILIZED, FOR SOLUTION INTRAVENOUS at 08:03

## 2024-03-11 RX ADMIN — DEXAMETHASONE 12 MG: 4 TABLET ORAL at 08:03

## 2024-03-11 RX ADMIN — ATORVASTATIN CALCIUM 40 MG: 40 TABLET, FILM COATED ORAL at 08:03

## 2024-03-11 RX ADMIN — CLOPIDOGREL BISULFATE 75 MG: 75 TABLET ORAL at 08:03

## 2024-03-11 RX ADMIN — ENOXAPARIN SODIUM 40 MG: 40 INJECTION SUBCUTANEOUS at 04:03

## 2024-03-11 RX ADMIN — NICOTINE 1 PATCH: 21 PATCH, EXTENDED RELEASE TRANSDERMAL at 08:03

## 2024-03-11 RX ADMIN — LEVOFLOXACIN 500 MG: 500 TABLET, FILM COATED ORAL at 01:03

## 2024-03-11 RX ADMIN — PIPERACILLIN SODIUM AND TAZOBACTAM SODIUM 4.5 G: 4; .5 INJECTION, POWDER, LYOPHILIZED, FOR SOLUTION INTRAVENOUS at 12:03

## 2024-03-11 NOTE — PT/OT/SLP DISCHARGE
[facility]  Speech Language Pathology Department  Diet Tolerance Follow-up    Patient Name:  Pratibha Patel   MRN:  [id number]  Admitting Diagnosis: worsening shortness of breath    Recommendations     General recommendations:  SLP intervention not indicated  Diet texture/consistency recommendations:  Easy to Chew solids (IDDSI 7) and thin liquids (IDDSI 0)  Medications: whole in puree  Swallow strategies/precautions:  small bites/sips, slow rate, and upright for PO intake   Precautions: Standard,      Diet Tolerance     Nursing reports no difficulty regarding diet tolerance.    Plan     Skilled SLP services no longer warranted, please reconsult as needed.    03/11/2024

## 2024-03-11 NOTE — PROGRESS NOTES
[facility] 4th Floor Medical Telemetry  Wound Care    Patient Name:  Pratibha Patel   MRN:  [id number]  Date: 3/11/2024  Diagnosis: Hypertensive urgency    History:     Past Medical History:   Diagnosis Date    Adenocarcinoma of lung     Anemia     Anxiety     Arthritis     COPD (chronic obstructive pulmonary disease)     Depression     HLD (hyperlipidemia)     Hypertension     Lung cancer     Secondary malignant neoplasm of lymph nodes     Secondary malignant neoplasm of right lung     Thyroid disease     lymph nodes malignant       Social History     Socioeconomic History    Marital status:    Tobacco Use    Smoking status: Every Day     Current packs/day: 0.25     Types: Cigarettes    Smokeless tobacco: Never    Tobacco comments:     Patient states she is trying to quit smoking.   Substance and Sexual Activity    Alcohol use: Not Currently    Drug use: Never       Precautions:     Allergies as of 03/08/2024    (No Known Allergies)       WOC Assessment Details/Treatment        03/11/24 1051   WOCN Assessment   Visit Date 03/11/24   Visit Time 1051   Consult Type New   WOCN Speciality Wound   Intervention assessed;chart review;orders   Teaching on-going        Altered Skin Integrity 03/10/24 2000 Right posterior Elbow   Date First Assessed/Time First Assessed: 03/10/24 2000   Altered Skin Integrity Present on Admission - Did Patient arrive to the hospital with altered skin?: yes  Side: Right  Orientation: posterior  Location: Elbow   Wound Image    Dressing Appearance Dry;Intact;Clean   Drainage Amount None   Drainage Characteristics/Odor No odor   Appearance Ecchymotic;Red   Tissue loss description Partial thickness   Red (%), Wound Tissue Color 100 %   Periwound Area Ecchymotic   Wound Edges Irregular   Wound Length (cm) 1 cm   Wound Width (cm) 0.5 cm   Wound Depth (cm) 0.1 cm   Wound Volume (cm^3) 0.05 cm^3   Wound Surface Area (cm^2) 0.5 cm^2   Care Cleansed with:;Sterile normal saline    Dressing Applied     Woundcare consulted for right elbow. Treatment recommendations put into place: Cleanse with NS. Apply adaptic cover with mepilex bordered foam dressing. Change daily. Nursing to continue with preventative measures including turning every two hours, wedge, and floating heels. Will follow up.    03/11/2024

## 2024-03-11 NOTE — PROGRESS NOTES
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  She underwent chemotherapy and radiation, now currently on combination chemo immunotherapy with carboplatin, pemetrexed, nivolumab, ipilimumab.  She also has a history of COPD and ongoing tobacco abuse.  She presented to the ED on 3/8 dear worsening shortness of breath.  She was reportedly treated with a course of Levaquin in January and then Augmentin in Zithromax in February due to pneumonia.  She was also given a course of prednisone on 3/4.  WBC 13.8, .  Blood cultures collected, respiratory panel in progress. COVID/RSV/Flu pending as well.  CTA of chest was negative for pulmonary embolus, demonstrated reticulonodular infiltrates in the lung bases bilaterally with areas of scarring in the left lung apex and right lung apex, along with chronic interstitial lung changes bilaterally.  She has been started on Levaquin and dexamethasone.  Pulmonology has been consulted for pneumonitis secondary to possible immunotherapy versus pneumonia.      Hospital Course/Significant events:  As noted above.    24 Hour Interval History:  Respiratory PCR positive for Coronavirus and rhinovirus/enterovirus. Respiratory, fungal and mycobacteria cultures pending. No acute events over night. Oxygenating well on 2L NC. Speech therapy did not find any signs of aspiration.     Past Medical History:   Diagnosis Date    Adenocarcinoma of lung     Anemia     Anxiety     Arthritis     COPD  (chronic obstructive pulmonary disease)     Depression     HLD (hyperlipidemia)     Hypertension     Lung cancer     Secondary malignant neoplasm of lymph nodes     Secondary malignant neoplasm of right lung     Thyroid disease     lymph nodes malignant       Past Surgical History:   Procedure Laterality Date    BLADDER SURGERY      CHOLECYSTECTOMY      COLONOSCOPY  2018    ENDOBRONCHIAL ULTRASOUND N/A 11/16/2022    Procedure: ENDOBRONCHIAL ULTRASOUND (EBUS);  Surgeon: Anthony Hutson MD;  Location: [facility] BRONCHOSCOPY [facility];  Service: Pulmonary;  Laterality: N/A;    HYSTERECTOMY      KNEE SURGERY Bilateral     replaced knees    PARTIAL HIP ARTHROPLASTY Bilateral        Social History     Socioeconomic History    Marital status:    Tobacco Use    Smoking status: Every Day     Current packs/day: 0.25     Types: Cigarettes    Smokeless tobacco: Never    Tobacco comments:     Patient states she is trying to quit smoking.   Substance and Sexual Activity    Alcohol use: Not Currently    Drug use: Never           Current Outpatient Medications   Medication Instructions    carvediloL (COREG) 6.25 mg, Oral, 2 times daily    clopidogreL (PLAVIX) 75 mg tablet No dose, route, or frequency recorded.    furosemide (LASIX) 20 mg, Oral, Daily    levothyroxine (SYNTHROID) 88 mcg, Oral, Every morning    losartan (COZAAR) 50 mg, Oral, Daily    pantoprazole (PROTONIX) 40 mg, Oral, Daily    simvastatin (ZOCOR) 40 mg, Oral, Nightly       Current Inpatient Medications   atorvastatin  40 mg Oral Daily    carvediloL  6.25 mg Oral BID    clopidogreL  75 mg Oral Daily    dexAMETHasone  12 mg Oral Daily    enoxparin  40 mg Subcutaneous Q24H (prophylaxis, 1700)    levothyroxine  88 mcg Oral QAM    mupirocin   Nasal BID    nicotine  1 patch Transdermal Daily    piperacillin-tazobactam (Zosyn) IV (PEDS and ADULTS) (extended infusion is not appropriate)  4.5 g Intravenous Q8H       Current Intravenous Infusions          Review of Systems  "  Respiratory:  Positive for shortness of breath.           Objective:       Intake/Output Summary (Last 24 hours) at 3/11/2024 0821  Last data filed at 3/11/2024 0000  Gross per 24 hour   Intake 1950.71 ml   Output 475 ml   Net 1475.71 ml           Vital Signs (Most Recent):  Temp: 97.8 °F (36.6 °C) (03/11/24 0816)  Pulse: (!) 59 (03/11/24 0816)  Resp: 18 (03/11/24 0101)  BP: 90/70 (03/11/24 0816)  SpO2: 99 % (03/11/24 0816)  Body mass index is 28.55 kg/m².  Weight: 73.1 kg (161 lb 2.5 oz) Vital Signs (24h Range):  Temp:  [97.3 °F (36.3 °C)-97.8 °F (36.6 °C)] 97.8 °F (36.6 °C)  Pulse:  [55-65] 59  Resp:  [16-18] 18  SpO2:  [95 %-99 %] 99 %  BP: ()/(45-70) 90/70     Physical Exam  Vitals reviewed.   Constitutional:       Appearance: Normal appearance.   HENT:      Head: Normocephalic and atraumatic.   Cardiovascular:      Rate and Rhythm: Normal rate and regular rhythm.   Pulmonary:      Effort: Pulmonary effort is normal.      Breath sounds: Decreased breath sounds present.   Abdominal:      General: Bowel sounds are normal.      Palpations: Abdomen is soft.   Neurological:      General: No focal deficit present.      Mental Status: She is alert.   Psychiatric:         Mood and Affect: Mood normal.           Lines/Drains/Airways       Peripheral Intravenous Line  Duration                  Peripheral IV - Single Lumen 03/10/24 2330 20 G Anterior;Left Forearm <1 day                    Significant Labs:    Lab Results   Component Value Date    WBC 13.76 (H) 03/11/2024    HGB 11.1 (L) 03/11/2024    HCT 33.8 (L) 03/11/2024    MCV 79.0 (L) 03/11/2024     03/11/2024           BMP  Lab Results   Component Value Date     03/11/2024    K 4.3 03/11/2024    CHLORIDE 101 03/11/2024    CO2 26 03/11/2024    BUN 34.7 (H) 03/11/2024    CREATININE 0.88 03/11/2024    CALCIUM 8.9 03/11/2024    AGAP 10.0 03/11/2024    ESTGFRAFRICA 89 03/02/2022    EGFRNONAA >60 07/05/2022         ABG  No results for input(s): "PH", " ""PO2", "PCO2", "HCO3", "POCBASEDEF" in the last 168 hours.    Mechanical Ventilation Support:  Oxygen Concentration (%): 2 (03/10/24 2000)      Significant Imaging:  I have reviewed the pertinent imaging within the past 24 hours.        Assessment/Plan:     Assessment  Moderately differentiated adenocarcinoma lung diagnosed 2014  - Chemotherapy with carboplatin/Taxol, Avastin [id number] 06/2019:  Thyroid nodule diagnosed as adenocarcinoma lung primary, post surgical resection 07/2019, and chemoradiation with carboplatin/Taxol 09/2019  - New left lung lesion 10/2020, CT-guided biopsy consistent with recurrent non-small-cell lung cancer, PD-L1 positive.  SPRT 12/2020  - PET/CT 07/05/2022 progression of disease right lung  - Currently receiving carboplatin, pemetrexed, nivolumab, and ipilimumab  Chronic cough with purulent sputum production and bilateral basilar bronchiectasis with centrilobular tree-in-bud distribution micronodular; suspicious for chronic aspiration, low suspicion for nivolumab or ipilimumab pneumonitis       Plan  Continue Zosyn, day #3.  Respiratory cultures pending. Can try giving guaifenesin to help with expectoration.  Speech did not find any concerns of aspiration  Adding inhaled therapy to try to improve dyspnea -- mentions of COPD in chart however no PFTs on file   Wean o2         JAIRO Newton  Pulmonary Critical Care Medicine  [facility] - Emergency Dept  DOS: 03/11/2024    "

## 2024-03-11 NOTE — PROGRESS NOTES
[facility] - Emergency Dept  [facility] MEDICINE ~ PROGRESS NOTE        CHIEF COMPLAINT   Hospital follow up    HOSPITAL COURSE   79 yr old female whose history includes COPD, HTN and recurrent metastatic lung cancer. Presented to ED with c/o worsening SOB. Endorses chronic SOB but has been progressively worse over the last 24 hours. Also has a productive cough with yellow sputum however this is chronic and no worse than usual. Denies any fever, chills, vomiting or diarrhea. Treated in January with course of Levaquin and in February with Augmentin and zithromax, for pneumonia according to patient. PO prednisone prescribed on 3/4. Not on home oxygen. Continues to smoke.      VS on arrival: T 97.5, P 68, R 26, B/P 149/73, Sats 92% on room air. Initial labs: WBC 13.81, Hgb 11.6, Hct 37.3, platelets 426 and otherwise unremarkable. No evidence of UTI. CTA chest shows reticulonodular infiltrates in bilateral lung bases, scarring and chronic interstitial lung changes bilaterally. No evidence of PE. Meds given in ED include Rocephin, Zithromax, Lasix 40mg IV and one liter LR.     Pulmonary consulted and felt CT findings could be from chronic aspiration so SLP was consulted and placed on Zosyn.   SLP evaluated and was started on a diet without concern for aspiration.    Today  Seen this morning feeling well.  We will continue Zosyn day or so since she failed outpatient Augmentin.  We will see what Pulmonary says today as well.        OBJECTIVE/PHYSICAL EXAM     VITAL SIGNS (MOST RECENT):  Temp: 97.8 °F (36.6 °C) (03/11/24 0816)  Pulse: (!) 59 (03/11/24 0816)  Resp: 18 (03/11/24 0101)  BP: 90/70 (03/11/24 0816)  SpO2: 98 % (03/11/24 0906) VITAL SIGNS (24 HOUR RANGE):  Temp:  [97.3 °F (36.3 °C)-97.8 °F (36.6 °C)] 97.8 °F (36.6 °C)  Pulse:  [55-61] 59  Resp:  [16-18] 18  SpO2:  [95 %-99 %] 98 %  BP: ()/(45-70) 90/70   GENERAL: In no acute distress, afebrile  HEENT:  CHEST:  Bibasilar crackle  HEART: S1, S2,  no appreciable murmur  ABDOMEN: Soft, nontender, BS +  MSK: Warm, no lower extremity edema, no clubbing or cyanosis  NEUROLOGIC: Alert and oriented x4, moving all extremities with good strength   INTEGUMENTARY:  PSYCHIATRY:        ASSESSMENT/PLAN   Steroid induced leukocytosis   Bibasilar reticulonodular infiltrate  Biapical scarring  Possible chronic aspiration changes  Coronavirus (non COVID) and Rhinovirus URI    History of: As listed above      Pulmonary following.  Pending further recommendations.  Oncology following.  Zosyn day 3., start tapering dexamethasone    DVT prophylaxis:  Lovenox 40    Anticipated discharge and disposition:  Probably discharge in the next day or so  __________________________________________________________________________    LABS/MICRO/MEDS/DIAGNOSTICS       LABS  Recent Labs     03/08/24 1551 03/11/24  0425    137   K 4.6 4.3   CHLORIDE 98 101   CO2 30 26   BUN 30.9* 34.7*   CREATININE 0.83 0.88   GLUCOSE 100 83   CALCIUM 9.1 8.9   ALKPHOS 75  --    AST 22  --    ALT 27  --    ALBUMIN 3.5  --        Recent Labs     03/11/24  0425   WBC 13.76*   RBC 4.28   HCT 33.8*   MCV 79.0*            MICROBIOLOGY  Microbiology Results (last 7 days)       Procedure Component Value Units Date/Time    Blood culture #1 **CANNOT BE ORDERED STAT** [[id number]]  (Normal) Collected: 03/08/24 1551    Order Status: Completed Specimen: Blood Updated: 03/10/24 2101     CULTURE, BLOOD ([facility]) No Growth At 48 Hours    Blood culture #2 **CANNOT BE ORDERED STAT** [[id number]]  (Normal) Collected: 03/08/24 1551    Order Status: Completed Specimen: Blood Updated: 03/10/24 2101     CULTURE, BLOOD ([facility]) No Growth At 48 Hours    Fungal Culture [[id number]]     Order Status: Sent Specimen: Respiratory from Sputum, Expectorated     Mycobacteria and Nocardia Culture [[id number]]     Order Status: Sent Specimen: Respiratory from Sputum, Expectorated     Respiratory Culture [[id number]]     Order Status: Sent  "Specimen: Sputum     Respiratory Culture [[id number]]     Order Status: Sent Specimen: Sputum from Nasopharyngeal                MEDICATIONS   atorvastatin  40 mg Oral Daily    carvediloL  6.25 mg Oral BID    clopidogreL  75 mg Oral Daily    dexAMETHasone  12 mg Oral Daily    enoxparin  40 mg Subcutaneous Q24H (prophylaxis, 1700)    fluticasone furoate-vilanteroL  1 puff Inhalation Daily    levothyroxine  88 mcg Oral QAM    mupirocin   Nasal BID    nicotine  1 patch Transdermal Daily    piperacillin-tazobactam (Zosyn) IV (PEDS and ADULTS) (extended infusion is not appropriate)  4.5 g Intravenous Q8H         INFUSIONS           DIAGNOSTIC TESTS  CTA Chest Non-Coronary (PE Studies)   Final Result      No pulmonary embolism seen      Reticulonodular infiltrates in the lung bases bilaterally      Areas of scarring in the left lung apex and right lung apex with associated calcification on the left side      Chronic interstitial lung changes bilaterally         Electronically signed by: Rainer Ahumada   Date:    03/08/2024   Time:    17:56      X-Ray Chest AP Portable   Final Result      Chronic changes seen no acute process         Electronically signed by: Rainer Ahumada   Date:    03/08/2024   Time:    17:51           No results found for: "EF"       NUTRITION STATUS  Patient meets ASPEN criteria for   malnutrition of   per RD assessment as evidenced by:                       A minimum of two characteristics is recommended for diagnosis of either severe or non-severe malnutrition.       Case related differential diagnoses have been reviewed; assessment and plan has been documented. I have personally reviewed the labs and test results that are currently available; I have reviewed the patients medication list. I have reviewed the consulting providers recommendations. I have reviewed or attempted to review medical records based upon their availability.  All of the patient's and/or family's questions have been addressed and " answered to the best of my ability.  I will continue to monitor closely and make adjustments to medical management as needed.  This document was created using M*Modal Fluency Direct.  Transcription errors may have been made.  Please contact me if any questions may rise regarding documentation to clarify transcription.        Terry Clarke MD   Internal Medicine  Department of Hospital Medicine  [facility] - Emergency Dept

## 2024-03-12 VITALS
SYSTOLIC BLOOD PRESSURE: 109 MMHG | DIASTOLIC BLOOD PRESSURE: 56 MMHG | BODY MASS INDEX: 28.56 KG/M2 | RESPIRATION RATE: 18 BRPM | HEART RATE: 57 BPM | HEIGHT: 63 IN | WEIGHT: 161.19 LBS | OXYGEN SATURATION: 95 % | TEMPERATURE: 98 F

## 2024-03-12 LAB
1,3 BETA GLUCAN SER QL: NEGATIVE
1,3 BETA GLUCAN SER-MCNC: <31 PG/ML

## 2024-03-12 PROCEDURE — 25000003 PHARM REV CODE 250: Performed by: NURSE PRACTITIONER

## 2024-03-12 PROCEDURE — 63600175 PHARM REV CODE 636 W HCPCS: Performed by: NURSE PRACTITIONER

## 2024-03-12 PROCEDURE — S4991 NICOTINE PATCH NONLEGEND: HCPCS | Performed by: NURSE PRACTITIONER

## 2024-03-12 PROCEDURE — 25000003 PHARM REV CODE 250: Performed by: INTERNAL MEDICINE

## 2024-03-12 PROCEDURE — 25000242 PHARM REV CODE 250 ALT 637 W/ HCPCS: Performed by: NURSE PRACTITIONER

## 2024-03-12 PROCEDURE — 63600175 PHARM REV CODE 636 W HCPCS: Performed by: INTERNAL MEDICINE

## 2024-03-12 RX ORDER — LEVOFLOXACIN 500 MG/1
500 TABLET, FILM COATED ORAL DAILY
Qty: 6 TABLET | Refills: 0 | Status: SHIPPED | OUTPATIENT
Start: 2024-03-13 | End: 2024-03-19

## 2024-03-12 RX ORDER — ALBUTEROL SULFATE 90 UG/1
2 AEROSOL, METERED RESPIRATORY (INHALATION) EVERY 6 HOURS PRN
Qty: 90 G | Refills: 4 | Status: SHIPPED | OUTPATIENT
Start: 2024-03-12

## 2024-03-12 RX ORDER — FLUTICASONE FUROATE AND VILANTEROL 200; 25 UG/1; UG/1
1 POWDER RESPIRATORY (INHALATION) DAILY
Qty: 1 EACH | Refills: 4 | Status: SHIPPED | OUTPATIENT
Start: 2024-03-13 | End: 2024-04-12

## 2024-03-12 RX ORDER — BENZONATATE 200 MG/1
200 CAPSULE ORAL 3 TIMES DAILY PRN
Qty: 20 CAPSULE | Refills: 0 | Status: SHIPPED | OUTPATIENT
Start: 2024-03-12 | End: 2024-03-22

## 2024-03-12 RX ADMIN — CARVEDILOL 6.25 MG: 3.12 TABLET, FILM COATED ORAL at 09:03

## 2024-03-12 RX ADMIN — NICOTINE 1 PATCH: 21 PATCH, EXTENDED RELEASE TRANSDERMAL at 09:03

## 2024-03-12 RX ADMIN — CLOPIDOGREL BISULFATE 75 MG: 75 TABLET ORAL at 09:03

## 2024-03-12 RX ADMIN — ATORVASTATIN CALCIUM 40 MG: 40 TABLET, FILM COATED ORAL at 09:03

## 2024-03-12 RX ADMIN — CLONIDINE HYDROCHLORIDE 0.2 MG: 0.2 TABLET ORAL at 04:03

## 2024-03-12 RX ADMIN — LEVOFLOXACIN 500 MG: 500 TABLET, FILM COATED ORAL at 09:03

## 2024-03-12 RX ADMIN — FLUTICASONE FUROATE AND VILANTEROL TRIFENATATE 1 PUFF: 200; 25 POWDER RESPIRATORY (INHALATION) at 09:03

## 2024-03-12 RX ADMIN — LEVOTHYROXINE SODIUM 88 MCG: 88 TABLET ORAL at 04:03

## 2024-03-12 RX ADMIN — METRONIDAZOLE 500 MG: 500 TABLET ORAL at 04:03

## 2024-03-12 RX ADMIN — GUAIFENESIN AND DEXTROMETHORPHAN 10 ML: 100; 10 SYRUP ORAL at 09:03

## 2024-03-12 RX ADMIN — ENOXAPARIN SODIUM 40 MG: 40 INJECTION SUBCUTANEOUS at 04:03

## 2024-03-12 RX ADMIN — MUPIROCIN: 20 OINTMENT TOPICAL at 09:03

## 2024-03-12 RX ADMIN — METRONIDAZOLE 500 MG: 500 TABLET ORAL at 01:03

## 2024-03-12 RX ADMIN — DEXAMETHASONE 8 MG: 4 TABLET ORAL at 09:03

## 2024-03-12 NOTE — DISCHARGE SUMMARY
[facility] - 4th Floor [facility] Medicine  Discharge Summary      Patient Name: Pratibha Patel  MRN: [id number]  MAG: [id number]  Patient Class: IP- Inpatient  Admission Date: 3/8/2024  Hospital Length of Stay: 4 days  Discharge Date and Time:  03/12/2024 4:02 PM  Attending Physician: Terry Clarke MD   Discharging Provider: Bautista Stern MD  Primary Care Provider: Graham Nguyen NP    Primary Care Team: Networked reference to record PCT     HPI:   79 yr old female whose history includes COPD, HTN and recurrent metastatic lung cancer. Presented to ED with c/o worsening SOB. Endorses chronic SOB but has been progressively worse over the last 24 hours. Also has a productive cough with yellow sputum however this is chronic and no worse than usual. Denies any fever, chills, vomiting or diarrhea. Treated in January with course of Levaquin and in February with Augmentin and zithromax, for pneumonia according to patient. PO prednisone prescribed on 3/4. Not on home oxygen. Continues to smoke.      VS on arrival: T 97.5, P 68, R 26, B/P 149/73, Sats 92% on room air. Initial labs: WBC 13.81, Hgb 11.6, Hct 37.3, platelets 426 and otherwise unremarkable. No evidence of UTI. CTA chest shows reticulonodular infiltrates in bilateral lung bases, scarring and chronic interstitial lung changes bilaterally. No evidence of PE. Meds given in ED include Rocephin, Zithromax, Lasix 40mg IV and one liter LR.      Pulmonary consulted and felt CT findings could be from chronic aspiration so SLP was consulted and placed on Zosyn.   SLP evaluated and was started on a diet without concern for aspirati    * No surgery found *      Hospital Course:   3/12/24-Patient is doing better.  She has been off supplemental O2 since yesterday.  She is stable for discharge home today.  Exam(A&O, NAD, RR, CTA, BS +, NTTP, ROM I)     Goals of Care Treatment Preferences:  Code Status: Full Code      Consults:    Consults (From admission, onward)          Status Ordering Provider     Inpatient consult to Oncology  Once        Provider:  Enrrique Gudino II, MD    Acknowledged KIAH MARADIAGA     Inpatient consult to Pulmonology  Once        Provider:  Matthew Barrera MD    Completed DIANA COLIN            Pulmonary  Hypoxia  resolved      Cardiac/Vascular  * Hypertensive urgency  Patient has a current diagnosis of hypertensive urgency (without evidence of end organ damage) which is controlled.  Latest blood pressure and vitals reviewed-   Temp:  [97.4 °F (36.3 °C)-97.8 °F (36.6 °C)]   Pulse:  [57-73]   Resp:  [18-20]   BP: ()/(48-74)   SpO2:  [90 %-97 %] .   Patient currently off IV antihypertensives.   Home meds for hypertension were reviewed and noted below.   Hypertension Medications               carvediloL (COREG) 6.25 MG tablet Take 6.25 mg by mouth 2 (two) times daily.            Medication adjustment for hospital antihypertensives is as follows- as per MAR    Will aim for controlled BP reduction by medications noted above. Monitor and mitigate end organ damage as indicated.      Final Active Diagnoses:    Diagnosis Date Noted POA    PRINCIPAL PROBLEM:  Hypertensive urgency [I16.0] 03/08/2024 Yes    Hypoxia [R09.02] 03/08/2024 Yes      Problems Resolved During this Admission:       Discharged Condition: stable    Disposition: Home or Self Care    Follow Up:   Follow-up Information       Graham Nguyen, NP Follow up in 1 week(s).    Specialty: Family Medicine  Contact information:  [address] [place] [address]/[facility] [id number] [phone]                           Patient Instructions:   No discharge procedures on file.    Significant Diagnostic Studies: Labs: BMP:   Recent Labs   Lab 03/11/24  0425      K 4.3   CO2 26   BUN 34.7*   CREATININE 0.88   CALCIUM 8.9   MG 2.20   , CMP   Recent Labs   Lab 03/11/24  0425      K 4.3   CO2 26   BUN 34.7*   CREATININE 0.88   CALCIUM 8.9   ,  and CBC   Recent Labs   Lab 03/11/24  0425   WBC 13.76*   HGB 11.1*   HCT 33.8*          Pending Diagnostic Studies:       Procedure Component Value Units Date/Time    Fungitell Assay For (1.3)-B-D-Glucans [[id number]] Collected: 03/09/24 1231    Order Status: Sent Lab Status: In process Updated: 03/09/24 1235    Specimen: Blood            Medications:  Reconciled Home Medications:      Medication List        START taking these medications      albuterol 90 mcg/actuation inhaler  Commonly known as: VENTOLIN HFA  Inhale 2 puffs into the lungs every 6 (six) hours as needed for Wheezing. Rescue     benzonatate 200 MG capsule  Commonly known as: TESSALON  Take 1 capsule (200 mg total) by mouth 3 (three) times daily as needed for Cough ((2nd Choice)).     fluticasone furoate-vilanteroL 200-25 mcg/dose Dsdv diskus inhaler  Commonly known as: BREO  Inhale 1 puff into the lungs once daily. Controller  Start taking on: March 13, 2024     levoFLOXacin 500 MG tablet  Commonly known as: LEVAQUIN  Take 1 tablet (500 mg total) by mouth once daily. for 6 days  Start taking on: March 13, 2024            CONTINUE taking these medications      carvediloL 6.25 MG tablet  Commonly known as: COREG  Take 6.25 mg by mouth 2 (two) times daily.     clopidogreL 75 mg tablet  Commonly known as: PLAVIX     levothyroxine 88 MCG tablet  Commonly known as: SYNTHROID  Take 1 tablet (88 mcg total) by mouth every morning.     pantoprazole 40 MG tablet  Commonly known as: PROTONIX  Take 1 tablet (40 mg total) by mouth once daily.     simvastatin 40 MG tablet  Commonly known as: ZOCOR  Take 40 mg by mouth every evening.            STOP taking these medications      furosemide 20 MG tablet  Commonly known as: LASIX     losartan 50 MG tablet  Commonly known as: COZAAR              Indwelling Lines/Drains at time of discharge:   Lines/Drains/Airways       None                   Time spent on the discharge of patient: 36 minutes         Bautista  KASI Stern MD  Department of Hospital Medicine  [facility] - 4th Floor Medical Telemetry

## 2024-03-12 NOTE — PLAN OF CARE
03/12/24 1043   Medicare Message   Important Message from Medicare regarding Discharge Appeal Rights Given to patient/caregiver;Explained to patient/caregiver

## 2024-03-12 NOTE — HOSPITAL COURSE
3/12/24-Patient is doing better.  She has been off supplemental O2 since yesterday.  She is stable for discharge home today.  Exam(A&O, NAD, RR, CTA, BS +, NTTP, ROM I)

## 2024-03-12 NOTE — HPI
79 yr old female whose history includes COPD, HTN and recurrent metastatic lung cancer. Presented to ED with c/o worsening SOB. Endorses chronic SOB but has been progressively worse over the last 24 hours. Also has a productive cough with yellow sputum however this is chronic and no worse than usual. Denies any fever, chills, vomiting or diarrhea. Treated in January with course of Levaquin and in February with Augmentin and zithromax, for pneumonia according to patient. PO prednisone prescribed on 3/4. Not on home oxygen. Continues to smoke.      VS on arrival: T 97.5, P 68, R 26, B/P 149/73, Sats 92% on room air. Initial labs: WBC 13.81, Hgb 11.6, Hct 37.3, platelets 426 and otherwise unremarkable. No evidence of UTI. CTA chest shows reticulonodular infiltrates in bilateral lung bases, scarring and chronic interstitial lung changes bilaterally. No evidence of PE. Meds given in ED include Rocephin, Zithromax, Lasix 40mg IV and one liter LR.      Pulmonary consulted and felt CT findings could be from chronic aspiration so SLP was consulted and placed on Zosyn.   SLP evaluated and was started on a diet without concern for aspirati

## 2024-03-12 NOTE — PLAN OF CARE
03/12/24 1043   Discharge Assessment   Assessment Type Discharge Planning Assessment   Confirmed/corrected address, phone number and insurance Yes   Source of Information patient   Communicated RICKEY with patient/caregiver Yes   Reason For Admission Shortness of breath   People in Home spouse   Do you expect to return to your current living situation? Yes   Do you have help at home or someone to help you manage your care at home? Yes   Who are your caregiver(s) and their phone number(s)?  Ganesh [phone]   Current cognitive status: Alert/Oriented   Walking or Climbing Stairs Difficulty yes   Walking or Climbing Stairs ambulation difficulty, requires equipment   Mobility Management Uses walker   Dressing/Bathing Difficulty yes   Dressing/Bathing bathing difficulty, assistance 1 person;dressing difficulty, assistance 1 person   Home Layout Able to live on 1st floor   Patient currently being followed by outpatient case management? No   Do you currently have service(s) that help you manage your care at home? No   Do you take prescription medications? Yes   Do you have any problems affording any of your prescribed medications? No   Is the patient taking medications as prescribed? yes   Who is going to help you get home at discharge?    How do you get to doctors appointments? car, drives self;family or friend will provide   Are you on dialysis? No   Discharge Plan A Home with family   Discharge Plan B Home with family   Discharge Plan discussed with: Patient;Spouse/sig other   OTHER   Name(s) of People in Home  Aramber

## 2024-03-12 NOTE — PROGRESS NOTES
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  She underwent chemotherapy and radiation, now currently on combination chemo immunotherapy with carboplatin, pemetrexed, nivolumab, ipilimumab.  She also has a history of COPD and ongoing tobacco abuse.  She presented to the ED on 3/8 dear worsening shortness of breath.  She was reportedly treated with a course of Levaquin in January and then Augmentin in Zithromax in February due to pneumonia.  She was also given a course of prednisone on 3/4.  WBC 13.8, .  Blood cultures collected, respiratory panel in progress. COVID/RSV/Flu pending as well.  CTA of chest was negative for pulmonary embolus, demonstrated reticulonodular infiltrates in the lung bases bilaterally with areas of scarring in the left lung apex and right lung apex, along with chronic interstitial lung changes bilaterally.  She has been started on Levaquin and dexamethasone.  Pulmonology has been consulted for pneumonitis secondary to possible immunotherapy versus pneumonia.      Hospital Course/Significant events:  As noted above.    24 Hour Interval History:  Respiratory PCR positive for Coronavirus and rhinovirus/enterovirus. Respiratory, fungal and mycobacteria cultures pending. No acute events over night. Oxygenating well on 2L NC. Speech therapy did not find any signs of aspiration. Dr Cooper reviewed imaging and also does not feel this is a side effect of treatment.     Past Medical History:    Diagnosis Date    Adenocarcinoma of lung     Anemia     Anxiety     Arthritis     COPD (chronic obstructive pulmonary disease)     Depression     HLD (hyperlipidemia)     Hypertension     Lung cancer     Secondary malignant neoplasm of lymph nodes     Secondary malignant neoplasm of right lung     Thyroid disease     lymph nodes malignant       Past Surgical History:   Procedure Laterality Date    BLADDER SURGERY      CHOLECYSTECTOMY      COLONOSCOPY  2018    ENDOBRONCHIAL ULTRASOUND N/A 11/16/2022    Procedure: ENDOBRONCHIAL ULTRASOUND (EBUS);  Surgeon: Anthony Hutson MD;  Location: [facility] BRONCHOSCOPY LAB;  Service: Pulmonary;  Laterality: N/A;    HYSTERECTOMY      KNEE SURGERY Bilateral     replaced knees    PARTIAL HIP ARTHROPLASTY Bilateral        Social History     Socioeconomic History    Marital status:    Tobacco Use    Smoking status: Every Day     Current packs/day: 0.25     Types: Cigarettes    Smokeless tobacco: Never    Tobacco comments:     Patient states she is trying to quit smoking.   Substance and Sexual Activity    Alcohol use: Not Currently    Drug use: Never           Current Outpatient Medications   Medication Instructions    carvediloL (COREG) 6.25 mg, Oral, 2 times daily    clopidogreL (PLAVIX) 75 mg tablet No dose, route, or frequency recorded.    furosemide (LASIX) 20 mg, Oral, Daily    levothyroxine (SYNTHROID) 88 mcg, Oral, Every morning    losartan (COZAAR) 50 mg, Oral, Daily    pantoprazole (PROTONIX) 40 mg, Oral, Daily    simvastatin (ZOCOR) 40 mg, Oral, Nightly       Current Inpatient Medications   atorvastatin  40 mg Oral Daily    carvediloL  6.25 mg Oral BID    clopidogreL  75 mg Oral Daily    dexAMETHasone  8 mg Oral Daily    enoxparin  40 mg Subcutaneous Q24H (prophylaxis, 1700)    fluticasone furoate-vilanteroL  1 puff Inhalation Daily    levoFLOXacin  500 mg Oral Daily    levothyroxine  88 mcg Oral QAM    metroNIDAZOLE  500 mg Oral Q8H    mupirocin   Nasal BID     "nicotine  1 patch Transdermal Daily       Current Intravenous Infusions          Review of Systems   Respiratory:  Positive for shortness of breath.           Objective:       Intake/Output Summary (Last 24 hours) at 3/12/2024 0829  Last data filed at 3/11/2024 2300  Gross per 24 hour   Intake 240 ml   Output --   Net 240 ml           Vital Signs (Most Recent):  Temp: 97.4 °F (36.3 °C) (03/12/24 0740)  Pulse: (!) 57 (03/12/24 0740)  Resp: 18 (03/12/24 0740)  BP: 108/63 (03/12/24 0740)  SpO2: (!) 90 % (03/12/24 0740)  Body mass index is 28.55 kg/m².  Weight: 73.1 kg (161 lb 2.5 oz) Vital Signs (24h Range):  Temp:  [97.4 °F (36.3 °C)-98.2 °F (36.8 °C)] 97.4 °F (36.3 °C)  Pulse:  [57-73] 57  Resp:  [18-20] 18  SpO2:  [90 %-99 %] 90 %  BP: (100-174)/(50-74) 108/63     Physical Exam  Vitals reviewed.   Constitutional:       Appearance: Normal appearance.   HENT:      Head: Normocephalic and atraumatic.   Cardiovascular:      Rate and Rhythm: Normal rate and regular rhythm.   Pulmonary:      Effort: Pulmonary effort is normal.      Breath sounds: Decreased breath sounds present.   Abdominal:      General: Bowel sounds are normal.      Palpations: Abdomen is soft.   Neurological:      General: No focal deficit present.      Mental Status: She is alert.   Psychiatric:         Mood and Affect: Mood normal.           Lines/Drains/Airways       None                   Significant Labs:    Lab Results   Component Value Date    WBC 13.76 (H) 03/11/2024    HGB 11.1 (L) 03/11/2024    HCT 33.8 (L) 03/11/2024    MCV 79.0 (L) 03/11/2024     03/11/2024           BMP  Lab Results   Component Value Date     03/11/2024    K 4.3 03/11/2024    CHLORIDE 101 03/11/2024    CO2 26 03/11/2024    BUN 34.7 (H) 03/11/2024    CREATININE 0.88 03/11/2024    CALCIUM 8.9 03/11/2024    AGAP 10.0 03/11/2024    ESTGFRAFRICA 89 03/02/2022    EGFRNONAA >60 07/05/2022         ABG  No results for input(s): "PH", "PO2", "PCO2", "HCO3", "POCBASEDEF" " in the last 168 hours.    Mechanical Ventilation Support:  Oxygen Concentration (%): 2 (03/11/24 2019)      Significant Imaging:  I have reviewed the pertinent imaging within the past 24 hours.        Assessment/Plan:     Assessment  Moderately differentiated adenocarcinoma lung diagnosed 2014  - Chemotherapy with carboplatin/Taxol, Avastin [id number] 06/2019:  Thyroid nodule diagnosed as adenocarcinoma lung primary, post surgical resection 07/2019, and chemoradiation with carboplatin/Taxol 09/2019  - New left lung lesion 10/2020, CT-guided biopsy consistent with recurrent non-small-cell lung cancer, PD-L1 positive.  SPRT 12/2020  - PET/CT 07/05/2022 progression of disease right lung  - Currently receiving carboplatin, pemetrexed, nivolumab, and ipilimumab  Chronic cough with purulent sputum production and bilateral basilar bronchiectasis with centrilobular tree-in-bud distribution micronodular; suspicious for chronic aspiration, low suspicion for nivolumab or ipilimumab pneumonitis       Plan  Continue Levaquin - to complete outpatient  Adding inhaled therapy to try to improve dyspnea -- mentions of COPD in chart however no PFTs on file   Wean o2 - may need to go home with o2         JAIRO Newton  Pulmonary Critical Care Medicine  [facility] - Emergency Dept  DOS: 03/12/2024     [FreeTextEntry1] : General: sitting on exam table comfortably, NAD\par Neck/Back: no tenderness on palpation of back of neck or along spinal processes\par Eyes: anicteric sclera\par Ears: tympanic membranes are clear bilaterally\par CV: RRR	\par Carotid: No bruits\par Extremities: FROMx4, 2+ radial pulses bilaterally\par 			\par Neurological exam:	\par Mental status: AA&O x 3, fluent - able name, repeat, describe picture fully, spontaneous speech, no dysarthria, follows complex commands across midline, able give full history of present and past events, memory intact, normal attention span, fund of knowledge appropriate\par Cranial nerves: EOMI, PERRL+, VFF, no nystagmus, fundi benign, facial sensation intact, no facial droop, hearing grossly intact to finger snap bilaterally, palatal elevation intact, shoulder shrug intact bilaterally, tongue midline\par Motor: No pronator drift, 5/5 x4, normal tone and bulk\par Sensory: Intact light touch, pinprick, vibration, temperature bilaterally. Negative Romberg\par Reflexes: symmetric\par Cerebellar: FNF and HKS intact bilaterally\par Gait: steady, able tandem, toe and heel walk

## 2024-03-12 NOTE — ASSESSMENT & PLAN NOTE
Patient has a current diagnosis of hypertensive urgency (without evidence of end organ damage) which is controlled.  Latest blood pressure and vitals reviewed-   Temp:  [97.4 °F (36.3 °C)-97.8 °F (36.6 °C)]   Pulse:  [57-73]   Resp:  [18-20]   BP: ()/(48-74)   SpO2:  [90 %-97 %] .   Patient currently off IV antihypertensives.   Home meds for hypertension were reviewed and noted below.   Hypertension Medications               carvediloL (COREG) 6.25 MG tablet Take 6.25 mg by mouth 2 (two) times daily.            Medication adjustment for hospital antihypertensives is as follows- as per MAR    Will aim for controlled BP reduction by medications noted above. Monitor and mitigate end organ damage as indicated.

## 2024-03-12 NOTE — PLAN OF CARE
Problem: Adult Inpatient Plan of Care  Goal: Readiness for Transition of Care  Outcome: Met     Problem: Infection  Goal: Absence of Infection Signs and Symptoms  Outcome: Met     Problem: Fall Injury Risk  Goal: Absence of Fall and Fall-Related Injury  Outcome: Met     Problem: Impaired Wound Healing  Goal: Optimal Wound Healing  Outcome: Met

## 2024-03-13 LAB
BACTERIA BLD CULT: NORMAL
BACTERIA BLD CULT: NORMAL

## 2024-03-14 ENCOUNTER — PATIENT OUTREACH (OUTPATIENT)
Dept: ADMINISTRATIVE | Facility: CLINIC | Age: 79
End: 2024-03-14
Payer: MEDICARE

## 2024-03-20 ENCOUNTER — LAB VISIT (OUTPATIENT)
Dept: LAB | Facility: HOSPITAL | Age: 79
End: 2024-03-20
Attending: INTERNAL MEDICINE
Payer: MEDICARE

## 2024-03-20 ENCOUNTER — OFFICE VISIT (OUTPATIENT)
Dept: HEMATOLOGY/ONCOLOGY | Facility: CLINIC | Age: 79
End: 2024-03-20
Payer: MEDICARE

## 2024-03-20 VITALS
HEIGHT: 63 IN | DIASTOLIC BLOOD PRESSURE: 68 MMHG | SYSTOLIC BLOOD PRESSURE: 125 MMHG | RESPIRATION RATE: 14 BRPM | BODY MASS INDEX: 27.4 KG/M2 | HEART RATE: 67 BPM | WEIGHT: 154.63 LBS | OXYGEN SATURATION: 94 %

## 2024-03-20 DIAGNOSIS — C79.51 MALIGNANT NEOPLASM METASTATIC TO BONE: ICD-10-CM

## 2024-03-20 DIAGNOSIS — C77.9 MALIGNANT NEOPLASM METASTATIC TO LYMPH NODES, UNSPECIFIED LYMPH NODE REGION: ICD-10-CM

## 2024-03-20 DIAGNOSIS — R53.83 FATIGUE, UNSPECIFIED TYPE: ICD-10-CM

## 2024-03-20 DIAGNOSIS — C34.90 ADENOCARCINOMA OF LUNG, STAGE 4, UNSPECIFIED LATERALITY: Primary | ICD-10-CM

## 2024-03-20 DIAGNOSIS — E03.2 HYPOTHYROIDISM DUE TO DRUGS: ICD-10-CM

## 2024-03-20 DIAGNOSIS — C34.90 ADENOCARCINOMA OF LUNG, STAGE 4, UNSPECIFIED LATERALITY: ICD-10-CM

## 2024-03-20 LAB
ALBUMIN SERPL-MCNC: 3 G/DL (ref 3.4–4.8)
ALBUMIN/GLOB SERPL: 1.3 RATIO (ref 1.1–2)
ALP SERPL-CCNC: 64 UNIT/L (ref 40–150)
ALT SERPL-CCNC: 15 UNIT/L (ref 0–55)
AST SERPL-CCNC: 19 UNIT/L (ref 5–34)
BASOPHILS # BLD AUTO: 0.03 X10(3)/MCL
BASOPHILS NFR BLD AUTO: 0.3 %
BILIRUB SERPL-MCNC: 0.4 MG/DL
BUN SERPL-MCNC: 18.1 MG/DL (ref 9.8–20.1)
CALCIUM SERPL-MCNC: 8.8 MG/DL (ref 8.4–10.2)
CHLORIDE SERPL-SCNC: 99 MMOL/L (ref 98–107)
CO2 SERPL-SCNC: 28 MMOL/L (ref 23–31)
CORTIS SERPL-SCNC: 11.2 UG/DL
CREAT SERPL-MCNC: 0.71 MG/DL (ref 0.55–1.02)
EOSINOPHIL # BLD AUTO: 0.26 X10(3)/MCL (ref 0–0.9)
EOSINOPHIL NFR BLD AUTO: 2.5 %
ERYTHROCYTE [DISTWIDTH] IN BLOOD BY AUTOMATED COUNT: 19.3 % (ref 11.5–17)
GFR SERPLBLD CREATININE-BSD FMLA CKD-EPI: >60 MLS/MIN/1.73/M2
GLOBULIN SER-MCNC: 2.4 GM/DL (ref 2.4–3.5)
GLUCOSE SERPL-MCNC: 107 MG/DL (ref 82–115)
HCT VFR BLD AUTO: 31.9 % (ref 37–47)
HGB BLD-MCNC: 10.2 G/DL (ref 12–16)
IMM GRANULOCYTES # BLD AUTO: 0.04 X10(3)/MCL (ref 0–0.04)
IMM GRANULOCYTES NFR BLD AUTO: 0.4 %
LYMPHOCYTES # BLD AUTO: 1.37 X10(3)/MCL (ref 0.6–4.6)
LYMPHOCYTES NFR BLD AUTO: 13.3 %
MCH RBC QN AUTO: 25.9 PG (ref 27–31)
MCHC RBC AUTO-ENTMCNC: 32 G/DL (ref 33–36)
MCV RBC AUTO: 81 FL (ref 80–94)
MONOCYTES # BLD AUTO: 0.88 X10(3)/MCL (ref 0.1–1.3)
MONOCYTES NFR BLD AUTO: 8.6 %
NEUTROPHILS # BLD AUTO: 7.71 X10(3)/MCL (ref 2.1–9.2)
NEUTROPHILS NFR BLD AUTO: 74.9 %
PLATELET # BLD AUTO: 225 X10(3)/MCL (ref 130–400)
PMV BLD AUTO: 8.7 FL (ref 7.4–10.4)
POTASSIUM SERPL-SCNC: 4.4 MMOL/L (ref 3.5–5.1)
PROT SERPL-MCNC: 5.4 GM/DL (ref 5.8–7.6)
RBC # BLD AUTO: 3.94 X10(6)/MCL (ref 4.2–5.4)
SODIUM SERPL-SCNC: 133 MMOL/L (ref 136–145)
TSH SERPL-ACNC: 6.37 UIU/ML (ref 0.35–4.94)
WBC # SPEC AUTO: 10.29 X10(3)/MCL (ref 4.5–11.5)

## 2024-03-20 PROCEDURE — 36415 COLL VENOUS BLD VENIPUNCTURE: CPT

## 2024-03-20 PROCEDURE — 99215 OFFICE O/P EST HI 40 MIN: CPT | Mod: S$PBB,,, | Performed by: NURSE PRACTITIONER

## 2024-03-20 PROCEDURE — 99999 PR PBB SHADOW E&M-EST. PATIENT-LVL III: CPT | Mod: PBBFAC,,, | Performed by: NURSE PRACTITIONER

## 2024-03-20 PROCEDURE — 85025 COMPLETE CBC W/AUTO DIFF WBC: CPT

## 2024-03-20 PROCEDURE — 99213 OFFICE O/P EST LOW 20 MIN: CPT | Mod: PBBFAC | Performed by: NURSE PRACTITIONER

## 2024-03-20 PROCEDURE — 82533 TOTAL CORTISOL: CPT

## 2024-03-20 PROCEDURE — 80053 COMPREHEN METABOLIC PANEL: CPT

## 2024-03-20 PROCEDURE — 84443 ASSAY THYROID STIM HORMONE: CPT

## 2024-03-20 RX ORDER — HEPARIN 100 UNIT/ML
500 SYRINGE INTRAVENOUS
Status: CANCELLED | OUTPATIENT
Start: 2024-04-12

## 2024-03-20 RX ORDER — SODIUM CHLORIDE 0.9 % (FLUSH) 0.9 %
10 SYRINGE (ML) INJECTION
Status: CANCELLED | OUTPATIENT
Start: 2024-04-12

## 2024-03-20 RX ORDER — SODIUM CHLORIDE 0.9 % (FLUSH) 0.9 %
10 SYRINGE (ML) INJECTION
Status: CANCELLED | OUTPATIENT
Start: 2024-03-22

## 2024-03-20 RX ORDER — EPINEPHRINE 0.3 MG/.3ML
0.3 INJECTION SUBCUTANEOUS ONCE AS NEEDED
Status: CANCELLED | OUTPATIENT
Start: 2024-04-12

## 2024-03-20 RX ORDER — DIPHENHYDRAMINE HYDROCHLORIDE 50 MG/ML
50 INJECTION INTRAMUSCULAR; INTRAVENOUS ONCE AS NEEDED
Status: CANCELLED | OUTPATIENT
Start: 2024-03-22

## 2024-03-20 RX ORDER — DIPHENHYDRAMINE HYDROCHLORIDE 50 MG/ML
50 INJECTION INTRAMUSCULAR; INTRAVENOUS ONCE AS NEEDED
Status: CANCELLED | OUTPATIENT
Start: 2024-04-12

## 2024-03-20 RX ORDER — EPINEPHRINE 0.3 MG/.3ML
0.3 INJECTION SUBCUTANEOUS ONCE AS NEEDED
Status: CANCELLED | OUTPATIENT
Start: 2024-03-22

## 2024-03-20 RX ORDER — HEPARIN 100 UNIT/ML
500 SYRINGE INTRAVENOUS
Status: CANCELLED | OUTPATIENT
Start: 2024-03-22

## 2024-03-20 NOTE — PROGRESS NOTES
Subjective:       Patient ID: Pratibha Patel is a 79 y.o. female.    Chief Complaint: Follow-up (Patient has no concerns today)      PCP: Graham Nguyen NP  Cardiologist: Dr. Danny Sánchez  Referring Physician: Dr. Anthony Hutson  Endocrine: Dr. Werner  Radiation oncology: Dr. Boss in the past, now Dr. Mortensen.  ENT: Dr. 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 ? metastatic findings could not be biopsied. TTF negative/CK 7 +.    Diagnosed June 2014 EGFR mutation negative/ALK gene rearrangement negative. 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 Patient has reportedly been referred to ENT for resection and lymph node dissection (per patient report).   Subsequent open biopsy done by Dr. Rosales on 7/29/2019 confirmed the same.  Patient was unable to undergo definitive surgical resection for oligo metastatic disease.  Hoarseness and swallowing difficulties secondary to #2  Biopsy of the left lung lesion showed recurrent non-small cell lung cancer, Caris was sent and showed positivity for PD-L1, 2%.  No other targetable or actionable mutations present.    Current Treatment:   Combination chemo immunotherapy with carboplatin, pemetrexed, nivolumab, ipilimumab based off of checkmate 9LA     Treatment History:  RT with weekly Carbo/Taxol started 7/2/14--Completed August 2014  Single agent 'Maintenance' Avastin q 3 weeks.  Started 4/9/15--last given September 21, 2016  Radiation + weekly Carbo/Taxol  at [facility] ----9/11/19-10/22/19  Patient underwent radiation from 12/17/2020 through 12/21/2020.  Radiation to the left and right lung per Dr. Robert Mortensen.    HPI:   Please see Oncology history in the diagnosis of adenocarcinoma of the lung, stage IV on the problem list for full detail.  Patient originally seen in 2014 with unintentional weight loss, shortness of breath, nonproductive cough.  Patient had imaging findings suggestive of lung cancer, bronchoscopy done in June of 2014 revealed moderately differentiated adenocarcinoma of the lung.  She originally underwent chemo radiation from July 2014 through August 2014.  She was placed on maintenance Avastin from April 2015 through September 2016. Patient then underwent a thyroid biopsy in June of 2019 that showed metastatic carcinoma favored to be adenocarcinoma lung primary.  She underwent surgical resection in July 2019, however full surgical dissection was not able to be done and the patient underwent open biopsy of right thyroid gland.  Pathology revealed poorly differentiated non-small cell carcinoma consistent with metastatic lung cancer.  NGS panel done at that time unremarkable.  She started chemoradiation with carbo Taxol in September 2019, completed in October 2019. Patient was doing well, imaging in October 2020 revealed an abnormality in the left lung, CT-guided lung biopsy on 11/03/2020 showed recurrent non-small cell lung cancer, Caris revealed PDL1 positivity.  She underwent SBRT from 12/17/2020 through 12/21/2020.  Patient then placed on surveillance imaging, most recently done on 07/05/2022 showing worsening disease in the right lung with a previous lesion that measured 0.6 cm and had an SUV of 1.0 now measuring 1.5 cm and an SUV of 8.4.  This was treated with SBRT in August of 2022.  PET/CT scan done on 11/8/2022 showed mild increased radiotracer activity within the left apical consolidation, SUV 5.5.  There was a newly hypermetabolic subcentimeter  subcarinal lymph node.  A 1 cm right lower lobe nodule was smaller, he right upper lobe nodule was slightly larger measuring 6 mm.  No sites of FDG avid metastatic disease in the neck, abdomen, or pelvis. Bronchoscopy done on 11/16/2022, pathology revealed malignant cells consistent with adenocarcinoma.  Planning on chemo immunotherapy with carboplatin, pemetrexed, nivolumab, ipilimumab based off of checkmate 9LA.  PET/CT on 07/25/2023 showed infectious or inflammatory changes in the right lower lobe with no other findings to suggest new or worsening disease.    PET/CT scan done on 10/12/2023 showed mixed response to therapy with decrease right hilar and left upper lobe consolidation uptake.  Subcarinal lymph node demonstrates significant increased uptake compared to the prior study.  Stable subcentimeter right upper lobe nodule demonstrates slight increased uptake compared to prior study    Most recent PET/CT scan done on 01/18/2024 showed mixed response to therapy with decrease in size of the right upper lobe nodule and maximum SUV of subcarinal lymph node.  There was right hilar uptake that was increased, however there was also patchy airspace opacities that could be infectious or inflammatory.    Interval History:   Patient presents to clinic for a treatment visit.  Since her last visit she did have hospitalization due to an atypical pneumonia.  She was treated with antibiotics and states that she is feeling much better now.  She states that she has no longer having a cough and her shortness of breath somewhat improved.  She does have outpatient follow-up in the future with pulmonology.  She denies any new complaints and overall is feeling well.        Past Medical History:   Diagnosis Date    Adenocarcinoma of lung     Anemia     Anxiety     Arthritis     COPD (chronic obstructive pulmonary disease)     Depression     HLD (hyperlipidemia)     Hypertension     Lung cancer     Secondary malignant neoplasm of  lymph nodes     Secondary malignant neoplasm of right lung     Thyroid disease     lymph nodes malignant      Past Surgical History:   Procedure Laterality Date    BLADDER SURGERY      CHOLECYSTECTOMY      COLONOSCOPY  2018    ENDOBRONCHIAL ULTRASOUND N/A 11/16/2022    Procedure: ENDOBRONCHIAL ULTRASOUND (EBUS);  Surgeon: Anthony Hutson MD;  Location: [facility] BRONCHOSCOPY LAB;  Service: Pulmonary;  Laterality: N/A;    HYSTERECTOMY      KNEE SURGERY Bilateral     replaced knees    PARTIAL HIP ARTHROPLASTY Bilateral      Social History     Socioeconomic History    Marital status:    Tobacco Use    Smoking status: Every Day     Current packs/day: 0.25     Types: Cigarettes    Smokeless tobacco: Never    Tobacco comments:     Patient states she is trying to quit smoking.   Substance and Sexual Activity    Alcohol use: Not Currently    Drug use: Never      Family History   Problem Relation Age of Onset    Lung cancer Mother     Lung cancer Brother       Review of patient's allergies indicates:  No Known Allergies   Review of Systems   Constitutional:  Negative for chills, diaphoresis, fatigue, fever and unexpected weight change.   HENT:  Negative for nasal congestion, mouth sores, sinus pressure/congestion and sore throat.    Eyes:  Negative for pain and visual disturbance.   Respiratory:  Negative for cough, chest tightness and shortness of breath.    Cardiovascular:  Negative for chest pain, palpitations and leg swelling.   Gastrointestinal:  Negative for abdominal distention, abdominal pain, blood in stool, constipation and diarrhea.   Genitourinary:  Negative for dysuria, frequency and hematuria.   Musculoskeletal:  Negative for arthralgias and back pain.   Integumentary:  Negative for rash.   Neurological:  Negative for dizziness, weakness, numbness and headaches.   Hematological:  Negative for adenopathy.   Psychiatric/Behavioral:  Negative for confusion.          Objective:      Physical Exam  Vitals  reviewed.   Constitutional:       General: She is not in acute distress.     Appearance: Normal appearance.   HENT:      Head: Normocephalic and atraumatic.      Nose: Nose normal.      Mouth/Throat:      Mouth: Mucous membranes are moist.   Eyes:      Extraocular Movements: Extraocular movements intact.      Conjunctiva/sclera: Conjunctivae normal.   Neck:      Comments: Radiation changes to the left supraclavicular area  Cardiovascular:      Rate and Rhythm: Normal rate and regular rhythm.      Pulses: Normal pulses.      Heart sounds: Normal heart sounds.   Pulmonary:      Effort: Pulmonary effort is normal.      Breath sounds: Examination of the right-lower field reveals decreased breath sounds. Examination of the left-lower field reveals decreased breath sounds. Decreased breath sounds present.      Comments: Slight wheezing throughout  Abdominal:      General: Bowel sounds are normal.      Palpations: Abdomen is soft.   Musculoskeletal:         General: No swelling. Normal range of motion.      Cervical back: Normal range of motion and neck supple.      Right lower leg: No edema.      Left lower leg: No edema.   Skin:     General: Skin is warm and dry.   Neurological:      General: No focal deficit present.      Mental Status: She is alert and oriented to person, place, and time. Mental status is at baseline.   Psychiatric:         Mood and Affect: Mood normal.         Behavior: Behavior normal.         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 ? metastatic findings could not be biopsied.    TTF negative/CK 7 +.    Diagnosed June 2014.  EGFR mutation negative/ALK gene rearrangement negative.  Recurrent  disease to the thyroid gland FNA biopsy performed 6/3/19 with pathology showing metastatic carcinoma, CK7 (+) and TTF1 (-) favored to be metastatic adenocarcinoma from patient's known lung primary. Patient has reportedly been referred to ENT for resection and lymph node dissection (per patient report). Subsequent open biopsy done by Dr. Rosales on 7/29/2019 confirmed the same.  Patient was unable to undergo definitive surgical resection for oligo metastatic disease.  Treatment induced anemia  Hoarseness .      Plan:          Patient's biopsy did return as recurrent non-small cell lung cancer in the left lung.  I feel that the right lung will be similar pathology.  She completed radiation with Dr. Mortensen on 12/24/2020.     Caris did reveal that PD-L1 was 2% positive suggesting benefit from pembrolizumab or nivolumab/ipilimumab.      Her PET/CT scan does show a new hypermetabolic subcentimeter subcarinal lymph node and increase in a left apical consolidation.  The left apical consolidation is close to the aorta and I do not think would be amenable to biopsy, however this subcarinal lymph node may be amenable to bronchoscopy.      Bronchoscopy done on 11/16/2022 showed metastatic adenocarcinoma.  She is no longer a candidate for full dose SBRT.  We started with carboplatin, pemetrexed, nivolumab, and ipilimumab based on the CHECKMATE 9LA study.  Cycle 1 day 1 given 12/20/2022.     Follow up with radiation oncology as scheduled, Dr. Mortensen.    Continue levothyroxine to 88mcg daily.     PET/CT scan done on 01/18/2024 showed mixed response to therapy.  Overall, the patient was asymptomatic from a cancer standpoint.  No evidence of overt progression.    Status post treatment for atypical pneumonia inpatient.  No evidence of immunotherapy induced lung changes on most recent chest imaging.  She will have follow-up with pulmonology.    Started on PPI    Repeat PET scan scheduled for 04/11/2024, we will try to get this moved up a  few days    Labs and treat q3w    Return to clinic in 3 weeks, same day labs    JAIRO Martinez-C

## 2024-03-22 ENCOUNTER — INFUSION (OUTPATIENT)
Dept: INFUSION THERAPY | Facility: HOSPITAL | Age: 79
End: 2024-03-22
Attending: NURSE PRACTITIONER
Payer: MEDICARE

## 2024-03-22 VITALS
DIASTOLIC BLOOD PRESSURE: 71 MMHG | HEART RATE: 65 BPM | HEIGHT: 63 IN | SYSTOLIC BLOOD PRESSURE: 164 MMHG | OXYGEN SATURATION: 99 % | BODY MASS INDEX: 27.39 KG/M2 | TEMPERATURE: 98 F | WEIGHT: 154.56 LBS | RESPIRATION RATE: 20 BRPM

## 2024-03-22 DIAGNOSIS — C34.90 ADENOCARCINOMA OF LUNG, STAGE 4, UNSPECIFIED LATERALITY: Primary | ICD-10-CM

## 2024-03-22 PROCEDURE — 25000003 PHARM REV CODE 250: Performed by: NURSE PRACTITIONER

## 2024-03-22 PROCEDURE — 96413 CHEMO IV INFUSION 1 HR: CPT

## 2024-03-22 PROCEDURE — 63600175 PHARM REV CODE 636 W HCPCS: Mod: JG | Performed by: NURSE PRACTITIONER

## 2024-03-22 PROCEDURE — A4216 STERILE WATER/SALINE, 10 ML: HCPCS | Performed by: NURSE PRACTITIONER

## 2024-03-22 PROCEDURE — 96417 CHEMO IV INFUS EACH ADDL SEQ: CPT

## 2024-03-22 RX ORDER — SODIUM CHLORIDE 0.9 % (FLUSH) 0.9 %
10 SYRINGE (ML) INJECTION
Status: DISCONTINUED | OUTPATIENT
Start: 2024-03-22 | End: 2024-03-22 | Stop reason: HOSPADM

## 2024-03-22 RX ORDER — HEPARIN 100 UNIT/ML
500 SYRINGE INTRAVENOUS
Status: DISCONTINUED | OUTPATIENT
Start: 2024-03-22 | End: 2024-03-22 | Stop reason: HOSPADM

## 2024-03-22 RX ORDER — EPINEPHRINE 0.3 MG/.3ML
0.3 INJECTION SUBCUTANEOUS ONCE AS NEEDED
Status: DISCONTINUED | OUTPATIENT
Start: 2024-03-22 | End: 2024-03-22 | Stop reason: HOSPADM

## 2024-03-22 RX ORDER — DIPHENHYDRAMINE HYDROCHLORIDE 50 MG/ML
50 INJECTION INTRAMUSCULAR; INTRAVENOUS ONCE AS NEEDED
Status: DISCONTINUED | OUTPATIENT
Start: 2024-03-22 | End: 2024-03-22 | Stop reason: HOSPADM

## 2024-03-22 RX ADMIN — Medication 10 ML: at 10:03

## 2024-03-22 RX ADMIN — HEPARIN 500 UNITS: 100 SYRINGE at 10:03

## 2024-03-22 RX ADMIN — SODIUM CHLORIDE 75 MG: 9 INJECTION, SOLUTION INTRAVENOUS at 10:03

## 2024-03-22 RX ADMIN — SODIUM CHLORIDE: 9 INJECTION, SOLUTION INTRAVENOUS at 09:03

## 2024-03-22 RX ADMIN — SODIUM CHLORIDE 360 MG: 9 INJECTION, SOLUTION INTRAVENOUS at 09:03

## 2024-03-22 NOTE — PLAN OF CARE
C11 D1 Opdivo/Yervoy given. Tolerated well. Next appts given to pt. Dc'd home in stable condition.

## 2024-04-02 ENCOUNTER — HOSPITAL ENCOUNTER (OUTPATIENT)
Dept: RADIOLOGY | Facility: HOSPITAL | Age: 79
Discharge: HOME | End: 2024-04-02
Attending: NURSE PRACTITIONER
Payer: MEDICARE

## 2024-04-02 DIAGNOSIS — E03.2 HYPOTHYROIDISM DUE TO DRUGS: ICD-10-CM

## 2024-04-02 DIAGNOSIS — C79.51 MALIGNANT NEOPLASM METASTATIC TO BONE: ICD-10-CM

## 2024-04-02 DIAGNOSIS — C34.90 ADENOCARCINOMA OF LUNG, STAGE 4, UNSPECIFIED LATERALITY: ICD-10-CM

## 2024-04-02 DIAGNOSIS — C77.9 MALIGNANT NEOPLASM METASTATIC TO LYMPH NODES, UNSPECIFIED LYMPH NODE REGION: ICD-10-CM

## 2024-04-02 DIAGNOSIS — R06.02 SOB (SHORTNESS OF BREATH): ICD-10-CM

## 2024-04-02 PROCEDURE — 78815 PET IMAGE W/CT SKULL-THIGH: CPT | Mod: TC,PS

## 2024-04-02 PROCEDURE — A9552 F18 FDG: HCPCS | Performed by: NURSE PRACTITIONER

## 2024-04-02 RX ORDER — FLUDEOXYGLUCOSE F18 500 MCI/ML
10 INJECTION INTRAVENOUS
Status: COMPLETED | OUTPATIENT
Start: 2024-04-02 | End: 2024-04-02

## 2024-04-02 RX ADMIN — FLUDEOXYGLUCOSE F-18 10.8 MILLICURIE: 500 INJECTION INTRAVENOUS at 08:04

## 2024-04-08 NOTE — PROGRESS NOTES
Subjective:       Patient ID: Pratibha Patel is a 79 y.o. female.    Chief Complaint: Follow-up (Patient has no concerns today)      PCP: Graham Nguyen NP  Cardiologist: Dr. Danny Sánchez  Referring Physician: Dr. Anthony Hutson  Endocrine: Dr. Werner  Radiation oncology: Dr. Boss in the past, now Dr. Mortensen.  ENT: Dr. 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 ? metastatic findings could not be biopsied. TTF negative/CK 7 +.    Diagnosed June 2014 EGFR mutation negative/ALK gene rearrangement negative. 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 Patient has reportedly been referred to ENT for resection and lymph node dissection (per patient report).   Subsequent open biopsy done by Dr. Rosales on 7/29/2019 confirmed the same.  Patient was unable to undergo definitive surgical resection for oligo metastatic disease.  Hoarseness and swallowing difficulties secondary to #2  Biopsy of the left lung lesion showed recurrent non-small cell lung cancer, Caris was sent and showed positivity for PD-L1, 2%.  No other targetable or actionable mutations present.    Current Treatment:   Combination chemo immunotherapy with carboplatin, pemetrexed, nivolumab, ipilimumab based off of checkmate [id number]     Treatment History:  RT with weekly Carbo/Taxol started 7/2/14--Completed August 2014  Single agent 'Maintenance' Avastin q 3 weeks.  Started 4/9/15--last given September 21, 2016  Radiation + weekly Carbo/Taxol  at [facility] ----9/11/19-10/22/19  Patient underwent radiation from 12/17/2020 through 12/21/2020.  Radiation to the left and right lung per Dr. Robert Mortensen.    HPI:   Please see Oncology history in the diagnosis of adenocarcinoma of the lung, stage IV on the problem list for full detail.  Patient originally seen in 2014 with unintentional weight loss, shortness of breath, nonproductive cough.  Patient had imaging findings suggestive of lung cancer, bronchoscopy done in June of 2014 revealed moderately differentiated adenocarcinoma of the lung.  She originally underwent chemo radiation from July 2014 through August 2014.  She was placed on maintenance Avastin from April 2015 through September 2016. Patient then underwent a thyroid biopsy in June of 2019 that showed metastatic carcinoma favored to be adenocarcinoma lung primary.  She underwent surgical resection in July 2019, however full surgical dissection was not able to be done and the patient underwent open biopsy of right thyroid gland.  Pathology revealed poorly differentiated non-small cell carcinoma consistent with metastatic lung cancer.  NGS panel done at that time unremarkable.  She started chemoradiation with carbo Taxol in September 2019, completed in October 2019. Patient was doing well, imaging in October 2020 revealed an abnormality in the left lung, CT-guided lung biopsy on 11/03/2020 showed recurrent non-small cell lung cancer, Caris revealed PDL1 positivity.  She underwent SBRT from 12/17/2020 through 12/21/2020.  Patient then placed on surveillance imaging, most recently done on 07/05/2022 showing worsening disease in the right lung with a previous lesion that measured 0.6 cm and had an SUV of 1.0 now measuring 1.5 cm and an SUV of 8.4.  This was treated with SBRT in August of 2022.  PET/CT scan done on 11/8/2022 showed mild increased radiotracer activity within the left apical consolidation, SUV 5.5.  There was a newly hypermetabolic subcentimeter  subcarinal lymph node.  A 1 cm right lower lobe nodule was smaller, he right upper lobe nodule was slightly larger measuring 6 mm.  No sites of FDG avid metastatic disease in the neck, abdomen, or pelvis. Bronchoscopy done on 11/16/2022, pathology revealed malignant cells consistent with adenocarcinoma.  Planning on chemo immunotherapy with carboplatin, pemetrexed, nivolumab, ipilimumab based off of checkmate 9LA.  PET/CT on 07/25/2023 showed infectious or inflammatory changes in the right lower lobe with no other findings to suggest new or worsening disease.    PET/CT scan done on 10/12/2023 showed mixed response to therapy with decrease right hilar and left upper lobe consolidation uptake.  Subcarinal lymph node demonstrates significant increased uptake compared to the prior study.  Stable subcentimeter right upper lobe nodule demonstrates slight increased uptake compared to prior study    PET/CT scan done on 01/18/2024 showed mixed response to therapy with decrease in size of the right upper lobe nodule and maximum SUV of subcarinal lymph node.  There was right hilar uptake that was increased, however there was also patchy airspace opacities that could be infectious or inflammatory.    Most recent PET scan done on 04/02/2024 showed mixed therapeutic response with increased size and hypermetabolic activity of a posterior right apical nodule but improved thoracic adenopathy.  There were no findings to suggest new/progressed FDG avid metastasis to the neck, abdomen, or pelvis.    Interval History:   Patient presents to clinic for a treatment visit.  Since her last visit she did have hospitalization due to an atypical pneumonia.  She was feeling better.  She does have some shortness of breath with exertion, but otherwise is doing well.  She does also have chronic cough still.      Past Medical History:   Diagnosis Date    Adenocarcinoma of lung     Anemia     Anxiety     Arthritis     COPD (chronic obstructive  pulmonary disease)     Depression     HLD (hyperlipidemia)     Hypertension     Lung cancer     Secondary malignant neoplasm of lymph nodes     Secondary malignant neoplasm of right lung     Thyroid disease     lymph nodes malignant      Past Surgical History:   Procedure Laterality Date    BLADDER SURGERY      CHOLECYSTECTOMY      COLONOSCOPY  2018    ENDOBRONCHIAL ULTRASOUND N/A 11/16/2022    Procedure: ENDOBRONCHIAL ULTRASOUND (EBUS);  Surgeon: Anthony Hutson MD;  Location: [facility]ONCH[facility];  Service: Pulmonary;  Laterality: N/A;    HYSTERECTOMY      KNEE SURGERY Bilateral     replaced knees    PARTIAL HIP ARTHROPLASTY Bilateral      Social History     Socioeconomic History    Marital status:    Tobacco Use    Smoking status: Every Day     Current packs/day: 0.25     Types: Cigarettes    Smokeless tobacco: Never    Tobacco comments:     Patient states she is trying to quit smoking.   Substance and Sexual Activity    Alcohol use: Not Currently    Drug use: Never      Family History   Problem Relation Age of Onset    Lung cancer Mother     Lung cancer Brother       Review of patient's allergies indicates:  No Known Allergies   Review of Systems   Constitutional:  Negative for chills, diaphoresis, fatigue, fever and unexpected weight change.   HENT:  Negative for nasal congestion, mouth sores, sinus pressure/congestion and sore throat.    Eyes:  Negative for pain and visual disturbance.   Respiratory:  Negative for cough, chest tightness and shortness of breath.    Cardiovascular:  Negative for chest pain, palpitations and leg swelling.   Gastrointestinal:  Negative for abdominal distention, abdominal pain, blood in stool, constipation and diarrhea.   Genitourinary:  Negative for dysuria, frequency and hematuria.   Musculoskeletal:  Negative for arthralgias and back pain.   Integumentary:  Negative for rash.   Neurological:  Negative for dizziness, weakness, numbness and headaches.   Hematological:   Negative for adenopathy.   Psychiatric/Behavioral:  Negative for confusion.          Objective:      Physical Exam  Vitals reviewed.   Constitutional:       General: She is not in acute distress.     Appearance: Normal appearance.   HENT:      Head: Normocephalic and atraumatic.      Nose: Nose normal.      Mouth/Throat:      Mouth: Mucous membranes are moist.   Eyes:      Extraocular Movements: Extraocular movements intact.      Conjunctiva/sclera: Conjunctivae normal.   Neck:      Comments: Radiation changes to the left supraclavicular area  Cardiovascular:      Rate and Rhythm: Normal rate and regular rhythm.      Pulses: Normal pulses.      Heart sounds: Normal heart sounds.   Pulmonary:      Effort: Pulmonary effort is normal.      Breath sounds: Examination of the right-lower field reveals decreased breath sounds. Examination of the left-lower field reveals decreased breath sounds. Decreased breath sounds present.      Comments: Slight wheezing throughout  Abdominal:      General: Bowel sounds are normal.      Palpations: Abdomen is soft.   Musculoskeletal:         General: No swelling. Normal range of motion.      Cervical back: Normal range of motion and neck supple.      Right lower leg: No edema.      Left lower leg: No edema.   Skin:     General: Skin is warm and dry.   Neurological:      General: No focal deficit present.      Mental Status: She is alert and oriented to person, place, and time. Mental status is at baseline.   Psychiatric:         Mood and Affect: Mood normal.         Behavior: Behavior normal.         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 ? metastatic findings could not be  biopsied.    TTF negative/CK 7 +.    Diagnosed June 2014.  EGFR mutation negative/ALK gene rearrangement negative.  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 Patient has reportedly been referred to ENT for resection and lymph node dissection (per patient report). Subsequent open biopsy done by Dr. Rosales on 7/29/2019 confirmed the same.  Patient was unable to undergo definitive surgical resection for oligo metastatic disease.  Treatment induced anemia  Hoarseness .      Plan:          Patient's biopsy did return as recurrent non-small cell lung cancer in the left lung.  I feel that the right lung will be similar pathology.  She completed radiation with Dr. Mortensen on 12/24/2020.     Caris did reveal that PD-L1 was 2% positive suggesting benefit from pembrolizumab or nivolumab/ipilimumab.      Her PET/CT scan does show a new hypermetabolic subcentimeter subcarinal lymph node and increase in a left apical consolidation.  The left apical consolidation is close to the aorta and I do not think would be amenable to biopsy, however this subcarinal lymph node may be amenable to bronchoscopy.      Bronchoscopy done on 11/16/2022 showed metastatic adenocarcinoma.  She is no longer a candidate for full dose SBRT.  We started with carboplatin, pemetrexed, nivolumab, and ipilimumab based on the CHECKMATE 9LA study.  Cycle 1 day 1 given 12/20/2022.     Follow up with radiation oncology as scheduled, Dr. Mortensen.    Continue levothyroxine to 88mcg daily.     PET/CT scan done on 01/18/2024 showed mixed response to therapy.  Overall, the patient was asymptomatic from a cancer standpoint.  No evidence of overt progression.    Status post treatment for atypical pneumonia inpatient.  No evidence of immunotherapy induced lung changes on most recent chest imaging.  She will have  follow-up with pulmonology.    Started on PPI    Repeat PET scan done on 04/02/2024 showed increased size and a right apical nodule.  There was a little bit of hypermetabolic activity associated with it.  The report read as a mixed response.      We will continue with treatment and repeat a CT scan of the chest in 6 weeks.    Labs and treat q3w    Return to clinic in 6 weeks with a CT scan of the chest.    Enrrique Gudino II, MD

## 2024-04-09 ENCOUNTER — OFFICE VISIT (OUTPATIENT)
Dept: HEMATOLOGY/ONCOLOGY | Facility: CLINIC | Age: 79
End: 2024-04-09
Payer: MEDICARE

## 2024-04-09 ENCOUNTER — LAB VISIT (OUTPATIENT)
Dept: LAB | Facility: HOSPITAL | Age: 79
End: 2024-04-09
Attending: NURSE PRACTITIONER
Payer: MEDICARE

## 2024-04-09 VITALS
BODY MASS INDEX: 26.39 KG/M2 | HEART RATE: 76 BPM | OXYGEN SATURATION: 94 % | SYSTOLIC BLOOD PRESSURE: 113 MMHG | WEIGHT: 148.94 LBS | RESPIRATION RATE: 14 BRPM | HEIGHT: 63 IN | DIASTOLIC BLOOD PRESSURE: 73 MMHG

## 2024-04-09 DIAGNOSIS — R06.02 SOB (SHORTNESS OF BREATH): ICD-10-CM

## 2024-04-09 DIAGNOSIS — E03.2 HYPOTHYROIDISM DUE TO DRUGS: ICD-10-CM

## 2024-04-09 DIAGNOSIS — C79.51 MALIGNANT NEOPLASM METASTATIC TO BONE: ICD-10-CM

## 2024-04-09 DIAGNOSIS — C34.90 ADENOCARCINOMA OF LUNG, UNSPECIFIED LATERALITY: Primary | ICD-10-CM

## 2024-04-09 DIAGNOSIS — C77.9 MALIGNANT NEOPLASM METASTATIC TO LYMPH NODES, UNSPECIFIED LYMPH NODE REGION: ICD-10-CM

## 2024-04-09 DIAGNOSIS — C73 MALIGNANT NEOPLASM OF THYROID GLAND: ICD-10-CM

## 2024-04-09 DIAGNOSIS — C34.90 ADENOCARCINOMA OF LUNG, STAGE 4, UNSPECIFIED LATERALITY: ICD-10-CM

## 2024-04-09 LAB
ALBUMIN SERPL-MCNC: 3.3 G/DL (ref 3.4–4.8)
ALBUMIN/GLOB SERPL: 1.4 RATIO (ref 1.1–2)
ALP SERPL-CCNC: 76 UNIT/L (ref 40–150)
ALT SERPL-CCNC: 23 UNIT/L (ref 0–55)
AST SERPL-CCNC: 27 UNIT/L (ref 5–34)
BASOPHILS # BLD AUTO: 0.04 X10(3)/MCL
BASOPHILS NFR BLD AUTO: 0.5 %
BILIRUB SERPL-MCNC: 0.3 MG/DL
BUN SERPL-MCNC: 14.3 MG/DL (ref 9.8–20.1)
CALCIUM SERPL-MCNC: 9.1 MG/DL (ref 8.4–10.2)
CHLORIDE SERPL-SCNC: 101 MMOL/L (ref 98–107)
CO2 SERPL-SCNC: 28 MMOL/L (ref 23–31)
CORTIS SERPL-SCNC: 15.7 UG/DL
CREAT SERPL-MCNC: 0.85 MG/DL (ref 0.55–1.02)
EOSINOPHIL # BLD AUTO: 0.05 X10(3)/MCL (ref 0–0.9)
EOSINOPHIL NFR BLD AUTO: 0.6 %
ERYTHROCYTE [DISTWIDTH] IN BLOOD BY AUTOMATED COUNT: 17.3 % (ref 11.5–17)
GFR SERPLBLD CREATININE-BSD FMLA CKD-EPI: >60 MLS/MIN/1.73/M2
GLOBULIN SER-MCNC: 2.4 GM/DL (ref 2.4–3.5)
GLUCOSE SERPL-MCNC: 110 MG/DL (ref 82–115)
HCT VFR BLD AUTO: 33.2 % (ref 37–47)
HGB BLD-MCNC: 10.5 G/DL (ref 12–16)
IMM GRANULOCYTES # BLD AUTO: 0.03 X10(3)/MCL (ref 0–0.04)
IMM GRANULOCYTES NFR BLD AUTO: 0.3 %
LYMPHOCYTES # BLD AUTO: 1.4 X10(3)/MCL (ref 0.6–4.6)
LYMPHOCYTES NFR BLD AUTO: 16.3 %
MCH RBC QN AUTO: 25.5 PG (ref 27–31)
MCHC RBC AUTO-ENTMCNC: 31.6 G/DL (ref 33–36)
MCV RBC AUTO: 80.6 FL (ref 80–94)
MONOCYTES # BLD AUTO: 1.06 X10(3)/MCL (ref 0.1–1.3)
MONOCYTES NFR BLD AUTO: 12.3 %
NEUTROPHILS # BLD AUTO: 6.02 X10(3)/MCL (ref 2.1–9.2)
NEUTROPHILS NFR BLD AUTO: 70 %
PLATELET # BLD AUTO: 308 X10(3)/MCL (ref 130–400)
PMV BLD AUTO: 8.2 FL (ref 7.4–10.4)
POTASSIUM SERPL-SCNC: 4.4 MMOL/L (ref 3.5–5.1)
PROT SERPL-MCNC: 5.7 GM/DL (ref 5.8–7.6)
RBC # BLD AUTO: 4.12 X10(6)/MCL (ref 4.2–5.4)
SODIUM SERPL-SCNC: 136 MMOL/L (ref 136–145)
TSH SERPL-ACNC: 2.67 UIU/ML (ref 0.35–4.94)
WBC # SPEC AUTO: 8.6 X10(3)/MCL (ref 4.5–11.5)

## 2024-04-09 PROCEDURE — 36415 COLL VENOUS BLD VENIPUNCTURE: CPT

## 2024-04-09 PROCEDURE — 82533 TOTAL CORTISOL: CPT

## 2024-04-09 PROCEDURE — 80053 COMPREHEN METABOLIC PANEL: CPT

## 2024-04-09 PROCEDURE — 99999 PR PBB SHADOW E&M-EST. PATIENT-LVL III: CPT | Mod: PBBFAC,,, | Performed by: INTERNAL MEDICINE

## 2024-04-09 PROCEDURE — 99214 OFFICE O/P EST MOD 30 MIN: CPT | Mod: S$PBB,,, | Performed by: INTERNAL MEDICINE

## 2024-04-09 PROCEDURE — 85025 COMPLETE CBC W/AUTO DIFF WBC: CPT

## 2024-04-09 PROCEDURE — 99213 OFFICE O/P EST LOW 20 MIN: CPT | Mod: PBBFAC | Performed by: INTERNAL MEDICINE

## 2024-04-09 PROCEDURE — 84443 ASSAY THYROID STIM HORMONE: CPT

## 2024-04-12 ENCOUNTER — TELEPHONE (OUTPATIENT)
Dept: INFUSION THERAPY | Facility: HOSPITAL | Age: 79
End: 2024-04-12
Payer: MEDICARE

## 2024-04-15 ENCOUNTER — LAB VISIT (OUTPATIENT)
Dept: LAB | Facility: HOSPITAL | Age: 79
End: 2024-04-15
Attending: INTERNAL MEDICINE
Payer: MEDICARE

## 2024-04-15 DIAGNOSIS — C79.51 MALIGNANT NEOPLASM METASTATIC TO BONE: ICD-10-CM

## 2024-04-15 DIAGNOSIS — C73 MALIGNANT NEOPLASM OF THYROID GLAND: ICD-10-CM

## 2024-04-15 DIAGNOSIS — C77.9 MALIGNANT NEOPLASM METASTATIC TO LYMPH NODES, UNSPECIFIED LYMPH NODE REGION: ICD-10-CM

## 2024-04-15 DIAGNOSIS — C34.90 ADENOCARCINOMA OF LUNG, UNSPECIFIED LATERALITY: ICD-10-CM

## 2024-04-15 DIAGNOSIS — E03.2 HYPOTHYROIDISM DUE TO DRUGS: ICD-10-CM

## 2024-04-15 LAB
ALBUMIN SERPL-MCNC: 3.3 G/DL (ref 3.4–4.8)
ALBUMIN/GLOB SERPL: 1.3 RATIO (ref 1.1–2)
ALP SERPL-CCNC: 71 UNIT/L (ref 40–150)
ALT SERPL-CCNC: 33 UNIT/L (ref 0–55)
AST SERPL-CCNC: 39 UNIT/L (ref 5–34)
BASOPHILS # BLD AUTO: 0.04 X10(3)/MCL
BASOPHILS NFR BLD AUTO: 0.5 %
BILIRUB SERPL-MCNC: 0.2 MG/DL
BUN SERPL-MCNC: 18 MG/DL (ref 9.8–20.1)
CALCIUM SERPL-MCNC: 9.5 MG/DL (ref 8.4–10.2)
CHLORIDE SERPL-SCNC: 100 MMOL/L (ref 98–107)
CO2 SERPL-SCNC: 29 MMOL/L (ref 23–31)
CORTIS SERPL-SCNC: 11 UG/DL
CREAT SERPL-MCNC: 0.77 MG/DL (ref 0.55–1.02)
EOSINOPHIL # BLD AUTO: 0.14 X10(3)/MCL (ref 0–0.9)
EOSINOPHIL NFR BLD AUTO: 1.6 %
ERYTHROCYTE [DISTWIDTH] IN BLOOD BY AUTOMATED COUNT: 17.3 % (ref 11.5–17)
GFR SERPLBLD CREATININE-BSD FMLA CKD-EPI: >60 MLS/MIN/1.73/M2
GLOBULIN SER-MCNC: 2.5 GM/DL (ref 2.4–3.5)
GLUCOSE SERPL-MCNC: 87 MG/DL (ref 82–115)
HCT VFR BLD AUTO: 32 % (ref 37–47)
HGB BLD-MCNC: 10.3 G/DL (ref 12–16)
IMM GRANULOCYTES # BLD AUTO: 0.02 X10(3)/MCL (ref 0–0.04)
IMM GRANULOCYTES NFR BLD AUTO: 0.2 %
LYMPHOCYTES # BLD AUTO: 1.56 X10(3)/MCL (ref 0.6–4.6)
LYMPHOCYTES NFR BLD AUTO: 17.7 %
MCH RBC QN AUTO: 26.1 PG (ref 27–31)
MCHC RBC AUTO-ENTMCNC: 32.2 G/DL (ref 33–36)
MCV RBC AUTO: 81 FL (ref 80–94)
MONOCYTES # BLD AUTO: 1.29 X10(3)/MCL (ref 0.1–1.3)
MONOCYTES NFR BLD AUTO: 14.6 %
NEUTROPHILS # BLD AUTO: 5.78 X10(3)/MCL (ref 2.1–9.2)
NEUTROPHILS NFR BLD AUTO: 65.4 %
PLATELET # BLD AUTO: 305 X10(3)/MCL (ref 130–400)
PMV BLD AUTO: 8.6 FL (ref 7.4–10.4)
POTASSIUM SERPL-SCNC: 4.8 MMOL/L (ref 3.5–5.1)
PROT SERPL-MCNC: 5.8 GM/DL (ref 5.8–7.6)
RBC # BLD AUTO: 3.95 X10(6)/MCL (ref 4.2–5.4)
SODIUM SERPL-SCNC: 136 MMOL/L (ref 136–145)
TSH SERPL-ACNC: 1.78 UIU/ML (ref 0.35–4.94)
WBC # SPEC AUTO: 8.83 X10(3)/MCL (ref 4.5–11.5)

## 2024-04-15 PROCEDURE — 84443 ASSAY THYROID STIM HORMONE: CPT

## 2024-04-15 PROCEDURE — 85025 COMPLETE CBC W/AUTO DIFF WBC: CPT

## 2024-04-15 PROCEDURE — 36415 COLL VENOUS BLD VENIPUNCTURE: CPT

## 2024-04-15 PROCEDURE — 82533 TOTAL CORTISOL: CPT

## 2024-04-15 PROCEDURE — 80053 COMPREHEN METABOLIC PANEL: CPT

## 2024-04-16 ENCOUNTER — INFUSION (OUTPATIENT)
Dept: INFUSION THERAPY | Facility: HOSPITAL | Age: 79
End: 2024-04-16
Attending: NURSE PRACTITIONER
Payer: MEDICARE

## 2024-04-16 VITALS
DIASTOLIC BLOOD PRESSURE: 64 MMHG | HEIGHT: 63 IN | HEART RATE: 71 BPM | RESPIRATION RATE: 18 BRPM | SYSTOLIC BLOOD PRESSURE: 182 MMHG | BODY MASS INDEX: 27.04 KG/M2 | OXYGEN SATURATION: 97 % | WEIGHT: 152.63 LBS

## 2024-04-16 DIAGNOSIS — C34.90 ADENOCARCINOMA OF LUNG, STAGE 4, UNSPECIFIED LATERALITY: Primary | ICD-10-CM

## 2024-04-16 PROCEDURE — 25000003 PHARM REV CODE 250: Performed by: NURSE PRACTITIONER

## 2024-04-16 PROCEDURE — 63600175 PHARM REV CODE 636 W HCPCS: Mod: JZ,JG | Performed by: NURSE PRACTITIONER

## 2024-04-16 PROCEDURE — A4216 STERILE WATER/SALINE, 10 ML: HCPCS | Performed by: NURSE PRACTITIONER

## 2024-04-16 PROCEDURE — 96413 CHEMO IV INFUSION 1 HR: CPT

## 2024-04-16 RX ORDER — SODIUM CHLORIDE 0.9 % (FLUSH) 0.9 %
10 SYRINGE (ML) INJECTION
Status: DISCONTINUED | OUTPATIENT
Start: 2024-04-16 | End: 2024-04-16 | Stop reason: HOSPADM

## 2024-04-16 RX ORDER — HEPARIN 100 UNIT/ML
500 SYRINGE INTRAVENOUS
Status: DISCONTINUED | OUTPATIENT
Start: 2024-04-16 | End: 2024-04-16 | Stop reason: HOSPADM

## 2024-04-16 RX ORDER — DIPHENHYDRAMINE HYDROCHLORIDE 50 MG/ML
50 INJECTION INTRAMUSCULAR; INTRAVENOUS ONCE AS NEEDED
Status: DISCONTINUED | OUTPATIENT
Start: 2024-04-16 | End: 2024-04-16 | Stop reason: HOSPADM

## 2024-04-16 RX ORDER — EPINEPHRINE 0.3 MG/.3ML
0.3 INJECTION SUBCUTANEOUS ONCE AS NEEDED
Status: DISCONTINUED | OUTPATIENT
Start: 2024-04-16 | End: 2024-04-16 | Stop reason: HOSPADM

## 2024-04-16 RX ADMIN — SODIUM CHLORIDE: 9 INJECTION, SOLUTION INTRAVENOUS at 08:04

## 2024-04-16 RX ADMIN — HEPARIN 500 UNITS: 100 SYRINGE at 09:04

## 2024-04-16 RX ADMIN — SODIUM CHLORIDE 360 MG: 9 INJECTION, SOLUTION INTRAVENOUS at 08:04

## 2024-04-16 RX ADMIN — Medication 10 ML: at 09:04

## 2024-05-01 ENCOUNTER — LAB VISIT (OUTPATIENT)
Dept: LAB | Facility: HOSPITAL | Age: 79
End: 2024-05-01
Attending: INTERNAL MEDICINE
Payer: MEDICARE

## 2024-05-01 DIAGNOSIS — C77.9 MALIGNANT NEOPLASM METASTATIC TO LYMPH NODES, UNSPECIFIED LYMPH NODE REGION: ICD-10-CM

## 2024-05-01 DIAGNOSIS — C79.51 MALIGNANT NEOPLASM METASTATIC TO BONE: ICD-10-CM

## 2024-05-01 DIAGNOSIS — C34.90 ADENOCARCINOMA OF LUNG, UNSPECIFIED LATERALITY: ICD-10-CM

## 2024-05-01 DIAGNOSIS — E03.2 HYPOTHYROIDISM DUE TO DRUGS: ICD-10-CM

## 2024-05-01 DIAGNOSIS — C73 MALIGNANT NEOPLASM OF THYROID GLAND: ICD-10-CM

## 2024-05-01 LAB
ALBUMIN SERPL-MCNC: 3.4 G/DL (ref 3.4–4.8)
ALBUMIN/GLOB SERPL: 1.4 RATIO (ref 1.1–2)
ALP SERPL-CCNC: 71 UNIT/L (ref 40–150)
ALT SERPL-CCNC: 21 UNIT/L (ref 0–55)
AST SERPL-CCNC: 26 UNIT/L (ref 5–34)
BASOPHILS # BLD AUTO: 0.06 X10(3)/MCL
BASOPHILS NFR BLD AUTO: 0.7 %
BILIRUB SERPL-MCNC: 0.3 MG/DL
BUN SERPL-MCNC: 15 MG/DL (ref 9.8–20.1)
CALCIUM SERPL-MCNC: 9.4 MG/DL (ref 8.4–10.2)
CHLORIDE SERPL-SCNC: 99 MMOL/L (ref 98–107)
CO2 SERPL-SCNC: 30 MMOL/L (ref 23–31)
CORTIS SERPL-SCNC: 9.4 UG/DL
CREAT SERPL-MCNC: 0.83 MG/DL (ref 0.55–1.02)
EOSINOPHIL # BLD AUTO: 0.38 X10(3)/MCL (ref 0–0.9)
EOSINOPHIL NFR BLD AUTO: 4.2 %
ERYTHROCYTE [DISTWIDTH] IN BLOOD BY AUTOMATED COUNT: 17.3 % (ref 11.5–17)
GFR SERPLBLD CREATININE-BSD FMLA CKD-EPI: >60 MLS/MIN/1.73/M2
GLOBULIN SER-MCNC: 2.5 GM/DL (ref 2.4–3.5)
GLUCOSE SERPL-MCNC: 89 MG/DL (ref 82–115)
HCT VFR BLD AUTO: 31.1 % (ref 37–47)
HGB BLD-MCNC: 10 G/DL (ref 12–16)
IMM GRANULOCYTES # BLD AUTO: 0.02 X10(3)/MCL (ref 0–0.04)
IMM GRANULOCYTES NFR BLD AUTO: 0.2 %
LYMPHOCYTES # BLD AUTO: 1.76 X10(3)/MCL (ref 0.6–4.6)
LYMPHOCYTES NFR BLD AUTO: 19.4 %
MCH RBC QN AUTO: 25.6 PG (ref 27–31)
MCHC RBC AUTO-ENTMCNC: 32.2 G/DL (ref 33–36)
MCV RBC AUTO: 79.7 FL (ref 80–94)
MONOCYTES # BLD AUTO: 1.06 X10(3)/MCL (ref 0.1–1.3)
MONOCYTES NFR BLD AUTO: 11.7 %
NEUTROPHILS # BLD AUTO: 5.78 X10(3)/MCL (ref 2.1–9.2)
NEUTROPHILS NFR BLD AUTO: 63.8 %
PLATELET # BLD AUTO: 239 X10(3)/MCL (ref 130–400)
PMV BLD AUTO: 8.1 FL (ref 7.4–10.4)
POTASSIUM SERPL-SCNC: 4.3 MMOL/L (ref 3.5–5.1)
PROT SERPL-MCNC: 5.9 GM/DL (ref 5.8–7.6)
RBC # BLD AUTO: 3.9 X10(6)/MCL (ref 4.2–5.4)
SODIUM SERPL-SCNC: 136 MMOL/L (ref 136–145)
TSH SERPL-ACNC: 1.06 UIU/ML (ref 0.35–4.94)
WBC # SPEC AUTO: 9.06 X10(3)/MCL (ref 4.5–11.5)

## 2024-05-01 PROCEDURE — 85025 COMPLETE CBC W/AUTO DIFF WBC: CPT

## 2024-05-01 PROCEDURE — 84443 ASSAY THYROID STIM HORMONE: CPT

## 2024-05-01 PROCEDURE — 36415 COLL VENOUS BLD VENIPUNCTURE: CPT

## 2024-05-01 PROCEDURE — 80053 COMPREHEN METABOLIC PANEL: CPT

## 2024-05-01 PROCEDURE — 82533 TOTAL CORTISOL: CPT

## 2024-05-02 RX ORDER — SODIUM CHLORIDE 0.9 % (FLUSH) 0.9 %
10 SYRINGE (ML) INJECTION
Status: CANCELLED | OUTPATIENT
Start: 2024-05-03

## 2024-05-02 RX ORDER — DIPHENHYDRAMINE HYDROCHLORIDE 50 MG/ML
50 INJECTION INTRAMUSCULAR; INTRAVENOUS ONCE AS NEEDED
Status: CANCELLED | OUTPATIENT
Start: 2024-05-03

## 2024-05-02 RX ORDER — HEPARIN 100 UNIT/ML
500 SYRINGE INTRAVENOUS
Status: CANCELLED | OUTPATIENT
Start: 2024-05-24

## 2024-05-02 RX ORDER — EPINEPHRINE 0.3 MG/.3ML
0.3 INJECTION SUBCUTANEOUS ONCE AS NEEDED
Status: CANCELLED | OUTPATIENT
Start: 2024-05-03

## 2024-05-02 RX ORDER — SODIUM CHLORIDE 0.9 % (FLUSH) 0.9 %
10 SYRINGE (ML) INJECTION
Status: CANCELLED | OUTPATIENT
Start: 2024-05-24

## 2024-05-02 RX ORDER — DIPHENHYDRAMINE HYDROCHLORIDE 50 MG/ML
50 INJECTION INTRAMUSCULAR; INTRAVENOUS ONCE AS NEEDED
Status: CANCELLED | OUTPATIENT
Start: 2024-05-24

## 2024-05-02 RX ORDER — EPINEPHRINE 0.3 MG/.3ML
0.3 INJECTION SUBCUTANEOUS ONCE AS NEEDED
Status: CANCELLED | OUTPATIENT
Start: 2024-05-24

## 2024-05-02 RX ORDER — HEPARIN 100 UNIT/ML
500 SYRINGE INTRAVENOUS
Status: CANCELLED | OUTPATIENT
Start: 2024-05-03

## 2024-05-03 ENCOUNTER — INFUSION (OUTPATIENT)
Dept: INFUSION THERAPY | Facility: HOSPITAL | Age: 79
End: 2024-05-03
Attending: NURSE PRACTITIONER
Payer: MEDICARE

## 2024-05-03 VITALS
HEART RATE: 67 BPM | WEIGHT: 152.63 LBS | SYSTOLIC BLOOD PRESSURE: 105 MMHG | RESPIRATION RATE: 18 BRPM | DIASTOLIC BLOOD PRESSURE: 68 MMHG | BODY MASS INDEX: 27.04 KG/M2 | HEIGHT: 63 IN

## 2024-05-03 DIAGNOSIS — C34.90 ADENOCARCINOMA OF LUNG, STAGE 4, UNSPECIFIED LATERALITY: Primary | ICD-10-CM

## 2024-05-03 PROCEDURE — 63600175 PHARM REV CODE 636 W HCPCS: Mod: JZ,JG | Performed by: INTERNAL MEDICINE

## 2024-05-03 PROCEDURE — 96417 CHEMO IV INFUS EACH ADDL SEQ: CPT

## 2024-05-03 PROCEDURE — 25000003 PHARM REV CODE 250: Performed by: INTERNAL MEDICINE

## 2024-05-03 PROCEDURE — 96413 CHEMO IV INFUSION 1 HR: CPT

## 2024-05-03 RX ORDER — HEPARIN 100 UNIT/ML
500 SYRINGE INTRAVENOUS
Status: DISCONTINUED | OUTPATIENT
Start: 2024-05-03 | End: 2024-05-03 | Stop reason: HOSPADM

## 2024-05-03 RX ORDER — SODIUM CHLORIDE 0.9 % (FLUSH) 0.9 %
10 SYRINGE (ML) INJECTION
Status: DISCONTINUED | OUTPATIENT
Start: 2024-05-03 | End: 2024-05-03 | Stop reason: HOSPADM

## 2024-05-03 RX ORDER — DIPHENHYDRAMINE HYDROCHLORIDE 50 MG/ML
50 INJECTION INTRAMUSCULAR; INTRAVENOUS ONCE AS NEEDED
Status: DISCONTINUED | OUTPATIENT
Start: 2024-05-03 | End: 2024-05-03 | Stop reason: HOSPADM

## 2024-05-03 RX ORDER — EPINEPHRINE 0.3 MG/.3ML
0.3 INJECTION SUBCUTANEOUS ONCE AS NEEDED
Status: DISCONTINUED | OUTPATIENT
Start: 2024-05-03 | End: 2024-05-03 | Stop reason: HOSPADM

## 2024-05-03 RX ADMIN — HEPARIN 500 UNITS: 100 SYRINGE at 11:05

## 2024-05-03 RX ADMIN — SODIUM CHLORIDE 360 MG: 9 INJECTION, SOLUTION INTRAVENOUS at 10:05

## 2024-05-03 RX ADMIN — SODIUM CHLORIDE 75.5 MG: 9 INJECTION, SOLUTION INTRAVENOUS at 10:05

## 2024-05-21 ENCOUNTER — HOSPITAL ENCOUNTER (OUTPATIENT)
Dept: RADIOLOGY | Facility: HOSPITAL | Age: 79
Discharge: HOME | End: 2024-05-21
Attending: INTERNAL MEDICINE
Payer: MEDICARE

## 2024-05-21 DIAGNOSIS — C34.90 ADENOCARCINOMA OF LUNG, UNSPECIFIED LATERALITY: ICD-10-CM

## 2024-05-21 DIAGNOSIS — C79.51 MALIGNANT NEOPLASM METASTATIC TO BONE: ICD-10-CM

## 2024-05-21 DIAGNOSIS — C73 MALIGNANT NEOPLASM OF THYROID GLAND: ICD-10-CM

## 2024-05-21 DIAGNOSIS — C77.9 MALIGNANT NEOPLASM METASTATIC TO LYMPH NODES, UNSPECIFIED LYMPH NODE REGION: ICD-10-CM

## 2024-05-21 PROCEDURE — 25500020 PHARM REV CODE 255: Performed by: INTERNAL MEDICINE

## 2024-05-21 PROCEDURE — 71260 CT THORAX DX C+: CPT | Mod: TC

## 2024-05-21 RX ADMIN — IOHEXOL 89 ML: 350 INJECTION, SOLUTION INTRAVENOUS at 09:05

## 2024-05-23 ENCOUNTER — OFFICE VISIT (OUTPATIENT)
Dept: HEMATOLOGY/ONCOLOGY | Facility: CLINIC | Age: 79
End: 2024-05-23
Payer: MEDICARE

## 2024-05-23 ENCOUNTER — LAB VISIT (OUTPATIENT)
Dept: LAB | Facility: HOSPITAL | Age: 79
End: 2024-05-23
Attending: INTERNAL MEDICINE
Payer: MEDICARE

## 2024-05-23 VITALS
BODY MASS INDEX: 27.73 KG/M2 | RESPIRATION RATE: 15 BRPM | DIASTOLIC BLOOD PRESSURE: 61 MMHG | HEART RATE: 64 BPM | WEIGHT: 150.69 LBS | HEIGHT: 62 IN | OXYGEN SATURATION: 95 % | SYSTOLIC BLOOD PRESSURE: 131 MMHG

## 2024-05-23 DIAGNOSIS — C79.51 MALIGNANT NEOPLASM METASTATIC TO BONE: ICD-10-CM

## 2024-05-23 DIAGNOSIS — C34.90 ADENOCARCINOMA OF LUNG, UNSPECIFIED LATERALITY: Primary | ICD-10-CM

## 2024-05-23 DIAGNOSIS — E03.2 HYPOTHYROIDISM DUE TO DRUGS: ICD-10-CM

## 2024-05-23 DIAGNOSIS — C73 MALIGNANT NEOPLASM OF THYROID GLAND: ICD-10-CM

## 2024-05-23 DIAGNOSIS — C77.9 MALIGNANT NEOPLASM METASTATIC TO LYMPH NODES, UNSPECIFIED LYMPH NODE REGION: ICD-10-CM

## 2024-05-23 DIAGNOSIS — C34.90 ADENOCARCINOMA OF LUNG, UNSPECIFIED LATERALITY: ICD-10-CM

## 2024-05-23 LAB
ALBUMIN SERPL-MCNC: 3.2 G/DL (ref 3.4–4.8)
ALBUMIN/GLOB SERPL: 1.2 RATIO (ref 1.1–2)
ALP SERPL-CCNC: 76 UNIT/L (ref 40–150)
ALT SERPL-CCNC: 17 UNIT/L (ref 0–55)
ANION GAP SERPL CALC-SCNC: 9 MEQ/L
AST SERPL-CCNC: 20 UNIT/L (ref 5–34)
BASOPHILS # BLD AUTO: 0.03 X10(3)/MCL
BASOPHILS NFR BLD AUTO: 0.3 %
BILIRUB SERPL-MCNC: 0.5 MG/DL
BUN SERPL-MCNC: 13.3 MG/DL (ref 9.8–20.1)
CALCIUM SERPL-MCNC: 9.1 MG/DL (ref 8.4–10.2)
CHLORIDE SERPL-SCNC: 101 MMOL/L (ref 98–107)
CO2 SERPL-SCNC: 29 MMOL/L (ref 23–31)
CORTIS SERPL-SCNC: 15.4 UG/DL
CREAT SERPL-MCNC: 0.79 MG/DL (ref 0.55–1.02)
CREAT/UREA NIT SERPL: 17
EOSINOPHIL # BLD AUTO: 0.19 X10(3)/MCL (ref 0–0.9)
EOSINOPHIL NFR BLD AUTO: 2 %
ERYTHROCYTE [DISTWIDTH] IN BLOOD BY AUTOMATED COUNT: 18.3 % (ref 11.5–17)
GFR SERPLBLD CREATININE-BSD FMLA CKD-EPI: >60 ML/MIN/1.73/M2
GLOBULIN SER-MCNC: 2.7 GM/DL (ref 2.4–3.5)
GLUCOSE SERPL-MCNC: 112 MG/DL (ref 82–115)
HCT VFR BLD AUTO: 32.2 % (ref 37–47)
HGB BLD-MCNC: 10.3 G/DL (ref 12–16)
IMM GRANULOCYTES # BLD AUTO: 0.03 X10(3)/MCL (ref 0–0.04)
IMM GRANULOCYTES NFR BLD AUTO: 0.3 %
LYMPHOCYTES # BLD AUTO: 1.39 X10(3)/MCL (ref 0.6–4.6)
LYMPHOCYTES NFR BLD AUTO: 14.5 %
MCH RBC QN AUTO: 26 PG (ref 27–31)
MCHC RBC AUTO-ENTMCNC: 32 G/DL (ref 33–36)
MCV RBC AUTO: 81.3 FL (ref 80–94)
MONOCYTES # BLD AUTO: 1.06 X10(3)/MCL (ref 0.1–1.3)
MONOCYTES NFR BLD AUTO: 11 %
NEUTROPHILS # BLD AUTO: 6.9 X10(3)/MCL (ref 2.1–9.2)
NEUTROPHILS NFR BLD AUTO: 71.9 %
PLATELET # BLD AUTO: 242 X10(3)/MCL (ref 130–400)
PMV BLD AUTO: 8.9 FL (ref 7.4–10.4)
POTASSIUM SERPL-SCNC: 4.2 MMOL/L (ref 3.5–5.1)
PROT SERPL-MCNC: 5.9 GM/DL (ref 5.8–7.6)
RBC # BLD AUTO: 3.96 X10(6)/MCL (ref 4.2–5.4)
SODIUM SERPL-SCNC: 139 MMOL/L (ref 136–145)
TSH SERPL-ACNC: 0.93 UIU/ML (ref 0.35–4.94)
WBC # SPEC AUTO: 9.6 X10(3)/MCL (ref 4.5–11.5)

## 2024-05-23 PROCEDURE — 82533 TOTAL CORTISOL: CPT

## 2024-05-23 PROCEDURE — 99215 OFFICE O/P EST HI 40 MIN: CPT | Mod: S$PBB,,, | Performed by: NURSE PRACTITIONER

## 2024-05-23 PROCEDURE — 85025 COMPLETE CBC W/AUTO DIFF WBC: CPT

## 2024-05-23 PROCEDURE — 36415 COLL VENOUS BLD VENIPUNCTURE: CPT

## 2024-05-23 PROCEDURE — 80053 COMPREHEN METABOLIC PANEL: CPT

## 2024-05-23 PROCEDURE — 84443 ASSAY THYROID STIM HORMONE: CPT

## 2024-05-23 PROCEDURE — 99213 OFFICE O/P EST LOW 20 MIN: CPT | Mod: PBBFAC | Performed by: INTERNAL MEDICINE

## 2024-05-23 PROCEDURE — 99999 PR PBB SHADOW E&M-EST. PATIENT-LVL III: CPT | Mod: PBBFAC,,, | Performed by: INTERNAL MEDICINE

## 2024-05-23 RX ORDER — FUROSEMIDE 20 MG/1
TABLET ORAL
COMMUNITY
Start: 2024-05-09

## 2024-05-23 RX ORDER — LOSARTAN POTASSIUM 50 MG/1
TABLET ORAL
COMMUNITY
Start: 2024-05-09

## 2024-05-23 NOTE — PROGRESS NOTES
Subjective:       Patient ID: Pratibha Patel is a 79 y.o. female.    Chief Complaint: Follow-up (Patient has no concerns today)      PCP: Graham Nguyen NP  Cardiologist: Dr. Danny Sánchez  Referring Physician: Dr. Anthony Hutson  Endocrine: Dr. Werner  Radiation oncology: Dr. Boss in the past, now Dr. Mortensen.  ENT: Dr. 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 ? metastatic findings could not be biopsied. TTF negative/CK 7 +.    Diagnosed June 2014 EGFR mutation negative/ALK gene rearrangement negative. 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 Patient has reportedly been referred to ENT for resection and lymph node dissection (per patient report).   Subsequent open biopsy done by Dr. Rosales on 7/29/2019 confirmed the same.  Patient was unable to undergo definitive surgical resection for oligo metastatic disease.  Hoarseness and swallowing difficulties secondary to #2  Biopsy of the left lung lesion showed recurrent non-small cell lung cancer, Caris was sent and showed positivity for PD-L1, 2%.  No other targetable or actionable mutations present.    Current Treatment:   Combination chemo immunotherapy with carboplatin, pemetrexed, nivolumab, ipilimumab based off of checkmate 9LA     Treatment History:  RT with weekly Carbo/Taxol started 7/2/14--Completed August 2014  Single agent 'Maintenance' Avastin q 3 weeks.  Started 4/9/15--last given September 21, 2016  Radiation + weekly Carbo/Taxol  at [facility] ----9/11/19-10/22/19  Patient underwent radiation from 12/17/2020 through 12/21/2020.  Radiation to the left and right lung per Dr. Robert Mortensen.    HPI:   Please see Oncology history in the diagnosis of adenocarcinoma of the lung, stage IV on the problem list for full detail.  Patient originally seen in 2014 with unintentional weight loss, shortness of breath, nonproductive cough.  Patient had imaging findings suggestive of lung cancer, bronchoscopy done in June of 2014 revealed moderately differentiated adenocarcinoma of the lung.  She originally underwent chemo radiation from July 2014 through August 2014.  She was placed on maintenance Avastin from April 2015 through September 2016. Patient then underwent a thyroid biopsy in June of 2019 that showed metastatic carcinoma favored to be adenocarcinoma lung primary.  She underwent surgical resection in July 2019, however full surgical dissection was not able to be done and the patient underwent open biopsy of right thyroid gland.  Pathology revealed poorly differentiated non-small cell carcinoma consistent with metastatic lung cancer.  NGS panel done at that time unremarkable.  She started chemoradiation with carbo Taxol in September 2019, completed in October 2019. Patient was doing well, imaging in October 2020 revealed an abnormality in the left lung, CT-guided lung biopsy on 11/03/2020 showed recurrent non-small cell lung cancer, Caris revealed PDL1 positivity.  She underwent SBRT from 12/17/2020 through 12/21/2020.  Patient then placed on surveillance imaging, most recently done on 07/05/2022 showing worsening disease in the right lung with a previous lesion that measured 0.6 cm and had an SUV of 1.0 now measuring 1.5 cm and an SUV of 8.4.  This was treated with SBRT in August of 2022.  PET/CT scan done on 11/8/2022 showed mild increased radiotracer activity within the left apical consolidation, SUV 5.5.  There was a newly hypermetabolic subcentimeter  subcarinal lymph node.  A 1 cm right lower lobe nodule was smaller, he right upper lobe nodule was slightly larger measuring 6 mm.  No sites of FDG avid metastatic disease in the neck, abdomen, or pelvis. Bronchoscopy done on 11/16/2022, pathology revealed malignant cells consistent with adenocarcinoma.  Planning on chemo immunotherapy with carboplatin, pemetrexed, nivolumab, ipilimumab based off of checkmate 9LA.  PET/CT on 07/25/2023 showed infectious or inflammatory changes in the right lower lobe with no other findings to suggest new or worsening disease.    PET/CT scan done on 10/12/2023 showed mixed response to therapy with decrease right hilar and left upper lobe consolidation uptake.  Subcarinal lymph node demonstrates significant increased uptake compared to the prior study.  Stable subcentimeter right upper lobe nodule demonstrates slight increased uptake compared to prior study    PET/CT scan done on 01/18/2024 showed mixed response to therapy with decrease in size of the right upper lobe nodule and maximum SUV of subcarinal lymph node.  There was right hilar uptake that was increased, however there was also patchy airspace opacities that could be infectious or inflammatory.    Most recent PET scan done on 04/02/2024 showed mixed therapeutic response with increased size and hypermetabolic activity of a posterior right apical nodule but improved thoracic adenopathy.  There were no findings to suggest new/progressed FDG avid metastasis to the neck, abdomen, or pelvis.    CT of the chest on 05/21/2024 showed overall stable disease with tiny centrilobular nodules throughout both lungs, similar to prior CTA.  Stable 0.9 cm nodule in the apical right upper lobe.    Interval History:   Patient presents to clinic for a treatment visit.  She states that she is feeling well overall and she denies any new complaints.  She denies any recent changes in her breathing or any new cough.  She denies any pain, bowel  difficulties.  She is eating and drinking well.    Past Medical History:   Diagnosis Date    Adenocarcinoma of lung     Anemia     Anxiety     Arthritis     COPD (chronic obstructive pulmonary disease)     Depression     HLD (hyperlipidemia)     Hypertension     Lung cancer     Secondary malignant neoplasm of lymph nodes     Secondary malignant neoplasm of right lung     Thyroid disease     lymph nodes malignant      Past Surgical History:   Procedure Laterality Date    BLADDER SURGERY      CHOLECYSTECTOMY      COLONOSCOPY  2018    ENDOBRONCHIAL ULTRASOUND N/A 11/16/2022    Procedure: ENDOBRONCHIAL ULTRASOUND (EBUS);  Surgeon: Anthony Hutson MD;  Location: [facility] BRONCHOSCOPY [facility];  Service: Pulmonary;  Laterality: N/A;    HYSTERECTOMY      KNEE SURGERY Bilateral     replaced knees    PARTIAL HIP ARTHROPLASTY Bilateral      Social History     Socioeconomic History    Marital status:    Tobacco Use    Smoking status: Every Day     Current packs/day: 0.25     Types: Cigarettes    Smokeless tobacco: Never    Tobacco comments:     Patient states she is trying to quit smoking.   Substance and Sexual Activity    Alcohol use: Not Currently    Drug use: Never     Social Determinants of Health     Financial Resource Strain: Unknown (3/1/2022)    Received from [facility] and Its Subsidiaries and Affiliates    Overall Financial Resource Strain (CARDIA)     Difficulty of Paying Living Expenses: Patient declined   Food Insecurity: Unknown (3/1/2022)    Received from [facility] and Its Subsidiaries and Affiliates    Hunger Vital Sign     Worried About Running Out of Food in the Last Year: Patient declined     Ran Out of Food in the Last Year: Patient declined   Transportation Needs: Unknown (3/1/2022)    Received from [facility] Its Subsidiaries and Affiliates    PRAPARE - Transportation     Lack of  Transportation (Medical): Patient declined     Lack of Transportation (Non-Medical): Patient declined   Housing Stability: Unknown (3/1/2022)    Received from [facility] and Its Subsidiaries and Affiliates    Housing Stability Vital Sign     Unable to Pay for Housing in the Last Year: Patient refused     Number of Places Lived in the Last Year: 1      Family History   Problem Relation Name Age of Onset    Lung cancer Mother      Lung cancer Brother        Review of patient's allergies indicates:  No Known Allergies   Review of Systems   Constitutional:  Negative for chills, diaphoresis, fatigue, fever and unexpected weight change.   HENT:  Negative for nasal congestion, mouth sores, sinus pressure/congestion and sore throat.    Eyes:  Negative for pain and visual disturbance.   Respiratory:  Negative for cough, chest tightness and shortness of breath.    Cardiovascular:  Negative for chest pain, palpitations and leg swelling.   Gastrointestinal:  Negative for abdominal distention, abdominal pain, blood in stool, constipation and diarrhea.   Genitourinary:  Negative for dysuria, frequency and hematuria.   Musculoskeletal:  Negative for arthralgias and back pain.   Integumentary:  Negative for rash.   Neurological:  Negative for dizziness, weakness, numbness and headaches.   Hematological:  Negative for adenopathy.   Psychiatric/Behavioral:  Negative for confusion.          Objective:      Physical Exam  Vitals reviewed.   Constitutional:       General: She is not in acute distress.     Appearance: Normal appearance.   HENT:      Head: Normocephalic and atraumatic.      Nose: Nose normal.      Mouth/Throat:      Mouth: Mucous membranes are moist.   Eyes:      Extraocular Movements: Extraocular movements intact.      Conjunctiva/sclera: Conjunctivae normal.   Neck:      Comments: Radiation changes to the left supraclavicular area  Cardiovascular:      Rate and Rhythm: Normal rate  and regular rhythm.      Pulses: Normal pulses.      Heart sounds: Normal heart sounds.   Pulmonary:      Effort: Pulmonary effort is normal.      Breath sounds: Examination of the right-lower field reveals decreased breath sounds. Examination of the left-lower field reveals decreased breath sounds. Decreased breath sounds present.      Comments: Slight wheezing throughout  Abdominal:      General: Bowel sounds are normal.      Palpations: Abdomen is soft.   Musculoskeletal:         General: No swelling. Normal range of motion.      Cervical back: Normal range of motion and neck supple.      Right lower leg: No edema.      Left lower leg: No edema.   Skin:     General: Skin is warm and dry.   Neurological:      General: No focal deficit present.      Mental Status: She is alert and oriented to person, place, and time. Mental status is at baseline.   Psychiatric:         Mood and Affect: Mood normal.         Behavior: Behavior normal.         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 ? metastatic findings could not be biopsied.    TTF negative/CK 7 +.    Diagnosed June 2014.  EGFR mutation negative/ALK gene rearrangement negative.  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 Patient has reportedly been referred to ENT for resection and lymph node dissection (per patient report). Subsequent open biopsy done by Dr. Rosales on 7/29/2019 confirmed the same.  Patient was unable to undergo definitive surgical resection for oligo metastatic disease.  Treatment  induced anemia  Hoarseness .      Plan:          Patient's biopsy did return as recurrent non-small cell lung cancer in the left lung.  I feel that the right lung will be similar pathology.  She completed radiation with Dr. Mortensen on 12/24/2020.     Caris did reveal that PD-L1 was 2% positive suggesting benefit from pembrolizumab or nivolumab/ipilimumab.      Her PET/CT scan does show a new hypermetabolic subcentimeter subcarinal lymph node and increase in a left apical consolidation.  The left apical consolidation is close to the aorta and I do not think would be amenable to biopsy, however this subcarinal lymph node may be amenable to bronchoscopy.      Bronchoscopy done on 11/16/2022 showed metastatic adenocarcinoma.  She is no longer a candidate for full dose SBRT.  We started with carboplatin, pemetrexed, nivolumab, and ipilimumab based on the CHECKMATE 9LA study.  Cycle 1 day 1 given 12/20/2022.     Follow up with radiation oncology as scheduled, Dr. Mortensen.    Continue levothyroxine to 88mcg daily.     PET/CT scan done on 01/18/2024 showed mixed response to therapy.  Overall, the patient was asymptomatic from a cancer standpoint.  No evidence of overt progression.    Status post treatment for atypical pneumonia inpatient.  No evidence of immunotherapy induced lung changes on most recent chest imaging.  She will have follow-up with pulmonology.    Started on PPI    Repeat PET scan done on 04/02/2024 showed increased size and a right apical nodule.  There was a little bit of hypermetabolic activity associated with it.  The report read as a mixed response.      CT of the chest on 05/21/2024 showed overall stable disease with tiny centrilobular nodules throughout both lungs, similar to prior CTA.  Stable 0.9 cm nodule in the apical right upper lobe.    Labs and treat q3w    Return to clinic in 6 weeks       GADIEL Martinez

## 2024-05-24 ENCOUNTER — INFUSION (OUTPATIENT)
Dept: INFUSION THERAPY | Facility: HOSPITAL | Age: 79
End: 2024-05-24
Attending: NURSE PRACTITIONER
Payer: MEDICARE

## 2024-05-24 VITALS — DIASTOLIC BLOOD PRESSURE: 55 MMHG | SYSTOLIC BLOOD PRESSURE: 133 MMHG | HEART RATE: 60 BPM

## 2024-05-24 DIAGNOSIS — C34.90 ADENOCARCINOMA OF LUNG, STAGE 4, UNSPECIFIED LATERALITY: Primary | ICD-10-CM

## 2024-05-24 PROCEDURE — 25000003 PHARM REV CODE 250: Performed by: INTERNAL MEDICINE

## 2024-05-24 PROCEDURE — 96413 CHEMO IV INFUSION 1 HR: CPT

## 2024-05-24 PROCEDURE — 63600175 PHARM REV CODE 636 W HCPCS: Mod: JZ,JG | Performed by: INTERNAL MEDICINE

## 2024-05-24 RX ORDER — EPINEPHRINE 0.3 MG/.3ML
0.3 INJECTION SUBCUTANEOUS ONCE AS NEEDED
Status: DISCONTINUED | OUTPATIENT
Start: 2024-05-24 | End: 2024-05-24 | Stop reason: HOSPADM

## 2024-05-24 RX ORDER — SODIUM CHLORIDE 0.9 % (FLUSH) 0.9 %
10 SYRINGE (ML) INJECTION
Status: DISCONTINUED | OUTPATIENT
Start: 2024-05-24 | End: 2024-05-24 | Stop reason: HOSPADM

## 2024-05-24 RX ORDER — DIPHENHYDRAMINE HYDROCHLORIDE 50 MG/ML
50 INJECTION INTRAMUSCULAR; INTRAVENOUS ONCE AS NEEDED
Status: DISCONTINUED | OUTPATIENT
Start: 2024-05-24 | End: 2024-05-24 | Stop reason: HOSPADM

## 2024-05-24 RX ORDER — HEPARIN 100 UNIT/ML
500 SYRINGE INTRAVENOUS
Status: DISCONTINUED | OUTPATIENT
Start: 2024-05-24 | End: 2024-05-24 | Stop reason: HOSPADM

## 2024-05-24 RX ADMIN — SODIUM CHLORIDE 360 MG: 9 INJECTION, SOLUTION INTRAVENOUS at 10:05

## 2024-06-12 ENCOUNTER — LAB VISIT (OUTPATIENT)
Dept: LAB | Facility: HOSPITAL | Age: 79
End: 2024-06-12
Attending: INTERNAL MEDICINE
Payer: MEDICARE

## 2024-06-12 DIAGNOSIS — C34.90 ADENOCARCINOMA OF LUNG, UNSPECIFIED LATERALITY: ICD-10-CM

## 2024-06-12 DIAGNOSIS — C79.51 MALIGNANT NEOPLASM METASTATIC TO BONE: ICD-10-CM

## 2024-06-12 DIAGNOSIS — E03.2 HYPOTHYROIDISM DUE TO DRUGS: ICD-10-CM

## 2024-06-12 LAB
ALBUMIN SERPL-MCNC: 3.2 G/DL (ref 3.4–4.8)
ALBUMIN/GLOB SERPL: 1.1 RATIO (ref 1.1–2)
ALP SERPL-CCNC: 77 UNIT/L (ref 40–150)
ALT SERPL-CCNC: 12 UNIT/L (ref 0–55)
ANION GAP SERPL CALC-SCNC: 11 MEQ/L
AST SERPL-CCNC: 21 UNIT/L (ref 5–34)
BASOPHILS # BLD AUTO: 0.03 X10(3)/MCL
BASOPHILS NFR BLD AUTO: 0.3 %
BILIRUB SERPL-MCNC: 0.4 MG/DL
BUN SERPL-MCNC: 15.7 MG/DL (ref 9.8–20.1)
CALCIUM SERPL-MCNC: 9.6 MG/DL (ref 8.4–10.2)
CHLORIDE SERPL-SCNC: 97 MMOL/L (ref 98–107)
CO2 SERPL-SCNC: 29 MMOL/L (ref 23–31)
CORTIS SERPL-SCNC: 12.4 UG/DL
CREAT SERPL-MCNC: 0.8 MG/DL (ref 0.55–1.02)
CREAT/UREA NIT SERPL: 20
EOSINOPHIL # BLD AUTO: 0.17 X10(3)/MCL (ref 0–0.9)
EOSINOPHIL NFR BLD AUTO: 1.9 %
ERYTHROCYTE [DISTWIDTH] IN BLOOD BY AUTOMATED COUNT: 18.3 % (ref 11.5–17)
GFR SERPLBLD CREATININE-BSD FMLA CKD-EPI: >60 ML/MIN/1.73/M2
GLOBULIN SER-MCNC: 2.8 GM/DL (ref 2.4–3.5)
GLUCOSE SERPL-MCNC: 89 MG/DL (ref 82–115)
HCT VFR BLD AUTO: 32 % (ref 37–47)
HGB BLD-MCNC: 10.2 G/DL (ref 12–16)
IMM GRANULOCYTES # BLD AUTO: 0.02 X10(3)/MCL (ref 0–0.04)
IMM GRANULOCYTES NFR BLD AUTO: 0.2 %
LYMPHOCYTES # BLD AUTO: 1.49 X10(3)/MCL (ref 0.6–4.6)
LYMPHOCYTES NFR BLD AUTO: 16.7 %
MCH RBC QN AUTO: 26 PG (ref 27–31)
MCHC RBC AUTO-ENTMCNC: 31.9 G/DL (ref 33–36)
MCV RBC AUTO: 81.4 FL (ref 80–94)
MONOCYTES # BLD AUTO: 1.4 X10(3)/MCL (ref 0.1–1.3)
MONOCYTES NFR BLD AUTO: 15.7 %
NEUTROPHILS # BLD AUTO: 5.8 X10(3)/MCL (ref 2.1–9.2)
NEUTROPHILS NFR BLD AUTO: 65.2 %
PLATELET # BLD AUTO: 307 X10(3)/MCL (ref 130–400)
PMV BLD AUTO: 8.9 FL (ref 7.4–10.4)
POTASSIUM SERPL-SCNC: 4.5 MMOL/L (ref 3.5–5.1)
PROT SERPL-MCNC: 6 GM/DL (ref 5.8–7.6)
RBC # BLD AUTO: 3.93 X10(6)/MCL (ref 4.2–5.4)
SODIUM SERPL-SCNC: 137 MMOL/L (ref 136–145)
TSH SERPL-ACNC: 0.72 UIU/ML (ref 0.35–4.94)
WBC # SPEC AUTO: 8.91 X10(3)/MCL (ref 4.5–11.5)

## 2024-06-12 PROCEDURE — 80053 COMPREHEN METABOLIC PANEL: CPT

## 2024-06-12 PROCEDURE — 82533 TOTAL CORTISOL: CPT

## 2024-06-12 PROCEDURE — 36415 COLL VENOUS BLD VENIPUNCTURE: CPT

## 2024-06-12 PROCEDURE — 85025 COMPLETE CBC W/AUTO DIFF WBC: CPT

## 2024-06-12 PROCEDURE — 84443 ASSAY THYROID STIM HORMONE: CPT

## 2024-06-13 RX ORDER — DIPHENHYDRAMINE HYDROCHLORIDE 50 MG/ML
50 INJECTION INTRAMUSCULAR; INTRAVENOUS ONCE AS NEEDED
OUTPATIENT
Start: 2024-07-05

## 2024-06-13 RX ORDER — EPINEPHRINE 0.3 MG/.3ML
0.3 INJECTION SUBCUTANEOUS ONCE AS NEEDED
OUTPATIENT
Start: 2024-07-05

## 2024-06-13 RX ORDER — DIPHENHYDRAMINE HYDROCHLORIDE 50 MG/ML
50 INJECTION INTRAMUSCULAR; INTRAVENOUS ONCE AS NEEDED
Status: CANCELLED | OUTPATIENT
Start: 2024-06-14

## 2024-06-13 RX ORDER — HEPARIN 100 UNIT/ML
500 SYRINGE INTRAVENOUS
OUTPATIENT
Start: 2024-07-05

## 2024-06-13 RX ORDER — HEPARIN 100 UNIT/ML
500 SYRINGE INTRAVENOUS
Status: CANCELLED | OUTPATIENT
Start: 2024-06-14

## 2024-06-13 RX ORDER — EPINEPHRINE 0.3 MG/.3ML
0.3 INJECTION SUBCUTANEOUS ONCE AS NEEDED
Status: CANCELLED | OUTPATIENT
Start: 2024-06-14

## 2024-06-13 RX ORDER — SODIUM CHLORIDE 0.9 % (FLUSH) 0.9 %
10 SYRINGE (ML) INJECTION
OUTPATIENT
Start: 2024-07-05

## 2024-06-13 RX ORDER — SODIUM CHLORIDE 0.9 % (FLUSH) 0.9 %
10 SYRINGE (ML) INJECTION
Status: CANCELLED | OUTPATIENT
Start: 2024-06-14

## 2024-06-14 ENCOUNTER — INFUSION (OUTPATIENT)
Dept: INFUSION THERAPY | Facility: HOSPITAL | Age: 79
End: 2024-06-14
Attending: NURSE PRACTITIONER
Payer: MEDICARE

## 2024-06-14 VITALS — HEART RATE: 71 BPM | SYSTOLIC BLOOD PRESSURE: 134 MMHG | TEMPERATURE: 97 F | DIASTOLIC BLOOD PRESSURE: 56 MMHG

## 2024-06-14 DIAGNOSIS — C34.90 ADENOCARCINOMA OF LUNG, STAGE 4, UNSPECIFIED LATERALITY: Primary | ICD-10-CM

## 2024-06-14 PROCEDURE — 25000003 PHARM REV CODE 250: Performed by: NURSE PRACTITIONER

## 2024-06-14 PROCEDURE — 96413 CHEMO IV INFUSION 1 HR: CPT

## 2024-06-14 PROCEDURE — 63600175 PHARM REV CODE 636 W HCPCS: Mod: JZ,JG | Performed by: NURSE PRACTITIONER

## 2024-06-14 PROCEDURE — 96417 CHEMO IV INFUS EACH ADDL SEQ: CPT

## 2024-06-14 RX ORDER — HEPARIN 100 UNIT/ML
500 SYRINGE INTRAVENOUS
Status: DISCONTINUED | OUTPATIENT
Start: 2024-06-14 | End: 2024-06-14 | Stop reason: HOSPADM

## 2024-06-14 RX ORDER — DIPHENHYDRAMINE HYDROCHLORIDE 50 MG/ML
50 INJECTION INTRAMUSCULAR; INTRAVENOUS ONCE AS NEEDED
Status: DISCONTINUED | OUTPATIENT
Start: 2024-06-14 | End: 2024-06-14 | Stop reason: HOSPADM

## 2024-06-14 RX ORDER — SODIUM CHLORIDE 0.9 % (FLUSH) 0.9 %
10 SYRINGE (ML) INJECTION
Status: DISCONTINUED | OUTPATIENT
Start: 2024-06-14 | End: 2024-06-14 | Stop reason: HOSPADM

## 2024-06-14 RX ORDER — EPINEPHRINE 0.3 MG/.3ML
0.3 INJECTION SUBCUTANEOUS ONCE AS NEEDED
Status: DISCONTINUED | OUTPATIENT
Start: 2024-06-14 | End: 2024-06-14 | Stop reason: HOSPADM

## 2024-06-14 RX ADMIN — HEPARIN 500 UNITS: 100 SYRINGE at 11:06

## 2024-06-14 RX ADMIN — SODIUM CHLORIDE 360 MG: 9 INJECTION, SOLUTION INTRAVENOUS at 09:06

## 2024-06-14 RX ADMIN — SODIUM CHLORIDE 75.5 MG: 9 INJECTION, SOLUTION INTRAVENOUS at 10:06

## 2024-07-03 ENCOUNTER — LAB VISIT (OUTPATIENT)
Dept: LAB | Facility: HOSPITAL | Age: 79
End: 2024-07-03
Attending: NURSE PRACTITIONER
Payer: MEDICARE

## 2024-07-03 ENCOUNTER — OFFICE VISIT (OUTPATIENT)
Dept: HEMATOLOGY/ONCOLOGY | Facility: CLINIC | Age: 79
End: 2024-07-03
Payer: MEDICARE

## 2024-07-03 VITALS
SYSTOLIC BLOOD PRESSURE: 124 MMHG | DIASTOLIC BLOOD PRESSURE: 58 MMHG | TEMPERATURE: 98 F | OXYGEN SATURATION: 94 % | BODY MASS INDEX: 26.39 KG/M2 | WEIGHT: 143.44 LBS | RESPIRATION RATE: 14 BRPM | HEIGHT: 62 IN | HEART RATE: 64 BPM

## 2024-07-03 DIAGNOSIS — E03.2 HYPOTHYROIDISM DUE TO DRUGS: ICD-10-CM

## 2024-07-03 DIAGNOSIS — C34.90 ADENOCARCINOMA OF LUNG, UNSPECIFIED LATERALITY: Primary | ICD-10-CM

## 2024-07-03 DIAGNOSIS — C79.51 MALIGNANT NEOPLASM METASTATIC TO BONE: ICD-10-CM

## 2024-07-03 DIAGNOSIS — C34.90 ADENOCARCINOMA OF LUNG, UNSPECIFIED LATERALITY: ICD-10-CM

## 2024-07-03 DIAGNOSIS — C77.9 MALIGNANT NEOPLASM METASTATIC TO LYMPH NODES, UNSPECIFIED LYMPH NODE REGION: ICD-10-CM

## 2024-07-03 LAB
ALBUMIN SERPL-MCNC: 3 G/DL (ref 3.4–4.8)
ALBUMIN/GLOB SERPL: 1 RATIO (ref 1.1–2)
ALP SERPL-CCNC: 81 UNIT/L (ref 40–150)
ALT SERPL-CCNC: 12 UNIT/L (ref 0–55)
ANION GAP SERPL CALC-SCNC: 9 MEQ/L
AST SERPL-CCNC: 20 UNIT/L (ref 5–34)
BASOPHILS # BLD AUTO: 0.04 X10(3)/MCL
BASOPHILS NFR BLD AUTO: 0.4 %
BILIRUB SERPL-MCNC: 0.3 MG/DL
BUN SERPL-MCNC: 14.9 MG/DL (ref 9.8–20.1)
CALCIUM SERPL-MCNC: 9.7 MG/DL (ref 8.4–10.2)
CHLORIDE SERPL-SCNC: 97 MMOL/L (ref 98–107)
CO2 SERPL-SCNC: 30 MMOL/L (ref 23–31)
CORTIS SERPL-SCNC: 13 UG/DL
CREAT SERPL-MCNC: 0.82 MG/DL (ref 0.55–1.02)
CREAT/UREA NIT SERPL: 18
EOSINOPHIL # BLD AUTO: 0.23 X10(3)/MCL (ref 0–0.9)
EOSINOPHIL NFR BLD AUTO: 2.2 %
ERYTHROCYTE [DISTWIDTH] IN BLOOD BY AUTOMATED COUNT: 17.9 % (ref 11.5–17)
GFR SERPLBLD CREATININE-BSD FMLA CKD-EPI: >60 ML/MIN/1.73/M2
GLOBULIN SER-MCNC: 2.9 GM/DL (ref 2.4–3.5)
GLUCOSE SERPL-MCNC: 107 MG/DL (ref 82–115)
HCT VFR BLD AUTO: 32.9 % (ref 37–47)
HGB BLD-MCNC: 10.5 G/DL (ref 12–16)
IMM GRANULOCYTES # BLD AUTO: 0.02 X10(3)/MCL (ref 0–0.04)
IMM GRANULOCYTES NFR BLD AUTO: 0.2 %
LYMPHOCYTES # BLD AUTO: 1.47 X10(3)/MCL (ref 0.6–4.6)
LYMPHOCYTES NFR BLD AUTO: 14.1 %
MCH RBC QN AUTO: 26.2 PG (ref 27–31)
MCHC RBC AUTO-ENTMCNC: 31.9 G/DL (ref 33–36)
MCV RBC AUTO: 82 FL (ref 80–94)
MONOCYTES # BLD AUTO: 1.12 X10(3)/MCL (ref 0.1–1.3)
MONOCYTES NFR BLD AUTO: 10.8 %
NEUTROPHILS # BLD AUTO: 7.53 X10(3)/MCL (ref 2.1–9.2)
NEUTROPHILS NFR BLD AUTO: 72.3 %
PLATELET # BLD AUTO: 297 X10(3)/MCL (ref 130–400)
PMV BLD AUTO: 8.1 FL (ref 7.4–10.4)
POTASSIUM SERPL-SCNC: 4.2 MMOL/L (ref 3.5–5.1)
PROT SERPL-MCNC: 5.9 GM/DL (ref 5.8–7.6)
RBC # BLD AUTO: 4.01 X10(6)/MCL (ref 4.2–5.4)
SODIUM SERPL-SCNC: 136 MMOL/L (ref 136–145)
TSH SERPL-ACNC: 3.88 UIU/ML (ref 0.35–4.94)
WBC # BLD AUTO: 10.41 X10(3)/MCL (ref 4.5–11.5)

## 2024-07-03 PROCEDURE — 99214 OFFICE O/P EST MOD 30 MIN: CPT | Mod: PBBFAC | Performed by: NURSE PRACTITIONER

## 2024-07-03 PROCEDURE — 85025 COMPLETE CBC W/AUTO DIFF WBC: CPT

## 2024-07-03 PROCEDURE — 99215 OFFICE O/P EST HI 40 MIN: CPT | Mod: S$PBB,,, | Performed by: NURSE PRACTITIONER

## 2024-07-03 PROCEDURE — 84443 ASSAY THYROID STIM HORMONE: CPT

## 2024-07-03 PROCEDURE — 80053 COMPREHEN METABOLIC PANEL: CPT

## 2024-07-03 PROCEDURE — 99999 PR PBB SHADOW E&M-EST. PATIENT-LVL IV: CPT | Mod: PBBFAC,,, | Performed by: NURSE PRACTITIONER

## 2024-07-03 PROCEDURE — 36415 COLL VENOUS BLD VENIPUNCTURE: CPT

## 2024-07-03 PROCEDURE — 82533 TOTAL CORTISOL: CPT

## 2024-07-03 NOTE — PROGRESS NOTES
Subjective:       Patient ID: Pratibha Patel is a 79 y.o. female.    Chief Complaint: Follow-up (Patient has no concerns today)      PCP: Graham Nguyen NP  Cardiologist: Dr. Danny Sánchez  Referring Physician: Dr. Anthony Hutson  Endocrine: Dr. Werner  Radiation oncology: Dr. Boss in the past, now Dr. Mortensen.  ENT: Dr. 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 ? metastatic findings could not be biopsied. TTF negative/CK 7 +.    Diagnosed June 2014 EGFR mutation negative/ALK gene rearrangement negative. 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 Patient has reportedly been referred to ENT for resection and lymph node dissection (per patient report).   Subsequent open biopsy done by Dr. Rosales on 7/29/2019 confirmed the same.  Patient was unable to undergo definitive surgical resection for oligo metastatic disease.  Hoarseness and swallowing difficulties secondary to #2  Biopsy of the left lung lesion showed recurrent non-small cell lung cancer, Caris was sent and showed positivity for PD-L1, 2%.  No other targetable or actionable mutations present.    Current Treatment:   Combination chemo immunotherapy with carboplatin, pemetrexed, nivolumab, ipilimumab based off of checkmate 9LA     Treatment History:  RT with weekly Carbo/Taxol started 7/2/14--Completed August 2014  Single agent 'Maintenance' Avastin q 3 weeks.  Started 4/9/15--last given September 21, 2016  Radiation + weekly Carbo/Taxol  at [facility] ----9/11/19-10/22/19  Patient underwent radiation from 12/17/2020 through 12/21/2020.  Radiation to the left and right lung per Dr. Robert Mortensen.    HPI:   Please see Oncology history in the diagnosis of adenocarcinoma of the lung, stage IV on the problem list for full detail.  Patient originally seen in 2014 with unintentional weight loss, shortness of breath, nonproductive cough.  Patient had imaging findings suggestive of lung cancer, bronchoscopy done in June of 2014 revealed moderately differentiated adenocarcinoma of the lung.  She originally underwent chemo radiation from July 2014 through August 2014.  She was placed on maintenance Avastin from April 2015 through September 2016. Patient then underwent a thyroid biopsy in June of 2019 that showed metastatic carcinoma favored to be adenocarcinoma lung primary.  She underwent surgical resection in July 2019, however full surgical dissection was not able to be done and the patient underwent open biopsy of right thyroid gland.  Pathology revealed poorly differentiated non-small cell carcinoma consistent with metastatic lung cancer.  NGS panel done at that time unremarkable.  She started chemoradiation with carbo Taxol in September 2019, completed in October 2019. Patient was doing well, imaging in October 2020 revealed an abnormality in the left lung, CT-guided lung biopsy on 11/03/2020 showed recurrent non-small cell lung cancer, Caris revealed PDL1 positivity.  She underwent SBRT from 12/17/2020 through 12/21/2020.  Patient then placed on surveillance imaging, most recently done on 07/05/2022 showing worsening disease in the right lung with a previous lesion that measured 0.6 cm and had an SUV of 1.0 now measuring 1.5 cm and an SUV of 8.4.  This was treated with SBRT in August of 2022.  PET/CT scan done on 11/8/2022 showed mild increased radiotracer activity within the left apical consolidation, SUV 5.5.  There was a newly hypermetabolic subcentimeter  subcarinal lymph node.  A 1 cm right lower lobe nodule was smaller, he right upper lobe nodule was slightly larger measuring 6 mm.  No sites of FDG avid metastatic disease in the neck, abdomen, or pelvis. Bronchoscopy done on 11/16/2022, pathology revealed malignant cells consistent with adenocarcinoma.  Planning on chemo immunotherapy with carboplatin, pemetrexed, nivolumab, ipilimumab based off of checkmate 9LA.  PET/CT on 07/25/2023 showed infectious or inflammatory changes in the right lower lobe with no other findings to suggest new or worsening disease.    PET/CT scan done on 10/12/2023 showed mixed response to therapy with decrease right hilar and left upper lobe consolidation uptake.  Subcarinal lymph node demonstrates significant increased uptake compared to the prior study.  Stable subcentimeter right upper lobe nodule demonstrates slight increased uptake compared to prior study    PET/CT scan done on 01/18/2024 showed mixed response to therapy with decrease in size of the right upper lobe nodule and maximum SUV of subcarinal lymph node.  There was right hilar uptake that was increased, however there was also patchy airspace opacities that could be infectious or inflammatory.    Most recent PET scan done on 04/02/2024 showed mixed therapeutic response with increased size and hypermetabolic activity of a posterior right apical nodule but improved thoracic adenopathy.  There were no findings to suggest new/progressed FDG avid metastasis to the neck, abdomen, or pelvis.    CT of the chest on 05/21/2024 showed overall stable disease with tiny centrilobular nodules throughout both lungs, similar to prior CTA.  Stable 0.9 cm nodule in the apical right upper lobe.    Interval History:   Patient presents to clinic for a treatment visit.  She states that she is feeling well overall and she denies any new complaints. Her breathing is at her baseline, no new cough.  She denies any pain, bowel difficulties.  She is  eating and drinking well.    Past Medical History:   Diagnosis Date    Adenocarcinoma of lung     Anemia     Anxiety     Arthritis     COPD (chronic obstructive pulmonary disease)     Depression     HLD (hyperlipidemia)     Hypertension     Lung cancer     Secondary malignant neoplasm of lymph nodes     Secondary malignant neoplasm of right lung     Thyroid disease     lymph nodes malignant      Past Surgical History:   Procedure Laterality Date    BLADDER SURGERY      CHOLECYSTECTOMY      COLONOSCOPY  2018    ENDOBRONCHIAL ULTRASOUND N/A 11/16/2022    Procedure: ENDOBRONCHIAL ULTRASOUND (EBUS);  Surgeon: Anthony Hutson MD;  Location: [facility];  Service: Pulmonary;  Laterality: N/A;    HYSTERECTOMY      KNEE SURGERY Bilateral     replaced knees    PARTIAL HIP ARTHROPLASTY Bilateral      Social History     Socioeconomic History    Marital status:    Tobacco Use    Smoking status: Every Day     Current packs/day: 0.25     Types: Cigarettes    Smokeless tobacco: Never    Tobacco comments:     Patient states she is trying to quit smoking.   Substance and Sexual Activity    Alcohol use: Not Currently    Drug use: Never     Social Determinants of Health     Financial Resource Strain: Unknown (3/1/2022)    Received from [facility] Subsidiaries and Affiliates, [facility] Its Subsidiaries and Affiliates    Overall Financial Resource Strain (CARDIA)     Difficulty of Paying Living Expenses: Patient declined   Food Insecurity: Unknown (3/1/2022)    Received from [facility] Subsidiaries and Affiliates, [facility] Its Subsidiaries and Affiliates    Hunger Vital Sign     Worried About Running Out of Food in the Last Year: Patient declined     Ran Out of Food in the Last Year: Patient declined   Transportation Needs: Unknown  (3/1/2022)    [facility], [facility]    PRAPARE - Transportation     Lack of Transportation (Medical): Patient declined     Lack of Transportation (Non-Medical): Patient declined   Housing Stability: Unknown (3/1/2022)    Received from [facility], [facility]    Housing Stability Vital Sign     Unable to Pay for Housing in the Last Year: Patient refused     Number of Places Lived in the Last Year: 1      Family History   Problem Relation Name Age of Onset    Lung cancer Mother      Lung cancer Brother        Review of patient's allergies indicates:  No Known Allergies   Review of Systems   Constitutional:  Negative for chills, diaphoresis, fatigue, fever and unexpected weight change.   HENT:  Negative for nasal congestion, mouth sores, sinus pressure/congestion and sore throat.    Eyes:  Negative for pain and visual disturbance.   Respiratory:  Negative for cough, chest tightness and shortness of breath.    Cardiovascular:  Negative for chest pain, palpitations and leg swelling.   Gastrointestinal:  Negative for abdominal distention, abdominal pain, blood in stool, constipation and diarrhea.   Genitourinary:  Negative for dysuria, frequency and hematuria.   Musculoskeletal:  Negative for arthralgias and back pain.   Integumentary:  Negative for rash.   Neurological:  Negative for dizziness, weakness, numbness and headaches.   Hematological:  Negative for adenopathy.   Psychiatric/Behavioral:  Negative for confusion.          Objective:      Physical Exam  Vitals reviewed.   Constitutional:       General: She is not in acute distress.     Appearance: Normal appearance.   HENT:      Head: Normocephalic and  atraumatic.      Nose: Nose normal.      Mouth/Throat:      Mouth: Mucous membranes are moist.   Eyes:      Extraocular Movements: Extraocular movements intact.      Conjunctiva/sclera: Conjunctivae normal.   Neck:      Comments: Radiation changes to the left supraclavicular area  Cardiovascular:      Rate and Rhythm: Normal rate and regular rhythm.      Pulses: Normal pulses.      Heart sounds: Normal heart sounds.   Pulmonary:      Effort: Pulmonary effort is normal.      Breath sounds: Examination of the right-lower field reveals decreased breath sounds. Examination of the left-lower field reveals decreased breath sounds. Decreased breath sounds present.   Musculoskeletal:         General: No swelling. Normal range of motion.      Cervical back: Normal range of motion and neck supple.      Right lower leg: No edema.      Left lower leg: No edema.   Skin:     General: Skin is warm and dry.   Neurological:      General: No focal deficit present.      Mental Status: She is alert and oriented to person, place, and time. Mental status is at baseline.   Psychiatric:         Mood and Affect: Mood normal.         Behavior: Behavior normal.         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 ? metastatic findings could not be biopsied.    TTF negative/CK 7 +.    Diagnosed June 2014.  EGFR mutation negative/ALK gene rearrangement negative.  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 Patient has reportedly been referred to ENT for resection and lymph node  dissection (per patient report). Subsequent open biopsy done by Dr. Rosales on 7/29/2019 confirmed the same.  Patient was unable to undergo definitive surgical resection for oligo metastatic disease.  Treatment induced anemia  Hoarseness .      Plan:          Patient's biopsy did return as recurrent non-small cell lung cancer in the left lung.  I feel that the right lung will be similar pathology.  She completed radiation with Dr. Mortensen on 12/24/2020.     Caris did reveal that PD-L1 was 2% positive suggesting benefit from pembrolizumab or nivolumab/ipilimumab.      Her PET/CT scan does show a new hypermetabolic subcentimeter subcarinal lymph node and increase in a left apical consolidation.  The left apical consolidation is close to the aorta and I do not think would be amenable to biopsy, however this subcarinal lymph node may be amenable to bronchoscopy.      Bronchoscopy done on 11/16/2022 showed metastatic adenocarcinoma.  She is no longer a candidate for full dose SBRT.  We started with carboplatin, pemetrexed, nivolumab, and ipilimumab based on the CHECKMATE 9LA study.  Cycle 1 day 1 given 12/20/2022.     Follow up with radiation oncology as scheduled, Dr. Mortensen.    Continue levothyroxine to 88mcg daily.     PET/CT scan done on 01/18/2024 showed mixed response to therapy.  Overall, the patient was asymptomatic from a cancer standpoint.  No evidence of overt progression.    Status post treatment for atypical pneumonia inpatient.  No evidence of immunotherapy induced lung changes on most recent chest imaging.  She will have follow-up with pulmonology.    Started on PPI    Repeat PET scan done on 04/02/2024 showed increased size and a right apical nodule.  There was a little bit of hypermetabolic activity associated with it.  The report read as a mixed response.      CT of the chest on 05/21/2024 showed overall stable disease with tiny centrilobular nodules throughout both lungs, similar to prior CTA.  Stable 0.9  cm nodule in the apical right upper lobe.      Return to clinic in 3 weeks, repeat scans prior to next visit      KORI MartinezP-C

## 2024-07-05 ENCOUNTER — INFUSION (OUTPATIENT)
Dept: INFUSION THERAPY | Facility: HOSPITAL | Age: 79
End: 2024-07-05
Attending: NURSE PRACTITIONER
Payer: MEDICARE

## 2024-07-05 VITALS
HEART RATE: 65 BPM | BODY MASS INDEX: 26.5 KG/M2 | WEIGHT: 144 LBS | DIASTOLIC BLOOD PRESSURE: 57 MMHG | RESPIRATION RATE: 14 BRPM | TEMPERATURE: 98 F | HEIGHT: 62 IN | SYSTOLIC BLOOD PRESSURE: 127 MMHG | OXYGEN SATURATION: 92 %

## 2024-07-05 DIAGNOSIS — C34.90 ADENOCARCINOMA OF LUNG, STAGE 4, UNSPECIFIED LATERALITY: Primary | ICD-10-CM

## 2024-07-05 PROCEDURE — 63600175 PHARM REV CODE 636 W HCPCS: Mod: JZ,JG | Performed by: NURSE PRACTITIONER

## 2024-07-05 PROCEDURE — 25000003 PHARM REV CODE 250: Performed by: NURSE PRACTITIONER

## 2024-07-05 PROCEDURE — 96413 CHEMO IV INFUSION 1 HR: CPT

## 2024-07-05 RX ORDER — SODIUM CHLORIDE 0.9 % (FLUSH) 0.9 %
10 SYRINGE (ML) INJECTION
Status: DISCONTINUED | OUTPATIENT
Start: 2024-07-05 | End: 2024-07-05 | Stop reason: HOSPADM

## 2024-07-05 RX ORDER — HEPARIN 100 UNIT/ML
500 SYRINGE INTRAVENOUS
Status: DISCONTINUED | OUTPATIENT
Start: 2024-07-05 | End: 2024-07-05 | Stop reason: HOSPADM

## 2024-07-05 RX ORDER — DIPHENHYDRAMINE HYDROCHLORIDE 50 MG/ML
50 INJECTION INTRAMUSCULAR; INTRAVENOUS ONCE AS NEEDED
Status: DISCONTINUED | OUTPATIENT
Start: 2024-07-05 | End: 2024-07-05 | Stop reason: HOSPADM

## 2024-07-05 RX ORDER — EPINEPHRINE 0.3 MG/.3ML
0.3 INJECTION SUBCUTANEOUS ONCE AS NEEDED
Status: DISCONTINUED | OUTPATIENT
Start: 2024-07-05 | End: 2024-07-05 | Stop reason: HOSPADM

## 2024-07-05 RX ADMIN — SODIUM CHLORIDE 360 MG: 9 INJECTION, SOLUTION INTRAVENOUS at 10:07

## 2024-07-05 RX ADMIN — SODIUM CHLORIDE: 9 INJECTION, SOLUTION INTRAVENOUS at 10:07

## 2024-07-05 NOTE — PLAN OF CARE
Over the last 2 weeks, how often have you been bothered by the following problems?          PHQ2 Score:  0  1. Little interest or pleasure in doing things?:  0  2. Feeling down, depressed, or hopeless?:  0         DEPRESSION ASSESSMENT/PLAN:  PHQ 9 score is 0.   Pt received opdivo. Pt tolerated well. Pt discharged in stable condition

## 2024-07-23 ENCOUNTER — HOSPITAL ENCOUNTER (OUTPATIENT)
Dept: RADIOLOGY | Facility: HOSPITAL | Age: 79
Discharge: HOME | End: 2024-07-23
Attending: NURSE PRACTITIONER
Payer: MEDICARE

## 2024-07-23 DIAGNOSIS — C79.51 MALIGNANT NEOPLASM METASTATIC TO BONE: ICD-10-CM

## 2024-07-23 DIAGNOSIS — C34.90 ADENOCARCINOMA OF LUNG, UNSPECIFIED LATERALITY: ICD-10-CM

## 2024-07-23 DIAGNOSIS — C77.9 MALIGNANT NEOPLASM METASTATIC TO LYMPH NODES, UNSPECIFIED LYMPH NODE REGION: ICD-10-CM

## 2024-07-23 DIAGNOSIS — E03.2 HYPOTHYROIDISM DUE TO DRUGS: ICD-10-CM

## 2024-07-23 PROCEDURE — A9552 F18 FDG: HCPCS | Performed by: NURSE PRACTITIONER

## 2024-07-23 PROCEDURE — 78815 PET IMAGE W/CT SKULL-THIGH: CPT | Mod: TC

## 2024-07-23 RX ORDER — FLUDEOXYGLUCOSE F18 500 MCI/ML
10 INJECTION INTRAVENOUS
Status: COMPLETED | OUTPATIENT
Start: 2024-07-23 | End: 2024-07-23

## 2024-07-23 RX ADMIN — FLUDEOXYGLUCOSE F-18 10.3 MILLICURIE: 500 INJECTION INTRAVENOUS at 10:07

## 2024-07-24 DIAGNOSIS — C79.51 MALIGNANT NEOPLASM METASTATIC TO BONE: Primary | ICD-10-CM

## 2024-07-24 DIAGNOSIS — C34.90 ADENOCARCINOMA OF LUNG, UNSPECIFIED LATERALITY: ICD-10-CM

## 2024-07-24 DIAGNOSIS — C77.9 MALIGNANT NEOPLASM METASTATIC TO LYMPH NODES, UNSPECIFIED LYMPH NODE REGION: ICD-10-CM

## 2024-07-25 ENCOUNTER — HOSPITAL ENCOUNTER (OUTPATIENT)
Dept: RADIOLOGY | Facility: HOSPITAL | Age: 79
Discharge: HOME | End: 2024-07-25
Attending: INTERNAL MEDICINE
Payer: MEDICARE

## 2024-07-25 DIAGNOSIS — C34.90 ADENOCARCINOMA OF LUNG, UNSPECIFIED LATERALITY: ICD-10-CM

## 2024-07-25 DIAGNOSIS — C79.51 MALIGNANT NEOPLASM METASTATIC TO BONE: Primary | ICD-10-CM

## 2024-07-25 DIAGNOSIS — E03.2 HYPOTHYROIDISM DUE TO DRUGS: ICD-10-CM

## 2024-07-25 DIAGNOSIS — C77.9 MALIGNANT NEOPLASM METASTATIC TO LYMPH NODES, UNSPECIFIED LYMPH NODE REGION: ICD-10-CM

## 2024-07-25 DIAGNOSIS — C79.51 MALIGNANT NEOPLASM METASTATIC TO BONE: ICD-10-CM

## 2024-07-25 PROCEDURE — A9552 F18 FDG: HCPCS | Performed by: INTERNAL MEDICINE

## 2024-07-25 PROCEDURE — 78815 PET IMAGE W/CT SKULL-THIGH: CPT | Mod: TC

## 2024-07-25 RX ORDER — FLUDEOXYGLUCOSE F18 500 MCI/ML
10 INJECTION INTRAVENOUS
Status: COMPLETED | OUTPATIENT
Start: 2024-07-25 | End: 2024-07-25

## 2024-07-25 RX ADMIN — FLUDEOXYGLUCOSE F-18 10.5 MILLICURIE: 500 INJECTION INTRAVENOUS at 12:07

## 2024-07-26 LAB — POCT GLUCOSE: 95 MG/DL (ref 70–110)

## 2024-07-31 ENCOUNTER — LAB VISIT (OUTPATIENT)
Dept: LAB | Facility: HOSPITAL | Age: 79
End: 2024-07-31
Attending: NURSE PRACTITIONER
Payer: MEDICARE

## 2024-07-31 DIAGNOSIS — C77.9 MALIGNANT NEOPLASM METASTATIC TO LYMPH NODES, UNSPECIFIED LYMPH NODE REGION: ICD-10-CM

## 2024-07-31 DIAGNOSIS — C79.51 MALIGNANT NEOPLASM METASTATIC TO BONE: ICD-10-CM

## 2024-07-31 DIAGNOSIS — R06.02 SOB (SHORTNESS OF BREATH): ICD-10-CM

## 2024-07-31 DIAGNOSIS — E03.2 HYPOTHYROIDISM DUE TO DRUGS: ICD-10-CM

## 2024-07-31 DIAGNOSIS — C34.90 ADENOCARCINOMA OF LUNG, STAGE 4, UNSPECIFIED LATERALITY: ICD-10-CM

## 2024-07-31 LAB
ALBUMIN SERPL-MCNC: 3.1 G/DL (ref 3.4–4.8)
ALBUMIN/GLOB SERPL: 1.2 RATIO (ref 1.1–2)
ALP SERPL-CCNC: 69 UNIT/L (ref 40–150)
ALT SERPL-CCNC: 14 UNIT/L (ref 0–55)
ANION GAP SERPL CALC-SCNC: 5 MEQ/L
AST SERPL-CCNC: 23 UNIT/L (ref 5–34)
BASOPHILS # BLD AUTO: 0.05 X10(3)/MCL
BASOPHILS NFR BLD AUTO: 0.7 %
BILIRUB SERPL-MCNC: 0.4 MG/DL
BUN SERPL-MCNC: 14.6 MG/DL (ref 9.8–20.1)
CALCIUM SERPL-MCNC: 9.5 MG/DL (ref 8.4–10.2)
CHLORIDE SERPL-SCNC: 101 MMOL/L (ref 98–107)
CO2 SERPL-SCNC: 32 MMOL/L (ref 23–31)
CORTIS SERPL-SCNC: 9.7 UG/DL
CREAT SERPL-MCNC: 0.85 MG/DL (ref 0.55–1.02)
CREAT/UREA NIT SERPL: 17
EOSINOPHIL # BLD AUTO: 0.22 X10(3)/MCL (ref 0–0.9)
EOSINOPHIL NFR BLD AUTO: 3.1 %
ERYTHROCYTE [DISTWIDTH] IN BLOOD BY AUTOMATED COUNT: 17.9 % (ref 11.5–17)
GFR SERPLBLD CREATININE-BSD FMLA CKD-EPI: >60 ML/MIN/1.73/M2
GLOBULIN SER-MCNC: 2.6 GM/DL (ref 2.4–3.5)
GLUCOSE SERPL-MCNC: 93 MG/DL (ref 82–115)
HCT VFR BLD AUTO: 31.4 % (ref 37–47)
HGB BLD-MCNC: 10.1 G/DL (ref 12–16)
IMM GRANULOCYTES # BLD AUTO: 0.02 X10(3)/MCL (ref 0–0.04)
IMM GRANULOCYTES NFR BLD AUTO: 0.3 %
LYMPHOCYTES # BLD AUTO: 1.44 X10(3)/MCL (ref 0.6–4.6)
LYMPHOCYTES NFR BLD AUTO: 20.5 %
MCH RBC QN AUTO: 26.7 PG (ref 27–31)
MCHC RBC AUTO-ENTMCNC: 32.2 G/DL (ref 33–36)
MCV RBC AUTO: 83.1 FL (ref 80–94)
MONOCYTES # BLD AUTO: 0.83 X10(3)/MCL (ref 0.1–1.3)
MONOCYTES NFR BLD AUTO: 11.8 %
NEUTROPHILS # BLD AUTO: 4.47 X10(3)/MCL (ref 2.1–9.2)
NEUTROPHILS NFR BLD AUTO: 63.6 %
PLATELET # BLD AUTO: 228 X10(3)/MCL (ref 130–400)
PMV BLD AUTO: 8.5 FL (ref 7.4–10.4)
POTASSIUM SERPL-SCNC: 4.5 MMOL/L (ref 3.5–5.1)
PROT SERPL-MCNC: 5.7 GM/DL (ref 5.8–7.6)
RBC # BLD AUTO: 3.78 X10(6)/MCL (ref 4.2–5.4)
SODIUM SERPL-SCNC: 138 MMOL/L (ref 136–145)
TSH SERPL-ACNC: 6.88 UIU/ML (ref 0.35–4.94)
WBC # BLD AUTO: 7.03 X10(3)/MCL (ref 4.5–11.5)

## 2024-07-31 PROCEDURE — 85025 COMPLETE CBC W/AUTO DIFF WBC: CPT

## 2024-07-31 PROCEDURE — 36415 COLL VENOUS BLD VENIPUNCTURE: CPT

## 2024-07-31 PROCEDURE — 80053 COMPREHEN METABOLIC PANEL: CPT

## 2024-07-31 PROCEDURE — 82533 TOTAL CORTISOL: CPT

## 2024-07-31 PROCEDURE — 84443 ASSAY THYROID STIM HORMONE: CPT

## 2024-08-01 RX ORDER — DIPHENHYDRAMINE HYDROCHLORIDE 50 MG/ML
50 INJECTION INTRAMUSCULAR; INTRAVENOUS ONCE AS NEEDED
OUTPATIENT
Start: 2024-08-23

## 2024-08-01 RX ORDER — EPINEPHRINE 0.3 MG/.3ML
0.3 INJECTION SUBCUTANEOUS ONCE AS NEEDED
Status: CANCELLED | OUTPATIENT
Start: 2024-08-02

## 2024-08-01 RX ORDER — HEPARIN 100 UNIT/ML
500 SYRINGE INTRAVENOUS
OUTPATIENT
Start: 2024-08-23

## 2024-08-01 RX ORDER — DIPHENHYDRAMINE HYDROCHLORIDE 50 MG/ML
50 INJECTION INTRAMUSCULAR; INTRAVENOUS ONCE AS NEEDED
Status: CANCELLED | OUTPATIENT
Start: 2024-08-02

## 2024-08-01 RX ORDER — EPINEPHRINE 0.3 MG/.3ML
0.3 INJECTION SUBCUTANEOUS ONCE AS NEEDED
OUTPATIENT
Start: 2024-08-23

## 2024-08-01 RX ORDER — SODIUM CHLORIDE 0.9 % (FLUSH) 0.9 %
10 SYRINGE (ML) INJECTION
OUTPATIENT
Start: 2024-08-23

## 2024-08-01 RX ORDER — SODIUM CHLORIDE 0.9 % (FLUSH) 0.9 %
10 SYRINGE (ML) INJECTION
Status: CANCELLED | OUTPATIENT
Start: 2024-08-02

## 2024-08-01 RX ORDER — HEPARIN 100 UNIT/ML
500 SYRINGE INTRAVENOUS
Status: CANCELLED | OUTPATIENT
Start: 2024-08-02

## 2024-08-02 ENCOUNTER — INFUSION (OUTPATIENT)
Dept: INFUSION THERAPY | Facility: HOSPITAL | Age: 79
End: 2024-08-02
Attending: NURSE PRACTITIONER
Payer: MEDICARE

## 2024-08-02 VITALS
SYSTOLIC BLOOD PRESSURE: 125 MMHG | DIASTOLIC BLOOD PRESSURE: 61 MMHG | HEIGHT: 62 IN | RESPIRATION RATE: 18 BRPM | WEIGHT: 141 LBS | BODY MASS INDEX: 25.95 KG/M2 | HEART RATE: 66 BPM

## 2024-08-02 DIAGNOSIS — C34.90 ADENOCARCINOMA OF LUNG, STAGE 4, UNSPECIFIED LATERALITY: Primary | ICD-10-CM

## 2024-08-02 PROCEDURE — 96417 CHEMO IV INFUS EACH ADDL SEQ: CPT

## 2024-08-02 PROCEDURE — 25000003 PHARM REV CODE 250: Performed by: INTERNAL MEDICINE

## 2024-08-02 PROCEDURE — 96413 CHEMO IV INFUSION 1 HR: CPT

## 2024-08-02 RX ORDER — HEPARIN 100 UNIT/ML
500 SYRINGE INTRAVENOUS
Status: DISCONTINUED | OUTPATIENT
Start: 2024-08-02 | End: 2024-08-02 | Stop reason: HOSPADM

## 2024-08-02 RX ORDER — SODIUM CHLORIDE 0.9 % (FLUSH) 0.9 %
10 SYRINGE (ML) INJECTION
Status: DISCONTINUED | OUTPATIENT
Start: 2024-08-02 | End: 2024-08-02 | Stop reason: HOSPADM

## 2024-08-02 RX ORDER — DIPHENHYDRAMINE HYDROCHLORIDE 50 MG/ML
50 INJECTION INTRAMUSCULAR; INTRAVENOUS ONCE AS NEEDED
Status: DISCONTINUED | OUTPATIENT
Start: 2024-08-02 | End: 2024-08-02 | Stop reason: HOSPADM

## 2024-08-02 RX ORDER — EPINEPHRINE 0.3 MG/.3ML
0.3 INJECTION SUBCUTANEOUS ONCE AS NEEDED
Status: DISCONTINUED | OUTPATIENT
Start: 2024-08-02 | End: 2024-08-02 | Stop reason: HOSPADM

## 2024-08-02 RX ADMIN — SODIUM CHLORIDE 360 MG: 9 INJECTION, SOLUTION INTRAVENOUS at 09:08

## 2024-08-02 RX ADMIN — SODIUM CHLORIDE 65.5 MG: 9 INJECTION, SOLUTION INTRAVENOUS at 10:08

## 2024-08-20 NOTE — PROGRESS NOTES
Subjective:       Patient ID: Pratibha Patel is a 79 y.o. female.    Chief Complaint: Follow-up (Patient has no concerns today)      PCP: Graham Nguyen NP  Cardiologist: Dr. Danny Sánchez  Referring Physician: Dr. Anthony Hutson  Endocrine: Dr. Werner  Radiation oncology: Dr. Boss in the past, now Dr. Mortensen.  ENT: Dr. 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 ? metastatic findings could not be biopsied. TTF negative/CK 7 +.    Diagnosed June 2014 EGFR mutation negative/ALK gene rearrangement negative. 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 Patient has reportedly been referred to ENT for resection and lymph node dissection (per patient report).   Subsequent open biopsy done by Dr. Rosales on 7/29/2019 confirmed the same.  Patient was unable to undergo definitive surgical resection for oligo metastatic disease.  Hoarseness and swallowing difficulties secondary to #2  Biopsy of the left lung lesion showed recurrent non-small cell lung cancer, Caris was sent and showed positivity for PD-L1, 2%.  No other targetable or actionable mutations present.    Current Treatment:   Combination chemo immunotherapy with carboplatin, pemetrexed, nivolumab, ipilimumab based off of checkmate 9LA     Treatment History:  RT with weekly Carbo/Taxol started 7/2/14--Completed August 2014  Single agent 'Maintenance' Avastin q 3 weeks.  Started 4/9/15--last given September 21, 2016  Radiation + weekly Carbo/Taxol  at [facility] ----9/11/19-10/22/19  Patient underwent radiation from 12/17/2020 through 12/21/2020.  Radiation to the left and right lung per Dr. Robert Mortensen.    HPI:   Please see Oncology history in the diagnosis of adenocarcinoma of the lung, stage IV on the problem list for full detail.  Patient originally seen in 2014 with unintentional weight loss, shortness of breath, nonproductive cough.  Patient had imaging findings suggestive of lung cancer, bronchoscopy done in June of 2014 revealed moderately differentiated adenocarcinoma of the lung.  She originally underwent chemo radiation from July 2014 through August 2014.  She was placed on maintenance Avastin from April 2015 through September 2016. Patient then underwent a thyroid biopsy in June of 2019 that showed metastatic carcinoma favored to be adenocarcinoma lung primary.  She underwent surgical resection in July 2019, however full surgical dissection was not able to be done and the patient underwent open biopsy of right thyroid gland.  Pathology revealed poorly differentiated non-small cell carcinoma consistent with metastatic lung cancer.  NGS panel done at that time unremarkable.  She started chemoradiation with carbo Taxol in September 2019, completed in October 2019. Patient was doing well, imaging in October 2020 revealed an abnormality in the left lung, CT-guided lung biopsy on 11/03/2020 showed recurrent non-small cell lung cancer, Caris revealed PDL1 positivity.  She underwent SBRT from 12/17/2020 through 12/21/2020.  Patient then placed on surveillance imaging, most recently done on 07/05/2022 showing worsening disease in the right lung with a previous lesion that measured 0.6 cm and had an SUV of 1.0 now measuring 1.5 cm and an SUV of 8.4.  This was treated with SBRT in August of 2022.  PET/CT scan done on 11/8/2022 showed mild increased radiotracer activity within the left apical consolidation, SUV 5.5.  There was a newly hypermetabolic subcentimeter  subcarinal lymph node.  A 1 cm right lower lobe nodule was smaller, he right upper lobe nodule was slightly larger measuring 6 mm.  No sites of FDG avid metastatic disease in the neck, abdomen, or pelvis. Bronchoscopy done on 11/16/2022, pathology revealed malignant cells consistent with adenocarcinoma.  Planning on chemo immunotherapy with carboplatin, pemetrexed, nivolumab, ipilimumab based off of checkmate 9LA.  PET/CT on 07/25/2023 showed infectious or inflammatory changes in the right lower lobe with no other findings to suggest new or worsening disease.    PET/CT scan done on 10/12/2023 showed mixed response to therapy with decrease right hilar and left upper lobe consolidation uptake.  Subcarinal lymph node demonstrates significant increased uptake compared to the prior study.  Stable subcentimeter right upper lobe nodule demonstrates slight increased uptake compared to prior study    PET/CT scan done on 01/18/2024 showed mixed response to therapy with decrease in size of the right upper lobe nodule and maximum SUV of subcarinal lymph node.  There was right hilar uptake that was increased, however there was also patchy airspace opacities that could be infectious or inflammatory.    CT of the chest on 05/21/2024 showed overall stable disease with tiny centrilobular nodules throughout both lungs, similar to prior CTA.  Stable 0.9 cm nodule in the apical right upper lobe.    Most recent PET/CT scan done on 07/25/2024 showed unchanged mild FDG avid nodule in the posterior right lung apex, unchanged mild FDG avid left apical consolidative opacity.  There was a new paramediastinal consolidative opacity in the anterior right upper lobe and left upper lobe with mild FDG avidity that was symmetric and suggestive of a radiation port, malignancy felt less likely given symmetry.  There was indeterminate patchy consolidative opacity in the subpleural right lower lobe with mild FDG avidity slightly increased in prominence  from prior PET/CT.  There was similar mediastinal and hilar lymph nodes.      Interval History:   Patient presents to clinic for a treatment visit.  She states that she is feeling well overall and she denies any new complaints. Her breathing is at her baseline, no new cough.  She denies any pain, bowel difficulties.  She is eating and drinking well.    Past Medical History:   Diagnosis Date    Adenocarcinoma of lung     Anemia     Anxiety     Arthritis     COPD (chronic obstructive pulmonary disease)     Depression     HLD (hyperlipidemia)     Hypertension     Lung cancer     Secondary malignant neoplasm of lymph nodes     Secondary malignant neoplasm of right lung     Thyroid disease     lymph nodes malignant      Past Surgical History:   Procedure Laterality Date    BLADDER SURGERY      CHOLECYSTECTOMY      COLONOSCOPY  2018    ENDOBRONCHIAL ULTRASOUND N/A 11/16/2022    Procedure: ENDOBRONCHIAL ULTRASOUND (EBUS);  Surgeon: Anthony Hutson MD;  Location: [facility] BRONCHOSCOPY LAB;  Service: Pulmonary;  Laterality: N/A;    HYSTERECTOMY      KNEE SURGERY Bilateral     replaced knees    PARTIAL HIP ARTHROPLASTY Bilateral      Social History     Socioeconomic History    Marital status:    Tobacco Use    Smoking status: Every Day     Current packs/day: 0.25     Average packs/day: 0.3 packs/day for 65.6 years (16.4 ttl pk-yrs)     Types: Cigarettes     Start date: 1959    Smokeless tobacco: Never    Tobacco comments:     Patient states she is trying to quit smoking.   Substance and Sexual Activity    Alcohol use: Not Currently    Drug use: Never     Social Determinants of Health     Financial Resource Strain: Unknown (3/1/2022)    Received from [facility] and Its Subsidiaries and Affiliates, [facility] and Its Subsidiaries and Affiliates    Overall Financial Resource Strain (CARDIA)     Difficulty of Paying Living Expenses: Patient  declined   Food Insecurity: Unknown (3/1/2022)    [facility], [facility]    Hunger Vital Sign     Worried About Running Out of Food in the Last Year: Patient declined     Ran Out of Food in the Last Year: Patient declined   Transportation Needs: Unknown (3/1/2022)    [facility], [facility]    PRAPARE - Transportation     Lack of Transportation (Medical): Patient declined     Lack of Transportation (Non-Medical): Patient declined   Housing Stability: Unknown (3/1/2022)    [facility], [facility]    Housing Stability Vital Sign     Unable to Pay for Housing in the Last Year: Patient refused     Number of Places Lived in the Last Year: 1      Family History   Problem Relation Name Age of Onset    Lung cancer Mother      Lung cancer Brother        Review of patient's allergies indicates:  No Known Allergies   Review of Systems   Constitutional:  Negative for chills, diaphoresis, fatigue, fever and unexpected weight change.   HENT:  Negative for nasal congestion, mouth sores, sinus pressure/congestion and sore throat.    Eyes:  Negative for pain and visual disturbance.   Respiratory:  Negative for cough, chest tightness and shortness of breath.    Cardiovascular:  Negative for chest pain, palpitations and leg swelling.   Gastrointestinal:  Negative for abdominal distention, abdominal pain, blood in stool, constipation and diarrhea.   Genitourinary:  Negative for dysuria, frequency and hematuria.   Musculoskeletal:  Negative for arthralgias  and back pain.   Integumentary:  Negative for rash.   Neurological:  Negative for dizziness, weakness, numbness and headaches.   Hematological:  Negative for adenopathy.   Psychiatric/Behavioral:  Negative for confusion.          Objective:      Physical Exam  Vitals reviewed.   Constitutional:       General: She is not in acute distress.     Appearance: Normal appearance.   HENT:      Head: Normocephalic and atraumatic.      Nose: Nose normal.      Mouth/Throat:      Mouth: Mucous membranes are moist.   Eyes:      Extraocular Movements: Extraocular movements intact.      Conjunctiva/sclera: Conjunctivae normal.   Neck:      Comments: Radiation changes to the left supraclavicular area  Cardiovascular:      Rate and Rhythm: Normal rate and regular rhythm.      Pulses: Normal pulses.      Heart sounds: Normal heart sounds.   Pulmonary:      Effort: Pulmonary effort is normal.      Breath sounds: Examination of the right-lower field reveals decreased breath sounds. Examination of the left-lower field reveals decreased breath sounds. Decreased breath sounds present.   Musculoskeletal:         General: No swelling. Normal range of motion.      Cervical back: Normal range of motion and neck supple.      Right lower leg: No edema.      Left lower leg: No edema.   Skin:     General: Skin is warm and dry.   Neurological:      General: No focal deficit present.      Mental Status: She is alert and oriented to person, place, and time. Mental status is at baseline.   Psychiatric:         Mood and Affect: Mood normal.         Behavior: Behavior normal.         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 ? metastatic findings  could not be biopsied.    TTF negative/CK 7 +.    Diagnosed June 2014.  EGFR mutation negative/ALK gene rearrangement negative.  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 Patient has reportedly been referred to ENT for resection and lymph node dissection (per patient report). Subsequent open biopsy done by Dr. Rosales on 7/29/2019 confirmed the same.  Patient was unable to undergo definitive surgical resection for oligo metastatic disease.  Treatment induced anemia  Hoarseness .      Plan:          Patient's biopsy did return as recurrent non-small cell lung cancer in the left lung.  I feel that the right lung will be similar pathology.  She completed radiation with Dr. Mortensen on 12/24/2020.     Caris did reveal that PD-L1 was 2% positive suggesting benefit from pembrolizumab or nivolumab/ipilimumab.      Her PET/CT scan does show a new hypermetabolic subcentimeter subcarinal lymph node and increase in a left apical consolidation.  The left apical consolidation is close to the aorta and I do not think would be amenable to biopsy, however this subcarinal lymph node may be amenable to bronchoscopy.      Bronchoscopy done on 11/16/2022 showed metastatic adenocarcinoma.  She is no longer a candidate for full dose SBRT.  We started with carboplatin, pemetrexed, nivolumab, and ipilimumab based on the CHECKMATE 9LA study.  Cycle 1 day 1 given 12/20/2022.     Follow up with radiation oncology as scheduled, Dr. Mortensen.    Continue levothyroxine to 88mcg daily.     PET/CT scan done on 01/18/2024 showed mixed response to therapy.  Overall, the patient was asymptomatic from a cancer standpoint.  No evidence of overt progression.    Most recent PET/CT scan done on 07/25/2024 showed essentially stable disease.    Labs and treat q3w     Return to clinic in 3 weeks with  labs    Enrrique Gudino II, MD

## 2024-08-21 ENCOUNTER — LAB VISIT (OUTPATIENT)
Dept: LAB | Facility: HOSPITAL | Age: 79
End: 2024-08-21
Attending: INTERNAL MEDICINE
Payer: MEDICARE

## 2024-08-21 ENCOUNTER — OFFICE VISIT (OUTPATIENT)
Dept: HEMATOLOGY/ONCOLOGY | Facility: CLINIC | Age: 79
End: 2024-08-21
Payer: MEDICARE

## 2024-08-21 VITALS
BODY MASS INDEX: 26.31 KG/M2 | HEART RATE: 76 BPM | DIASTOLIC BLOOD PRESSURE: 62 MMHG | WEIGHT: 143 LBS | HEIGHT: 62 IN | SYSTOLIC BLOOD PRESSURE: 107 MMHG | RESPIRATION RATE: 14 BRPM | OXYGEN SATURATION: 94 %

## 2024-08-21 DIAGNOSIS — C77.9 MALIGNANT NEOPLASM METASTATIC TO LYMPH NODES, UNSPECIFIED LYMPH NODE REGION: ICD-10-CM

## 2024-08-21 DIAGNOSIS — C34.90 ADENOCARCINOMA OF LUNG, UNSPECIFIED LATERALITY: Primary | ICD-10-CM

## 2024-08-21 DIAGNOSIS — C79.51 MALIGNANT NEOPLASM METASTATIC TO BONE: ICD-10-CM

## 2024-08-21 DIAGNOSIS — E03.2 HYPOTHYROIDISM DUE TO DRUGS: ICD-10-CM

## 2024-08-21 LAB
ALBUMIN SERPL-MCNC: 2.9 G/DL (ref 3.4–4.8)
ALBUMIN/GLOB SERPL: 1 RATIO (ref 1.1–2)
ALP SERPL-CCNC: 62 UNIT/L (ref 40–150)
ALT SERPL-CCNC: 21 UNIT/L (ref 0–55)
ANION GAP SERPL CALC-SCNC: 8 MEQ/L
AST SERPL-CCNC: 32 UNIT/L (ref 5–34)
BASOPHILS # BLD AUTO: 0.02 X10(3)/MCL
BASOPHILS NFR BLD AUTO: 0.2 %
BILIRUB SERPL-MCNC: 0.2 MG/DL
BUN SERPL-MCNC: 23.3 MG/DL (ref 9.8–20.1)
CALCIUM SERPL-MCNC: 9.2 MG/DL (ref 8.4–10.2)
CHLORIDE SERPL-SCNC: 98 MMOL/L (ref 98–107)
CO2 SERPL-SCNC: 24 MMOL/L (ref 23–31)
CORTIS SERPL-SCNC: 15.1 UG/DL
CREAT SERPL-MCNC: 1.28 MG/DL (ref 0.55–1.02)
CREAT/UREA NIT SERPL: 18
EOSINOPHIL # BLD AUTO: 0.09 X10(3)/MCL (ref 0–0.9)
EOSINOPHIL NFR BLD AUTO: 0.9 %
ERYTHROCYTE [DISTWIDTH] IN BLOOD BY AUTOMATED COUNT: 17.4 % (ref 11.5–17)
GFR SERPLBLD CREATININE-BSD FMLA CKD-EPI: 43 ML/MIN/1.73/M2
GLOBULIN SER-MCNC: 2.8 GM/DL (ref 2.4–3.5)
GLUCOSE SERPL-MCNC: 123 MG/DL (ref 82–115)
HCT VFR BLD AUTO: 31.2 % (ref 37–47)
HGB BLD-MCNC: 10.1 G/DL (ref 12–16)
IMM GRANULOCYTES # BLD AUTO: 0.03 X10(3)/MCL (ref 0–0.04)
IMM GRANULOCYTES NFR BLD AUTO: 0.3 %
LYMPHOCYTES # BLD AUTO: 1.44 X10(3)/MCL (ref 0.6–4.6)
LYMPHOCYTES NFR BLD AUTO: 15.1 %
MCH RBC QN AUTO: 26.5 PG (ref 27–31)
MCHC RBC AUTO-ENTMCNC: 32.4 G/DL (ref 33–36)
MCV RBC AUTO: 81.9 FL (ref 80–94)
MONOCYTES # BLD AUTO: 0.89 X10(3)/MCL (ref 0.1–1.3)
MONOCYTES NFR BLD AUTO: 9.4 %
NEUTROPHILS # BLD AUTO: 7.04 X10(3)/MCL (ref 2.1–9.2)
NEUTROPHILS NFR BLD AUTO: 74.1 %
PLATELET # BLD AUTO: 232 X10(3)/MCL (ref 130–400)
PMV BLD AUTO: 8.5 FL (ref 7.4–10.4)
POTASSIUM SERPL-SCNC: 5 MMOL/L (ref 3.5–5.1)
PROT SERPL-MCNC: 5.7 GM/DL (ref 5.8–7.6)
RBC # BLD AUTO: 3.81 X10(6)/MCL (ref 4.2–5.4)
SODIUM SERPL-SCNC: 130 MMOL/L (ref 136–145)
TSH SERPL-ACNC: 14.84 UIU/ML (ref 0.35–4.94)
WBC # BLD AUTO: 9.51 X10(3)/MCL (ref 4.5–11.5)

## 2024-08-21 PROCEDURE — 36415 COLL VENOUS BLD VENIPUNCTURE: CPT

## 2024-08-21 PROCEDURE — 85025 COMPLETE CBC W/AUTO DIFF WBC: CPT

## 2024-08-21 PROCEDURE — 99214 OFFICE O/P EST MOD 30 MIN: CPT | Mod: S$PBB,,, | Performed by: INTERNAL MEDICINE

## 2024-08-21 PROCEDURE — 82533 TOTAL CORTISOL: CPT

## 2024-08-21 PROCEDURE — 99213 OFFICE O/P EST LOW 20 MIN: CPT | Mod: PBBFAC | Performed by: INTERNAL MEDICINE

## 2024-08-21 PROCEDURE — 84443 ASSAY THYROID STIM HORMONE: CPT

## 2024-08-21 PROCEDURE — 80053 COMPREHEN METABOLIC PANEL: CPT

## 2024-08-21 PROCEDURE — 99999 PR PBB SHADOW E&M-EST. PATIENT-LVL III: CPT | Mod: PBBFAC,,, | Performed by: INTERNAL MEDICINE

## 2024-08-23 ENCOUNTER — INFUSION (OUTPATIENT)
Dept: INFUSION THERAPY | Facility: HOSPITAL | Age: 79
End: 2024-08-23
Attending: NURSE PRACTITIONER
Payer: MEDICARE

## 2024-08-23 VITALS
RESPIRATION RATE: 18 BRPM | TEMPERATURE: 97 F | WEIGHT: 143 LBS | DIASTOLIC BLOOD PRESSURE: 52 MMHG | HEIGHT: 62 IN | SYSTOLIC BLOOD PRESSURE: 102 MMHG | OXYGEN SATURATION: 95 % | BODY MASS INDEX: 26.31 KG/M2 | HEART RATE: 65 BPM

## 2024-08-23 DIAGNOSIS — C34.90 ADENOCARCINOMA OF LUNG, STAGE 4, UNSPECIFIED LATERALITY: Primary | ICD-10-CM

## 2024-08-23 PROCEDURE — A4216 STERILE WATER/SALINE, 10 ML: HCPCS | Performed by: INTERNAL MEDICINE

## 2024-08-23 PROCEDURE — 96413 CHEMO IV INFUSION 1 HR: CPT

## 2024-08-23 PROCEDURE — 63600175 PHARM REV CODE 636 W HCPCS: Mod: JZ,JG | Performed by: INTERNAL MEDICINE

## 2024-08-23 PROCEDURE — 25000003 PHARM REV CODE 250: Performed by: INTERNAL MEDICINE

## 2024-08-23 RX ORDER — HEPARIN 100 UNIT/ML
500 SYRINGE INTRAVENOUS
Status: DISCONTINUED | OUTPATIENT
Start: 2024-08-23 | End: 2024-08-23 | Stop reason: HOSPADM

## 2024-08-23 RX ORDER — SODIUM CHLORIDE 0.9 % (FLUSH) 0.9 %
10 SYRINGE (ML) INJECTION
Status: DISCONTINUED | OUTPATIENT
Start: 2024-08-23 | End: 2024-08-23 | Stop reason: HOSPADM

## 2024-08-23 RX ORDER — DIPHENHYDRAMINE HYDROCHLORIDE 50 MG/ML
50 INJECTION INTRAMUSCULAR; INTRAVENOUS ONCE AS NEEDED
Status: DISCONTINUED | OUTPATIENT
Start: 2024-08-23 | End: 2024-08-23 | Stop reason: HOSPADM

## 2024-08-23 RX ORDER — EPINEPHRINE 0.3 MG/.3ML
0.3 INJECTION SUBCUTANEOUS ONCE AS NEEDED
Status: DISCONTINUED | OUTPATIENT
Start: 2024-08-23 | End: 2024-08-23 | Stop reason: HOSPADM

## 2024-08-23 RX ADMIN — SODIUM CHLORIDE: 9 INJECTION, SOLUTION INTRAVENOUS at 11:08

## 2024-08-23 RX ADMIN — Medication 10 ML: at 12:08

## 2024-08-23 RX ADMIN — SODIUM CHLORIDE 360 MG: 9 INJECTION, SOLUTION INTRAVENOUS at 11:08

## 2024-08-23 RX ADMIN — HEPARIN 500 UNITS: 100 SYRINGE at 12:08

## 2024-08-23 NOTE — NURSING
C14 D22 Opdivo given, tolerated well. Pt discharged home in stable condition, future appts given.

## 2024-09-05 ENCOUNTER — TELEPHONE (OUTPATIENT)
Dept: HEMATOLOGY/ONCOLOGY | Facility: CLINIC | Age: 79
End: 2024-09-05
Payer: MEDICARE

## 2024-09-06 ENCOUNTER — HOSPITAL ENCOUNTER (INPATIENT)
Facility: HOSPITAL | Age: 79
LOS: 6 days | Discharge: HOME | DRG: 392 | End: 2024-09-12
Attending: STUDENT IN AN ORGANIZED HEALTH CARE EDUCATION/TRAINING PROGRAM | Admitting: STUDENT IN AN ORGANIZED HEALTH CARE EDUCATION/TRAINING PROGRAM
Payer: MEDICARE

## 2024-09-06 DIAGNOSIS — R11.2 NAUSEA & VOMITING: ICD-10-CM

## 2024-09-06 DIAGNOSIS — K52.9 COLITIS: Primary | ICD-10-CM

## 2024-09-06 DIAGNOSIS — R07.9 CHEST PAIN: ICD-10-CM

## 2024-09-06 DIAGNOSIS — K52.9 ENTEROCOLITIS: ICD-10-CM

## 2024-09-06 LAB
ALBUMIN SERPL-MCNC: 2.8 G/DL (ref 3.4–4.8)
ALBUMIN/GLOB SERPL: 1 RATIO (ref 1.1–2)
ALP SERPL-CCNC: 75 UNIT/L (ref 40–150)
ALT SERPL-CCNC: 23 UNIT/L (ref 0–55)
ANION GAP SERPL CALC-SCNC: 9 MEQ/L
AST SERPL-CCNC: 23 UNIT/L (ref 5–34)
BASOPHILS # BLD AUTO: 0.12 X10(3)/MCL
BASOPHILS NFR BLD AUTO: 0.6 %
BILIRUB SERPL-MCNC: 0.3 MG/DL
BUN SERPL-MCNC: 16.3 MG/DL (ref 9.8–20.1)
CALCIUM SERPL-MCNC: 10.5 MG/DL (ref 8.4–10.2)
CHLORIDE SERPL-SCNC: 113 MMOL/L (ref 98–107)
CO2 SERPL-SCNC: 19 MMOL/L (ref 23–31)
CREAT SERPL-MCNC: 0.99 MG/DL (ref 0.55–1.02)
CREAT/UREA NIT SERPL: 16
EOSINOPHIL # BLD AUTO: 0.06 X10(3)/MCL (ref 0–0.9)
EOSINOPHIL NFR BLD AUTO: 0.3 %
ERYTHROCYTE [DISTWIDTH] IN BLOOD BY AUTOMATED COUNT: 19.2 % (ref 11.5–17)
GFR SERPLBLD CREATININE-BSD FMLA CKD-EPI: 58 ML/MIN/1.73/M2
GLOBULIN SER-MCNC: 2.7 GM/DL (ref 2.4–3.5)
GLUCOSE SERPL-MCNC: 83 MG/DL (ref 82–115)
HCT VFR BLD AUTO: 33.3 % (ref 37–47)
HGB BLD-MCNC: 11.1 G/DL (ref 12–16)
IMM GRANULOCYTES # BLD AUTO: 0.15 X10(3)/MCL (ref 0–0.04)
IMM GRANULOCYTES NFR BLD AUTO: 0.8 %
LYMPHOCYTES # BLD AUTO: 0.88 X10(3)/MCL (ref 0.6–4.6)
LYMPHOCYTES NFR BLD AUTO: 4.4 %
MCH RBC QN AUTO: 26.9 PG (ref 27–31)
MCHC RBC AUTO-ENTMCNC: 33.3 G/DL (ref 33–36)
MCV RBC AUTO: 80.8 FL (ref 80–94)
MONOCYTES # BLD AUTO: 1.19 X10(3)/MCL (ref 0.1–1.3)
MONOCYTES NFR BLD AUTO: 6 %
NEUTROPHILS # BLD AUTO: 17.52 X10(3)/MCL (ref 2.1–9.2)
NEUTROPHILS NFR BLD AUTO: 87.9 %
NRBC BLD AUTO-RTO: 0 %
PLATELET # BLD AUTO: 265 X10(3)/MCL (ref 130–400)
PMV BLD AUTO: 9 FL (ref 7.4–10.4)
POTASSIUM SERPL-SCNC: 4 MMOL/L (ref 3.5–5.1)
PROT SERPL-MCNC: 5.5 GM/DL (ref 5.8–7.6)
RBC # BLD AUTO: 4.12 X10(6)/MCL (ref 4.2–5.4)
SODIUM SERPL-SCNC: 141 MMOL/L (ref 136–145)
WBC # BLD AUTO: 19.92 X10(3)/MCL (ref 4.5–11.5)

## 2024-09-06 PROCEDURE — 86140 C-REACTIVE PROTEIN: CPT | Performed by: INTERNAL MEDICINE

## 2024-09-06 PROCEDURE — 80053 COMPREHEN METABOLIC PANEL: CPT | Performed by: STUDENT IN AN ORGANIZED HEALTH CARE EDUCATION/TRAINING PROGRAM

## 2024-09-06 PROCEDURE — 93010 ELECTROCARDIOGRAM REPORT: CPT | Mod: ,,, | Performed by: INTERNAL MEDICINE

## 2024-09-06 PROCEDURE — 25000003 PHARM REV CODE 250: Performed by: STUDENT IN AN ORGANIZED HEALTH CARE EDUCATION/TRAINING PROGRAM

## 2024-09-06 PROCEDURE — 25500020 PHARM REV CODE 255: Performed by: STUDENT IN AN ORGANIZED HEALTH CARE EDUCATION/TRAINING PROGRAM

## 2024-09-06 PROCEDURE — 93005 ELECTROCARDIOGRAM TRACING: CPT

## 2024-09-06 PROCEDURE — 85025 COMPLETE CBC W/AUTO DIFF WBC: CPT | Performed by: STUDENT IN AN ORGANIZED HEALTH CARE EDUCATION/TRAINING PROGRAM

## 2024-09-06 PROCEDURE — 99285 EMERGENCY DEPT VISIT HI MDM: CPT | Mod: 25

## 2024-09-06 PROCEDURE — 11000001 HC ACUTE MED/SURG PRIVATE ROOM

## 2024-09-06 PROCEDURE — 84443 ASSAY THYROID STIM HORMONE: CPT | Performed by: INTERNAL MEDICINE

## 2024-09-06 PROCEDURE — 27000207 HC ISOLATION

## 2024-09-06 PROCEDURE — 86318 IA INFECTIOUS AGENT ANTIBODY: CPT | Performed by: STUDENT IN AN ORGANIZED HEALTH CARE EDUCATION/TRAINING PROGRAM

## 2024-09-06 RX ADMIN — SODIUM CHLORIDE 1000 ML: 9 INJECTION, SOLUTION INTRAVENOUS at 11:09

## 2024-09-06 RX ADMIN — IOHEXOL 80 ML: 350 INJECTION, SOLUTION INTRAVENOUS at 10:09

## 2024-09-06 NOTE — Clinical Note
Diagnosis: Colitis [[id number]]   Future Attending Provider: KIAH MARADIAGA [[id number]]   Admit to which facility:: [facility] [[id number]]   Reason for IP Medical Treatment  (Clinical interventions that can only be accomplished in the IP setting? ) :: IV antibiotics, C diff testing

## 2024-09-07 PROBLEM — K52.9 ENTEROCOLITIS: Status: ACTIVE | Noted: 2024-09-07

## 2024-09-07 LAB
ADV 40+41 DNA STL QL NAA+NON-PROBE: NOT DETECTED
ALBUMIN SERPL-MCNC: 2.4 G/DL (ref 3.4–4.8)
ALBUMIN/GLOB SERPL: 0.9 RATIO (ref 1.1–2)
ALP SERPL-CCNC: 64 UNIT/L (ref 40–150)
ALT SERPL-CCNC: 20 UNIT/L (ref 0–55)
ANION GAP SERPL CALC-SCNC: 7 MEQ/L
AST SERPL-CCNC: 22 UNIT/L (ref 5–34)
ASTRO TYP 1-8 RNA STL QL NAA+NON-PROBE: NOT DETECTED
BASOPHILS # BLD AUTO: 0.1 X10(3)/MCL
BASOPHILS NFR BLD AUTO: 0.6 %
BILIRUB SERPL-MCNC: 0.3 MG/DL
BUN SERPL-MCNC: 16.2 MG/DL (ref 9.8–20.1)
C CAYETANENSIS DNA STL QL NAA+NON-PROBE: NOT DETECTED
C COLI+JEJ+UPSA DNA STL QL NAA+NON-PROBE: NOT DETECTED
C DIFF TOX A+B STL QL IA: NEGATIVE
CALCIUM SERPL-MCNC: 10.1 MG/DL (ref 8.4–10.2)
CHLORIDE SERPL-SCNC: 116 MMOL/L (ref 98–107)
CLOSTRIDIUM DIFFICILE GDH ANTIGEN (OHS): NEGATIVE
CO2 SERPL-SCNC: 18 MMOL/L (ref 23–31)
CREAT SERPL-MCNC: 0.94 MG/DL (ref 0.55–1.02)
CREAT/UREA NIT SERPL: 17
CRP SERPL-MCNC: 14.2 MG/L
CRYPTOSP DNA STL QL NAA+NON-PROBE: NOT DETECTED
E HISTOLYT DNA STL QL NAA+NON-PROBE: NOT DETECTED
EAEC PAA PLAS AGGR+AATA ST NAA+NON-PRB: NOT DETECTED
EC STX1+STX2 GENES STL QL NAA+NON-PROBE: NOT DETECTED
EOSINOPHIL # BLD AUTO: 0.06 X10(3)/MCL (ref 0–0.9)
EOSINOPHIL NFR BLD AUTO: 0.4 %
EPEC EAE GENE STL QL NAA+NON-PROBE: NOT DETECTED
ERYTHROCYTE [DISTWIDTH] IN BLOOD BY AUTOMATED COUNT: 19.4 % (ref 11.5–17)
ERYTHROCYTE [SEDIMENTATION RATE] IN BLOOD: 23 MM/HR (ref 0–20)
ETEC LTA+ST1A+ST1B TOX ST NAA+NON-PROBE: NOT DETECTED
FECAL LEUKOCYTE (OHS): POSITIVE
G LAMBLIA DNA STL QL NAA+NON-PROBE: NOT DETECTED
GFR SERPLBLD CREATININE-BSD FMLA CKD-EPI: >60 ML/MIN/1.73/M2
GLOBULIN SER-MCNC: 2.8 GM/DL (ref 2.4–3.5)
GLUCOSE SERPL-MCNC: 75 MG/DL (ref 82–115)
HCT VFR BLD AUTO: 32.8 % (ref 37–47)
HGB BLD-MCNC: 10.6 G/DL (ref 12–16)
IMM GRANULOCYTES # BLD AUTO: 0.12 X10(3)/MCL (ref 0–0.04)
IMM GRANULOCYTES NFR BLD AUTO: 0.7 %
LYMPHOCYTES # BLD AUTO: 1.37 X10(3)/MCL (ref 0.6–4.6)
LYMPHOCYTES NFR BLD AUTO: 8.1 %
MAGNESIUM SERPL-MCNC: 1.6 MG/DL (ref 1.6–2.6)
MCH RBC QN AUTO: 26.6 PG (ref 27–31)
MCHC RBC AUTO-ENTMCNC: 32.3 G/DL (ref 33–36)
MCV RBC AUTO: 82.4 FL (ref 80–94)
MONOCYTES # BLD AUTO: 0.91 X10(3)/MCL (ref 0.1–1.3)
MONOCYTES NFR BLD AUTO: 5.4 %
NEUTROPHILS # BLD AUTO: 14.25 X10(3)/MCL (ref 2.1–9.2)
NEUTROPHILS NFR BLD AUTO: 84.8 %
NOROVIRUS GI+II RNA STL QL NAA+NON-PROBE: NOT DETECTED
NRBC BLD AUTO-RTO: 0 %
P SHIGELLOIDES DNA STL QL NAA+NON-PROBE: NOT DETECTED
PHOSPHATE SERPL-MCNC: 3.8 MG/DL (ref 2.3–4.7)
PLATELET # BLD AUTO: 253 X10(3)/MCL (ref 130–400)
PMV BLD AUTO: 9.1 FL (ref 7.4–10.4)
POTASSIUM SERPL-SCNC: 3.5 MMOL/L (ref 3.5–5.1)
PROT SERPL-MCNC: 5.2 GM/DL (ref 5.8–7.6)
RBC # BLD AUTO: 3.98 X10(6)/MCL (ref 4.2–5.4)
RVA RNA STL QL NAA+NON-PROBE: NOT DETECTED
S ENT+BONG DNA STL QL NAA+NON-PROBE: NOT DETECTED
SAPO I+II+IV+V RNA STL QL NAA+NON-PROBE: NOT DETECTED
SHIGELLA SP+EIEC IPAH ST NAA+NON-PROBE: NOT DETECTED
SODIUM SERPL-SCNC: 141 MMOL/L (ref 136–145)
TSH SERPL-ACNC: 17.18 UIU/ML (ref 0.35–4.94)
V CHOL+PARA+VUL DNA STL QL NAA+NON-PROBE: NOT DETECTED
V CHOLERAE DNA STL QL NAA+NON-PROBE: NOT DETECTED
WBC # BLD AUTO: 16.81 X10(3)/MCL (ref 4.5–11.5)
Y ENTEROCOL DNA STL QL NAA+NON-PROBE: NOT DETECTED

## 2024-09-07 PROCEDURE — 25000003 PHARM REV CODE 250: Performed by: INTERNAL MEDICINE

## 2024-09-07 PROCEDURE — 63600175 PHARM REV CODE 636 W HCPCS: Performed by: INTERNAL MEDICINE

## 2024-09-07 PROCEDURE — 99223 1ST HOSP IP/OBS HIGH 75: CPT | Mod: ,,, | Performed by: INTERNAL MEDICINE

## 2024-09-07 PROCEDURE — 36415 COLL VENOUS BLD VENIPUNCTURE: CPT | Performed by: INTERNAL MEDICINE

## 2024-09-07 PROCEDURE — 83993 ASSAY FOR CALPROTECTIN FECAL: CPT | Performed by: INTERNAL MEDICINE

## 2024-09-07 PROCEDURE — S5010 5% DEXTROSE AND 0.45% SALINE: HCPCS | Performed by: INTERNAL MEDICINE

## 2024-09-07 PROCEDURE — 11000001 HC ACUTE MED/SURG PRIVATE ROOM

## 2024-09-07 PROCEDURE — 85025 COMPLETE CBC W/AUTO DIFF WBC: CPT | Performed by: INTERNAL MEDICINE

## 2024-09-07 PROCEDURE — 89055 LEUKOCYTE ASSESSMENT FECAL: CPT | Performed by: INTERNAL MEDICINE

## 2024-09-07 PROCEDURE — 85652 RBC SED RATE AUTOMATED: CPT | Performed by: INTERNAL MEDICINE

## 2024-09-07 PROCEDURE — 87045 FECES CULTURE AEROBIC BACT: CPT | Performed by: INTERNAL MEDICINE

## 2024-09-07 PROCEDURE — 87040 BLOOD CULTURE FOR BACTERIA: CPT | Performed by: INTERNAL MEDICINE

## 2024-09-07 PROCEDURE — 80053 COMPREHEN METABOLIC PANEL: CPT | Performed by: INTERNAL MEDICINE

## 2024-09-07 PROCEDURE — 84100 ASSAY OF PHOSPHORUS: CPT | Performed by: INTERNAL MEDICINE

## 2024-09-07 PROCEDURE — 87507 IADNA-DNA/RNA PROBE TQ 12-25: CPT | Performed by: INTERNAL MEDICINE

## 2024-09-07 PROCEDURE — 83735 ASSAY OF MAGNESIUM: CPT | Performed by: INTERNAL MEDICINE

## 2024-09-07 RX ORDER — LEVOTHYROXINE SODIUM 125 UG/1
125 TABLET ORAL
Status: DISCONTINUED | OUTPATIENT
Start: 2024-09-07 | End: 2024-09-12 | Stop reason: HOSPADM

## 2024-09-07 RX ORDER — ALUMINUM HYDROXIDE, MAGNESIUM HYDROXIDE, AND SIMETHICONE 1200; 120; 1200 MG/30ML; MG/30ML; MG/30ML
30 SUSPENSION ORAL 4 TIMES DAILY PRN
Status: DISCONTINUED | OUTPATIENT
Start: 2024-09-07 | End: 2024-09-12 | Stop reason: HOSPADM

## 2024-09-07 RX ORDER — ATORVASTATIN CALCIUM 10 MG/1
20 TABLET, FILM COATED ORAL DAILY
Status: DISCONTINUED | OUTPATIENT
Start: 2024-09-07 | End: 2024-09-12 | Stop reason: HOSPADM

## 2024-09-07 RX ORDER — METRONIDAZOLE 500 MG/100ML
500 INJECTION, SOLUTION INTRAVENOUS
Status: COMPLETED | OUTPATIENT
Start: 2024-09-07 | End: 2024-09-11

## 2024-09-07 RX ORDER — CIPROFLOXACIN 500 MG/1
500 TABLET ORAL 2 TIMES DAILY
Status: ON HOLD | COMMUNITY
Start: 2024-09-06 | End: 2024-09-12 | Stop reason: HOSPADM

## 2024-09-07 RX ORDER — ENOXAPARIN SODIUM 100 MG/ML
40 INJECTION SUBCUTANEOUS EVERY 24 HOURS
Status: DISCONTINUED | OUTPATIENT
Start: 2024-09-07 | End: 2024-09-12 | Stop reason: HOSPADM

## 2024-09-07 RX ORDER — TALC
6 POWDER (GRAM) TOPICAL NIGHTLY PRN
Status: DISCONTINUED | OUTPATIENT
Start: 2024-09-07 | End: 2024-09-12 | Stop reason: HOSPADM

## 2024-09-07 RX ORDER — SODIUM CHLORIDE 0.9 % (FLUSH) 0.9 %
10 SYRINGE (ML) INJECTION
Status: DISCONTINUED | OUTPATIENT
Start: 2024-09-07 | End: 2024-09-12 | Stop reason: HOSPADM

## 2024-09-07 RX ORDER — MAGNESIUM SULFATE 1 G/100ML
1 INJECTION INTRAVENOUS ONCE
Status: COMPLETED | OUTPATIENT
Start: 2024-09-07 | End: 2024-09-07

## 2024-09-07 RX ORDER — DEXTROSE MONOHYDRATE AND SODIUM CHLORIDE 5; .45 G/100ML; G/100ML
INJECTION, SOLUTION INTRAVENOUS CONTINUOUS
Status: DISCONTINUED | OUTPATIENT
Start: 2024-09-07 | End: 2024-09-12 | Stop reason: HOSPADM

## 2024-09-07 RX ORDER — ACETAMINOPHEN 325 MG/1
650 TABLET ORAL EVERY 4 HOURS PRN
Status: DISCONTINUED | OUTPATIENT
Start: 2024-09-07 | End: 2024-09-12 | Stop reason: HOSPADM

## 2024-09-07 RX ORDER — SODIUM BICARBONATE 650 MG/1
650 TABLET ORAL 2 TIMES DAILY
Status: DISCONTINUED | OUTPATIENT
Start: 2024-09-07 | End: 2024-09-07

## 2024-09-07 RX ORDER — CLOPIDOGREL BISULFATE 75 MG/1
75 TABLET ORAL DAILY
Status: DISCONTINUED | OUTPATIENT
Start: 2024-09-07 | End: 2024-09-12 | Stop reason: HOSPADM

## 2024-09-07 RX ORDER — SODIUM CHLORIDE, SODIUM LACTATE, POTASSIUM CHLORIDE, CALCIUM CHLORIDE 600; 310; 30; 20 MG/100ML; MG/100ML; MG/100ML; MG/100ML
INJECTION, SOLUTION INTRAVENOUS CONTINUOUS
Status: DISCONTINUED | OUTPATIENT
Start: 2024-09-07 | End: 2024-09-07

## 2024-09-07 RX ORDER — LOSARTAN POTASSIUM 50 MG/1
50 TABLET ORAL DAILY
Status: DISCONTINUED | OUTPATIENT
Start: 2024-09-07 | End: 2024-09-12 | Stop reason: HOSPADM

## 2024-09-07 RX ORDER — MUPIROCIN 20 MG/G
OINTMENT TOPICAL 2 TIMES DAILY
Status: DISPENSED | OUTPATIENT
Start: 2024-09-07 | End: 2024-09-12

## 2024-09-07 RX ORDER — LEVOTHYROXINE SODIUM 125 UG/1
125 TABLET ORAL
COMMUNITY
Start: 2024-09-06

## 2024-09-07 RX ORDER — METRONIDAZOLE 500 MG/1
500 TABLET ORAL 3 TIMES DAILY
Status: ON HOLD | COMMUNITY
Start: 2024-09-06 | End: 2024-09-12 | Stop reason: HOSPADM

## 2024-09-07 RX ORDER — PROCHLORPERAZINE EDISYLATE 5 MG/ML
5 INJECTION INTRAMUSCULAR; INTRAVENOUS EVERY 6 HOURS PRN
Status: DISCONTINUED | OUTPATIENT
Start: 2024-09-07 | End: 2024-09-12 | Stop reason: HOSPADM

## 2024-09-07 RX ORDER — CARVEDILOL 3.12 MG/1
6.25 TABLET ORAL 2 TIMES DAILY
Status: DISCONTINUED | OUTPATIENT
Start: 2024-09-07 | End: 2024-09-12 | Stop reason: HOSPADM

## 2024-09-07 RX ORDER — SODIUM BICARBONATE 650 MG/1
1300 TABLET ORAL 2 TIMES DAILY
Status: DISCONTINUED | OUTPATIENT
Start: 2024-09-07 | End: 2024-09-12 | Stop reason: HOSPADM

## 2024-09-07 RX ORDER — ONDANSETRON HYDROCHLORIDE 2 MG/ML
4 INJECTION, SOLUTION INTRAVENOUS EVERY 4 HOURS PRN
Status: DISCONTINUED | OUTPATIENT
Start: 2024-09-07 | End: 2024-09-12 | Stop reason: HOSPADM

## 2024-09-07 RX ORDER — CIPROFLOXACIN 2 MG/ML
400 INJECTION, SOLUTION INTRAVENOUS
Status: COMPLETED | OUTPATIENT
Start: 2024-09-07 | End: 2024-09-11

## 2024-09-07 RX ADMIN — ONDANSETRON 4 MG: 2 INJECTION INTRAMUSCULAR; INTRAVENOUS at 09:09

## 2024-09-07 RX ADMIN — LEVOTHYROXINE SODIUM 125 MCG: 125 TABLET ORAL at 06:09

## 2024-09-07 RX ADMIN — DEXTROSE AND SODIUM CHLORIDE: 5; 450 INJECTION, SOLUTION INTRAVENOUS at 09:09

## 2024-09-07 RX ADMIN — METRONIDAZOLE 500 MG: 5 INJECTION, SOLUTION INTRAVENOUS at 03:09

## 2024-09-07 RX ADMIN — CLOPIDOGREL BISULFATE 75 MG: 75 TABLET ORAL at 08:09

## 2024-09-07 RX ADMIN — CIPROFLOXACIN 400 MG: 400 INJECTION, SOLUTION INTRAVENOUS at 06:09

## 2024-09-07 RX ADMIN — METRONIDAZOLE 500 MG: 5 INJECTION, SOLUTION INTRAVENOUS at 10:09

## 2024-09-07 RX ADMIN — ATORVASTATIN CALCIUM 20 MG: 10 TABLET, FILM COATED ORAL at 08:09

## 2024-09-07 RX ADMIN — MUPIROCIN: 20 OINTMENT TOPICAL at 08:09

## 2024-09-07 RX ADMIN — CARVEDILOL 6.25 MG: 3.12 TABLET, FILM COATED ORAL at 08:09

## 2024-09-07 RX ADMIN — LOSARTAN POTASSIUM 50 MG: 50 TABLET, FILM COATED ORAL at 10:09

## 2024-09-07 RX ADMIN — PREDNISONE 66 MG: 10 TABLET ORAL at 06:09

## 2024-09-07 RX ADMIN — MAGNESIUM SULFATE IN DEXTROSE 1 G: 10 INJECTION, SOLUTION INTRAVENOUS at 02:09

## 2024-09-07 RX ADMIN — SODIUM BICARBONATE 650 MG TABLET 1300 MG: at 08:09

## 2024-09-07 RX ADMIN — SODIUM CHLORIDE, POTASSIUM CHLORIDE, SODIUM LACTATE AND CALCIUM CHLORIDE: 600; 310; 30; 20 INJECTION, SOLUTION INTRAVENOUS at 10:09

## 2024-09-07 RX ADMIN — SODIUM BICARBONATE 650 MG TABLET 1300 MG: at 02:09

## 2024-09-07 RX ADMIN — MUPIROCIN: 20 OINTMENT TOPICAL at 10:09

## 2024-09-07 RX ADMIN — ENOXAPARIN SODIUM 40 MG: 40 INJECTION SUBCUTANEOUS at 06:09

## 2024-09-07 RX ADMIN — METRONIDAZOLE 500 MG: 5 INJECTION, SOLUTION INTRAVENOUS at 08:09

## 2024-09-07 NOTE — PLAN OF CARE
Problem: Skin Injury Risk Increased  Goal: Skin Health and Integrity  Outcome: Progressing     Problem: Adult Inpatient Plan of Care  Goal: Plan of Care Review  Outcome: Progressing  Goal: Patient-Specific Goal (Individualized)  Outcome: Progressing  Goal: Absence of Hospital-Acquired Illness or Injury  Outcome: Progressing  Goal: Optimal Comfort and Wellbeing  Outcome: Progressing  Goal: Readiness for Transition of Care  Outcome: Progressing     Problem: Infection  Goal: Absence of Infection Signs and Symptoms  Outcome: Progressing     Problem: Wound  Goal: Optimal Coping  Outcome: Progressing  Goal: Optimal Functional Ability  Outcome: Progressing  Goal: Absence of Infection Signs and Symptoms  Outcome: Progressing  Goal: Improved Oral Intake  Outcome: Progressing  Goal: Optimal Pain Control and Function  Outcome: Progressing  Goal: Skin Health and Integrity  Outcome: Progressing  Goal: Optimal Wound Healing  Outcome: Progressing

## 2024-09-07 NOTE — NURSING
Nurses Note -- 4 Eyes      9/7/2024   7:39 AM      Skin assessed during: Q Shift Change      [x] No Altered Skin Integrity Present    []Prevention Measures Documented      [] Yes- Altered Skin Integrity Present or Discovered   [] LDA Added if Not in Epic (Describe Wound)   [] New Altered Skin Integrity was Present on Admit and Documented in LDA   [] Wound Image Taken    Wound Care Consulted? No    Attending Nurse:  Wendy Tolbert RN/Staff Member:  Melly JOSEPH

## 2024-09-07 NOTE — CONSULTS
[facility] - Oncology Acute  Hematology/Oncology  Progress Note      Consult Requested By: Garett Zee MD    Reason for Consult:     SUBJECTIVE:     History of Present Illness:  Patient is a 79 y.o. female presents with      Patient of Dr. 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  Patient was originally diagnosed in 2014 when she was seen for unintentional weight loss, shortness of breath, nonproductive cough.  Imaging studies suggestive of lung cancer.  Bronchoscopy done in June of 2014 revealed moderately differentiated adeno carcinoma of the lung.  She underwent chemoradiation from July of 2014 to August 20, 2014.  She was then placed on maintenance Avastin from April of 2015 through September of 2016.  Patient underwent thyroid biopsy in June of 2019 that showed metastatic carcinoma favor adeno carcinoma of the lung as primary.      She started chemoradiation with carbo Taxol in September 2019, completed in October 2019. Patient was doing well, imaging in October 2020 revealed an abnormality in the left lung, CT-guided lung biopsy on 11/03/2020 showed recurrent non-small cell lung cancer, Caris revealed PDL1 positivity.  She underwent SBRT from 12/17/2020 through 12/21/2020.  Patient then placed on surveillance imaging, most recently done on 07/05/2022 showing worsening disease in the right lung with a previous lesion that measured 0.6 cm and had an SUV of 1.0 now measuring 1.5 cm and an SUV of 8.4.  This was treated with SBRT in August of 2022.  PET/CT scan done on 11/8/2022 showed mild increased radiotracer activity within the left apical consolidation, SUV 5.5.  There was a newly hypermetabolic subcentimeter subcarinal lymph node.  A 1 cm right lower lobe nodule was smaller, he right upper lobe nodule was slightly larger measuring 6 mm.  No sites of FDG avid metastatic disease in the neck, abdomen, or pelvis. Bronchoscopy done on 11/16/2022,  pathology revealed malignant cells consistent with adenocarcinoma.     Caris did reveal that PD-L1 was 2% positive suggesting benefit from pembrolizumab or nivolumab/ipilimumab.   Patient was started on Combination chemo immunotherapy with carboplatin, pemetrexed, nivolumab, ipilimumab on 12/20/2022.     Patient is seen in rounds this morning.  Daughters at bedside.  She reports watery diarrhea.  She stated that this started probably close to a month ago but has been worsening to the point that she was having watery bowel stools up to 8-9 times per day.  She also reports occasional vomiting.    Patient is currently on Cipro Flagyl and was started on prednisone 1 milligram/kilogram daily.  She reports that today her diarrhea is a little bit better but so far she had 3 episodes of watery stools since this morning.    CT AP: The bowel is fluid distended from the level of the stomach through the rectum without transition point. Appearance may be related to enterocolitis or ileus.     No fever, chills, sweats.  No blood in the stools.    Continuous Infusions:   lactated ringers   Intravenous Continuous 125 mL/hr at 09/07/24 1023 New Bag at 09/07/24 1023     Scheduled Meds:   atorvastatin  20 mg Oral Daily    carvediloL  6.25 mg Oral BID    ciprofloxacin  400 mg Intravenous Q12H    clopidogreL  75 mg Oral Daily    enoxparin  40 mg Subcutaneous Q24H (prophylaxis, 1700)    levothyroxine  125 mcg Oral Before breakfast    losartan  50 mg Oral Daily    magnesium sulfate IVPB  1 g Intravenous Once    metroNIDAZOLE IV (PEDS and ADULTS)  500 mg Intravenous Q8H    mupirocin   Nasal BID    predniSONE  1 mg/kg Oral Daily    sodium bicarbonate  1,300 mg Oral BID     PRN Meds:  Current Facility-Administered Medications:     acetaminophen, 650 mg, Oral, Q4H PRN    aluminum-magnesium hydroxide-simethicone, 30 mL, Oral, QID PRN    melatonin, 6 mg, Oral, Nightly PRN    ondansetron, 4 mg, Intravenous, Q4H PRN    prochlorperazine, 5 mg,  Intravenous, Q6H PRN    sodium chloride 0.9%, 10 mL, Intravenous, PRN    Past Medical History:   Diagnosis Date    Adenocarcinoma of lung     Anemia     Anxiety     Arthritis     COPD (chronic obstructive pulmonary disease)     Depression     HLD (hyperlipidemia)     Hypertension     Lung cancer     Secondary malignant neoplasm of lymph nodes     Secondary malignant neoplasm of right lung     Thyroid disease     lymph nodes malignant     Past Surgical History:   Procedure Laterality Date    BLADDER SURGERY      CHOLECYSTECTOMY      COLONOSCOPY  2018    ENDOBRONCHIAL ULTRASOUND N/A 11/16/2022    Procedure: ENDOBRONCHIAL ULTRASOUND (EBUS);  Surgeon: Anthony Hutson MD;  Location: [facility]ONCHOSCOPY [facility];  Service: Pulmonary;  Laterality: N/A;    HYSTERECTOMY      KNEE SURGERY Bilateral     replaced knees    PARTIAL HIP ARTHROPLASTY Bilateral      Family History   Problem Relation Name Age of Onset    Lung cancer Mother      Lung cancer Brother       Social History     Tobacco Use    Smoking status: Every Day     Current packs/day: 0.25     Average packs/day: 0.3 packs/day for 65.7 years (16.4 ttl pk-yrs)     Types: Cigarettes     Start date: 1959    Smokeless tobacco: Never    Tobacco comments:     Patient states she is trying to quit smoking.   Substance Use Topics    Alcohol use: Not Currently    Drug use: Never       Review of patient's allergies indicates:  No Known Allergies   No current facility-administered medications on file prior to encounter.     Current Outpatient Medications on File Prior to Encounter   Medication Sig Dispense Refill    albuterol (VENTOLIN HFA) 90 mcg/actuation inhaler Inhale 2 puffs into the lungs every 6 (six) hours as needed for Wheezing. Rescue 90 g 4    carvediloL (COREG) 6.25 MG tablet Take 6.25 mg by mouth 2 (two) times daily.      ciprofloxacin HCl (CIPRO) 500 MG tablet Take 500 mg by mouth 2 (two) times daily.      clopidogreL (PLAVIX) 75 mg tablet       furosemide (LASIX) 20 MG  tablet       levothyroxine (SYNTHROID) 125 MCG tablet Take 125 mcg by mouth before breakfast.      losartan (COZAAR) 50 MG tablet       metroNIDAZOLE (FLAGYL) 500 MG tablet Take 500 mg by mouth 3 (three) times daily.      simvastatin (ZOCOR) 40 MG tablet Take 40 mg by mouth every evening.      [DISCONTINUED] levothyroxine (SYNTHROID) 88 MCG tablet Take 1 tablet (88 mcg total) by mouth every morning. 30 tablet 2    [DISCONTINUED] pantoprazole (PROTONIX) 40 MG tablet Take 1 tablet (40 mg total) by mouth once daily. 30 tablet 1       Review of Systems   Constitutional:  Negative for activity change, appetite change, chills, fatigue, fever and unexpected weight change.   HENT:  Negative for mouth dryness, mouth sores, nosebleeds, sore throat and trouble swallowing.    Eyes:  Negative for visual disturbance.   Respiratory:  Negative for cough and shortness of breath.    Cardiovascular:  Negative for chest pain, palpitations and leg swelling.   Gastrointestinal:  Positive for diarrhea, nausea and vomiting. Negative for abdominal distention, abdominal pain, blood in stool, change in bowel habit and constipation.   Endocrine: Negative.    Genitourinary:  Negative for dysuria, frequency, hematuria and urgency.   Musculoskeletal:  Negative for arthralgias, back pain, myalgias and neck pain.   Integumentary:  Negative for rash.   Neurological:  Negative for dizziness, tremors, syncope, speech difficulty, weakness, light-headedness, numbness, headaches and memory loss.   Hematological:  Does not bruise/bleed easily.   Psychiatric/Behavioral:  Negative for confusion and suicidal ideas.          OBJECTIVE:     Vital Signs (Most Recent)  Temp: 96.9 °F (36.1 °C) (09/07/24 1219)  Pulse: 75 (09/07/24 1219)  Resp: 16 (09/06/24 2350)  BP: (!) 104/51 (09/07/24 1219)  SpO2: (!) 93 % (09/07/24 1219)    Pain Assessment: No pain reported at this time    Vital Signs Range (Last 24H):  Temp:  [96.9 °F (36.1 °C)-98.8 °F (37.1 °C)]   Pulse:   []   Resp:  [16-20]   BP: ()/(46-66)   SpO2:  [91 %-96 %]     Physical Exam:  Physical Exam  Vitals and nursing note reviewed.   Constitutional:       General: She is not in acute distress.     Comments: Elderly female   HENT:      Head: Normocephalic and atraumatic.      Mouth/Throat:      Mouth: Mucous membranes are moist.   Eyes:      General: No scleral icterus.     Extraocular Movements: Extraocular movements intact.      Conjunctiva/sclera: Conjunctivae normal.      Pupils: Pupils are equal, round, and reactive to light.   Neck:      Vascular: No JVD.   Cardiovascular:      Rate and Rhythm: Normal rate and regular rhythm.      Heart sounds: No murmur heard.  Pulmonary:      Effort: Pulmonary effort is normal.      Breath sounds: Normal breath sounds. No wheezing or rhonchi.   Abdominal:      General: Bowel sounds are normal. There is no distension.      Palpations: Abdomen is soft. There is no mass.      Tenderness: There is no abdominal tenderness.   Musculoskeletal:         General: No swelling or deformity.      Cervical back: Neck supple.   Lymphadenopathy:      Head:      Right side of head: No submandibular adenopathy.      Left side of head: No submandibular adenopathy.      Cervical: No cervical adenopathy.      Upper Body:      Right upper body: No supraclavicular or axillary adenopathy.      Left upper body: No supraclavicular or axillary adenopathy.      Lower Body: No right inguinal adenopathy. No left inguinal adenopathy.   Skin:     General: Skin is warm.      Coloration: Skin is not jaundiced.      Findings: No lesion or rash.      Nails: There is no clubbing.   Neurological:      General: No focal deficit present.      Mental Status: She is alert and oriented to person, place, and time.      Cranial Nerves: Cranial nerves 2-12 are intact.   Psychiatric:         Attention and Perception: Attention normal.         Behavior: Behavior is cooperative.         Judgment: Judgment normal.  "        Laboratory:  CBC with Differential:  Recent Labs   Lab 09/07/24  0539   WBC 16.81*   RBC 3.98*   HCT 32.8*   HGB 10.6*   MCV 82.4   MCH 26.6*   RDW 19.4*      MPV 9.1     CMP:  Recent Labs   Lab 09/07/24  0539   CALCIUM 10.1   ALBUMIN 2.4*      K 3.5   CO2 18*   *   BUN 16.2   CREATININE 0.94   ALKPHOS 64   ALT 20   AST 22   BILITOT 0.3     BMP:   Recent Labs   Lab 09/07/24  0539   CALCIUM 10.1      K 3.5   CO2 18*   *   BUN 16.2   CREATININE 0.94     LFTs:   Recent Labs   Lab 09/07/24  0539   ALT 20   AST 22   ALKPHOS 64   BILITOT 0.3   ALBUMIN 2.4*     Haptoglobin: No results for input(s): "HAPTOGLOBIN" in the last 24 hours.  Tumor Markers: No results for input(s): "PSA", "CEA", "", "AFPTM", "HE0712", "" in the last 24 hours.    Invalid input(s): "ALGTM"  Immunology: No results for input(s): "SPEP", "JENNIFER", "DAVIDSON", "FREELAMBDALI" in the last 24 hours.  Coagulation: No results for input(s): "PT", "INR", "APTT" in the last 24 hours.  Specimen (24h ago, onward)      None          Microbiology Results (last 7 days)       Procedure Component Value Units Date/Time    Stool Culture [[id number]] Collected: 09/07/24 1034    Order Status: Sent Specimen: Stool Updated: 09/07/24 1046    Blood Culture [[id number]] Collected: 09/07/24 0539    Order Status: Resulted Specimen: Blood Updated: 09/07/24 0544    Blood Culture [[id number]] Collected: 09/07/24 0539    Order Status: Resulted Specimen: Blood Updated: 09/07/24 0543    Clostridium Diff Toxin, A & B, EIA [[id number]]  (Normal) Collected: 09/06/24 2347    Order Status: Completed Specimen: Stool Updated: 09/07/24 0039     Clostridium Difficile GDH Antigen Negative     Clostridium Difficile Toxin A/B Negative    C. Difficile By Rapid Pcr [[id number]] Collected: 09/06/24 2347    Order Status: Sent Specimen: Stool Updated: 09/06/24 2347            Diagnostic Results:  Imaging Results              CT Abdomen Pelvis With IV Contrast " NO Oral Contrast (Final result)  Result time 09/07/24 12:00:52      Final result by Kathleen Scott MD (09/07/24 12:00:52)                   Impression:      1. The bowel is fluid distended from the level of the stomach through the rectum without transition point.  Appearance may be related to enterocolitis or ileus.  2. Tree-in-bud nodularity at the lung bases suggest infectious or inflammatory bronchiolitis.  3. Postop cholecystectomy with biliary ectasia  4. The preliminary and final reports are concordant.      Electronically signed by: Kathleen Scott  Date:    09/07/2024  Time:    12:00               Narrative:    EXAMINATION:  CT ABDOMEN PELVIS WITH IV CONTRAST    CLINICAL HISTORY:  Abdominal abscess/infection suspected;    TECHNIQUE:  Helically acquired images with axial, sagittal and coronal reformations were obtained from the lung bases to the pubic symphysis after the IV administration of contrast.    Automated tube current modulation, weight-based exposure dosing, and/or iterative reconstruction technique utilized to reach lowest reasonably achievable exposure rate.    DLP: 486 mGy*cm    COMPARISON:  PET-CT 07/25/2024    FINDINGS:  HEART: There are coronary artery calcifications.    LUNG BASES: Tree-in-bud nodularity at the lung bases, left greater than right.    LIVER: Normal attenuation. No appreciable focal hepatic lesion.    BILIARY: Gallbladder is surgically absent.  Mild biliary ectasia with intrahepatic and extrahepatic biliary ductal dilatation.    PANCREAS: No inflammatory change.    SPLEEN: Normal in size    ADRENALS: No mass.    KIDNEYS/URETERS: The kidneys enhance symmetrically.  No hydronephrosis. Incidental right renal cyst requires no imaging follow-up.    GI TRACT/MESENTERY: The bowel is fluid distended from the stomach through the rectum.  No transition point seen.    PERITONEUM: No free fluid.No free air.    LYMPH NODES: No enlarged lymph nodes by size criteria.    VASCULATURE:  Aortoiliac atherosclerosis.    BLADDER: Obscured by beam hardening artifact.    REPRODUCTIVE ORGANS: Obscured by beam hardening artifact.    SOFT TISSUES: Unremarkable.    BONES: There are bilateral hip arthroplasties with associated beam hardening artifact.  Postsurgical change at the lumbosacral spine.                                          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 Diarrhea  --was admitted to the hospital for 1 week in [place] were infectious workup was essentially normal.  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

## 2024-09-07 NOTE — NURSING
Nurses Note -- 4 Eyes      9/7/2024   2:53 AM      Skin assessed during: Admit      [x] No Altered Skin Integrity Present    []Prevention Measures Documented      [] Yes- Altered Skin Integrity Present or Discovered   [] LDA Added if Not in Epic (Describe Wound)   [] New Altered Skin Integrity was Present on Admit and Documented in LDA   [] Wound Image Taken    Wound Care Consulted? No    Attending Nurse:  OPAL Pickard    Second RN/Staff Member:   FELICIANO Guzmán

## 2024-09-07 NOTE — CONSULTS
"[facility]    CC: diarrhea      HPI 79 y.o. female with lung cancer currently on therapy with carboplatin+ pemetrexed+ nivolumab+ ipilimumab since 12/20/2022. She presents with one month of worsening diarrhea, up to 8 times daily.  She spent a week at [facility] where infectious workup was unremarkable.  She did not undergo endoscopic evaluation from what she says.  No melena or hematochezia, no other new medications.      Past Medical History  Past Medical History:   Diagnosis Date    Adenocarcinoma of lung     Anemia     Anxiety     Arthritis     COPD (chronic obstructive pulmonary disease)     Depression     HLD (hyperlipidemia)     Hypertension     Lung cancer     Secondary malignant neoplasm of lymph nodes     Secondary malignant neoplasm of right lung     Thyroid disease     lymph nodes malignant         Review of Systems  General ROS: negative for chills, fever or weight loss  Cardiovascular ROS: no chest pain or dyspnea on exertion  Gastrointestinal ROS: positive for diarrhea, negative for rectal bleeding, dyphagia    Physical Examination  BP (!) 105/56   Pulse 72   Temp 97.4 °F (36.3 °C) (Oral)   Resp 16   Ht 5' 4" (1.626 m)   Wt 64.1 kg (141 lb 5 oz)   SpO2 (!) 93%   BMI 24.26 kg/m²   General appearance: alert, cooperative, no distress; a[[ears weak  HENT: Normocephalic, atraumatic, neck symmetrical, no nasal discharge   Lungs: clear to auscultation bilaterally, no dullness to percussion bilaterally  Heart: regular rate and rhythm without rub; no displacement of the PMI   Abdomen: soft, non-tender; bowel sounds normoactive; no organomegaly  Extremities: extremities symmetric; no clubbing, cyanosis, or edema  Neurologic: Alert and oriented X 3, normal strength, normal coordination and gait    Labs:  Lab Results   Component Value Date    WBC 16.81 (H) 09/07/2024    HGB 10.6 (L) 09/07/2024    HCT 32.8 (L) 09/07/2024    MCV 82.4 09/07/2024     09/07/2024 "           Imaging:    I have personally reviewed and interpreted these images.    Assessment:   79 year old with one month of diarrhea with negative infectious workup.  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  Infectious workup previously negative.  Will treat symptomatically with imodium and agree with prednisone.  Consideration for flex with biopsy if no improvement.     Plan:  Agree with prednisone  Schedule imodium  If no improvement, consider flex with biopsy      VICKY Constantino Jr., M.D.  [facility]

## 2024-09-07 NOTE — H&P
[facility] Hospital Medicine - H&P Note    Patient Name: Pratibha Patel  MRN: [id number]  PCP: Graham Nguyen NP  Admitting Physician: Kailee Gentile MD  Admission Class: IP- Inpatient   Date of Service: 09/07/2024  Code status: Full    Chief Complaint   Diarrhea    History of Present Illness   This is a 79-year-old female medical history of stage IV lung adenocarcinoma initially diagnosed in 2014 went through multiple treatment regimen and currently on carboplatin+ pemetrexed+ nivolumab+ ipilimumab since 12/20/2022.    Present to the ED with chief complaint of diarrhea.  Report diarrhea has been ongoing for the past month minimum 3 loose watery bowel movements per day and some days up to 8 bowel movements per day, she was hospitalized last week for 8 days at [facility] and underwent infectious workup for diarrhea and treated for dehydration and MT and was discharged on a course of Cipro and Flagyl yesterday 09/05/2024.  Report again today she had multiple episode of watery diarrhea about 6 bowel movement, feeling weak and lightheaded as well as report nauseated and vomited once.  Denies any abdominal pain, fever or chills.    On arrival to ED she was afebrile, borderline low blood pressure 96/46 and saturating 96% on room air.  Labs notable for WBC 19.92, hemoglobin 11.1, creatinine 0.99, potassium 4.0, CO2 19, CT abdomen and pelvis with IV contrast showed numerous fluid-filled small bowel loops with diffuse wall thickening and enhancement of proximal jejunum may reflect enteritis, much of the colon is also fluid-filled with some mucosal enhancement may reflect colitis, left lung base chronic radiation related changes and scarring.    C difficile toxin and antigen both negative.  She was given IV fluids and referred to hospital medicine service for further evaluation and management    ROS   Except as documented, all other systems reviewed and negative     Past Medical History    Stage IV NSCLC  moderately differentiated adenocarcinoma WILFREDO diagnosed 2014   RT+ weekly Carbo/Taxol started 7/2014 to 8/2014  Maintenance bevacizumab Q3 weeks 4/9/15 to 9/21/2016  RT + weekly Carbo/Taxol at [facility]  9/11/2019 to 10/22/2019  RT 12/17/2020 through 12/21/2020.  Radiation to the left and right lung  Carboplatin, pemetrexed, nivolumab, ipilimumab 12/20/2022-current  Hoarseness, chronic cough, microaspiration, on modified diet  Unqualified COPD  HTN  HLD  Hypothyroid - levothyroxine increased from 88 to 125 on 9/5/2024  Anxiety  Osteoarthritis  PAD on Plavix    Surgical History   Hysterectomy  Cholecystectomy  Bilateral knee replacements  Bilateral hip replacements  Bilateral LE PTA/intervention - details unknown  Bronchoscopy/EBUS/biopsy   Thyroid biopsy  Bladder suspension    Social History     Social History     Tobacco Use    Smoking status: Every Day     Current packs/day: 0.25     Average packs/day: 0.3 packs/day for 65.7 years (16.4 ttl pk-yrs)     Types: Cigarettes     Start date: 1959    Smokeless tobacco: Never    Tobacco comments:     Patient states she is trying to quit smoking.   Substance Use Topics    Alcohol use: Not Currently        Screening for social drivers of health:  Patient screened for food insecurity, housing instability, transportation needs, utility difficulties, and interpersonal safety:  []Housing or food  []Transportation needs  []Utility difficulties  []Interpersonal safety  [x]None    Family History   Reviewed and negative    Allergies   Patient has no known allergies.    Home Medications     Prior to Admission medications    Medication Sig Start Date End Date Taking? Authorizing Provider   albuterol (VENTOLIN HFA) 90 mcg/actuation inhaler Inhale 2 puffs into the lungs every 6 (six) hours as needed for Wheezing. Rescue 3/12/24   Bautista Stern MD   carvediloL (COREG) 6.25 MG tablet Take 6.25 mg by mouth 2 (two) times daily. 6/19/22   Provider, Historical    clopidogreL (PLAVIX) 75 mg tablet  10/12/23   Provider, Historical   furosemide (LASIX) 20 MG tablet  5/9/24   Provider, Historical   levothyroxine (SYNTHROID) 88 MCG tablet Take 1 tablet (88 mcg total) by mouth every morning. 8/22/23   Enrrique Gudino II, MD   losartan (COZAAR) 50 MG tablet  5/9/24   Provider, Historical   pantoprazole (PROTONIX) 40 MG tablet Take 1 tablet (40 mg total) by mouth once daily. 7/27/23 7/8/24  Enrrique Gudino II, MD   simvastatin (ZOCOR) 40 MG tablet Take 40 mg by mouth every evening. 6/19/22   Provider, Historical        Physical Exam   Vital Signs  Temp:  [98.8 °F (37.1 °C)]   Pulse:  [76-90]   Resp:  [16-20]   BP: ()/(46-65)   SpO2:  [94 %-96 %]    General: Appears comfortable  HEENT: NC/AT, dry skin and oral mucosa  Neck:  No JVD  Chest: CTABL  CVS: Regular rhythm. Normal S1/S2.  Abdomen: nondistended, normoactive BS, soft and non-tender.  MSK: No obvious deformity or joint swelling  Skin: Warm and dry  Neuro: AAOx3, no focal neurological deficit  Psych: Cooperative    Labs     Recent Labs     09/06/24 2130   WBC 19.92*   RBC 4.12*   HGB 11.1*   HCT 33.3*   MCV 80.8   MCH 26.9*   MCHC 33.3   RDW 19.2*        Recent Labs     09/06/24 2130   CRP 14.20*      Recent Labs     09/06/24  2130      K 4.0   CO2 19*   BUN 16.3   CREATININE 0.99   EGFRNORACEVR 58   GLUCOSE 83   CALCIUM 10.5*   ALBUMIN 2.8*   GLOBULIN 2.7   ALKPHOS 75   ALT 23   AST 23   BILITOT 0.3        Microbiology Results (last 7 days)       Procedure Component Value Units Date/Time    Blood Culture [[id number]]     Order Status: Sent Specimen: Blood     Blood Culture [[id number]]     Order Status: Sent Specimen: Blood     Stool Culture [[id number]]     Order Status: Sent Specimen: Stool     Clostridium Diff Toxin, A & B, EIA [[id number]]  (Normal) Collected: 09/06/24 2087    Order Status: Completed Specimen: Stool Updated: 09/07/24 0039     Clostridium Difficile GDH Antigen Negative      Clostridium Difficile Toxin A/B Negative    C. Difficile By Rapid Pcr [[id number]] Collected: 09/06/24 2347    Order Status: Sent Specimen: Stool Updated: 09/06/24 2347           Imaging     CT Abdomen Pelvis With IV Contrast NO Oral Contrast           CT Abdomen Pelvis With IV Contrast NO Oral Contrast  START OF REPORT:  Technique: CT of the abdomen and pelvis was performed with axial images as well as sagittal and coronal reconstruction images with intravenous contrast.    Comparison: None available.    Clinical History: Pt arrives via [facility], EMS / Pt reports that she has had multiple episodes of diarrhea today. Pt was Dc'd yesterday from [facility], due to vomiting and diarrhea. Pt is AAOx 4.    Dosage Information: Automated Exposure Control was utilized 485.73 mGy.cm.    Findings:  Technical notes: Metallic streak artifacts emanatiing from the patient's bilateral hip replacement hardware are seen, obscuring the adjacent structures and the portions of the pelvis.  Lines and Tubes: None.  Thorax:  Lungs: Mild streaky linear opacities are present at the visualized lung bases, consistent with nonspecific dependent changes scarring and atelectasis. There is pronounced bronchial wall thickening in dilatation of bronchi in the visualized left lung base with some associated streaky densities in the left lower lobe.  Pleura: No effusions or thickening are seen.  Heart: The heart size is within normal limits.  Abdomen:  Abdominal Wall: No abdominal wall pathology is seen.  Liver: There is mild intra and extrahepatic bile duct dilatation which likely reflects prior obstructive physiology with a liver otherwise appearing unremarkable.  Gallbladder: Surgical clips are seen in the gallbladder fossa consistent with prior cholecystectomy.  Pancreas: The pancreas appears unremarkable.  Spleen: The spleen appears unremarkable.  Adrenals: The adrenal glands appear unremarkable.  Kidneys: Multiple cysts are identified in the  left kidney the largest of which measures 6.8 mm is on Image 41, Series 2 in the upper pole of the left kidney. The left kidney otherwise appears unremarkable with no stones masses or hydronephrosis identified. Multiple cysts are identified in the right kidney the largest of which measures 4.2 cm is on Image 54, Series 2 in the lower pole of the right kidney. The right kidney otherwise appears unremarkable with no stones masses or hydronephrosis identified.  Aorta: There is pronounced calcification of the abdominal aorta and its branches.  IVC: Unremarkable.  Bowel:  Esophagus: The visualized distal esophagus appears unremarkable.  Stomach: The stomach appears unremarkable.  Duodenum: Unremarkable appearing duodenum.  Small Bowel: There are numerous fluid-filled small bowel loops. There is diffuse wall thickening and enhancement of the proximal jejunum, for instance at Series 2 Image 63-74. This may reflect an element of enteritis.  Colon: Much of the colon is also fluid-filled with some mucosal enhancement.  Appendix: The appendix is not identified but no inflammatory changes are seen in the right lower quadrant to suggest appendicitis.  Peritoneum: No intraperitoneal free air or ascites is seen.    Pelvis: The pelvic region is obscured by metallic artifacts, precluding proper evaluation of the urinary bladder and reproductive organs.    Bony structures:  Dorsal Spine: There is severe multilevel spondylosis of the visualized dorsal spine. Posterior spinal fixation hardware and associated post laminectomy changes are seen transfixing L3 down through S1.  Bony Pelvis: The patient is post bilateral hip replacement.    Impression:  1. There are numerous fluid-filled small bowel loops. There is diffuse wall thickening and enhancement of the proximal jejunum, for instance at Series 2 Image 63-74. This may reflect an element of enteritis. Correlate with clinical and laboratory finidngs as regards further evaluation and  follow-up.  2. There is pronounced bronchial wall thickening in dilatation of bronchi in the visualized left lung base with some associated streaky densities in the left lower lobe. This is consistent with chronic bronchitis with associated scarring with possible mild infectious component not entirely exclude. Correlate with clinical and laboratory findings as regards additional evaluation and follow-up.  3. Much of the colon is also fluid-filled with some mucosal enhancement. This may reflect colitis.  4. Details and other findings as discussed above.    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    History of unquantified COPD, CAD on Plavix, HTN, HLD, hoarseness/chronic cough/chronic microaspiration on modified diet    Plan   Blood culture, Stool leukocyte, stool culture, GI PCR panel  LR at 125 mL/hour  IV Cipro/metronidazole  Given degree of diarrhea (moderate to severe) will start prednisone 1 mg/kg daily  Will consult Oncology and Gastroenterology.  Home medication reviewed and resumed  VTE Prophylaxis:  SCD/enoxaparin 40 mg subQ daily      Kailee Gentile MD  Internal Medicine  9/7/2024. at 1:20 AM.

## 2024-09-07 NOTE — ED PROVIDER NOTES
Encounter Date: 9/6/2024    SCRIBE #1 NOTE: I, Ochoa Hutson, am scribing for, and in the presence of,  Abad Alston MD. I have scribed the following portions of the note - Other sections scribed: HPI, ROS, PE.       History     Chief Complaint   Patient presents with    Diarrhea     Pt arrives via [facility], EMS / Pt reports that she has had multiple episodes of diarrhea today. Pt was Dc'd yesterday from [facility], due to vomiting and diarrhea. Pt is AAOx 4.     79 year old female with a pmhx of adenocarcinoma of the lung, anemia, anxiety, arthritis, COPD, depression, HLD, HTN, thyroid disease, and secondary malignant neoplasm of the lymph nodes presents to the ED via EMS for diarrhea onset this afternoon.  The patient reports associated symptoms of nausea and vomiting.  She denies fever, chills, abdominal pain, shortness of breath, hematochezia, hematemesis, weakness, or dizziness.  She states that she feels an improvement in her symptoms and has no complaints at this time.  She reports that her children had called EMS.  The patient was discharged from [facility] yesterday after being there for vomiting and diarrhea.  The patient's oncologist is Dr. Enrrique Gudino, and her PCP is Graham Nguyen NP.    The history is provided by the patient. No  was used.     Review of patient's allergies indicates:  No Known Allergies  Past Medical History:   Diagnosis Date    Adenocarcinoma of lung     Anemia     Anxiety     Arthritis     COPD (chronic obstructive pulmonary disease)     Depression     HLD (hyperlipidemia)     Hypertension     Lung cancer     Secondary malignant neoplasm of lymph nodes     Secondary malignant neoplasm of right lung     Thyroid disease     lymph nodes malignant     Past Surgical History:   Procedure Laterality Date    BLADDER SURGERY      CHOLECYSTECTOMY      COLONOSCOPY  2018    ENDOBRONCHIAL ULTRASOUND N/A 11/16/2022    Procedure: ENDOBRONCHIAL  ULTRASOUND (EBUS);  Surgeon: Anthony Hutson MD;  Location: [facility] BRONCHOSCOPY [facility];  Service: Pulmonary;  Laterality: N/A;    HYSTERECTOMY      KNEE SURGERY Bilateral     replaced knees    PARTIAL HIP ARTHROPLASTY Bilateral      Family History   Problem Relation Name Age of Onset    Lung cancer Mother      Lung cancer Brother       Social History     Tobacco Use    Smoking status: Every Day     Current packs/day: 0.25     Average packs/day: 0.3 packs/day for 65.7 years (16.4 ttl pk-yrs)     Types: Cigarettes     Start date: 1959    Smokeless tobacco: Never    Tobacco comments:     Patient states she is trying to quit smoking.   Substance Use Topics    Alcohol use: Not Currently    Drug use: Never     Review of Systems   Constitutional:  Negative for chills and fever.   Respiratory:  Negative for shortness of breath.    Gastrointestinal:  Positive for diarrhea, nausea and vomiting. Negative for abdominal pain and blood in stool.   Neurological:  Negative for dizziness and weakness.       Physical Exam     Initial Vitals [09/06/24 2059]   BP Pulse Resp Temp SpO2   (!) 96/46 90 20 98.8 °F (37.1 °C) 96 %      MAP       --         Physical Exam    Nursing note and vitals reviewed.  Constitutional: She appears well-developed. She is not diaphoretic. No distress.   Thin   HENT:   Head: Normocephalic and atraumatic.   Right Ear: External ear normal.   Left Ear: External ear normal.   Nose: Nose normal.   Eyes: Conjunctivae and EOM are normal. Pupils are equal, round, and reactive to light. Right eye exhibits no discharge. Left eye exhibits no discharge.   Cardiovascular:  Normal rate, regular rhythm, normal heart sounds and intact distal pulses.     Exam reveals no gallop and no friction rub.       No murmur heard.  Pulmonary/Chest: Breath sounds normal. No respiratory distress. She has no wheezes. She has no rhonchi. She has no rales. She exhibits no tenderness.   Port to the right upper chest   Abdominal: Abdomen is  soft. Bowel sounds are normal. She exhibits no distension and no mass. There is no abdominal tenderness. There is no rebound and no guarding.   Musculoskeletal:         General: No edema. Normal range of motion.     Neurological: She is alert and oriented to person, place, and time. No cranial nerve deficit or sensory deficit.   Skin: Skin is warm and dry. Capillary refill takes less than 2 seconds. No erythema. No pallor.         ED Course   Procedures  Labs Reviewed   COMPREHENSIVE METABOLIC PANEL - Abnormal       Result Value    Sodium 141      Potassium 4.0      Chloride 113 (*)     CO2 19 (*)     Glucose 83      Blood Urea Nitrogen 16.3      Creatinine 0.99      Calcium 10.5 (*)     Protein Total 5.5 (*)     Albumin 2.8 (*)     Globulin 2.7      Albumin/Globulin Ratio 1.0 (*)     Bilirubin Total 0.3      ALP 75      ALT 23      AST 23      eGFR 58      Anion Gap 9.0      BUN/Creatinine Ratio 16     CBC WITH DIFFERENTIAL - Abnormal    WBC 19.92 (*)     RBC 4.12 (*)     Hgb 11.1 (*)     Hct 33.3 (*)     MCV 80.8      MCH 26.9 (*)     MCHC 33.3      RDW 19.2 (*)     Platelet 265      MPV 9.0      Neut % 87.9      Lymph % 4.4      Mono % 6.0      Eos % 0.3      Basophil % 0.6      Lymph # 0.88      Neut # 17.52 (*)     Mono # 1.19      Eos # 0.06      Baso # 0.12      IG# 0.15 (*)     IG% 0.8      NRBC% 0.0     CLOSTRIDIUM DIFFICILE TOXIN A AND B, EIA   C. DIFFICILE BY RAPID PCR   CBC W/ AUTO DIFFERENTIAL    Narrative:     The following orders were created for panel order CBC auto differential.  Procedure                               Abnormality         Status                     ---------                               -----------         ------                     CBC with Differential[[id number]]       Abnormal            Final result                 Please view results for these tests on the individual orders.          Imaging Results              CT Abdomen Pelvis With IV Contrast NO Oral Contrast (Preliminary  result)  Result time 09/06/24 23:21:29      Preliminary result by Yovani Gould MD (09/06/24 23:21:29)                   Narrative:    START OF REPORT:  Technique: CT of the abdomen and pelvis was performed with axial images as well as sagittal and coronal reconstruction images with intravenous contrast.    Comparison: None available.    Clinical History: Pt arrives via [facility], EMS / Pt reports that she has had multiple episodes of diarrhea today. Pt was Dc'd yesterday from [facility], due to vomiting and diarrhea. Pt is AAOx 4.    Dosage Information: Automated Exposure Control was utilized 485.73 mGy.cm.    Findings:  Technical notes: Metallic streak artifacts emanatiing from the patient's bilateral hip replacement hardware are seen, obscuring the adjacent structures and the portions of the pelvis.  Lines and Tubes: None.  Thorax:  Lungs: Mild streaky linear opacities are present at the visualized lung bases, consistent with nonspecific dependent changes scarring and atelectasis. There is pronounced bronchial wall thickening in dilatation of bronchi in the visualized left lung base with some associated streaky densities in the left lower lobe.  Pleura: No effusions or thickening are seen.  Heart: The heart size is within normal limits.  Abdomen:  Abdominal Wall: No abdominal wall pathology is seen.  Liver: There is mild intra and extrahepatic bile duct dilatation which likely reflects prior obstructive physiology with a liver otherwise appearing unremarkable.  Gallbladder: Surgical clips are seen in the gallbladder fossa consistent with prior cholecystectomy.  Pancreas: The pancreas appears unremarkable.  Spleen: The spleen appears unremarkable.  Adrenals: The adrenal glands appear unremarkable.  Kidneys: Multiple cysts are identified in the left kidney the largest of which measures 6.8 mm is on Image 41, Series 2 in the upper pole of the left kidney. The left kidney otherwise appears unremarkable with no  stones masses or hydronephrosis identified. Multiple cysts are identified in the right kidney the largest of which measures 4.2 cm is on Image 54, Series 2 in the lower pole of the right kidney. The right kidney otherwise appears unremarkable with no stones masses or hydronephrosis identified.  Aorta: There is pronounced calcification of the abdominal aorta and its branches.  IVC: Unremarkable.  Bowel:  Esophagus: The visualized distal esophagus appears unremarkable.  Stomach: The stomach appears unremarkable.  Duodenum: Unremarkable appearing duodenum.  Small Bowel: There are numerous fluid-filled small bowel loops. There is diffuse wall thickening and enhancement of the proximal jejunum, for instance at Series 2 Image 63-74. This may reflect an element of enteritis.  Colon: Much of the colon is also fluid-filled with some mucosal enhancement.  Appendix: The appendix is not identified but no inflammatory changes are seen in the right lower quadrant to suggest appendicitis.  Peritoneum: No intraperitoneal free air or ascites is seen.    Pelvis: The pelvic region is obscured by metallic artifacts, precluding proper evaluation of the urinary bladder and reproductive organs.    Bony structures:  Dorsal Spine: There is severe multilevel spondylosis of the visualized dorsal spine. Posterior spinal fixation hardware and associated post laminectomy changes are seen transfixing L3 down through S1.  Bony Pelvis: The patient is post bilateral hip replacement.      Impression:  1. There are numerous fluid-filled small bowel loops. There is diffuse wall thickening and enhancement of the proximal jejunum, for instance at Series 2 Image 63-74. This may reflect an element of enteritis. Correlate with clinical and laboratory finidngs as regards further evaluation and follow-up.  2. There is pronounced bronchial wall thickening in dilatation of bronchi in the visualized left lung base with some associated streaky densities in the  left lower lobe. This is consistent with chronic bronchitis with associated scarring with possible mild infectious component not entirely exclude. Correlate with clinical and laboratory findings as regards additional evaluation and follow-up.  3. Much of the colon is also fluid-filled with some mucosal enhancement. This may reflect colitis.  4. Details and other findings as discussed above.                                         Medications   sodium chloride 0.9% bolus 1,000 mL 1,000 mL (1,000 mLs Intravenous New Bag 9/6/24 2320)   iohexoL (OMNIPAQUE 350) injection 80 mL (80 mLs Intravenous Given 9/6/24 2235)     Medical Decision Making  The differential diagnoses includes but is not limited to:  Colitis, gastroenteritis, Clostridium difficile he was small-bowel obstruction.      Patient was awake alert well-appearing here in the emergency department.  Adamantly denies any complaints however her workup does reveal significant leukocytosis with left shift, CT scan with dilated loops of bowel no obvious small bowel obstruction but what appears to be colitis, enteritis.  Family room reports he was recently admitted to outside hospital at that time was on Cipro and Flagyl.  She possibly he was develop some C diff especially given recent hospitalization, recent antibiotic use.  C diff testing has been acquired.  However given her advanced age, nausea vomiting will admit for further evaluation management.  Discussed with Laurita Verduzco, on-call for hospitalist.  Happy to admit.  Initial blood pressure was soft, improved with fluid resuscitation.      Amount and/or Complexity of Data Reviewed  External Data Reviewed: notes.     Details: See ED course  Labs: ordered. Decision-making details documented in ED Course.  Radiology: ordered and independent interpretation performed.     Details: Dilated loops of bowel    Risk  OTC drugs.  Prescription drug management.  Decision regarding hospitalization.            Scribe  Attestation:   Scribe #1: I performed the above scribed service and the documentation accurately describes the services I performed. I attest to the accuracy of the note.    Attending Attestation:           Physician Attestation for Scribe:  Physician Attestation Statement for Scribe #1: I, Abad Alston MD, reviewed documentation, as scribed by Ochoa Hutson in my presence, and it is both accurate and complete.             ED Course as of 09/07/24 0003   Fri Sep 06, 2024   2208 CBC auto differential(!)  Marked leukocytosis with left shift.  Improving anemia [MM]   2208 Comprehensive metabolic panel(!)  Mildly decreased bicarb.  No significant electrolyte abnormality or renal dysfunction. [MM]   2350 Chart review reveals history of stage IV moderately differentiated adenocarcinoma. Combination chemo immunotherapy with carboplatin, pemetrexed, nivolumab, ipilimumab based off of checkmate 9LA. [MM]   Sat Sep 07, 2024   0001 EKG done at 11:57 p.m. shows sinus rhythm rate of 78 .  Normal axis.  No ST elevation or depression. [MM]      ED Course User Index  [MM] Abad Alston MD                           Clinical Impression:  Final diagnoses:  [R11.2] Nausea & vomiting  [K52.9] Colitis (Primary)          ED Disposition Condition    Admit Stable                Abad Alston MD  09/06/24 2349       Abad Alston MD  09/06/24 2350       Abad Alston MD  09/07/24 0003

## 2024-09-08 LAB
ANION GAP SERPL CALC-SCNC: 8 MEQ/L
BASOPHILS # BLD AUTO: 0.03 X10(3)/MCL
BASOPHILS NFR BLD AUTO: 0.2 %
BUN SERPL-MCNC: 15.5 MG/DL (ref 9.8–20.1)
CALCIUM SERPL-MCNC: 9.6 MG/DL (ref 8.4–10.2)
CHLORIDE SERPL-SCNC: 113 MMOL/L (ref 98–107)
CO2 SERPL-SCNC: 18 MMOL/L (ref 23–31)
CREAT SERPL-MCNC: 0.97 MG/DL (ref 0.55–1.02)
CREAT/UREA NIT SERPL: 16
EOSINOPHIL # BLD AUTO: 0 X10(3)/MCL (ref 0–0.9)
EOSINOPHIL NFR BLD AUTO: 0 %
ERYTHROCYTE [DISTWIDTH] IN BLOOD BY AUTOMATED COUNT: 19.8 % (ref 11.5–17)
GFR SERPLBLD CREATININE-BSD FMLA CKD-EPI: 60 ML/MIN/1.73/M2
GLUCOSE SERPL-MCNC: 137 MG/DL (ref 82–115)
HCT VFR BLD AUTO: 27.3 % (ref 37–47)
HGB BLD-MCNC: 8.8 G/DL (ref 12–16)
IMM GRANULOCYTES # BLD AUTO: 0.1 X10(3)/MCL (ref 0–0.04)
IMM GRANULOCYTES NFR BLD AUTO: 0.7 %
LYMPHOCYTES # BLD AUTO: 1.34 X10(3)/MCL (ref 0.6–4.6)
LYMPHOCYTES NFR BLD AUTO: 9 %
MCH RBC QN AUTO: 26.7 PG (ref 27–31)
MCHC RBC AUTO-ENTMCNC: 32.2 G/DL (ref 33–36)
MCV RBC AUTO: 83 FL (ref 80–94)
MONOCYTES # BLD AUTO: 0.96 X10(3)/MCL (ref 0.1–1.3)
MONOCYTES NFR BLD AUTO: 6.4 %
NEUTROPHILS # BLD AUTO: 12.54 X10(3)/MCL (ref 2.1–9.2)
NEUTROPHILS NFR BLD AUTO: 83.7 %
NRBC BLD AUTO-RTO: 0 %
OHS QRS DURATION: 94 MS
OHS QTC CALCULATION: 401 MS
PLATELET # BLD AUTO: 268 X10(3)/MCL (ref 130–400)
PMV BLD AUTO: 9.6 FL (ref 7.4–10.4)
POTASSIUM SERPL-SCNC: 3.1 MMOL/L (ref 3.5–5.1)
RBC # BLD AUTO: 3.29 X10(6)/MCL (ref 4.2–5.4)
SODIUM SERPL-SCNC: 139 MMOL/L (ref 136–145)
WBC # BLD AUTO: 14.97 X10(3)/MCL (ref 4.5–11.5)

## 2024-09-08 PROCEDURE — 11000001 HC ACUTE MED/SURG PRIVATE ROOM

## 2024-09-08 PROCEDURE — 25000003 PHARM REV CODE 250: Performed by: INTERNAL MEDICINE

## 2024-09-08 PROCEDURE — 85025 COMPLETE CBC W/AUTO DIFF WBC: CPT | Performed by: INTERNAL MEDICINE

## 2024-09-08 PROCEDURE — 80048 BASIC METABOLIC PNL TOTAL CA: CPT | Performed by: INTERNAL MEDICINE

## 2024-09-08 PROCEDURE — 99232 SBSQ HOSP IP/OBS MODERATE 35: CPT | Mod: ,,, | Performed by: INTERNAL MEDICINE

## 2024-09-08 PROCEDURE — 36415 COLL VENOUS BLD VENIPUNCTURE: CPT | Performed by: INTERNAL MEDICINE

## 2024-09-08 PROCEDURE — 63600175 PHARM REV CODE 636 W HCPCS: Performed by: INTERNAL MEDICINE

## 2024-09-08 PROCEDURE — S5010 5% DEXTROSE AND 0.45% SALINE: HCPCS | Performed by: INTERNAL MEDICINE

## 2024-09-08 RX ORDER — LOPERAMIDE HYDROCHLORIDE 2 MG/1
2 CAPSULE ORAL 4 TIMES DAILY
Status: DISCONTINUED | OUTPATIENT
Start: 2024-09-08 | End: 2024-09-12 | Stop reason: HOSPADM

## 2024-09-08 RX ORDER — CHOLESTYRAMINE 4 G/4.8G
1 POWDER, FOR SUSPENSION ORAL 2 TIMES DAILY
Status: DISCONTINUED | OUTPATIENT
Start: 2024-09-08 | End: 2024-09-12 | Stop reason: HOSPADM

## 2024-09-08 RX ORDER — POTASSIUM CHLORIDE 20 MEQ/1
40 TABLET, EXTENDED RELEASE ORAL 2 TIMES DAILY
Status: DISCONTINUED | OUTPATIENT
Start: 2024-09-08 | End: 2024-09-08

## 2024-09-08 RX ADMIN — LEVOTHYROXINE SODIUM 125 MCG: 125 TABLET ORAL at 05:09

## 2024-09-08 RX ADMIN — LOPERAMIDE HYDROCHLORIDE 2 MG: 2 CAPSULE ORAL at 06:09

## 2024-09-08 RX ADMIN — MUPIROCIN: 20 OINTMENT TOPICAL at 08:09

## 2024-09-08 RX ADMIN — CIPROFLOXACIN 400 MG: 400 INJECTION, SOLUTION INTRAVENOUS at 05:09

## 2024-09-08 RX ADMIN — CARVEDILOL 6.25 MG: 3.12 TABLET, FILM COATED ORAL at 08:09

## 2024-09-08 RX ADMIN — CHOLESTYRAMINE 4 GRAMS OF ANHYDROUS CHOLESTYRAMINE: 4 POWDER, FOR SUSPENSION ORAL at 08:09

## 2024-09-08 RX ADMIN — CIPROFLOXACIN 400 MG: 400 INJECTION, SOLUTION INTRAVENOUS at 06:09

## 2024-09-08 RX ADMIN — DEXTROSE AND SODIUM CHLORIDE: 5; 450 INJECTION, SOLUTION INTRAVENOUS at 05:09

## 2024-09-08 RX ADMIN — LOPERAMIDE HYDROCHLORIDE 2 MG: 2 CAPSULE ORAL at 08:09

## 2024-09-08 RX ADMIN — LOPERAMIDE HYDROCHLORIDE 2 MG: 2 CAPSULE ORAL at 02:09

## 2024-09-08 RX ADMIN — SODIUM BICARBONATE 650 MG TABLET 1300 MG: at 08:09

## 2024-09-08 RX ADMIN — ATORVASTATIN CALCIUM 20 MG: 10 TABLET, FILM COATED ORAL at 08:09

## 2024-09-08 RX ADMIN — METRONIDAZOLE 500 MG: 5 INJECTION, SOLUTION INTRAVENOUS at 10:09

## 2024-09-08 RX ADMIN — CLOPIDOGREL BISULFATE 75 MG: 75 TABLET ORAL at 08:09

## 2024-09-08 RX ADMIN — ENOXAPARIN SODIUM 40 MG: 40 INJECTION SUBCUTANEOUS at 06:09

## 2024-09-08 RX ADMIN — DEXTROSE AND SODIUM CHLORIDE 1000 ML: 5; 450 INJECTION, SOLUTION INTRAVENOUS at 08:09

## 2024-09-08 RX ADMIN — METRONIDAZOLE 500 MG: 5 INJECTION, SOLUTION INTRAVENOUS at 02:09

## 2024-09-08 RX ADMIN — METRONIDAZOLE 500 MG: 5 INJECTION, SOLUTION INTRAVENOUS at 08:09

## 2024-09-08 RX ADMIN — CHOLESTYRAMINE 4 GRAMS OF ANHYDROUS CHOLESTYRAMINE: 4 POWDER, FOR SUSPENSION ORAL at 02:09

## 2024-09-08 RX ADMIN — LOSARTAN POTASSIUM 50 MG: 50 TABLET, FILM COATED ORAL at 08:09

## 2024-09-08 RX ADMIN — POTASSIUM BICARBONATE 20 MEQ: 391 TABLET, EFFERVESCENT ORAL at 08:09

## 2024-09-08 RX ADMIN — PREDNISONE 66 MG: 10 TABLET ORAL at 11:09

## 2024-09-08 RX ADMIN — POTASSIUM BICARBONATE 20 MEQ: 391 TABLET, EFFERVESCENT ORAL at 11:09

## 2024-09-08 NOTE — PROGRESS NOTES
[facility]  Hospital Medicine Progress Note        Chief Complaint: Inpatient Follow-up     HPI:   79-year-old female medical history of stage IV lung adenocarcinoma initially diagnosed in 2014 went through multiple treatment regimen and currently on carboplatin+ pemetrexed+ nivolumab+ ipilimumab since 12/20/2022.     Present to the ED with chief complaint of diarrhea.  Report diarrhea has been ongoing for the past month minimum 3 loose watery bowel movements per day and some days up to 8 bowel movements per day, she was hospitalized last week for 8 days at [facility] and underwent infectious workup for diarrhea and treated for dehydration and MT and was discharged on a course of Cipro and Flagyl yesterday 09/05/2024.  Report again today she had multiple episode of watery diarrhea about 6 bowel movement, feeling weak and lightheaded as well as report nauseated and vomited once.  Denies any abdominal pain, fever or chills.     On arrival to ED she was afebrile, borderline low blood pressure 96/46 and saturating 96% on room air.  Labs notable for WBC 19.92, hemoglobin 11.1, creatinine 0.99, potassium 4.0, CO2 19, CT abdomen and pelvis with IV contrast showed numerous fluid-filled small bowel loops with diffuse wall thickening and enhancement of proximal jejunum may reflect enteritis, much of the colon is also fluid-filled with some mucosal enhancement may reflect colitis, left lung base chronic radiation related changes and scarring.     C difficile toxin and antigen both negative.  She was given IV fluids and referred to hospital medicine service for further evaluation and management    Heme-Onc, GI was consulted.    Interval Hx:   No acute events reported overnight.  RN reported rectal tube insertion yesterday given the degree of diarrhea to prevent skin break  Patient was seen at bedside this morning, multiple family members in the room, she was lying in the bed, denied  abdominal pain nausea vomiting, reported diarrhea watery stool rectal tube in place  Encourage p.o. intake  Case was discussed with patient's nurse     Patient hemodynamically stable on room air     Labs from this morning showed bicarb 18, potassium 3.1, chloride 113, WBC 14.97 K, hemoglobin 8.8    Objective/physical exam:  General: In no acute distress, afebrile  Chest:  Unlabored breathing   Heart:  Normal  Abdomen: Soft, nontender, rectal tube in place with a watery stool  MSK: Warm, no lower extremity edema  Neurologic: Alert and responding appropriately  VITAL SIGNS: 24 HRS MIN & MAX LAST   Temp  Min: 96.9 °F (36.1 °C)  Max: 97.7 °F (36.5 °C) 97.7 °F (36.5 °C)   BP  Min: 104/51  Max: 131/65 131/65   Pulse  Min: 60  Max: 75  61   No data recorded 16   SpO2  Min: 92 %  Max: 96 % (!) 92 %     I have reviewed the following labs:  Recent Labs   Lab 09/06/24 2130 09/07/24 0539 09/08/24  0451   WBC 19.92* 16.81* 14.97*   RBC 4.12* 3.98* 3.29*   HGB 11.1* 10.6* 8.8*   HCT 33.3* 32.8* 27.3*   MCV 80.8 82.4 83.0   MCH 26.9* 26.6* 26.7*   MCHC 33.3 32.3* 32.2*   RDW 19.2* 19.4* 19.8*    253 268   MPV 9.0 9.1 9.6     Recent Labs   Lab 09/06/24 2130 09/07/24 0539 09/08/24  0451    141 139   K 4.0 3.5 3.1*   * 116* 113*   CO2 19* 18* 18*   BUN 16.3 16.2 15.5   CREATININE 0.99 0.94 0.97   CALCIUM 10.5* 10.1 9.6   MG  --  1.60  --    ALBUMIN 2.8* 2.4*  --    ALKPHOS 75 64  --    ALT 23 20  --    AST 23 22  --    BILITOT 0.3 0.3  --      Microbiology Results (last 7 days)       Procedure Component Value Units Date/Time    Stool Culture [[id number]] Collected: 09/07/24 1034    Order Status: Sent Specimen: Stool Updated: 09/07/24 1046    Blood Culture [[id number]] Collected: 09/07/24 0539    Order Status: Resulted Specimen: Blood Updated: 09/07/24 0544    Blood Culture [[id number]] Collected: 09/07/24 0539    Order Status: Resulted Specimen: Blood Updated: 09/07/24 0543    Clostridium Diff Toxin, A & B, EIA  [[id number]]  (Normal) Collected: 09/06/24 2347    Order Status: Completed Specimen: Stool Updated: 09/07/24 0039     Clostridium Difficile GDH Antigen Negative     Clostridium Difficile Toxin A/B Negative    C. 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     History of unquantified COPD, CAD on Plavix, HTN, HLD, hoarseness/chronic cough/chronic microaspiration on modified diet    Fecal leukocytes positive but Infectious workup so far negative  Continue p.o. steroids,Empiric antibiotics   GI following, input noted, Appreciate GI recommendations  Heme-Onc following, noted inputs  Continue IV fluids, encourage p.o. intake   Replete electrolytes   Supportive care   Labs in a.m.      VTE prophylaxis:  Lovenox    Patient condition:  Fair    Anticipated discharge and Disposition:   TBD      Portions of this note dictated using EMR integrated voice recognition software, and may be subject to voice recognition errors not corrected at proofreading. Please contact writer for clarification if needed.   _____________________________________________________________________    Malnutrition Status:    Scheduled Med:   atorvastatin  20 mg Oral Daily    carvediloL  6.25 mg Oral BID    ciprofloxacin  400 mg Intravenous Q12H    clopidogreL  75 mg Oral Daily    enoxparin  40 mg Subcutaneous Q24H (prophylaxis, 1700)    levothyroxine  125 mcg Oral Before breakfast    losartan  50 mg Oral Daily    metroNIDAZOLE IV (PEDS and ADULTS)  500 mg Intravenous Q8H    mupirocin   Nasal BID    predniSONE  1 mg/kg Oral Daily    sodium bicarbonate  1,300 mg  Oral BID      Continuous Infusions:   D5 and 0.45% NaCl   Intravenous Continuous 100 mL/hr at 09/08/24 0545 New Bag at 09/08/24 0545      PRN Meds:    Current Facility-Administered Medications:     acetaminophen, 650 mg, Oral, Q4H PRN    aluminum-magnesium hydroxide-simethicone, 30 mL, Oral, QID PRN    melatonin, 6 mg, Oral, Nightly PRN    ondansetron, 4 mg, Intravenous, Q4H PRN    prochlorperazine, 5 mg, Intravenous, Q6H PRN    sodium chloride 0.9%, 10 mL, Intravenous, PRN     Radiology:  I have personally reviewed the following imaging and agree with the radiologist.     CT Abdomen Pelvis With IV Contrast NO Oral Contrast  Narrative: EXAMINATION:  CT ABDOMEN PELVIS WITH IV CONTRAST    CLINICAL HISTORY:  Abdominal abscess/infection suspected;    TECHNIQUE:  Helically acquired images with axial, sagittal and coronal reformations were obtained from the lung bases to the pubic symphysis after the IV administration of contrast.    Automated tube current modulation, weight-based exposure dosing, and/or iterative reconstruction technique utilized to reach lowest reasonably achievable exposure rate.    DLP: 486 mGy*cm    COMPARISON:  PET-CT 07/25/2024    FINDINGS:  HEART: There are coronary artery calcifications.    LUNG BASES: Tree-in-bud nodularity at the lung bases, left greater than right.    LIVER: Normal attenuation. No appreciable focal hepatic lesion.    BILIARY: Gallbladder is surgically absent.  Mild biliary ectasia with intrahepatic and extrahepatic biliary ductal dilatation.    PANCREAS: No inflammatory change.    SPLEEN: Normal in size    ADRENALS: No mass.    KIDNEYS/URETERS: The kidneys enhance symmetrically.  No hydronephrosis. Incidental right renal cyst requires no imaging follow-up.    GI TRACT/MESENTERY: The bowel is fluid distended from the stomach through the rectum.  No transition point seen.    PERITONEUM: No free fluid.No free air.    LYMPH NODES: No enlarged lymph nodes by size  criteria.    VASCULATURE: Aortoiliac atherosclerosis.    BLADDER: Obscured by beam hardening artifact.    REPRODUCTIVE ORGANS: Obscured by beam hardening artifact.    SOFT TISSUES: Unremarkable.    BONES: There are bilateral hip arthroplasties with associated beam hardening artifact.  Postsurgical change at the lumbosacral spine.  Impression: 1. The bowel is fluid distended from the level of the stomach through the rectum without transition point.  Appearance may be related to enterocolitis or ileus.  2. Tree-in-bud nodularity at the lung bases suggest infectious or inflammatory bronchiolitis.  3. Postop cholecystectomy with biliary ectasia  4. The preliminary and final reports are concordant.    Electronically signed by: Kathleen Scott  Date:    09/07/2024  Time:    12:00      Garett Zee MD  Department of Hospital Medicine   [facility]   09/08/2024

## 2024-09-08 NOTE — NURSING
Nurses Note -- 4 Eyes      9/8/2024   7:17 AM      Skin assessed during: Q Shift Change      [] No Altered Skin Integrity Present    []Prevention Measures Documented      [x] Yes- Altered Skin Integrity Present or Discovered   [] LDA Added if Not in Epic (Describe Wound)   [] New Altered Skin Integrity was Present on Admit and Documented in LDA   [] Wound Image Taken    Wound Care Consulted? Yes    Attending Nurse:  Wendy JOSEPH    Second RN/Staff Member:   Gladys WIGGINS

## 2024-09-08 NOTE — PROGRESS NOTES
[facility] - Oncology Acute  Hematology/Oncology  Progress Note      Consult Requested By: Garett Zee MD    Reason for Consult:     SUBJECTIVE:     History of Present Illness:  Patient is a 79 y.o. female presents with      Patient of Dr. 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  Patient was originally diagnosed in 2014 when she was seen for unintentional weight loss, shortness of breath, nonproductive cough.  Imaging studies suggestive of lung cancer.  Bronchoscopy done in June of 2014 revealed moderately differentiated adeno carcinoma of the lung.  She underwent chemoradiation from July of 2014 to August 20, 2014.  She was then placed on maintenance Avastin from April of 2015 through September of 2016.  Patient underwent thyroid biopsy in June of 2019 that showed metastatic carcinoma favor adeno carcinoma of the lung as primary.      She started chemoradiation with carbo Taxol in September 2019, completed in October 2019. Patient was doing well, imaging in October 2020 revealed an abnormality in the left lung, CT-guided lung biopsy on 11/03/2020 showed recurrent non-small cell lung cancer, Caris revealed PDL1 positivity.  She underwent SBRT from 12/17/2020 through 12/21/2020.  Patient then placed on surveillance imaging, most recently done on 07/05/2022 showing worsening disease in the right lung with a previous lesion that measured 0.6 cm and had an SUV of 1.0 now measuring 1.5 cm and an SUV of 8.4.  This was treated with SBRT in August of 2022.  PET/CT scan done on 11/8/2022 showed mild increased radiotracer activity within the left apical consolidation, SUV 5.5.  There was a newly hypermetabolic subcentimeter subcarinal lymph node.  A 1 cm right lower lobe nodule was smaller, he right upper lobe nodule was slightly larger measuring 6 mm.  No sites of FDG avid metastatic disease in the neck, abdomen, or pelvis. Bronchoscopy done on 11/16/2022,  pathology revealed malignant cells consistent with adenocarcinoma.     Caris did reveal that PD-L1 was 2% positive suggesting benefit from pembrolizumab or nivolumab/ipilimumab.   Patient was started on Combination chemo immunotherapy with carboplatin, pemetrexed, nivolumab, ipilimumab on 12/20/2022.     Patient is seen in rounds this morning.  Daughters at bedside.  She reports watery diarrhea.  She stated that this started probably close to a month ago but has been worsening to the point that she was having watery bowel stools up to 8-9 times per day.  She also reports occasional vomiting.    Patient is currently on Cipro Flagyl and was started on prednisone 1 milligram/kilogram daily.  She reports that today her diarrhea is a little bit better but so far she had 3 episodes of watery stools since this morning.    CT AP: The bowel is fluid distended from the level of the stomach through the rectum without transition point. Appearance may be related to enterocolitis or ileus.     No fever, chills, sweats.  No blood in the stools.    09/08/2024: Patient is seen and examined today in rounds.   at bedside.  She reported diarrhea is better but she has a rectal tube with watery stools.  No fever, chills, sweats.    Continuous Infusions:   D5 and 0.45% NaCl   Intravenous Continuous 100 mL/hr at 09/08/24 0545 New Bag at 09/08/24 0545     Scheduled Meds:   atorvastatin  20 mg Oral Daily    carvediloL  6.25 mg Oral BID    ciprofloxacin  400 mg Intravenous Q12H    clopidogreL  75 mg Oral Daily    enoxparin  40 mg Subcutaneous Q24H (prophylaxis, 1700)    levothyroxine  125 mcg Oral Before breakfast    losartan  50 mg Oral Daily    metroNIDAZOLE IV (PEDS and ADULTS)  500 mg Intravenous Q8H    mupirocin   Nasal BID    potassium chloride  40 mEq Oral BID    predniSONE  1 mg/kg Oral Daily    sodium bicarbonate  1,300 mg Oral BID     PRN Meds:  Current Facility-Administered Medications:     acetaminophen, 650 mg, Oral, Q4H  PRN    aluminum-magnesium hydroxide-simethicone, 30 mL, Oral, QID PRN    melatonin, 6 mg, Oral, Nightly PRN    ondansetron, 4 mg, Intravenous, Q4H PRN    prochlorperazine, 5 mg, Intravenous, Q6H PRN    sodium chloride 0.9%, 10 mL, Intravenous, PRN    Past Medical History:   Diagnosis Date    Adenocarcinoma of lung     Anemia     Anxiety     Arthritis     COPD (chronic obstructive pulmonary disease)     Depression     HLD (hyperlipidemia)     Hypertension     Lung cancer     Secondary malignant neoplasm of lymph nodes     Secondary malignant neoplasm of right lung     Thyroid disease     lymph nodes malignant     Past Surgical History:   Procedure Laterality Date    BLADDER SURGERY      CHOLECYSTECTOMY      COLONOSCOPY  2018    ENDOBRONCHIAL ULTRASOUND N/A 11/16/2022    Procedure: ENDOBRONCHIAL ULTRASOUND (EBUS);  Surgeon: Anthony Hutson MD;  Location: [facility]ONCH[facility];  Service: Pulmonary;  Laterality: N/A;    HYSTERECTOMY      KNEE SURGERY Bilateral     replaced knees    PARTIAL HIP ARTHROPLASTY Bilateral      Family History   Problem Relation Name Age of Onset    Lung cancer Mother      Lung cancer Brother       Social History     Tobacco Use    Smoking status: Every Day     Current packs/day: 0.25     Average packs/day: 0.3 packs/day for 65.7 years (16.4 ttl pk-yrs)     Types: Cigarettes     Start date: 1959    Smokeless tobacco: Never    Tobacco comments:     Patient states she is trying to quit smoking.   Substance Use Topics    Alcohol use: Not Currently    Drug use: Never       Review of patient's allergies indicates:  No Known Allergies   No current facility-administered medications on file prior to encounter.     Current Outpatient Medications on File Prior to Encounter   Medication Sig Dispense Refill    albuterol (VENTOLIN HFA) 90 mcg/actuation inhaler Inhale 2 puffs into the lungs every 6 (six) hours as needed for Wheezing. Rescue 90 g 4    carvediloL (COREG) 6.25 MG tablet Take 6.25 mg by mouth  2 (two) times daily.      ciprofloxacin HCl (CIPRO) 500 MG tablet Take 500 mg by mouth 2 (two) times daily.      clopidogreL (PLAVIX) 75 mg tablet       furosemide (LASIX) 20 MG tablet       levothyroxine (SYNTHROID) 125 MCG tablet Take 125 mcg by mouth before breakfast.      losartan (COZAAR) 50 MG tablet       metroNIDAZOLE (FLAGYL) 500 MG tablet Take 500 mg by mouth 3 (three) times daily.      simvastatin (ZOCOR) 40 MG tablet Take 40 mg by mouth every evening.         Review of Systems   Constitutional:  Positive for activity change, appetite change and fatigue. Negative for chills, fever and unexpected weight change.   HENT:  Negative for mouth dryness, mouth sores, nosebleeds, sore throat and trouble swallowing.    Eyes:  Negative for visual disturbance.   Respiratory:  Negative for cough and shortness of breath.    Cardiovascular:  Negative for chest pain, palpitations and leg swelling.   Gastrointestinal:  Positive for diarrhea. Negative for abdominal distention, abdominal pain, blood in stool, change in bowel habit, constipation, nausea and vomiting.   Endocrine: Negative.    Genitourinary:  Negative for dysuria, frequency, hematuria and urgency.   Musculoskeletal:  Negative for arthralgias, back pain, myalgias and neck pain.   Integumentary:  Negative for rash.   Neurological:  Positive for weakness. Negative for dizziness, tremors, syncope, speech difficulty, light-headedness, numbness, headaches and memory loss.   Hematological:  Does not bruise/bleed easily.   Psychiatric/Behavioral:  Negative for confusion and suicidal ideas.          OBJECTIVE:     Vital Signs (Most Recent)  Temp: 97.7 °F (36.5 °C) (09/08/24 0723)  Pulse: 61 (09/08/24 0723)  Resp: 16 (09/06/24 2350)  BP: 131/65 (09/08/24 0824)  SpO2: (!) 92 % (09/08/24 0723)    Pain Assessment: No pain reported at this time    Vital Signs Range (Last 24H):  Temp:  [96.9 °F (36.1 °C)-97.7 °F (36.5 °C)]   Pulse:  [60-75]   BP: (104-131)/(51-67)   SpO2:   [92 %-96 %]     Physical Exam:  Physical Exam  Vitals and nursing note reviewed.   Constitutional:       General: She is not in acute distress.     Appearance: She is ill-appearing.      Comments: Elderly female   HENT:      Head: Normocephalic and atraumatic.      Mouth/Throat:      Mouth: Mucous membranes are moist.   Eyes:      General: No scleral icterus.     Extraocular Movements: Extraocular movements intact.      Conjunctiva/sclera: Conjunctivae normal.      Pupils: Pupils are equal, round, and reactive to light.   Neck:      Vascular: No JVD.   Cardiovascular:      Rate and Rhythm: Normal rate and regular rhythm.      Heart sounds: No murmur heard.  Pulmonary:      Effort: Pulmonary effort is normal.      Breath sounds: Normal breath sounds. No wheezing or rhonchi.   Abdominal:      General: Bowel sounds are normal. There is no distension.      Palpations: Abdomen is soft. There is no mass.      Tenderness: There is no abdominal tenderness.   Musculoskeletal:         General: No swelling or deformity.      Cervical back: Neck supple.   Lymphadenopathy:      Head:      Right side of head: No submandibular adenopathy.      Left side of head: No submandibular adenopathy.      Cervical: No cervical adenopathy.      Upper Body:      Right upper body: No supraclavicular or axillary adenopathy.      Left upper body: No supraclavicular or axillary adenopathy.      Lower Body: No right inguinal adenopathy. No left inguinal adenopathy.   Skin:     General: Skin is warm.      Coloration: Skin is not jaundiced.      Findings: No lesion or rash.      Nails: There is no clubbing.   Neurological:      General: No focal deficit present.      Mental Status: She is alert and oriented to person, place, and time.      Cranial Nerves: Cranial nerves 2-12 are intact.   Psychiatric:         Attention and Perception: Attention normal.         Behavior: Behavior is cooperative.         Judgment: Judgment normal.  "        Laboratory:  CBC with Differential:  Recent Labs   Lab 09/08/24  0451   WBC 14.97*   RBC 3.29*   HCT 27.3*   HGB 8.8*   MCV 83.0   MCH 26.7*   RDW 19.8*      MPV 9.6     CMP:  Recent Labs   Lab 09/08/24  0451   CALCIUM 9.6      K 3.1*   CO2 18*   *   BUN 15.5   CREATININE 0.97     BMP:   Recent Labs   Lab 09/08/24  0451   CALCIUM 9.6      K 3.1*   CO2 18*   *   BUN 15.5   CREATININE 0.97     LFTs:   No results for input(s): "ALT", "AST", "ALKPHOS", "BILITOT", "PROT", "ALBUMIN" in the last 24 hours.    Haptoglobin: No results for input(s): "HAPTOGLOBIN" in the last 24 hours.  Tumor Markers: No results for input(s): "PSA", "CEA", "", "AFPTM", "MI1700", "" in the last 24 hours.    Invalid input(s): "ALGTM"  Immunology: No results for input(s): "SPEP", "JENNIFER", "DAVIDSON", "FREELAMBDALI" in the last 24 hours.  Coagulation: No results for input(s): "PT", "INR", "APTT" in the last 24 hours.  Specimen (24h ago, onward)      None          Microbiology Results (last 7 days)       Procedure Component Value Units Date/Time    Stool Culture [[id number]]  (Normal) Collected: 09/07/24 1034    Order Status: Completed Specimen: Stool Updated: 09/08/24 1002     Stool Culture Negative for Salmonella, Shigella, Campylobacter, Vibrio, Aeromonas, Pleisiomonas,Yersinia, or Shiga Toxin 1 and 2.    Blood Culture [[id number]] Collected: 09/07/24 0539    Order Status: Resulted Specimen: Blood Updated: 09/07/24 0544    Blood Culture [[id number]] Collected: 09/07/24 0539    Order Status: Resulted Specimen: Blood Updated: 09/07/24 0543    Clostridium Diff Toxin, A & B, EIA [[id number]]  (Normal) Collected: 09/06/24 2347    Order Status: Completed Specimen: Stool Updated: 09/07/24 0039     Clostridium Difficile GDH Antigen Negative     Clostridium Difficile Toxin A/B Negative    C. Difficile By Rapid Pcr [[id number]] Collected: 09/06/24 2347    Order Status: Canceled Specimen: Stool Updated: 09/06/24 " 2347            Diagnostic Results:  Imaging Results              CT Abdomen Pelvis With IV Contrast NO Oral Contrast (Final result)  Result time 09/07/24 12:00:52      Final result by Kathleen Scott MD (09/07/24 12:00:52)                   Impression:      1. The bowel is fluid distended from the level of the stomach through the rectum without transition point.  Appearance may be related to enterocolitis or ileus.  2. Tree-in-bud nodularity at the lung bases suggest infectious or inflammatory bronchiolitis.  3. Postop cholecystectomy with biliary ectasia  4. The preliminary and final reports are concordant.      Electronically signed by: Kathleen Scott  Date:    09/07/2024  Time:    12:00               Narrative:    EXAMINATION:  CT ABDOMEN PELVIS WITH IV CONTRAST    CLINICAL HISTORY:  Abdominal abscess/infection suspected;    TECHNIQUE:  Helically acquired images with axial, sagittal and coronal reformations were obtained from the lung bases to the pubic symphysis after the IV administration of contrast.    Automated tube current modulation, weight-based exposure dosing, and/or iterative reconstruction technique utilized to reach lowest reasonably achievable exposure rate.    DLP: 486 mGy*cm    COMPARISON:  PET-CT 07/25/2024    FINDINGS:  HEART: There are coronary artery calcifications.    LUNG BASES: Tree-in-bud nodularity at the lung bases, left greater than right.    LIVER: Normal attenuation. No appreciable focal hepatic lesion.    BILIARY: Gallbladder is surgically absent.  Mild biliary ectasia with intrahepatic and extrahepatic biliary ductal dilatation.    PANCREAS: No inflammatory change.    SPLEEN: Normal in size    ADRENALS: No mass.    KIDNEYS/URETERS: The kidneys enhance symmetrically.  No hydronephrosis. Incidental right renal cyst requires no imaging follow-up.    GI TRACT/MESENTERY: The bowel is fluid distended from the stomach through the rectum.  No transition point seen.    PERITONEUM:  No free fluid.No free air.    LYMPH NODES: No enlarged lymph nodes by size criteria.    VASCULATURE: Aortoiliac atherosclerosis.    BLADDER: Obscured by beam hardening artifact.    REPRODUCTIVE ORGANS: Obscured by beam hardening artifact.    SOFT TISSUES: Unremarkable.    BONES: There are bilateral hip arthroplasties with associated beam hardening artifact.  Postsurgical change at the lumbosacral spine.                                          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 Severe Diarrhea and enterocolitis  --was admitted to the hospital for 1 week in [place] were infectious workup was essentially normal.  --diarrhea may be immunotherapy related colitis  --on Imodium  --s/p rectal tube placement  --C diff PCR negative   --CT AP: The bowel is fluid distended from the level of the stomach through the rectum without transition point. Appearance may be related to enterocolitis or ileus.   3. Metabolic acidosis likely due to diarrhea  4. Hypokalemia  5. Anemia  6. Leukopenia    Plan  Continue steroids 1 milligram/kilogram daily  Continue Imodium  GI following  Agree with flex sig tomorrow.  Monitor chemistries and electrolytes closely  Replace potassium    Dr Gudino will be back tomorrow        Pati Coburn MD  Hematology/Oncology  [facility]

## 2024-09-08 NOTE — NURSING
Nurses Note -- 4 Eyes      9/8/2024   4:43 AM      Skin assessed during: Q Shift Change      [] No Altered Skin Integrity Present    []Prevention Measures Documented      [x] Yes- Altered Skin Integrity Present or Discovered   [] LDA Added if Not in Epic (Describe Wound)   [] New Altered Skin Integrity was Present on Admit and Documented in LDA   [] Wound Image Taken    Wound Care Consulted? No    Attending Nurse:  Gladys Peguero RN    Second RN/Staff Member:   Wendy Terrazas LPN

## 2024-09-08 NOTE — PROGRESS NOTES
"Gastroenterology Progress Note      Interval HPI:    Pt still having constant watery diarrhea, potassium is low at 3.1 today. 20 mEq ordered by hospital medicine.  Multiple family members at bedside. Pt is tolerating PO, no n/v. No abd pain.     ROS:    Review of Systems   Constitutional:  Negative for fever, malaise/fatigue and weight loss.   Respiratory:  Negative for cough and shortness of breath.    Cardiovascular:  Negative for chest pain, palpitations, orthopnea and leg swelling.   Gastrointestinal:  Positive for diarrhea. Negative for nausea and vomiting.   Genitourinary:  Negative for dysuria, frequency, hematuria and urgency.   Musculoskeletal:  Negative for back pain and myalgias.   Skin:  Negative for rash.   Neurological:  Negative for speech change and focal weakness.   All other systems reviewed and are negative.      Vital Signs:  BP (!) 137/52   Pulse 60   Temp 97.4 °F (36.3 °C) (Oral)   Resp 16   Ht 5' 4" (1.626 m)   Wt 64.1 kg (141 lb 5 oz)   SpO2 97%   BMI 24.26 kg/m²   Body mass index is 24.26 kg/m².    Physical Exam:    Physical Exam  Vitals and nursing note reviewed.   Constitutional:       Appearance: She is ill-appearing.   HENT:      Head: Normocephalic and atraumatic.   Eyes:      General: No scleral icterus.     Extraocular Movements: Extraocular movements intact.   Cardiovascular:      Rate and Rhythm: Normal rate and regular rhythm.      Heart sounds: Normal heart sounds.   Pulmonary:      Effort: Pulmonary effort is normal. No respiratory distress.      Breath sounds: Normal breath sounds.   Chest:      Comments: Mediport in right upper chest wall  Abdominal:      General: Abdomen is flat. Bowel sounds are normal. There is no distension.      Palpations: Abdomen is soft.      Tenderness: There is no abdominal tenderness.      Comments: Rectal tube with significant watery output   Musculoskeletal:         General: No swelling or deformity. Normal range of motion.      Right lower " leg: No edema.      Left lower leg: No edema.   Skin:     General: Skin is warm and dry.   Neurological:      General: No focal deficit present.      Mental Status: She is alert and oriented to person, place, and time.   Psychiatric:         Mood and Affect: Mood normal.         Behavior: Behavior normal.         Labs:  Recent Results (from the past 48 hour(s))   Comprehensive metabolic panel    Collection Time: 09/06/24  9:30 PM   Result Value Ref Range    Sodium 141 136 - 145 mmol/L    Potassium 4.0 3.5 - 5.1 mmol/L    Chloride 113 (H) 98 - 107 mmol/L    CO2 19 (L) 23 - 31 mmol/L    Glucose 83 82 - 115 mg/dL    Blood Urea Nitrogen 16.3 9.8 - 20.1 mg/dL    Creatinine 0.99 0.55 - 1.02 mg/dL    Calcium 10.5 (H) 8.4 - 10.2 mg/dL    Protein Total 5.5 (L) 5.8 - 7.6 gm/dL    Albumin 2.8 (L) 3.4 - 4.8 g/dL    Globulin 2.7 2.4 - 3.5 gm/dL    Albumin/Globulin Ratio 1.0 (L) 1.1 - 2.0 ratio    Bilirubin Total 0.3 <=1.5 mg/dL    ALP 75 40 - 150 unit/L    ALT 23 0 - 55 unit/L    AST 23 5 - 34 unit/L    eGFR 58 mL/min/1.73/m2    Anion Gap 9.0 mEq/L    BUN/Creatinine Ratio 16    CBC with Differential    Collection Time: 09/06/24  9:30 PM   Result Value Ref Range    WBC 19.92 (H) 4.50 - 11.50 x10(3)/mcL    RBC 4.12 (L) 4.20 - 5.40 x10(6)/mcL    Hgb 11.1 (L) 12.0 - 16.0 g/dL    Hct 33.3 (L) 37.0 - 47.0 %    MCV 80.8 80.0 - 94.0 fL    MCH 26.9 (L) 27.0 - 31.0 pg    MCHC 33.3 33.0 - 36.0 g/dL    RDW 19.2 (H) 11.5 - 17.0 %    Platelet 265 130 - 400 x10(3)/mcL    MPV 9.0 7.4 - 10.4 fL    Neut % 87.9 %    Lymph % 4.4 %    Mono % 6.0 %    Eos % 0.3 %    Basophil % 0.6 %    Lymph # 0.88 0.6 - 4.6 x10(3)/mcL    Neut # 17.52 (H) 2.1 - 9.2 x10(3)/mcL    Mono # 1.19 0.1 - 1.3 x10(3)/mcL    Eos # 0.06 0 - 0.9 x10(3)/mcL    Baso # 0.12 <=0.2 x10(3)/mcL    IG# 0.15 (H) 0 - 0.04 x10(3)/mcL    IG% 0.8 %    NRBC% 0.0 %   C-Reactive Protein    Collection Time: 09/06/24  9:30 PM   Result Value Ref Range    CRP 14.20 (H) <5.00 mg/L   TSH    Collection  Time: 09/06/24  9:30 PM   Result Value Ref Range    TSH 17.181 (H) 0.350 - 4.940 uIU/mL   Clostridium Diff Toxin, A & B, EIA    Collection Time: 09/06/24 11:47 PM    Specimen: Stool   Result Value Ref Range    Clostridium Difficile GDH Antigen Negative Negative    Clostridium Difficile Toxin A/B Negative Negative   Sedimentation rate    Collection Time: 09/07/24  5:39 AM   Result Value Ref Range    Sed Rate 23 (H) 0 - 20 mm/hr   Comprehensive Metabolic Panel    Collection Time: 09/07/24  5:39 AM   Result Value Ref Range    Sodium 141 136 - 145 mmol/L    Potassium 3.5 3.5 - 5.1 mmol/L    Chloride 116 (H) 98 - 107 mmol/L    CO2 18 (L) 23 - 31 mmol/L    Glucose 75 (L) 82 - 115 mg/dL    Blood Urea Nitrogen 16.2 9.8 - 20.1 mg/dL    Creatinine 0.94 0.55 - 1.02 mg/dL    Calcium 10.1 8.4 - 10.2 mg/dL    Protein Total 5.2 (L) 5.8 - 7.6 gm/dL    Albumin 2.4 (L) 3.4 - 4.8 g/dL    Globulin 2.8 2.4 - 3.5 gm/dL    Albumin/Globulin Ratio 0.9 (L) 1.1 - 2.0 ratio    Bilirubin Total 0.3 <=1.5 mg/dL    ALP 64 40 - 150 unit/L    ALT 20 0 - 55 unit/L    AST 22 5 - 34 unit/L    eGFR >60 mL/min/1.73/m2    Anion Gap 7.0 mEq/L    BUN/Creatinine Ratio 17    Magnesium    Collection Time: 09/07/24  5:39 AM   Result Value Ref Range    Magnesium Level 1.60 1.60 - 2.60 mg/dL   Phosphorus    Collection Time: 09/07/24  5:39 AM   Result Value Ref Range    Phosphorus Level 3.8 2.3 - 4.7 mg/dL   CBC with Differential    Collection Time: 09/07/24  5:39 AM   Result Value Ref Range    WBC 16.81 (H) 4.50 - 11.50 x10(3)/mcL    RBC 3.98 (L) 4.20 - 5.40 x10(6)/mcL    Hgb 10.6 (L) 12.0 - 16.0 g/dL    Hct 32.8 (L) 37.0 - 47.0 %    MCV 82.4 80.0 - 94.0 fL    MCH 26.6 (L) 27.0 - 31.0 pg    MCHC 32.3 (L) 33.0 - 36.0 g/dL    RDW 19.4 (H) 11.5 - 17.0 %    Platelet 253 130 - 400 x10(3)/mcL    MPV 9.1 7.4 - 10.4 fL    Neut % 84.8 %    Lymph % 8.1 %    Mono % 5.4 %    Eos % 0.4 %    Basophil % 0.6 %    Lymph # 1.37 0.6 - 4.6 x10(3)/mcL    Neut # 14.25 (H) 2.1 - 9.2  x10(3)/mcL    Mono # 0.91 0.1 - 1.3 x10(3)/mcL    Eos # 0.06 0 - 0.9 x10(3)/mcL    Baso # 0.10 <=0.2 x10(3)/mcL    IG# 0.12 (H) 0 - 0.04 x10(3)/mcL    IG% 0.7 %    NRBC% 0.0 %   Stool Culture    Collection Time: 09/07/24 10:34 AM    Specimen: Stool   Result Value Ref Range    Stool Culture       Negative for Salmonella, Shigella, Campylobacter, Vibrio, Aeromonas, Pleisiomonas,Yersinia, or Shiga Toxin 1 and 2.   FECAL LEUKOCYTES - LACTOFERRIN ON  STOOL    Collection Time: 09/07/24 10:34 AM   Result Value Ref Range    Fecal Leukocyte Positive (A) Negative   GI Panel    Collection Time: 09/07/24 10:34 AM   Result Value Ref Range    CAMPYLOBACTER Not Detected Not Detected    PLESIOMONAS SHIGELLOIDES Not Detected Not Detected    SALMONELLA Not Detected Not Detected    Vibrio sp. Not Detected Not Detected    VIBRIO CHOLERAE Not Detected Not Detected    YERSINIA ENTEROCOLITICA Not Detected Not Detected    ENTEROAGGREGATIVE E. COLI (EAEC) Not Detected Not Detected    ENTEROPATHOGENIC E. COLI (EPEC) Not Detected Not Detected    Enterotoxigenic E. coli (ETEC) Not Detected Not Detected    SHIGA-LIKE TOXIN-PRODUCING E. COLI (STEC) Not Detected Not Detected    Shigella/Enteroinvasive E. coli (EIEC) Not Detected Not Detected    CRYPTOSPORIDIUM Not Detected Not Detected    Cyclospora cayetanensis Not Detected Not Detected    Entamoeba histolytica Not Detected Not Detected    Giardia lamblia Not Detected Not Detected    Adenovirus F 40/41 Not Detected Not Detected    Astrovirus Not Detected Not Detected    Norovirus GI/GII Not Detected Not Detected    Rotavirus A Not Detected Not Detected    Sapovirus Not Detected Not Detected   Basic Metabolic Panel    Collection Time: 09/08/24  4:51 AM   Result Value Ref Range    Sodium 139 136 - 145 mmol/L    Potassium 3.1 (L) 3.5 - 5.1 mmol/L    Chloride 113 (H) 98 - 107 mmol/L    CO2 18 (L) 23 - 31 mmol/L    Glucose 137 (H) 82 - 115 mg/dL    Blood Urea Nitrogen 15.5 9.8 - 20.1 mg/dL     Creatinine 0.97 0.55 - 1.02 mg/dL    BUN/Creatinine Ratio 16     Calcium 9.6 8.4 - 10.2 mg/dL    Anion Gap 8.0 mEq/L    eGFR 60 mL/min/1.73/m2   CBC with Differential    Collection Time: 09/08/24  4:51 AM   Result Value Ref Range    WBC 14.97 (H) 4.50 - 11.50 x10(3)/mcL    RBC 3.29 (L) 4.20 - 5.40 x10(6)/mcL    Hgb 8.8 (L) 12.0 - 16.0 g/dL    Hct 27.3 (L) 37.0 - 47.0 %    MCV 83.0 80.0 - 94.0 fL    MCH 26.7 (L) 27.0 - 31.0 pg    MCHC 32.2 (L) 33.0 - 36.0 g/dL    RDW 19.8 (H) 11.5 - 17.0 %    Platelet 268 130 - 400 x10(3)/mcL    MPV 9.6 7.4 - 10.4 fL    Neut % 83.7 %    Lymph % 9.0 %    Mono % 6.4 %    Eos % 0.0 %    Basophil % 0.2 %    Lymph # 1.34 0.6 - 4.6 x10(3)/mcL    Neut # 12.54 (H) 2.1 - 9.2 x10(3)/mcL    Mono # 0.96 0.1 - 1.3 x10(3)/mcL    Eos # 0.00 0 - 0.9 x10(3)/mcL    Baso # 0.03 <=0.2 x10(3)/mcL    IG# 0.10 (H) 0 - 0.04 x10(3)/mcL    IG% 0.7 %    NRBC% 0.0 %         Assessment/Plan:    79 y.o. female with lung cancer currently on therapy with carboplatin+ pemetrexed+ nivolumab+ ipilimumab since 12/20/2022. She presents with one month of worsening diarrhea, up to 8 times daily.  She spent a week at [facility] where infectious workup was unremarkable.  She did not undergo endoscopic evaluation from what she says.  No melena or hematochezia, no other new medications.      Severe diarrhea/enterocolitis  -CT evidence of fluid distended bowel from stomach through rectum.   - still requiring rectal tube with significant liquid stool output and associated hypokalemia  - c. Diff and GI PCR negative  - plan for flex sig tomorrow with enemas as prep and biopsies. NPO after midnight  - concern for checkpoint inhibitor colitis, heme/onc consulted    2. Stage IV lung cancer      Shawanda Peoples PA-C  [facility]

## 2024-09-09 ENCOUNTER — ANESTHESIA (OUTPATIENT)
Dept: ENDOSCOPY | Facility: HOSPITAL | Age: 79
End: 2024-09-09
Payer: MEDICARE

## 2024-09-09 ENCOUNTER — ANESTHESIA EVENT (OUTPATIENT)
Dept: ENDOSCOPY | Facility: HOSPITAL | Age: 79
End: 2024-09-09
Payer: MEDICARE

## 2024-09-09 LAB
ANION GAP SERPL CALC-SCNC: 6 MEQ/L
BACTERIA STL CULT: ABNORMAL
BACTERIA STL CULT: ABNORMAL
BASOPHILS # BLD AUTO: 0.01 X10(3)/MCL
BASOPHILS NFR BLD AUTO: 0.1 %
BUN SERPL-MCNC: 12.4 MG/DL (ref 9.8–20.1)
CALCIUM SERPL-MCNC: 9.1 MG/DL (ref 8.4–10.2)
CHLORIDE SERPL-SCNC: 110 MMOL/L (ref 98–107)
CO2 SERPL-SCNC: 19 MMOL/L (ref 23–31)
CREAT SERPL-MCNC: 0.89 MG/DL (ref 0.55–1.02)
CREAT/UREA NIT SERPL: 14
EOSINOPHIL # BLD AUTO: 0 X10(3)/MCL (ref 0–0.9)
EOSINOPHIL NFR BLD AUTO: 0 %
ERYTHROCYTE [DISTWIDTH] IN BLOOD BY AUTOMATED COUNT: 20 % (ref 11.5–17)
GFR SERPLBLD CREATININE-BSD FMLA CKD-EPI: >60 ML/MIN/1.73/M2
GLUCOSE SERPL-MCNC: 111 MG/DL (ref 82–115)
HCT VFR BLD AUTO: 27.3 % (ref 37–47)
HGB BLD-MCNC: 8.9 G/DL (ref 12–16)
IMM GRANULOCYTES # BLD AUTO: 0.11 X10(3)/MCL (ref 0–0.04)
IMM GRANULOCYTES NFR BLD AUTO: 0.7 %
LYMPHOCYTES # BLD AUTO: 0.97 X10(3)/MCL (ref 0.6–4.6)
LYMPHOCYTES NFR BLD AUTO: 6 %
MAGNESIUM SERPL-MCNC: 1.5 MG/DL (ref 1.6–2.6)
MCH RBC QN AUTO: 27.1 PG (ref 27–31)
MCHC RBC AUTO-ENTMCNC: 32.6 G/DL (ref 33–36)
MCV RBC AUTO: 83 FL (ref 80–94)
MONOCYTES # BLD AUTO: 0.89 X10(3)/MCL (ref 0.1–1.3)
MONOCYTES NFR BLD AUTO: 5.5 %
NEUTROPHILS # BLD AUTO: 14.22 X10(3)/MCL (ref 2.1–9.2)
NEUTROPHILS NFR BLD AUTO: 87.7 %
NRBC BLD AUTO-RTO: 0 %
PLATELET # BLD AUTO: 263 X10(3)/MCL (ref 130–400)
PLATELETS.RETICULATED NFR BLD AUTO: 4.1 % (ref 0.9–11.2)
PMV BLD AUTO: 10.4 FL (ref 7.4–10.4)
POTASSIUM SERPL-SCNC: 4 MMOL/L (ref 3.5–5.1)
RBC # BLD AUTO: 3.29 X10(6)/MCL (ref 4.2–5.4)
SODIUM SERPL-SCNC: 135 MMOL/L (ref 136–145)
WBC # BLD AUTO: 16.2 X10(3)/MCL (ref 4.5–11.5)

## 2024-09-09 PROCEDURE — 80048 BASIC METABOLIC PNL TOTAL CA: CPT | Performed by: INTERNAL MEDICINE

## 2024-09-09 PROCEDURE — 63600175 PHARM REV CODE 636 W HCPCS: Performed by: INTERNAL MEDICINE

## 2024-09-09 PROCEDURE — 27201423 OPTIME MED/SURG SUP & DEVICES STERILE SUPPLY: Performed by: INTERNAL MEDICINE

## 2024-09-09 PROCEDURE — 37000009 HC ANESTHESIA EA ADD 15 MINS: Performed by: INTERNAL MEDICINE

## 2024-09-09 PROCEDURE — 11000001 HC ACUTE MED/SURG PRIVATE ROOM

## 2024-09-09 PROCEDURE — 25000003 PHARM REV CODE 250: Performed by: INTERNAL MEDICINE

## 2024-09-09 PROCEDURE — S5010 5% DEXTROSE AND 0.45% SALINE: HCPCS | Performed by: INTERNAL MEDICINE

## 2024-09-09 PROCEDURE — 0DBM8ZX EXCISION OF DESCENDING COLON, VIA NATURAL OR ARTIFICIAL OPENING ENDOSCOPIC, DIAGNOSTIC: ICD-10-PCS | Performed by: INTERNAL MEDICINE

## 2024-09-09 PROCEDURE — 63600175 PHARM REV CODE 636 W HCPCS

## 2024-09-09 PROCEDURE — 0DBN8ZX EXCISION OF SIGMOID COLON, VIA NATURAL OR ARTIFICIAL OPENING ENDOSCOPIC, DIAGNOSTIC: ICD-10-PCS | Performed by: INTERNAL MEDICINE

## 2024-09-09 PROCEDURE — 88305 TISSUE EXAM BY PATHOLOGIST: CPT | Performed by: INTERNAL MEDICINE

## 2024-09-09 PROCEDURE — 25000003 PHARM REV CODE 250

## 2024-09-09 PROCEDURE — 45331 SIGMOIDOSCOPY AND BIOPSY: CPT | Performed by: INTERNAL MEDICINE

## 2024-09-09 PROCEDURE — 85025 COMPLETE CBC W/AUTO DIFF WBC: CPT | Performed by: INTERNAL MEDICINE

## 2024-09-09 PROCEDURE — 37000008 HC ANESTHESIA 1ST 15 MINUTES: Performed by: INTERNAL MEDICINE

## 2024-09-09 PROCEDURE — 83735 ASSAY OF MAGNESIUM: CPT | Performed by: INTERNAL MEDICINE

## 2024-09-09 PROCEDURE — 36415 COLL VENOUS BLD VENIPUNCTURE: CPT | Performed by: INTERNAL MEDICINE

## 2024-09-09 RX ORDER — ONDANSETRON HYDROCHLORIDE 2 MG/ML
4 INJECTION, SOLUTION INTRAVENOUS DAILY PRN
OUTPATIENT
Start: 2024-09-09

## 2024-09-09 RX ORDER — PROPOFOL 10 MG/ML
VIAL (ML) INTRAVENOUS
Status: COMPLETED
Start: 2024-09-09 | End: 2024-09-09

## 2024-09-09 RX ORDER — LIDOCAINE HYDROCHLORIDE 10 MG/ML
INJECTION, SOLUTION EPIDURAL; INFILTRATION; INTRACAUDAL; PERINEURAL
Status: DISCONTINUED | OUTPATIENT
Start: 2024-09-09 | End: 2024-09-09

## 2024-09-09 RX ORDER — SODIUM CHLORIDE, SODIUM LACTATE, POTASSIUM CHLORIDE, CALCIUM CHLORIDE 600; 310; 30; 20 MG/100ML; MG/100ML; MG/100ML; MG/100ML
INJECTION, SOLUTION INTRAVENOUS CONTINUOUS
Status: DISCONTINUED | OUTPATIENT
Start: 2024-09-09 | End: 2024-09-12 | Stop reason: HOSPADM

## 2024-09-09 RX ORDER — SODIUM CHLORIDE, SODIUM GLUCONATE, SODIUM ACETATE, POTASSIUM CHLORIDE AND MAGNESIUM CHLORIDE 30; 37; 368; 526; 502 MG/100ML; MG/100ML; MG/100ML; MG/100ML; MG/100ML
INJECTION, SOLUTION INTRAVENOUS CONTINUOUS
OUTPATIENT
Start: 2024-09-09 | End: 2024-10-09

## 2024-09-09 RX ORDER — PROPOFOL 10 MG/ML
VIAL (ML) INTRAVENOUS
Status: DISCONTINUED | OUTPATIENT
Start: 2024-09-09 | End: 2024-09-09

## 2024-09-09 RX ORDER — SODIUM CHLORIDE, SODIUM GLUCONATE, SODIUM ACETATE, POTASSIUM CHLORIDE AND MAGNESIUM CHLORIDE 30; 37; 368; 526; 502 MG/100ML; MG/100ML; MG/100ML; MG/100ML; MG/100ML
INJECTION, SOLUTION INTRAVENOUS CONTINUOUS
Status: DISCONTINUED | OUTPATIENT
Start: 2024-09-09 | End: 2024-09-12 | Stop reason: HOSPADM

## 2024-09-09 RX ORDER — LIDOCAINE HYDROCHLORIDE 10 MG/ML
INJECTION, SOLUTION EPIDURAL; INFILTRATION; INTRACAUDAL; PERINEURAL
Status: COMPLETED
Start: 2024-09-09 | End: 2024-09-09

## 2024-09-09 RX ORDER — MAGNESIUM SULFATE HEPTAHYDRATE 40 MG/ML
2 INJECTION, SOLUTION INTRAVENOUS ONCE
Status: COMPLETED | OUTPATIENT
Start: 2024-09-09 | End: 2024-09-09

## 2024-09-09 RX ADMIN — SODIUM BICARBONATE 650 MG TABLET 1300 MG: at 11:09

## 2024-09-09 RX ADMIN — CLOPIDOGREL BISULFATE 75 MG: 75 TABLET ORAL at 11:09

## 2024-09-09 RX ADMIN — METRONIDAZOLE 500 MG: 5 INJECTION, SOLUTION INTRAVENOUS at 02:09

## 2024-09-09 RX ADMIN — METRONIDAZOLE 500 MG: 5 INJECTION, SOLUTION INTRAVENOUS at 11:09

## 2024-09-09 RX ADMIN — LIDOCAINE HYDROCHLORIDE 50 MG: 10 INJECTION, SOLUTION EPIDURAL; INFILTRATION; INTRACAUDAL; PERINEURAL at 08:09

## 2024-09-09 RX ADMIN — LOSARTAN POTASSIUM 50 MG: 50 TABLET, FILM COATED ORAL at 11:09

## 2024-09-09 RX ADMIN — MUPIROCIN: 20 OINTMENT TOPICAL at 09:09

## 2024-09-09 RX ADMIN — SODIUM CHLORIDE, SODIUM GLUCONATE, SODIUM ACETATE, POTASSIUM CHLORIDE AND MAGNESIUM CHLORIDE: 526; 502; 368; 37; 30 INJECTION, SOLUTION INTRAVENOUS at 08:09

## 2024-09-09 RX ADMIN — PREDNISONE 66 MG: 10 TABLET ORAL at 03:09

## 2024-09-09 RX ADMIN — DEXTROSE AND SODIUM CHLORIDE: 5; 450 INJECTION, SOLUTION INTRAVENOUS at 07:09

## 2024-09-09 RX ADMIN — LOPERAMIDE HYDROCHLORIDE 2 MG: 2 CAPSULE ORAL at 05:09

## 2024-09-09 RX ADMIN — ONDANSETRON 4 MG: 2 INJECTION INTRAMUSCULAR; INTRAVENOUS at 04:09

## 2024-09-09 RX ADMIN — METRONIDAZOLE 500 MG: 5 INJECTION, SOLUTION INTRAVENOUS at 06:09

## 2024-09-09 RX ADMIN — CIPROFLOXACIN 400 MG: 400 INJECTION, SOLUTION INTRAVENOUS at 06:09

## 2024-09-09 RX ADMIN — LOPERAMIDE HYDROCHLORIDE 2 MG: 2 CAPSULE ORAL at 09:09

## 2024-09-09 RX ADMIN — LOPERAMIDE HYDROCHLORIDE 2 MG: 2 CAPSULE ORAL at 11:09

## 2024-09-09 RX ADMIN — CHOLESTYRAMINE 4 GRAMS OF ANHYDROUS CHOLESTYRAMINE: 4 POWDER, FOR SUSPENSION ORAL at 11:09

## 2024-09-09 RX ADMIN — ENOXAPARIN SODIUM 40 MG: 40 INJECTION SUBCUTANEOUS at 05:09

## 2024-09-09 RX ADMIN — CARVEDILOL 6.25 MG: 3.12 TABLET, FILM COATED ORAL at 11:09

## 2024-09-09 RX ADMIN — CARVEDILOL 6.25 MG: 3.12 TABLET, FILM COATED ORAL at 09:09

## 2024-09-09 RX ADMIN — MAGNESIUM SULFATE HEPTAHYDRATE 2 G: 40 INJECTION, SOLUTION INTRAVENOUS at 12:09

## 2024-09-09 RX ADMIN — CIPROFLOXACIN 400 MG: 400 INJECTION, SOLUTION INTRAVENOUS at 05:09

## 2024-09-09 RX ADMIN — PROPOFOL 40 MG: 10 INJECTION, EMULSION INTRAVENOUS at 09:09

## 2024-09-09 RX ADMIN — PROPOFOL 40 MG: 10 INJECTION, EMULSION INTRAVENOUS at 08:09

## 2024-09-09 RX ADMIN — CHOLESTYRAMINE 4 GRAMS OF ANHYDROUS CHOLESTYRAMINE: 4 POWDER, FOR SUSPENSION ORAL at 09:09

## 2024-09-09 RX ADMIN — SODIUM BICARBONATE 650 MG TABLET 1300 MG: at 09:09

## 2024-09-09 RX ADMIN — ATORVASTATIN CALCIUM 20 MG: 10 TABLET, FILM COATED ORAL at 11:09

## 2024-09-09 RX ADMIN — LEVOTHYROXINE SODIUM 125 MCG: 125 TABLET ORAL at 06:09

## 2024-09-09 RX ADMIN — DEXTROSE AND SODIUM CHLORIDE: 5; 450 INJECTION, SOLUTION INTRAVENOUS at 11:09

## 2024-09-09 NOTE — ANESTHESIA POSTPROCEDURE EVALUATION
Anesthesia Post Evaluation    Patient: Pratibha Patel    Procedure(s) Performed: Procedure(s) (LRB):  SIG (N/A)  SIGMOIDOSCOPY, WITH BIOPSY (N/A)    Final Anesthesia Type: general      Patient location during evaluation: PACU  Patient participation: Yes- Able to Participate  Level of consciousness: awake and alert  Post-procedure vital signs: reviewed and stable  Pain management: adequate  Airway patency: patent    PONV status at discharge: No PONV  Anesthetic complications: no      Cardiovascular status: hemodynamically stable  Respiratory status: unassisted  Hydration status: euvolemic  Follow-up not needed.              Vitals Value Taken Time   /68 09/09/24 0940   Temp 36.2 °C (97.2 °F) 09/09/24 0913   Pulse 67 09/09/24 0940   Resp 18 09/09/24 0940   SpO2 96 % 09/09/24 0940         No case tracking events are documented in the log.      Pain/Pavel Score: Pavel Score: 9 (9/9/2024  9:23 AM)

## 2024-09-09 NOTE — CONSULTS
[facility] [address]  Wound Care    Patient Name:  Pratibha Patel   MRN:  [id number]  Date: 9/9/2024  Diagnosis: Enterocolitis    History:     Past Medical History:   Diagnosis Date    Adenocarcinoma of lung     Anemia     Anxiety     Arthritis     COPD (chronic obstructive pulmonary disease)     Depression     HLD (hyperlipidemia)     Hypertension     Lung cancer     Secondary malignant neoplasm of lymph nodes     Secondary malignant neoplasm of right lung     Thyroid disease     lymph nodes malignant       Social History     Socioeconomic History    Marital status:    Tobacco Use    Smoking status: Every Day     Current packs/day: 0.25     Average packs/day: 0.3 packs/day for 65.7 years (16.4 ttl pk-yrs)     Types: Cigarettes     Start date: 1959    Smokeless tobacco: Never    Tobacco comments:     Patient states she is trying to quit smoking.   Substance and Sexual Activity    Alcohol use: Not Currently    Drug use: Never     Social Determinants of Health     Financial Resource Strain: Low Risk  (9/9/2024)    Overall Financial Resource Strain (CARDIA)     Difficulty of Paying Living Expenses: Not hard at all   Food Insecurity: No Food Insecurity (9/9/2024)    Hunger Vital Sign     Worried About Running Out of Food in the Last Year: Never true     Ran Out of Food in the Last Year: Never true   Transportation Needs: No Transportation Needs (9/9/2024)    TRANSPORTATION NEEDS     Transportation : No   Physical Activity: Sufficiently Active (9/9/2024)    Exercise Vital Sign     Days of Exercise per Week: 5 days     Minutes of Exercise per Session: 30 min   Stress: No Stress Concern Present (9/9/2024)    [employer] - Occupational Stress Questionnaire     Feeling of Stress : Only a little   Housing Stability: Low Risk  (9/9/2024)    Housing Stability Vital Sign     Unable to Pay for Housing in the Last Year: No     Homeless in the Last Year: No        Precautions:     Allergies as of 09/06/2024    (No Known Allergies)       [facility] Assessment Details/Treatment        09/09/24 1133   WO Assessment   Visit Date 09/09/24   Visit Time 1133   Consult Type New   [facility] Speciality Wound   Wound moisture   Intervention assessed;chart review;orders   Teaching on-going   Skin Interventions   Device Skin Pressure Protection absorbent pad utilized/changed        Wound 09/09/24 Moisture associated dermatitis  Buttocks   Date First Assessed: 09/09/24   Primary Wound Type: Moisture associated dermatitis  Side: (c)   Location: Buttocks   Wound Image    Dressing Appearance Open to air   Drainage Amount None   Drainage Characteristics/Odor No odor   Appearance Red;Moist   Tissue loss description Not applicable   Periwound Area Redness;Moist   Wound Edges Defined   Care Cleansed with:;Sterile normal saline   Dressing Applied     WOCN consulted for buttocks. Family at bedside. Adult brief on and wet. Educated patient on the importance of turning every 2 hours, and no adult briefs while in bed. She voiced understanding. Treatment recommendations put into place.  Buttocks: Apply Desitin BID and PRN. Keep areas clean and dry, no adult briefs while in bed. Nursing to continue with preventative measures. Head of bed elevated, bed in lowest position, and call bell within reach. Will follow up.    09/09/2024

## 2024-09-09 NOTE — PROVATION PATIENT INSTRUCTIONS
Discharge Summary/Instructions after an Endoscopic Procedure  Patient Name: Pratibha Patel  Patient MRN: [id number]  Patient YOB: 1945  Monday, September 9, 2024  Saurav Hutson MD  Dear patient,  As a result of recent federal legislation (The Federal Cures Act), you may   receive lab or pathology results from your procedure in your [facility]   account before your physician is able to contact you. Your physician or   their representative will relay the results to you with their   recommendations at their soonest availability.  Thank you,  RESTRICTIONS:  During your procedure today, you received medications for sedation.  These   medications may affect your judgment, balance and coordination.  Therefore,   for 24 hours, you have the following restrictions:   - DO NOT drive a car, operate machinery, make legal/financial decisions,   sign important papers or drink alcohol.    ACTIVITY:  Today: no heavy lifting, straining or running due to procedural   sedation/anesthesia.  The following day: return to full activity including work.  DIET:  Eat and drink normally unless instructed otherwise.     TREATMENT FOR COMMON SIDE EFFECTS:  - Mild abdominal pain, nausea, belching, bloating or excessive gas:  rest,   eat lightly and use a heating pad.  - Sore Throat: treat with throat lozenges and/or gargle with warm salt   water.  - Because air was used during the procedure, expelling large amounts of air   from your rectum or belching is normal.  - If a bowel prep was taken, you may not have a bowel movement for 1-3 days.    This is normal.  SYMPTOMS TO WATCH FOR AND REPORT TO YOUR PHYSICIAN:  1. Abdominal pain or bloating, other than gas cramps.  2. Chest pain.  3. Back pain.  4. Signs of infection such as: chills or fever occurring within 24 hours   after the procedure.  5. Rectal bleeding, which would show as bright red, maroon, or black stools.   (A tablespoon of blood from the rectum is not serious, especially  if   hemorrhoids are present.)  6. Vomiting.  7. Weakness or dizziness.  GO DIRECTLY TO THE NEAREST EMERGENCY ROOM IF YOU HAVE ANY OF THE FOLLOWING:      Difficulty breathing              Chills and/or fever over 101 F   Persistent vomiting and/or vomiting blood   Severe abdominal pain   Severe chest pain   Black, tarry stools   Bleeding- more than one tablespoon   Any other symptom or condition that you feel may need urgent attention  Your doctor recommends these additional instructions:  If any biopsies were taken, your doctors clinic will contact you in 1 to 2   weeks with any results.  - Return patient to [facility] for ongoing care.   - Resume regular diet today.   - Await pathology results.   - The findings and recommendations were discussed with the patient.   - I wonder if patient had a fecal impaction with overflow diarrhea which   also led to her rectal ulcer. Poor anal tone contributing  - Use FiberCon 1 tablet PO daily daily.  For questions, problems or results please call your physician - Saurav Hutson MD at Work:  [phone].  [facility] ED at [phone]  IF A COMPLICATION OR EMERGENCY SITUATION ARISES AND YOU ARE UNABLE TO REACH   YOUR PHYSICIAN - GO DIRECTLY TO THE EMERGENCY ROOM.  MD Saurav Benitez MD  9/9/2024 9:22:05 AM  This report has been verified and signed electronically.  Dear patient,  As a result of recent federal legislation (The Federal Cures Act), you may   receive lab or pathology results from your procedure in your [facility]   account before your physician is able to contact you. Your physician or   their representative will relay the results to you with their   recommendations at their soonest availability.  Thank you,  PROVATION

## 2024-09-09 NOTE — PLAN OF CARE
09/09/24 0857   Discharge Assessment   Assessment Type Discharge Planning Assessment   Confirmed/corrected address, phone number and insurance Yes   Confirmed Demographics Correct on Facesheet   Source of Information family  (: Ganesh Patel (Spouse)  [phone] (Mobile))   If unable to respond/provide information was family/caregiver contacted? Yes   Contact Name/Number : Ganesh Patel (Spouse)  [phone] (Mobile); present at his wife's bedside.   Communicated RICKEY with patient/caregiver Date not available/Unable to determine   Reason For Admission 79-year-old female medical history of stage IV lung adenocarcinoma initially diagnosed in 2014 went through multiple treatment regimen and currently on carboplatin+ pemetrexed+ nivolumab+ ipilimumab since 12/20/2022.     Present to the ED with chief complaint of diarrhea.  Report diarrhea has been ongoing for the past month minimum 3 loose watery bowel movements per day and some days up to 8 bowel movements per day, she was hospitalized last week for 8 days at [facility] and underwent infectious workup for diarrhea and treated for dehydration and MT and was discharged on a course of Cipro and Flagyl yesterday 09/05/2024   People in Home spouse  (: Ganesh Patel (Spouse) [phone] (Mobile); present at his wife's bedside.)   Facility Arrived From: Private residence.   Do you expect to return to your current living situation? Yes   Do you have help at home or someone to help you manage your care at home? Yes   Who are your caregiver(s) and their phone number(s)? : Ganesh Patel (Spouse) [phone] (Mobile); present at his wife's bedside.   Prior to hospitilization cognitive status: Alert/Oriented   Current cognitive status: Alert/Oriented   Walking or Climbing Stairs Difficulty yes   Walking or Climbing Stairs ambulation difficulty, requires equipment   Mobility Management The patient uses the following DME: 1. Rolling Walker  2. Wheelchair 3. Straight Cane PRN mobility needs.   Dressing/Bathing Difficulty yes   Dressing/Bathing bathing difficulty, requires equipment   Dressing/Bathing Management The patient uses the following DME for hygiene needs: 1. Bath Bench 2. Grab Bars PRN bathing/showering needs.   Home Accessibility wheelchair accessible  (The patient's home is built on a slab.)   Home Layout Able to live on 1st floor   Equipment Currently Used at Home bath bench;crutches;grab bar;walker, rolling;wheelchair;cane, straight;CPAP   Readmission within 30 days? No   Patient currently being followed by outpatient case management? No   Do you currently have service(s) that help you manage your care at home? No   Do you take prescription medications? Yes   Do you have prescription coverage? Yes   Coverage Financial Class: Medicare  Payor: MEDICARE - MEDICARE PART A & B   Do you have any problems affording any of your prescribed medications? No   Is the patient taking medications as prescribed? yes   Who is going to help you get home at discharge? : Amanda Ganesh (Spouse) [phone] (Mobile); present at his wife's bedside.   How do you get to doctors appointments? family or friend will provide   Are you on dialysis? No   Do you take coumadin? No   Discharge Plan A Home with family   Discharge Plan B Home with family   DME Needed Upon Discharge  none   Discharge Plan discussed with: Spouse/sig other;Patient   Name(s) and Number(s) : Ganesh Patel (Spouse) [phone] (Mobile); present at his wife's bedside.   Transition of Care Barriers None   Physical Activity   On average, how many days per week do you engage in moderate to strenuous exercise (like a brisk walk)? 5 days   On average, how many minutes do you engage in exercise at this level? 30 min   Financial Resource Strain   How hard is it for you to pay for the very basics like food, housing, medical care, and heating? Not hard   Housing Stability   In the last 12  months, was there a time when you were not able to pay the mortgage or rent on time? N   At any time in the past 12 months, were you homeless or living in a shelter (including now)? N   Transportation Needs   Has the lack of transportation kept you from medical appointments, meetings, work or from getting things needed for daily living? No   Food Insecurity   Within the past 12 months, you worried that your food would run out before you got the money to buy more. Never true   Within the past 12 months, the food you bought just didn't last and you didn't have money to get more. Never true   Stress   Do you feel stress - tense, restless, nervous, or anxious, or unable to sleep at night because your mind is troubled all the time - these days? Only a littl   Social Isolation   How often do you feel lonely or isolated from those around you?  Rarely   Alcohol Use   Q1: How often do you have a drink containing alcohol? Never   Q2: How many drinks containing alcohol do you have on a typical day when you are drinking? None   Q3: How often do you have six or more drinks on one occasion? Never   Utilities   In the past 12 months has the electric, gas, oil, or water company threatened to shut off services in your home? No   Health Literacy   How often do you need to have someone help you when you read instructions, pamphlets, or other written material from your doctor or pharmacy? Never   OTHER   Name(s) of People in Home : Amanda Aramber (Spouse) [phone] (Mobile); present at his wife's bedside.

## 2024-09-09 NOTE — PROGRESS NOTES
[facility] Hospital Medicine Progress Note        Chief Complaint: Inpatient Follow-up     HPI:   79-year-old female medical history of stage IV lung adenocarcinoma initially diagnosed in 2014 went through multiple treatment regimen and currently on carboplatin+ pemetrexed+ nivolumab+ ipilimumab since 12/20/2022.     Present to the ED with chief complaint of diarrhea.  Report diarrhea has been ongoing for the past month minimum 3 loose watery bowel movements per day and some days up to 8 bowel movements per day, she was hospitalized last week for 8 days at [facility] and underwent infectious workup for diarrhea and treated for dehydration and MT and was discharged on a course of Cipro and Flagyl yesterday 09/05/2024.  Report again today she had multiple episode of watery diarrhea about 6 bowel movement, feeling weak and lightheaded as well as report nauseated and vomited once.  Denies any abdominal pain, fever or chills.     On arrival to ED she was afebrile, borderline low blood pressure 96/46 and saturating 96% on room air.  Labs notable for WBC 19.92, hemoglobin 11.1, creatinine 0.99, potassium 4.0, CO2 19, CT abdomen and pelvis with IV contrast showed numerous fluid-filled small bowel loops with diffuse wall thickening and enhancement of proximal jejunum may reflect enteritis, much of the colon is also fluid-filled with some mucosal enhancement may reflect colitis, left lung base chronic radiation related changes and scarring.     C difficile toxin and antigen both negative.  She was given IV fluids and referred to hospital medicine service for further evaluation and management    Heme-Onc, GI was consulted.    Interval Hx:   Pt underwent flex sig this mrng,  No acute events reported overnight.   Seen post procedure,reported doing ok,rectal tube was discontinued during the procedure ,pt denied abd pain,n,v  Discussed plan of care with pt, family member at bedside      Labs  from this morning showed na 135, bicarb 19, mag 1.5, hb 8.9        Objective/physical exam:  General: In no acute distress, afebrile  Chest:  Unlabored breathing   Heart:  Normal  Abdomen: Soft, nontender  MSK: Warm, no lower extremity edema  Neurologic: Alert and responding appropriately  VITAL SIGNS: 24 HRS MIN & MAX LAST   Temp  Min: 97.4 °F (36.3 °C)  Max: 98.2 °F (36.8 °C) 97.9 °F (36.6 °C)   BP  Min: 127/65  Max: 165/68 (!) 165/68   Pulse  Min: 60  Max: 65  61   Resp  Min: 16  Max: 16 16   SpO2  Min: 95 %  Max: 97 % 96 %     I have reviewed the following labs:  Recent Labs   Lab 09/07/24 0539 09/08/24 0451 09/09/24 0524   WBC 16.81* 14.97* 16.20*   RBC 3.98* 3.29* 3.29*   HGB 10.6* 8.8* 8.9*   HCT 32.8* 27.3* 27.3*   MCV 82.4 83.0 83.0   MCH 26.6* 26.7* 27.1   MCHC 32.3* 32.2* 32.6*   RDW 19.4* 19.8* 20.0*    268 263   MPV 9.1 9.6 10.4     Recent Labs   Lab 09/06/24 2130 09/07/24 0539 09/08/24 0451 09/09/24 0524    141 139 135*   K 4.0 3.5 3.1* 4.0   * 116* 113* 110*   CO2 19* 18* 18* 19*   BUN 16.3 16.2 15.5 12.4   CREATININE 0.99 0.94 0.97 0.89   CALCIUM 10.5* 10.1 9.6 9.1   MG  --  1.60  --  1.50*   ALBUMIN 2.8* 2.4*  --   --    ALKPHOS 75 64  --   --    ALT 23 20  --   --    AST 23 22  --   --    BILITOT 0.3 0.3  --   --      Microbiology Results (last 7 days)       Procedure Component Value Units Date/Time    Stool Culture [[id number]]  (Abnormal) Collected: 09/07/24 1034    Order Status: Completed Specimen: Stool Updated: 09/09/24 0900     Stool Culture Negative for Salmonella, Shigella, Campylobacter, Vibrio, Aeromonas, Pleisiomonas,Yersinia, or Shiga Toxin 1 and 2.      Moderate Yeast    Blood Culture [[id number]]  (Normal) Collected: 09/07/24 0539    Order Status: Completed Specimen: Blood Updated: 09/08/24 1201     Blood Culture No Growth At 24 Hours    Blood Culture [[id number]]  (Normal) Collected: 09/07/24 0539    Order Status: Completed Specimen: Blood Updated: 09/08/24  1201     Blood Culture No Growth At 24 Hours    Clostridium Diff Toxin, A & B, EIA [[id number]]  (Normal) Collected: 09/06/24 2347    Order Status: Completed Specimen: Stool Updated: 09/07/24 0039     Clostridium Difficile GDH Antigen Negative     Clostridium Difficile Toxin A/B Negative    C. 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     History of unquantified COPD, CAD on Plavix, HTN, HLD, hoarseness/chronic cough/chronic microaspiration on modified diet      Continue p.o. steroids,Empiric antibiotics   Replete Mag  Flex sig- diverticulosis in sig, desc colon ,single solitary ulcer in distal rectum, biopsies were taken,follow pathology  Follow GI recs  Heme-Onc following, noted inputs  Continue IV fluids, encourage p.o. intake   Monitor Bms   Supportive care   Labs in a.m.      VTE prophylaxis:  Lovenox    Patient condition:  Fair    Anticipated discharge and Disposition:   TBD      Portions of this note dictated using EMR integrated voice recognition software, and may be subject to voice recognition errors not corrected at proofreading. Please contact writer for clarification if needed.   _____________________________________________________________________    Malnutrition Status:    Scheduled Med:   atorvastatin  20 mg Oral Daily    carvediloL  6.25 mg Oral BID    cholestyramine-aspartame  1 packet Oral BID    ciprofloxacin  400 mg Intravenous Q12H    clopidogreL  75 mg Oral Daily    enoxparin  40 mg Subcutaneous Q24H (prophylaxis, 1700)     levothyroxine  125 mcg Oral Before breakfast    loperamide  2 mg Oral QID    losartan  50 mg Oral Daily    metroNIDAZOLE IV (PEDS and ADULTS)  500 mg Intravenous Q8H    mupirocin   Nasal BID    predniSONE  1 mg/kg Oral Daily    sodium bicarbonate  1,300 mg Oral BID      Continuous Infusions:   D5 and 0.45% NaCl   Intravenous Continuous 100 mL/hr at 09/08/24 2031 1,000 mL at 09/08/24 2031    electrolyte-A   Intravenous Continuous        lactated ringers   Intravenous Continuous          PRN Meds:    Current Facility-Administered Medications:     acetaminophen, 650 mg, Oral, Q4H PRN    aluminum-magnesium hydroxide-simethicone, 30 mL, Oral, QID PRN    melatonin, 6 mg, Oral, Nightly PRN    ondansetron, 4 mg, Intravenous, Q4H PRN    prochlorperazine, 5 mg, Intravenous, Q6H PRN    sodium chloride 0.9%, 10 mL, Intravenous, PRN     Radiology:  I have personally reviewed the following imaging and agree with the radiologist.     CT Abdomen Pelvis With IV Contrast NO Oral Contrast  Narrative: EXAMINATION:  CT ABDOMEN PELVIS WITH IV CONTRAST    CLINICAL HISTORY:  Abdominal abscess/infection suspected;    TECHNIQUE:  Helically acquired images with axial, sagittal and coronal reformations were obtained from the lung bases to the pubic symphysis after the IV administration of contrast.    Automated tube current modulation, weight-based exposure dosing, and/or iterative reconstruction technique utilized to reach lowest reasonably achievable exposure rate.    DLP: 486 mGy*cm    COMPARISON:  PET-CT 07/25/2024    FINDINGS:  HEART: There are coronary artery calcifications.    LUNG BASES: Tree-in-bud nodularity at the lung bases, left greater than right.    LIVER: Normal attenuation. No appreciable focal hepatic lesion.    BILIARY: Gallbladder is surgically absent.  Mild biliary ectasia with intrahepatic and extrahepatic biliary ductal dilatation.    PANCREAS: No inflammatory change.    SPLEEN: Normal in size    ADRENALS: No  mass.    KIDNEYS/URETERS: The kidneys enhance symmetrically.  No hydronephrosis. Incidental right renal cyst requires no imaging follow-up.    GI TRACT/MESENTERY: The bowel is fluid distended from the stomach through the rectum.  No transition point seen.    PERITONEUM: No free fluid.No free air.    LYMPH NODES: No enlarged lymph nodes by size criteria.    VASCULATURE: Aortoiliac atherosclerosis.    BLADDER: Obscured by beam hardening artifact.    REPRODUCTIVE ORGANS: Obscured by beam hardening artifact.    SOFT TISSUES: Unremarkable.    BONES: There are bilateral hip arthroplasties with associated beam hardening artifact.  Postsurgical change at the lumbosacral spine.  Impression: 1. The bowel is fluid distended from the level of the stomach through the rectum without transition point.  Appearance may be related to enterocolitis or ileus.  2. Tree-in-bud nodularity at the lung bases suggest infectious or inflammatory bronchiolitis.  3. Postop cholecystectomy with biliary ectasia  4. The preliminary and final reports are concordant.    Electronically signed by: Kathleen Scott  Date:    09/07/2024  Time:    12:00      Garett Zee MD  Department of Hospital Medicine   [facility]   09/09/2024

## 2024-09-09 NOTE — NURSING
Nurses Note -- 4 Eyes      9/8/2024   8 PM      Skin assessed during: Daily Assessment      [x] No Altered Skin Integrity Present    [x]Prevention Measures Documented      [] Yes- Altered Skin Integrity Present or Discovered   [] LDA Added if Not in Epic (Describe Wound)   [] New Altered Skin Integrity was Present on Admit and Documented in LDA   [] Wound Image Taken    Wound Care Consulted? No    Attending Nurse:  Serafin Tolbert RN/Staff Member:   [place]

## 2024-09-09 NOTE — PROGRESS NOTES
Attempted to see the patient today, however she was off the floor having a flex sigmoidoscopy at the time of my hospital rounds.  This revealed diverticulosis in the sigmoid colon with otherwise normal exam.  There was a single solitary ulcer in the distal rectum.  Biopsies were taken.     I will follow up with the patient tomorrow.

## 2024-09-09 NOTE — PLAN OF CARE
Problem: Skin Injury Risk Increased  Goal: Skin Health and Integrity  Outcome: Progressing     Problem: Adult Inpatient Plan of Care  Goal: Plan of Care Review  Outcome: Progressing  Goal: Patient-Specific Goal (Individualized)  Outcome: Progressing  Goal: Absence of Hospital-Acquired Illness or Injury  Outcome: Progressing  Goal: Optimal Comfort and Wellbeing  Outcome: Progressing  Goal: Readiness for Transition of Care  Outcome: Progressing     Problem: Infection  Goal: Absence of Infection Signs and Symptoms  Outcome: Progressing     Problem: Wound  Goal: Optimal Coping  Outcome: Progressing  Goal: Optimal Functional Ability  Outcome: Progressing  Goal: Absence of Infection Signs and Symptoms  Outcome: Progressing  Goal: Improved Oral Intake  Outcome: Progressing  Goal: Optimal Pain Control and Function  Outcome: Progressing  Goal: Skin Health and Integrity  Outcome: Progressing  Goal: Optimal Wound Healing  Outcome: Progressing      Propranolol Counseling:  I discussed with the patient the risks of propranolol including but not limited to low heart rate, low blood pressure, low blood sugar, restlessness and increased cold sensitivity. They should call the office if they experience any of these side effects.

## 2024-09-09 NOTE — ANESTHESIA PREPROCEDURE EVALUATION
"                                                                                                             09/09/2024  Pratibha Patel is a 79 y.o., female with   -------------------------------------    Adenocarcinoma of lung    Anemia    Anxiety    Arthritis    COPD (chronic obstructive pulmonary disease)    Depression    HLD (hyperlipidemia)    Hypertension    Lung cancer    Secondary malignant neoplasm of lymph nodes    Secondary malignant neoplasm of right lung    Thyroid disease    lymph nodes malignant       And   ----------------------------    Bladder surgery    Cholecystectomy    Colonoscopy    Endobronchial ultrasound    Procedure: ENDOBRONCHIAL ULTRASOUND (EBUS);  Surgeon: Anthony Hutson MD;  Location: [facility]ONCH[facility];  Service: Pulmonary;  Laterality: N/A;    Hysterectomy    Knee surgery    replaced knees    Partial hip arthroplasty       Presents for flexible sigmoidoscopy   "79 y.o. female with lung cancer currently on therapy with carboplatin+ pemetrexed+ nivolumab+ ipilimumab since 12/20/2022. She presents with one month of worsening diarrhea, up to 8 times daily.  She spent a week at [facility] where infectious workup was unremarkable.  She did not undergo endoscopic evaluation from what she says.  No melena or hematochezia, no other new medications.       Severe diarrhea/enterocolitis  -CT evidence of fluid distended bowel from stomach through rectum.   - still requiring rectal tube with significant liquid stool output and associated hypokalemia  - c. Diff and GI PCR negative  - plan for flex sig tomorrow with enemas as prep and biopsies. NPO after midnight  - concern for checkpoint inhibitor colitis, heme/onc consulted     2. Stage IV lung cancer        Shawanda Peoples PA-C  [facility] "       Pre-op Assessment    I have reviewed the NPO Status.      Review of Systems  Cardiovascular:     Hypertension                                  Hypertension       "   Pulmonary:   COPD         Chronic Obstructive Pulmonary Disease (COPD):                      Endocrine:   Hypothyroidism       Hypothyroidism          Psych:  Psychiatric History                  Physical Exam  General: Well nourished, Cooperative, Alert and Oriented  Elderly female, smoker's voice  Airway:  Mallampati: II   Mouth Opening: Normal  TM Distance: Normal  Tongue: Normal  Neck ROM: Normal ROM    Dental:  Edentulous    Chest/Lungs:  Clear to auscultation, Normal Respiratory Rate    Heart:  Rate: Normal  Rhythm: Regular Rhythm       Latest Reference Range & Units 09/09/24 05:24   WBC 4.50 - 11.50 x10(3)/mcL 16.20 (H)   Hemoglobin 12.0 - 16.0 g/dL 8.9 (L)   Hematocrit 37.0 - 47.0 % 27.3 (L)   Platelet Count 130 - 400 x10(3)/mcL 263   Sodium 136 - 145 mmol/L 135 (L)   Potassium 3.5 - 5.1 mmol/L 4.0   Chloride 98 - 107 mmol/L 110 (H)   CO2 23 - 31 mmol/L 19 (L)   Anion Gap mEq/L 6.0   BUN 9.8 - 20.1 mg/dL 12.4   Creatinine 0.55 - 1.02 mg/dL 0.89   BUN/CREAT RATIO  14   eGFR mL/min/1.73/m2 >60   Glucose 82 - 115 mg/dL 111   Calcium 8.4 - 10.2 mg/dL 9.1   Magnesium  1.60 - 2.60 mg/dL 1.50 (L)   (H): Data is abnormally high  (L): Data is abnormally low    Anesthesia Plan  Type of Anesthesia, risks & benefits discussed:    Anesthesia Type: Gen Natural Airway  Intra-op Monitoring Plan: Standard ASA Monitors  Post Op Pain Control Plan: IV/PO Opioids PRN  Induction:  IV  Airway Plan: Direct  Informed Consent: Informed consent signed with the Patient representative and all parties understand the risks and agree with anesthesia plan.  All questions answered. Patient consented to blood products? No  ASA Score: 3  Day of Surgery Review of History & Physical: H&P Update referred to the surgeon/provider.  Anesthesia Plan Notes: Telephone consent from patient's daughter  Nasal cannula vs facemask supplemental oxygenation   For patients with JULIANNE/obesity, may consider SuperNoval Nasal CPAP      Ready For Surgery From  Anesthesia Perspective.     .

## 2024-09-09 NOTE — TRANSFER OF CARE
"Anesthesia Transfer of Care Note    Patient: Pratibha Patel    Procedure(s) Performed: Procedure(s) (LRB):  SIG (N/A)  SIGMOIDOSCOPY, WITH BIOPSY (N/A)    Patient location: GI    Anesthesia Type: general    Transport from OR: Transported from OR on room air with adequate spontaneous ventilation    Post pain: adequate analgesia    Post assessment: no apparent anesthetic complications    Post vital signs: stable    Level of consciousness: awake and alert    Nausea/Vomiting: no nausea/vomiting    Complications: none    Transfer of care protocol was followed      Last vitals: Visit Vitals  BP (!) 117/58   Pulse (!) 58   Temp 36.6 °C (97.9 °F)   Resp 12   Ht 5' 4" (1.626 m)   Wt 64 kg (141 lb)   SpO2 97%   Breastfeeding No   BMI 24.20 kg/m²     "

## 2024-09-10 LAB
ANION GAP SERPL CALC-SCNC: 5 MEQ/L
BUN SERPL-MCNC: 10.7 MG/DL (ref 9.8–20.1)
CALCIUM SERPL-MCNC: 8.6 MG/DL (ref 8.4–10.2)
CHLORIDE SERPL-SCNC: 112 MMOL/L (ref 98–107)
CO2 SERPL-SCNC: 20 MMOL/L (ref 23–31)
CREAT SERPL-MCNC: 0.95 MG/DL (ref 0.55–1.02)
CREAT/UREA NIT SERPL: 11
GFR SERPLBLD CREATININE-BSD FMLA CKD-EPI: >60 ML/MIN/1.73/M2
GLUCOSE SERPL-MCNC: 113 MG/DL (ref 82–115)
MAGNESIUM SERPL-MCNC: 1.8 MG/DL (ref 1.6–2.6)
POTASSIUM SERPL-SCNC: 3.9 MMOL/L (ref 3.5–5.1)
SODIUM SERPL-SCNC: 137 MMOL/L (ref 136–145)

## 2024-09-10 PROCEDURE — 11000001 HC ACUTE MED/SURG PRIVATE ROOM

## 2024-09-10 PROCEDURE — 25000003 PHARM REV CODE 250: Performed by: INTERNAL MEDICINE

## 2024-09-10 PROCEDURE — A4216 STERILE WATER/SALINE, 10 ML: HCPCS | Performed by: INTERNAL MEDICINE

## 2024-09-10 PROCEDURE — 99232 SBSQ HOSP IP/OBS MODERATE 35: CPT | Mod: ,,, | Performed by: INTERNAL MEDICINE

## 2024-09-10 PROCEDURE — 80048 BASIC METABOLIC PNL TOTAL CA: CPT | Performed by: INTERNAL MEDICINE

## 2024-09-10 PROCEDURE — 83735 ASSAY OF MAGNESIUM: CPT | Performed by: INTERNAL MEDICINE

## 2024-09-10 PROCEDURE — 36415 COLL VENOUS BLD VENIPUNCTURE: CPT | Performed by: INTERNAL MEDICINE

## 2024-09-10 PROCEDURE — 63600175 PHARM REV CODE 636 W HCPCS: Performed by: INTERNAL MEDICINE

## 2024-09-10 RX ADMIN — METRONIDAZOLE 500 MG: 5 INJECTION, SOLUTION INTRAVENOUS at 03:09

## 2024-09-10 RX ADMIN — SODIUM BICARBONATE 650 MG TABLET 1300 MG: at 08:09

## 2024-09-10 RX ADMIN — ENOXAPARIN SODIUM 40 MG: 40 INJECTION SUBCUTANEOUS at 05:09

## 2024-09-10 RX ADMIN — LOPERAMIDE HYDROCHLORIDE 2 MG: 2 CAPSULE ORAL at 08:09

## 2024-09-10 RX ADMIN — PREDNISONE 66 MG: 10 TABLET ORAL at 10:09

## 2024-09-10 RX ADMIN — CHOLESTYRAMINE 4 GRAMS OF ANHYDROUS CHOLESTYRAMINE: 4 POWDER, FOR SUSPENSION ORAL at 10:09

## 2024-09-10 RX ADMIN — LOSARTAN POTASSIUM 50 MG: 50 TABLET, FILM COATED ORAL at 08:09

## 2024-09-10 RX ADMIN — METRONIDAZOLE 500 MG: 5 INJECTION, SOLUTION INTRAVENOUS at 10:09

## 2024-09-10 RX ADMIN — LEVOTHYROXINE SODIUM 125 MCG: 125 TABLET ORAL at 05:09

## 2024-09-10 RX ADMIN — CARVEDILOL 6.25 MG: 3.12 TABLET, FILM COATED ORAL at 08:09

## 2024-09-10 RX ADMIN — LOPERAMIDE HYDROCHLORIDE 2 MG: 2 CAPSULE ORAL at 05:09

## 2024-09-10 RX ADMIN — CLOPIDOGREL BISULFATE 75 MG: 75 TABLET ORAL at 08:09

## 2024-09-10 RX ADMIN — Medication: at 09:09

## 2024-09-10 RX ADMIN — MUPIROCIN: 20 OINTMENT TOPICAL at 08:09

## 2024-09-10 RX ADMIN — CHOLESTYRAMINE 4 GRAMS OF ANHYDROUS CHOLESTYRAMINE: 4 POWDER, FOR SUSPENSION ORAL at 08:09

## 2024-09-10 RX ADMIN — METRONIDAZOLE 500 MG: 5 INJECTION, SOLUTION INTRAVENOUS at 05:09

## 2024-09-10 RX ADMIN — CIPROFLOXACIN 400 MG: 400 INJECTION, SOLUTION INTRAVENOUS at 05:09

## 2024-09-10 RX ADMIN — CIPROFLOXACIN 400 MG: 400 INJECTION, SOLUTION INTRAVENOUS at 07:09

## 2024-09-10 RX ADMIN — SODIUM CHLORIDE, PRESERVATIVE FREE 10 ML: 5 INJECTION INTRAVENOUS at 06:09

## 2024-09-10 RX ADMIN — LOPERAMIDE HYDROCHLORIDE 2 MG: 2 CAPSULE ORAL at 12:09

## 2024-09-10 RX ADMIN — ATORVASTATIN CALCIUM 20 MG: 10 TABLET, FILM COATED ORAL at 08:09

## 2024-09-10 NOTE — PROGRESS NOTES
[facility]  Hospital Medicine Progress Note        Chief Complaint: Inpatient Follow-up     HPI:   79-year-old female medical history of stage IV lung adenocarcinoma initially diagnosed in 2014 went through multiple treatment regimen and currently on carboplatin+ pemetrexed+ nivolumab+ ipilimumab since 12/20/2022.     Present to the ED with chief complaint of diarrhea.  Report diarrhea has been ongoing for the past month minimum 3 loose watery bowel movements per day and some days up to 8 bowel movements per day, she was hospitalized last week for 8 days at [facility] and underwent infectious workup for diarrhea and treated for dehydration and MT and was discharged on a course of Cipro and Flagyl yesterday 09/05/2024.  Report again today she had multiple episode of watery diarrhea about 6 bowel movement, feeling weak and lightheaded as well as report nauseated and vomited once.  Denies any abdominal pain, fever or chills.     On arrival to ED she was afebrile, borderline low blood pressure 96/46 and saturating 96% on room air.  Labs notable for WBC 19.92, hemoglobin 11.1, creatinine 0.99, potassium 4.0, CO2 19, CT abdomen and pelvis with IV contrast showed numerous fluid-filled small bowel loops with diffuse wall thickening and enhancement of proximal jejunum may reflect enteritis, much of the colon is also fluid-filled with some mucosal enhancement may reflect colitis, left lung base chronic radiation related changes and scarring.     C difficile toxin and antigen both negative.  She was given IV fluids and referred to hospital medicine service for further evaluation and management    Heme-Onc, GI was consulted.    s/p flex sig 9/9: Diverticulosis in sigmoid and descending colon. examination otherwise normal. Decreased sphincter tone on digital rectal exam. single (solitary) ulcer in the distal rectum. Biopsies were taken with a cold forceps from the descending colon and  sigmoid colon for evaluation of microscopic colitis.     Interval Hx:   No acute events reported overnight.  Family members in the room.  Patient was resting on bed she reported improving diarrhea denied abdominal pain nausea vomiting    Discussed plan of care with pt, family member at bedside      Labs this morning showed chloride 112, bicarb 20, creatinine 0.95        Objective/physical exam:  General: In no acute distress, afebrile  Chest:  Unlabored breathing   Heart:  Normal  Abdomen: Soft, nontender  MSK: Warm, no lower extremity edema  Neurologic: Alert and responding appropriately  VITAL SIGNS: 24 HRS MIN & MAX LAST   Temp  Min: 97.3 °F (36.3 °C)  Max: 97.9 °F (36.6 °C) 97.3 °F (36.3 °C)   BP  Min: 128/73  Max: 157/69 (!) 157/69   Pulse  Min: 62  Max: 68  68   Resp  Min: 18  Max: 18 18   SpO2  Min: 93 %  Max: 95 % (!) 93 %     I have reviewed the following labs:  Recent Labs   Lab 09/07/24  0539 09/08/24 0451 09/09/24 0524   WBC 16.81* 14.97* 16.20*   RBC 3.98* 3.29* 3.29*   HGB 10.6* 8.8* 8.9*   HCT 32.8* 27.3* 27.3*   MCV 82.4 83.0 83.0   MCH 26.6* 26.7* 27.1   MCHC 32.3* 32.2* 32.6*   RDW 19.4* 19.8* 20.0*    268 263   MPV 9.1 9.6 10.4     Recent Labs   Lab 09/06/24  2130 09/07/24  0539 09/08/24  0451 09/09/24  0524 09/10/24  0439    141 139 135* 137   K 4.0 3.5 3.1* 4.0 3.9   * 116* 113* 110* 112*   CO2 19* 18* 18* 19* 20*   BUN 16.3 16.2 15.5 12.4 10.7   CREATININE 0.99 0.94 0.97 0.89 0.95   CALCIUM 10.5* 10.1 9.6 9.1 8.6   MG  --  1.60  --  1.50* 1.80   ALBUMIN 2.8* 2.4*  --   --   --    ALKPHOS 75 64  --   --   --    ALT 23 20  --   --   --    AST 23 22  --   --   --    BILITOT 0.3 0.3  --   --   --      Microbiology Results (last 7 days)       Procedure Component Value Units Date/Time    Blood Culture [[id number]]  (Normal) Collected: 09/07/24 0539    Order Status: Completed Specimen: Blood Updated: 09/09/24 1201     Blood Culture No Growth At 48 Hours    Blood Culture  [[id number]]  (Normal) Collected: 09/07/24 0539    Order Status: Completed Specimen: Blood Updated: 09/09/24 1201     Blood Culture No Growth At 48 Hours    Stool Culture [[id number]]  (Abnormal) Collected: 09/07/24 1034    Order Status: Completed Specimen: Stool Updated: 09/09/24 0900     Stool Culture Negative for Salmonella, Shigella, Campylobacter, Vibrio, Aeromonas, Pleisiomonas,Yersinia, or Shiga Toxin 1 and 2.      Moderate Yeast    Clostridium Diff Toxin, A & B, EIA [[id number]]  (Normal) Collected: 09/06/24 2347    Order Status: Completed Specimen: Stool Updated: 09/07/24 0039     Clostridium Difficile GDH Antigen Negative     Clostridium Difficile Toxin A/B Negative    C. Difficile By Rapid Pcr [[id number]] Collected: 09/06/24 2347    Order Status: Canceled Specimen: Stool Updated: 09/06/24 2347             See below for Radiology    Assessment/Plan:  Enterocolitis with persistent diarrhea-Suspected  immune check point inhibitor induced colitis/enteritis vs other  Diverticulosis in sig, desc colon ,single solitary ulcer in distal rectum,  Dehydration/volume depletion   Metabolic acidosis  Hypothyroid  stage IV metastatic lung cancer currently on carboplatin, pemetrexed, nivolumab, ipilimumab.     History of unquantified COPD, CAD on Plavix, HTN, HLD, hoarseness/chronic cough/chronic microaspiration on modified diet      Patient improving, Continue p.o. steroids,Empiric antibiotics Cipro, Flagyl day 5  Stool cultures negative, moderate yeast blood cultures negative  Flex sig- diverticulosis in sig, desc colon ,single solitary ulcer in distal rectum, biopsies were taken,follow pathology  Follow gastroenterology recommendation  Heme-Onc following, follow recommendations  IV fluids support, encourage p.o. intake   Monitor Bms   Monitor blood pressure, continue antihypertensives  Continue supportive  care   Labs in a.m.      VTE prophylaxis:  Lovenox    Patient condition:  Fair    Anticipated discharge and  Disposition:   TBD      Portions of this note dictated using EMR integrated voice recognition software, and may be subject to voice recognition errors not corrected at proofreading. Please contact writer for clarification if needed.   _____________________________________________________________________    Malnutrition Status:    Scheduled Med:   atorvastatin  20 mg Oral Daily    carvediloL  6.25 mg Oral BID    cholestyramine-aspartame  1 packet Oral BID    ciprofloxacin  400 mg Intravenous Q12H    clopidogreL  75 mg Oral Daily    enoxparin  40 mg Subcutaneous Q24H (prophylaxis, 1700)    levothyroxine  125 mcg Oral Before breakfast    loperamide  2 mg Oral QID    losartan  50 mg Oral Daily    metroNIDAZOLE IV (PEDS and ADULTS)  500 mg Intravenous Q8H    mupirocin   Nasal BID    predniSONE  1 mg/kg Oral Daily    sodium bicarbonate  1,300 mg Oral BID    zinc oxide-cod liver oil   Topical (Top) BID      Continuous Infusions:   D5 and 0.45% NaCl   Intravenous Continuous 100 mL/hr at 09/09/24 2306 New Bag at 09/09/24 2306    electrolyte-A   Intravenous Continuous        lactated ringers   Intravenous Continuous          PRN Meds:    Current Facility-Administered Medications:     acetaminophen, 650 mg, Oral, Q4H PRN    aluminum-magnesium hydroxide-simethicone, 30 mL, Oral, QID PRN    melatonin, 6 mg, Oral, Nightly PRN    ondansetron, 4 mg, Intravenous, Q4H PRN    prochlorperazine, 5 mg, Intravenous, Q6H PRN    sodium chloride 0.9%, 10 mL, Intravenous, PRN     Radiology:  I have personally reviewed the following imaging and agree with the radiologist.     CT Abdomen Pelvis With IV Contrast NO Oral Contrast  Narrative: EXAMINATION:  CT ABDOMEN PELVIS WITH IV CONTRAST    CLINICAL HISTORY:  Abdominal abscess/infection suspected;    TECHNIQUE:  Helically acquired images with axial, sagittal and coronal reformations were obtained from the lung bases to the pubic symphysis after the IV administration of contrast.    Automated  tube current modulation, weight-based exposure dosing, and/or iterative reconstruction technique utilized to reach lowest reasonably achievable exposure rate.    DLP: 486 mGy*cm    COMPARISON:  PET-CT 07/25/2024    FINDINGS:  HEART: There are coronary artery calcifications.    LUNG BASES: Tree-in-bud nodularity at the lung bases, left greater than right.    LIVER: Normal attenuation. No appreciable focal hepatic lesion.    BILIARY: Gallbladder is surgically absent.  Mild biliary ectasia with intrahepatic and extrahepatic biliary ductal dilatation.    PANCREAS: No inflammatory change.    SPLEEN: Normal in size    ADRENALS: No mass.    KIDNEYS/URETERS: The kidneys enhance symmetrically.  No hydronephrosis. Incidental right renal cyst requires no imaging follow-up.    GI TRACT/MESENTERY: The bowel is fluid distended from the stomach through the rectum.  No transition point seen.    PERITONEUM: No free fluid.No free air.    LYMPH NODES: No enlarged lymph nodes by size criteria.    VASCULATURE: Aortoiliac atherosclerosis.    BLADDER: Obscured by beam hardening artifact.    REPRODUCTIVE ORGANS: Obscured by beam hardening artifact.    SOFT TISSUES: Unremarkable.    BONES: There are bilateral hip arthroplasties with associated beam hardening artifact.  Postsurgical change at the lumbosacral spine.  Impression: 1. The bowel is fluid distended from the level of the stomach through the rectum without transition point.  Appearance may be related to enterocolitis or ileus.  2. Tree-in-bud nodularity at the lung bases suggest infectious or inflammatory bronchiolitis.  3. Postop cholecystectomy with biliary ectasia  4. The preliminary and final reports are concordant.    Electronically signed by: Kathleen Scott  Date:    09/07/2024  Time:    12:00      Garett Zee MD  Department of Hospital Medicine   [facility]   09/10/2024

## 2024-09-10 NOTE — PROGRESS NOTES
"Gastroenterology Progress Note    Subjective/Interval HPI:  "Diarrhea went away."  Reports no BMs since enema for flex sig prep.  No abdominal pain/n/v.  Tolerating regular diet.      VSS.  Hypokalemia resolved.     Vital Signs:  BP (!) 157/69   Pulse 68   Temp 97.3 °F (36.3 °C) (Oral)   Resp 18   Ht 5' 4" (1.626 m)   Wt 64 kg (141 lb)   SpO2 (!) 93%   Breastfeeding No   BMI 24.20 kg/m²   Body mass index is 24.2 kg/m².    Physical Exam:    Physical Exam  Vitals and nursing note reviewed.   Constitutional:       Appearance: She is not ill-appearing.   HENT:      Head: Normocephalic and atraumatic.   Eyes:      General: No scleral icterus.     Extraocular Movements: Extraocular movements intact.   Cardiovascular:      Rate and Rhythm: Normal rate and regular rhythm.      Heart sounds: Normal heart sounds.   Pulmonary:      Effort: Pulmonary effort is normal. No respiratory distress.      Breath sounds: Normal breath sounds.   Chest:      Comments: Mediport in right upper chest wall  Abdominal:      General: Abdomen is flat. Bowel sounds are normal. There is no distension.      Palpations: Abdomen is soft.      Tenderness: There is no abdominal tenderness.   Musculoskeletal:         General: No swelling or deformity. Normal range of motion.      Right lower leg: No edema.      Left lower leg: No edema.   Skin:     General: Skin is warm and dry.   Neurological:      General: No focal deficit present.      Mental Status: She is alert and oriented to person, place, and time.   Psychiatric:         Mood and Affect: Mood normal.         Behavior: Behavior normal.         Labs:  Recent Results (from the past 48 hour(s))   Basic Metabolic Panel    Collection Time: 09/09/24  5:24 AM   Result Value Ref Range    Sodium 135 (L) 136 - 145 mmol/L    Potassium 4.0 3.5 - 5.1 mmol/L    Chloride 110 (H) 98 - 107 mmol/L    CO2 19 (L) 23 - 31 mmol/L    Glucose 111 82 - 115 mg/dL    Blood Urea Nitrogen 12.4 9.8 - 20.1 mg/dL    " Creatinine 0.89 0.55 - 1.02 mg/dL    BUN/Creatinine Ratio 14     Calcium 9.1 8.4 - 10.2 mg/dL    Anion Gap 6.0 mEq/L    eGFR >60 mL/min/1.73/m2   Magnesium    Collection Time: 09/09/24  5:24 AM   Result Value Ref Range    Magnesium Level 1.50 (L) 1.60 - 2.60 mg/dL   CBC with Differential    Collection Time: 09/09/24  5:24 AM   Result Value Ref Range    WBC 16.20 (H) 4.50 - 11.50 x10(3)/mcL    RBC 3.29 (L) 4.20 - 5.40 x10(6)/mcL    Hgb 8.9 (L) 12.0 - 16.0 g/dL    Hct 27.3 (L) 37.0 - 47.0 %    MCV 83.0 80.0 - 94.0 fL    MCH 27.1 27.0 - 31.0 pg    MCHC 32.6 (L) 33.0 - 36.0 g/dL    RDW 20.0 (H) 11.5 - 17.0 %    Platelet 263 130 - 400 x10(3)/mcL    MPV 10.4 7.4 - 10.4 fL    Neut % 87.7 %    Lymph % 6.0 %    Mono % 5.5 %    Eos % 0.0 %    Basophil % 0.1 %    Lymph # 0.97 0.6 - 4.6 x10(3)/mcL    Neut # 14.22 (H) 2.1 - 9.2 x10(3)/mcL    Mono # 0.89 0.1 - 1.3 x10(3)/mcL    Eos # 0.00 0 - 0.9 x10(3)/mcL    Baso # 0.01 <=0.2 x10(3)/mcL    IG# 0.11 (H) 0 - 0.04 x10(3)/mcL    IG% 0.7 %    NRBC% 0.0 %    IPF 4.1 0.9 - 11.2 %   Basic Metabolic Panel    Collection Time: 09/10/24  4:39 AM   Result Value Ref Range    Sodium 137 136 - 145 mmol/L    Potassium 3.9 3.5 - 5.1 mmol/L    Chloride 112 (H) 98 - 107 mmol/L    CO2 20 (L) 23 - 31 mmol/L    Glucose 113 82 - 115 mg/dL    Blood Urea Nitrogen 10.7 9.8 - 20.1 mg/dL    Creatinine 0.95 0.55 - 1.02 mg/dL    BUN/Creatinine Ratio 11     Calcium 8.6 8.4 - 10.2 mg/dL    Anion Gap 5.0 mEq/L    eGFR >60 mL/min/1.73/m2   Magnesium    Collection Time: 09/10/24  4:39 AM   Result Value Ref Range    Magnesium Level 1.80 1.60 - 2.60 mg/dL         Assessment/Plan:    79 y.o. female with lung cancer currently on therapy with carboplatin+ pemetrexed+ nivolumab+ ipilimumab since 12/20/2022. She presents with one month of worsening diarrhea, up to 8 times daily.  She spent a week at [facility] where infectious workup was unremarkable.  She did not undergo endoscopic evaluation.  Noted h/o  cholecystectomy.     Severe diarrhea    CT abd/pelv w IV contrast 9/6: evidence of fluid distended bowel from stomach through rectum.   c. Diff and GI PCR negative  s/p flex sig 9/9: Diverticulosis in sigmoid and descending colon. examination otherwise normal. Decreased sphincter tone on digital rectal exam. single (solitary) ulcer in the distal rectum. Biopsies were taken with a cold forceps from the descending colon and sigmoid colon for evaluation of microscopic colitis.    - concern for possible checkpoint inhibitor colitis, heme/onc consulted. Initiated steroids.   - f/u pathology.  - also on scheduled imodium 2mg QID and questran 4g BID.         Patrica Olmedo PA-C  [facility]

## 2024-09-10 NOTE — PROGRESS NOTES
Subjective:       Patient ID: Pratibha Patel is a 79 y.o. female.    Chief Complaint: Diarrhea (Pt arrives via [facility], EMS / Pt reports that she has had multiple episodes of diarrhea today. Pt was Dc'd yesterday from [facility], due to vomiting and diarrhea. Pt is AAOx 4.)      PCP: Graham Nguyen NP  Cardiologist: Dr. Danny Sánchez  Referring Physician: Dr. Anthony Hutson  Endocrine: Dr. Werner  Radiation oncology: Dr. Boss in the past, now Dr. Mortensen.  ENT: Dr. 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 ? metastatic findings could not be biopsied. TTF negative/CK 7 +.    Diagnosed June 2014 EGFR mutation negative/ALK gene rearrangement negative. 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 Patient has reportedly been referred to ENT for resection and lymph node dissection (per patient report).   Subsequent open biopsy done by Dr. Rosales on 7/29/2019 confirmed the same.  Patient was unable to undergo definitive surgical resection for oligo metastatic disease.  Hoarseness and swallowing difficulties secondary to #2  Biopsy of the left lung lesion showed recurrent non-small cell lung cancer, Caris was sent and showed positivity for PD-L1, 2%.  No other targetable or actionable mutations present.    Current Treatment:   Combination chemo immunotherapy with carboplatin, pemetrexed, nivolumab, ipilimumab based off of checkmate 9LA     Treatment History:  RT with weekly Carbo/Taxol started  7/2/14--Completed August 2014  Single agent 'Maintenance' Avastin q 3 weeks.  Started 4/9/15--last given September 21, 2016  Radiation + weekly Carbo/Taxol at 2AUC ----9/11/19-10/22/19  Patient underwent radiation from 12/17/2020 through 12/21/2020.  Radiation to the left and right lung per Dr. Robert Mortensen.    HPI:   Please see Oncology history in the diagnosis of adenocarcinoma of the lung, stage IV on the problem list for full detail.  Patient originally seen in 2014 with unintentional weight loss, shortness of breath, nonproductive cough.  Patient had imaging findings suggestive of lung cancer, bronchoscopy done in June of 2014 revealed moderately differentiated adenocarcinoma of the lung.  She originally underwent chemo radiation from July 2014 through August 2014.  She was placed on maintenance Avastin from April 2015 through September 2016. Patient then underwent a thyroid biopsy in June of 2019 that showed metastatic carcinoma favored to be adenocarcinoma lung primary.  She underwent surgical resection in July 2019, however full surgical dissection was not able to be done and the patient underwent open biopsy of right thyroid gland.  Pathology revealed poorly differentiated non-small cell carcinoma consistent with metastatic lung cancer.  NGS panel done at that time unremarkable.  She started chemoradiation with carbo Taxol in September 2019, completed in October 2019. Patient was doing well, imaging in October 2020 revealed an abnormality in the left lung, CT-guided lung biopsy on 11/03/2020 showed recurrent non-small cell lung cancer, Caris revealed PDL1 positivity.  She underwent SBRT from 12/17/2020 through 12/21/2020.  Patient then placed on surveillance imaging, most recently done on 07/05/2022 showing worsening disease in the right lung with a previous lesion that measured 0.6 cm and had an SUV of 1.0 now measuring 1.5 cm and an SUV of 8.4.  This was treated with SBRT in August of 2022.  PET/CT scan  done on 11/8/2022 showed mild increased radiotracer activity within the left apical consolidation, SUV 5.5.  There was a newly hypermetabolic subcentimeter subcarinal lymph node.  A 1 cm right lower lobe nodule was smaller, he right upper lobe nodule was slightly larger measuring 6 mm.  No sites of FDG avid metastatic disease in the neck, abdomen, or pelvis. Bronchoscopy done on 11/16/2022, pathology revealed malignant cells consistent with adenocarcinoma.  Planning on chemo immunotherapy with carboplatin, pemetrexed, nivolumab, ipilimumab based off of checkmate 9LA.  PET/CT on 07/25/2023 showed infectious or inflammatory changes in the right lower lobe with no other findings to suggest new or worsening disease.    PET/CT scan done on 10/12/2023 showed mixed response to therapy with decrease right hilar and left upper lobe consolidation uptake.  Subcarinal lymph node demonstrates significant increased uptake compared to the prior study.  Stable subcentimeter right upper lobe nodule demonstrates slight increased uptake compared to prior study    PET/CT scan done on 01/18/2024 showed mixed response to therapy with decrease in size of the right upper lobe nodule and maximum SUV of subcarinal lymph node.  There was right hilar uptake that was increased, however there was also patchy airspace opacities that could be infectious or inflammatory.    CT of the chest on 05/21/2024 showed overall stable disease with tiny centrilobular nodules throughout both lungs, similar to prior CTA.  Stable 0.9 cm nodule in the apical right upper lobe.    Most recent PET/CT scan done on 07/25/2024 showed unchanged mild FDG avid nodule in the posterior right lung apex, unchanged mild FDG avid left apical consolidative opacity.  There was a new paramediastinal consolidative opacity in the anterior right upper lobe and left upper lobe with mild FDG avidity that was symmetric and suggestive of a radiation port, malignancy felt less likely  given symmetry.  There was indeterminate patchy consolidative opacity in the subpleural right lower lobe with mild FDG avidity slightly increased in prominence from prior PET/CT.  There was similar mediastinal and hilar lymph nodes.      Interval History:   Patient well known to me who presented to the hospital on 09/06/2024 with significant watery diarrhea.  CT scan of the abdomen and pelvis done on 09/06/2024 showed fluid distended bowel in the level of the stomach through the rectum without transition point which may be related to enterocolitis or ileus.  There was infectious versus inflammatory bronchiolitis tree-in-bud nodularity in the lung bases.  Patient underwent flex sigmoidoscopy on 09/09/2024, there were some diverticulosis with no major abnormalities, random biopsies obtained.    Today, 09/10/2024:  Patient seen and examined.  Her diarrhea has improved.  She no longer has a rectal tube placed.    Past Medical History:   Diagnosis Date    Adenocarcinoma of lung     Anemia     Anxiety     Arthritis     COPD (chronic obstructive pulmonary disease)     Depression     HLD (hyperlipidemia)     Hypertension     Lung cancer     Secondary malignant neoplasm of lymph nodes     Secondary malignant neoplasm of right lung     Thyroid disease     lymph nodes malignant      Past Surgical History:   Procedure Laterality Date    BLADDER SURGERY      CHOLECYSTECTOMY      COLONOSCOPY  2018    COLONOSCOPY N/A 9/9/2024    Procedure: SIG;  Surgeon: Saurav Hutson MD;  Location: [facility] ENDOSCOPY;  Service: Gastroenterology;  Laterality: N/A;    ENDOBRONCHIAL ULTRASOUND N/A 11/16/2022    Procedure: ENDOBRONCHIAL ULTRASOUND (EBUS);  Surgeon: Anthony Hutson MD;  Location: [facility] BRONCHOSCOPY LAB;  Service: Pulmonary;  Laterality: N/A;    HYSTERECTOMY      KNEE SURGERY Bilateral     replaced knees    PARTIAL HIP ARTHROPLASTY Bilateral     SIGMOIDOSCOPY, WITH BIOPSY N/A 9/9/2024    Procedure: SIGMOIDOSCOPY, WITH BIOPSY;   Surgeon: Saurav Hutson MD;  Location: [facility] ENDOSCOPY;  Service: Gastroenterology;  Laterality: N/A;     Social History     Socioeconomic History    Marital status:    Tobacco Use    Smoking status: Every Day     Current packs/day: 0.25     Average packs/day: 0.3 packs/day for 65.7 years (16.4 ttl pk-yrs)     Types: Cigarettes     Start date: 1959    Smokeless tobacco: Never    Tobacco comments:     Patient states she is trying to quit smoking.   Substance and Sexual Activity    Alcohol use: Not Currently    Drug use: Never     Social Determinants of Health     Financial Resource Strain: Low Risk  (9/9/2024)    Overall Financial Resource Strain (CARDIA)     Difficulty of Paying Living Expenses: Not hard at all   Food Insecurity: No Food Insecurity (9/9/2024)    Hunger Vital Sign     Worried About Running Out of Food in the Last Year: Never true     Ran Out of Food in the Last Year: Never true   Transportation Needs: No Transportation Needs (9/9/2024)    TRANSPORTATION NEEDS     Transportation : No   Physical Activity: Sufficiently Active (9/9/2024)    Exercise Vital Sign     Days of Exercise per Week: 5 days     Minutes of Exercise per Session: 30 min   Stress: No Stress Concern Present (9/9/2024)    [employer] [place] [employer] - Occupational Stress Questionnaire     Feeling of Stress : Only a little   Housing Stability: Low Risk  (9/9/2024)    Housing Stability Vital Sign     Unable to Pay for Housing in the Last Year: No     Homeless in the Last Year: No      Family History   Problem Relation Name Age of Onset    Lung cancer Mother      Lung cancer Brother        Review of patient's allergies indicates:  No Known Allergies   Review of Systems   Constitutional:  Negative for chills, diaphoresis, fatigue, fever and unexpected weight change.   HENT:  Negative for nasal congestion, mouth sores, sinus pressure/congestion and sore throat.    Eyes:  Negative for pain and visual disturbance.    Respiratory:  Negative for cough, chest tightness and shortness of breath.    Cardiovascular:  Negative for chest pain, palpitations and leg swelling.   Gastrointestinal:  Negative for abdominal distention, abdominal pain, blood in stool, constipation and diarrhea.   Genitourinary:  Negative for dysuria, frequency and hematuria.   Musculoskeletal:  Negative for arthralgias and back pain.   Integumentary:  Negative for rash.   Neurological:  Negative for dizziness, weakness, numbness and headaches.   Hematological:  Negative for adenopathy.   Psychiatric/Behavioral:  Negative for confusion.          Objective:      Physical Exam  Vitals reviewed.   Constitutional:       General: She is not in acute distress.     Appearance: Normal appearance.   HENT:      Head: Normocephalic and atraumatic.      Nose: Nose normal.      Mouth/Throat:      Mouth: Mucous membranes are moist.   Eyes:      Extraocular Movements: Extraocular movements intact.      Conjunctiva/sclera: Conjunctivae normal.   Neck:      Comments: Radiation changes to the left supraclavicular area  Cardiovascular:      Rate and Rhythm: Normal rate and regular rhythm.      Pulses: Normal pulses.      Heart sounds: Normal heart sounds.   Pulmonary:      Effort: Pulmonary effort is normal.      Breath sounds: Examination of the right-lower field reveals decreased breath sounds. Examination of the left-lower field reveals decreased breath sounds. Decreased breath sounds present.   Musculoskeletal:         General: No swelling. Normal range of motion.      Cervical back: Normal range of motion and neck supple.      Right lower leg: No edema.      Left lower leg: No edema.   Skin:     General: Skin is warm and dry.   Neurological:      General: No focal deficit present.      Mental Status: She is alert and oriented to person, place, and time. Mental status is at baseline.   Psychiatric:         Mood and Affect: Mood normal.         Behavior: Behavior normal.          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 ? metastatic findings could not be biopsied.    TTF negative/CK 7 +.    Diagnosed June 2014.  EGFR mutation negative/ALK gene rearrangement negative.  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 Patient has reportedly been referred to ENT for resection and lymph node dissection (per patient report). Subsequent open biopsy done by Dr. Rosales on 7/29/2019 confirmed the same.  Patient was unable to undergo definitive surgical resection for oligo metastatic disease.  Treatment induced anemia  Hoarseness .      Plan:          Patient's biopsy did return as recurrent non-small cell lung cancer in the left lung.  I feel that the right lung will be similar pathology.  She completed radiation with Dr. Mortensen on 12/24/2020.     Caris did reveal that PD-L1 was 2% positive suggesting benefit from pembrolizumab or nivolumab/ipilimumab.      Her PET/CT scan does show a new hypermetabolic subcentimeter subcarinal lymph node and increase in a left apical consolidation.  The left apical consolidation is close to the aorta and I do not think would be amenable to biopsy, however this subcarinal lymph node may be amenable to bronchoscopy.      Bronchoscopy done on 11/16/2022 showed metastatic adenocarcinoma.  She is no longer a candidate for full dose SBRT.  We started with carboplatin, pemetrexed, nivolumab, and ipilimumab based on the CHECKMATE 9LA study.  Cycle 1 day 1 given 12/20/2022.     Follow up with radiation  oncology as scheduled, Dr. Mortensen.    Continue levothyroxine to 88mcg daily.     PET/CT scan done on 01/18/2024 showed mixed response to therapy.  Overall, the patient was asymptomatic from a cancer standpoint.  No evidence of overt progression.    Most recent PET/CT scan done on 07/25/2024 showed essentially stable disease.    Patient now admitted with diarrhea.  Her diarrhea is improving.  She has been on immunotherapy for quite some time.  The patient did receive steroids.  This did lead to improvement of her diarrhea.  One question would be as this immunotherapy related.  While it is possible, I wonder if we could still treat her with immunotherapy and keep her on low-dose steroids.  We will discuss this on an outpatient basis.    Hopefully she will discharge home.    Enrrique Gudino II, MD

## 2024-09-10 NOTE — NURSING
Nurses Note -- 4 Eyes      9/10/2024   7:54 AM      Skin assessed during: Q Shift Change      [x] No Altered Skin Integrity Present    [x]Prevention Measures Documented      [] Yes- Altered Skin Integrity Present or Discovered   [] LDA Added if Not in Epic (Describe Wound)   [] New Altered Skin Integrity was Present on Admit and Documented in LDA   [] Wound Image Taken    Wound Care Consulted? No    Attending Nurse:  Tobi Tolbert RN/Staff Member:   Gifty

## 2024-09-10 NOTE — NURSING
Nurses Note -- 4 Eyes      9/9/2024   0833 PM      Skin assessed during: Daily Assessment      [x] No Altered Skin Integrity Present    [x]Prevention Measures Documented      [] Yes- Altered Skin Integrity Present or Discovered   [] LDA Added if Not in Epic (Describe Wound)   [] New Altered Skin Integrity was Present on Admit and Documented in LDA   [] Wound Image Taken    Wound Care Consulted? No    Attending Nurse:  Gladys Tolbert RN/Staff Member:   Chantell

## 2024-09-11 LAB — CALPROTECTIN STL-MCNT: 528 MCG/G

## 2024-09-11 PROCEDURE — 25000003 PHARM REV CODE 250: Performed by: INTERNAL MEDICINE

## 2024-09-11 PROCEDURE — 63600175 PHARM REV CODE 636 W HCPCS: Performed by: INTERNAL MEDICINE

## 2024-09-11 PROCEDURE — 97166 OT EVAL MOD COMPLEX 45 MIN: CPT

## 2024-09-11 PROCEDURE — 11000001 HC ACUTE MED/SURG PRIVATE ROOM

## 2024-09-11 PROCEDURE — 97162 PT EVAL MOD COMPLEX 30 MIN: CPT

## 2024-09-11 PROCEDURE — 63600175 PHARM REV CODE 636 W HCPCS: Mod: JZ,JG | Performed by: INTERNAL MEDICINE

## 2024-09-11 RX ORDER — ZINC OXIDE 20 G/100G
OINTMENT TOPICAL 2 TIMES DAILY
Status: DISCONTINUED | OUTPATIENT
Start: 2024-09-11 | End: 2024-09-12 | Stop reason: HOSPADM

## 2024-09-11 RX ADMIN — METRONIDAZOLE 500 MG: 5 INJECTION, SOLUTION INTRAVENOUS at 12:09

## 2024-09-11 RX ADMIN — CIPROFLOXACIN 400 MG: 400 INJECTION, SOLUTION INTRAVENOUS at 05:09

## 2024-09-11 RX ADMIN — LOPERAMIDE HYDROCHLORIDE 2 MG: 2 CAPSULE ORAL at 05:09

## 2024-09-11 RX ADMIN — LOPERAMIDE HYDROCHLORIDE 2 MG: 2 CAPSULE ORAL at 12:09

## 2024-09-11 RX ADMIN — MUPIROCIN: 20 OINTMENT TOPICAL at 09:09

## 2024-09-11 RX ADMIN — LEVOTHYROXINE SODIUM 125 MCG: 125 TABLET ORAL at 05:09

## 2024-09-11 RX ADMIN — CLOPIDOGREL BISULFATE 75 MG: 75 TABLET ORAL at 09:09

## 2024-09-11 RX ADMIN — SODIUM BICARBONATE 650 MG TABLET 1300 MG: at 09:09

## 2024-09-11 RX ADMIN — ONDANSETRON 4 MG: 2 INJECTION INTRAMUSCULAR; INTRAVENOUS at 04:09

## 2024-09-11 RX ADMIN — CHOLESTYRAMINE 4 GRAMS OF ANHYDROUS CHOLESTYRAMINE: 4 POWDER, FOR SUSPENSION ORAL at 09:09

## 2024-09-11 RX ADMIN — PREDNISONE 66 MG: 10 TABLET ORAL at 05:09

## 2024-09-11 RX ADMIN — CARVEDILOL 6.25 MG: 3.12 TABLET, FILM COATED ORAL at 09:09

## 2024-09-11 RX ADMIN — CIPROFLOXACIN 400 MG: 400 INJECTION, SOLUTION INTRAVENOUS at 07:09

## 2024-09-11 RX ADMIN — RUGBY ZINC OXIDE 20%: 20 OINTMENT TOPICAL at 09:09

## 2024-09-11 RX ADMIN — LOPERAMIDE HYDROCHLORIDE 2 MG: 2 CAPSULE ORAL at 09:09

## 2024-09-11 RX ADMIN — LOSARTAN POTASSIUM 50 MG: 50 TABLET, FILM COATED ORAL at 09:09

## 2024-09-11 RX ADMIN — METRONIDAZOLE 500 MG: 5 INJECTION, SOLUTION INTRAVENOUS at 03:09

## 2024-09-11 RX ADMIN — ATORVASTATIN CALCIUM 20 MG: 10 TABLET, FILM COATED ORAL at 09:09

## 2024-09-11 RX ADMIN — METRONIDAZOLE 500 MG: 5 INJECTION, SOLUTION INTRAVENOUS at 05:09

## 2024-09-11 RX ADMIN — ENOXAPARIN SODIUM 40 MG: 40 INJECTION SUBCUTANEOUS at 05:09

## 2024-09-11 NOTE — PT/OT/SLP EVAL
Physical Therapy Evaluation    Patient Name:  Pratibha Patel   MRN:  [id number]    Recommendations:     Discharge therapy intensity: Low Intensity Therapy   Discharge Equipment Recommendations: none   Barriers to discharge: Ongoing medical needs    Assessment:     Pratibha Patel is a 79 y.o. female admitted with a medical diagnosis of enterocolitis with persistent diarrhea s/p flex sig 9/9, dehydration.  She presents with the following impairments/functional limitations: weakness, impaired endurance, impaired functional mobility, gait instability, impaired balance . Pt presents with generalized weakness/deconditioning below prior baseline, currently requiring min-CGA for mobility. Vitals stable during ambulation.  able to provide 24/7 assistance at home; recommend low intensity therapy and RW use.    Rehab Prognosis: Good; patient would benefit from acute skilled PT services to address these deficits and reach maximum level of function.    Recent Surgery: Procedure(s) (LRB):  SIG (N/A)  SIGMOIDOSCOPY, WITH BIOPSY (N/A) 2 Days Post-Op    Plan:     During this hospitalization, patient would benefit from acute PT services 5 x/week to address the identified rehab impairments via gait training, therapeutic activities, therapeutic exercises, neuromuscular re-education and progress toward the following goals:    Plan of Care Expires:  10/11/24    Subjective     Chief Complaint: weakness  Patient/Family Comments/goals: home  Pain/Comfort:  Pain Rating 1: 0/10    Patients cultural, spiritual, Evangelical conflicts given the current situation: no    Living Environment:  Pt lives with her spouse in a [facility] with no WILLIAM.  Prior to admission, patients level of function was [place] with rolling walker, occasionally needs assistance for ADLs.  Equipment used at home: walker, rolling, grab bar, bath bench, shower chair, wheelchair, cane, straight.  DME owned (not currently used): none.  Upon discharge, patient will have assistance  from .    Objective:     Communicated with RN prior to session.  Patient found sitting edge of bed with pulse ox (continuous), telemetry, peripheral IV  upon PT entry to room.    General Precautions: Standard, fall  Orthopedic Precautions:    Braces:    Respiratory Status: Room air SpO2 98%  Blood Pressure: NT, HR 66 bpm with mobility      Exams:  RLE Strength: 4+/5 gross  LLE Strength: 4+/5 gross  Skin integrity: Visible skin intact      Functional Mobility:  Transfers:     Sit to Stand:  minimum assistance with rolling walker  Gait: 85ft with RW with CGA, slow pace, narrow SAHRA. Cues to maintain close proximity to RW. Briefly dizzy during first steps but resolved with standing rest.  Balance: Sitting balance = WFL      AM-PAC 6 CLICK MOBILITY  Total Score:18       Treatment & Education:  Encouraged to ambulate to toilet with staff to increase OOB activity throughout day. RW left in room.    Patient and spouse were provided with verbal education education regarding PT role/goals/POC, fall prevention, safety awareness, and discharge/DME recommendations.  Understanding was verbalized.     Patient left up in chair with all lines intact, call button in reach, RN notified, and  present.    GOALS:   Multidisciplinary Problems       Physical Therapy Goals          Problem: Physical Therapy    Goal Priority Disciplines Outcome Goal Variances Interventions   Physical Therapy Goal     PT, PT/OT Progressing     Description: Goals to be met by: 10/11/24     Patient will increase functional independence with mobility by performin. Supine to sit with [place]  2. Sit to stand transfer with Modified [place]  3. Bed to chair transfer with Modified [place] using Rolling Walker  4. Gait  x 150 feet with Modified [place] using Rolling Walker.   5. Increased functional strength of BLE to 5/5.                         History:     Past Medical History:   Diagnosis Date    Adenocarcinoma of lung      Anemia     Anxiety     Arthritis     COPD (chronic obstructive pulmonary disease)     Depression     HLD (hyperlipidemia)     Hypertension     Lung cancer     Secondary malignant neoplasm of lymph nodes     Secondary malignant neoplasm of right lung     Thyroid disease     lymph nodes malignant       Past Surgical History:   Procedure Laterality Date    BLADDER SURGERY      CHOLECYSTECTOMY      COLONOSCOPY  2018    COLONOSCOPY N/A 9/9/2024    Procedure: SIG;  Surgeon: Saurav Hutson MD;  Location: [facility] ENDOSCOPY;  Service: Gastroenterology;  Laterality: N/A;    ENDOBRONCHIAL ULTRASOUND N/A 11/16/2022    Procedure: ENDOBRONCHIAL ULTRASOUND (EBUS);  Surgeon: Anthony Hutson MD;  Location: [facility] BRONCHOSCOPY LAB;  Service: Pulmonary;  Laterality: N/A;    HYSTERECTOMY      KNEE SURGERY Bilateral     replaced knees    PARTIAL HIP ARTHROPLASTY Bilateral     SIGMOIDOSCOPY, WITH BIOPSY N/A 9/9/2024    Procedure: SIGMOIDOSCOPY, WITH BIOPSY;  Surgeon: Saurav Hutson MD;  Location: [facility] ENDOSCOPY;  Service: Gastroenterology;  Laterality: N/A;       Time Tracking:     PT Received On: 09/11/24  PT Start Time: 1150     PT Stop Time: 1210  PT Total Time (min): 20 min     Billable Minutes: Evaluation MOD      09/11/2024

## 2024-09-11 NOTE — PLAN OF CARE
Problem: Wound  Goal: Optimal Coping  Outcome: Progressing  Goal: Optimal Functional Ability  Outcome: Progressing  Goal: Absence of Infection Signs and Symptoms  Outcome: Progressing  Goal: Improved Oral Intake  Outcome: Progressing  Goal: Optimal Pain Control and Function  Outcome: Progressing  Goal: Skin Health and Integrity  Outcome: Progressing  Goal: Optimal Wound Healing  Outcome: Progressing     Problem: Adult Inpatient Plan of Care  Goal: Plan of Care Review  Outcome: Progressing  Goal: Patient-Specific Goal (Individualized)  Outcome: Progressing  Goal: Absence of Hospital-Acquired Illness or Injury  Outcome: Progressing  Goal: Optimal Comfort and Wellbeing  Outcome: Progressing  Goal: Readiness for Transition of Care  Outcome: Progressing     Problem: Skin Injury Risk Increased  Goal: Skin Health and Integrity  Outcome: Progressing     Problem: Wound  Goal: Optimal Coping  Outcome: Progressing  Goal: Optimal Functional Ability  Outcome: Progressing  Goal: Absence of Infection Signs and Symptoms  Outcome: Progressing  Goal: Improved Oral Intake  Outcome: Progressing  Goal: Optimal Pain Control and Function  Outcome: Progressing  Goal: Skin Health and Integrity  Outcome: Progressing  Goal: Optimal Wound Healing  Outcome: Progressing

## 2024-09-11 NOTE — PLAN OF CARE
Problem: Skin Injury Risk Increased  Goal: Skin Health and Integrity  9/10/2024 1942 by Tobi Manzanares LPN  Outcome: Progressing  9/10/2024 1941 by Tobi Manzanares LPN  Outcome: Progressing     Problem: Adult Inpatient Plan of Care  Goal: Plan of Care Review  9/10/2024 1942 by Tobi Manzanares LPN  Outcome: Progressing  9/10/2024 1941 by Tobi Manzanares LPN  Outcome: Progressing  Goal: Patient-Specific Goal (Individualized)  9/10/2024 1942 by Tobi Manzanares LPN  Outcome: Progressing  9/10/2024 1941 by Tobi Manzanares LPN  Outcome: Progressing  Goal: Absence of Hospital-Acquired Illness or Injury  9/10/2024 1942 by Tobi Manzanares LPN  Outcome: Progressing  9/10/2024 1941 by Tobi Manzanares LPN  Outcome: Progressing  Goal: Optimal Comfort and Wellbeing  9/10/2024 1942 by Tobi Manzanares LPN  Outcome: Progressing  9/10/2024 1941 by Tobi Manzanares LPN  Outcome: Progressing  Goal: Readiness for Transition of Care  9/10/2024 1942 by Tobi Manzanares LPN  Outcome: Progressing  9/10/2024 1941 by Tobi Manzanares LPN  Outcome: Progressing     Problem: Infection  Goal: Absence of Infection Signs and Symptoms  9/10/2024 1942 by Tobi Manzanares LPN  Outcome: Progressing  9/10/2024 1941 by Tobi Manzanares LPN  Outcome: Progressing     Problem: Wound  Goal: Optimal Coping  9/10/2024 1942 by Tobi Manzanares LPN  Outcome: Progressing  9/10/2024 1941 by Tobi Manzanares LPN  Outcome: Progressing  Goal: Optimal Functional Ability  9/10/2024 1942 by Tobi Manzanares LPN  Outcome: Progressing  9/10/2024 1941 by Tobi Manzanares LPN  Outcome: Progressing  Goal: Absence of Infection Signs and Symptoms  9/10/2024 1942 by Tobi Manzanares LPN  Outcome: Progressing  9/10/2024 1941 by Tobi Manzanares LPN  Outcome: Progressing  Goal: Improved Oral Intake  9/10/2024 1942 by Tobi Manzanares LPN  Outcome: Progressing  9/10/2024 1941 by Tobi Manzanares LPN  Outcome: Progressing  Goal: Optimal Pain Control and Function  9/10/2024 1942 by Tobi Manzanares LPN  Outcome:  Progressing  9/10/2024 1941 by Tobi Manzanares LPN  Outcome: Progressing  Goal: Skin Health and Integrity  9/10/2024 1942 by Tobi Manzanares LPN  Outcome: Progressing  9/10/2024 1941 by Tobi Manzanares LPN  Outcome: Progressing  Goal: Optimal Wound Healing  9/10/2024 1942 by Tobi Manzanares LPN  Outcome: Progressing  9/10/2024 1941 by Tobi Manzanares LPN  Outcome: Progressing     Problem: Adult Inpatient Plan of Care  Goal: Plan of Care Review  9/10/2024 1942 by Tobi Manzanares LPN  Outcome: Progressing  9/10/2024 1941 by Toib Manzanares LPN  Outcome: Progressing     Problem: Infection  Goal: Absence of Infection Signs and Symptoms  9/10/2024 1942 by Tobi Manzanares LPN  Outcome: Progressing  9/10/2024 1941 by Tobi Manzanares LPN  Outcome: Progressing     Problem: Wound  Goal: Optimal Coping  9/10/2024 1942 by Tobi Manzanares LPN  Outcome: Progressing  9/10/2024 1941 by Tobi Manzanares LPN  Outcome: Progressing

## 2024-09-11 NOTE — PLAN OF CARE
Problem: Physical Therapy  Goal: Physical Therapy Goal  Description: Goals to be met by: 10/11/24     Patient will increase functional independence with mobility by performin. Supine to sit with [place]  2. Sit to stand transfer with Modified [place]  3. Bed to chair transfer with Modified [place] using Rolling Walker  4. Gait  x 150 feet with Modified [place] using Rolling Walker.   5. Increased functional strength of BLE to 5/5.    Outcome: Progressing

## 2024-09-11 NOTE — NURSING
Nurses Note -- 4 Eyes      9/11/2024   7:47 AM      Skin assessed during: Q Shift Change      [x] No Altered Skin Integrity Present    [x]Prevention Measures Documented      [] Yes- Altered Skin Integrity Present or Discovered   [] LDA Added if Not in Epic (Describe Wound)   [] New Altered Skin Integrity was Present on Admit and Documented in LDA   [] Wound Image Taken    Wound Care Consulted? No    Attending Nurse:  Saroj Tolbert RN/Staff Member:   Siomara VERGARA in pt Care

## 2024-09-11 NOTE — PLAN OF CARE
Problem: Occupational Therapy  Goal: Occupational Therapy Goal  Description: Goals to be met by: 10/11/24     Patient will increase functional independence with ADLs by performing:    UE Dressing with Modified [place].  LE Dressing with Contact Guard Assistance.  Grooming while standing at sink with Modified [place].  Toileting from toilet with Modified [place] for hygiene and clothing management.   Toilet transfer to toilet with Modified [place].    Outcome: Progressing

## 2024-09-11 NOTE — PT/OT/SLP EVAL
Occupational Therapy  Evaluation    Name: Pratibha Patel  MRN: [id number]  Admitting Diagnosis: diarrhea  Recent Surgery: Procedure(s) (LRB):  SIG (N/A)  SIGMOIDOSCOPY, WITH BIOPSY (N/A) 2 Days Post-Op    Recommendations:     Discharge therapy intensity: Low Intensity Therapy   Discharge Equipment Recommendations:  none  Barriers to discharge:       Assessment:     Pratibha Patel is a 79 y.o. female with a medical diagnosis of enterocolitis with persistent diarrhea s/p flex sig 9/9, dehydration.  She presents with the following performance deficits affecting function: weakness, impaired endurance, impaired self care skills, impaired functional mobility, gait instability, impaired balance.     Pt tolerated OT evaluation well, overall required Min A for sit>stand transfers from bedside chair and bathroom commode, Min A for clothing mgmt in prep for toileting, and CGA for mobility to/from bathroom. Pt's spouse present and very involved in her care, can provide 24/7 assist at home. Recommending low intensity therapy upon discharge.     Rehab Prognosis: Good; patient would benefit from acute skilled OT services to address these deficits and reach maximum level of function.       Plan:     Patient to be seen 4 x/week to address the above listed problems via self-care/home management, therapeutic exercises, therapeutic activities  Plan of Care Expires: 10/11/24  Plan of Care Reviewed with: patient, spouse    Subjective     Chief Complaint: none  Patient/Family Comments/goals: to get better    Occupational Profile:  Living Environment: lives with spouse in [facility], [address], has walk in shower and tub with seat  Previous level of function: Mod I with RW, assist with donning socks, occasional assist with donning pants  Roles and Routines: spouse, mother, grandmother   Equipment Used at Home: wheelchair, walker, rolling, bath bench, grab bar, cane, straight  Assistance upon Discharge: spouse can provide 24/7 assist      Pain/Comfort:  Pain Rating 1: 0/10    Patients cultural, spiritual, Catholic conflicts given the current situation: no    Objective:     OT communicated with RN prior to session.      Patient was found up in chair with pulse ox (continuous), telemetry, peripheral IV upon OT entry to room.    General Precautions: Standard, fall  Orthopedic Precautions: N/A  Braces: N/A    Vital Signs: HR: WNL throughout  Sp02: WNL throughout  Respiratory Status: on room air    Bed Mobility:    Pt found up in chair    Functional Mobility/Transfers:  Patient completed Sit <> Stand Transfer with minimum assistance  with  rolling walker   Patient completed Toilet Transfer Step Transfer technique with minimum assistance with  rolling walker  Functional Mobility: CGA for mobility to/from bathroom commode using RW    Activities of Daily Living:  Lower Body Dressing: maximal assistance for socks. This is baseline  Toileting: minimum assistance for clothing mgmt in standing pre/post void. Pt completed perineal hygiene following void with CGA in standing    Functional Cognition:  Orientation: oriented to Person, Place, Time, and Situation  Safety Awareness: WFL    Visual Perceptual Skills:  Intact    Upper Extremity Function:  Right Upper Extremity:   Strength: WFL    Left Upper Extremity:  Strength: 3-/5 at shoulder due to chronic pain from previous fall, able to use in function; distal WFL    Balance:   Static standing balance:CGA at RW  Dynamic standing balance:CGA at RW    Therapeutic Positioning  Risk for acquired pressure injuries is decreased due to ability to get to BSC/toilet with assist.    OT interventions performed during the course of today's session:   Education was provided on benefits of and recommendations for therapeutic positioning    Skin assessment: all bony prominences were assessed    Findings: known area of altered skin integrity at buttocks; wound care on case     OT recommendations for therapeutic positioning  throughout hospitalization:   Follow [facility] Pressure Injury Prevention Protocol  Geomat recommended for sacral protection while UIC    Patient Education:  Patient and spouse were provided with verbal education education regarding OT role/goals/POC, fall prevention, safety awareness, and Discharge/DME recommendations.  Understanding was verbalized.     Patient left up in chair with all lines intact, call button in reach, and spouse present.    GOALS:   Multidisciplinary Problems       Occupational Therapy Goals          Problem: Occupational Therapy    Goal Priority Disciplines Outcome Interventions   Occupational Therapy Goal     OT, PT/OT Progressing    Description: Goals to be met by: 10/11/24     Patient will increase functional independence with ADLs by performing:    UE Dressing with Modified [employer].  LE Dressing with Contact Guard Assistance.  Grooming while standing at sink with Modified [employer].  Toileting from toilet with Modified [employer] for hygiene and clothing management.   Toilet transfer to toilet with Modified [employer].                         History:     Past Medical History:   Diagnosis Date    Adenocarcinoma of lung     Anemia     Anxiety     Arthritis     COPD (chronic obstructive pulmonary disease)     Depression     HLD (hyperlipidemia)     Hypertension     Lung cancer     Secondary malignant neoplasm of lymph nodes     Secondary malignant neoplasm of right lung     Thyroid disease     lymph nodes malignant         Past Surgical History:   Procedure Laterality Date    BLADDER SURGERY      CHOLECYSTECTOMY      COLONOSCOPY  2018    COLONOSCOPY N/A 9/9/2024    Procedure: SIG;  Surgeon: Saurav Hutson MD;  Location: [facility] ENDOSCOPY;  Service: Gastroenterology;  Laterality: N/A;    ENDOBRONCHIAL ULTRASOUND N/A 11/16/2022    Procedure: ENDOBRONCHIAL ULTRASOUND (EBUS);  Surgeon: Anthony Hutson MD;  Location: [facility] BRONCHOSCOPY LAB;  Service: Pulmonary;  Laterality: N/A;     HYSTERECTOMY      KNEE SURGERY Bilateral     replaced knees    PARTIAL HIP ARTHROPLASTY Bilateral     SIGMOIDOSCOPY, WITH BIOPSY N/A 9/9/2024    Procedure: SIGMOIDOSCOPY, WITH BIOPSY;  Surgeon: Saurav Hutson MD;  Location: [facility] ENDOSCOPY;  Service: Gastroenterology;  Laterality: N/A;       Time Tracking:     OT Date of Treatment: 09/11/24  OT Start Time: 1259  OT Stop Time: 1315  OT Total Time (min): 16 min    Billable Minutes:Evaluation mod complexity    9/11/2024

## 2024-09-11 NOTE — PROGRESS NOTES
[facility]  Hospital Medicine Progress Note        Chief Complaint: Inpatient Follow-up     HPI:   79-year-old female medical history of stage IV lung adenocarcinoma initially diagnosed in 2014 went through multiple treatment regimen and currently on carboplatin+ pemetrexed+ nivolumab+ ipilimumab since 12/20/2022.     Present to the ED with chief complaint of diarrhea.  Report diarrhea has been ongoing for the past month minimum 3 loose watery bowel movements per day and some days up to 8 bowel movements per day, she was hospitalized last week for 8 days at [facility] and underwent infectious workup for diarrhea and treated for dehydration and MT and was discharged on a course of Cipro and Flagyl yesterday 09/05/2024.  Report again today she had multiple episode of watery diarrhea about 6 bowel movement, feeling weak and lightheaded as well as report nauseated and vomited once.  Denies any abdominal pain, fever or chills.     On arrival to ED she was afebrile, borderline low blood pressure 96/46 and saturating 96% on room air.  Labs notable for WBC 19.92, hemoglobin 11.1, creatinine 0.99, potassium 4.0, CO2 19, CT abdomen and pelvis with IV contrast showed numerous fluid-filled small bowel loops with diffuse wall thickening and enhancement of proximal jejunum may reflect enteritis, much of the colon is also fluid-filled with some mucosal enhancement may reflect colitis, left lung base chronic radiation related changes and scarring.     C difficile toxin and antigen both negative.  She was given IV fluids and referred to hospital medicine service for further evaluation and management    Heme-Onc, GI was consulted.    s/p flex sig 9/9: Diverticulosis in sigmoid and descending colon. examination otherwise normal. Decreased sphincter tone on digital rectal exam. single (solitary) ulcer in the distal rectum. Biopsies were taken with a cold forceps from the descending colon and  sigmoid colon for evaluation of microscopic colitis.     Interval Hx:   No acute events reported overnight.  Family member in the room.  Patient sitting side of bed, diarrhea improved, , tolerating po  denied abdominal pain nausea vomiting    Discussed plan of care with pt, family member at bedside          Objective/physical exam:  General: In no acute distress, afebrile  Chest:  Unlabored breathing   Heart:  Normal  Abdomen: Soft, nontender  MSK: Warm, no lower extremity edema  Neurologic: Alert and responding appropriately  VITAL SIGNS: 24 HRS MIN & MAX LAST   Temp  Min: 97.3 °F (36.3 °C)  Max: 98.1 °F (36.7 °C) 97.5 °F (36.4 °C)   BP  Min: 135/60  Max: 172/70 135/60   Pulse  Min: 60  Max: 66  63   Resp  Min: 16  Max: 20 18   SpO2  Min: 92 %  Max: 95 % (!) 94 %     I have reviewed the following labs:  Recent Labs   Lab 09/07/24  0539 09/08/24 0451 09/09/24  0524   WBC 16.81* 14.97* 16.20*   RBC 3.98* 3.29* 3.29*   HGB 10.6* 8.8* 8.9*   HCT 32.8* 27.3* 27.3*   MCV 82.4 83.0 83.0   MCH 26.6* 26.7* 27.1   MCHC 32.3* 32.2* 32.6*   RDW 19.4* 19.8* 20.0*    268 263   MPV 9.1 9.6 10.4     Recent Labs   Lab 09/06/24  2130 09/07/24  0539 09/08/24 0451 09/09/24  0524 09/10/24  0439    141 139 135* 137   K 4.0 3.5 3.1* 4.0 3.9   * 116* 113* 110* 112*   CO2 19* 18* 18* 19* 20*   BUN 16.3 16.2 15.5 12.4 10.7   CREATININE 0.99 0.94 0.97 0.89 0.95   CALCIUM 10.5* 10.1 9.6 9.1 8.6   MG  --  1.60  --  1.50* 1.80   ALBUMIN 2.8* 2.4*  --   --   --    ALKPHOS 75 64  --   --   --    ALT 23 20  --   --   --    AST 23 22  --   --   --    BILITOT 0.3 0.3  --   --   --      Microbiology Results (last 7 days)       Procedure Component Value Units Date/Time    Blood Culture [[id number]]  (Normal) Collected: 09/07/24 0539    Order Status: Completed Specimen: Blood Updated: 09/10/24 1201     Blood Culture No Growth At 72 Hours    Blood Culture [[id number]]  (Normal) Collected: 09/07/24 0539    Order Status: Completed  Specimen: Blood Updated: 09/10/24 1201     Blood Culture No Growth At 72 Hours    Stool Culture [[id number]]  (Abnormal) Collected: 09/07/24 1034    Order Status: Completed Specimen: Stool Updated: 09/09/24 0900     Stool Culture Negative for Salmonella, Shigella, Campylobacter, Vibrio, Aeromonas, Pleisiomonas,Yersinia, or Shiga Toxin 1 and 2.      Moderate Yeast    Clostridium Diff Toxin, A & B, EIA [[id number]]  (Normal) Collected: 09/06/24 2347    Order Status: Completed Specimen: Stool Updated: 09/07/24 0039     Clostridium Difficile GDH Antigen Negative     Clostridium Difficile Toxin A/B Negative    C. Difficile By Rapid Pcr [[id number]] Collected: 09/06/24 2347    Order Status: Canceled Specimen: Stool Updated: 09/06/24 2347             See below for Radiology    Assessment/Plan:  Severe diarrhea -Suspected  immune check point inhibitor induced colitis/enteritis vs overflow diarrhea   Diverticulosis in sig, desc colon ,single solitary ulcer in distal rectum,  Dehydration/volume depletion   Metabolic acidosis  Hypothyroid  stage IV metastatic lung cancer currently on carboplatin, pemetrexed, nivolumab, ipilimumab.     History of unquantified COPD, CAD on Plavix, HTN, HLD, hoarseness/chronic cough/chronic microaspiration on modified diet      Patient improving,   Continue p.o. steroids,questran  dc Empiric antibiotics Cipro, Flagyl, Stool cultures negative, moderate yeast blood cultures negative  Flex sig- diverticulosis in sig, desc colon ,single solitary ulcer in distal rectum, biopsies were taken  follow pathology  Follow gastroenterology recommendations  Heme-Onc following, follow recommendations   encourage p.o. intake   Monitor Bms   PT/OT  Monitor blood pressure, continue antihypertensives  Continue supportive  care   Labs in a.m.      VTE prophylaxis:  Lovenox    Patient condition:  Fair    Anticipated discharge and Disposition:   TBD      Portions of this note dictated using EMR integrated voice  recognition software, and may be subject to voice recognition errors not corrected at proofreading. Please contact writer for clarification if needed.   _____________________________________________________________________    Malnutrition Status:    Scheduled Med:   atorvastatin  20 mg Oral Daily    carvediloL  6.25 mg Oral BID    cholestyramine-aspartame  1 packet Oral BID    ciprofloxacin  400 mg Intravenous Q12H    clopidogreL  75 mg Oral Daily    enoxparin  40 mg Subcutaneous Q24H (prophylaxis, 1700)    levothyroxine  125 mcg Oral Before breakfast    loperamide  2 mg Oral QID    losartan  50 mg Oral Daily    metroNIDAZOLE IV (PEDS and ADULTS)  500 mg Intravenous Q8H    mupirocin   Nasal BID    predniSONE  1 mg/kg Oral Daily    sodium bicarbonate  1,300 mg Oral BID    zinc oxide-cod liver oil   Topical (Top) BID      Continuous Infusions:   D5 and 0.45% NaCl   Intravenous Continuous 100 mL/hr at 09/09/24 2306 New Bag at 09/09/24 2306    electrolyte-A   Intravenous Continuous        lactated ringers   Intravenous Continuous          PRN Meds:    Current Facility-Administered Medications:     acetaminophen, 650 mg, Oral, Q4H PRN    aluminum-magnesium hydroxide-simethicone, 30 mL, Oral, QID PRN    melatonin, 6 mg, Oral, Nightly PRN    ondansetron, 4 mg, Intravenous, Q4H PRN    prochlorperazine, 5 mg, Intravenous, Q6H PRN    sodium chloride 0.9%, 10 mL, Intravenous, PRN     Radiology:  I have personally reviewed the following imaging and agree with the radiologist.     CT Abdomen Pelvis With IV Contrast NO Oral Contrast  Narrative: EXAMINATION:  CT ABDOMEN PELVIS WITH IV CONTRAST    CLINICAL HISTORY:  Abdominal abscess/infection suspected;    TECHNIQUE:  Helically acquired images with axial, sagittal and coronal reformations were obtained from the lung bases to the pubic symphysis after the IV administration of contrast.    Automated tube current modulation, weight-based exposure dosing, and/or iterative  reconstruction technique utilized to reach lowest reasonably achievable exposure rate.    DLP: 486 mGy*cm    COMPARISON:  PET-CT 07/25/2024    FINDINGS:  HEART: There are coronary artery calcifications.    LUNG BASES: Tree-in-bud nodularity at the lung bases, left greater than right.    LIVER: Normal attenuation. No appreciable focal hepatic lesion.    BILIARY: Gallbladder is surgically absent.  Mild biliary ectasia with intrahepatic and extrahepatic biliary ductal dilatation.    PANCREAS: No inflammatory change.    SPLEEN: Normal in size    ADRENALS: No mass.    KIDNEYS/URETERS: The kidneys enhance symmetrically.  No hydronephrosis. Incidental right renal cyst requires no imaging follow-up.    GI TRACT/MESENTERY: The bowel is fluid distended from the stomach through the rectum.  No transition point seen.    PERITONEUM: No free fluid.No free air.    LYMPH NODES: No enlarged lymph nodes by size criteria.    VASCULATURE: Aortoiliac atherosclerosis.    BLADDER: Obscured by beam hardening artifact.    REPRODUCTIVE ORGANS: Obscured by beam hardening artifact.    SOFT TISSUES: Unremarkable.    BONES: There are bilateral hip arthroplasties with associated beam hardening artifact.  Postsurgical change at the lumbosacral spine.  Impression: 1. The bowel is fluid distended from the level of the stomach through the rectum without transition point.  Appearance may be related to enterocolitis or ileus.  2. Tree-in-bud nodularity at the lung bases suggest infectious or inflammatory bronchiolitis.  3. Postop cholecystectomy with biliary ectasia  4. The preliminary and final reports are concordant.    Electronically signed by: Kathleen Scott  Date:    09/07/2024  Time:    12:00      Garett Zee MD  Department of Hospital Medicine   [facility]   09/11/2024

## 2024-09-12 VITALS
WEIGHT: 141 LBS | HEIGHT: 64 IN | DIASTOLIC BLOOD PRESSURE: 74 MMHG | TEMPERATURE: 99 F | BODY MASS INDEX: 24.07 KG/M2 | OXYGEN SATURATION: 94 % | SYSTOLIC BLOOD PRESSURE: 157 MMHG | RESPIRATION RATE: 18 BRPM | HEART RATE: 65 BPM

## 2024-09-12 PROBLEM — K52.9 ENTEROCOLITIS: Status: RESOLVED | Noted: 2024-09-07 | Resolved: 2024-09-12

## 2024-09-12 LAB
ALBUMIN SERPL-MCNC: 2.4 G/DL (ref 3.4–4.8)
ALBUMIN/GLOB SERPL: 1.1 RATIO (ref 1.1–2)
ALP SERPL-CCNC: 83 UNIT/L (ref 40–150)
ALT SERPL-CCNC: 28 UNIT/L (ref 0–55)
ANION GAP SERPL CALC-SCNC: 6 MEQ/L
AST SERPL-CCNC: 37 UNIT/L (ref 5–34)
BACTERIA BLD CULT: NORMAL
BACTERIA BLD CULT: NORMAL
BASOPHILS # BLD AUTO: 0.03 X10(3)/MCL
BASOPHILS NFR BLD AUTO: 0.2 %
BILIRUB SERPL-MCNC: 0.2 MG/DL
BUN SERPL-MCNC: 9.5 MG/DL (ref 9.8–20.1)
CALCIUM SERPL-MCNC: 8.2 MG/DL (ref 8.4–10.2)
CHLORIDE SERPL-SCNC: 109 MMOL/L (ref 98–107)
CO2 SERPL-SCNC: 23 MMOL/L (ref 23–31)
CREAT SERPL-MCNC: 0.89 MG/DL (ref 0.55–1.02)
CREAT/UREA NIT SERPL: 11
EOSINOPHIL # BLD AUTO: 0 X10(3)/MCL (ref 0–0.9)
EOSINOPHIL NFR BLD AUTO: 0 %
ERYTHROCYTE [DISTWIDTH] IN BLOOD BY AUTOMATED COUNT: 20.2 % (ref 11.5–17)
GFR SERPLBLD CREATININE-BSD FMLA CKD-EPI: >60 ML/MIN/1.73/M2
GLOBULIN SER-MCNC: 2.1 GM/DL (ref 2.4–3.5)
GLUCOSE SERPL-MCNC: 112 MG/DL (ref 82–115)
HCT VFR BLD AUTO: 30.6 % (ref 37–47)
HGB BLD-MCNC: 10.3 G/DL (ref 12–16)
IMM GRANULOCYTES # BLD AUTO: 0.09 X10(3)/MCL (ref 0–0.04)
IMM GRANULOCYTES NFR BLD AUTO: 0.7 %
LYMPHOCYTES # BLD AUTO: 0.71 X10(3)/MCL (ref 0.6–4.6)
LYMPHOCYTES NFR BLD AUTO: 5.4 %
MCH RBC QN AUTO: 27.6 PG (ref 27–31)
MCHC RBC AUTO-ENTMCNC: 33.7 G/DL (ref 33–36)
MCV RBC AUTO: 82 FL (ref 80–94)
MONOCYTES # BLD AUTO: 0.31 X10(3)/MCL (ref 0.1–1.3)
MONOCYTES NFR BLD AUTO: 2.4 %
NEUTROPHILS # BLD AUTO: 11.94 X10(3)/MCL (ref 2.1–9.2)
NEUTROPHILS NFR BLD AUTO: 91.3 %
NRBC BLD AUTO-RTO: 0 %
PLATELET # BLD AUTO: 303 X10(3)/MCL (ref 130–400)
PMV BLD AUTO: 9 FL (ref 7.4–10.4)
POTASSIUM SERPL-SCNC: 4.2 MMOL/L (ref 3.5–5.1)
PROT SERPL-MCNC: 4.5 GM/DL (ref 5.8–7.6)
PSYCHE PATHOLOGY RESULT: NORMAL
RBC # BLD AUTO: 3.73 X10(6)/MCL (ref 4.2–5.4)
SODIUM SERPL-SCNC: 138 MMOL/L (ref 136–145)
WBC # BLD AUTO: 13.08 X10(3)/MCL (ref 4.5–11.5)

## 2024-09-12 PROCEDURE — 97535 SELF CARE MNGMENT TRAINING: CPT

## 2024-09-12 PROCEDURE — 25000003 PHARM REV CODE 250: Performed by: INTERNAL MEDICINE

## 2024-09-12 PROCEDURE — 80053 COMPREHEN METABOLIC PANEL: CPT | Performed by: INTERNAL MEDICINE

## 2024-09-12 PROCEDURE — 63600175 PHARM REV CODE 636 W HCPCS: Performed by: INTERNAL MEDICINE

## 2024-09-12 PROCEDURE — 36415 COLL VENOUS BLD VENIPUNCTURE: CPT | Performed by: INTERNAL MEDICINE

## 2024-09-12 PROCEDURE — 85025 COMPLETE CBC W/AUTO DIFF WBC: CPT | Performed by: INTERNAL MEDICINE

## 2024-09-12 PROCEDURE — S5010 5% DEXTROSE AND 0.45% SALINE: HCPCS | Performed by: INTERNAL MEDICINE

## 2024-09-12 PROCEDURE — 97530 THERAPEUTIC ACTIVITIES: CPT

## 2024-09-12 RX ORDER — PREDNISONE 10 MG/1
TABLET ORAL
Qty: 84 TABLET | Refills: 0 | Status: SHIPPED | OUTPATIENT
Start: 2024-09-12 | End: 2024-10-06

## 2024-09-12 RX ADMIN — LOSARTAN POTASSIUM 50 MG: 50 TABLET, FILM COATED ORAL at 09:09

## 2024-09-12 RX ADMIN — LOPERAMIDE HYDROCHLORIDE 2 MG: 2 CAPSULE ORAL at 09:09

## 2024-09-12 RX ADMIN — CLOPIDOGREL BISULFATE 75 MG: 75 TABLET ORAL at 09:09

## 2024-09-12 RX ADMIN — DEXTROSE AND SODIUM CHLORIDE: 5; 450 INJECTION, SOLUTION INTRAVENOUS at 01:09

## 2024-09-12 RX ADMIN — RUGBY ZINC OXIDE 20%: 20 OINTMENT TOPICAL at 09:09

## 2024-09-12 RX ADMIN — SODIUM BICARBONATE 650 MG TABLET 1300 MG: at 09:09

## 2024-09-12 RX ADMIN — ATORVASTATIN CALCIUM 20 MG: 10 TABLET, FILM COATED ORAL at 09:09

## 2024-09-12 RX ADMIN — PREDNISONE 66 MG: 10 TABLET ORAL at 10:09

## 2024-09-12 RX ADMIN — LEVOTHYROXINE SODIUM 125 MCG: 125 TABLET ORAL at 06:09

## 2024-09-12 RX ADMIN — CHOLESTYRAMINE 4 GRAMS OF ANHYDROUS CHOLESTYRAMINE: 4 POWDER, FOR SUSPENSION ORAL at 09:09

## 2024-09-12 RX ADMIN — CARVEDILOL 6.25 MG: 3.12 TABLET, FILM COATED ORAL at 09:09

## 2024-09-12 RX ADMIN — LOPERAMIDE HYDROCHLORIDE 2 MG: 2 CAPSULE ORAL at 01:09

## 2024-09-12 NOTE — NURSING
Patient stable upon discharge. Discharge education and information given prior to discharge. Patient and spouse verbalized understanding of information and education given. Patient left via transport.

## 2024-09-12 NOTE — NURSING
Nurses Note -- 4 Eyes      09/   2:09 AM      Skin assessed during: Daily Assessment      [] No Altered Skin Integrity Present    []Prevention Measures Documented      [x] Yes- Altered Skin Integrity Present or Discovered   [] LDA Added if Not in Epic (Describe Wound)   [] New Altered Skin Integrity was Present on Admit and Documented in LDA   [] Wound Image Taken    Wound Care Consulted? No    Attending Nurse:  Matilda Tolbert RN/Staff Member:  Chantell

## 2024-09-12 NOTE — NURSING
Nurses Note -- 4 Eyes      9/12/2024   3:42 PM      Skin assessed during: Q Shift Change      [] No Altered Skin Integrity Present    []Prevention Measures Documented      [x] Yes- Altered Skin Integrity Present or Discovered   [] LDA Added if Not in Epic (Describe Wound)   [] New Altered Skin Integrity was Present on Admit and Documented in LDA   [] Wound Image Taken    Wound Care Consulted? No    Attending Nurse:  Wendy JOSEPH    Second RN/Staff Member:   Chantell WIGGINS

## 2024-09-12 NOTE — PT/OT/SLP PROGRESS
Physical Therapy      Patient Name:  Pratibha Patel   MRN:  [id number]    Brief conference held with pt and  prior to discharge. They voice no concerns regarding home mobility/discharge recs. Reiterated recommendations for RW use at home due to weakness from hospitalization. Voiced understanding.

## 2024-09-12 NOTE — PT/OT/SLP PROGRESS
Occupational Therapy   Treatment    Name: Pratibha Patel  MRN: [id number]  Admitting Diagnosis:  Enterocolitis  3 Days Post-Op    Recommendations:     Recommended therapy intensity at discharge: Low Intensity Therapy   Discharge Equipment Recommendations:  bedside commode  Barriers to discharge:  None    Assessment:     Pratibha Patel is a 79 y.o. female with a medical diagnosis of Enterocolitis.  She presents with The primary encounter diagnosis was Colitis. Diagnoses of Nausea & vomiting, Chest pain, and Enterocolitis were also pertinent to this visit.  . Performance deficits affecting function are weakness, impaired endurance, impaired self care skills, impaired functional mobility, gait instability, impaired balance.     Pt progressing well towards goals. Remains with impaired performance of ADLs, endurance, and ax tolerance. Cont OT POC     Rehab Prognosis:  Good; patient would benefit from acute skilled OT services to address these deficits and reach maximum level of function.       Plan:     Patient to be seen 4 x/week to address the above listed problems via self-care/home management, therapeutic activities, therapeutic exercises  Plan of Care Expires: 10/11/24  Plan of Care Reviewed with: patient, spouse    Subjective     Pain/Comfort:  Pain Rating 1: 0/10    Objective:     Communicated with: nsg prior to session.  Patient found up in chair with peripheral IV, pulse ox (continuous), telemetry upon OT entry to room.    General Precautions: Standard, fall    Orthopedic Precautions:N/A  Braces: N/A  Respiratory Status: Room air       Occupational Performance:     Bed Mobility:    Pt UIC     Functional Mobility/Transfers:  Patient completed Sit <> Stand Transfer with minimum assistance  with  rolling walker   Functional Mobility: CGA progressing to SBA with RW; cues for upright posture, RW management, and maintaining safe boundaries of RW     Activities of Daily Living:  Grooming: stand by assistance at  sink    Therapeutic Activities:  Functional mobility performed in hallway with endurance trng    Educated on home safety and d/c recs; pt reports increased assist required for bowel movements and increased frequency; discussed used of BSC for overnight hours to increase independence         [facility] 6 Click ADL: 20    Patient Education:  Patient and spouse were provided with verbal education education regarding OT role/goals/POC, fall prevention, safety awareness, and Discharge/DME recommendations.  Understanding was verbalized.      Patient left up in chair with all lines intact, call button in reach, nsg notified, and spouse present.    GOALS:   Multidisciplinary Problems       Occupational Therapy Goals          Problem: Occupational Therapy    Goal Priority Disciplines Outcome Interventions   Occupational Therapy Goal     OT, PT/OT Progressing    Description: Goals to be met by: 10/11/24     Patient will increase functional independence with ADLs by performing:    UE Dressing with Modified [place].  LE Dressing with Contact Guard Assistance.  Grooming while standing at sink with Modified [place].  Toileting from toilet with Modified [place] for hygiene and clothing management.   Toilet transfer to toilet with Modified [place].                         Time Tracking:     OT Date of Treatment: 09/12/24  OT Start Time: 0927  OT Stop Time: 0950  OT Total Time (min): 23 min    Billable Minutes:Self Care/Home Management 13  Therapeutic Activity 10    OT/DILLON: OT     Number of DILLON visits since last OT visit: 1    9/12/2024

## 2024-09-12 NOTE — DISCHARGE SUMMARY
[facility] - [facility] Hospital Medicine  Discharge Summary      Patient Name: Pratibha Patel  MRN: [id number]  [facility]: [id number]  Patient Class: IP- Inpatient  Admission Date: 9/6/2024  Hospital Length of Stay: 6 days  Discharge Date and Time:  09/12/2024 2:00 PM  Attending Physician: Garett Zee MD   Discharging Provider: hCris Alamo MD  Primary Care Provider: Graham Nguyen NP    Primary Care Team: Networked reference to record PCT     79-year-old female medical history of stage IV lung adenocarcinoma initially diagnosed in 2014 went through multiple treatment regimen and currently on carboplatin+ pemetrexed+ nivolumab+ ipilimumab since 12/20/2022.     Present to the ED with chief complaint of diarrhea.  Report diarrhea has been ongoing for the past month minimum 3 loose watery bowel movements per day and some days up to 8 bowel movements per day, she was hospitalized last week for 8 days at [facility] and underwent infectious workup for diarrhea and treated for dehydration and MT and was discharged on a course of Cipro and Flagyl yesterday 09/05/2024.  Report again today she had multiple episode of watery diarrhea about 6 bowel movement, feeling weak and lightheaded as well as report nauseated and vomited once.  Denies any abdominal pain, fever or chills.     On arrival to ED she was afebrile, borderline low blood pressure 96/46 and saturating 96% on room air.  Labs notable for WBC 19.92, hemoglobin 11.1, creatinine 0.99, potassium 4.0, CO2 19, CT abdomen and pelvis with IV contrast showed numerous fluid-filled small bowel loops with diffuse wall thickening and enhancement of proximal jejunum may reflect enteritis, much of the colon is also fluid-filled with some mucosal enhancement may reflect colitis, left lung base chronic radiation related changes and scarring.     C difficile toxin and antigen both negative.  She was given IV fluids and referred to  hospital medicine service for further evaluation and management     [facility]-Onc, GI was consulted.     s/p flex sig 9/9: Diverticulosis in sigmoid and descending colon. examination otherwise normal. Decreased sphincter tone on digital rectal exam. single (solitary) ulcer in the distal rectum. Biopsies were taken with a cold forceps from the descending colon and sigmoid colon for evaluation of microscopic colitis. She was started on prednisone. Diarrhea has resolved and imodium was stopped. She will go home on tapering dose of PO prednisone and follow with Onc. Tissue pathology is pending.      Severe diarrhea -Suspected  immune check point inhibitor induced colitis/enteritis vs overflow diarrhea   Diverticulosis in sig, desc colon ,single solitary ulcer in distal rectum,  stage IV metastatic lung cancer currently on carboplatin, pemetrexed, nivolumab, ipilimumab.     History of unquantified COPD, CAD on Plavix, HTN, HLD, hoarseness/chronic cough/chronic microaspiration on modified diet      Procedure(s) (LRB):  SIG (N/A)  SIGMOIDOSCOPY, WITH BIOPSY (N/A)            Goals of Care Treatment Preferences:  Code Status: Full Code      SDOH Screening:  The patient was screened for utility difficulties, food insecurity, transport difficulties, housing insecurity, and interpersonal safety and there were no concerns identified this admission.     Consults:   Consults (From admission, onward)          Status Ordering Provider     Inpatient consult to Gastroenterology  Once        Provider:  Derek Hutson MD    Completed KIAH MARADIAGA     Inpatient consult to Oncology  Once        Provider:  Enrrique Gudino II, MD    Completed KIAH MARADIAGA            No new Assessment & Plan notes have been filed under this hospital service since the last note was generated.  Service: Hospital Medicine    Final Active Diagnoses:      Problems Resolved During this Admission:    Diagnosis Date Noted Date Resolved POA    PRINCIPAL PROBLEM:   Enterocolitis [K52.9] 09/07/2024 09/12/2024 Yes       Discharged Condition: good    Disposition: Home or Self Care    Follow Up:   Follow-up Information       Graham Nguyen, NP Follow up in 1 week(s).    Specialty: Family Medicine  Contact information:  [address] [place] [address]/[facility]  [place] [address]  [phone]                           Patient Instructions:   No discharge procedures on file.    Significant Diagnostic Studies: Labs: CMP   Recent Labs   Lab 09/12/24  0701      K 4.2   *   CO2 23   BUN 9.5*   CREATININE 0.89   CALCIUM 8.2*   ALBUMIN 2.4*   BILITOT 0.2   ALKPHOS 83   AST 37*   ALT 28       Pending Diagnostic Studies:       Procedure Component Value Units Date/Time    Specimen to Pathology [[id number]] Collected: 09/09/24 0902    Order Status: Sent Lab Status: In process Updated: 09/09/24 1042    Specimen: Tissue from Intestine Large, Descending Colon; Tissue from Intestine Large, Sigmoid            Medications:  Reconciled Home Medications:      Medication List        START taking these medications      predniSONE 10 MG tablet  Commonly known as: DELTASONE  Take 6 tablets (60 mg total) by mouth once daily for 4 days, THEN 5 tablets (50 mg total) once daily for 4 days, THEN 4 tablets (40 mg total) once daily for 4 days, THEN 3 tablets (30 mg total) once daily for 4 days, THEN 2 tablets (20 mg total) once daily for 4 days, THEN 1 tablet (10 mg total) once daily for 4 days.  Start taking on: September 12, 2024            CONTINUE taking these medications      albuterol 90 mcg/actuation inhaler  Commonly known as: VENTOLIN HFA  Inhale 2 puffs into the lungs every 6 (six) hours as needed for Wheezing. Rescue     carvediloL 6.25 MG tablet  Commonly known as: COREG  Take 6.25 mg by mouth 2 (two) times daily.     clopidogreL 75 mg tablet  Commonly known as: PLAVIX     furosemide 20 MG tablet  Commonly known as: LASIX     levothyroxine 125 MCG tablet  Commonly known as:  SYNTHROID  Take 125 mcg by mouth before breakfast.     losartan 50 MG tablet  Commonly known as: COZAAR     simvastatin 40 MG tablet  Commonly known as: ZOCOR  Take 40 mg by mouth every evening.            STOP taking these medications      ciprofloxacin HCl 500 MG tablet  Commonly known as: CIPRO     metroNIDAZOLE 500 MG tablet  Commonly known as: FLAGYL              Indwelling Lines/Drains at time of discharge:   Lines/Drains/Airways       None                   Time spent on the discharge of patient: 35 minutes         Chris Alamo MD  Department of Hospital Medicine  [facility] - [facility]

## 2024-09-16 ENCOUNTER — PATIENT OUTREACH (OUTPATIENT)
Dept: ADMINISTRATIVE | Facility: CLINIC | Age: 79
End: 2024-09-16
Payer: MEDICARE

## 2024-09-18 ENCOUNTER — TELEPHONE (OUTPATIENT)
Dept: HEMATOLOGY/ONCOLOGY | Facility: CLINIC | Age: 79
End: 2024-09-18
Payer: MEDICARE

## 2024-09-18 NOTE — TELEPHONE ENCOUNTER
Patient's daughter would like to know if she needs to continue the prednisone. States she was recently discharged from the hospital. She has swelling to face, bilateral LE up past her knees and her left hand/wrist. Patient does not see you until 10/04/24. Please advise. Currently on 60mg.

## 2024-10-04 ENCOUNTER — OFFICE VISIT (OUTPATIENT)
Dept: HEMATOLOGY/ONCOLOGY | Facility: CLINIC | Age: 79
End: 2024-10-04
Payer: MEDICARE

## 2024-10-04 VITALS
HEIGHT: 64 IN | BODY MASS INDEX: 23.56 KG/M2 | OXYGEN SATURATION: 15 % | RESPIRATION RATE: 95 BRPM | DIASTOLIC BLOOD PRESSURE: 54 MMHG | SYSTOLIC BLOOD PRESSURE: 127 MMHG | HEART RATE: 75 BPM | WEIGHT: 138 LBS | TEMPERATURE: 98 F

## 2024-10-04 DIAGNOSIS — C73 MALIGNANT NEOPLASM OF THYROID GLAND: ICD-10-CM

## 2024-10-04 DIAGNOSIS — C34.90 ADENOCARCINOMA OF LUNG, UNSPECIFIED LATERALITY: Primary | ICD-10-CM

## 2024-10-04 DIAGNOSIS — C77.9 MALIGNANT NEOPLASM METASTATIC TO LYMPH NODES, UNSPECIFIED LYMPH NODE REGION: ICD-10-CM

## 2024-10-04 DIAGNOSIS — C79.51 MALIGNANT NEOPLASM METASTATIC TO BONE: ICD-10-CM

## 2024-10-04 PROCEDURE — 99999 PR PBB SHADOW E&M-EST. PATIENT-LVL III: CPT | Mod: PBBFAC,,, | Performed by: INTERNAL MEDICINE

## 2024-10-04 PROCEDURE — 99213 OFFICE O/P EST LOW 20 MIN: CPT | Mod: PBBFAC | Performed by: INTERNAL MEDICINE

## 2024-10-04 NOTE — PROGRESS NOTES
Subjective:       Patient ID: Pratibha Patel is a 79 y.o. female.    Chief Complaint: No chief complaint on file.      PCP: Graham Nguyen NP  Cardiologist: Dr. Danny Sánchez  Referring Physician: Dr. Anthony Hutson  Endocrine: Dr. Werner  Radiation oncology: Dr. Boss in the past, now Dr. Mortensen.  ENT: Dr. 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 ? metastatic findings could not be biopsied. TTF negative/CK 7 +.    Diagnosed June 2014 EGFR mutation negative/ALK gene rearrangement negative. 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 Patient has reportedly been referred to ENT for resection and lymph node dissection (per patient report).   Subsequent open biopsy done by Dr. Rosales on 7/29/2019 confirmed the same.  Patient was unable to undergo definitive surgical resection for oligo metastatic disease.  Hoarseness and swallowing difficulties secondary to #2  Biopsy of the left lung lesion showed recurrent non-small cell lung cancer, Caris was sent and showed positivity for PD-L1, 2%.  No other targetable or actionable mutations present.    Current Treatment:   Combination chemo immunotherapy with carboplatin, pemetrexed, nivolumab, ipilimumab based off of checkmate 9LA     Treatment History:  RT with weekly Carbo/Taxol started 7/2/14--Completed August 2014  Single agent 'Maintenance' Avastin q 3 weeks.  Started 4/9/15--last given September 21, 2016  Radiation + weekly Carbo/Taxol at 2AUC  ----9/11/19-10/22/19  Patient underwent radiation from 12/17/2020 through 12/21/2020.  Radiation to the left and right lung per Dr. Robert Mortensen.    HPI:   Please see Oncology history in the diagnosis of adenocarcinoma of the lung, stage IV on the problem list for full detail.  Patient originally seen in 2014 with unintentional weight loss, shortness of breath, nonproductive cough.  Patient had imaging findings suggestive of lung cancer, bronchoscopy done in June of 2014 revealed moderately differentiated adenocarcinoma of the lung.  She originally underwent chemo radiation from July 2014 through August 2014.  She was placed on maintenance Avastin from April 2015 through September 2016. Patient then underwent a thyroid biopsy in June of 2019 that showed metastatic carcinoma favored to be adenocarcinoma lung primary.  She underwent surgical resection in July 2019, however full surgical dissection was not able to be done and the patient underwent open biopsy of right thyroid gland.  Pathology revealed poorly differentiated non-small cell carcinoma consistent with metastatic lung cancer.  NGS panel done at that time unremarkable.  She started chemoradiation with carbo Taxol in September 2019, completed in October 2019. Patient was doing well, imaging in October 2020 revealed an abnormality in the left lung, CT-guided lung biopsy on 11/03/2020 showed recurrent non-small cell lung cancer, Caris revealed PDL1 positivity.  She underwent SBRT from 12/17/2020 through 12/21/2020.  Patient then placed on surveillance imaging, most recently done on 07/05/2022 showing worsening disease in the right lung with a previous lesion that measured 0.6 cm and had an SUV of 1.0 now measuring 1.5 cm and an SUV of 8.4.  This was treated with SBRT in August of 2022.  PET/CT scan done on 11/8/2022 showed mild increased radiotracer activity within the left apical consolidation, SUV 5.5.  There was a newly hypermetabolic subcentimeter subcarinal  lymph node.  A 1 cm right lower lobe nodule was smaller, he right upper lobe nodule was slightly larger measuring 6 mm.  No sites of FDG avid metastatic disease in the neck, abdomen, or pelvis. Bronchoscopy done on 11/16/2022, pathology revealed malignant cells consistent with adenocarcinoma.  Planning on chemo immunotherapy with carboplatin, pemetrexed, nivolumab, ipilimumab based off of checkmate 9LA.  PET/CT on 07/25/2023 showed infectious or inflammatory changes in the right lower lobe with no other findings to suggest new or worsening disease.    PET/CT scan done on 10/12/2023 showed mixed response to therapy with decrease right hilar and left upper lobe consolidation uptake.  Subcarinal lymph node demonstrates significant increased uptake compared to the prior study.  Stable subcentimeter right upper lobe nodule demonstrates slight increased uptake compared to prior study    PET/CT scan done on 01/18/2024 showed mixed response to therapy with decrease in size of the right upper lobe nodule and maximum SUV of subcarinal lymph node.  There was right hilar uptake that was increased, however there was also patchy airspace opacities that could be infectious or inflammatory.    CT of the chest on 05/21/2024 showed overall stable disease with tiny centrilobular nodules throughout both lungs, similar to prior CTA.  Stable 0.9 cm nodule in the apical right upper lobe.    Most recent PET/CT scan done on 07/25/2024 showed unchanged mild FDG avid nodule in the posterior right lung apex, unchanged mild FDG avid left apical consolidative opacity.  There was a new paramediastinal consolidative opacity in the anterior right upper lobe and left upper lobe with mild FDG avidity that was symmetric and suggestive of a radiation port, malignancy felt less likely given symmetry.  There was indeterminate patchy consolidative opacity in the subpleural right lower lobe with mild FDG avidity slightly increased in prominence from prior  PET/CT.  There was similar mediastinal and hilar lymph nodes.    CT scan of the abdomen and pelvis done on 09/06/2024 showed fluid distended bowel in the level of the stomach through the rectum without transition point which may be related to enterocolitis or ileus.  There was infectious versus inflammatory bronchiolitis tree-in-bud nodularity in the lung bases.  Patient underwent flex sigmoidoscopy on 09/09/2024, there were some diverticulosis with no major abnormalities, random biopsies obtained.      Interval History:   Patient presents to clinic for scheduled hospital follow up.  Admitted for severe diarrhea.  She had CT findings concerning for enterocolitis.  She was started on steroids, her diarrhea subsided.  He was still on her steroid taper and will finish tomorrow.    Past Medical History:   Diagnosis Date    Adenocarcinoma of lung     Anemia     Anxiety     Arthritis     COPD (chronic obstructive pulmonary disease)     Depression     HLD (hyperlipidemia)     Hypertension     Lung cancer     Secondary malignant neoplasm of lymph nodes     Secondary malignant neoplasm of right lung     Thyroid disease     lymph nodes malignant      Past Surgical History:   Procedure Laterality Date    BLADDER SURGERY      CHOLECYSTECTOMY      COLONOSCOPY  2018    COLONOSCOPY N/A 9/9/2024    Procedure: SIG;  Surgeon: Saurav Hutson MD;  Location: [facility] ENDOSCOPY;  Service: Gastroenterology;  Laterality: N/A;    ENDOBRONCHIAL ULTRASOUND N/A 11/16/2022    Procedure: ENDOBRONCHIAL ULTRASOUND (EBUS);  Surgeon: Anthony Hutson MD;  Location: [facility] BRONCHOSCOPY LAB;  Service: Pulmonary;  Laterality: N/A;    HYSTERECTOMY      KNEE SURGERY Bilateral     replaced knees    PARTIAL HIP ARTHROPLASTY Bilateral     SIGMOIDOSCOPY, WITH BIOPSY N/A 9/9/2024    Procedure: SIGMOIDOSCOPY, WITH BIOPSY;  Surgeon: Saurav Hutson MD;  Location: [facility] ENDOSCOPY;  Service: Gastroenterology;  Laterality: N/A;     Social History      Socioeconomic History    Marital status:    Tobacco Use    Smoking status: Every Day     Current packs/day: 0.25     Average packs/day: 0.3 packs/day for 65.8 years (16.4 ttl pk-yrs)     Types: Cigarettes     Start date: 1959    Smokeless tobacco: Never    Tobacco comments:     Patient states she is trying to quit smoking.   Substance and Sexual Activity    Alcohol use: Not Currently    Drug use: Never     Social Drivers of Health     Financial Resource Strain: Low Risk  (9/9/2024)    Overall Financial Resource Strain (CARDIA)     Difficulty of Paying Living Expenses: Not hard at all   Food Insecurity: No Food Insecurity (9/9/2024)    Hunger Vital Sign     Worried About Running Out of Food in the Last Year: Never true     Ran Out of Food in the Last Year: Never true   Transportation Needs: No Transportation Needs (9/9/2024)    TRANSPORTATION NEEDS     Transportation : No   Physical Activity: Sufficiently Active (9/9/2024)    Exercise Vital Sign     Days of Exercise per Week: 5 days     Minutes of Exercise per Session: 30 min   Stress: No Stress Concern Present (9/9/2024)    [employer] [place] [employer] - Occupational Stress Questionnaire     Feeling of Stress : Only a little   Housing Stability: Low Risk  (9/9/2024)    Housing Stability Vital Sign     Unable to Pay for Housing in the Last Year: No     Homeless in the Last Year: No      Family History   Problem Relation Name Age of Onset    Lung cancer Mother      Lung cancer Brother        Review of patient's allergies indicates:  No Known Allergies   Review of Systems   Constitutional:  Negative for chills, diaphoresis, fatigue, fever and unexpected weight change.   HENT:  Negative for nasal congestion, mouth sores, sinus pressure/congestion and sore throat.    Eyes:  Negative for pain and visual disturbance.   Respiratory:  Negative for cough, chest tightness and shortness of breath.    Cardiovascular:  Negative for chest pain, palpitations  and leg swelling.   Gastrointestinal:  Negative for abdominal distention, abdominal pain, blood in stool, constipation and diarrhea.   Genitourinary:  Negative for dysuria, frequency and hematuria.   Musculoskeletal:  Negative for arthralgias and back pain.   Integumentary:  Negative for rash.   Neurological:  Negative for dizziness, weakness, numbness and headaches.   Hematological:  Negative for adenopathy.   Psychiatric/Behavioral:  Negative for confusion.          Objective:      Physical Exam  Vitals reviewed.   Constitutional:       General: She is not in acute distress.     Appearance: Normal appearance.   HENT:      Head: Normocephalic and atraumatic.      Nose: Nose normal.      Mouth/Throat:      Mouth: Mucous membranes are moist.   Eyes:      Extraocular Movements: Extraocular movements intact.      Conjunctiva/sclera: Conjunctivae normal.   Neck:      Comments: Radiation changes to the left supraclavicular area  Cardiovascular:      Rate and Rhythm: Normal rate and regular rhythm.      Pulses: Normal pulses.      Heart sounds: Normal heart sounds.   Pulmonary:      Effort: Pulmonary effort is normal.      Breath sounds: Examination of the right-lower field reveals decreased breath sounds. Examination of the left-lower field reveals decreased breath sounds. Decreased breath sounds present.   Musculoskeletal:         General: No swelling. Normal range of motion.      Cervical back: Normal range of motion and neck supple.      Right lower leg: No edema.      Left lower leg: No edema.   Skin:     General: Skin is warm and dry.   Neurological:      General: No focal deficit present.      Mental Status: She is alert and oriented to person, place, and time. Mental status is at baseline.   Psychiatric:         Mood and Affect: Mood normal.         Behavior: Behavior normal.         LABS AND IMAGING REVIEWED IN EPIC          Assessment:   Stage IV-- T3NxM1 moderately differentiated adenocarcinoma of the WILFREDO with  contiguous extrathoracic extension, left infraclavicular and axillary hypermetabolic metastatic lymph nodes and destruction of the left first and second ribs compatible with metastatic involvement per baseline PET/CT. ? metastatic findings could not be biopsied.    TTF negative/CK 7 +.    Diagnosed June 2014.  EGFR mutation negative/ALK gene rearrangement negative.  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 Patient has reportedly been referred to ENT for resection and lymph node dissection (per patient report). Subsequent open biopsy done by Dr. Rosales on 7/29/2019 confirmed the same.  Patient was unable to undergo definitive surgical resection for oligo metastatic disease.          Plan:          Patient's biopsy did return as recurrent non-small cell lung cancer in the left lung.  I feel that the right lung will be similar pathology.  She completed radiation with Dr. Mortensen on 12/24/2020.     Caris did reveal that PD-L1 was 2% positive suggesting benefit from pembrolizumab or nivolumab/ipilimumab.      Her PET/CT scan does show a new hypermetabolic subcentimeter subcarinal lymph node and increase in a left apical consolidation.  The left apical consolidation is close to the aorta and I do not think would be amenable to biopsy, however this subcarinal lymph node may be amenable to bronchoscopy.      Bronchoscopy done on 11/16/2022 showed metastatic adenocarcinoma.  She is no longer a candidate for full dose SBRT.  We started with carboplatin, pemetrexed, nivolumab, and ipilimumab based on the CHECKMATE 9LA study.  Cycle 1 day 1 given 12/20/2022.     Continue levothyroxine to 88mcg daily.     Most recent PET/CT scan done on 07/25/2024 showed essentially stable disease.    Patient did get admitted for diarrhea.  This subsided with steroids.  Immune related  diarrhea secondary to immunotherapy is a possibility.    I will go ahead and get a PET/CT scan of the end of this month, I will see her shortly after.  We will discuss whether or not to go back on immunotherapy pending the scans.        Enrrique Gudino II, MD

## 2024-10-05 ENCOUNTER — HOSPITAL ENCOUNTER (EMERGENCY)
Facility: HOSPITAL | Age: 79
Discharge: HOME | End: 2024-10-05
Attending: EMERGENCY MEDICINE
Payer: MEDICARE

## 2024-10-05 VITALS
WEIGHT: 143 LBS | DIASTOLIC BLOOD PRESSURE: 50 MMHG | HEART RATE: 72 BPM | BODY MASS INDEX: 24.55 KG/M2 | RESPIRATION RATE: 17 BRPM | TEMPERATURE: 98 F | OXYGEN SATURATION: 96 % | SYSTOLIC BLOOD PRESSURE: 122 MMHG

## 2024-10-05 DIAGNOSIS — R06.02 SOB (SHORTNESS OF BREATH): ICD-10-CM

## 2024-10-05 DIAGNOSIS — J44.1 COPD EXACERBATION: Primary | ICD-10-CM

## 2024-10-05 LAB
ALBUMIN SERPL-MCNC: 3 G/DL (ref 3.4–4.8)
ALBUMIN/GLOB SERPL: 0.8 RATIO (ref 1.1–2)
ALP SERPL-CCNC: 69 UNIT/L (ref 40–150)
ALT SERPL-CCNC: 24 UNIT/L (ref 0–55)
ANION GAP SERPL CALC-SCNC: 10 MEQ/L
AST SERPL-CCNC: 20 UNIT/L (ref 5–34)
BASOPHILS # BLD AUTO: 0.05 X10(3)/MCL
BASOPHILS NFR BLD AUTO: 0.5 %
BILIRUB SERPL-MCNC: 0.4 MG/DL
BNP BLD-MCNC: 116.7 PG/ML
BUN SERPL-MCNC: 20.5 MG/DL (ref 9.8–20.1)
CALCIUM SERPL-MCNC: 9.3 MG/DL (ref 8.4–10.2)
CHLORIDE SERPL-SCNC: 95 MMOL/L (ref 98–107)
CO2 SERPL-SCNC: 32 MMOL/L (ref 23–31)
CREAT SERPL-MCNC: 0.85 MG/DL (ref 0.55–1.02)
CREAT/UREA NIT SERPL: 24
EOSINOPHIL # BLD AUTO: 0.08 X10(3)/MCL (ref 0–0.9)
EOSINOPHIL NFR BLD AUTO: 0.7 %
ERYTHROCYTE [DISTWIDTH] IN BLOOD BY AUTOMATED COUNT: 21.2 % (ref 11.5–17)
FLUAV AG UPPER RESP QL IA.RAPID: NOT DETECTED
FLUBV AG UPPER RESP QL IA.RAPID: NOT DETECTED
GFR SERPLBLD CREATININE-BSD FMLA CKD-EPI: >60 ML/MIN/1.73/M2
GLOBULIN SER-MCNC: 3.6 GM/DL (ref 2.4–3.5)
GLUCOSE SERPL-MCNC: 74 MG/DL (ref 82–115)
HCT VFR BLD AUTO: 33.1 % (ref 37–47)
HGB BLD-MCNC: 10.6 G/DL (ref 12–16)
IMM GRANULOCYTES # BLD AUTO: 0.08 X10(3)/MCL (ref 0–0.04)
IMM GRANULOCYTES NFR BLD AUTO: 0.7 %
LYMPHOCYTES # BLD AUTO: 1.04 X10(3)/MCL (ref 0.6–4.6)
LYMPHOCYTES NFR BLD AUTO: 9.6 %
MCH RBC QN AUTO: 27.4 PG (ref 27–31)
MCHC RBC AUTO-ENTMCNC: 32 G/DL (ref 33–36)
MCV RBC AUTO: 85.5 FL (ref 80–94)
MONOCYTES # BLD AUTO: 0.8 X10(3)/MCL (ref 0.1–1.3)
MONOCYTES NFR BLD AUTO: 7.4 %
NEUTROPHILS # BLD AUTO: 8.76 X10(3)/MCL (ref 2.1–9.2)
NEUTROPHILS NFR BLD AUTO: 81.1 %
NRBC BLD AUTO-RTO: 0 %
PLATELET # BLD AUTO: 313 X10(3)/MCL (ref 130–400)
PMV BLD AUTO: 8.8 FL (ref 7.4–10.4)
POTASSIUM SERPL-SCNC: 4.7 MMOL/L (ref 3.5–5.1)
PROT SERPL-MCNC: 6.6 GM/DL (ref 5.8–7.6)
RBC # BLD AUTO: 3.87 X10(6)/MCL (ref 4.2–5.4)
SARS-COV-2 RNA RESP QL NAA+PROBE: NOT DETECTED
SODIUM SERPL-SCNC: 137 MMOL/L (ref 136–145)
TROPONIN I SERPL-MCNC: <0.01 NG/ML (ref 0–0.04)
WBC # BLD AUTO: 10.81 X10(3)/MCL (ref 4.5–11.5)

## 2024-10-05 PROCEDURE — 25000242 PHARM REV CODE 250 ALT 637 W/ HCPCS: Performed by: EMERGENCY MEDICINE

## 2024-10-05 PROCEDURE — 99900031 HC PATIENT EDUCATION (STAT)

## 2024-10-05 PROCEDURE — 0240U COVID/FLU A&B PCR: CPT | Performed by: EMERGENCY MEDICINE

## 2024-10-05 PROCEDURE — 83880 ASSAY OF NATRIURETIC PEPTIDE: CPT

## 2024-10-05 PROCEDURE — 93010 ELECTROCARDIOGRAM REPORT: CPT | Mod: ,,, | Performed by: INTERNAL MEDICINE

## 2024-10-05 PROCEDURE — 99285 EMERGENCY DEPT VISIT HI MDM: CPT | Mod: 25

## 2024-10-05 PROCEDURE — 80053 COMPREHEN METABOLIC PANEL: CPT

## 2024-10-05 PROCEDURE — 93005 ELECTROCARDIOGRAM TRACING: CPT

## 2024-10-05 PROCEDURE — 85025 COMPLETE CBC W/AUTO DIFF WBC: CPT

## 2024-10-05 PROCEDURE — 84484 ASSAY OF TROPONIN QUANT: CPT

## 2024-10-05 PROCEDURE — 94640 AIRWAY INHALATION TREATMENT: CPT

## 2024-10-05 RX ORDER — IPRATROPIUM BROMIDE AND ALBUTEROL SULFATE 2.5; .5 MG/3ML; MG/3ML
3 SOLUTION RESPIRATORY (INHALATION)
Status: COMPLETED | OUTPATIENT
Start: 2024-10-05 | End: 2024-10-05

## 2024-10-05 RX ADMIN — IPRATROPIUM BROMIDE AND ALBUTEROL SULFATE 3 ML: .5; 3 SOLUTION RESPIRATORY (INHALATION) at 08:10

## 2024-10-05 NOTE — ED PROVIDER NOTES
Encounter Date: 10/5/2024    SCRIBE #1 NOTE: I, Jack Adams, am scribing for, and in the presence of,  Matthew Hernández MD. I have scribed the following portions of the note - the EKG reading. Other sections scribed: HPI, ROS, PE.       History     Chief Complaint   Patient presents with    Shortness of Breath     SOB and weakness that worsened today. Dx Adenocarcinoma of the lung, stage IV. Sees Pricilla RANGEL. Denies any fever at home.      78 y/o female with a hx of COPD, HLD, HTN, and lung cancer presents to the ED for shortness of breath since today at 0900. Pt states she had acute onset of shortness of breath this morning. Pt does have a hx of smoking. Pt does use a daily inhaler but she did not have a treatment today. She notes she has a chronic cough with sputum production but she has seen no significant changes since onset of other symptoms. Pt's granddaughter at bedside states the pt was doing infusions with Dr. Gudino however she recently stopped treatment because she is in remission. She also notes she was recently admitted to the hospital about 2 weeks ago with symptoms of diarrhea and vomiting. She states the pt does have a hx of cardiac issues but she denies the pt being on a diuretic. Pt complains of associated symptoms of lightheadedness but she denies any fever, new onset cough, appetite changes, nausea, vomiting, diarrhea, or wheezing.     Oncology: Enrrique Gudino MD  Pulmonologist: Anthony Hutson MD  Cardiologist: Mc Boone MD    The history is provided by the patient and a relative. No  was used.     Review of patient's allergies indicates:   Allergen Reactions    Lisinopril Swelling     Past Medical History:   Diagnosis Date    Adenocarcinoma of lung     Anemia     Anxiety     Arthritis     COPD (chronic obstructive pulmonary disease)     Depression     HLD (hyperlipidemia)     Hypertension     Lung cancer     Secondary malignant neoplasm of lymph nodes      Secondary malignant neoplasm of right lung     Thyroid disease     lymph nodes malignant     Past Surgical History:   Procedure Laterality Date    BLADDER SURGERY      CHOLECYSTECTOMY      COLONOSCOPY  2018    COLONOSCOPY N/A 9/9/2024    Procedure: SIG;  Surgeon: Saurav Hutson MD;  Location: [facility] ENDOSCOPY;  Service: Gastroenterology;  Laterality: N/A;    ENDOBRONCHIAL ULTRASOUND N/A 11/16/2022    Procedure: ENDOBRONCHIAL ULTRASOUND (EBUS);  Surgeon: Anthony Hutson MD;  Location: [facility] BRONCHOSCOPY LAB;  Service: Pulmonary;  Laterality: N/A;    HYSTERECTOMY      KNEE SURGERY Bilateral     replaced knees    PARTIAL HIP ARTHROPLASTY Bilateral     SIGMOIDOSCOPY, WITH BIOPSY N/A 9/9/2024    Procedure: SIGMOIDOSCOPY, WITH BIOPSY;  Surgeon: Saurav Hutson MD;  Location: [facility] ENDOSCOPY;  Service: Gastroenterology;  Laterality: N/A;     Family History   Problem Relation Name Age of Onset    Lung cancer Mother      Lung cancer Brother       Social History     Tobacco Use    Smoking status: Every Day     Current packs/day: 0.25     Average packs/day: 0.3 packs/day for 65.8 years (16.4 ttl pk-yrs)     Types: Cigarettes     Start date: 1959    Smokeless tobacco: Never    Tobacco comments:     Patient states she is trying to quit smoking.   Substance Use Topics    Alcohol use: Not Currently    Drug use: Never     Review of Systems   Constitutional:  Negative for appetite change, chills, fatigue and fever.   HENT:  Negative for congestion and sore throat.    Eyes:  Negative for visual disturbance.   Respiratory:  Positive for shortness of breath. Negative for cough and wheezing.    Cardiovascular:  Negative for chest pain.   Gastrointestinal:  Negative for abdominal pain, diarrhea, nausea and vomiting.   Genitourinary:  Negative for dysuria.   Musculoskeletal:  Negative for myalgias.   Skin:  Negative for rash.   Neurological:  Positive for light-headedness. Negative for weakness, numbness and headaches.        Physical Exam     Initial Vitals [10/05/24 1729]   BP Pulse Resp Temp SpO2   (!) 103/57 96 (!) 21 97.7 °F (36.5 °C) 100 %      MAP       --         Physical Exam    Nursing note and vitals reviewed.  Constitutional: No distress.   Frail and elderly appearing   HENT:   Head: Normocephalic and atraumatic.   Right Ear: Tympanic membrane normal.   Left Ear: Tympanic membrane normal. Mouth/Throat: Oropharynx is clear and moist.   Eyes: Conjunctivae and EOM are normal. Pupils are equal, round, and reactive to light.   Neck: Trachea normal. Neck supple. Carotid bruit is not present. No JVD present.   Normal range of motion.  Cardiovascular:  Normal rate and regular rhythm.           Murmur heard.  Systolic murmur is present with a grade of 2/6.  Pulmonary/Chest: No respiratory distress. She has decreased breath sounds. She has wheezes. She exhibits no tenderness.   Pt O2 saturation 100% on RA per bedside monitor. Metiport to the right upper chest wall. Diminished breath sounds bilaterally. Occasional expiratory wheezes. Occasional wet sounding cough   Abdominal: Abdomen is soft. Bowel sounds are normal. She exhibits no distension. There is no abdominal tenderness.   Musculoskeletal:         General: Normal range of motion.      Cervical back: Normal range of motion and neck supple.      Lumbar back: Normal. Normal range of motion.     Neurological: She is alert and oriented to person, place, and time. She has normal strength. No cranial nerve deficit or sensory deficit.   Psychiatric: She has a normal mood and affect.         ED Course   Procedures  Labs Reviewed   COMPREHENSIVE METABOLIC PANEL - Abnormal       Result Value    Sodium 137      Potassium 4.7      Chloride 95 (*)     CO2 32 (*)     Glucose 74 (*)     Blood Urea Nitrogen 20.5 (*)     Creatinine 0.85      Calcium 9.3      Protein Total 6.6      Albumin 3.0 (*)     Globulin 3.6 (*)     Albumin/Globulin Ratio 0.8 (*)     Bilirubin Total 0.4      ALP 69       ALT 24      AST 20      eGFR >60      Anion Gap 10.0      BUN/Creatinine Ratio 24     B-TYPE NATRIURETIC PEPTIDE - Abnormal    Natriuretic Peptide 116.7 (*)    CBC WITH DIFFERENTIAL - Abnormal    WBC 10.81      RBC 3.87 (*)     Hgb 10.6 (*)     Hct 33.1 (*)     MCV 85.5      MCH 27.4      MCHC 32.0 (*)     RDW 21.2 (*)     Platelet 313      MPV 8.8      Neut % 81.1      Lymph % 9.6      Mono % 7.4      Eos % 0.7      Basophil % 0.5      Lymph # 1.04      Neut # 8.76      Mono # 0.80      Eos # 0.08      Baso # 0.05      IG# 0.08 (*)     IG% 0.7      NRBC% 0.0     TROPONIN I - Normal    Troponin-I <0.010     COVID/FLU A&B PCR - Normal    Influenza A PCR Not Detected      Influenza B PCR Not Detected      SARS-CoV-2 PCR Not Detected      Narrative:     The Xpert Xpress SARS-CoV-2/FLU/RSV plus is a rapid, multiplexed real-time PCR test intended for the simultaneous qualitative detection and differentiation of SARS-CoV-2, Influenza A, Influenza B, and respiratory syncytial virus (RSV) viral RNA in either nasopharyngeal swab or nasal swab specimens.         CBC W/ AUTO DIFFERENTIAL    Narrative:     The following orders were created for panel order CBC auto differential.  Procedure                               Abnormality         Status                     ---------                               -----------         ------                     CBC with Differential[[id number]]       Abnormal            Final result                 Please view results for these tests on the individual orders.     EKG Readings: (Independently Interpreted)   Initial Reading: No STEMI. Rhythm: Normal Sinus Rhythm. Heart Rate: 79. Ectopy: No Ectopy. Conduction: Normal. ST Segments: Normal ST Segments. Axis: Normal. Clinical Impression: Normal Sinus Rhythm   Done at 1730    Questionable T wave elevations       Imaging Results              X-Ray Chest AP (In process)                      Medications   albuterol-ipratropium 2.5 mg-0.5 mg/3 mL  nebulizer solution 3 mL (3 mLs Nebulization Given 10/5/24 2038)     Medical Decision Making  Differential diagnosis includes but is not limited to: CHF, pneumonia, COPD exacerbation, lung cancer, anemia      Amount and/or Complexity of Data Reviewed  Independent Historian: caregiver     Details: Pt's granddaughter at bedside states the pt was doing infusions with Dr. Gudino however she recently stopped treatment because she is in remission. She also notes she was recently admitted to the hospital about 2 weeks ago with symptoms of diarrhea and vomiting. She states the pt does have a hx of cardiac issues but she denies the pt being on a diuretic.   Labs: ordered. Decision-making details documented in ED Course.  Radiology: ordered. Decision-making details documented in ED Course.  ECG/medicine tests: ordered and independent interpretation performed.     Details: EKG Readings: (Independently Interpreted)   Initial Reading: No STEMI. Rhythm: Normal Sinus Rhythm. Heart Rate: 79. Ectopy: No Ectopy. Conduction: Normal. ST Segments: Normal ST Segments. Axis: Normal. Clinical Impression: Normal Sinus Rhythm   Done at 1730    Questionable T wave elevations       Risk  Prescription drug management.            Scribe Attestation:   Scribe #1: I performed the above scribed service and the documentation accurately describes the services I performed. I attest to the accuracy of the note.    Attending Attestation:           Physician Attestation for Scribe:  Physician Attestation Statement for Scribe #1: I, Matthew Hernández MD, reviewed documentation, as scribed by Jack Adams in my presence, and it is both accurate and complete.             ED Course as of 10/05/24 2108   Sat Oct 05, 2024   2014 Patient's initial workup unremarkable, troponin undetectable, , slightly elevated BUN at 20.5, white blood cell count of 10.8 with hemoglobin of 10.6 which is her baseline. [MP]   2105 Patient's workup unremarkable, and states  felt much relieved after DuoNeb.  On reexam lungs are still diminished, no wheezes.  She is resting comfortably.  I discussed the findings, a rather lack thereof, with the patient as well as .  Will discharge home, as the patient has a nebulizer and medications to continue treatment .  Patient was already on steroids.  No indication for antibiotics at this time.  Her sats remained greater than 98% throughout ED stay. [MP]      ED Course User Index  [MP] Matthew Hernández MD                           Clinical Impression:  Final diagnoses:  [R06.02] SOB (shortness of breath)  [J44.1] COPD exacerbation (Primary)          ED Disposition Condition    Discharge Stable          ED Prescriptions    None       Follow-up Information       Follow up With Specialties Details Why Contact Info    Graham Nguyen, NP Family Medicine In 2 days  [address] [facility] [address]  [phone]      [facility] - Emergency Dept Emergency Medicine Go to  If symptoms worsen, As needed [address] [facility] [id number] [phone]             Matthew Hernández MD  10/05/24 4913

## 2024-10-06 LAB
OHS QRS DURATION: 82 MS
OHS QTC CALCULATION: 410 MS

## 2024-10-06 NOTE — DISCHARGE INSTRUCTIONS
Continue nebulizer treatments at home.  Return to the ED for fever, worsening breathing despite treatments, new symptoms or chest pain

## 2024-10-28 ENCOUNTER — HOSPITAL ENCOUNTER (OUTPATIENT)
Dept: RADIOLOGY | Facility: HOSPITAL | Age: 79
Discharge: HOME | End: 2024-10-28
Attending: INTERNAL MEDICINE
Payer: MEDICARE

## 2024-10-28 DIAGNOSIS — C73 MALIGNANT NEOPLASM OF THYROID GLAND: ICD-10-CM

## 2024-10-28 DIAGNOSIS — C77.9 MALIGNANT NEOPLASM METASTATIC TO LYMPH NODES, UNSPECIFIED LYMPH NODE REGION: ICD-10-CM

## 2024-10-28 DIAGNOSIS — C34.90 ADENOCARCINOMA OF LUNG, UNSPECIFIED LATERALITY: ICD-10-CM

## 2024-10-28 DIAGNOSIS — C79.51 MALIGNANT NEOPLASM METASTATIC TO BONE: ICD-10-CM

## 2024-10-28 PROCEDURE — A9552 F18 FDG: HCPCS | Performed by: INTERNAL MEDICINE

## 2024-10-28 PROCEDURE — 78815 PET IMAGE W/CT SKULL-THIGH: CPT | Mod: TC

## 2024-10-28 RX ORDER — FLUDEOXYGLUCOSE F18 500 MCI/ML
10 INJECTION INTRAVENOUS
Status: COMPLETED | OUTPATIENT
Start: 2024-10-28 | End: 2024-10-28

## 2024-10-28 RX ADMIN — FLUDEOXYGLUCOSE F-18 11 MILLICURIE: 500 INJECTION INTRAVENOUS at 11:10

## 2024-11-01 ENCOUNTER — OFFICE VISIT (OUTPATIENT)
Dept: HEMATOLOGY/ONCOLOGY | Facility: CLINIC | Age: 79
End: 2024-11-01
Payer: MEDICARE

## 2024-11-01 VITALS
RESPIRATION RATE: 15 BRPM | BODY MASS INDEX: 24.55 KG/M2 | TEMPERATURE: 70 F | DIASTOLIC BLOOD PRESSURE: 47 MMHG | WEIGHT: 143 LBS | OXYGEN SATURATION: 95 % | SYSTOLIC BLOOD PRESSURE: 105 MMHG

## 2024-11-01 DIAGNOSIS — C34.90 ADENOCARCINOMA OF LUNG, UNSPECIFIED LATERALITY: Primary | ICD-10-CM

## 2024-11-01 DIAGNOSIS — E83.42 HYPOMAGNESEMIA: Primary | ICD-10-CM

## 2024-11-01 DIAGNOSIS — C79.51 MALIGNANT NEOPLASM METASTATIC TO BONE: ICD-10-CM

## 2024-11-01 DIAGNOSIS — E03.2 HYPOTHYROIDISM DUE TO DRUGS: ICD-10-CM

## 2024-11-01 LAB
ALBUMIN SERPL-MCNC: 2.6 G/DL (ref 3.4–4.8)
ALBUMIN/GLOB SERPL: 0.7 RATIO (ref 1.1–2)
ALP SERPL-CCNC: 67 UNIT/L (ref 40–150)
ALT SERPL-CCNC: 12 UNIT/L (ref 0–55)
ANION GAP SERPL CALC-SCNC: 9 MEQ/L
AST SERPL-CCNC: 25 UNIT/L (ref 5–34)
BASOPHILS # BLD AUTO: 0.04 X10(3)/MCL
BASOPHILS NFR BLD AUTO: 0.4 %
BILIRUB SERPL-MCNC: 0.3 MG/DL
BUN SERPL-MCNC: 11.1 MG/DL (ref 9.8–20.1)
CALCIUM SERPL-MCNC: 8.9 MG/DL (ref 8.4–10.2)
CHLORIDE SERPL-SCNC: 99 MMOL/L (ref 98–107)
CO2 SERPL-SCNC: 29 MMOL/L (ref 23–31)
CORTIS SERPL-SCNC: 12 UG/DL
CREAT SERPL-MCNC: 0.74 MG/DL (ref 0.55–1.02)
CREAT/UREA NIT SERPL: 15
EOSINOPHIL # BLD AUTO: 0.33 X10(3)/MCL (ref 0–0.9)
EOSINOPHIL NFR BLD AUTO: 3.3 %
ERYTHROCYTE [DISTWIDTH] IN BLOOD BY AUTOMATED COUNT: 18.3 % (ref 11.5–17)
GFR SERPLBLD CREATININE-BSD FMLA CKD-EPI: >60 ML/MIN/1.73/M2
GLOBULIN SER-MCNC: 3.5 GM/DL (ref 2.4–3.5)
GLUCOSE SERPL-MCNC: 88 MG/DL (ref 82–115)
HCT VFR BLD AUTO: 29 % (ref 37–47)
HGB BLD-MCNC: 9.3 G/DL (ref 12–16)
IMM GRANULOCYTES # BLD AUTO: 0.07 X10(3)/MCL (ref 0–0.04)
IMM GRANULOCYTES NFR BLD AUTO: 0.7 %
LYMPHOCYTES # BLD AUTO: 1.75 X10(3)/MCL (ref 0.6–4.6)
LYMPHOCYTES NFR BLD AUTO: 17.7 %
MAGNESIUM SERPL-MCNC: 1.5 MG/DL (ref 1.6–2.6)
MCH RBC QN AUTO: 27.9 PG (ref 27–31)
MCHC RBC AUTO-ENTMCNC: 32.1 G/DL (ref 33–36)
MCV RBC AUTO: 87.1 FL (ref 80–94)
MONOCYTES # BLD AUTO: 1.23 X10(3)/MCL (ref 0.1–1.3)
MONOCYTES NFR BLD AUTO: 12.5 %
NEUTROPHILS # BLD AUTO: 6.44 X10(3)/MCL (ref 2.1–9.2)
NEUTROPHILS NFR BLD AUTO: 65.4 %
PLATELET # BLD AUTO: 303 X10(3)/MCL (ref 130–400)
PMV BLD AUTO: 8.4 FL (ref 7.4–10.4)
POTASSIUM SERPL-SCNC: 4.4 MMOL/L (ref 3.5–5.1)
PROT SERPL-MCNC: 6.1 GM/DL (ref 5.8–7.6)
RBC # BLD AUTO: 3.33 X10(6)/MCL (ref 4.2–5.4)
SODIUM SERPL-SCNC: 137 MMOL/L (ref 136–145)
TSH SERPL-ACNC: 7.13 UIU/ML (ref 0.35–4.94)
WBC # BLD AUTO: 9.86 X10(3)/MCL (ref 4.5–11.5)

## 2024-11-01 PROCEDURE — 83735 ASSAY OF MAGNESIUM: CPT | Performed by: INTERNAL MEDICINE

## 2024-11-01 PROCEDURE — 85025 COMPLETE CBC W/AUTO DIFF WBC: CPT | Performed by: INTERNAL MEDICINE

## 2024-11-01 PROCEDURE — 80053 COMPREHEN METABOLIC PANEL: CPT | Performed by: INTERNAL MEDICINE

## 2024-11-01 PROCEDURE — 84443 ASSAY THYROID STIM HORMONE: CPT | Performed by: INTERNAL MEDICINE

## 2024-11-01 PROCEDURE — 99213 OFFICE O/P EST LOW 20 MIN: CPT | Mod: PBBFAC | Performed by: INTERNAL MEDICINE

## 2024-11-01 PROCEDURE — 82533 TOTAL CORTISOL: CPT | Performed by: INTERNAL MEDICINE

## 2024-11-01 PROCEDURE — 36415 COLL VENOUS BLD VENIPUNCTURE: CPT | Performed by: INTERNAL MEDICINE

## 2024-11-01 PROCEDURE — 99999 PR PBB SHADOW E&M-EST. PATIENT-LVL III: CPT | Mod: PBBFAC,,, | Performed by: INTERNAL MEDICINE

## 2024-11-01 RX ORDER — LANOLIN ALCOHOL/MO/W.PET/CERES
400 CREAM (GRAM) TOPICAL 2 TIMES DAILY
Qty: 60 TABLET | Refills: 1 | Status: SHIPPED | OUTPATIENT
Start: 2024-11-01

## 2024-11-07 RX ORDER — HEPARIN 100 UNIT/ML
500 SYRINGE INTRAVENOUS
OUTPATIENT
Start: 2024-11-29

## 2024-11-07 RX ORDER — HEPARIN 100 UNIT/ML
500 SYRINGE INTRAVENOUS
Status: CANCELLED | OUTPATIENT
Start: 2024-11-08

## 2024-11-07 RX ORDER — SODIUM CHLORIDE 0.9 % (FLUSH) 0.9 %
10 SYRINGE (ML) INJECTION
Status: CANCELLED | OUTPATIENT
Start: 2024-11-08

## 2024-11-07 RX ORDER — EPINEPHRINE 0.3 MG/.3ML
0.3 INJECTION SUBCUTANEOUS ONCE AS NEEDED
OUTPATIENT
Start: 2024-11-29

## 2024-11-07 RX ORDER — DIPHENHYDRAMINE HYDROCHLORIDE 50 MG/ML
50 INJECTION INTRAMUSCULAR; INTRAVENOUS ONCE AS NEEDED
OUTPATIENT
Start: 2024-11-29

## 2024-11-07 RX ORDER — EPINEPHRINE 0.3 MG/.3ML
0.3 INJECTION SUBCUTANEOUS ONCE AS NEEDED
Status: CANCELLED | OUTPATIENT
Start: 2024-11-08

## 2024-11-07 RX ORDER — SODIUM CHLORIDE 0.9 % (FLUSH) 0.9 %
10 SYRINGE (ML) INJECTION
OUTPATIENT
Start: 2024-11-29

## 2024-11-07 RX ORDER — DIPHENHYDRAMINE HYDROCHLORIDE 50 MG/ML
50 INJECTION INTRAMUSCULAR; INTRAVENOUS ONCE AS NEEDED
Status: CANCELLED | OUTPATIENT
Start: 2024-11-08

## 2024-11-08 ENCOUNTER — INFUSION (OUTPATIENT)
Dept: INFUSION THERAPY | Facility: HOSPITAL | Age: 79
End: 2024-11-08
Attending: NURSE PRACTITIONER
Payer: MEDICARE

## 2024-11-08 VITALS
RESPIRATION RATE: 18 BRPM | HEIGHT: 64 IN | BODY MASS INDEX: 24.41 KG/M2 | DIASTOLIC BLOOD PRESSURE: 59 MMHG | HEART RATE: 79 BPM | WEIGHT: 143 LBS | TEMPERATURE: 98 F | SYSTOLIC BLOOD PRESSURE: 117 MMHG

## 2024-11-08 DIAGNOSIS — C34.90 ADENOCARCINOMA OF LUNG, STAGE 4, UNSPECIFIED LATERALITY: Primary | ICD-10-CM

## 2024-11-08 PROCEDURE — 96417 CHEMO IV INFUS EACH ADDL SEQ: CPT

## 2024-11-08 PROCEDURE — 63600175 PHARM REV CODE 636 W HCPCS: Mod: JW,JG | Performed by: NURSE PRACTITIONER

## 2024-11-08 PROCEDURE — 25000003 PHARM REV CODE 250: Performed by: NURSE PRACTITIONER

## 2024-11-08 PROCEDURE — 63600175 PHARM REV CODE 636 W HCPCS: Performed by: INTERNAL MEDICINE

## 2024-11-08 PROCEDURE — 25000003 PHARM REV CODE 250: Performed by: INTERNAL MEDICINE

## 2024-11-08 PROCEDURE — 96413 CHEMO IV INFUSION 1 HR: CPT

## 2024-11-08 RX ORDER — DIPHENHYDRAMINE HYDROCHLORIDE 50 MG/ML
50 INJECTION INTRAMUSCULAR; INTRAVENOUS ONCE AS NEEDED
Status: DISCONTINUED | OUTPATIENT
Start: 2024-11-08 | End: 2024-11-08 | Stop reason: HOSPADM

## 2024-11-08 RX ORDER — HEPARIN 100 UNIT/ML
500 SYRINGE INTRAVENOUS
Status: DISCONTINUED | OUTPATIENT
Start: 2024-11-08 | End: 2024-11-08 | Stop reason: HOSPADM

## 2024-11-08 RX ORDER — SODIUM CHLORIDE 0.9 % (FLUSH) 0.9 %
10 SYRINGE (ML) INJECTION
Status: DISCONTINUED | OUTPATIENT
Start: 2024-11-08 | End: 2024-11-08 | Stop reason: HOSPADM

## 2024-11-08 RX ORDER — EPINEPHRINE 0.3 MG/.3ML
0.3 INJECTION SUBCUTANEOUS ONCE AS NEEDED
Status: DISCONTINUED | OUTPATIENT
Start: 2024-11-08 | End: 2024-11-08 | Stop reason: HOSPADM

## 2024-11-08 RX ADMIN — SODIUM CHLORIDE 360 MG: 9 INJECTION, SOLUTION INTRAVENOUS at 10:11

## 2024-11-08 RX ADMIN — HEPARIN 500 UNITS: 100 SYRINGE at 11:11

## 2024-11-08 RX ADMIN — SODIUM CHLORIDE 65.5 MG: 9 INJECTION, SOLUTION INTRAVENOUS at 10:11

## 2024-11-26 ENCOUNTER — TELEPHONE (OUTPATIENT)
Dept: HEMATOLOGY/ONCOLOGY | Facility: CLINIC | Age: 79
End: 2024-11-26
Payer: MEDICARE

## 2024-11-26 NOTE — TELEPHONE ENCOUNTER
Spoke with patient's granddaughter - states that patient is due for tx on Friday, but she is weak and not feeling well. Productive cough has returned and she is also running low grade temp - 99.1. She is concerned that patient may have developed pneumonia again. Sats run from 85%-93% after breathing treatment. Home health going out this morning to draw labs and evaluate. Please advise.

## 2024-11-28 ENCOUNTER — HOSPITAL ENCOUNTER (INPATIENT)
Facility: HOSPITAL | Age: 79
LOS: 2 days | Discharge: HOME HEALTH | DRG: 193 | End: 2024-11-30
Attending: STUDENT IN AN ORGANIZED HEALTH CARE EDUCATION/TRAINING PROGRAM | Admitting: INTERNAL MEDICINE
Payer: MEDICARE

## 2024-11-28 DIAGNOSIS — J18.9 PNEUMONIA: ICD-10-CM

## 2024-11-28 DIAGNOSIS — J44.1 COPD EXACERBATION: Primary | ICD-10-CM

## 2024-11-28 DIAGNOSIS — R06.02 SOB (SHORTNESS OF BREATH): ICD-10-CM

## 2024-11-28 DIAGNOSIS — R09.02 HYPOXIA: ICD-10-CM

## 2024-11-28 DIAGNOSIS — C34.00: ICD-10-CM

## 2024-11-28 DIAGNOSIS — R79.89 ELEVATED BRAIN NATRIURETIC PEPTIDE (BNP) LEVEL: ICD-10-CM

## 2024-11-28 PROBLEM — F17.200 TOBACCO DEPENDENCY: Status: ACTIVE | Noted: 2024-11-28

## 2024-11-28 LAB
ALBUMIN SERPL-MCNC: 2.8 G/DL (ref 3.4–4.8)
ALBUMIN/GLOB SERPL: 0.8 RATIO (ref 1.1–2)
ALP SERPL-CCNC: 75 UNIT/L (ref 40–150)
ALT SERPL-CCNC: 14 UNIT/L (ref 0–55)
ANION GAP SERPL CALC-SCNC: 10 MEQ/L
APTT PPP: 28.6 SECONDS (ref 23.2–33.7)
AST SERPL-CCNC: 22 UNIT/L (ref 5–34)
B PERT.PT PRMT NPH QL NAA+NON-PROBE: NOT DETECTED
BASOPHILS # BLD AUTO: 0.05 X10(3)/MCL
BASOPHILS NFR BLD AUTO: 0.4 %
BILIRUB SERPL-MCNC: 0.4 MG/DL
BNP BLD-MCNC: 374.8 PG/ML
BUN SERPL-MCNC: 14.6 MG/DL (ref 9.8–20.1)
C PNEUM DNA NPH QL NAA+NON-PROBE: NOT DETECTED
CALCIUM SERPL-MCNC: 8.9 MG/DL (ref 8.4–10.2)
CHLORIDE SERPL-SCNC: 92 MMOL/L (ref 98–107)
CO2 SERPL-SCNC: 26 MMOL/L (ref 23–31)
CREAT SERPL-MCNC: 0.83 MG/DL (ref 0.55–1.02)
CREAT/UREA NIT SERPL: 18
EOSINOPHIL # BLD AUTO: 0.04 X10(3)/MCL (ref 0–0.9)
EOSINOPHIL NFR BLD AUTO: 0.3 %
ERYTHROCYTE [DISTWIDTH] IN BLOOD BY AUTOMATED COUNT: 16.1 % (ref 11.5–17)
FLUAV AG UPPER RESP QL IA.RAPID: NOT DETECTED
FLUBV AG UPPER RESP QL IA.RAPID: NOT DETECTED
GFR SERPLBLD CREATININE-BSD FMLA CKD-EPI: >60 ML/MIN/1.73/M2
GLOBULIN SER-MCNC: 3.6 GM/DL (ref 2.4–3.5)
GLUCOSE SERPL-MCNC: 95 MG/DL (ref 82–115)
HADV DNA NPH QL NAA+NON-PROBE: NOT DETECTED
HCOV 229E RNA NPH QL NAA+NON-PROBE: NOT DETECTED
HCOV HKU1 RNA NPH QL NAA+NON-PROBE: NOT DETECTED
HCOV NL63 RNA NPH QL NAA+NON-PROBE: NOT DETECTED
HCOV OC43 RNA NPH QL NAA+NON-PROBE: DETECTED
HCT VFR BLD AUTO: 28.9 % (ref 37–47)
HGB BLD-MCNC: 9.4 G/DL (ref 12–16)
HMPV RNA NPH QL NAA+NON-PROBE: NOT DETECTED
HPIV1 RNA NPH QL NAA+NON-PROBE: NOT DETECTED
HPIV2 RNA NPH QL NAA+NON-PROBE: NOT DETECTED
HPIV3 RNA NPH QL NAA+NON-PROBE: NOT DETECTED
HPIV4 RNA NPH QL NAA+NON-PROBE: NOT DETECTED
IMM GRANULOCYTES # BLD AUTO: 0.06 X10(3)/MCL (ref 0–0.04)
IMM GRANULOCYTES NFR BLD AUTO: 0.4 %
INR PPP: 1.1
LACTATE SERPL-SCNC: 1.4 MMOL/L (ref 0.5–2.2)
LYMPHOCYTES # BLD AUTO: 1.18 X10(3)/MCL (ref 0.6–4.6)
LYMPHOCYTES NFR BLD AUTO: 8.3 %
M PNEUMO DNA NPH QL NAA+NON-PROBE: NOT DETECTED
MCH RBC QN AUTO: 27.4 PG (ref 27–31)
MCHC RBC AUTO-ENTMCNC: 32.5 G/DL (ref 33–36)
MCV RBC AUTO: 84.3 FL (ref 80–94)
MONOCYTES # BLD AUTO: 1.22 X10(3)/MCL (ref 0.1–1.3)
MONOCYTES NFR BLD AUTO: 8.5 %
MRSA PCR SCRN (OHS): NOT DETECTED
NEUTROPHILS # BLD AUTO: 11.73 X10(3)/MCL (ref 2.1–9.2)
NEUTROPHILS NFR BLD AUTO: 82.1 %
NRBC BLD AUTO-RTO: 0 %
OHS QRS DURATION: 84 MS
OHS QTC CALCULATION: 403 MS
PLATELET # BLD AUTO: 283 X10(3)/MCL (ref 130–400)
PMV BLD AUTO: 8.7 FL (ref 7.4–10.4)
POTASSIUM SERPL-SCNC: 4.2 MMOL/L (ref 3.5–5.1)
PROT SERPL-MCNC: 6.4 GM/DL (ref 5.8–7.6)
PROTHROMBIN TIME: 14 SECONDS (ref 12.5–14.5)
RBC # BLD AUTO: 3.43 X10(6)/MCL (ref 4.2–5.4)
RSV RNA NPH QL NAA+NON-PROBE: NOT DETECTED
RV+EV RNA NPH QL NAA+NON-PROBE: NOT DETECTED
SARS-COV-2 RNA RESP QL NAA+PROBE: NOT DETECTED
SODIUM SERPL-SCNC: 128 MMOL/L (ref 136–145)
TROPONIN I SERPL-MCNC: 0.02 NG/ML (ref 0–0.04)
WBC # BLD AUTO: 14.28 X10(3)/MCL (ref 4.5–11.5)

## 2024-11-28 PROCEDURE — 83880 ASSAY OF NATRIURETIC PEPTIDE: CPT | Performed by: STUDENT IN AN ORGANIZED HEALTH CARE EDUCATION/TRAINING PROGRAM

## 2024-11-28 PROCEDURE — 94760 N-INVAS EAR/PLS OXIMETRY 1: CPT

## 2024-11-28 PROCEDURE — 27000221 HC OXYGEN, UP TO 24 HOURS

## 2024-11-28 PROCEDURE — 85025 COMPLETE CBC W/AUTO DIFF WBC: CPT | Performed by: STUDENT IN AN ORGANIZED HEALTH CARE EDUCATION/TRAINING PROGRAM

## 2024-11-28 PROCEDURE — 96365 THER/PROPH/DIAG IV INF INIT: CPT

## 2024-11-28 PROCEDURE — 87040 BLOOD CULTURE FOR BACTERIA: CPT | Performed by: STUDENT IN AN ORGANIZED HEALTH CARE EDUCATION/TRAINING PROGRAM

## 2024-11-28 PROCEDURE — 11000001 HC ACUTE MED/SURG PRIVATE ROOM

## 2024-11-28 PROCEDURE — 99285 EMERGENCY DEPT VISIT HI MDM: CPT | Mod: 25

## 2024-11-28 PROCEDURE — 25500020 PHARM REV CODE 255: Performed by: STUDENT IN AN ORGANIZED HEALTH CARE EDUCATION/TRAINING PROGRAM

## 2024-11-28 PROCEDURE — 96375 TX/PRO/DX INJ NEW DRUG ADDON: CPT

## 2024-11-28 PROCEDURE — 94640 AIRWAY INHALATION TREATMENT: CPT

## 2024-11-28 PROCEDURE — 96367 TX/PROPH/DG ADDL SEQ IV INF: CPT

## 2024-11-28 PROCEDURE — 63600175 PHARM REV CODE 636 W HCPCS: Performed by: STUDENT IN AN ORGANIZED HEALTH CARE EDUCATION/TRAINING PROGRAM

## 2024-11-28 PROCEDURE — 87641 MR-STAPH DNA AMP PROBE: CPT | Performed by: STUDENT IN AN ORGANIZED HEALTH CARE EDUCATION/TRAINING PROGRAM

## 2024-11-28 PROCEDURE — 63600175 PHARM REV CODE 636 W HCPCS: Performed by: INTERNAL MEDICINE

## 2024-11-28 PROCEDURE — 99900035 HC TECH TIME PER 15 MIN (STAT)

## 2024-11-28 PROCEDURE — 25000003 PHARM REV CODE 250: Performed by: STUDENT IN AN ORGANIZED HEALTH CARE EDUCATION/TRAINING PROGRAM

## 2024-11-28 PROCEDURE — 0240U COVID/FLU A&B PCR: CPT | Performed by: STUDENT IN AN ORGANIZED HEALTH CARE EDUCATION/TRAINING PROGRAM

## 2024-11-28 PROCEDURE — 87798 DETECT AGENT NOS DNA AMP: CPT | Performed by: INTERNAL MEDICINE

## 2024-11-28 PROCEDURE — 93005 ELECTROCARDIOGRAM TRACING: CPT

## 2024-11-28 PROCEDURE — 85610 PROTHROMBIN TIME: CPT | Performed by: STUDENT IN AN ORGANIZED HEALTH CARE EDUCATION/TRAINING PROGRAM

## 2024-11-28 PROCEDURE — 80053 COMPREHEN METABOLIC PANEL: CPT | Performed by: STUDENT IN AN ORGANIZED HEALTH CARE EDUCATION/TRAINING PROGRAM

## 2024-11-28 PROCEDURE — 93010 ELECTROCARDIOGRAM REPORT: CPT | Mod: ,,, | Performed by: INTERNAL MEDICINE

## 2024-11-28 PROCEDURE — 25000242 PHARM REV CODE 250 ALT 637 W/ HCPCS: Performed by: INTERNAL MEDICINE

## 2024-11-28 PROCEDURE — 85730 THROMBOPLASTIN TIME PARTIAL: CPT | Performed by: STUDENT IN AN ORGANIZED HEALTH CARE EDUCATION/TRAINING PROGRAM

## 2024-11-28 PROCEDURE — 84484 ASSAY OF TROPONIN QUANT: CPT | Performed by: STUDENT IN AN ORGANIZED HEALTH CARE EDUCATION/TRAINING PROGRAM

## 2024-11-28 PROCEDURE — 99900031 HC PATIENT EDUCATION (STAT)

## 2024-11-28 PROCEDURE — 25000003 PHARM REV CODE 250: Performed by: INTERNAL MEDICINE

## 2024-11-28 PROCEDURE — 25000242 PHARM REV CODE 250 ALT 637 W/ HCPCS: Performed by: STUDENT IN AN ORGANIZED HEALTH CARE EDUCATION/TRAINING PROGRAM

## 2024-11-28 PROCEDURE — 83605 ASSAY OF LACTIC ACID: CPT | Performed by: STUDENT IN AN ORGANIZED HEALTH CARE EDUCATION/TRAINING PROGRAM

## 2024-11-28 RX ORDER — ONDANSETRON HYDROCHLORIDE 2 MG/ML
4 INJECTION, SOLUTION INTRAVENOUS EVERY 8 HOURS PRN
Status: DISCONTINUED | OUTPATIENT
Start: 2024-11-28 | End: 2024-11-30 | Stop reason: HOSPADM

## 2024-11-28 RX ORDER — TALC
6 POWDER (GRAM) TOPICAL NIGHTLY PRN
Status: DISCONTINUED | OUTPATIENT
Start: 2024-11-28 | End: 2024-11-30 | Stop reason: HOSPADM

## 2024-11-28 RX ORDER — MUPIROCIN 20 MG/G
OINTMENT TOPICAL 2 TIMES DAILY
Status: DISCONTINUED | OUTPATIENT
Start: 2024-11-30 | End: 2024-11-30 | Stop reason: HOSPADM

## 2024-11-28 RX ORDER — SODIUM CHLORIDE 0.9 % (FLUSH) 0.9 %
10 SYRINGE (ML) INJECTION
Status: DISCONTINUED | OUTPATIENT
Start: 2024-11-28 | End: 2024-11-30 | Stop reason: HOSPADM

## 2024-11-28 RX ORDER — METHYLPREDNISOLONE SOD SUCC 125 MG
125 VIAL (EA) INJECTION
Status: COMPLETED | OUTPATIENT
Start: 2024-11-28 | End: 2024-11-28

## 2024-11-28 RX ORDER — IBUPROFEN 200 MG
1 TABLET ORAL DAILY PRN
Status: DISCONTINUED | OUTPATIENT
Start: 2024-11-28 | End: 2024-11-30 | Stop reason: HOSPADM

## 2024-11-28 RX ORDER — ACETAMINOPHEN 325 MG/1
650 TABLET ORAL EVERY 8 HOURS PRN
Status: DISCONTINUED | OUTPATIENT
Start: 2024-11-28 | End: 2024-11-30 | Stop reason: HOSPADM

## 2024-11-28 RX ORDER — IPRATROPIUM BROMIDE AND ALBUTEROL SULFATE 2.5; .5 MG/3ML; MG/3ML
3 SOLUTION RESPIRATORY (INHALATION) EVERY 4 HOURS
Status: DISCONTINUED | OUTPATIENT
Start: 2024-11-28 | End: 2024-11-30 | Stop reason: HOSPADM

## 2024-11-28 RX ORDER — ENOXAPARIN SODIUM 100 MG/ML
40 INJECTION SUBCUTANEOUS EVERY 24 HOURS
Status: DISCONTINUED | OUTPATIENT
Start: 2024-11-28 | End: 2024-11-30 | Stop reason: HOSPADM

## 2024-11-28 RX ORDER — IPRATROPIUM BROMIDE AND ALBUTEROL SULFATE 2.5; .5 MG/3ML; MG/3ML
9 SOLUTION RESPIRATORY (INHALATION)
Status: COMPLETED | OUTPATIENT
Start: 2024-11-28 | End: 2024-11-28

## 2024-11-28 RX ADMIN — METHYLPREDNISOLONE SODIUM SUCCINATE 125 MG: 125 INJECTION, POWDER, FOR SOLUTION INTRAMUSCULAR; INTRAVENOUS at 03:11

## 2024-11-28 RX ADMIN — AZITHROMYCIN MONOHYDRATE 500 MG: 500 INJECTION, POWDER, LYOPHILIZED, FOR SOLUTION INTRAVENOUS at 09:11

## 2024-11-28 RX ADMIN — IOHEXOL 100 ML: 350 INJECTION, SOLUTION INTRAVENOUS at 05:11

## 2024-11-28 RX ADMIN — VANCOMYCIN HYDROCHLORIDE 1500 MG: 1.5 INJECTION, POWDER, LYOPHILIZED, FOR SOLUTION INTRAVENOUS at 07:11

## 2024-11-28 RX ADMIN — PIPERACILLIN AND TAZOBACTAM 4.5 G: 4; .5 INJECTION, POWDER, LYOPHILIZED, FOR SOLUTION INTRAVENOUS; PARENTERAL at 07:11

## 2024-11-28 RX ADMIN — ENOXAPARIN SODIUM 40 MG: 40 INJECTION SUBCUTANEOUS at 09:11

## 2024-11-28 RX ADMIN — IPRATROPIUM BROMIDE AND ALBUTEROL SULFATE 9 ML: .5; 3 SOLUTION RESPIRATORY (INHALATION) at 03:11

## 2024-11-28 RX ADMIN — IPRATROPIUM BROMIDE AND ALBUTEROL SULFATE 3 ML: 2.5; .5 SOLUTION RESPIRATORY (INHALATION) at 08:11

## 2024-11-28 NOTE — Clinical Note
Diagnosis: Pneumonia [[id number]]   Future Attending Provider: VIJAYA MCDONALD [[id number]]   Admit to which facility:: [facility] [[id number]]   Reason for IP Medical Treatment  (Clinical interventions that can only be accomplished in the IP setting? ) :: Pneumonia, hypoxia

## 2024-11-28 NOTE — ED PROVIDER NOTES
Encounter Date: 11/28/2024    SCRIBE #1 NOTE: I, Dorie De La Vega, am scribing for, and in the presence of,  Justin Clarke MD. I have scribed the following portions of the note - the EKG reading. Other sections scribed: HPI, ROS, PE.       History     Chief Complaint   Patient presents with    Shortness of Breath     Shortness of breath x1 week. Hx of COPD, asthma, no relief with inhaler. Denies any chest pain.      Patient is a 79 year old female with a history of lung adenocarcinoma, anxiety, arthritis, COPD, asthma, depression, HLD, and HTN presenting to the ED with worsening shortness of breath for a week. The patient complains of a cough and fever that began today.  T-max of 101°, patient took some Tylenol and nebulizer treatment prior to arrival.  Patient is on chemotherapy, Dr. Gudino is oncologist.      The history is provided by the patient and the spouse. No  was used.     Review of patient's allergies indicates:   Allergen Reactions    Lisinopril Swelling     Past Medical History:   Diagnosis Date    Adenocarcinoma of lung     Anemia     Anxiety     Arthritis     COPD (chronic obstructive pulmonary disease)     Depression     HLD (hyperlipidemia)     Hypertension     Lung cancer     Secondary malignant neoplasm of lymph nodes     Secondary malignant neoplasm of right lung     Thyroid disease     lymph nodes malignant     Past Surgical History:   Procedure Laterality Date    BLADDER SURGERY      CHOLECYSTECTOMY      COLONOSCOPY  2018    COLONOSCOPY N/A 9/9/2024    Procedure: SIG;  Surgeon: Saurav Hutson MD;  Location: [facility] ENDOSCOPY;  Service: Gastroenterology;  Laterality: N/A;    ENDOBRONCHIAL ULTRASOUND N/A 11/16/2022    Procedure: ENDOBRONCHIAL ULTRASOUND (EBUS);  Surgeon: Anthony Hutson MD;  Location: [facility] BRONCHOSCOPY LAB;  Service: Pulmonary;  Laterality: N/A;    HYSTERECTOMY      KNEE SURGERY Bilateral     replaced knees    PARTIAL HIP ARTHROPLASTY Bilateral      SIGMOIDOSCOPY, WITH BIOPSY N/A 9/9/2024    Procedure: SIGMOIDOSCOPY, WITH BIOPSY;  Surgeon: Saurav Hutson MD;  Location: [facility] ENDOSCOPY;  Service: Gastroenterology;  Laterality: N/A;     Family History   Problem Relation Name Age of Onset    Lung cancer Mother      Lung cancer Brother       Social History     Tobacco Use    Smoking status: Every Day     Current packs/day: 0.25     Average packs/day: 0.3 packs/day for 65.9 years (16.5 ttl pk-yrs)     Types: Cigarettes     Start date: 1959    Smokeless tobacco: Never    Tobacco comments:     Patient states she is trying to quit smoking.   Substance Use Topics    Alcohol use: Not Currently    Drug use: Never     Review of Systems   Constitutional:  Positive for fever.   HENT:  Negative for sore throat.    Eyes:  Negative for visual disturbance.   Respiratory:  Positive for cough and shortness of breath.    Cardiovascular:  Negative for chest pain.   Gastrointestinal:  Negative for abdominal pain.   Genitourinary:  Negative for dysuria.   Musculoskeletal:  Negative for joint swelling.   Skin:  Negative for rash.   Neurological:  Negative for weakness.   Psychiatric/Behavioral:  Negative for confusion.        Physical Exam     Initial Vitals [11/28/24 1512]   BP Pulse Resp Temp SpO2   (!) 89/51 92 (!) 24 98.5 °F (36.9 °C) (!) 88 %      MAP       --         Physical Exam    Nursing note and vitals reviewed.  Constitutional: She appears well-developed and well-nourished.   HENT:   Head: Normocephalic and atraumatic.   Eyes: EOM are normal. Pupils are equal, round, and reactive to light.   Neck:   Normal range of motion.  Cardiovascular:  Normal rate, regular rhythm, normal heart sounds and intact distal pulses.           No murmur heard.  Pulmonary/Chest: Tachypnea noted. She is in respiratory distress. She has wheezes. She has no rales.   Diffuse wheezing in all lung fields.   Abdominal: Abdomen is soft. She exhibits no distension. There is no abdominal  tenderness. There is no rebound.   Musculoskeletal:         General: No tenderness or edema. Normal range of motion.      Cervical back: Normal range of motion.     Neurological: She is alert. She has normal strength. No cranial nerve deficit. GCS score is 15. GCS eye subscore is 4. GCS verbal subscore is 5. GCS motor subscore is 6.   Skin: Skin is warm and dry. Capillary refill takes less than 2 seconds. No rash noted. No erythema.   Psychiatric: She has a normal mood and affect.         ED Course   Critical Care    Date/Time: 11/28/2024 7:58 PM    Performed by: Justin Clarke MD  Authorized by: Justin Clarke MD  Direct patient critical care time: 15 minutes  Additional history critical care time: 6 minutes  Ordering / reviewing critical care time: 5 minutes  Documentation critical care time: 5 minutes  Consulting other physicians critical care time: 5 minutes  Total critical care time (exclusive of procedural time) : 36 minutes  Critical care time was exclusive of separately billable procedures and treating other patients and teaching time.  Critical care was necessary to treat or prevent imminent or life-threatening deterioration of the following conditions: respiratory failure and sepsis.  Critical care was time spent personally by me on the following activities: development of treatment plan with patient or surrogate, discussions with consultants, interpretation of cardiac output measurements, evaluation of patient's response to treatment, examination of patient, obtaining history from patient or surrogate, ordering and performing treatments and interventions, ordering and review of laboratory studies, ordering and review of radiographic studies, pulse oximetry, re-evaluation of patient's condition and review of old charts.        Labs Reviewed   COMPREHENSIVE METABOLIC PANEL - Abnormal       Result Value    Sodium 128 (*)     Potassium 4.2      Chloride 92 (*)     CO2 26      Glucose 95      Blood Urea  Nitrogen 14.6      Creatinine 0.83      Calcium 8.9      Protein Total 6.4      Albumin 2.8 (*)     Globulin 3.6 (*)     Albumin/Globulin Ratio 0.8 (*)     Bilirubin Total 0.4      ALP 75      ALT 14      AST 22      eGFR >60      Anion Gap 10.0      BUN/Creatinine Ratio 18     B-TYPE NATRIURETIC PEPTIDE - Abnormal    Natriuretic Peptide 374.8 (*)    CBC WITH DIFFERENTIAL - Abnormal    WBC 14.28 (*)     RBC 3.43 (*)     Hgb 9.4 (*)     Hct 28.9 (*)     MCV 84.3      MCH 27.4      MCHC 32.5 (*)     RDW 16.1      Platelet 283      MPV 8.7      Neut % 82.1      Lymph % 8.3      Mono % 8.5      Eos % 0.3      Basophil % 0.4      Lymph # 1.18      Neut # 11.73 (*)     Mono # 1.22      Eos # 0.04      Baso # 0.05      IG# 0.06 (*)     IG% 0.4      NRBC% 0.0     APTT - Normal    PTT 28.6     COVID/FLU A&B PCR - Normal    Influenza A PCR Not Detected      Influenza B PCR Not Detected      SARS-CoV-2 PCR Not Detected      Narrative:     The Xpert Xpress SARS-CoV-2/FLU/RSV plus is a rapid, multiplexed real-time PCR test intended for the simultaneous qualitative detection and differentiation of SARS-CoV-2, Influenza A, Influenza B, and respiratory syncytial virus (RSV) viral RNA in either nasopharyngeal swab or nasal swab specimens.         TROPONIN I - Normal    Troponin-I 0.019     PROTIME-INR - Normal    PT 14.0      INR 1.1     LACTIC ACID, PLASMA - Normal    Lactic Acid Level 1.4     BLOOD CULTURE [facility]   BLOOD CULTURE [facility]   RESPIRATORY CULTURE ([facility])   CBC W/ AUTO DIFFERENTIAL    Narrative:     The following orders were created for panel order CBC auto differential.  Procedure                               Abnormality         Status                     ---------                               -----------         ------                     CBC with Differential[[id number]]       Abnormal            Final result                 Please view results for these tests on the individual orders.   URINALYSIS, REFLEX TO URINE CULTURE    MRSA PCR   RESPIRATORY PANEL     EKG Readings: (Independently Interpreted)   Initial Reading: No STEMI. Rhythm: Normal Sinus Rhythm. Heart Rate: 89. Ectopy: No Ectopy. Conduction: Normal. ST Segments: Normal ST Segments. T Waves: Normal. Axis: Normal. Clinical Impression: Normal Sinus Rhythm   EKG completed at 1516. Normal intervals.      ECG Results              EKG 12-lead (Final result)        Collection Time Result Time QRS Duration OHS QTC Calculation    11/28/24 15:16:42 11/28/24 16:08:19 84 403                     Final result by Interface, Lab In [facility] (11/28/24 16:08:26)                   Narrative:    Test Reason : R06.02,    Vent. Rate :  89 BPM     Atrial Rate :  89 BPM     P-R Int : 134 ms          QRS Dur :  84 ms      QT Int : 332 ms       P-R-T Axes :  87  73  75 degrees    QTcB Int : 403 ms    Normal sinus rhythm  Normal ECG  When compared with ECG of 05-Oct-2024 17:30,  No significant change was found  Confirmed by Pelon Wyatt ([id number]) on 11/28/2024 4:08:17 PM    Referred By:            Confirmed By: Pelon Wyatt                                  Imaging Results              CTA Chest Non-Coronary (PE Studies) (Preliminary result)  Result time 11/28/24 18:19:37      Preliminary result by Yovani Gould MD (11/28/24 18:19:37)                   Narrative:    START OF REPORT:  Technique: CT Scan of the chest was performed with intravenous contrast with direct axial images as well as sagittal and coronal reconstruction images pulmonary embolus protocol.    Dosage Information: Automated Exposure Control was utilized 267.79 mGy.cm.    Comparison: Comparison is with study dated 2024-05-21 09:46:01.    Clinical History: Sob, r/o pe.    Findings:  Soft Tissues: Unremarkable.  Lines and Tubes: An implantable catheter is seen with its hub in the right anterior chest wall and its tip in the superior vena cava.  Neck: There is a 1.7 cm stable appearing rim-calcified hypodense nodule in the right thyroid lobe  on Series 4 Image 8.  Mediastinum: Multiple enlarged mediastinal lymph nodes are seen in the prevascular APwindow/subaortic paratracheal precarinal subcarinal and bilateral hilar spaces . The largest is in right hilar space and measures 13.5 mm in least dimension on Series 4 Image 36. This suggests pathological lymphadenopathy.  Heart: The heart size is within normal limits. Severe coronary artery calcification is seen.  Aorta: Severe aortic calcification is seen in the thoracic aorta.  Pulmonary Arteries: No filling defects are seen in the pulmonary arteries to suggest pulmonary embolus.  Lungs: There is mild non specific dependent change at the lung bases. There is left greater than right bilateral apical pleural thickening with calcifications and traction bronchiectasis in the left, which appears stable as compared previously. There is a stable 7 mm parenchymal nodule in the apicoposterior segment of the left upper lobe on Series 4 Image 28. There are patchy dense opacities in the medial segment of the right middle lobe and posterior basal segment of the left lower lobe. Ill-defined ground glass opacities are also seen in the left lung base, for instance on Series 4 Image 67. These may reflect an element of multilobar atypical pneumonia. There is a 2.5 cm mass at the right lung base with micro spiculations. This is worrisome for a neoplastic focus.  Pleura: No effusions or pneumothorax are identified.  Bony Structures:  Spine: Moderate multilevel spondylotic changes are seen in the thoracic spine.  Ribs: The visualized bilateral ribs appear unremarkable.  Abdomen: Surgical clips are seen in the gallbladder fossa consistent with prior cholecystectomy. A 2.7 cm cyst is partially visualized in the midpole of the right kidney.      Impression:  1. Multiple enlarged mediastinal lymph nodes are seen in the prevascular APwindow/subaortic paratracheal precarinal subcarinal and bilateral hilar spaces . The largest is in  right hilar space and measures 13.5 mm in least dimension on Series 4 Image 36. This suggests pathological lymphadenopathy.  2. No filling defects are seen in the pulmonary arteries to suggest pulmonary embolus.  3. There are patchy dense opacities in the medial segment of the right middle lobe and posterior basal segment of the left lower lobe. Ill-defined ground glass opacities are also seen in the left lung base, for instance on Series 4 Image 67. These may reflect an element of multilobar atypical pneumonia. Correlate with clinical and laboratory findings as regards further evaluation and follow-up. There is a 2.5 cm mass at the right lung base with micro spiculations. This is worrisome for a neoplastic focus. Recommend additional evaluation and follow-up as indicated.  4. Details and other findings as discussed above.                                         X-Ray Chest AP Portable (Final result)  Result time 11/28/24 15:47:08      Final result by Brian German MD (11/28/24 15:47:08)                   Narrative:    EXAMINATION  XR CHEST AP PORTABLE    CLINICAL HISTORY  Shortness of breath    TECHNIQUE  A total of 1 frontal image(s) submitted of the chest.    COMPARISON  5 October 2024    FINDINGS  Lines/tubes/devices: Right chest wall power-injectable subcutaneous port remains in similar position.  Multiple ECG leads overlie the chest.    The cardiac silhouette and central vascular structures are unchanged.  The trachea is midline.  There is similar appearance of irregular rounded left suprahilar opacity and biapical pleural capping.  No evidence of new lobar consolidation is appreciated.  There is no enlarging pleural effusion or convincing pneumothorax.    Regional osseous structures and extrathoracic soft tissues are similar.    IMPRESSION  1. No definite acute cardiopulmonary process is identified.  2. Similar appearance of irregular left suprahilar mass-like opacity and biapical pleural  capping.      Electronically signed by: Brian German  Date:    11/28/2024  Time:    15:47                                     Medications   mupirocin 2 % ointment (has no administration in time range)   vancomycin 1,500 mg in D5W 250 mL IVPB (admixture device) (1,500 mg Intravenous New Bag 11/28/24 1926)   piperacillin-tazobactam (ZOSYN) 4.5 g in D5W 100 mL IVPB (MB+) (has no administration in time range)   azithromycin (ZITHROMAX) 500 mg in 0.9% NaCl 250 mL IVPB (admixture device) (has no administration in time range)   albuterol-ipratropium 2.5 mg-0.5 mg/3 mL nebulizer solution 3 mL (has no administration in time range)   albuterol-ipratropium 2.5 mg-0.5 mg/3 mL nebulizer solution 9 mL (9 mLs Nebulization Given 11/28/24 1543)   methylPREDNISolone sodium succinate injection 125 mg (125 mg Intravenous Given 11/28/24 1555)   iohexoL (OMNIPAQUE 350) injection 100 mL (100 mLs Intravenous Given 11/28/24 1721)   piperacillin-tazobactam (ZOSYN) 4.5 g in D5W 100 mL IVPB (MB+) (0 g Intravenous Stopped 11/28/24 1926)     Medical Decision Making  Judging by the patient's chief complaint and pertinent history, the patient has the following possible differential diagnoses, including but not limited to the following.  Some of these are deemed to be lower likelihood and some more likely based on my physical exam and history combined with possible lab work and/or imaging studies.   Please see the pertinent studies, and refer to the HPI.  Some of these diagnoses will take further evaluation to fully rule out, perhaps as an outpatient and the patient was encouraged to follow up when discharged for more comprehensive evaluation.    ACS, pneumonia, COVID/Flu, congestive heart failure, asthma, COPD, pleural effusion, pulmonary edema, acute bronchitis, PE, pneumothorax, hemothorax, electrolyte abnormalities, anemia, anxiety       Patient is a 79-year-old female presents to the emergency department for shortness of breath, hypoxia,  wheezing in all lung fields.  She has a history of lung cancer.  Does not typically require oxygen.  She was hypoxic on arrival on room air.  She was placed on 2-3 L with improvement in her oxygenation and work of breathing.  She was given an hour long DuoNeb, steroids with improvement.  Chest x-ray with masslike lesion, concern for additional infiltrate.  CTA with infiltrate in the right middle lobe, left lung.  Started on vanc and Zosyn.  Will admit for pneumonia, sepsis, hypoxia.  All results discussed with the patient and family.  Discussed need for admission for further evaluation management treatment.  Patient and family verbalized understanding and agreed to plan.    Problems Addressed:  Adenocarcinoma of hilum of lung: acute illness or injury that poses a threat to life or bodily functions  Hypoxia: acute illness or injury that poses a threat to life or bodily functions  Pneumonia: acute illness or injury that poses a threat to life or bodily functions  SOB (shortness of breath): acute illness or injury that poses a threat to life or bodily functions    Amount and/or Complexity of Data Reviewed  Independent Historian: spouse     Details: The patient's  at bedside corresponds with the patient.   External Data Reviewed: notes.  Labs: ordered.  Radiology: ordered and independent interpretation performed.  ECG/medicine tests: ordered and independent interpretation performed.     Details: EKG completed at 1516.Initial Reading: No STEMI. Rhythm: Normal Sinus Rhythm. Heart Rate: 89. Ectopy: No Ectopy. Conduction: Normal. ST Segments: Normal ST Segments. T Waves: Normal. Axis: Normal. Clinical Impression: Normal Sinus Rhythm. Normal intervals.    Risk  Prescription drug management.  Parenteral controlled substances.  Decision regarding hospitalization.  Diagnosis or treatment significantly limited by social determinants of health.            Scribe Attestation:   Scribe #1: I performed the above scribed  service and the documentation accurately describes the services I performed. I attest to the accuracy of the note.    Attending Attestation:           Physician Attestation for Scribe:  Physician Attestation Statement for Scribe #1: I, Justin Clarke MD, reviewed documentation, as scribed by Dorie De La Vega in my presence, and it is both accurate and complete.             ED Course as of 11/28/24 2000 Thu Nov 28, 2024   [address] [facility] medicine.  [GW]      ED Course User Index  [GW] Dorie De La Vega                           Clinical Impression:  Final diagnoses:  [R06.02] SOB (shortness of breath)  [J18.9] Pneumonia  [R09.02] Hypoxia (Primary)  [C34.00] Adenocarcinoma of hilum of lung          ED Disposition Condition    Admit Stable                Justin Clarke MD  11/28/24 2000

## 2024-11-29 LAB
ALBUMIN SERPL-MCNC: 2.9 G/DL (ref 3.4–4.8)
ALBUMIN/GLOB SERPL: 0.9 RATIO (ref 1.1–2)
ALP SERPL-CCNC: 81 UNIT/L (ref 40–150)
ALT SERPL-CCNC: 12 UNIT/L (ref 0–55)
ANION GAP SERPL CALC-SCNC: 8 MEQ/L
AST SERPL-CCNC: 19 UNIT/L (ref 5–34)
BACTERIA #/AREA URNS AUTO: ABNORMAL /HPF
BASOPHILS # BLD AUTO: 0.01 X10(3)/MCL
BASOPHILS NFR BLD AUTO: 0.1 %
BILIRUB SERPL-MCNC: 0.4 MG/DL
BILIRUB UR QL STRIP.AUTO: NEGATIVE
BUN SERPL-MCNC: 13.2 MG/DL (ref 9.8–20.1)
CALCIUM SERPL-MCNC: 9.1 MG/DL (ref 8.4–10.2)
CHLORIDE SERPL-SCNC: 96 MMOL/L (ref 98–107)
CLARITY UR: ABNORMAL
CO2 SERPL-SCNC: 30 MMOL/L (ref 23–31)
COLOR UR AUTO: YELLOW
CREAT SERPL-MCNC: 0.77 MG/DL (ref 0.55–1.02)
CREAT/UREA NIT SERPL: 17
EOSINOPHIL # BLD AUTO: 0 X10(3)/MCL (ref 0–0.9)
EOSINOPHIL NFR BLD AUTO: 0 %
ERYTHROCYTE [DISTWIDTH] IN BLOOD BY AUTOMATED COUNT: 16.2 % (ref 11.5–17)
GFR SERPLBLD CREATININE-BSD FMLA CKD-EPI: >60 ML/MIN/1.73/M2
GLOBULIN SER-MCNC: 3.2 GM/DL (ref 2.4–3.5)
GLUCOSE SERPL-MCNC: 139 MG/DL (ref 82–115)
GLUCOSE UR QL STRIP: NORMAL
HCT VFR BLD AUTO: 30.7 % (ref 37–47)
HGB BLD-MCNC: 9.9 G/DL (ref 12–16)
HGB UR QL STRIP: ABNORMAL
IMM GRANULOCYTES # BLD AUTO: 0.04 X10(3)/MCL (ref 0–0.04)
IMM GRANULOCYTES NFR BLD AUTO: 0.3 %
KETONES UR QL STRIP: NEGATIVE
LEUKOCYTE ESTERASE UR QL STRIP: 500
LYMPHOCYTES # BLD AUTO: 0.83 X10(3)/MCL (ref 0.6–4.6)
LYMPHOCYTES NFR BLD AUTO: 6.6 %
MCH RBC QN AUTO: 27.4 PG (ref 27–31)
MCHC RBC AUTO-ENTMCNC: 32.2 G/DL (ref 33–36)
MCV RBC AUTO: 85 FL (ref 80–94)
MONOCYTES # BLD AUTO: 0.09 X10(3)/MCL (ref 0.1–1.3)
MONOCYTES NFR BLD AUTO: 0.7 %
NEUTROPHILS # BLD AUTO: 11.63 X10(3)/MCL (ref 2.1–9.2)
NEUTROPHILS NFR BLD AUTO: 92.3 %
NITRITE UR QL STRIP: ABNORMAL
NRBC BLD AUTO-RTO: 0 %
PH UR STRIP: 6 [PH]
PLATELET # BLD AUTO: 302 X10(3)/MCL (ref 130–400)
PMV BLD AUTO: 8.6 FL (ref 7.4–10.4)
POTASSIUM SERPL-SCNC: 5.4 MMOL/L (ref 3.5–5.1)
PROT SERPL-MCNC: 6.1 GM/DL (ref 5.8–7.6)
PROT UR QL STRIP: ABNORMAL
RBC # BLD AUTO: 3.61 X10(6)/MCL (ref 4.2–5.4)
RBC #/AREA URNS AUTO: ABNORMAL /HPF
SODIUM SERPL-SCNC: 134 MMOL/L (ref 136–145)
SP GR UR STRIP.AUTO: 1.02 (ref 1–1.03)
SQUAMOUS #/AREA URNS LPF: ABNORMAL /HPF
UROBILINOGEN UR STRIP-ACNC: NORMAL
WBC # BLD AUTO: 12.6 X10(3)/MCL (ref 4.5–11.5)
WBC #/AREA URNS AUTO: >100 /HPF
WBC CLUMPS UR QL AUTO: ABNORMAL

## 2024-11-29 PROCEDURE — 94761 N-INVAS EAR/PLS OXIMETRY MLT: CPT

## 2024-11-29 PROCEDURE — 87077 CULTURE AEROBIC IDENTIFY: CPT | Performed by: STUDENT IN AN ORGANIZED HEALTH CARE EDUCATION/TRAINING PROGRAM

## 2024-11-29 PROCEDURE — 27000221 HC OXYGEN, UP TO 24 HOURS

## 2024-11-29 PROCEDURE — 11000001 HC ACUTE MED/SURG PRIVATE ROOM

## 2024-11-29 PROCEDURE — 80053 COMPREHEN METABOLIC PANEL: CPT | Performed by: INTERNAL MEDICINE

## 2024-11-29 PROCEDURE — 94799 UNLISTED PULMONARY SVC/PX: CPT

## 2024-11-29 PROCEDURE — 25000242 PHARM REV CODE 250 ALT 637 W/ HCPCS: Performed by: INTERNAL MEDICINE

## 2024-11-29 PROCEDURE — 94640 AIRWAY INHALATION TREATMENT: CPT

## 2024-11-29 PROCEDURE — 25000003 PHARM REV CODE 250: Performed by: INTERNAL MEDICINE

## 2024-11-29 PROCEDURE — 81001 URINALYSIS AUTO W/SCOPE: CPT | Performed by: STUDENT IN AN ORGANIZED HEALTH CARE EDUCATION/TRAINING PROGRAM

## 2024-11-29 PROCEDURE — 87070 CULTURE OTHR SPECIMN AEROBIC: CPT | Performed by: INTERNAL MEDICINE

## 2024-11-29 PROCEDURE — 99900035 HC TECH TIME PER 15 MIN (STAT)

## 2024-11-29 PROCEDURE — 94760 N-INVAS EAR/PLS OXIMETRY 1: CPT

## 2024-11-29 PROCEDURE — 63600175 PHARM REV CODE 636 W HCPCS: Performed by: INTERNAL MEDICINE

## 2024-11-29 PROCEDURE — 36415 COLL VENOUS BLD VENIPUNCTURE: CPT | Performed by: INTERNAL MEDICINE

## 2024-11-29 PROCEDURE — 99900031 HC PATIENT EDUCATION (STAT)

## 2024-11-29 PROCEDURE — 85025 COMPLETE CBC W/AUTO DIFF WBC: CPT | Performed by: INTERNAL MEDICINE

## 2024-11-29 RX ORDER — LEVOTHYROXINE SODIUM 125 UG/1
125 TABLET ORAL
Status: DISCONTINUED | OUTPATIENT
Start: 2024-11-29 | End: 2024-11-30 | Stop reason: HOSPADM

## 2024-11-29 RX ORDER — CLOPIDOGREL BISULFATE 75 MG/1
75 TABLET ORAL DAILY
Status: DISCONTINUED | OUTPATIENT
Start: 2024-11-29 | End: 2024-11-30 | Stop reason: HOSPADM

## 2024-11-29 RX ORDER — BUDESONIDE 0.5 MG/2ML
0.5 INHALANT ORAL EVERY 12 HOURS
Status: DISCONTINUED | OUTPATIENT
Start: 2024-11-29 | End: 2024-11-30 | Stop reason: HOSPADM

## 2024-11-29 RX ORDER — GUAIFENESIN AND DEXTROMETHORPHAN HYDROBROMIDE 10; 100 MG/5ML; MG/5ML
10 SYRUP ORAL EVERY 4 HOURS PRN
Status: DISCONTINUED | OUTPATIENT
Start: 2024-11-29 | End: 2024-11-30 | Stop reason: HOSPADM

## 2024-11-29 RX ORDER — CARVEDILOL 3.12 MG/1
6.25 TABLET ORAL 2 TIMES DAILY
Status: DISCONTINUED | OUTPATIENT
Start: 2024-11-29 | End: 2024-11-30 | Stop reason: HOSPADM

## 2024-11-29 RX ADMIN — ENOXAPARIN SODIUM 40 MG: 40 INJECTION SUBCUTANEOUS at 04:11

## 2024-11-29 RX ADMIN — AZITHROMYCIN MONOHYDRATE 500 MG: 500 INJECTION, POWDER, LYOPHILIZED, FOR SOLUTION INTRAVENOUS at 09:11

## 2024-11-29 RX ADMIN — LEVOTHYROXINE SODIUM 125 MCG: 125 TABLET ORAL at 05:11

## 2024-11-29 RX ADMIN — PIPERACILLIN AND TAZOBACTAM 4.5 G: 4; .5 INJECTION, POWDER, LYOPHILIZED, FOR SOLUTION INTRAVENOUS; PARENTERAL at 05:11

## 2024-11-29 RX ADMIN — CLOPIDOGREL BISULFATE 75 MG: 75 TABLET ORAL at 08:11

## 2024-11-29 RX ADMIN — IPRATROPIUM BROMIDE AND ALBUTEROL SULFATE 3 ML: 2.5; .5 SOLUTION RESPIRATORY (INHALATION) at 08:11

## 2024-11-29 RX ADMIN — PIPERACILLIN AND TAZOBACTAM 4.5 G: 4; .5 INJECTION, POWDER, LYOPHILIZED, FOR SOLUTION INTRAVENOUS; PARENTERAL at 10:11

## 2024-11-29 RX ADMIN — BUDESONIDE 0.5 MG: 0.5 INHALANT RESPIRATORY (INHALATION) at 08:11

## 2024-11-29 RX ADMIN — CARVEDILOL 6.25 MG: 3.12 TABLET, FILM COATED ORAL at 08:11

## 2024-11-29 RX ADMIN — IPRATROPIUM BROMIDE AND ALBUTEROL SULFATE 3 ML: 2.5; .5 SOLUTION RESPIRATORY (INHALATION) at 03:11

## 2024-11-29 RX ADMIN — SODIUM ZIRCONIUM CYCLOSILICATE 10 G: 10 POWDER, FOR SUSPENSION ORAL at 10:11

## 2024-11-29 RX ADMIN — IPRATROPIUM BROMIDE AND ALBUTEROL SULFATE 3 ML: 2.5; .5 SOLUTION RESPIRATORY (INHALATION) at 07:11

## 2024-11-29 RX ADMIN — IPRATROPIUM BROMIDE AND ALBUTEROL SULFATE 3 ML: 2.5; .5 SOLUTION RESPIRATORY (INHALATION) at 01:11

## 2024-11-29 RX ADMIN — PIPERACILLIN AND TAZOBACTAM 4.5 G: 4; .5 INJECTION, POWDER, LYOPHILIZED, FOR SOLUTION INTRAVENOUS; PARENTERAL at 01:11

## 2024-11-29 RX ADMIN — IPRATROPIUM BROMIDE AND ALBUTEROL SULFATE 3 ML: 2.5; .5 SOLUTION RESPIRATORY (INHALATION) at 05:11

## 2024-11-29 NOTE — PLAN OF CARE
Problem: Adult Inpatient Plan of Care  Goal: Plan of Care Review  Outcome: Progressing  Goal: Patient-Specific Goal (Individualized)  Outcome: Progressing  Goal: Absence of Hospital-Acquired Illness or Injury  Outcome: Progressing  Goal: Optimal Comfort and Wellbeing  Outcome: Progressing  Goal: Readiness for Transition of Care  Outcome: Progressing     Problem: Infection  Goal: Absence of Infection Signs and Symptoms  Outcome: Progressing     Problem: Wound  Goal: Optimal Coping  Outcome: Progressing  Goal: Optimal Functional Ability  Outcome: Progressing  Goal: Absence of Infection Signs and Symptoms  Outcome: Progressing  Goal: Improved Oral Intake  Outcome: Progressing  Goal: Optimal Pain Control and Function  Outcome: Progressing  Goal: Skin Health and Integrity  Outcome: Progressing  Goal: Optimal Wound Healing  Outcome: Progressing     Problem: Pneumonia  Goal: Fluid Balance  Outcome: Progressing  Goal: Resolution of Infection Signs and Symptoms  Outcome: Progressing  Goal: Effective Oxygenation and Ventilation  Outcome: Progressing     Problem: Fall Injury Risk  Goal: Absence of Fall and Fall-Related Injury  Outcome: Progressing     Problem: Skin Injury Risk Increased  Goal: Skin Health and Integrity  Outcome: Progressing

## 2024-11-29 NOTE — H&P
[facility] Hospital Medicine History & Physical Examination       Patient Name: Pratibha Patel  MRN: [id number]  Patient Class: IP- Inpatient   Admission Date: 11/28/2024  3:15 PM  Length of Stay: 0  Admitting Service: Hospital Medicine   Attending Physician: Bautista Dean MD   Primary Care Provider: Graham Nguyen NP  History source: EMR, patient and/or patient's family    CHIEF COMPLAINT   Shortness of Breath (Shortness of breath x1 week. Hx of COPD, asthma, no relief with inhaler. Denies any chest pain. )    HISTORY OF PRESENT ILLNESS:   Patient is a 79-year-old female with a history of stage IV non-small-cell lung cancer on chemotherapy, COPD, HTN, HLD, hypothyroidism and additional past medical history as below presents to the ER with worsening dyspnea, cough over the last week.  She denied fever or chills.    She arrived to the ER afebrile but was hypotensive and hypoxic with sats in the mid 80s on room air was placed on supplemental oxygen.  Laboratory work noted a leukocytosis of 14 K and elevated BNP.  CTA of the chest showed no pulmonary embolus but noted thoracic lymphadenopathy and patchy dense opacities in the right middle lobe and left lower lobe.  Blood cultures were obtained and she was started on broad-spectrum empiric antibiotics admitted to the hospitalist service for further management.    PAST MEDICAL HISTORY:   Stage IV NSCLC on chemotherapy   Hoarseness, chronic cough, microaspiration, on modified diet  Unqualified COPD  HTN  HLD  Hypothyroidism  Anxiety  Osteoarthritis  PAD on Plavix    PAST SURGICAL HISTORY:     Past Surgical History:   Procedure Laterality Date    BLADDER SURGERY      CHOLECYSTECTOMY      COLONOSCOPY  2018    COLONOSCOPY N/A 9/9/2024    Procedure: SIG;  Surgeon: Saurav Hutson MD;  Location: [facility] ENDOSCOPY;  Service: Gastroenterology;  Laterality: N/A;    ENDOBRONCHIAL ULTRASOUND N/A 11/16/2022    Procedure: ENDOBRONCHIAL  ULTRASOUND (EBUS);  Surgeon: Anthony Hutson MD;  Location: [facility] BRONCHOSCOPY LAB;  Service: Pulmonary;  Laterality: N/A;    HYSTERECTOMY      KNEE SURGERY Bilateral     replaced knees    PARTIAL HIP ARTHROPLASTY Bilateral     SIGMOIDOSCOPY, WITH BIOPSY N/A 9/9/2024    Procedure: SIGMOIDOSCOPY, WITH BIOPSY;  Surgeon: Saurav Hutson MD;  Location: [facility] ENDOSCOPY;  Service: Gastroenterology;  Laterality: N/A;       ALLERGIES:   Lisinopril    FAMILY HISTORY:   Reviewed and non-contributory     SOCIAL HISTORY:   Screening for Social Drivers for health: Patient screened for food insecurity, housing instability, transportation needs, utility   difficulties, and interpersonal safety (select all that apply as identified as concern)  []Housing or Food  []Transportation Needs  []Utility Difficulties  []Interpersonal safety  [x]None  Social History     Tobacco Use    Smoking status: Every Day     Current packs/day: 0.25     Average packs/day: 0.3 packs/day for 65.9 years (16.5 ttl pk-yrs)     Types: Cigarettes     Start date: 1959    Smokeless tobacco: Never    Tobacco comments:     Patient states she is trying to quit smoking.   Substance Use Topics    Alcohol use: Not Currently        HOME MEDICATIONS:     Prior to Admission medications    Medication Sig Start Date End Date Taking? Authorizing Provider   albuterol (VENTOLIN HFA) 90 mcg/actuation inhaler Inhale 2 puffs into the lungs every 6 (six) hours as needed for Wheezing. Rescue 3/12/24   Bautista Stern MD   carvediloL (COREG) 6.25 MG tablet Take 6.25 mg by mouth 2 (two) times daily. 6/19/22   Provider, Historical   clopidogreL (PLAVIX) 75 mg tablet  10/12/23   Provider, Historical   furosemide (LASIX) 20 MG tablet  5/9/24   Provider, Historical   levothyroxine (SYNTHROID) 125 MCG tablet Take 125 mcg by mouth before breakfast. 9/6/24   Provider, Historical   losartan (COZAAR) 50 MG tablet  5/9/24   Provider, Historical   magnesium oxide (MAG-OX) 400  "mg (241.3 mg magnesium) tablet Take 1 tablet (400 mg total) by mouth 2 (two) times daily. 11/1/24   Lauren Myrick, FNP   simvastatin (ZOCOR) 40 MG tablet Take 40 mg by mouth every evening. 6/19/22   Provider, Historical       REVIEW OF SYSTEMS:   Except as documented, all other systems reviewed and negative     PHYSICAL EXAM:   T 98.5 °F (36.9 °C)   BP (!) 145/70   P 71   RR 20   O2 96 %  GENERAL: awake, alert, oriented and in no acute distress, non-toxic appearing   HEENT: normocephalic atraumatic   NECK: supple   LUNGS: Clear bilaterally, no wheezing or rales, no accessory muscle use   CVS: Regular rate and rhythm, normal peripheral perfusion  ABD: Soft, non-tender, non-distended, bowel sounds present  EXTREMITIES: no clubbing or cyanosis  SKIN: Warm, dry.   NEURO: alert and oriented, grossly without focal deficits   PSYCHIATRIC: Cooperative    LABS AND IMAGING:     Recent Labs     11/28/24  1545   WBC 14.28*   RBC 3.43*   HGB 9.4*   HCT 28.9*   MCV 84.3   MCH 27.4   MCHC 32.5*   RDW 16.1        No results for input(s): "LACTIC" in the last 72 hours.  Recent Labs     11/28/24  1545   INR 1.1   APTT 28.6     No results for input(s): "HGBA1C", "CHOL", "TRIG", "LDL", "VLDL", "HDL" in the last 72 hours.   Recent Labs     11/28/24  1545   *   K 4.2   CO2 26   BUN 14.6   CREATININE 0.83   GLUCOSE 95   CALCIUM 8.9   ALBUMIN 2.8*   GLOBULIN 3.6*   ALKPHOS 75   ALT 14   AST 22   BILITOT 0.4     Recent Labs     11/28/24  1545   .8*   TROPONINI 0.019          CTA Chest Non-Coronary (PE Studies)  START OF REPORT:  Technique: CT Scan of the chest was performed with intravenous contrast with direct axial images as well as sagittal and coronal reconstruction images pulmonary embolus protocol.    Dosage Information: Automated Exposure Control was utilized 267.79 mGy.cm.    Comparison: Comparison is with study dated 2024-05-21 09:46:01.    Clinical History: Sob, r/o pe.    Findings:  Soft Tissues: " Unremarkable.  Lines and Tubes: An implantable catheter is seen with its hub in the right anterior chest wall and its tip in the superior vena cava.  Neck: There is a 1.7 cm stable appearing rim-calcified hypodense nodule in the right thyroid lobe on Series 4 Image 8.  Mediastinum: Multiple enlarged mediastinal lymph nodes are seen in the prevascular APwindow/subaortic paratracheal precarinal subcarinal and bilateral hilar spaces . The largest is in right hilar space and measures 13.5 mm in least dimension on Series 4 Image 36. This suggests pathological lymphadenopathy.  Heart: The heart size is within normal limits. Severe coronary artery calcification is seen.  Aorta: Severe aortic calcification is seen in the thoracic aorta.  Pulmonary Arteries: No filling defects are seen in the pulmonary arteries to suggest pulmonary embolus.  Lungs: There is mild non specific dependent change at the lung bases. There is left greater than right bilateral apical pleural thickening with calcifications and traction bronchiectasis in the left, which appears stable as compared previously. There is a stable 7 mm parenchymal nodule in the apicoposterior segment of the left upper lobe on Series 4 Image 28. There are patchy dense opacities in the medial segment of the right middle lobe and posterior basal segment of the left lower lobe. Ill-defined ground glass opacities are also seen in the left lung base, for instance on Series 4 Image 67. These may reflect an element of multilobar atypical pneumonia. There is a 2.5 cm mass at the right lung base with micro spiculations. This is worrisome for a neoplastic focus.  Pleura: No effusions or pneumothorax are identified.  Bony Structures:  Spine: Moderate multilevel spondylotic changes are seen in the thoracic spine.  Ribs: The visualized bilateral ribs appear unremarkable.  Abdomen: Surgical clips are seen in the gallbladder fossa consistent with prior cholecystectomy. A 2.7 cm cyst is  partially visualized in the midpole of the right kidney.    Impression:  1. Multiple enlarged mediastinal lymph nodes are seen in the prevascular APwindow/subaortic paratracheal precarinal subcarinal and bilateral hilar spaces . The largest is in right hilar space and measures 13.5 mm in least dimension on Series 4 Image 36. This suggests pathological lymphadenopathy.  2. No filling defects are seen in the pulmonary arteries to suggest pulmonary embolus.  3. There are patchy dense opacities in the medial segment of the right middle lobe and posterior basal segment of the left lower lobe. Ill-defined ground glass opacities are also seen in the left lung base, for instance on Series 4 Image 67. These may reflect an element of multilobar atypical pneumonia. Correlate with clinical and laboratory findings as regards further evaluation and follow-up. There is a 2.5 cm mass at the right lung base with micro spiculations. This is worrisome for a neoplastic focus. Recommend additional evaluation and follow-up as indicated.  4. Details and other findings as discussed above.  X-Ray Chest AP Portable  EXAMINATION  XR CHEST AP PORTABLE    CLINICAL HISTORY  Shortness of breath    TECHNIQUE  A total of 1 frontal image(s) submitted of the chest.    COMPARISON  5 October 2024    FINDINGS  Lines/tubes/devices: Right chest wall power-injectable subcutaneous port remains in similar position.  Multiple ECG leads overlie the chest.    The cardiac silhouette and central vascular structures are unchanged.  The trachea is midline.  There is similar appearance of irregular rounded left suprahilar opacity and biapical pleural capping.  No evidence of new lobar consolidation is appreciated.  There is no enlarging pleural effusion or convincing pneumothorax.    Regional osseous structures and extrathoracic soft tissues are similar.    IMPRESSION  1. No definite acute cardiopulmonary process is identified.  2. Similar appearance of irregular  left suprahilar mass-like opacity and biapical pleural capping.    Electronically signed by: Brian German  Date:    11/28/2024  Time:    15:47      ASSESSMENT & PLAN:   Hospital-acquired pneumonia   Acute hypoxemic respiratory failure secondary to above   Acute exacerbation of COPD  Stage IV lung cancer on chemotherapy    Hx:  COPD, CAD/Plavix, HTN, HLD, hypothyroidism, chronic microaspiration    -blood cultures, respiratory PCR, MRSA PCR  -continue broad-spectrum empiric antibiotics  -nebulizers as needed wean oxygen as tolerated   -will obtain echo given elevated BNP  -resume home meds as appropriate pending med rec    DVT prophylaxis: lovenox  Code status: full     If patient was admitted under observational status it is with my approval/permission.     At least 55 min was spent on this history and physical.  Time seen: 11pm 11/28  Critical care time = 35 min; Critical care diagnosis = respiratory failure   Bautista Dean MD

## 2024-11-29 NOTE — PLAN OF CARE
Problem: Adult Inpatient Plan of Care  Goal: Plan of Care Review  Outcome: Progressing  Flowsheets (Taken 11/28/2024 2335)  Plan of Care Reviewed With:   patient   spouse  Goal: Patient-Specific Goal (Individualized)  Outcome: Progressing  Flowsheets (Taken 11/28/2024 2335)  Individualized Care Needs: IV ABX, Comfort Measures  Anxieties, Fears or Concerns: Fear of SOB  Patient/Family-Specific Goals (Include Timeframe): Return Home  Goal: Absence of Hospital-Acquired Illness or Injury  Outcome: Progressing  Goal: Optimal Comfort and Wellbeing  Outcome: Progressing  Goal: Readiness for Transition of Care  Outcome: Progressing     Problem: Infection  Goal: Absence of Infection Signs and Symptoms  Outcome: Progressing     Problem: Wound  Goal: Optimal Coping  Outcome: Progressing  Goal: Optimal Functional Ability  Outcome: Progressing  Goal: Absence of Infection Signs and Symptoms  Outcome: Progressing  Goal: Improved Oral Intake  Outcome: Progressing  Goal: Optimal Pain Control and Function  Outcome: Progressing  Goal: Skin Health and Integrity  Outcome: Progressing  Goal: Optimal Wound Healing  Outcome: Progressing     Problem: Pneumonia  Goal: Fluid Balance  Outcome: Progressing  Goal: Resolution of Infection Signs and Symptoms  Outcome: Progressing  Goal: Effective Oxygenation and Ventilation  Outcome: Progressing     Problem: Fall Injury Risk  Goal: Absence of Fall and Fall-Related Injury  Outcome: Progressing     Problem: Skin Injury Risk Increased  Goal: Skin Health and Integrity  Outcome: Progressing

## 2024-11-29 NOTE — PLAN OF CARE
11/29/24 1056   Discharge Assessment   Assessment Type Discharge Planning Assessment   Confirmed/corrected address, phone number and insurance Yes   Confirmed Demographics Correct on Facesheet   Source of Information patient   Communicated RICKEY with patient/caregiver Date not available/Unable to determine   Reason For Admission hypoxia, SOB, pneumonia, adenocarcinoma   People in Home spouse   Do you expect to return to your current living situation? Yes   Do you have help at home or someone to help you manage your care at home? Yes   Who are your caregiver(s) and their phone number(s)? spouse Ganesh Patel [phone]   Prior to hospitilization cognitive status: Alert/Oriented   Current cognitive status: Alert/Oriented   Walking or Climbing Stairs Difficulty yes   Walking or Climbing Stairs ambulation difficulty, requires equipment;stair climbing difficulty, requires equipment;transferring difficulty, requires equipment   Mobility Management Rolling walker primary has cane and wheelchair   Dressing/Bathing Difficulty yes   Dressing/Bathing bathing difficulty, assistance 1 person   Dressing/Bathing Management Has bather through Home health   Home Accessibility wheelchair accessible   Home Layout Able to live on 1st floor   Equipment Currently Used at Home wheelchair;walker, rolling;cane, straight;bath bench   Readmission within 30 days? No   Patient currently being followed by outpatient case management? No   Do you currently have service(s) that help you manage your care at home? Yes   Name and Contact number of agency Has [facility] thinks it's with [facility]   Is the pt/caregiver preference to resume services with current agency Yes   Do you take prescription medications? Yes   Do you have prescription coverage? Yes   Coverage Medicare fills through [facility]   Do you have any problems affording any of your prescribed medications? No   Is the patient taking medications as prescribed? yes   Who is going to help  you get home at discharge? spouse   How do you get to doctors appointments? family or friend will provide   Are you on dialysis? No   Do you take coumadin? No   Discharge Plan A Home Health   Discharge Plan B Home;Home with family   DME Needed Upon Discharge  none   Discharge Plan discussed with: Patient;Spouse/sig other   Name(s) and Number(s) spouse Ganesh Patel [phone]   Transition of Care Barriers None   OTHER   Name(s) of People in Home spouse Ganesh Patel [phone]

## 2024-11-30 VITALS
HEART RATE: 66 BPM | DIASTOLIC BLOOD PRESSURE: 58 MMHG | BODY MASS INDEX: 24.62 KG/M2 | RESPIRATION RATE: 15 BRPM | WEIGHT: 144.19 LBS | TEMPERATURE: 98 F | HEIGHT: 64 IN | OXYGEN SATURATION: 96 % | SYSTOLIC BLOOD PRESSURE: 120 MMHG

## 2024-11-30 LAB
ANION GAP SERPL CALC-SCNC: 8 MEQ/L
APICAL FOUR CHAMBER EJECTION FRACTION: 57 %
APICAL TWO CHAMBER EJECTION FRACTION: 69 %
AV INDEX (PROSTH): 0.87
AV MEAN GRADIENT: 4 MMHG
AV PEAK GRADIENT: 7.8 MMHG
AV VALVE AREA BY VELOCITY RATIO: 2.4 CM²
AV VALVE AREA: 2.5 CM²
AV VELOCITY RATIO: 0.86
BASOPHILS # BLD AUTO: 0.02 X10(3)/MCL
BASOPHILS NFR BLD AUTO: 0.1 %
BNP BLD-MCNC: 752.1 PG/ML
BSA FOR ECHO PROCEDURE: 1.72 M2
BUN SERPL-MCNC: 21 MG/DL (ref 9.8–20.1)
CALCIUM SERPL-MCNC: 9.4 MG/DL (ref 8.4–10.2)
CHLORIDE SERPL-SCNC: 95 MMOL/L (ref 98–107)
CO2 SERPL-SCNC: 30 MMOL/L (ref 23–31)
CREAT SERPL-MCNC: 0.79 MG/DL (ref 0.55–1.02)
CREAT/UREA NIT SERPL: 27
CV ECHO LV RWT: 0.45 CM
DOP CALC AO PEAK VEL: 1.4 M/S
DOP CALC AO VTI: 34.2 CM
DOP CALC LVOT AREA: 2.8 CM2
DOP CALC LVOT DIAMETER: 1.9 CM
DOP CALC LVOT PEAK VEL: 1.2 M/S
DOP CALC LVOT STROKE VOLUME: 84.4 CM3
DOP CALC MV VTI: 34.3 CM
DOP CALCLVOT PEAK VEL VTI: 29.8 CM
E WAVE DECELERATION TIME: 248 MSEC
E/A RATIO: 1.12
E/E' RATIO: 12.14 M/S
ECHO LV POSTERIOR WALL: 1 CM (ref 0.6–1.1)
EOSINOPHIL # BLD AUTO: 0 X10(3)/MCL (ref 0–0.9)
EOSINOPHIL NFR BLD AUTO: 0 %
ERYTHROCYTE [DISTWIDTH] IN BLOOD BY AUTOMATED COUNT: 16.2 % (ref 11.5–17)
FRACTIONAL SHORTENING: 36.4 % (ref 28–44)
GFR SERPLBLD CREATININE-BSD FMLA CKD-EPI: >60 ML/MIN/1.73/M2
GLUCOSE SERPL-MCNC: 111 MG/DL (ref 82–115)
HCT VFR BLD AUTO: 27.5 % (ref 37–47)
HGB BLD-MCNC: 9 G/DL (ref 12–16)
IMM GRANULOCYTES # BLD AUTO: 0.09 X10(3)/MCL (ref 0–0.04)
IMM GRANULOCYTES NFR BLD AUTO: 0.6 %
INTERVENTRICULAR SEPTUM: 1.1 CM (ref 0.6–1.1)
LEFT ATRIUM AREA SYSTOLIC (APICAL 2 CHAMBER): 11.1 CM2
LEFT ATRIUM AREA SYSTOLIC (APICAL 4 CHAMBER): 13.4 CM2
LEFT ATRIUM SIZE: 2.9 CM
LEFT ATRIUM VOLUME INDEX MOD: 14.9 ML/M2
LEFT ATRIUM VOLUME MOD: 25.3 ML
LEFT INTERNAL DIMENSION IN SYSTOLE: 2.8 CM (ref 2.1–4)
LEFT VENTRICLE DIASTOLIC VOLUME INDEX: 51.59 ML/M2
LEFT VENTRICLE DIASTOLIC VOLUME: 87.7 ML
LEFT VENTRICLE END DIASTOLIC VOLUME APICAL 2 CHAMBER: 69.7 ML
LEFT VENTRICLE END DIASTOLIC VOLUME APICAL 4 CHAMBER: 64.4 ML
LEFT VENTRICLE END SYSTOLIC VOLUME APICAL 2 CHAMBER: 20.4 ML
LEFT VENTRICLE END SYSTOLIC VOLUME APICAL 4 CHAMBER: 31.1 ML
LEFT VENTRICLE MASS INDEX: 93.1 G/M2
LEFT VENTRICLE SYSTOLIC VOLUME INDEX: 17.4 ML/M2
LEFT VENTRICLE SYSTOLIC VOLUME: 29.6 ML
LEFT VENTRICULAR INTERNAL DIMENSION IN DIASTOLE: 4.4 CM (ref 3.5–6)
LEFT VENTRICULAR MASS: 158.2 G
LV LATERAL E/E' RATIO: 12.14 M/S
LV SEPTAL E/E' RATIO: 12.14 M/S
LVED V (TEICH): 87.7 ML
LVES V (TEICH): 29.6 ML
LVOT MG: 3 MMHG
LVOT MV: 0.77 CM/S
LYMPHOCYTES # BLD AUTO: 1.31 X10(3)/MCL (ref 0.6–4.6)
LYMPHOCYTES NFR BLD AUTO: 8.1 %
MAGNESIUM SERPL-MCNC: 1.9 MG/DL (ref 1.6–2.6)
MCH RBC QN AUTO: 27.6 PG (ref 27–31)
MCHC RBC AUTO-ENTMCNC: 32.7 G/DL (ref 33–36)
MCV RBC AUTO: 84.4 FL (ref 80–94)
MONOCYTES # BLD AUTO: 0.87 X10(3)/MCL (ref 0.1–1.3)
MONOCYTES NFR BLD AUTO: 5.4 %
MV MEAN GRADIENT: 2 MMHG
MV PEAK A VEL: 0.76 M/S
MV PEAK E VEL: 0.85 M/S
MV PEAK GRADIENT: 4 MMHG
MV STENOSIS PRESSURE HALF TIME: 56 MS
MV VALVE AREA BY CONTINUITY EQUATION: 2.46 CM2
MV VALVE AREA P 1/2 METHOD: 3.93 CM2
NEUTROPHILS # BLD AUTO: 13.94 X10(3)/MCL (ref 2.1–9.2)
NEUTROPHILS NFR BLD AUTO: 85.8 %
NRBC BLD AUTO-RTO: 0 %
OHS LV EJECTION FRACTION SIMPSONS BIPLANE MOD: 64 %
PHOSPHATE SERPL-MCNC: 3.3 MG/DL (ref 2.3–4.7)
PISA TR MAX VEL: 2.6 M/S
PLATELET # BLD AUTO: 309 X10(3)/MCL (ref 130–400)
PMV BLD AUTO: 9.1 FL (ref 7.4–10.4)
POTASSIUM SERPL-SCNC: 4.7 MMOL/L (ref 3.5–5.1)
PV PEAK GRADIENT: 6 MMHG
PV PEAK VELOCITY: 1.23 M/S
RA PRESSURE ESTIMATED: 3 MMHG
RBC # BLD AUTO: 3.26 X10(6)/MCL (ref 4.2–5.4)
RV TB RVSP: 6 MMHG
SODIUM SERPL-SCNC: 133 MMOL/L (ref 136–145)
TDI LATERAL: 0.07 M/S
TDI SEPTAL: 0.07 M/S
TDI: 0.07 M/S
TR MAX PG: 27 MMHG
TRICUSPID ANNULAR PLANE SYSTOLIC EXCURSION: 2.77 CM
TV REST PULMONARY ARTERY PRESSURE: 30 MMHG
WBC # BLD AUTO: 16.23 X10(3)/MCL (ref 4.5–11.5)
Z-SCORE OF LEFT VENTRICULAR DIMENSION IN END DIASTOLE: -0.72
Z-SCORE OF LEFT VENTRICULAR DIMENSION IN END SYSTOLE: -0.35

## 2024-11-30 PROCEDURE — 63600175 PHARM REV CODE 636 W HCPCS: Performed by: INTERNAL MEDICINE

## 2024-11-30 PROCEDURE — 25000242 PHARM REV CODE 250 ALT 637 W/ HCPCS: Performed by: INTERNAL MEDICINE

## 2024-11-30 PROCEDURE — 83880 ASSAY OF NATRIURETIC PEPTIDE: CPT | Performed by: INTERNAL MEDICINE

## 2024-11-30 PROCEDURE — 99900031 HC PATIENT EDUCATION (STAT)

## 2024-11-30 PROCEDURE — 99900035 HC TECH TIME PER 15 MIN (STAT)

## 2024-11-30 PROCEDURE — 85025 COMPLETE CBC W/AUTO DIFF WBC: CPT | Performed by: INTERNAL MEDICINE

## 2024-11-30 PROCEDURE — 83735 ASSAY OF MAGNESIUM: CPT | Performed by: INTERNAL MEDICINE

## 2024-11-30 PROCEDURE — 25000003 PHARM REV CODE 250: Performed by: STUDENT IN AN ORGANIZED HEALTH CARE EDUCATION/TRAINING PROGRAM

## 2024-11-30 PROCEDURE — 27000221 HC OXYGEN, UP TO 24 HOURS

## 2024-11-30 PROCEDURE — 94760 N-INVAS EAR/PLS OXIMETRY 1: CPT

## 2024-11-30 PROCEDURE — 94761 N-INVAS EAR/PLS OXIMETRY MLT: CPT

## 2024-11-30 PROCEDURE — 80048 BASIC METABOLIC PNL TOTAL CA: CPT | Performed by: INTERNAL MEDICINE

## 2024-11-30 PROCEDURE — 25000003 PHARM REV CODE 250: Performed by: INTERNAL MEDICINE

## 2024-11-30 PROCEDURE — 36415 COLL VENOUS BLD VENIPUNCTURE: CPT | Performed by: INTERNAL MEDICINE

## 2024-11-30 PROCEDURE — 94640 AIRWAY INHALATION TREATMENT: CPT

## 2024-11-30 PROCEDURE — 84100 ASSAY OF PHOSPHORUS: CPT | Performed by: INTERNAL MEDICINE

## 2024-11-30 RX ORDER — ATORVASTATIN CALCIUM 10 MG/1
20 TABLET, FILM COATED ORAL DAILY
Status: DISCONTINUED | OUTPATIENT
Start: 2024-11-30 | End: 2024-11-30 | Stop reason: HOSPADM

## 2024-11-30 RX ORDER — IBUPROFEN 200 MG
1 TABLET ORAL DAILY
COMMUNITY
Start: 2024-11-30

## 2024-11-30 RX ORDER — FUROSEMIDE 10 MG/ML
40 INJECTION INTRAMUSCULAR; INTRAVENOUS DAILY
Status: DISCONTINUED | OUTPATIENT
Start: 2024-11-30 | End: 2024-11-30 | Stop reason: HOSPADM

## 2024-11-30 RX ORDER — LOSARTAN POTASSIUM 50 MG/1
50 TABLET ORAL DAILY
Status: DISCONTINUED | OUTPATIENT
Start: 2024-11-30 | End: 2024-11-30 | Stop reason: HOSPADM

## 2024-11-30 RX ORDER — AMOXICILLIN AND CLAVULANATE POTASSIUM 875; 125 MG/1; MG/1
1 TABLET, FILM COATED ORAL 2 TIMES DAILY
Qty: 14 TABLET | Refills: 0 | Status: SHIPPED | OUTPATIENT
Start: 2024-11-30 | End: 2024-12-07

## 2024-11-30 RX ORDER — LEVOFLOXACIN 500 MG/1
500 TABLET, FILM COATED ORAL DAILY
Qty: 7 TABLET | Refills: 0 | Status: SHIPPED | OUTPATIENT
Start: 2024-11-30 | End: 2024-12-07

## 2024-11-30 RX ORDER — FUROSEMIDE 20 MG/1
20 TABLET ORAL DAILY
Qty: 30 TABLET | Refills: 0 | Status: SHIPPED | OUTPATIENT
Start: 2024-11-30 | End: 2024-12-30

## 2024-11-30 RX ORDER — LOSARTAN POTASSIUM 50 MG/1
50 TABLET ORAL DAILY
Qty: 30 TABLET | Refills: 0 | Status: SHIPPED | OUTPATIENT
Start: 2024-12-01 | End: 2024-12-31

## 2024-11-30 RX ORDER — GUAIFENESIN AND DEXTROMETHORPHAN HYDROBROMIDE 10; 100 MG/5ML; MG/5ML
10 SYRUP ORAL EVERY 4 HOURS PRN
COMMUNITY
Start: 2024-11-30 | End: 2024-12-10

## 2024-11-30 RX ORDER — LANOLIN ALCOHOL/MO/W.PET/CERES
400 CREAM (GRAM) TOPICAL 2 TIMES DAILY
Status: DISCONTINUED | OUTPATIENT
Start: 2024-11-30 | End: 2024-11-30 | Stop reason: HOSPADM

## 2024-11-30 RX ADMIN — CLOPIDOGREL BISULFATE 75 MG: 75 TABLET ORAL at 09:11

## 2024-11-30 RX ADMIN — MUPIROCIN: 20 OINTMENT TOPICAL at 09:11

## 2024-11-30 RX ADMIN — Medication 400 MG: at 09:11

## 2024-11-30 RX ADMIN — PIPERACILLIN AND TAZOBACTAM 4.5 G: 4; .5 INJECTION, POWDER, LYOPHILIZED, FOR SOLUTION INTRAVENOUS; PARENTERAL at 02:11

## 2024-11-30 RX ADMIN — ATORVASTATIN CALCIUM 20 MG: 10 TABLET, FILM COATED ORAL at 09:11

## 2024-11-30 RX ADMIN — IPRATROPIUM BROMIDE AND ALBUTEROL SULFATE 3 ML: 2.5; .5 SOLUTION RESPIRATORY (INHALATION) at 12:11

## 2024-11-30 RX ADMIN — LEVOTHYROXINE SODIUM 125 MCG: 125 TABLET ORAL at 05:11

## 2024-11-30 RX ADMIN — BUDESONIDE 0.5 MG: 0.5 INHALANT RESPIRATORY (INHALATION) at 08:11

## 2024-11-30 RX ADMIN — IPRATROPIUM BROMIDE AND ALBUTEROL SULFATE 3 ML: 2.5; .5 SOLUTION RESPIRATORY (INHALATION) at 03:11

## 2024-11-30 RX ADMIN — IPRATROPIUM BROMIDE AND ALBUTEROL SULFATE 3 ML: 2.5; .5 SOLUTION RESPIRATORY (INHALATION) at 08:11

## 2024-11-30 RX ADMIN — LOSARTAN POTASSIUM 50 MG: 50 TABLET, FILM COATED ORAL at 09:11

## 2024-11-30 RX ADMIN — IPRATROPIUM BROMIDE AND ALBUTEROL SULFATE 3 ML: 2.5; .5 SOLUTION RESPIRATORY (INHALATION) at 01:11

## 2024-11-30 RX ADMIN — PIPERACILLIN AND TAZOBACTAM 4.5 G: 4; .5 INJECTION, POWDER, LYOPHILIZED, FOR SOLUTION INTRAVENOUS; PARENTERAL at 09:11

## 2024-11-30 RX ADMIN — CARVEDILOL 6.25 MG: 3.12 TABLET, FILM COATED ORAL at 09:11

## 2024-11-30 RX ADMIN — FUROSEMIDE 40 MG: 10 INJECTION, SOLUTION INTRAMUSCULAR; INTRAVENOUS at 09:11

## 2024-11-30 NOTE — PLAN OF CARE
11/30/24 1350   Final Note   Assessment Type Final Discharge Note   Anticipated Discharge Disposition Home-Health   Hospital Resources/Appts/Education Provided Post-Acute resouces added to AVS   Post-Acute Status   Post-Acute Authorization Home Health   Home Health Status Set-up Complete/Auth obtained   Discharge Delays None known at this time     Pt to d/c home. HH resumption with [facility]; d/c info sent to HH.    Charo Wu LCSW

## 2024-11-30 NOTE — PROGRESS NOTES
[facility] Hospital Medicine Progress Note        Chief Complaint: Inpatient Follow-up for SOB    HPI:   79-year-old female with a history of stage IV non-small-cell lung cancer on chemotherapy, COPD not on home O2, Current everyday smoker, HTN, HLD, hypothyroidism, PAD on Plavix, Anxiety  presents to the ER with worsening dyspnea, cough over the last week.  She denied fever or chills.     She arrived to the ER afebrile but was hypotensive and hypoxic with sats in the mid 80s on room air was placed on supplemental oxygen.  Laboratory work noted a leukocytosis of 14 K and elevated . Resp panel positive for common cold virus, UA consistent with UTI, CTA of the chest showed no pulmonary embolus but noted consolidation in the medial right middle lobe along with masslike consolidation in the right lower lobe c/w infectious or neoplastic process.     Blood cultures were obtained and she was started on broad-spectrum empiric antibiotics Zosyn and Azithromycin and admitted to the  service for further management.         Interval Hx:   Pt reports SOB is better. Still has cough  Denies chest pain   Case was discussed with patient's nurse and  on the floor.    Objective/physical exam:  General: In no acute distress, afebrile  Chest: Faint wheezes appreciated milady  Heart: RRR, +S1, S2, no appreciable murmur  Abdomen: Soft, nontender, BS +  MSK: Warm, no lower extremity edema, no clubbing or cyanosis  Neurologic: Alert and oriented x4     VITAL SIGNS: 24 HRS MIN & MAX LAST   Temp  Min: 96.5 °F (35.8 °C)  Max: 97.7 °F (36.5 °C) 97.6 °F (36.4 °C)   BP  Min: 114/60  Max: 158/65 114/60   Pulse  Min: 54  Max: 71  67   Resp  Min: 15  Max: 20 19   SpO2  Min: 90 %  Max: 100 % 96 %     I have reviewed the following labs:  Recent Labs   Lab 11/28/24  1545 11/29/24  0338   WBC 14.28* 12.60*   RBC 3.43* 3.61*   HGB 9.4* 9.9*   HCT 28.9* 30.7*   MCV 84.3 85.0   MCH 27.4 27.4   MCHC 32.5*  32.2*   RDW 16.1 16.2    302   MPV 8.7 8.6     Recent Labs   Lab 11/28/24  1545 11/29/24  0338   * 134*   K 4.2 5.4*   CL 92* 96*   CO2 26 30   BUN 14.6 13.2   CREATININE 0.83 0.77   CALCIUM 8.9 9.1   ALBUMIN 2.8* 2.9*   ALKPHOS 75 81   ALT 14 12   AST 22 19   BILITOT 0.4 0.4     Microbiology Results (last 7 days)       Procedure Component Value Units Date/Time    Blood culture #1 **CANNOT BE ORDERED STAT** [[id number]]  (Normal) Collected: 11/28/24 1545    Order Status: Completed Specimen: Blood Updated: 11/29/24 1702     Blood Culture No Growth At 24 Hours    Blood culture #2 **CANNOT BE ORDERED STAT** [[id number]]  (Normal) Collected: 11/28/24 1545    Order Status: Completed Specimen: Blood Updated: 11/29/24 1702     Blood Culture No Growth At 24 Hours    Urine culture [[id number]] Collected: 11/29/24 1507    Order Status: Sent Specimen: Urine Updated: 11/29/24 1529    Respiratory Culture [[id number]] Collected: 11/29/24 1114    Order Status: Completed Specimen: Sputum, Expectorated Updated: 11/29/24 1208     GRAM STAIN Quality 2+      Rare Gram positive cocci             See below for Radiology    Assessment/Plan:  Pneumonia due to unspecified organism involving Rt lung, POA  Acute hypoxic respiratory failure due to above   Viral respiratory tract infection, POA  COPD with acute exacerbation   Elevated BNP in the setting of pneumonia and COPD  UTI due to unspecified organism, POA  Leukocytosis due to infection   Normocytic anemia - mild -POA  Hyponatremia , POA- improved   Hyperkalemia - 11/29/24  Stage IV recurrent non-small cell lung cancer on chemotherapy     Hx- CAD/Plavix, HTN, HLD, hypothyroidism, chronic microaspiration     Plan-  Supplemental oxygen to keep SpO2  88 to 90%  Continue Antibiotic   Nebulized bronchodilators and ICS  Follow up blood, sputum  and urine cultures   Home meds are reviewed and resumed as appropriate   Home O2 eval prior to discharge       VTE prophylaxis:  Lovenox    Patient condition:  Fair    Anticipated discharge and Disposition:     Home with family    All diagnosis and differential diagnosis have been reviewed; assessment and plan has been documented; I have personally reviewed the labs and test results that are presently available; I have reviewed the patients medication list; I have reviewed the consulting providers response and recommendations. I have reviewed or attempted to review medical records based upon their availability    All of the patient's questions have been  addressed and answered. Patient's is agreeable to the above stated plan. I will continue to monitor closely and make adjustments to medical management as needed.    Portions of this note dictated using EMR integrated voice recognition software, and may be subject to voice recognition errors not corrected at proofreading. Please contact writer for clarification if needed.   _____________________________________________________________________    Malnutrition Status:  Nutrition consulted. Most recent weight and BMI monitored-     Measurements:  Wt Readings from Last 1 Encounters:   11/28/24 65.4 kg (144 lb 2.9 oz)   Body mass index is 24.75 kg/m².    Patient has been screened and assessed by RD.    Malnutrition Type:  Context:    Level:      Malnutrition Characteristic Summary:       Interventions/Recommendations (treatment strategy):        Scheduled Med:   albuterol-ipratropium  3 mL Nebulization Q4H    azithromycin  500 mg Intravenous Q24H    budesonide  0.5 mg Nebulization Q12H    carvediloL  6.25 mg Oral BID    clopidogreL  75 mg Oral Daily    enoxparin  40 mg Subcutaneous Daily    levothyroxine  125 mcg Oral Before breakfast    [START ON 11/30/2024] mupirocin   Nasal BID    piperacillin-tazobactam (Zosyn) IV (PEDS and ADULTS) (extended infusion is not appropriate)  4.5 g Intravenous Q8H      Continuous Infusions:     PRN Meds:    Current Facility-Administered Medications:      acetaminophen, 650 mg, Oral, Q8H PRN    acetaminophen, 650 mg, Oral, Q8H PRN    melatonin, 6 mg, Oral, Nightly PRN    nicotine, 1 patch, Transdermal, Daily PRN    ondansetron, 4 mg, Intravenous, Q8H PRN    sodium chloride 0.9%, 10 mL, Intravenous, PRN         Allen Barkley MD  Department of Hospital Medicine   [facility]   11/29/2024

## 2024-11-30 NOTE — DISCHARGE SUMMARY
[facility] Hospital Medicine Discharge Summary    Admit Date: 11/28/2024  Discharge Date and Time: 11/30/20241:44 PM  Admitting Physician:  Team  Discharging Physician: Allen Barkley MD.  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 LVEF 60-65%, POA   Acute hypoxic respiratory failure due to above  all, POA- resolved   UTI due to unspecified organism, POA  Leukocytosis due to infection   Normocytic anemia - mild -POA  Hyponatremia , POA- improved   Hyperkalemia - 11/29/24  Stage IV recurrent non-small cell lung cancer on chemotherapy      Hx- CAD/Plavix, HTN, HLD, hypothyroidism, chronic microaspiration     Hospital Course:   79-year-old female with a history of stage IV non-small-cell lung cancer on chemotherapy, COPD not on home O2, Current everyday smoker with no plan to stop at this time, HTN, HLD, hypothyroidism, PAD on Plavix, Anxiety  presents to the ER with c/o worsening dyspnea, cough over the last week.  She denied fever or chills.     She arrived to the ER afebrile but was hypotensive and hypoxic with sats in the mid 80s on room air was placed on supplemental oxygen.  Laboratory work noted a leukocytosis of 14 K and elevated . Resp panel positive for common cold virus, UA consistent with UTI, CTA of the chest showed no pulmonary embolus but noted consolidation in the medial right middle lobe along with masslike consolidation in the right lower lobe c/w infectious or neoplastic process.      Blood cultures were obtained and Pt was started on broad-spectrum antibiotics include Zosyn and Azithromycin and admitted to the  service for further management.  Pt was placed on supplemental oxygen to maintain SpO2 88-90%, continued on aggressive nebulized  bronchodilators, ICS, IV furosemide and Zosyn/ Azithromycin for antimicrobial coverage. Pt was much improved clinically and subjectively. O2 wean down to RA with SpO2 92% at rest and 94% with exercise , therefor did not qualify for home oxygen. SOB nearly resolved and Pt was requesting for discharge. However her leukocyte count remained elevated despite appropriate antibiotic treatment. WBC count was 16.2 K on 11/30/24. Blood cultures x 2 from admission - NG 24h, Resp culture showed growth of normal resp kirill and urine culture with 25-50,000 GNR c/w colonization rather than true infection. Pt was adamant for discharge. Antibiotic transitioned to oral Levaquin and Augmentin. Lasix transitioned to oral 20 mg daily. Pt was otherwise stable for discharge to home to family care. We discussed smoking  cessation, however Pt reported she had no immediate plan.     Assistance with smoking cessation was offered, including:  []  Medications  [x]  Counseling  []  Printed Information on Smoking Cessation  []  Referral to a Smoking Cessation Program    Patient was counseled regarding smoking for >10 minutes.      Pt was seen and examined on the day of discharge  Vitals:  VITAL SIGNS: 24 HRS MIN & MAX LAST   Temp  Min: 97.3 °F (36.3 °C)  Max: 97.6 °F (36.4 °C) 97.6 °F (36.4 °C)   BP  Min: 114/60  Max: 157/58 (!) 120/58   Pulse  Min: 54  Max: 69  66   Resp  Min: 14  Max: 21 15   SpO2  Min: 93 %  Max: 99 % 96 %       Physical Exam:  General: In no acute distress, afebrile  Chest: Faint wheezes appreciated milady  Heart: RRR, +S1, S2, no appreciable murmur  Abdomen: Soft, nontender, BS +  MSK: Warm, no lower extremity edema, no clubbing or cyanosis  Neurologic: Alert and oriented x4     Procedures Performed: No admission procedures for hospital encounter.     Significant Diagnostic Studies: See Full reports for all details    Recent Labs   Lab 11/28/24  1545 11/29/24  0338 11/30/24  0333   WBC 14.28* 12.60* 16.23*   RBC 3.43* 3.61*  3.26*   HGB 9.4* 9.9* 9.0*   HCT 28.9* 30.7* 27.5*   MCV 84.3 85.0 84.4   MCH 27.4 27.4 27.6   MCHC 32.5* 32.2* 32.7*   RDW 16.1 16.2 16.2    302 309   MPV 8.7 8.6 9.1       Recent Labs   Lab 11/28/24  1545 11/29/24  0338 11/30/24  0333   * 134* 133*   K 4.2 5.4* 4.7   CL 92* 96* 95*   CO2 26 30 30   BUN 14.6 13.2 21.0*   CREATININE 0.83 0.77 0.79   CALCIUM 8.9 9.1 9.4   MG  --   --  1.90   ALBUMIN 2.8* 2.9*  --    ALKPHOS 75 81  --    ALT 14 12  --    AST 22 19  --    BILITOT 0.4 0.4  --         Microbiology Results (last 7 days)       Procedure Component Value Units Date/Time    Respiratory Culture [[id number]] Collected: 11/29/24 1114    Order Status: Completed Specimen: Sputum, Expectorated Updated: 11/30/24 0816     Respiratory Culture Normal respiratory kirill     GRAM STAIN Quality 2+      Rare Gram positive cocci    Urine culture [[id number]]  (Abnormal) Collected: 11/29/24 1507    Order Status: Completed Specimen: Urine Updated: 11/30/24 0751     Urine Culture 25,000-50,000 colonies/ml Gram-negative Rods    Blood culture #1 **CANNOT BE ORDERED STAT** [[id number]]  (Normal) Collected: 11/28/24 1545    Order Status: Completed Specimen: Blood Updated: 11/29/24 1702     Blood Culture No Growth At 24 Hours    Blood culture #2 **CANNOT BE ORDERED STAT** [[id number]]  (Normal) Collected: 11/28/24 1545    Order Status: Completed Specimen: Blood Updated: 11/29/24 1702     Blood Culture No Growth At 24 Hours             CTA Chest Non-Coronary (PE Studies)  Narrative: EXAMINATION:  CTA CHEST NON CORONARY (PE STUDIES)    CLINICAL HISTORY:  Shortness of breath    TECHNIQUE:  Axial CT images were obtained through the chest after IV administration of 100 cc Omnipaque 350 contrast.  MIP, coronal and sagittal reconstructions submitted and interpreted.  Total .  Automated exposure control utilized.    COMPARISON:  CT 05/21/2024    FINDINGS:  No filling defects are in the main pulmonary artery or segmental  branches.    Small right pleural fluid is present.  Spiculated masslike consolidation in the right lower lobe measures up to 2.7 cm.  There is consolidation in the medial right middle lobe.  Emphysematous changes are present.  No pneumothorax.  Partially calcified pleural thickening/scarring is in the left lung apex.    Heart is normal size.  Thoracic aorta and coronary arteries are partially calcified.  Central airways are clear.    Enlarged lymph nodes in the chest are indeterminate.    No acute osseous findings.    Visualized portions of the upper abdomen show no acute findings.  Impression: 1. No CT evidence of pulmonary thromboembolism.  2. Spiculated mass like density in the right lung base with focal consolidation in the right middle lobe.  Findings may be infectious or neoplastic.  3. Small right effusion.  4. Findings are compatible with the preliminary report.    Electronically signed by: Nicolas Meeks MD  Date:    11/29/2024  Time:    08:35         Medication List        START taking these medications      amoxicillin-clavulanate 875-125mg 875-125 mg per tablet  Commonly known as: AUGMENTIN  Take 1 tablet by mouth 2 (two) times daily. For antibiotic for 7 days     dextromethorphan-guaiFENesin  mg/5 ml  mg/5 mL liquid  Commonly known as: ROBITUSSIN-DM  Take 10 mLs by mouth every 4 (four) hours as needed (cough).     levoFLOXacin 500 MG tablet  Commonly known as: LEVAQUIN  Take 1 tablet (500 mg total) by mouth once daily. For antibiotic for 7 days     nicotine 14 mg/24 hr  Commonly known as: NICODERM CQ  Place 1 patch onto the skin once daily.            CHANGE how you take these medications      furosemide 20 MG tablet  Commonly known as: LASIX  Take 1 tablet (20 mg total) by mouth once daily.  What changed: See the new instructions.     losartan 50 MG tablet  Commonly known as: COZAAR  Take 1 tablet (50 mg total) by mouth once daily.  Start taking on: December 1, 2024  What changed: See the  new instructions.            CONTINUE taking these medications      albuterol 90 mcg/actuation inhaler  Commonly known as: VENTOLIN HFA  Inhale 2 puffs into the lungs every 6 (six) hours as needed for Wheezing. Rescue     carvediloL 6.25 MG tablet  Commonly known as: COREG     clopidogreL 75 mg tablet  Commonly known as: PLAVIX     levothyroxine 125 MCG tablet  Commonly known as: SYNTHROID     magnesium oxide 400 mg (241.3 mg magnesium) tablet  Commonly known as: MAG-OX  Take 1 tablet (400 mg total) by mouth 2 (two) times daily.     simvastatin 40 MG tablet  Commonly known as: ZOCOR               Where to Get Your Medications        These medications were sent to [id number] [address] AT [facility] & [address] [address]      Hours: 24-hours Phone: [phone]   amoxicillin-clavulanate 875-125mg 875-125 mg per tablet  furosemide 20 MG tablet  levoFLOXacin 500 MG tablet  losartan 50 MG tablet       You can get these medications from any pharmacy    You don't need a prescription for these medications  dextromethorphan-guaiFENesin  mg/5 ml  mg/5 mL liquid  nicotine 14 mg/24 hr          Explained in detail to the patient about the discharge plan, medications, and follow-up visits. Pt understands and agrees with the treatment plan  Discharge Disposition: Home or Self Care   Discharged Condition: stable  Diet-   Dietary Orders (From admission, onward)       Start     Ordered    11/28/24 2019  Diet Adult Regular  (Diet/Nutrition - [facility])  Diet effective now         11/28/24 2019                   Medications Per [place] med rec  Activities as tolerated   Follow-up Information       Graham Nguyen, NATHANAEL. Schedule an appointment as soon as possible for a visit in 3 day(s).    Specialty: Family Medicine  Why: Hospital discharge follow up  Contact information:  [address] [place] [address]  [facility]  Ron [place]AN  [id number]  [phone]                           For further questions contact hospitalist office    Discharge time 33 minutes    For worsening symptoms, chest pain, shortness of breath, increased abdominal pain, high grade fever, stroke or stroke like symptoms, immediately go to the nearest Emergency Room or call 911 as soon as possible.      Allen Arnold M.D, on 11/30/2024. at 1:44 PM.

## 2024-12-01 LAB
BACTERIA SPEC CULT: NORMAL
GRAM STN SPEC: NORMAL
GRAM STN SPEC: NORMAL

## 2024-12-02 ENCOUNTER — HOSPITAL ENCOUNTER (EMERGENCY)
Facility: HOSPITAL | Age: 79
Discharge: HOME | End: 2024-12-02
Attending: EMERGENCY MEDICINE
Payer: MEDICARE

## 2024-12-02 VITALS
WEIGHT: 143 LBS | TEMPERATURE: 98 F | BODY MASS INDEX: 24.41 KG/M2 | SYSTOLIC BLOOD PRESSURE: 147 MMHG | RESPIRATION RATE: 17 BRPM | HEART RATE: 83 BPM | DIASTOLIC BLOOD PRESSURE: 115 MMHG | OXYGEN SATURATION: 97 % | HEIGHT: 64 IN

## 2024-12-02 DIAGNOSIS — R06.02 SHORTNESS OF BREATH: ICD-10-CM

## 2024-12-02 DIAGNOSIS — J44.1 COPD EXACERBATION: Primary | ICD-10-CM

## 2024-12-02 LAB
ALBUMIN SERPL-MCNC: 2.8 G/DL (ref 3.4–4.8)
ALBUMIN/GLOB SERPL: 0.8 RATIO (ref 1.1–2)
ALP SERPL-CCNC: 65 UNIT/L (ref 40–150)
ALT SERPL-CCNC: 24 UNIT/L (ref 0–55)
ANION GAP SERPL CALC-SCNC: 10 MEQ/L
AST SERPL-CCNC: 24 UNIT/L (ref 5–34)
BACTERIA UR CULT: ABNORMAL
BASOPHILS # BLD AUTO: 0.03 X10(3)/MCL
BASOPHILS NFR BLD AUTO: 0.3 %
BILIRUB SERPL-MCNC: 0.3 MG/DL
BNP BLD-MCNC: 386.4 PG/ML
BUN SERPL-MCNC: 19.5 MG/DL (ref 9.8–20.1)
CALCIUM SERPL-MCNC: 9.1 MG/DL (ref 8.4–10.2)
CHLORIDE SERPL-SCNC: 96 MMOL/L (ref 98–107)
CO2 SERPL-SCNC: 30 MMOL/L (ref 23–31)
CREAT SERPL-MCNC: 0.84 MG/DL (ref 0.55–1.02)
CREAT/UREA NIT SERPL: 23
EOSINOPHIL # BLD AUTO: 0.04 X10(3)/MCL (ref 0–0.9)
EOSINOPHIL NFR BLD AUTO: 0.3 %
ERYTHROCYTE [DISTWIDTH] IN BLOOD BY AUTOMATED COUNT: 16.5 % (ref 11.5–17)
GFR SERPLBLD CREATININE-BSD FMLA CKD-EPI: >60 ML/MIN/1.73/M2
GLOBULIN SER-MCNC: 3.6 GM/DL (ref 2.4–3.5)
GLUCOSE SERPL-MCNC: 100 MG/DL (ref 82–115)
HCT VFR BLD AUTO: 33.9 % (ref 37–47)
HGB BLD-MCNC: 10.9 G/DL (ref 12–16)
IMM GRANULOCYTES # BLD AUTO: 0.07 X10(3)/MCL (ref 0–0.04)
IMM GRANULOCYTES NFR BLD AUTO: 0.6 %
LYMPHOCYTES # BLD AUTO: 0.66 X10(3)/MCL (ref 0.6–4.6)
LYMPHOCYTES NFR BLD AUTO: 5.7 %
MCH RBC QN AUTO: 27.5 PG (ref 27–31)
MCHC RBC AUTO-ENTMCNC: 32.2 G/DL (ref 33–36)
MCV RBC AUTO: 85.4 FL (ref 80–94)
MONOCYTES # BLD AUTO: 0.6 X10(3)/MCL (ref 0.1–1.3)
MONOCYTES NFR BLD AUTO: 5.2 %
NEUTROPHILS # BLD AUTO: 10.11 X10(3)/MCL (ref 2.1–9.2)
NEUTROPHILS NFR BLD AUTO: 87.9 %
NRBC BLD AUTO-RTO: 0 %
OHS QRS DURATION: 82 MS
OHS QTC CALCULATION: 431 MS
PLATELET # BLD AUTO: 360 X10(3)/MCL (ref 130–400)
PMV BLD AUTO: 8.3 FL (ref 7.4–10.4)
POTASSIUM SERPL-SCNC: 3.9 MMOL/L (ref 3.5–5.1)
PROT SERPL-MCNC: 6.4 GM/DL (ref 5.8–7.6)
RBC # BLD AUTO: 3.97 X10(6)/MCL (ref 4.2–5.4)
SODIUM SERPL-SCNC: 136 MMOL/L (ref 136–145)
TROPONIN I SERPL-MCNC: 0.02 NG/ML (ref 0–0.04)
WBC # BLD AUTO: 11.51 X10(3)/MCL (ref 4.5–11.5)

## 2024-12-02 PROCEDURE — 99285 EMERGENCY DEPT VISIT HI MDM: CPT | Mod: 25

## 2024-12-02 PROCEDURE — 99900031 HC PATIENT EDUCATION (STAT)

## 2024-12-02 PROCEDURE — 84484 ASSAY OF TROPONIN QUANT: CPT

## 2024-12-02 PROCEDURE — 93005 ELECTROCARDIOGRAM TRACING: CPT

## 2024-12-02 PROCEDURE — 63600175 PHARM REV CODE 636 W HCPCS: Performed by: EMERGENCY MEDICINE

## 2024-12-02 PROCEDURE — 85025 COMPLETE CBC W/AUTO DIFF WBC: CPT

## 2024-12-02 PROCEDURE — 80053 COMPREHEN METABOLIC PANEL: CPT

## 2024-12-02 PROCEDURE — 96374 THER/PROPH/DIAG INJ IV PUSH: CPT

## 2024-12-02 PROCEDURE — 94640 AIRWAY INHALATION TREATMENT: CPT

## 2024-12-02 PROCEDURE — 94760 N-INVAS EAR/PLS OXIMETRY 1: CPT

## 2024-12-02 PROCEDURE — 99900035 HC TECH TIME PER 15 MIN (STAT)

## 2024-12-02 PROCEDURE — 25000242 PHARM REV CODE 250 ALT 637 W/ HCPCS: Performed by: EMERGENCY MEDICINE

## 2024-12-02 PROCEDURE — 83880 ASSAY OF NATRIURETIC PEPTIDE: CPT

## 2024-12-02 RX ORDER — METHYLPREDNISOLONE SOD SUCC 125 MG
125 VIAL (EA) INJECTION
Status: COMPLETED | OUTPATIENT
Start: 2024-12-02 | End: 2024-12-02

## 2024-12-02 RX ORDER — IPRATROPIUM BROMIDE AND ALBUTEROL SULFATE 2.5; .5 MG/3ML; MG/3ML
3 SOLUTION RESPIRATORY (INHALATION)
Status: COMPLETED | OUTPATIENT
Start: 2024-12-02 | End: 2024-12-02

## 2024-12-02 RX ORDER — METHYLPREDNISOLONE SOD SUCC 125 MG
125 VIAL (EA) INJECTION
Status: DISCONTINUED | OUTPATIENT
Start: 2024-12-02 | End: 2024-12-02

## 2024-12-02 RX ADMIN — IPRATROPIUM BROMIDE AND ALBUTEROL SULFATE 3 ML: .5; 3 SOLUTION RESPIRATORY (INHALATION) at 03:12

## 2024-12-02 RX ADMIN — METHYLPREDNISOLONE SODIUM SUCCINATE 125 MG: 125 INJECTION, POWDER, FOR SOLUTION INTRAMUSCULAR; INTRAVENOUS at 02:12

## 2024-12-02 NOTE — ED PROVIDER NOTES
Encounter Date: 12/2/2024    SCRIBE #1 NOTE: I, Tino Cortez, am scribing for, and in the presence of,  Ryanne Heller MD. I have scribed the following portions of the note - Other sections scribed: HPI, ROS, PE.       History     Chief Complaint   Patient presents with    Shortness of Breath     Pt arrives c/o SOB and generalized weakness worse today. + Cough. Dx'd pneumonia 1 week ago, on abx. Denies chest pain, fevers. Hx COPD.     Patient is a 79 y.o. female with a PMHx of lung cancer, HTN, HLD, COPD, anemia, and thyroid disease presenting to the ED with complaints of SOB and generalized weakness that worsened today. Patient reports she was dx with pneumonia a week ago and put on Augmentin and Levaquin. She is also using breathing treatment 4 times a day at home with her latest being this morning and slight improvement in symptoms upon using. She adds that she is also on steroids. Patient endorses a yellow mucous producing cough that has not improved. She denies any chest pain, fever, n/v, and bloody stool. She is not eating due to loss of appetite but is having normal bowel movements and urine production. Patient is in remission for lung cancer.       The history is provided by the patient. No  was used.     Review of patient's allergies indicates:   Allergen Reactions    Lisinopril Swelling     Past Medical History:   Diagnosis Date    Adenocarcinoma of lung     Anemia     Anxiety     Arthritis     COPD (chronic obstructive pulmonary disease)     Depression     HLD (hyperlipidemia)     Hypertension     Lung cancer     Secondary malignant neoplasm of lymph nodes     Secondary malignant neoplasm of right lung     Thyroid disease     lymph nodes malignant     Past Surgical History:   Procedure Laterality Date    BLADDER SURGERY      CHOLECYSTECTOMY      COLONOSCOPY  2018    COLONOSCOPY N/A 9/9/2024    Procedure: SIG;  Surgeon: Saurav Hutson MD;  Location: [facility] ENDOSCOPY;   Service: Gastroenterology;  Laterality: N/A;    ENDOBRONCHIAL ULTRASOUND N/A 11/16/2022    Procedure: ENDOBRONCHIAL ULTRASOUND (EBUS);  Surgeon: Anthony Hutson MD;  [facility] BRONCHOSCOPY LAB;  Service: Pulmonary;  Laterality: N/A;    HYSTERECTOMY      KNEE SURGERY Bilateral     replaced knees    PARTIAL HIP ARTHROPLASTY Bilateral     SIGMOIDOSCOPY, WITH BIOPSY N/A 9/9/2024    Procedure: SIGMOIDOSCOPY, WITH BIOPSY;  Surgeon: Saurav Hutson MD;  Location: [facility] ENDOSCOPY;  Service: Gastroenterology;  Laterality: N/A;     Family History   Problem Relation Name Age of Onset    Lung cancer Mother      Lung cancer Brother       Social History     Tobacco Use    Smoking status: Every Day     Current packs/day: 0.25     Average packs/day: 0.3 packs/day for 65.9 years (16.5 ttl pk-yrs)     Types: Cigarettes     Start date: 1959    Smokeless tobacco: Never    Tobacco comments:     Patient states she is trying to quit smoking.   Substance Use Topics    Alcohol use: Not Currently    Drug use: Never     Review of Systems   Constitutional:  Negative for fever.        Generalized weakness   HENT:  Negative for sore throat.    Eyes:  Negative for visual disturbance.   Respiratory:  Positive for cough and shortness of breath.    Cardiovascular:  Negative for chest pain.   Gastrointestinal:  Negative for abdominal pain, blood in stool, constipation, diarrhea, nausea and vomiting.   Genitourinary:  Negative for dysuria and hematuria.   Skin:  Negative for rash.   Neurological:  Negative for syncope and headaches.   All other systems reviewed and are negative.      Physical Exam     Initial Vitals [12/02/24 1112]   BP Pulse Resp Temp SpO2   (!) 147/115 84 (!) 21 98 °F (36.7 °C) 100 %      MAP       --         Physical Exam    Nursing note and vitals reviewed.  Constitutional: She appears well-developed and well-nourished. No distress.   HENT:   Head: Normocephalic and atraumatic. Mouth/Throat: Oropharynx is clear and  moist.   Eyes: Conjunctivae and EOM are normal. Pupils are equal, round, and reactive to light.   Neck: Neck supple.   Cardiovascular:  Normal rate and regular rhythm.           Pulmonary/Chest:   Expiratory wheezing with occasional inspiratory wheezing, no labored breathing   Abdominal: Abdomen is soft. Bowel sounds are normal. There is no abdominal tenderness.   Musculoskeletal:      Cervical back: Neck supple.      Lumbar back: Normal range of motion.      Right lower leg: No edema.      Left lower leg: No edema.     Neurological: She is alert and oriented to person, place, and time.   Skin: Skin is warm, dry and intact.         ED Course   Procedures  Labs Reviewed   COMPREHENSIVE METABOLIC PANEL - Abnormal       Result Value    Sodium 136      Potassium 3.9      Chloride 96 (*)     CO2 30      Glucose 100      Blood Urea Nitrogen 19.5      Creatinine 0.84      Calcium 9.1      Protein Total 6.4      Albumin 2.8 (*)     Globulin 3.6 (*)     Albumin/Globulin Ratio 0.8 (*)     Bilirubin Total 0.3      ALP 65      ALT 24      AST 24      eGFR >60      Anion Gap 10.0      BUN/Creatinine Ratio 23     B-TYPE NATRIURETIC PEPTIDE - Abnormal    Natriuretic Peptide 386.4 (*)    CBC WITH DIFFERENTIAL - Abnormal    WBC 11.51 (*)     RBC 3.97 (*)     Hgb 10.9 (*)     Hct 33.9 (*)     MCV 85.4      MCH 27.5      MCHC 32.2 (*)     RDW 16.5      Platelet 360      MPV 8.3      Neut % 87.9      Lymph % 5.7      Mono % 5.2      Eos % 0.3      Basophil % 0.3      Lymph # 0.66      Neut # 10.11 (*)     Mono # 0.60      Eos # 0.04      Baso # 0.03      IG# 0.07 (*)     IG% 0.6      NRBC% 0.0     TROPONIN I - Normal    Troponin-I 0.017     CBC W/ AUTO DIFFERENTIAL    Narrative:     The following orders were created for panel order CBC Auto Differential.  Procedure                               Abnormality         Status                     ---------                               -----------         ------                     CBC with  Differential[[id number]]       Abnormal            Final result                 Please view results for these tests on the individual orders.        ECG Results              EKG 12-lead (Final result)        Collection Time Result Time QRS Duration OHS QTC Calculation    12/02/24 11:13:08 12/02/24 12:36:25 82 431                     Final result by Interface, Lab In [facility] (12/02/24 12:36:36)                   Narrative:    Test Reason : R06.02,    Vent. Rate :  98 BPM     Atrial Rate :  98 BPM     P-R Int : 132 ms          QRS Dur :  82 ms      QT Int : 338 ms       P-R-T Axes :  66  38  29 degrees    QTcB Int : 431 ms    Normal sinus rhythm  Minimal voltage criteria for LVH, may be normal variant ( R in aVL )  Anterior infarct ,age undetermined  Abnormal ECG  When compared with ECG of 28-Nov-2024 15:16,  Anterior infarct is now Present  Confirmed by Melvi Perez ([id number]) on 12/2/2024 12:36:23 PM    Referred By:            Confirmed By: Melvi Perez                                  Imaging Results              X-Ray Chest 1 View (Final result)  Result time 12/02/24 11:29:23      Final result by Inocente Landry MD (12/02/24 11:29:23)                   Impression:      No acute cardiopulmonary process.      Electronically signed by: Inocente Landry  Date:    12/02/2024  Time:    11:29               Narrative:    EXAMINATION:  XR CHEST 1 VIEW    CLINICAL HISTORY:  shortness of breath;    TECHNIQUE:  Single view of the chest    COMPARISON:  11/28/2024    FINDINGS:  No focal opacification, pleural effusion, or pneumothorax.    The cardiomediastinal silhouette is within normal limits.  Right-sided MediPort remains.    No acute osseous abnormality.                                       Medications   methylPREDNISolone sodium succinate injection 125 mg (125 mg Intravenous Given 12/2/24 1416)   albuterol-ipratropium 2.5 mg-0.5 mg/3 mL nebulizer solution 3 mL (3 mLs Nebulization Given 12/2/24 7422)     Medical  Decision Making  Differential diagnosis includes but is not limited to pneumonia and others.    Amount and/or Complexity of Data Reviewed  Labs: ordered.  Radiology: ordered.  ECG/medicine tests: ordered.            Scribe Attestation:   Scribe #1: I performed the above scribed service and the documentation accurately describes the services I performed. I attest to the accuracy of the note.    Attending Attestation:           Physician Attestation for Scribe:  Physician Attestation Statement for Scribe #1: I, Ryanne Heller MD, reviewed documentation, as scribed by Tino Cortez in my presence, and it is both accurate and complete.             ED Course as of 12/02/24 1834   Mon Dec 02, 2024   1823 Pt reevaluated, bs clear, o2 sats 97 RA, feels better, has abx, nebs and steroids at home, no c/o at this time pt ok with discharge plan, given RTED precautions with her and  in room [NL]      ED Course User Index  [NL] Coleman Arnold MD                           Clinical Impression:  Final diagnoses:  [R06.02] Shortness of breath  [J44.1] COPD exacerbation (Primary)          ED Disposition Condition    Discharge Stable          ED Prescriptions    None       Follow-up Information       Follow up With Specialties Details Why Contact Info    Graham Nguyen, NP Family Medicine In 2 days  [address] [facility] [address]  [phone]               Coleman Arnold MD  12/02/24 1834

## 2024-12-03 ENCOUNTER — PATIENT OUTREACH (OUTPATIENT)
Dept: ADMINISTRATIVE | Facility: CLINIC | Age: 79
End: 2024-12-03
Payer: MEDICARE

## 2024-12-03 LAB
BACTERIA BLD CULT: NORMAL
BACTERIA BLD CULT: NORMAL

## 2024-12-16 ENCOUNTER — OFFICE VISIT (OUTPATIENT)
Dept: HEMATOLOGY/ONCOLOGY | Facility: CLINIC | Age: 79
End: 2024-12-16
Payer: MEDICARE

## 2024-12-16 ENCOUNTER — LAB VISIT (OUTPATIENT)
Dept: LAB | Facility: HOSPITAL | Age: 79
End: 2024-12-16
Attending: INTERNAL MEDICINE
Payer: MEDICARE

## 2024-12-16 VITALS
HEIGHT: 64 IN | TEMPERATURE: 97 F | SYSTOLIC BLOOD PRESSURE: 109 MMHG | RESPIRATION RATE: 18 BRPM | OXYGEN SATURATION: 99 % | HEART RATE: 87 BPM | WEIGHT: 132.25 LBS | DIASTOLIC BLOOD PRESSURE: 64 MMHG | BODY MASS INDEX: 22.58 KG/M2

## 2024-12-16 DIAGNOSIS — C34.90 ADENOCARCINOMA OF LUNG, UNSPECIFIED LATERALITY: ICD-10-CM

## 2024-12-16 DIAGNOSIS — C77.9 MALIGNANT NEOPLASM METASTATIC TO LYMPH NODES, UNSPECIFIED LYMPH NODE REGION: ICD-10-CM

## 2024-12-16 DIAGNOSIS — E03.2 HYPOTHYROIDISM DUE TO DRUGS: ICD-10-CM

## 2024-12-16 DIAGNOSIS — C79.51 MALIGNANT NEOPLASM METASTATIC TO BONE: ICD-10-CM

## 2024-12-16 DIAGNOSIS — C34.90 ADENOCARCINOMA OF LUNG, STAGE 4, UNSPECIFIED LATERALITY: Primary | ICD-10-CM

## 2024-12-16 LAB
ALBUMIN SERPL-MCNC: 3.2 G/DL (ref 3.4–4.8)
ALBUMIN/GLOB SERPL: 1.1 RATIO (ref 1.1–2)
ALP SERPL-CCNC: 56 UNIT/L (ref 40–150)
ALT SERPL-CCNC: 16 UNIT/L (ref 0–55)
ANION GAP SERPL CALC-SCNC: 8 MEQ/L
AST SERPL-CCNC: 23 UNIT/L (ref 5–34)
BASOPHILS # BLD AUTO: 0.02 X10(3)/MCL
BASOPHILS NFR BLD AUTO: 0.1 %
BILIRUB SERPL-MCNC: 0.5 MG/DL
BUN SERPL-MCNC: 22 MG/DL (ref 9.8–20.1)
CALCIUM SERPL-MCNC: 9 MG/DL (ref 8.4–10.2)
CHLORIDE SERPL-SCNC: 100 MMOL/L (ref 98–107)
CO2 SERPL-SCNC: 30 MMOL/L (ref 23–31)
CORTIS SERPL-SCNC: 8.7 UG/DL
CREAT SERPL-MCNC: 0.82 MG/DL (ref 0.55–1.02)
CREAT/UREA NIT SERPL: 27
EOSINOPHIL # BLD AUTO: 0.01 X10(3)/MCL (ref 0–0.9)
EOSINOPHIL NFR BLD AUTO: 0.1 %
ERYTHROCYTE [DISTWIDTH] IN BLOOD BY AUTOMATED COUNT: 17 % (ref 11.5–17)
GFR SERPLBLD CREATININE-BSD FMLA CKD-EPI: >60 ML/MIN/1.73/M2
GLOBULIN SER-MCNC: 3 GM/DL (ref 2.4–3.5)
GLUCOSE SERPL-MCNC: 103 MG/DL (ref 82–115)
HCT VFR BLD AUTO: 33.2 % (ref 37–47)
HGB BLD-MCNC: 10.8 G/DL (ref 12–16)
IMM GRANULOCYTES # BLD AUTO: 0.03 X10(3)/MCL (ref 0–0.04)
IMM GRANULOCYTES NFR BLD AUTO: 0.2 %
LYMPHOCYTES # BLD AUTO: 0.98 X10(3)/MCL (ref 0.6–4.6)
LYMPHOCYTES NFR BLD AUTO: 6.6 %
MCH RBC QN AUTO: 28 PG (ref 27–31)
MCHC RBC AUTO-ENTMCNC: 32.5 G/DL (ref 33–36)
MCV RBC AUTO: 86 FL (ref 80–94)
MONOCYTES # BLD AUTO: 0.26 X10(3)/MCL (ref 0.1–1.3)
MONOCYTES NFR BLD AUTO: 1.8 %
NEUTROPHILS # BLD AUTO: 13.49 X10(3)/MCL (ref 2.1–9.2)
NEUTROPHILS NFR BLD AUTO: 91.2 %
PLATELET # BLD AUTO: 312 X10(3)/MCL (ref 130–400)
PMV BLD AUTO: 8.7 FL (ref 7.4–10.4)
POTASSIUM SERPL-SCNC: 4.2 MMOL/L (ref 3.5–5.1)
PROT SERPL-MCNC: 6.2 GM/DL (ref 5.8–7.6)
RBC # BLD AUTO: 3.86 X10(6)/MCL (ref 4.2–5.4)
SODIUM SERPL-SCNC: 138 MMOL/L (ref 136–145)
TSH SERPL-ACNC: 6.37 UIU/ML (ref 0.35–4.94)
WBC # BLD AUTO: 14.79 X10(3)/MCL (ref 4.5–11.5)

## 2024-12-16 PROCEDURE — 82533 TOTAL CORTISOL: CPT

## 2024-12-16 PROCEDURE — 85025 COMPLETE CBC W/AUTO DIFF WBC: CPT

## 2024-12-16 PROCEDURE — 36415 COLL VENOUS BLD VENIPUNCTURE: CPT

## 2024-12-16 PROCEDURE — 99999 PR PBB SHADOW E&M-EST. PATIENT-LVL III: CPT | Mod: PBBFAC,,, | Performed by: NURSE PRACTITIONER

## 2024-12-16 PROCEDURE — 84443 ASSAY THYROID STIM HORMONE: CPT

## 2024-12-16 PROCEDURE — 80053 COMPREHEN METABOLIC PANEL: CPT

## 2024-12-16 PROCEDURE — 99215 OFFICE O/P EST HI 40 MIN: CPT | Mod: S$PBB,,, | Performed by: NURSE PRACTITIONER

## 2024-12-16 PROCEDURE — 99213 OFFICE O/P EST LOW 20 MIN: CPT | Mod: PBBFAC | Performed by: NURSE PRACTITIONER

## 2024-12-16 NOTE — PROGRESS NOTES
Subjective:       Patient ID: Pratibha Patel is a 79 y.o. female.    Chief Complaint: Follow-up (Pt has no concerns today)      PCP: Graham Nguyen NP  Cardiologist: Dr. Danny Sánchez  Referring Physician: Dr. Anthony Hutson  Endocrine: Dr. Werner  Radiation oncology: Dr. Boss in the past, now Dr. Mortensen.  ENT: Dr. 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 ? metastatic findings could not be biopsied. TTF negative/CK 7 +.    Diagnosed June 2014 EGFR mutation negative/ALK gene rearrangement negative. 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 Patient has reportedly been referred to ENT for resection and lymph node dissection (per patient report).   Subsequent open biopsy done by Dr. Rosales on 7/29/2019 confirmed the same.  Patient was unable to undergo definitive surgical resection for oligo metastatic disease.  Hoarseness and swallowing difficulties secondary to #2  Biopsy of the left lung lesion showed recurrent non-small cell lung cancer, Caris was sent and showed positivity for PD-L1, 2%.  No other targetable or actionable mutations present.    Current Treatment:   Combination chemo immunotherapy with carboplatin, pemetrexed, nivolumab, ipilimumab based off of checkmate 9LA. Cycle 1 day 1 given 12/20/2022.     Treatment History:  RT with weekly Carbo/Taxol started 7/2/14--Completed August 2014  Single agent 'Maintenance' Avastin q 3 weeks.  Started 4/9/15--last given September 21,  2016  Radiation + weekly Carbo/Taxol at [facility] ----9/11/19-10/22/19  Patient underwent radiation from 12/17/2020 through 12/21/2020.  Radiation to the left and right lung per Dr. Robert Mortensen.    HPI:   Please see Oncology history in the diagnosis of adenocarcinoma of the lung, stage IV on the problem list for full detail.  Patient originally seen in 2014 with unintentional weight loss, shortness of breath, nonproductive cough.  Patient had imaging findings suggestive of lung cancer, bronchoscopy done in June of 2014 revealed moderately differentiated adenocarcinoma of the lung.  She originally underwent chemo radiation from July 2014 through August 2014.  She was placed on maintenance Avastin from April 2015 through September 2016. Patient then underwent a thyroid biopsy in June of 2019 that showed metastatic carcinoma favored to be adenocarcinoma lung primary.  She underwent surgical resection in July 2019, however full surgical dissection was not able to be done and the patient underwent open biopsy of right thyroid gland.  Pathology revealed poorly differentiated non-small cell carcinoma consistent with metastatic lung cancer.  NGS panel done at that time unremarkable.  She started chemoradiation with carbo Taxol in September 2019, completed in October 2019. Patient was doing well, imaging in October 2020 revealed an abnormality in the left lung, CT-guided lung biopsy on 11/03/2020 showed recurrent non-small cell lung cancer, Caris revealed PDL1 positivity.  She underwent SBRT from 12/17/2020 through 12/21/2020.  Patient then placed on surveillance imaging, most recently done on 07/05/2022 showing worsening disease in the right lung with a previous lesion that measured 0.6 cm and had an SUV of 1.0 now measuring 1.5 cm and an SUV of 8.4.  This was treated with SBRT in August of 2022.  PET/CT scan done on 11/8/2022 showed mild increased radiotracer activity within the left apical consolidation, SUV 5.5.  There was a  newly hypermetabolic subcentimeter subcarinal lymph node.  A 1 cm right lower lobe nodule was smaller, he right upper lobe nodule was slightly larger measuring 6 mm.  No sites of FDG avid metastatic disease in the neck, abdomen, or pelvis. Bronchoscopy done on 11/16/2022, pathology revealed malignant cells consistent with adenocarcinoma.  Planning on chemo immunotherapy with carboplatin, pemetrexed, nivolumab, ipilimumab based off of checkmate 9LA.  PET/CT on 07/25/2023 showed infectious or inflammatory changes in the right lower lobe with no other findings to suggest new or worsening disease.    PET/CT scan done on 10/12/2023 showed mixed response to therapy with decrease right hilar and left upper lobe consolidation uptake.  Subcarinal lymph node demonstrates significant increased uptake compared to the prior study.  Stable subcentimeter right upper lobe nodule demonstrates slight increased uptake compared to prior study    PET/CT scan done on 01/18/2024 showed mixed response to therapy with decrease in size of the right upper lobe nodule and maximum SUV of subcarinal lymph node.  There was right hilar uptake that was increased, however there was also patchy airspace opacities that could be infectious or inflammatory.    CT of the chest on 05/21/2024 showed overall stable disease with tiny centrilobular nodules throughout both lungs, similar to prior CTA.  Stable 0.9 cm nodule in the apical right upper lobe.    PET/CT scan done on 07/25/2024 showed unchanged mild FDG avid nodule in the posterior right lung apex, unchanged mild FDG avid left apical consolidative opacity.  There was a new paramediastinal consolidative opacity in the anterior right upper lobe and left upper lobe with mild FDG avidity that was symmetric and suggestive of a radiation port, malignancy felt less likely given symmetry.  There was indeterminate patchy consolidative opacity in the subpleural right lower lobe with mild FDG avidity slightly  increased in prominence from prior PET/CT.  There was similar mediastinal and hilar lymph nodes.    CT scan of the abdomen and pelvis done on 09/06/2024 showed fluid distended bowel in the level of the stomach through the rectum without transition point which may be related to enterocolitis or ileus.  There was infectious versus inflammatory bronchiolitis tree-in-bud nodularity in the lung bases.  Patient underwent flex sigmoidoscopy on 09/09/2024, there were some diverticulosis with no major abnormalities, random biopsies obtained.    Most recent PET/CT scan done on 10/28/2024 showed essentially stable disease.      Interval History:   Patient presents to clinic for scheduled treatment visit.  She resumed immunotherapy and has not had a return of diarrhea.  She did have a ER visit due to a COPD exacerbation.  She now has home oxygen to use as needed and she is feeling better.  She denies complaints today.    Past Medical History:   Diagnosis Date    Adenocarcinoma of lung     Anemia     Anxiety     Arthritis     COPD (chronic obstructive pulmonary disease)     Depression     HLD (hyperlipidemia)     Hypertension     Lung cancer     Secondary malignant neoplasm of lymph nodes     Secondary malignant neoplasm of right lung     Thyroid disease     lymph nodes malignant      Past Surgical History:   Procedure Laterality Date    BLADDER SURGERY      CHOLECYSTECTOMY      COLONOSCOPY  2018    COLONOSCOPY N/A 9/9/2024    Procedure: SIG;  Surgeon: Saurav Hutson MD;  Location: [facility] ENDOSCOPY;  Service: Gastroenterology;  Laterality: N/A;    ENDOBRONCHIAL ULTRASOUND N/A 11/16/2022    Procedure: ENDOBRONCHIAL ULTRASOUND (EBUS);  Surgeon: Anthony Hutson MD;  Location: [facility] BRONCHOSCOPY LAB;  Service: Pulmonary;  Laterality: N/A;    HYSTERECTOMY      KNEE SURGERY Bilateral     replaced knees    PARTIAL HIP ARTHROPLASTY Bilateral     SIGMOIDOSCOPY, WITH BIOPSY N/A 9/9/2024    Procedure: SIGMOIDOSCOPY, WITH BIOPSY;   Surgeon: Saurav Hutson MD;  Location: [facility] ENDOSCOPY;  Service: Gastroenterology;  Laterality: N/A;     Social History     Socioeconomic History    Marital status:    Tobacco Use    Smoking status: Every Day     Current packs/day: 0.25     Average packs/day: 0.3 packs/day for 66.0 years (16.5 ttl pk-yrs)     Types: Cigarettes     Start date: 1959    Smokeless tobacco: Never    Tobacco comments:     Patient states she is trying to quit smoking.   Substance and Sexual Activity    Alcohol use: Not Currently    Drug use: Never     Social Drivers of Health     Financial Resource Strain: Low Risk  (11/28/2024)    Overall Financial Resource Strain (CARDIA)     Difficulty of Paying Living Expenses: Not hard at all   Food Insecurity: No Food Insecurity (11/28/2024)    Hunger Vital Sign     Worried About Running Out of Food in the Last Year: Never true     Ran Out of Food in the Last Year: Never true   Transportation Needs: No Transportation Needs (11/28/2024)    TRANSPORTATION NEEDS     Transportation : No   Physical Activity: Sufficiently Active (9/9/2024)    Exercise Vital Sign     Days of Exercise per Week: 5 days     Minutes of Exercise per Session: 30 min   Stress: Stress Concern Present (11/28/2024)    [employer] - Occupational Stress Questionnaire     Feeling of Stress : To some extent   Housing Stability: Low Risk  (11/28/2024)    Housing Stability Vital Sign     Unable to Pay for Housing in the Last Year: No     Homeless in the Last Year: No      Family History   Problem Relation Name Age of Onset    Lung cancer Mother      Lung cancer Brother        Review of patient's allergies indicates:   Allergen Reactions    Lisinopril Swelling      Review of Systems   Constitutional:  Negative for chills, diaphoresis, fatigue, fever and unexpected weight change.   HENT:  Negative for nasal congestion, mouth sores, sinus pressure/congestion and sore throat.    Eyes:  Negative  for pain and visual disturbance.   Respiratory:  Negative for cough, chest tightness and shortness of breath.    Cardiovascular:  Negative for chest pain, palpitations and leg swelling.   Gastrointestinal:  Negative for abdominal distention, abdominal pain, blood in stool, constipation and diarrhea.   Genitourinary:  Negative for dysuria, frequency and hematuria.   Musculoskeletal:  Negative for arthralgias and back pain.   Integumentary:  Negative for rash.   Neurological:  Negative for dizziness, weakness, numbness and headaches.   Hematological:  Negative for adenopathy.   Psychiatric/Behavioral:  Negative for confusion.          Objective:      Physical Exam  Vitals reviewed.   Constitutional:       General: She is not in acute distress.     Appearance: Normal appearance.   HENT:      Head: Normocephalic and atraumatic.      Nose: Nose normal.      Mouth/Throat:      Mouth: Mucous membranes are moist.   Eyes:      Extraocular Movements: Extraocular movements intact.      Conjunctiva/sclera: Conjunctivae normal.   Neck:      Comments: Radiation changes to the left supraclavicular area  Cardiovascular:      Rate and Rhythm: Normal rate and regular rhythm.      Pulses: Normal pulses.      Heart sounds: Normal heart sounds.   Pulmonary:      Effort: Pulmonary effort is normal.      Breath sounds: Examination of the right-lower field reveals decreased breath sounds. Examination of the left-lower field reveals decreased breath sounds. Decreased breath sounds present.   Musculoskeletal:         General: No swelling. Normal range of motion.      Cervical back: Normal range of motion and neck supple.      Right lower leg: No edema.      Left lower leg: No edema.   Skin:     General: Skin is warm and dry.   Neurological:      General: No focal deficit present.      Mental Status: She is alert and oriented to person, place, and time. Mental status is at baseline.   Psychiatric:         Mood and Affect: Mood normal.          Behavior: Behavior normal.         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 ? metastatic findings could not be biopsied.    TTF negative/CK 7 +.    Diagnosed June 2014.  EGFR mutation negative/ALK gene rearrangement negative.  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 Patient has reportedly been referred to ENT for resection and lymph node dissection (per patient report). Subsequent open biopsy done by Dr. Rosales on 7/29/2019 confirmed the same.  Patient was unable to undergo definitive surgical resection for oligo metastatic disease.        Plan:       Patient's biopsy did return as recurrent non-small cell lung cancer in the left lung.  I feel that the right lung will be similar pathology.  She completed radiation with Dr. Mortensen on 12/24/2020.     Caris did reveal that PD-L1 was 2% positive suggesting benefit from pembrolizumab or nivolumab/ipilimumab.      PET/CT scan does show a new hypermetabolic subcentimeter subcarinal lymph node and increase in a left apical consolidation.  The left apical consolidation is close to the aorta and I do not think would be amenable to biopsy, however this subcarinal lymph node may be amenable to bronchoscopy.      Bronchoscopy done on 11/16/2022 showed metastatic adenocarcinoma.  She is no longer a candidate for full dose SBRT.  We started with carboplatin, pemetrexed, nivolumab, and ipilimumab based on the CHECKMATE 9LA study.  Cycle 1 day 1 given 12/20/2022.    Patient did get admitted for diarrhea.  This  subsided with steroids.  Immune related diarrhea secondary to immunotherapy is a possibility.      Most recent PET/CT scan done on 10/28/2024 showed essentially stable disease.    We had a discussion about immunotherapy in the possibility that led to her diarrhea.  The patient voiced understanding.  We discussed rechallenge with immunotherapy.  She stated that she would like to try to get back on treatment.  Re-started treatment on 11/08/2024.     She knows to call and let me know if she has more than 1 diarrhea bowel movement in a 24 hour period.  If she gets severe diarrhea again from immunotherapy, we would likely have to stop indefinitely.    Proceed with treatment on Friday as scheduled    Continue levothyroxine to 88mcg daily.      RTC in 3 weeks with labs    GADIEL Martinez

## 2024-12-20 ENCOUNTER — INFUSION (OUTPATIENT)
Dept: INFUSION THERAPY | Facility: HOSPITAL | Age: 79
End: 2024-12-20
Attending: NURSE PRACTITIONER
Payer: MEDICARE

## 2024-12-20 VITALS
HEART RATE: 63 BPM | DIASTOLIC BLOOD PRESSURE: 66 MMHG | RESPIRATION RATE: 18 BRPM | WEIGHT: 132.25 LBS | SYSTOLIC BLOOD PRESSURE: 151 MMHG | TEMPERATURE: 98 F | HEIGHT: 64 IN | OXYGEN SATURATION: 96 % | BODY MASS INDEX: 22.58 KG/M2

## 2024-12-20 DIAGNOSIS — C34.90 ADENOCARCINOMA OF LUNG, STAGE 4, UNSPECIFIED LATERALITY: Primary | ICD-10-CM

## 2024-12-20 PROCEDURE — 96413 CHEMO IV INFUSION 1 HR: CPT

## 2024-12-20 PROCEDURE — A4216 STERILE WATER/SALINE, 10 ML: HCPCS | Performed by: INTERNAL MEDICINE

## 2024-12-20 PROCEDURE — 25000003 PHARM REV CODE 250: Performed by: INTERNAL MEDICINE

## 2024-12-20 PROCEDURE — 63600175 PHARM REV CODE 636 W HCPCS: Mod: JZ,JG | Performed by: INTERNAL MEDICINE

## 2024-12-20 RX ORDER — EPINEPHRINE 0.3 MG/.3ML
0.3 INJECTION SUBCUTANEOUS ONCE AS NEEDED
Status: DISCONTINUED | OUTPATIENT
Start: 2024-12-20 | End: 2024-12-20 | Stop reason: HOSPADM

## 2024-12-20 RX ORDER — HEPARIN 100 UNIT/ML
500 SYRINGE INTRAVENOUS
Status: DISCONTINUED | OUTPATIENT
Start: 2024-12-20 | End: 2024-12-20 | Stop reason: HOSPADM

## 2024-12-20 RX ORDER — DIPHENHYDRAMINE HYDROCHLORIDE 50 MG/ML
50 INJECTION INTRAMUSCULAR; INTRAVENOUS ONCE AS NEEDED
Status: DISCONTINUED | OUTPATIENT
Start: 2024-12-20 | End: 2024-12-20 | Stop reason: HOSPADM

## 2024-12-20 RX ORDER — SODIUM CHLORIDE 0.9 % (FLUSH) 0.9 %
10 SYRINGE (ML) INJECTION
Status: DISCONTINUED | OUTPATIENT
Start: 2024-12-20 | End: 2024-12-20 | Stop reason: HOSPADM

## 2024-12-20 RX ADMIN — HEPARIN 500 UNITS: 100 SYRINGE at 12:12

## 2024-12-20 RX ADMIN — SODIUM CHLORIDE 360 MG: 9 INJECTION, SOLUTION INTRAVENOUS at 11:12

## 2024-12-20 RX ADMIN — SODIUM CHLORIDE: 9 INJECTION, SOLUTION INTRAVENOUS at 11:12

## 2024-12-20 RX ADMIN — SODIUM CHLORIDE, PRESERVATIVE FREE 10 ML: 5 INJECTION INTRAVENOUS at 12:12

## 2024-12-20 NOTE — PLAN OF CARE
Plan of care reviewed with patient; patient in agreement. [id number] Opdivo completed. Appts reviewed and printed for patient.

## 2025-01-07 ENCOUNTER — TELEPHONE (OUTPATIENT)
Dept: HEMATOLOGY/ONCOLOGY | Facility: CLINIC | Age: 80
End: 2025-01-07
Payer: MEDICARE

## 2025-01-07 NOTE — TELEPHONE ENCOUNTER
Graham Nguyen NP @ [facility] asking to s/w one of you regarding this patient. She can be reached @ [phone].

## 2025-01-08 ENCOUNTER — HOSPITAL ENCOUNTER (INPATIENT)
Facility: HOSPITAL | Age: 80
LOS: 3 days | Discharge: HOME HEALTH | DRG: 191 | End: 2025-01-11
Attending: EMERGENCY MEDICINE | Admitting: STUDENT IN AN ORGANIZED HEALTH CARE EDUCATION/TRAINING PROGRAM
Payer: MEDICARE

## 2025-01-08 DIAGNOSIS — R06.02 SHORTNESS OF BREATH: ICD-10-CM

## 2025-01-08 DIAGNOSIS — R07.9 CHEST PAIN: ICD-10-CM

## 2025-01-08 DIAGNOSIS — F17.200 TOBACCO DEPENDENCY: Primary | ICD-10-CM

## 2025-01-08 DIAGNOSIS — J44.1 COPD EXACERBATION: ICD-10-CM

## 2025-01-08 LAB
ALBUMIN SERPL-MCNC: 2.9 G/DL (ref 3.4–4.8)
ALBUMIN/GLOB SERPL: 0.8 RATIO (ref 1.1–2)
ALP SERPL-CCNC: 67 UNIT/L (ref 40–150)
ALT SERPL-CCNC: 14 UNIT/L (ref 0–55)
ANION GAP SERPL CALC-SCNC: 6 MEQ/L
AST SERPL-CCNC: 17 UNIT/L (ref 5–34)
BASOPHILS # BLD AUTO: 0.02 X10(3)/MCL
BASOPHILS NFR BLD AUTO: 0.1 %
BILIRUB SERPL-MCNC: 0.5 MG/DL
BNP BLD-MCNC: 188.1 PG/ML
BUN SERPL-MCNC: 10.2 MG/DL (ref 9.8–20.1)
CALCIUM SERPL-MCNC: 9.2 MG/DL (ref 8.4–10.2)
CHLORIDE SERPL-SCNC: 92 MMOL/L (ref 98–107)
CO2 SERPL-SCNC: 30 MMOL/L (ref 23–31)
CREAT SERPL-MCNC: 0.75 MG/DL (ref 0.55–1.02)
CREAT/UREA NIT SERPL: 14
EOSINOPHIL # BLD AUTO: 0.03 X10(3)/MCL (ref 0–0.9)
EOSINOPHIL NFR BLD AUTO: 0.2 %
ERYTHROCYTE [DISTWIDTH] IN BLOOD BY AUTOMATED COUNT: 17 % (ref 11.5–17)
FLUAV AG UPPER RESP QL IA.RAPID: NOT DETECTED
FLUBV AG UPPER RESP QL IA.RAPID: NOT DETECTED
GFR SERPLBLD CREATININE-BSD FMLA CKD-EPI: >60 ML/MIN/1.73/M2
GLOBULIN SER-MCNC: 3.6 GM/DL (ref 2.4–3.5)
GLUCOSE SERPL-MCNC: 82 MG/DL (ref 82–115)
HCT VFR BLD AUTO: 36.3 % (ref 37–47)
HGB BLD-MCNC: 11.9 G/DL (ref 12–16)
IMM GRANULOCYTES # BLD AUTO: 0.1 X10(3)/MCL (ref 0–0.04)
IMM GRANULOCYTES NFR BLD AUTO: 0.6 %
LYMPHOCYTES # BLD AUTO: 1.39 X10(3)/MCL (ref 0.6–4.6)
LYMPHOCYTES NFR BLD AUTO: 7.9 %
MCH RBC QN AUTO: 26.6 PG (ref 27–31)
MCHC RBC AUTO-ENTMCNC: 32.8 G/DL (ref 33–36)
MCV RBC AUTO: 81.2 FL (ref 80–94)
MONOCYTES # BLD AUTO: 0.93 X10(3)/MCL (ref 0.1–1.3)
MONOCYTES NFR BLD AUTO: 5.3 %
NEUTROPHILS # BLD AUTO: 15.19 X10(3)/MCL (ref 2.1–9.2)
NEUTROPHILS NFR BLD AUTO: 85.9 %
NRBC BLD AUTO-RTO: 0 %
OHS QRS DURATION: 88 MS
OHS QTC CALCULATION: 406 MS
PLATELET # BLD AUTO: 390 X10(3)/MCL (ref 130–400)
PMV BLD AUTO: 8.6 FL (ref 7.4–10.4)
POTASSIUM SERPL-SCNC: 4 MMOL/L (ref 3.5–5.1)
PROT SERPL-MCNC: 6.5 GM/DL (ref 5.8–7.6)
RBC # BLD AUTO: 4.47 X10(6)/MCL (ref 4.2–5.4)
SARS-COV-2 RNA RESP QL NAA+PROBE: NOT DETECTED
SODIUM SERPL-SCNC: 128 MMOL/L (ref 136–145)
TROPONIN I SERPL-MCNC: <0.01 NG/ML (ref 0–0.04)
WBC # BLD AUTO: 17.66 X10(3)/MCL (ref 4.5–11.5)

## 2025-01-08 PROCEDURE — 84484 ASSAY OF TROPONIN QUANT: CPT

## 2025-01-08 PROCEDURE — 80053 COMPREHEN METABOLIC PANEL: CPT

## 2025-01-08 PROCEDURE — 96374 THER/PROPH/DIAG INJ IV PUSH: CPT

## 2025-01-08 PROCEDURE — 94640 AIRWAY INHALATION TREATMENT: CPT

## 2025-01-08 PROCEDURE — 83880 ASSAY OF NATRIURETIC PEPTIDE: CPT

## 2025-01-08 PROCEDURE — 94644 CONT INHLJ TX 1ST HOUR: CPT

## 2025-01-08 PROCEDURE — 87070 CULTURE OTHR SPECIMN AEROBIC: CPT | Performed by: NURSE PRACTITIONER

## 2025-01-08 PROCEDURE — 99900035 HC TECH TIME PER 15 MIN (STAT)

## 2025-01-08 PROCEDURE — 94761 N-INVAS EAR/PLS OXIMETRY MLT: CPT

## 2025-01-08 PROCEDURE — 25000003 PHARM REV CODE 250: Performed by: EMERGENCY MEDICINE

## 2025-01-08 PROCEDURE — 63600175 PHARM REV CODE 636 W HCPCS: Performed by: EMERGENCY MEDICINE

## 2025-01-08 PROCEDURE — 99285 EMERGENCY DEPT VISIT HI MDM: CPT | Mod: 25

## 2025-01-08 PROCEDURE — 36415 COLL VENOUS BLD VENIPUNCTURE: CPT

## 2025-01-08 PROCEDURE — 93010 ELECTROCARDIOGRAM REPORT: CPT | Mod: ,,, | Performed by: STUDENT IN AN ORGANIZED HEALTH CARE EDUCATION/TRAINING PROGRAM

## 2025-01-08 PROCEDURE — 99900031 HC PATIENT EDUCATION (STAT)

## 2025-01-08 PROCEDURE — 27000221 HC OXYGEN, UP TO 24 HOURS

## 2025-01-08 PROCEDURE — 0240U COVID/FLU A&B PCR: CPT

## 2025-01-08 PROCEDURE — 94760 N-INVAS EAR/PLS OXIMETRY 1: CPT

## 2025-01-08 PROCEDURE — 85025 COMPLETE CBC W/AUTO DIFF WBC: CPT

## 2025-01-08 PROCEDURE — 93005 ELECTROCARDIOGRAM TRACING: CPT

## 2025-01-08 PROCEDURE — 87040 BLOOD CULTURE FOR BACTERIA: CPT

## 2025-01-08 PROCEDURE — 87632 RESP VIRUS 6-11 TARGETS: CPT

## 2025-01-08 PROCEDURE — 25000242 PHARM REV CODE 250 ALT 637 W/ HCPCS: Performed by: NURSE PRACTITIONER

## 2025-01-08 PROCEDURE — 11000001 HC ACUTE MED/SURG PRIVATE ROOM

## 2025-01-08 PROCEDURE — 25000242 PHARM REV CODE 250 ALT 637 W/ HCPCS: Performed by: EMERGENCY MEDICINE

## 2025-01-08 RX ORDER — DEXAMETHASONE SODIUM PHOSPHATE 4 MG/ML
8 INJECTION, SOLUTION INTRA-ARTICULAR; INTRALESIONAL; INTRAMUSCULAR; INTRAVENOUS; SOFT TISSUE
Status: COMPLETED | OUTPATIENT
Start: 2025-01-08 | End: 2025-01-08

## 2025-01-08 RX ORDER — IPRATROPIUM BROMIDE AND ALBUTEROL SULFATE 2.5; .5 MG/3ML; MG/3ML
3 SOLUTION RESPIRATORY (INHALATION) EVERY 4 HOURS
Status: DISCONTINUED | OUTPATIENT
Start: 2025-01-08 | End: 2025-01-11 | Stop reason: HOSPADM

## 2025-01-08 RX ORDER — TALC
6 POWDER (GRAM) TOPICAL NIGHTLY PRN
Status: DISCONTINUED | OUTPATIENT
Start: 2025-01-08 | End: 2025-01-11 | Stop reason: HOSPADM

## 2025-01-08 RX ORDER — ONDANSETRON HYDROCHLORIDE 2 MG/ML
4 INJECTION, SOLUTION INTRAVENOUS EVERY 4 HOURS PRN
Status: DISCONTINUED | OUTPATIENT
Start: 2025-01-08 | End: 2025-01-11 | Stop reason: HOSPADM

## 2025-01-08 RX ORDER — ACETAMINOPHEN 325 MG/1
650 TABLET ORAL EVERY 4 HOURS PRN
Status: DISCONTINUED | OUTPATIENT
Start: 2025-01-08 | End: 2025-01-08

## 2025-01-08 RX ORDER — IBUPROFEN 200 MG
24 TABLET ORAL
Status: DISCONTINUED | OUTPATIENT
Start: 2025-01-08 | End: 2025-01-11 | Stop reason: HOSPADM

## 2025-01-08 RX ORDER — ALBUTEROL SULFATE 0.83 MG/ML
10 SOLUTION RESPIRATORY (INHALATION) CONTINUOUS
Status: DISPENSED | OUTPATIENT
Start: 2025-01-08 | End: 2025-01-08

## 2025-01-08 RX ORDER — GLUCAGON 1 MG
1 KIT INJECTION
Status: DISCONTINUED | OUTPATIENT
Start: 2025-01-08 | End: 2025-01-11 | Stop reason: HOSPADM

## 2025-01-08 RX ORDER — ACETAMINOPHEN 325 MG/1
650 TABLET ORAL EVERY 8 HOURS PRN
Status: DISCONTINUED | OUTPATIENT
Start: 2025-01-08 | End: 2025-01-08

## 2025-01-08 RX ORDER — ALUMINUM HYDROXIDE, MAGNESIUM HYDROXIDE, AND SIMETHICONE 1200; 120; 1200 MG/30ML; MG/30ML; MG/30ML
30 SUSPENSION ORAL 4 TIMES DAILY PRN
Status: DISCONTINUED | OUTPATIENT
Start: 2025-01-08 | End: 2025-01-11 | Stop reason: HOSPADM

## 2025-01-08 RX ORDER — GUAIFENESIN 100 MG/5ML
200 SOLUTION ORAL EVERY 4 HOURS PRN
Status: DISCONTINUED | OUTPATIENT
Start: 2025-01-08 | End: 2025-01-11 | Stop reason: HOSPADM

## 2025-01-08 RX ORDER — BENZONATATE 100 MG/1
100 CAPSULE ORAL 3 TIMES DAILY PRN
Status: DISCONTINUED | OUTPATIENT
Start: 2025-01-08 | End: 2025-01-11 | Stop reason: HOSPADM

## 2025-01-08 RX ORDER — IBUPROFEN 200 MG
16 TABLET ORAL
Status: DISCONTINUED | OUTPATIENT
Start: 2025-01-08 | End: 2025-01-11 | Stop reason: HOSPADM

## 2025-01-08 RX ORDER — ACETAMINOPHEN 500 MG
1000 TABLET ORAL EVERY 6 HOURS PRN
Status: DISCONTINUED | OUTPATIENT
Start: 2025-01-08 | End: 2025-01-11 | Stop reason: HOSPADM

## 2025-01-08 RX ORDER — MUPIROCIN 20 MG/G
OINTMENT TOPICAL 2 TIMES DAILY
Status: DISCONTINUED | OUTPATIENT
Start: 2025-01-08 | End: 2025-01-11 | Stop reason: HOSPADM

## 2025-01-08 RX ORDER — ONDANSETRON HYDROCHLORIDE 2 MG/ML
4 INJECTION, SOLUTION INTRAVENOUS EVERY 8 HOURS PRN
Status: DISCONTINUED | OUTPATIENT
Start: 2025-01-08 | End: 2025-01-08

## 2025-01-08 RX ORDER — POLYETHYLENE GLYCOL 3350 17 G/17G
17 POWDER, FOR SOLUTION ORAL 2 TIMES DAILY PRN
Status: DISCONTINUED | OUTPATIENT
Start: 2025-01-08 | End: 2025-01-11 | Stop reason: HOSPADM

## 2025-01-08 RX ORDER — ENOXAPARIN SODIUM 100 MG/ML
40 INJECTION SUBCUTANEOUS EVERY 24 HOURS
Status: DISCONTINUED | OUTPATIENT
Start: 2025-01-09 | End: 2025-01-11 | Stop reason: HOSPADM

## 2025-01-08 RX ORDER — BISACODYL 10 MG/1
10 SUPPOSITORY RECTAL DAILY PRN
Status: DISCONTINUED | OUTPATIENT
Start: 2025-01-08 | End: 2025-01-11 | Stop reason: HOSPADM

## 2025-01-08 RX ORDER — SODIUM CHLORIDE 0.9 % (FLUSH) 0.9 %
10 SYRINGE (ML) INJECTION
Status: DISCONTINUED | OUTPATIENT
Start: 2025-01-08 | End: 2025-01-11 | Stop reason: HOSPADM

## 2025-01-08 RX ADMIN — DEXAMETHASONE SODIUM PHOSPHATE 8 MG: 4 INJECTION, SOLUTION INTRA-ARTICULAR; INTRALESIONAL; INTRAMUSCULAR; INTRAVENOUS; SOFT TISSUE at 04:01

## 2025-01-08 RX ADMIN — MUPIROCIN: 20 OINTMENT TOPICAL at 09:01

## 2025-01-08 RX ADMIN — IPRATROPIUM BROMIDE AND ALBUTEROL SULFATE 3 ML: .5; 3 SOLUTION RESPIRATORY (INHALATION) at 09:01

## 2025-01-08 RX ADMIN — ALBUTEROL SULFATE 10 MG/HR: 2.5 SOLUTION RESPIRATORY (INHALATION) at 04:01

## 2025-01-08 NOTE — ED PROVIDER NOTES
Encounter Date: 1/8/2025    SCRIBE #1 NOTE: I, Phoebe Glaser, am scribing for, and in the presence of,  Bautista Smith MD. I have scribed the entire note.       History     Chief Complaint   Patient presents with    Shortness of Breath     Arrives via [facility] for SOB & cough. Currently on chemo for lung cancer. Also has hx COPD - wears home O2. EMS reports O2 91% on 3L on their arrival - currently 94% on 4L. Recently completed antibiotics for pneumonia. Received neb & solumedrol in route.      79 year old female with a hx of COPD and lung cancer presents to the ED via EMS for SOB. Pt states she has been feeling SOB for the past week. Along with the SOB, pt states she has had a cough producing a yellow mucus. Pt states these symptoms have worsened today with the cold weather. Pt reports she she has supplemental O2 at home as needed. Pt states she needed the supplemental O2 this morning. Pt recently completed antibiotics for pneumonia. Pt received Solumedrol and neb treatment en route.    The history is provided by the patient. No  was used.     Review of patient's allergies indicates:   Allergen Reactions    Lisinopril Swelling     Past Medical History:   Diagnosis Date    Adenocarcinoma of lung     Anemia     Anxiety     Arthritis     COPD (chronic obstructive pulmonary disease)     Depression     HLD (hyperlipidemia)     Hypertension     Lung cancer     Secondary malignant neoplasm of lymph nodes     Secondary malignant neoplasm of right lung     Thyroid disease     lymph nodes malignant     Past Surgical History:   Procedure Laterality Date    BLADDER SURGERY      CHOLECYSTECTOMY      COLONOSCOPY  2018    COLONOSCOPY N/A 9/9/2024    Procedure: SIG;  Surgeon: Saurav Hutson MD;  Location: [facility] ENDOSCOPY;  Service: Gastroenterology;  Laterality: N/A;    ENDOBRONCHIAL ULTRASOUND N/A 11/16/2022    Procedure: ENDOBRONCHIAL ULTRASOUND (EBUS);  Surgeon: Anthony Hutson MD;  Location:  [facility] BRONCHOSCOPY LAB;  Service: Pulmonary;  Laterality: N/A;    HYSTERECTOMY      KNEE SURGERY Bilateral     replaced knees    PARTIAL HIP ARTHROPLASTY Bilateral     SIGMOIDOSCOPY, WITH BIOPSY N/A 9/9/2024    Procedure: SIGMOIDOSCOPY, WITH BIOPSY;  Surgeon: Saurav Hutson MD;  Location: [facility] ENDOSCOPY;  Service: Gastroenterology;  Laterality: N/A;     Family History   Problem Relation Name Age of Onset    Lung cancer Mother      Lung cancer Brother       Social History     Tobacco Use    Smoking status: Every Day     Current packs/day: 0.25     Average packs/day: 0.3 packs/day for 66.0 years (16.5 ttl pk-yrs)     Types: Cigarettes     Start date: 1959    Smokeless tobacco: Never    Tobacco comments:     Patient states she is trying to quit smoking.   Substance Use Topics    Alcohol use: Not Currently    Drug use: Never     Review of Systems   Constitutional: Negative.    HENT: Negative.     Eyes: Negative.    Respiratory:  Positive for cough and shortness of breath.    Cardiovascular: Negative.    Gastrointestinal: Negative.    Endocrine: Negative.    Genitourinary: Negative.    Musculoskeletal: Negative.    Skin: Negative.    Allergic/Immunologic: Negative.    Neurological: Negative.    Hematological: Negative.    Psychiatric/Behavioral: Negative.         Physical Exam     Initial Vitals [01/08/25 1116]   BP Pulse Resp Temp SpO2   (!) 188/77 77 (!) 24 98.6 °F (37 °C) (!) 93 %      MAP       --         Physical Exam    Nursing note and vitals reviewed.  Constitutional: She appears well-developed and well-nourished.   HENT:   Head: Normocephalic and atraumatic.   Right Ear: External ear normal.   Left Ear: External ear normal.   Eyes: Conjunctivae and EOM are normal. Pupils are equal, round, and reactive to light.   Neck: Neck supple.   Normal range of motion.  Cardiovascular:  Normal rate, regular rhythm, normal heart sounds and intact distal pulses.           Pulmonary/Chest: Tachypnea noted. She has  rhonchi.   Expiratory wheezes.    Abdominal: Abdomen is soft. Bowel sounds are normal.   Musculoskeletal:         General: Normal range of motion.      Cervical back: Normal range of motion and neck supple.     Neurological: She is alert and oriented to person, place, and time. GCS score is 15. GCS eye subscore is 4. GCS verbal subscore is 5. GCS motor subscore is 6.   Skin: Skin is warm and dry. Capillary refill takes less than 2 seconds.   Psychiatric: She has a normal mood and affect. Her behavior is normal. Judgment and thought content normal.         ED Course   Procedures  Labs Reviewed   COMPREHENSIVE METABOLIC PANEL - Abnormal       Result Value    Sodium 128 (*)     Potassium 4.0      Chloride 92 (*)     CO2 30      Glucose 82      Blood Urea Nitrogen 10.2      Creatinine 0.75      Calcium 9.2      Protein Total 6.5      Albumin 2.9 (*)     Globulin 3.6 (*)     Albumin/Globulin Ratio 0.8 (*)     Bilirubin Total 0.5      ALP 67      ALT 14      AST 17      eGFR >60      Anion Gap 6.0      BUN/Creatinine Ratio 14     B-TYPE NATRIURETIC PEPTIDE - Abnormal    Natriuretic Peptide 188.1 (*)    CBC WITH DIFFERENTIAL - Abnormal    WBC 17.66 (*)     RBC 4.47      Hgb 11.9 (*)     Hct 36.3 (*)     MCV 81.2      MCH 26.6 (*)     MCHC 32.8 (*)     RDW 17.0      Platelet 390      MPV 8.6      Neut % 85.9      Lymph % 7.9      Mono % 5.3      Eos % 0.2      Basophil % 0.1      Imm Grans % 0.6      Neut # 15.19 (*)     Lymph # 1.39      Mono # 0.93      Eos # 0.03      Baso # 0.02      Imm Gran # 0.10 (*)     NRBC% 0.0     TROPONIN I - Normal    Troponin-I <0.010     COVID/FLU A&B PCR - Normal    Influenza A PCR Not Detected      Influenza B PCR Not Detected      SARS-CoV-2 PCR Not Detected      Narrative:     The Xpert Xpress SARS-CoV-2/FLU/RSV plus is a rapid, multiplexed real-time PCR test intended for the simultaneous qualitative detection and differentiation of SARS-CoV-2, Influenza A, Influenza B, and respiratory  syncytial virus (RSV) viral RNA in either nasopharyngeal swab or nasal swab specimens.         CBC W/ AUTO DIFFERENTIAL    Narrative:     The following orders were created for panel order CBC Auto Differential.  Procedure                               Abnormality         Status                     ---------                               -----------         ------                     CBC with Differential[[id number]]       Abnormal            Final result                 Please view results for these tests on the individual orders.          Imaging Results              X-Ray Chest 1 View (Final result)  Result time 01/08/25 12:24:39      Final result by Juan Taylor MD (01/08/25 12:24:39)                   Impression:      No acute cardiopulmonary process identified.      Electronically signed by: Juan Taylor  Date:    01/08/2025  Time:    12:24               Narrative:    EXAMINATION:  XR CHEST 1 VIEW    CLINICAL HISTORY:  shortness of breath;    TECHNIQUE:  One view    COMPARISON:  December 2, 2024.    FINDINGS:  Cardiopericardial silhouette is within normal limits.  Right chest implanted tunnel jose catheter terminates within the very proximal superior vena cava.  Portion of the catheter tubing appears to cannulate the internal jugular vein.  No acute dense focal or segmental consolidation, congestive process, pleural effusions or pneumothorax.                                       Medications   albuterol nebulizer solution (10 mg/hr Nebulization New Bag 1/8/25 1615)   dexAMETHasone injection 8 mg (8 mg Intravenous Given 1/8/25 1609)     Medical Decision Making  The differential diagnosis includes, but is not limited to, COPD exacerbation, pneumonia, or CHF.     Amount and/or Complexity of Data Reviewed  Labs: ordered.  Radiology: ordered and independent interpretation performed.    Risk  Prescription drug management.            Scribe Attestation:   Scribe #1: I performed the above scribed service and the  documentation accurately describes the services I performed. I attest to the accuracy of the note.    Attending Attestation:           Physician Attestation for Scribe:  Physician Attestation Statement for Scribe #1: I, Bautista Smith MD, reviewed documentation, as scribed by Phoebe Glaser in my presence, and it is both accurate and complete.             ED Course as of 01/08/25 1755 Wed Jan 08, 2025 1754 I spoke with NATHANAEL Acuña (hospital medicine) - states admit to Dr. Doran. [CL]      ED Course User Index  [CL] Bautista Smith MD                           Clinical Impression:  Final diagnoses:  [R06.02] Shortness of breath  [J44.1] COPD exacerbation (Primary)          ED Disposition Condition    Admit Stable                Bautista Smith MD  01/08/25 1755

## 2025-01-09 PROBLEM — J44.1 COPD EXACERBATION: Status: ACTIVE | Noted: 2025-01-09

## 2025-01-09 LAB
ALBUMIN SERPL-MCNC: 2.7 G/DL (ref 3.4–4.8)
ALBUMIN/GLOB SERPL: 1 RATIO (ref 1.1–2)
ALP SERPL-CCNC: 59 UNIT/L (ref 40–150)
ALT SERPL-CCNC: 12 UNIT/L (ref 0–55)
ANION GAP SERPL CALC-SCNC: 5 MEQ/L
AST SERPL-CCNC: 16 UNIT/L (ref 5–34)
B PERT.PT PRMT NPH QL NAA+NON-PROBE: NOT DETECTED
BASOPHILS # BLD AUTO: 0.02 X10(3)/MCL
BASOPHILS NFR BLD AUTO: 0.2 %
BILIRUB SERPL-MCNC: 0.4 MG/DL
BUN SERPL-MCNC: 12.5 MG/DL (ref 9.8–20.1)
C PNEUM DNA NPH QL NAA+NON-PROBE: NOT DETECTED
CALCIUM SERPL-MCNC: 8.5 MG/DL (ref 8.4–10.2)
CHLORIDE SERPL-SCNC: 91 MMOL/L (ref 98–107)
CO2 SERPL-SCNC: 33 MMOL/L (ref 23–31)
CREAT SERPL-MCNC: 0.72 MG/DL (ref 0.55–1.02)
CREAT/UREA NIT SERPL: 17
EOSINOPHIL # BLD AUTO: 0 X10(3)/MCL (ref 0–0.9)
EOSINOPHIL NFR BLD AUTO: 0 %
ERYTHROCYTE [DISTWIDTH] IN BLOOD BY AUTOMATED COUNT: 16.7 % (ref 11.5–17)
GFR SERPLBLD CREATININE-BSD FMLA CKD-EPI: >60 ML/MIN/1.73/M2
GLOBULIN SER-MCNC: 2.7 GM/DL (ref 2.4–3.5)
GLUCOSE SERPL-MCNC: 124 MG/DL (ref 82–115)
HADV DNA NPH QL NAA+NON-PROBE: NOT DETECTED
HCOV 229E RNA NPH QL NAA+NON-PROBE: NOT DETECTED
HCOV HKU1 RNA NPH QL NAA+NON-PROBE: NOT DETECTED
HCOV NL63 RNA NPH QL NAA+NON-PROBE: NOT DETECTED
HCOV OC43 RNA NPH QL NAA+NON-PROBE: NOT DETECTED
HCT VFR BLD AUTO: 32.5 % (ref 37–47)
HGB BLD-MCNC: 10.5 G/DL (ref 12–16)
HMPV RNA NPH QL NAA+NON-PROBE: NOT DETECTED
HPIV1 RNA NPH QL NAA+NON-PROBE: NOT DETECTED
HPIV2 RNA NPH QL NAA+NON-PROBE: NOT DETECTED
HPIV3 RNA NPH QL NAA+NON-PROBE: NOT DETECTED
HPIV4 RNA NPH QL NAA+NON-PROBE: NOT DETECTED
IMM GRANULOCYTES # BLD AUTO: 0.12 X10(3)/MCL (ref 0–0.04)
IMM GRANULOCYTES NFR BLD AUTO: 1 %
LACTATE SERPL-SCNC: 1.3 MMOL/L (ref 0.5–2.2)
LYMPHOCYTES # BLD AUTO: 0.64 X10(3)/MCL (ref 0.6–4.6)
LYMPHOCYTES NFR BLD AUTO: 5.1 %
M PNEUMO DNA NPH QL NAA+NON-PROBE: NOT DETECTED
MAGNESIUM SERPL-MCNC: 2 MG/DL (ref 1.6–2.6)
MCH RBC QN AUTO: 26.6 PG (ref 27–31)
MCHC RBC AUTO-ENTMCNC: 32.3 G/DL (ref 33–36)
MCV RBC AUTO: 82.3 FL (ref 80–94)
MONOCYTES # BLD AUTO: 0.19 X10(3)/MCL (ref 0.1–1.3)
MONOCYTES NFR BLD AUTO: 1.5 %
MRSA PCR SCRN (OHS): DETECTED
NEUTROPHILS # BLD AUTO: 11.64 X10(3)/MCL (ref 2.1–9.2)
NEUTROPHILS NFR BLD AUTO: 92.2 %
NRBC BLD AUTO-RTO: 0 %
PLATELET # BLD AUTO: 347 X10(3)/MCL (ref 130–400)
PMV BLD AUTO: 8.4 FL (ref 7.4–10.4)
POTASSIUM SERPL-SCNC: 4.3 MMOL/L (ref 3.5–5.1)
PROT SERPL-MCNC: 5.4 GM/DL (ref 5.8–7.6)
RBC # BLD AUTO: 3.95 X10(6)/MCL (ref 4.2–5.4)
RSV RNA NPH QL NAA+NON-PROBE: NOT DETECTED
RV+EV RNA NPH QL NAA+NON-PROBE: DETECTED
SODIUM SERPL-SCNC: 129 MMOL/L (ref 136–145)
WBC # BLD AUTO: 12.61 X10(3)/MCL (ref 4.5–11.5)

## 2025-01-09 PROCEDURE — 99900031 HC PATIENT EDUCATION (STAT)

## 2025-01-09 PROCEDURE — 25000003 PHARM REV CODE 250

## 2025-01-09 PROCEDURE — 94761 N-INVAS EAR/PLS OXIMETRY MLT: CPT

## 2025-01-09 PROCEDURE — 97166 OT EVAL MOD COMPLEX 45 MIN: CPT

## 2025-01-09 PROCEDURE — 97162 PT EVAL MOD COMPLEX 30 MIN: CPT

## 2025-01-09 PROCEDURE — 25000242 PHARM REV CODE 250 ALT 637 W/ HCPCS: Performed by: NURSE PRACTITIONER

## 2025-01-09 PROCEDURE — S4991 NICOTINE PATCH NONLEGEND: HCPCS

## 2025-01-09 PROCEDURE — 25000003 PHARM REV CODE 250: Performed by: EMERGENCY MEDICINE

## 2025-01-09 PROCEDURE — 80053 COMPREHEN METABOLIC PANEL: CPT

## 2025-01-09 PROCEDURE — 87641 MR-STAPH DNA AMP PROBE: CPT | Performed by: INTERNAL MEDICINE

## 2025-01-09 PROCEDURE — 85025 COMPLETE CBC W/AUTO DIFF WBC: CPT

## 2025-01-09 PROCEDURE — 25000003 PHARM REV CODE 250: Performed by: HOSPITALIST

## 2025-01-09 PROCEDURE — 99900035 HC TECH TIME PER 15 MIN (STAT)

## 2025-01-09 PROCEDURE — 11000001 HC ACUTE MED/SURG PRIVATE ROOM

## 2025-01-09 PROCEDURE — 63600175 PHARM REV CODE 636 W HCPCS

## 2025-01-09 PROCEDURE — 94640 AIRWAY INHALATION TREATMENT: CPT

## 2025-01-09 PROCEDURE — 94760 N-INVAS EAR/PLS OXIMETRY 1: CPT

## 2025-01-09 PROCEDURE — 94799 UNLISTED PULMONARY SVC/PX: CPT

## 2025-01-09 PROCEDURE — 27000221 HC OXYGEN, UP TO 24 HOURS

## 2025-01-09 PROCEDURE — 36415 COLL VENOUS BLD VENIPUNCTURE: CPT

## 2025-01-09 PROCEDURE — 83735 ASSAY OF MAGNESIUM: CPT

## 2025-01-09 PROCEDURE — 83605 ASSAY OF LACTIC ACID: CPT

## 2025-01-09 RX ORDER — CARVEDILOL 3.12 MG/1
6.25 TABLET ORAL 2 TIMES DAILY
Status: DISCONTINUED | OUTPATIENT
Start: 2025-01-09 | End: 2025-01-11 | Stop reason: HOSPADM

## 2025-01-09 RX ORDER — IBUPROFEN 200 MG
1 TABLET ORAL DAILY
Status: DISCONTINUED | OUTPATIENT
Start: 2025-01-09 | End: 2025-01-11 | Stop reason: HOSPADM

## 2025-01-09 RX ORDER — ATORVASTATIN CALCIUM 10 MG/1
20 TABLET, FILM COATED ORAL NIGHTLY
Status: DISCONTINUED | OUTPATIENT
Start: 2025-01-09 | End: 2025-01-11 | Stop reason: HOSPADM

## 2025-01-09 RX ORDER — CLOPIDOGREL BISULFATE 75 MG/1
75 TABLET ORAL DAILY
Status: DISCONTINUED | OUTPATIENT
Start: 2025-01-09 | End: 2025-01-11 | Stop reason: HOSPADM

## 2025-01-09 RX ORDER — FUROSEMIDE 20 MG/1
20 TABLET ORAL DAILY
Status: DISCONTINUED | OUTPATIENT
Start: 2025-01-09 | End: 2025-01-09

## 2025-01-09 RX ORDER — SODIUM CHLORIDE 9 MG/ML
INJECTION, SOLUTION INTRAVENOUS CONTINUOUS
Status: DISCONTINUED | OUTPATIENT
Start: 2025-01-09 | End: 2025-01-10

## 2025-01-09 RX ORDER — LEVOTHYROXINE SODIUM 125 UG/1
125 TABLET ORAL
Status: DISCONTINUED | OUTPATIENT
Start: 2025-01-09 | End: 2025-01-11 | Stop reason: HOSPADM

## 2025-01-09 RX ORDER — LOSARTAN POTASSIUM 50 MG/1
50 TABLET ORAL DAILY
Status: DISCONTINUED | OUTPATIENT
Start: 2025-01-09 | End: 2025-01-11 | Stop reason: HOSPADM

## 2025-01-09 RX ADMIN — METHYLPREDNISOLONE SODIUM SUCCINATE 60 MG: 40 INJECTION, POWDER, FOR SOLUTION INTRAMUSCULAR; INTRAVENOUS at 09:01

## 2025-01-09 RX ADMIN — MUPIROCIN: 20 OINTMENT TOPICAL at 09:01

## 2025-01-09 RX ADMIN — POLYETHYLENE GLYCOL 3350 17 G: 17 POWDER, FOR SOLUTION ORAL at 11:01

## 2025-01-09 RX ADMIN — PIPERACILLIN SODIUM AND TAZOBACTAM SODIUM 4.5 G: 4; .5 INJECTION, POWDER, LYOPHILIZED, FOR SOLUTION INTRAVENOUS at 03:01

## 2025-01-09 RX ADMIN — CLOPIDOGREL BISULFATE 75 MG: 75 TABLET ORAL at 09:01

## 2025-01-09 RX ADMIN — ATORVASTATIN CALCIUM 20 MG: 10 TABLET, FILM COATED ORAL at 05:01

## 2025-01-09 RX ADMIN — SODIUM CHLORIDE: 9 INJECTION, SOLUTION INTRAVENOUS at 09:01

## 2025-01-09 RX ADMIN — ENOXAPARIN SODIUM 40 MG: 40 INJECTION SUBCUTANEOUS at 03:01

## 2025-01-09 RX ADMIN — NICOTINE 1 PATCH: 14 PATCH TRANSDERMAL at 09:01

## 2025-01-09 RX ADMIN — IPRATROPIUM BROMIDE AND ALBUTEROL SULFATE 3 ML: .5; 3 SOLUTION RESPIRATORY (INHALATION) at 12:01

## 2025-01-09 RX ADMIN — IPRATROPIUM BROMIDE AND ALBUTEROL SULFATE 3 ML: .5; 3 SOLUTION RESPIRATORY (INHALATION) at 02:01

## 2025-01-09 RX ADMIN — IPRATROPIUM BROMIDE AND ALBUTEROL SULFATE 3 ML: .5; 3 SOLUTION RESPIRATORY (INHALATION) at 11:01

## 2025-01-09 RX ADMIN — CARVEDILOL 6.25 MG: 3.12 TABLET, FILM COATED ORAL at 08:01

## 2025-01-09 RX ADMIN — MUPIROCIN: 20 OINTMENT TOPICAL at 08:01

## 2025-01-09 RX ADMIN — CARVEDILOL 6.25 MG: 3.12 TABLET, FILM COATED ORAL at 09:01

## 2025-01-09 RX ADMIN — SODIUM CHLORIDE: 9 INJECTION, SOLUTION INTRAVENOUS at 07:01

## 2025-01-09 RX ADMIN — IPRATROPIUM BROMIDE AND ALBUTEROL SULFATE 3 ML: .5; 3 SOLUTION RESPIRATORY (INHALATION) at 08:01

## 2025-01-09 RX ADMIN — IPRATROPIUM BROMIDE AND ALBUTEROL SULFATE 3 ML: .5; 3 SOLUTION RESPIRATORY (INHALATION) at 07:01

## 2025-01-09 RX ADMIN — LOSARTAN POTASSIUM 50 MG: 50 TABLET, FILM COATED ORAL at 09:01

## 2025-01-09 RX ADMIN — LEVOTHYROXINE SODIUM 125 MCG: 125 TABLET ORAL at 05:01

## 2025-01-09 RX ADMIN — ATORVASTATIN CALCIUM 20 MG: 10 TABLET, FILM COATED ORAL at 08:01

## 2025-01-09 RX ADMIN — LEVOFLOXACIN 750 MG: 500 TABLET, FILM COATED ORAL at 09:01

## 2025-01-09 NOTE — H&P
[facility] Hospital Medicine - H&P Note    Patient Name: Pratibha Patel  : 1945  MRN: [id number]  PCP: Graham Nguyen NP  Admitting Physician:   Admission Class: IP- Inpatient   Code status: FULL    Allergies   Lisinopril    Chief Complaint     Shortness of breath    History of Present Illness     Pratibha Patel is a 79 year old F with a PMH of HDL, PAD currently on Plavix, HTN, COPD who wears 2 L nasal cannula p.r.n. at home, lung cancer receives radiation every 3 weeks with her last treatment being last Thursday and not currently receiving chemo, who presented to the ED with complaints of shortness of breath and productive cough. Patient explains that her shortness of breath began 1 week ago and has gotten progressively worse, and has been accompanied by a productive cough with sputum yellow in color.  Patient explains that she wears 2 L nasal cannula at home as needed and denies having to increase her oxygen use. Patient states that she was recently diagnosed with pneumonia and has completed her outpatient antibiotics, and has been taking prednisone daily.  Patient denies fever, denies chest pain, denies nausea vomiting, denies diarrhea, denies sick contacts, denies abdominal pain.  Patient currently resting comfortably on 2 L nasal cannula.      Initial ED vitals:  Temp 98.6°, HR 77, respirations 24, /77, O2 sat 93% on 4 L nasal cannula.  ED workup revealed WBC 17.66, Na 128, chloride 92, albumin 2.9, .1, chest x-ray read no acute cardiopulmonary process identified, EKG showed normal sinus rhythm at 82 beats per minute.  Patient was given albuterol nebulizer solution 10 mg, Decadron 8 mg IV in the ED, and admitted to Hospital Medicine.    ROS   Except as documented, all other systems reviewed and negative     Past Medical History     Past Medical History:   Diagnosis Date    Adenocarcinoma of lung     Anemia     Anxiety     Arthritis     COPD (chronic obstructive  "pulmonary disease)     Depression     HLD (hyperlipidemia)     Hypertension     Lung cancer     Secondary malignant neoplasm of lymph nodes     Secondary malignant neoplasm of right lung     Thyroid disease     lymph nodes malignant       Past Surgical History     Past Surgical History:   Procedure Laterality Date    BLADDER SURGERY      CHOLECYSTECTOMY      COLONOSCOPY  2018    COLONOSCOPY N/A 9/9/2024    Procedure: SIG;  Surgeon: Saurav Hutson MD;  Location: [facility] ENDOSCOPY;  Service: Gastroenterology;  Laterality: N/A;    ENDOBRONCHIAL ULTRASOUND N/A 11/16/2022    Procedure: ENDOBRONCHIAL ULTRASOUND (EBUS);  Surgeon: Anthony Hutson MD;  Location: [facility] BRONCHOSCOPY LAB;  Service: Pulmonary;  Laterality: N/A;    HYSTERECTOMY      KNEE SURGERY Bilateral     replaced knees    PARTIAL HIP ARTHROPLASTY Bilateral     SIGMOIDOSCOPY, WITH BIOPSY N/A 9/9/2024    Procedure: SIGMOIDOSCOPY, WITH BIOPSY;  Surgeon: Saurav Hutson MD;  Location: [facility] ENDOSCOPY;  Service: Gastroenterology;  Laterality: N/A;       Social History   Denies alcohol or illicit drug use. States quit smoking "1 week ago,"but was previously smoking half a pack a day.    Screening for Social Drivers for health:  Patient screened for food insecurity, housing instability, transportation needs, utility difficulties, and interpersonal safety (select all that apply as identified as concern)  []Housing or Food  []Transportation Needs  []Utility Difficulties  []Interpersonal safety  [x]None        Family History   Reviewed and negative    Home Medications     Prior to Admission medications    Medication Sig Start Date End Date Taking? Authorizing Provider   carvediloL (COREG) 6.25 MG tablet Take 6.25 mg by mouth 2 (two) times daily. 6/19/22  Yes Provider, Historical   clopidogreL (PLAVIX) 75 mg tablet  10/12/23  Yes Provider, Historical   furosemide (LASIX) 20 MG tablet Take 1 tablet (20 mg total) by mouth once daily. 11/30/24 1/8/25 Yes " "Allen Barkley MD   losartan (COZAAR) 50 MG tablet Take 1 tablet (50 mg total) by mouth once daily. 12/1/24 1/8/25 Yes Allen Barkley MD   simvastatin (ZOCOR) 40 MG tablet Take 40 mg by mouth every evening. 6/19/22  Yes Provider, Historical   albuterol (VENTOLIN HFA) 90 mcg/actuation inhaler Inhale 2 puffs into the lungs every 6 (six) hours as needed for Wheezing. Rescue 3/12/24   Bautista Stern MD   levothyroxine (SYNTHROID) 125 MCG tablet Take 125 mcg by mouth before breakfast. 9/6/24   Provider, Historical   magnesium oxide (MAG-OX) 400 mg (241.3 mg magnesium) tablet Take 1 tablet (400 mg total) by mouth 2 (two) times daily. 11/1/24   Lauren Myrick FNP   nicotine (NICODERM CQ) 14 mg/24 hr Place 1 patch onto the skin once daily. 11/30/24   Allen Barkley MD        Physical Exam   Vital Signs  Temp:  [98.6 °F (37 °C)]   Pulse:  [72-85]   Resp:  [13-24]   BP: (114-188)/(68-99)   SpO2:  [92 %-100 %]    General: Appears comfortable  HEENT: NC/AT  Neck:  No JVD  Chest: Expiratory rhonchi heard bilaterally, slight wheezing heard bilaterally  CVS: Regular rhythm. Normal S1/S2.  Abdomen: nondistended, normoactive BS, soft and non-tender.  MSK: No obvious deformity or joint swelling  Skin: Warm and dry  Neuro: AAOx3, no focal neurological deficit  Psych: Cooperative    Labs     Recent Labs     01/08/25  1144   WBC 17.66*   RBC 4.47   HGB 11.9*   HCT 36.3*   MCV 81.2   MCH 26.6*   MCHC 32.8*   RDW 17.0        No results for input(s): "PROTIME", "INR", "PTT", "D-DIMER", "FERRITIN", "IRON", "TRANS", "TIBC", "LABIRON", "MKDCONLE68", "FOLATE", "LDH", "HAPTOGLOBIN", "RETICCNTAUTO", "RETABS", "PERIPSMEAREV" in the last 72 hours.   Recent Labs     01/08/25  1144   *   K 4.0   CO2 30   BUN 10.2   CREATININE 0.75   EGFRNORACEVR >60   GLUCOSE 82   CALCIUM 9.2   ALBUMIN 2.9*   GLOBULIN 3.6*   ALKPHOS 67   ALT 14   AST 17   BILITOT 0.5   .1*     No results for input(s): "LACTIC" in the last 72 " hours.  Recent Labs     01/08/25  1144   TROPONINI <0.010        Microbiology Results (last 7 days)       Procedure Component Value Units Date/Time    Respiratory Culture [[id number]] Collected: 01/08/25 1931    Order Status: Sent Specimen: Sputum, Expectorated Updated: 01/08/25 1931           Imaging   X-Ray Chest 1 View  Narrative: EXAMINATION:  XR CHEST 1 VIEW    CLINICAL HISTORY:  shortness of breath;    TECHNIQUE:  One view    COMPARISON:  December 2, 2024.    FINDINGS:  Cardiopericardial silhouette is within normal limits.  Right chest implanted tunnel jose catheter terminates within the very proximal superior vena cava.  Portion of the catheter tubing appears to cannulate the internal jugular vein.  No acute dense focal or segmental consolidation, congestive process, pleural effusions or pneumothorax.  Impression: No acute cardiopulmonary process identified.    Electronically signed by: Juan Taylor  Date:    01/08/2025  Time:    12:24    Assessment & Plan     Acute on Chronic COPD Exacerbation due to  Human Rhino virus/Enterovirus  - Solu-Medrol 60 mg IV q.12 hours x7 days  - Incentive spirometer q.2 hours while awake  - Oxygen p.r.n. to keep O2 sat >90%  -  Respiratory panel pending  - Sputum culture pending  - DuoNeb breathing treatment q.4 hours    Leukocytosis, possibly related to current Glucocorticoid use  - Monitor daily labs (CBC, CMP, Mag)  - BC pending  - Lactic pending    Positive MRSA/Recent hospitalization  - CT chest without pending (r/o infiltrates)  - Pharmacy to dose vanc  - Zosyn 4.5 G Q 8 hours  - Lactic pending    Nicotine Dependence  -Smoking cessation counseling      VTE Prophylaxis:  SCDs, Lovenox 40 mg subQ Q daily     I, GADIEL Hernandez have discussed this patients case with Dr. Dean who agrees with the diagnosis and treatment plan.     ________________________________________________________________________  I, Dr. Bautista Dean assumed care of this patient.  For the  patient encounter, I performed the substantive portion of the visit, I reviewed the NPPA documentation, treatment plan, and medical decision making.  I had face to face time with this patient.  I have personally reviewed the labs and test results that are presently available. I have reviewed or attempted to review medical records based upon their availability. If patient was admitted under observational status it is with my approval.      Pleasant 79-year-old female presents with COPD exacerbation on exam she has diffuse end-expiratory wheezing no accessory muscle use.  Continue supportive care, steroids, nebs.      Time seen:  11:00 p.m. 1/8  Bautista Dean MD

## 2025-01-09 NOTE — PLAN OF CARE
Problem: Physical Therapy  Goal: Physical Therapy Goal  Description: Goals to be met by: 25     Patient will increase functional independence with mobility by performin. Supine to sit with [place]  2. Sit to supine with [place]  3. Sit to stand transfer with Modified [place]  4. Gait  x 300 feet with Modified [place] using Rolling Walker.     Outcome: Progressing

## 2025-01-09 NOTE — PLAN OF CARE
Problem: Occupational Therapy  Goal: Occupational Therapy Goal  Description:   Pt will complete toilet t/f supervision with RW  Pt will complete toileting tasks mod I  Pt will complete grooming standing at sink mod I  Pt will increase standing endurance x 10 minutes supervision for ADL participation  Outcome: Progressing

## 2025-01-09 NOTE — PROGRESS NOTES
[facility] Hospital Medicine Progress Note        Chief Complaint: Inpatient Follow-up     HPI:    79 year old F with a PMH of HDL, PAD currently on Plavix, HTN, COPD who wears 2 L nasal cannula p.r.n. at home, lung cancer receives radiation every 3 weeks with her last treatment being last Thursday and not currently receiving chemo, who presented to the ED with complaints of shortness of breath and productive cough. Patient explains that her shortness of breath began 1 week ago and has gotten progressively worse, and has been accompanied by a productive cough with sputum yellow in color.  Patient explains that she wears 2 L nasal cannula at home as needed and denies having to increase her oxygen use. Patient states that she was recently diagnosed with pneumonia and has completed her outpatient antibiotics, and has been taking prednisone daily.  Patient denies fever, denies chest pain, denies nausea vomiting, denies diarrhea, denies sick contacts, denies abdominal pain.  Patient currently resting comfortably on 2 L nasal cannula.        Initial ED vitals:  Temp 98.6°, HR 77, respirations 24, /77, O2 sat 93% on 4 L nasal cannula.  ED workup revealed WBC 17.66, Na 128, chloride 92, albumin 2.9, .1, chest x-ray read no acute cardiopulmonary process identified, EKG showed normal sinus rhythm at 82 beats per minute.  Patient was given albuterol nebulizer solution 10 mg, Decadron 8 mg IV in the ED, and admitted to Hospital Medicine.    Interval Hx:  Patient doing okay this morning.  Currently on 4 L nasal cannula.  She normally uses 2 L at home.  Mild cough.  Overall feels like she is making some improvement.   at the bedside.  We discussed continuing her steroids and starting her on some fluids for her hyponatremia.  Likely going to be here a day or 2 getting her breathing back at baseline    Objective/physical exam:  General: Appears comfortable  HEENT: NC/AT  Neck:  No  JVD  Chest: Expiratory rhonchi heard bilaterally, slight wheezing heard bilaterally  CVS: Regular rhythm. Normal S1/S2.  Abdomen: nondistended, normoactive BS, soft and non-tender.  MSK: No obvious deformity or joint swelling  Skin: Warm and dry  Neuro: AAOx3, no focal neurological deficit  Psych: Cooperative    VITAL SIGNS: 24 HRS MIN & MAX LAST   Temp  Min: 98.2 °F (36.8 °C)  Max: 98.6 °F (37 °C) 98.2 °F (36.8 °C)   BP  Min: 114/94  Max: 188/77 (!) 152/72   Pulse  Min: 55  Max: 85  (!) 59   Resp  Min: 13  Max: 24 20   SpO2  Min: 92 %  Max: 100 % (!) 92 %       Recent Labs   Lab 01/08/25  1144 01/09/25 0458   WBC 17.66* 12.61*   RBC 4.47 3.95*   HGB 11.9* 10.5*   HCT 36.3* 32.5*   MCV 81.2 82.3   MCH 26.6* 26.6*   MCHC 32.8* 32.3*   RDW 17.0 16.7    347   MPV 8.6 8.4       Recent Labs   Lab 01/08/25  1144 01/09/25 0458   * 129*   K 4.0 4.3   CL 92* 91*   CO2 30 33*   BUN 10.2 12.5   CREATININE 0.75 0.72   CALCIUM 9.2 8.5   MG  --  2.00   ALBUMIN 2.9* 2.7*   ALKPHOS 67 59   ALT 14 12   AST 17 16   BILITOT 0.5 0.4          Microbiology Results (last 7 days)       Procedure Component Value Units Date/Time    Blood Culture [[id number]] Collected: 01/08/25 2301    Order Status: Resulted Specimen: Blood Updated: 01/09/25 0012    Blood Culture [[id number]] Collected: 01/08/25 2301    Order Status: Resulted Specimen: Blood Updated: 01/09/25 0012    Respiratory Culture [[id number]] Collected: 01/08/25 1931    Order Status: Sent Specimen: Sputum, Expectorated Updated: 01/08/25 1931             Radiology:  X-Ray Chest 1 View  Narrative: EXAMINATION:  XR CHEST 1 VIEW    CLINICAL HISTORY:  shortness of breath;    TECHNIQUE:  One view    COMPARISON:  December 2, 2024.    FINDINGS:  Cardiopericardial silhouette is within normal limits.  Right chest implanted tunnel jose catheter terminates within the very proximal superior vena cava.  Portion of the catheter tubing appears to cannulate the internal jugular vein.   No acute dense focal or segmental consolidation, congestive process, pleural effusions or pneumothorax.  Impression: No acute cardiopulmonary process identified.    Electronically signed by: Juan Taylor  Date:    01/08/2025  Time:    12:24        Medications:  Scheduled Meds:   albuterol-ipratropium  3 mL Nebulization Q4H    atorvastatin  20 mg Oral QHS    carvediloL  6.25 mg Oral BID    clopidogreL  75 mg Oral Daily    enoxparin  40 mg Subcutaneous Daily    furosemide  20 mg Oral Daily    levothyroxine  125 mcg Oral Before breakfast    losartan  50 mg Oral Daily    methylPREDNISolone injection (PEDS and ADULTS)  60 mg Intravenous Q12H    mupirocin   Nasal BID    nicotine  1 patch Transdermal Daily    piperacillin-tazobactam (Zosyn) IV (PEDS and ADULTS) (extended infusion is not appropriate)  4.5 g Intravenous Q8H    [START ON 1/10/2025] vancomycin 1,250 mg in D5W 250 mL IVPB (admixture device)  1,250 mg Intravenous Q24H    vancomycin 1,500 mg in D5W 250 mL IVPB (admixture device)  1,500 mg Intravenous Once     Continuous Infusions:  PRN Meds:.  Current Facility-Administered Medications:     acetaminophen, 1,000 mg, Oral, Q6H PRN    aluminum-magnesium hydroxide-simethicone, 30 mL, Oral, QID PRN    benzonatate, 100 mg, Oral, TID PRN    bisacodyL, 10 mg, Rectal, Daily PRN    dextrose 50%, 12.5 g, Intravenous, PRN    dextrose 50%, 25 g, Intravenous, PRN    glucagon (human recombinant), 1 mg, Intramuscular, PRN    glucose, 16 g, Oral, PRN    glucose, 24 g, Oral, PRN    guaiFENesin 100 mg/5 ml, 200 mg, Oral, Q4H PRN    melatonin, 6 mg, Oral, Nightly PRN    ondansetron, 4 mg, Intravenous, Q4H PRN    polyethylene glycol, 17 g, Oral, BID PRN    sodium chloride 0.9%, 10 mL, Intravenous, PRN    vancomycin - pharmacy to dose, , Intravenous, pharmacy to manage frequency    Nutrition:  Nutrition consulted. Most recent weight and BMI monitored-     Measurements:  Wt Readings from Last 1 Encounters:   01/08/25 64 kg (141 lb 1.5  oz)   Body mass index is 24.22 kg/m².    Patient has been screened and assessed by RD.    Malnutrition Type:  Context:    Level:      Malnutrition Characteristic Summary:       Interventions/Recommendations (treatment strategy):           Assessment/Plan:   COPD exacerbation  Acute on chronic hypoxemia   Rhino virus/enterovirus   Chronic steroid use, immunocompromise state  Hyperlipidemia   Peripheral vascular disease on Plavix   Hypertension   Lung cancer receiving XRT    Patient appears to be doing well.  She did have a mild leukocytosis but otherwise he is not appear to be acutely septic.  She is started on broad-spectrum IV antibiotics.  No focal consolidation noted on chest x-ray.  I think we can deescalate these today and treat symptomatically.  I will however put her on some oral Levaquin.    Continue to wean her O2 as tolerated.    Steroid taper   Will keep her on some IV fluids.  Noted hyponatremia which is new from previous admission.  She may have some degree of SIADH.  We would benefit from an outpatient workup but at this point she just looks dehydrated.  Hold Lasix for now  Blood pressure is elevated.  Will make sure she is back on her home regimen.      Critical care diagnosis: acute hypoxemic respiratory failure requiring high-flow oxygen  Critical care interventions: hands on evaluation, review of labs/radiographs/records and discussions with family  Critical care time spent: >32 minutes        Franklin Boone MD   01/09/2025     All diagnosis and differential diagnosis have been reviewed; assessment and plan has been documented; I have personally reviewed the labs and test results that are presently available; I have reviewed the patients medication list; I have reviewed the consulting providers response and recommendations. I have reviewed or attempted to review medical records based upon their availability    All of the patient's questions have been  addressed and answered. Patient's is agreeable  to the above stated plan. I will continue to monitor closely and make adjustments to medical management as needed.  _____________________________________________________________________

## 2025-01-09 NOTE — PLAN OF CARE
Problem: Adult Inpatient Plan of Care  Goal: Plan of Care Review  Outcome: Progressing  Goal: Patient-Specific Goal (Individualized)  Outcome: Progressing  Goal: Absence of Hospital-Acquired Illness or Injury  Outcome: Progressing  Goal: Optimal Comfort and Wellbeing  Outcome: Progressing  Goal: Readiness for Transition of Care  Outcome: Progressing     Problem: Pneumonia  Goal: Fluid Balance  Outcome: Progressing  Goal: Resolution of Infection Signs and Symptoms  Outcome: Progressing  Goal: Effective Oxygenation and Ventilation  Outcome: Progressing     Problem: Infection  Goal: Absence of Infection Signs and Symptoms  Outcome: Progressing     Problem: Wound  Goal: Optimal Coping  Outcome: Progressing  Goal: Optimal Functional Ability  Outcome: Progressing  Goal: Absence of Infection Signs and Symptoms  Outcome: Progressing  Goal: Improved Oral Intake  Outcome: Progressing  Goal: Optimal Pain Control and Function  Outcome: Progressing  Goal: Skin Health and Integrity  Outcome: Progressing  Goal: Optimal Wound Healing  Outcome: Progressing     Problem: Fall Injury Risk  Goal: Absence of Fall and Fall-Related Injury  Outcome: Progressing

## 2025-01-09 NOTE — PROGRESS NOTES
"Pharmacokinetic Initial Assessment: IV Vancomycin    Assessment/Plan:    Initiate intravenous vancomycin with loading dose of 1500 mg once followed by a maintenance dose of vancomycin 1250 mg IV every 24 hours  Desired empiric serum trough concentration is 15 to 20 mcg/mL  Draw vancomycin trough level 60 min prior to third dose on 1/11 at approximately 0700  Pharmacy will continue to follow and monitor vancomycin.      Please contact pharmacy at extension [phone] with any questions regarding this assessment.     Thank you for the consult,   Morenita Paul       Patient brief summary:  Pratibha Patel is a 79 y.o. female initiated on antimicrobial therapy with IV Vancomycin for treatment of suspected lower respiratory infection  MRSA PCR (+)    Drug Allergies:   Review of patient's allergies indicates:   Allergen Reactions    Lisinopril Swelling       Actual Body Weight:   Wt Readings from Last 1 Encounters:   01/08/25 64 kg (141 lb 1.5 oz)       Renal Function:   Estimated Creatinine Clearance: 52.5 mL/min (based on SCr of 0.75 mg/dL).,     Dialysis Method (if applicable):  N/A    CBC (last 72 hours):  Recent Labs   Lab Result Units 01/08/25  1144   WBC x10(3)/mcL 17.66*   Hgb g/dL 11.9*   Hct % 36.3*   Platelet x10(3)/mcL 390   Mono % % 5.3   Eos % % 0.2   Basophil % % 0.1       Metabolic Panel (last 72 hours):  Recent Labs   Lab Result Units 01/08/25  1144   Sodium mmol/L 128*   Potassium mmol/L 4.0   Chloride mmol/L 92*   CO2 mmol/L 30   Glucose mg/dL 82   Blood Urea Nitrogen mg/dL 10.2   Creatinine mg/dL 0.75   Albumin g/dL 2.9*   Bilirubin Total mg/dL 0.5   ALP unit/L 67   AST unit/L 17   ALT unit/L 14       Drug levels (last 3 results):  No results for input(s): "VANCOMYCINRA", "VANCORANDOM", "VANCOMYCINPE", "VANCOPEAK", "VANCOMYCINTR", "VANCOTROUGH" in the last 72 hours.    Microbiologic Results:  Microbiology Results (last 7 days)       Procedure Component Value Units Date/Time    Blood Culture [[id number]] " Collected: 01/08/25 2301    Order Status: Resulted Specimen: Blood Updated: 01/09/25 0012    Blood Culture [[id number]] Collected: 01/08/25 2301    Order Status: Resulted Specimen: Blood Updated: 01/09/25 0012    Respiratory Culture [[id number]] Collected: 01/08/25 1931    Order Status: Sent Specimen: Sputum, Expectorated Updated: 01/08/25 1931

## 2025-01-09 NOTE — PLAN OF CARE
01/09/25 1602   Discharge Assessment   Assessment Type Discharge Planning Brief Assessment   Confirmed/corrected address, phone number and insurance Yes   Confirmed Demographics Correct on Facesheet   Source of Information patient;family   Reason For Admission COPD(e)   People in Home spouse   Do you expect to return to your current living situation? Yes   Do you have help at home or someone to help you manage your care at home? Yes   Who are your caregiver(s) and their phone number(s)?  spouse # [phone]   Prior to hospitilization cognitive status: Alert/Oriented   Current cognitive status: Alert/Oriented   Walking or Climbing Stairs Difficulty no   Dressing/Bathing Difficulty no   Home Layout Able to live on 1st floor   Equipment Currently Used at Home glucometer;oxygen;other (see comments);walker, rolling;wheelchair;shower chair  (Pulse ox)   Do you currently have service(s) that help you manage your care at home? Yes   Name and Contact number of agency [facility]# [phone]   Is the pt/caregiver preference to resume services with current agency Yes   Do you take prescription medications? Yes   Do you have prescription coverage? Yes   Coverage Medicare Drug Plan   Do you have any problems affording any of your prescribed medications? No   Is the patient taking medications as prescribed? yes   Who is going to help you get home at discharge? Spouse Ganesh   How do you get to doctors appointments? family or friend will provide   Are you on dialysis? No   Do you take coumadin? Yes   Who monitors your labs? Graham Nguyen NP  PCP   Discharge Plan A Home with family;Home Health   DME Needed Upon Discharge  none   Discharge Plan discussed with: Patient;Spouse/sig other   Name(s) and Number(s) Mr Andersen  # [phone]  or raj Siddiqui # [phone]   Transition of Care Barriers None     Pt and spouse at bedside. Spouse and dgter will provide assistance at home if  needed.  They state that spouse will provide transportation home.  DME at Home- Glucometer, Pulse Ox, RW, W/C, Shower chair, Home O2. They do not know provider as the are not literate to read tag on DME, and can't remember name.  They both state she does not have any portables at home, so if needed will need to obtain.  She has HH services and can;t remember agency- researched EMR and [facility] was listed on Dec. D/C info ph# [phone].  PCP  Graham Nguyen NP.  Pharmacy [facility].

## 2025-01-09 NOTE — PT/OT/SLP EVAL
"Occupational Therapy   Evaluation    Name: Pratibha Patel  MRN: [id number]  ED due to: SOB , cough  Admitting Diagnosis: acute on chronic COPD exacerbation due to human rhino virus/enterovirus, leukocytosis   Med hx: HDL, PAD currently on Plavix, HTN, COPD who wears 2 L nasal cannula p.r.n. at home, lung cancer receives radiation every 3 weeks   Recent Surgery: * No surgery found *      Recommendations:     Discharge Recommendations: Low Intensity Therapy  Discharge Equipment Recommendations:  none  Barriers to discharge:  None    Assessment:     Pratibha Patel is a 79 y.o. female with a medical diagnosis of acute on chronic COPD exacerbation due to human rhino virus/enterovirus, leukocytosis   She presents with good participation and motivation to return home. Performance deficits affecting function: weakness, impaired endurance, impaired self care skills, impaired balance.  Pt close to return to baseline per pt report. Continue OT services 2x/week to prevent hospital debility in order to return to home with spouse.    Rehab Prognosis: Good; patient would benefit from acute skilled OT services to address these deficits and reach maximum level of function.       Plan:     Patient to be seen 2 x/week to address the above listed problems via self-care/home management, therapeutic activities, therapeutic exercises  Plan of Care Expires:    Plan of Care Reviewed with: patient    Subjective     Chief Complaint: no c/o  Patient/Family Comments/goals: "I want to get well. I want to do what I got to do to be able to do what I used to do. I just get tired"    Occupational Profile:  Living Environment: pt resides at home with spouse, single level home, no steps, walk in shower with shower chair  Previous level of function: pt was receiving home health services; required assistance with bathing from  services, spouse assists with LE dressing (threading LE's into clothing items), otherwise pt able to complete ADL tasks mod I - " "supervision level.   Roles and Routines: spouse and daughters complete  IADL tasks  Equipment Used at Home:  (RW, w/c, BSC, oxygen (2L), shower chair)  Assistance upon Discharge: spouse, children    Pain/Comfort:  Pain Rating 1: 0/10    Patients cultural, spiritual, Bahai conflicts given the current situation:      Objective:     Communicated with: nurse prior to session.  Patient found up in chair with peripheral IV, oxygen, telemetry, pulse ox (continuous), PureWick upon OT entry to room.    General Precautions: Standard,    Orthopedic Precautions:    Braces:    Respiratory Status: Nasal cannula, flow 2 L/min  /70 HR 63 O2 88-93%    Occupational Performance:    Bed Mobility:    NT pt uic    Functional Mobility/Transfers:  Patient completed Sit <> Stand Transfer with supervision  with  rolling walker   Patient completed Toilet Transfer Step Transfer technique with minimum assistance with  rolling walker and grab bars  Functional Mobility: pt ambulated chair<>bathroom toilet renate 10 ft with RW supervision    Activities of Daily Living:  Grooming: declined "I'm waiting for my  to bring my stuff", will work on completing tasks in standing   Lower Body Dressing: total A doff/ever B socks, pt able to doff mesh undergarments I'ly, req'd assistance to thread B LE into mesh undergarments, pt able to pull over hips in standing supervision  Toileting: purewick was not working properly, pt soiled with urine upon arrival. +void on toilet, supervision pericleaning in standing, clothing management min A     Cognitive/Visual Perceptual:  Cognitive/Psychosocial Skills:     -       Oriented to: Person, Place, Situation, and mod cueing for time (baseline)   -       Follows Commands/attention:Follows multistep  commands  -       Safety awareness/insight to disability: intact   -       Mood/Affect/Coping skills/emotional control: Cooperative and Pleasant    Physical Exam:  Balance: -       dynamic sitting balance " supervision, minimal reaching out of base of support, static standing balance mod I, dynamic standing balance supervision  Dominant hand: -       R handed  Upper Extremity Range of Motion:     -       Right Upper Extremity: WNL  -       Left Upper Extremity: WNL  Upper Extremity Strength:    -       Right Upper Extremity: WFL  -       Left Upper Extremity: WFL    Skin integrity: foam dressing in place on sacrum, foam dressing in place on L heel     Treatment & Education:  Education on OT POC, voiding on toilet verus use of Purewick (functional status relayed to CNA, recommend removal of purewick )    Patient left up in chair with all lines intact, call button in reach, and nurse notified    GOALS:   Multidisciplinary Problems       Occupational Therapy Goals          Problem: Occupational Therapy    Goal Priority Disciplines Outcome Interventions   Occupational Therapy Goal     OT, PT/OT Progressing    Description: Pt will complete toilet t/f supervision with RW  Pt will complete toileting tasks mod I  Pt will complete grooming standing at sink mod I  Pt will increase standing endurance x 10 minutes supervision for ADL participation                       History:     Past Medical History:   Diagnosis Date    Adenocarcinoma of lung     Anemia     Anxiety     Arthritis     COPD (chronic obstructive pulmonary disease)     Depression     HLD (hyperlipidemia)     Hypertension     Lung cancer     Secondary malignant neoplasm of lymph nodes     Secondary malignant neoplasm of right lung     Thyroid disease     lymph nodes malignant         Past Surgical History:   Procedure Laterality Date    BLADDER SURGERY      CHOLECYSTECTOMY      COLONOSCOPY  2018    COLONOSCOPY N/A 9/9/2024    Procedure: SIG;  Surgeon: Saurav Hutson MD;  Location: [facility] ENDOSCOPY;  Service: Gastroenterology;  Laterality: N/A;    ENDOBRONCHIAL ULTRASOUND N/A 11/16/2022    Procedure: ENDOBRONCHIAL ULTRASOUND (EBUS);  Surgeon: Anthony NICHOLAS  MD Caryl;  Location: [facility] BRONCHOSCOPY LAB;  Service: Pulmonary;  Laterality: N/A;    HYSTERECTOMY      KNEE SURGERY Bilateral     replaced knees    PARTIAL HIP ARTHROPLASTY Bilateral     SIGMOIDOSCOPY, WITH BIOPSY N/A 9/9/2024    Procedure: SIGMOIDOSCOPY, WITH BIOPSY;  Surgeon: Saurav Hutson MD;  Location: [facility] ENDOSCOPY;  Service: Gastroenterology;  Laterality: N/A;       Time Tracking:     OT Date of Treatment: 01/09/25  OT Start Time: 1006  OT Stop Time: 1040  OT Total Time (min): 34 min    Billable Minutes:Evaluation mod comp    1/9/2025

## 2025-01-09 NOTE — PROGRESS NOTES
Inpatient Nutrition Evaluation    Admit Date: 1/8/2025   Total duration of encounter: 1 day   Patient Age: 79 y.o.    Nutrition Recommendation/Prescription     -Continue Regular Diet as tolerated.   -Monitor wt, labs, and intake.     Nutrition Assessment     Chart Review    Reason Seen: continuous nutrition monitoring    Malnutrition Screening Tool Results   Have you recently lost weight without trying?: No  Have you been eating poorly because of a decreased appetite?: No   MST Score: 0   Diagnosis:  COPD exacerbation  Acute on chronic hypoxemia   Rhino virus/enterovirus   Chronic steroid use, immunocompromise state  Hyperlipidemia   Peripheral vascular disease on Plavix   Hypertension   Lung cancer receiving XRT    Relevant Medical History: HDL, PAD currently on Plavix, HTN, COPD     Scheduled Medications:  albuterol-ipratropium, 3 mL, Q4H  atorvastatin, 20 mg, QHS  carvediloL, 6.25 mg, BID  clopidogreL, 75 mg, Daily  enoxparin, 40 mg, Daily  levoFLOXacin, 750 mg, Daily  levothyroxine, 125 mcg, Before breakfast  losartan, 50 mg, Daily  methylPREDNISolone injection (PEDS and ADULTS), 60 mg, Q12H  mupirocin, , BID  nicotine, 1 patch, Daily    Continuous Infusions:  0.9% NaCl, Last Rate: 75 mL/hr at 01/09/25 0748    PRN Medications:   Current Facility-Administered Medications:     acetaminophen, 1,000 mg, Oral, Q6H PRN    aluminum-magnesium hydroxide-simethicone, 30 mL, Oral, QID PRN    benzonatate, 100 mg, Oral, TID PRN    bisacodyL, 10 mg, Rectal, Daily PRN    dextrose 50%, 12.5 g, Intravenous, PRN    dextrose 50%, 25 g, Intravenous, PRN    glucagon (human recombinant), 1 mg, Intramuscular, PRN    glucose, 16 g, Oral, PRN    glucose, 24 g, Oral, PRN    guaiFENesin 100 mg/5 ml, 200 mg, Oral, Q4H PRN    melatonin, 6 mg, Oral, Nightly PRN    ondansetron, 4 mg, Intravenous, Q4H PRN    polyethylene glycol, 17 g, Oral, BID PRN    sodium chloride 0.9%, 10 mL, Intravenous, PRN    Recent Labs   Lab 01/08/25  1144  "01/09/25  0458   * 129*   K 4.0 4.3   CALCIUM 9.2 8.5   MG  --  2.00   CL 92* 91*   CO2 30 33*   BUN 10.2 12.5   CREATININE 0.75 0.72   EGFRNORACEVR >60 >60   GLUCOSE 82 124*   BILITOT 0.5 0.4   ALKPHOS 67 59   ALT 14 12   AST 17 16   ALBUMIN 2.9* 2.7*   WBC 17.66* 12.61*   HGB 11.9* 10.5*   HCT 36.3* 32.5*     Nutrition Orders:  Diet Adult Regular Standard Tray      Appetite/Oral Intake: good/% of meals  Factors Affecting Nutritional Intake: none identified  Food/Caodaism/Cultural Preferences: none reported  Food Allergies: no known food allergies  Last Bowel Movement: 01/03/25  Wound(s):  dermatitis per EMR    Comments    1/9/25: Pt reports good appetite/intake, tolerating diet; reports usual wt of 133 lbs.     Anthropometrics    Height: 5' 4" (162.6 cm), Height Method: Stated  Last Weight: 64 kg (141 lb 1.5 oz) (01/08/25 2330), Weight Method: Bed Scale  BMI (Calculated): 24.2  BMI Classification: normal (BMI 18.5-24.9)     Ideal Body Weight (IBW), Female: 120 lb     % Ideal Body Weight, Female (lb): 117.58 %                             Usual Weight Provided By: patient    Wt Readings from Last 5 Encounters:   01/08/25 64 kg (141 lb 1.5 oz)   12/20/24 60 kg (132 lb 4.4 oz)   12/16/24 60 kg (132 lb 4.4 oz)   12/02/24 64.9 kg (143 lb)   11/28/24 65.4 kg (144 lb 2.9 oz)     Weight Change(s) Since Admission:   Wt Readings from Last 1 Encounters:   01/08/25 2330 64 kg (141 lb 1.5 oz)   01/08/25 1116 60.3 kg (133 lb)   Admit Weight: 60.3 kg (133 lb) (01/08/25 1116), Weight Method: Stated    Patient Education     Not applicable.    Nutrition Goals & Monitoring     Dietitian will monitor: energy intake and weight    Nutrition Risk/Follow-Up: low (follow-up in 5-7 days)  Patients assigned 'low nutrition risk' status do not qualify for a full nutritional assessment but will be monitored and re-evaluated in a 5-7 day time period. Please consult if re-evaluation needed sooner.   "

## 2025-01-09 NOTE — PT/OT/SLP EVAL
Physical Therapy Evaluation    Patient Name:  Pratibha Patel   MRN:  [id number]    Recommendations:     Discharge therapy intensity: Low Intensity Therapy   Discharge Equipment Recommendations: none   Barriers to discharge: Ongoing medical needs    Assessment:     Pratibha Patel is a 79 y.o. female admitted with a medical diagnosis of COPD exacerbation. Prior to admit, patient lived with spouse in a [facility]. At baseline, she requires assistance with ADL's and ambulates with a RW.  She presents with the following impairments/functional limitations: weakness, impaired endurance, gait instability, impaired balance, impaired self care skills, impaired functional mobility, decreased safety awareness. Patient mobilized OOB and ambulated 150 ft with RW & CGA. Slowed pace. Sats 89%-93% while on 2 L/min O2 via nasal cannula. Patient to benefit from skilled PT to address deficits, improve functional mobility, and progress towards functional independence. Recommending low intensity therapy at discharge. Progress as tolerated.    Rehab Prognosis: Good; patient would benefit from acute skilled PT services to address these deficits and reach maximum level of function.    Recent Surgery: * No surgery found *      Plan:     During this hospitalization, patient would benefit from acute PT services 5 x/week to address the identified rehab impairments via gait training, therapeutic exercises, neuromuscular re-education, therapeutic activities and progress toward the following goals:    Plan of Care Expires:  02/09/25    Subjective     Chief Complaint: none  Patient/Family Comments/goals: none  Pain/Comfort:  Pain Rating 1: 0/10    Patients cultural, spiritual, Moravian conflicts given the current situation: no    Living Environment:  Prior to admit, patient lived with spouse in a [facility]. At baseline, she requires assistance with ADL's and ambulates with a RW.Equipment used at home: walker, rolling (Owns WC and shower chair).  DME owned (not  currently used): shower chair and wheelchair.  Upon discharge, patient will have assistance from spouse.    Objective:     Communicated with nurse prior to session.  Patient found supine with telemetry, oxygen, pulse ox (continuous), peripheral IV  upon PT entry to room.    General Precautions: Standard, fall  Orthopedic Precautions:N/A   Braces: N/A  Respiratory Status: Nasal cannula, flow 2 L/min. Sats 89%-93% during session    Exams:  Cognitive Exam:  Patient is oriented to Person, Place, Time, and Situation  Sensation: -       Intact  RLE ROM: WFL  RLE Strength: 4/5 grossly  LLE ROM: WFL  LLE Strength: 4/5 grossly  Skin integrity: Visible skin intact      Functional Mobility:  Bed Mobility:  Supine to Sit: stand by assistance  Transfers:  Sit to Stand:  contact guard assistance with rolling walker  Gait: 150 ft with RW & CGA. Slowed pace. Sats 89%-93% while on 2 L/min O2 via nasal cannula.  Balance: fair      AM-PAC 6 CLICK MOBILITY  Total Score:20     Education Provided:  Role and goals of PT, transfer training, bed mobility, gait training, balance training, safety awareness, assistive device, strengthening exercises, and importance of participating in PT to return to PLOF.    Patient left up in chair with all lines intact, call button in reach, and educated on calling for assistance when ready to return to bed .    GOALS:   Multidisciplinary Problems       Physical Therapy Goals          Problem: Physical Therapy    Goal Priority Disciplines Outcome Interventions   Physical Therapy Goal     PT, PT/OT Progressing    Description: Goals to be met by: 25     Patient will increase functional independence with mobility by performin. Supine to sit with [place]  2. Sit to supine with [place]  3. Sit to stand transfer with Modified [place]  4. Gait  x 300 feet with Modified [place] using Rolling Walker.                          History:     Past Medical History:   Diagnosis Date     Adenocarcinoma of lung     Anemia     Anxiety     Arthritis     COPD (chronic obstructive pulmonary disease)     Depression     HLD (hyperlipidemia)     Hypertension     Lung cancer     Secondary malignant neoplasm of lymph nodes     Secondary malignant neoplasm of right lung     Thyroid disease     lymph nodes malignant       Past Surgical History:   Procedure Laterality Date    BLADDER SURGERY      CHOLECYSTECTOMY      COLONOSCOPY  2018    COLONOSCOPY N/A 9/9/2024    Procedure: SIG;  Surgeon: Saurav Hutson MD;  Location: [facility] ENDOSCOPY;  Service: Gastroenterology;  Laterality: N/A;    ENDOBRONCHIAL ULTRASOUND N/A 11/16/2022    Procedure: ENDOBRONCHIAL ULTRASOUND (EBUS);  Surgeon: Anthony Hutson MD;  Location: [facility] BRONCHOSCOPY LAB;  Service: Pulmonary;  Laterality: N/A;    HYSTERECTOMY      KNEE SURGERY Bilateral     replaced knees    PARTIAL HIP ARTHROPLASTY Bilateral     SIGMOIDOSCOPY, WITH BIOPSY N/A 9/9/2024    Procedure: SIGMOIDOSCOPY, WITH BIOPSY;  Surgeon: Saurav Hutson MD;  Location: [facility] ENDOSCOPY;  Service: Gastroenterology;  Laterality: N/A;       Time Tracking:     PT Received On: 01/09/25  PT Start Time: 0822     PT Stop Time: 0842  PT Total Time (min): 20 min     Billable Minutes: Evaluation 20 minutes      01/09/2025

## 2025-01-10 LAB
ANION GAP SERPL CALC-SCNC: 8 MEQ/L
BASOPHILS # BLD AUTO: 0.01 X10(3)/MCL
BASOPHILS NFR BLD AUTO: 0.1 %
BUN SERPL-MCNC: 14.7 MG/DL (ref 9.8–20.1)
CALCIUM SERPL-MCNC: 7.9 MG/DL (ref 8.4–10.2)
CHLORIDE SERPL-SCNC: 98 MMOL/L (ref 98–107)
CO2 SERPL-SCNC: 24 MMOL/L (ref 23–31)
CREAT SERPL-MCNC: 0.68 MG/DL (ref 0.55–1.02)
CREAT/UREA NIT SERPL: 22
EOSINOPHIL # BLD AUTO: 0 X10(3)/MCL (ref 0–0.9)
EOSINOPHIL NFR BLD AUTO: 0 %
ERYTHROCYTE [DISTWIDTH] IN BLOOD BY AUTOMATED COUNT: 16.8 % (ref 11.5–17)
GFR SERPLBLD CREATININE-BSD FMLA CKD-EPI: >60 ML/MIN/1.73/M2
GLUCOSE SERPL-MCNC: 99 MG/DL (ref 82–115)
HCT VFR BLD AUTO: 31.1 % (ref 37–47)
HGB BLD-MCNC: 9.9 G/DL (ref 12–16)
IMM GRANULOCYTES # BLD AUTO: 0.13 X10(3)/MCL (ref 0–0.04)
IMM GRANULOCYTES NFR BLD AUTO: 0.9 %
LYMPHOCYTES # BLD AUTO: 0.57 X10(3)/MCL (ref 0.6–4.6)
LYMPHOCYTES NFR BLD AUTO: 4.1 %
MCH RBC QN AUTO: 26.5 PG (ref 27–31)
MCHC RBC AUTO-ENTMCNC: 31.8 G/DL (ref 33–36)
MCV RBC AUTO: 83.2 FL (ref 80–94)
MONOCYTES # BLD AUTO: 0.35 X10(3)/MCL (ref 0.1–1.3)
MONOCYTES NFR BLD AUTO: 2.5 %
NEUTROPHILS # BLD AUTO: 12.88 X10(3)/MCL (ref 2.1–9.2)
NEUTROPHILS NFR BLD AUTO: 92.4 %
NRBC BLD AUTO-RTO: 0 %
PLATELET # BLD AUTO: 366 X10(3)/MCL (ref 130–400)
PMV BLD AUTO: 8.9 FL (ref 7.4–10.4)
POTASSIUM SERPL-SCNC: 5.2 MMOL/L (ref 3.5–5.1)
RBC # BLD AUTO: 3.74 X10(6)/MCL (ref 4.2–5.4)
SODIUM SERPL-SCNC: 130 MMOL/L (ref 136–145)
WBC # BLD AUTO: 13.94 X10(3)/MCL (ref 4.5–11.5)

## 2025-01-10 PROCEDURE — S4991 NICOTINE PATCH NONLEGEND: HCPCS

## 2025-01-10 PROCEDURE — 36415 COLL VENOUS BLD VENIPUNCTURE: CPT | Performed by: HOSPITALIST

## 2025-01-10 PROCEDURE — 99900035 HC TECH TIME PER 15 MIN (STAT)

## 2025-01-10 PROCEDURE — 94640 AIRWAY INHALATION TREATMENT: CPT

## 2025-01-10 PROCEDURE — 11000001 HC ACUTE MED/SURG PRIVATE ROOM

## 2025-01-10 PROCEDURE — 27000221 HC OXYGEN, UP TO 24 HOURS

## 2025-01-10 PROCEDURE — 80048 BASIC METABOLIC PNL TOTAL CA: CPT | Performed by: HOSPITALIST

## 2025-01-10 PROCEDURE — 94761 N-INVAS EAR/PLS OXIMETRY MLT: CPT

## 2025-01-10 PROCEDURE — 99900031 HC PATIENT EDUCATION (STAT)

## 2025-01-10 PROCEDURE — 85025 COMPLETE CBC W/AUTO DIFF WBC: CPT | Performed by: HOSPITALIST

## 2025-01-10 PROCEDURE — 97116 GAIT TRAINING THERAPY: CPT

## 2025-01-10 PROCEDURE — 25000003 PHARM REV CODE 250: Performed by: HOSPITALIST

## 2025-01-10 PROCEDURE — 94760 N-INVAS EAR/PLS OXIMETRY 1: CPT

## 2025-01-10 PROCEDURE — 63600175 PHARM REV CODE 636 W HCPCS: Mod: JZ,TB

## 2025-01-10 PROCEDURE — 25000003 PHARM REV CODE 250

## 2025-01-10 PROCEDURE — 25000242 PHARM REV CODE 250 ALT 637 W/ HCPCS: Performed by: NURSE PRACTITIONER

## 2025-01-10 RX ADMIN — IPRATROPIUM BROMIDE AND ALBUTEROL SULFATE 3 ML: .5; 3 SOLUTION RESPIRATORY (INHALATION) at 03:01

## 2025-01-10 RX ADMIN — METHYLPREDNISOLONE SODIUM SUCCINATE 60 MG: 40 INJECTION, POWDER, FOR SOLUTION INTRAMUSCULAR; INTRAVENOUS at 09:01

## 2025-01-10 RX ADMIN — IPRATROPIUM BROMIDE AND ALBUTEROL SULFATE 3 ML: .5; 3 SOLUTION RESPIRATORY (INHALATION) at 11:01

## 2025-01-10 RX ADMIN — LEVOTHYROXINE SODIUM 125 MCG: 125 TABLET ORAL at 05:01

## 2025-01-10 RX ADMIN — CLOPIDOGREL BISULFATE 75 MG: 75 TABLET ORAL at 09:01

## 2025-01-10 RX ADMIN — NICOTINE 1 PATCH: 14 PATCH TRANSDERMAL at 09:01

## 2025-01-10 RX ADMIN — CARVEDILOL 6.25 MG: 3.12 TABLET, FILM COATED ORAL at 09:01

## 2025-01-10 RX ADMIN — ATORVASTATIN CALCIUM 20 MG: 10 TABLET, FILM COATED ORAL at 09:01

## 2025-01-10 RX ADMIN — LEVOFLOXACIN 750 MG: 500 TABLET, FILM COATED ORAL at 09:01

## 2025-01-10 RX ADMIN — MUPIROCIN: 20 OINTMENT TOPICAL at 09:01

## 2025-01-10 RX ADMIN — IPRATROPIUM BROMIDE AND ALBUTEROL SULFATE 3 ML: .5; 3 SOLUTION RESPIRATORY (INHALATION) at 08:01

## 2025-01-10 RX ADMIN — IPRATROPIUM BROMIDE AND ALBUTEROL SULFATE 3 ML: .5; 3 SOLUTION RESPIRATORY (INHALATION) at 07:01

## 2025-01-10 RX ADMIN — LOSARTAN POTASSIUM 50 MG: 50 TABLET, FILM COATED ORAL at 09:01

## 2025-01-10 RX ADMIN — ENOXAPARIN SODIUM 40 MG: 40 INJECTION SUBCUTANEOUS at 09:01

## 2025-01-10 NOTE — PT/OT/SLP PROGRESS
Physical Therapy Treatment    Patient Name:  Pratibha Patel   MRN:  [id number]    Recommendations:     Discharge therapy intensity: Low Intensity Therapy   Discharge Equipment Recommendations: none  Barriers to discharge: None    Assessment:     Pratibha Patel is a 79 y.o. female admitted with a medical diagnosis of  COPD exacerbation .  She presents with the following impairments/functional limitations: weakness, gait instability, impaired balance, impaired endurance, impaired functional mobility, impaired self care skills     Rehab Prognosis: Good; patient would benefit from acute skilled PT services to address these deficits and reach maximum level of function.    Recent Surgery: * No surgery found *      Plan:     During this hospitalization, patient would benefit from acute PT services 5 x/week to address the identified rehab impairments via gait training, therapeutic activities, therapeutic exercises and progress toward the following goals:    Plan of Care Expires:  02/09/25    Subjective     Chief Complaint:   Patient/Family Comments/goals:   Pain/Comfort:         Objective:     Communicated with nurse prior to session.  Patient found up in chair with telemetry upon PT entry to room.     General Precautions: Standard, fall  Orthopedic Precautions: N/A  Braces: N/A  Respiratory Status: Nasal cannula, flow   L/min  Blood Pressure:   Skin Integrity: Visible skin intact      Functional Mobility:  Transfers:     Sit to Stand:  stand by assistance with rolling walker  Bed to Chair: stand by assistance with  rolling walker  using  Step Transfer  Gait: ptambulated sba with a rw  200ft    Therapeutic Activities/Exercises:      Education:  Patient provided with verbal education education regarding PT role/goals/POC.  Understanding was verbalized.     Patient left up in chair with all lines intact and call button in reach    GOALS:   Multidisciplinary Problems       Physical Therapy Goals          Problem: Physical Therapy     Goal Priority Disciplines Outcome Interventions   Physical Therapy Goal     PT, PT/OT Progressing    Description: Goals to be met by: 25     Patient will increase functional independence with mobility by performin. Supine to sit with [place]  2. Sit to supine with [place]  3. Sit to stand transfer with Modified [place]  4. Gait  x 300 feet with Modified [place] using Rolling Walker.                          Time Tracking:     PT Received On: 01/10/25  PT Start Time: 1145     PT Stop Time: 1200  PT Total Time (min): 15 min     Billable Minutes: Gait Training 15    Treatment Type: Treatment  PT/PTA: PT     Number of PTA visits since last PT visit: 1     01/10/2025

## 2025-01-10 NOTE — PROGRESS NOTES
[facility] Hospital Medicine Progress Note        Chief Complaint: Inpatient Follow-up     HPI:    79 year old F with a PMH of HDL, PAD currently on Plavix, HTN, COPD who wears 2 L nasal cannula p.r.n. at home, lung cancer receives radiation every 3 weeks with her last treatment being last Thursday and not currently receiving chemo, who presented to the ED with complaints of shortness of breath and productive cough. Patient explains that her shortness of breath began 1 week ago and has gotten progressively worse, and has been accompanied by a productive cough with sputum yellow in color.  Patient explains that she wears 2 L nasal cannula at home as needed and denies having to increase her oxygen use. Patient states that she was recently diagnosed with pneumonia and has completed her outpatient antibiotics, and has been taking prednisone daily.  Patient denies fever, denies chest pain, denies nausea vomiting, denies diarrhea, denies sick contacts, denies abdominal pain.  Patient currently resting comfortably on 2 L nasal cannula.        Initial ED vitals:  Temp 98.6°, HR 77, respirations 24, /77, O2 sat 93% on 4 L nasal cannula.  ED workup revealed WBC 17.66, Na 128, chloride 92, albumin 2.9, .1, chest x-ray read no acute cardiopulmonary process identified, EKG showed normal sinus rhythm at 82 beats per minute.  Patient was given albuterol nebulizer solution 10 mg, Decadron 8 mg IV in the ED, and admitted to Hospital Medicine.     Interval Hx:  No significant changes overnight.  Resting comfortably in the chair.   is present as well.  Feels like her breathing see any little bit better.  Just feeling fatigued.  She did work with PT and OT yesterday.     Objective/physical exam:  General: Appears comfortable  HEENT: NC/AT  Neck:  No JVD  Chest: Expiratory rhonchi heard bilaterally, slight wheezing heard bilaterally  CVS: Regular rhythm. Normal S1/S2.  Abdomen:  nondistended, normoactive BS, soft and non-tender.  MSK: No obvious deformity or joint swelling  Skin: Warm and dry  Neuro: AAOx3, no focal neurological deficit  Psych: Cooperative    VITAL SIGNS: 24 HRS MIN & MAX LAST   Temp  Min: 97.4 °F (36.3 °C)  Max: 97.8 °F (36.6 °C) 97.6 °F (36.4 °C)   BP  Min: 118/63  Max: 154/75 (!) 137/56   Pulse  Min: 57  Max: 72  (!) 58   Resp  Min: 16  Max: 20 18   SpO2  Min: 92 %  Max: 99 % 95 %       Recent Labs   Lab 01/08/25  1144 01/09/25  0458   WBC 17.66* 12.61*   RBC 4.47 3.95*   HGB 11.9* 10.5*   HCT 36.3* 32.5*   MCV 81.2 82.3   MCH 26.6* 26.6*   MCHC 32.8* 32.3*   RDW 17.0 16.7    347   MPV 8.6 8.4       Recent Labs   Lab 01/08/25  1144 01/09/25  0458 01/10/25  0520   * 129* 130*   K 4.0 4.3 5.2*   CL 92* 91* 98   CO2 30 33* 24   BUN 10.2 12.5 14.7   CREATININE 0.75 0.72 0.68   CALCIUM 9.2 8.5 7.9*   MG  --  2.00  --    ALBUMIN 2.9* 2.7*  --    ALKPHOS 67 59  --    ALT 14 12  --    AST 17 16  --    BILITOT 0.5 0.4  --           Microbiology Results (last 7 days)       Procedure Component Value Units Date/Time    Blood Culture [[id number]]  (Normal) Collected: 01/08/25 2301    Order Status: Completed Specimen: Blood Updated: 01/10/25 0200     Blood Culture No Growth At 24 Hours    Blood Culture [[id number]]  (Normal) Collected: 01/08/25 2301    Order Status: Completed Specimen: Blood Updated: 01/10/25 0200     Blood Culture No Growth At 24 Hours    Respiratory Culture [[id number]] Collected: 01/08/25 1931    Order Status: Completed Specimen: Sputum, Expectorated Updated: 01/09/25 0800     Respiratory Culture Normal respiratory kirill             Radiology:  CT Chest Without Contrast  Narrative: EXAMINATION:  CT CHEST WITHOUT CONTRAST    CLINICAL HISTORY:  Respiratory illness, nondiagnostic xray;    TECHNIQUE:  Helical acquisition through the chest performed without IV contrast. Three plane reconstructions made available for review.  mGycm. Automatic exposure  control, adjustment of mA/kV or iterative reconstruction technique was used to reduce radiation.    COMPARISON:  28 November 2024    FINDINGS:  No newly enlarged thoracic lymph nodes.    Heart is not significantly enlarged.  There are dense coronary artery calcifications.    There is no pleural effusion.  Overall slightly improved aeration of the lungs but similar area of nodular consolidation right lower lobe image 59 series 12.  Stable consolidation medial left upper lobe extending towards the apex.  Improved area of consolidation in the right middle lobe.  There is mild bronchiectasis with bronchial wall thickening.  There is some bibasilar atelectasis.    There are no acute findings in the imaged upper abdomen.  No acute osseous findings.  Impression: Overall slightly improved aeration of the lungs compared to November 2024 though there is a persistent area of nodular consolidation in the right lower lobe for which follow-up chest CT is recommended in 3 months.    Electronically signed by: Brody Silva  Date:    01/09/2025  Time:    09:56        Medications:  Scheduled Meds:   albuterol-ipratropium  3 mL Nebulization Q4H    atorvastatin  20 mg Oral QHS    carvediloL  6.25 mg Oral BID    clopidogreL  75 mg Oral Daily    enoxparin  40 mg Subcutaneous Daily    levoFLOXacin  750 mg Oral Daily    levothyroxine  125 mcg Oral Before breakfast    losartan  50 mg Oral Daily    methylPREDNISolone injection (PEDS and ADULTS)  60 mg Intravenous Q12H    mupirocin   Nasal BID    nicotine  1 patch Transdermal Daily     Continuous Infusions:   0.9% NaCl   Intravenous Continuous 75 mL/hr at 01/09/25 2145 New Bag at 01/09/25 2145     PRN Meds:.  Current Facility-Administered Medications:     acetaminophen, 1,000 mg, Oral, Q6H PRN    aluminum-magnesium hydroxide-simethicone, 30 mL, Oral, QID PRN    benzonatate, 100 mg, Oral, TID PRN    bisacodyL, 10 mg, Rectal, Daily PRN    dextrose 50%, 12.5 g, Intravenous, PRN    dextrose 50%,  25 g, Intravenous, PRN    glucagon (human recombinant), 1 mg, Intramuscular, PRN    glucose, 16 g, Oral, PRN    glucose, 24 g, Oral, PRN    guaiFENesin 100 mg/5 ml, 200 mg, Oral, Q4H PRN    melatonin, 6 mg, Oral, Nightly PRN    ondansetron, 4 mg, Intravenous, Q4H PRN    polyethylene glycol, 17 g, Oral, BID PRN    sodium chloride 0.9%, 10 mL, Intravenous, PRN    Nutrition:  Nutrition consulted. Most recent weight and BMI monitored-     Measurements:  Wt Readings from Last 1 Encounters:   01/08/25 64 kg (141 lb 1.5 oz)   Body mass index is 24.22 kg/m².    Patient has been screened and assessed by RD.    Malnutrition Type:  Context:    Level:      Malnutrition Characteristic Summary:       Interventions/Recommendations (treatment strategy):           Assessment/Plan:   COPD exacerbation  Acute on chronic hypoxemia   Rhino virus/enterovirus   Chronic steroid use, immunocompromise state  Hyperlipidemia   Peripheral vascular disease on Plavix   Hypertension   Lung cancer receiving XRT     Still with wheezing on exam this morning but better air flow.  Oxygenation is improved as well.  Currently on 2 L via mask which is her baseline.  We would like to get her another day of IV steroids and potentially could consider discharge tomorrow.    PT/OT recommending low intensity therapy.  Case management already on board and setting up with home health if not already arranged.    Continue oral Levaquin  Sodium improved with hydration.  Her potassium was mildly elevated as well.  Renal function stable.    Will hold fluids today.  See if she can keep up with her own oral intake.  His sodium continues to improve can go home tomorrow  Patient was on Lasix as an outpatient.  Could consider restarting half dose upon discharge.  Will see what her labs look like    Franklin Boone MD   01/10/2025     All diagnosis and differential diagnosis have been reviewed; assessment and plan has been documented; I have personally reviewed the labs and  test results that are presently available; I have reviewed the patients medication list; I have reviewed the consulting providers response and recommendations. I have reviewed or attempted to review medical records based upon their availability    All of the patient's questions have been  addressed and answered. Patient's is agreeable to the above stated plan. I will continue to monitor closely and make adjustments to medical management as needed.  _____________________________________________________________________

## 2025-01-10 NOTE — PLAN OF CARE
Problem: Adult Inpatient Plan of Care  Goal: Plan of Care Review  1/10/2025 1415 by Griselda Kraus RN  Outcome: Progressing  1/10/2025 1415 by Griselda Kraus RN  Outcome: Progressing  Goal: Patient-Specific Goal (Individualized)  1/10/2025 1415 by Griselda Kraus RN  Outcome: Progressing  1/10/2025 1415 by Griselda Kraus RN  Outcome: Progressing  Goal: Absence of Hospital-Acquired Illness or Injury  1/10/2025 1415 by Griselda Kraus RN  Outcome: Progressing  1/10/2025 1415 by Griselda Kraus RN  Outcome: Progressing  Goal: Optimal Comfort and Wellbeing  1/10/2025 1415 by Griselda Kraus RN  Outcome: Progressing  1/10/2025 1415 by Griselda Kraus RN  Outcome: Progressing  Goal: Readiness for Transition of Care  1/10/2025 1415 by Griselda Kraus RN  Outcome: Progressing  1/10/2025 1415 by Griselda Kraus RN  Outcome: Progressing     Problem: Pneumonia  Goal: Fluid Balance  1/10/2025 1415 by Griselda Kraus RN  Outcome: Progressing  1/10/2025 1415 by Griselda Kraus RN  Outcome: Progressing  Goal: Resolution of Infection Signs and Symptoms  1/10/2025 1415 by Griselda Kraus RN  Outcome: Progressing  1/10/2025 1415 by Griselda Kraus RN  Outcome: Progressing  Goal: Effective Oxygenation and Ventilation  1/10/2025 1415 by Griselda Kraus RN  Outcome: Progressing  1/10/2025 1415 by Griselda Kraus RN  Outcome: Progressing     Problem: Infection  Goal: Absence of Infection Signs and Symptoms  1/10/2025 1415 by Griselda Kraus RN  Outcome: Progressing  1/10/2025 1415 by Griselda Kraus RN  Outcome: Progressing     Problem: Wound  Goal: Optimal Coping  1/10/2025 1415 by Griselda Kraus RN  Outcome: Progressing  1/10/2025 1415 by Griselda Kraus RN  Outcome: Progressing  Goal: Optimal Functional Ability  1/10/2025 1415 by Griselda Kraus RN  Outcome: Progressing  1/10/2025 1415 by Griselda Kraus RN  Outcome: Progressing  Goal: Absence of Infection Signs and Symptoms  1/10/2025 1415 by Griselda Kraus RN  Outcome:  Progressing  1/10/2025 1415 by Griselda Kraus RN  Outcome: Progressing  Goal: Improved Oral Intake  1/10/2025 1415 by Griselda Kraus RN  Outcome: Progressing  1/10/2025 1415 by Griselda Kraus RN  Outcome: Progressing  Goal: Optimal Pain Control and Function  1/10/2025 1415 by Griselda Kraus RN  Outcome: Progressing  1/10/2025 1415 by Griselda Kraus RN  Outcome: Progressing  Goal: Skin Health and Integrity  1/10/2025 1415 by Griselda Kraus RN  Outcome: Progressing  1/10/2025 1415 by Griselda Kraus RN  Outcome: Progressing  Goal: Optimal Wound Healing  1/10/2025 1415 by Griselda Kraus RN  Outcome: Progressing  1/10/2025 1415 by Griselda Kraus RN  Outcome: Progressing     Problem: Fall Injury Risk  Goal: Absence of Fall and Fall-Related Injury  1/10/2025 1415 by Griselda Kraus RN  Outcome: Progressing  1/10/2025 1415 by Griselda Kraus RN  Outcome: Progressing

## 2025-01-11 LAB
ANION GAP SERPL CALC-SCNC: 6 MEQ/L
BACTERIA SPEC CULT: ABNORMAL
BASOPHILS # BLD AUTO: 0.02 X10(3)/MCL
BASOPHILS NFR BLD AUTO: 0.1 %
BUN SERPL-MCNC: 18.8 MG/DL (ref 9.8–20.1)
CALCIUM SERPL-MCNC: 8.3 MG/DL (ref 8.4–10.2)
CHLORIDE SERPL-SCNC: 98 MMOL/L (ref 98–107)
CO2 SERPL-SCNC: 28 MMOL/L (ref 23–31)
CREAT SERPL-MCNC: 0.78 MG/DL (ref 0.55–1.02)
CREAT/UREA NIT SERPL: 24
EOSINOPHIL # BLD AUTO: 0 X10(3)/MCL (ref 0–0.9)
EOSINOPHIL NFR BLD AUTO: 0 %
ERYTHROCYTE [DISTWIDTH] IN BLOOD BY AUTOMATED COUNT: 16.8 % (ref 11.5–17)
GFR SERPLBLD CREATININE-BSD FMLA CKD-EPI: >60 ML/MIN/1.73/M2
GLUCOSE SERPL-MCNC: 95 MG/DL (ref 82–115)
GRAM STN SPEC: ABNORMAL
GRAM STN SPEC: ABNORMAL
HCT VFR BLD AUTO: 32.3 % (ref 37–47)
HGB BLD-MCNC: 10.3 G/DL (ref 12–16)
IMM GRANULOCYTES # BLD AUTO: 0.16 X10(3)/MCL (ref 0–0.04)
IMM GRANULOCYTES NFR BLD AUTO: 1.1 %
LYMPHOCYTES # BLD AUTO: 0.51 X10(3)/MCL (ref 0.6–4.6)
LYMPHOCYTES NFR BLD AUTO: 3.6 %
MCH RBC QN AUTO: 26.5 PG (ref 27–31)
MCHC RBC AUTO-ENTMCNC: 31.9 G/DL (ref 33–36)
MCV RBC AUTO: 83.2 FL (ref 80–94)
MONOCYTES # BLD AUTO: 0.27 X10(3)/MCL (ref 0.1–1.3)
MONOCYTES NFR BLD AUTO: 1.9 %
NEUTROPHILS # BLD AUTO: 13.35 X10(3)/MCL (ref 2.1–9.2)
NEUTROPHILS NFR BLD AUTO: 93.3 %
NRBC BLD AUTO-RTO: 0 %
PLATELET # BLD AUTO: 391 X10(3)/MCL (ref 130–400)
PMV BLD AUTO: 8.7 FL (ref 7.4–10.4)
POTASSIUM SERPL-SCNC: 4.6 MMOL/L (ref 3.5–5.1)
RBC # BLD AUTO: 3.88 X10(6)/MCL (ref 4.2–5.4)
SODIUM SERPL-SCNC: 132 MMOL/L (ref 136–145)
WBC # BLD AUTO: 14.31 X10(3)/MCL (ref 4.5–11.5)

## 2025-01-11 PROCEDURE — 94640 AIRWAY INHALATION TREATMENT: CPT

## 2025-01-11 PROCEDURE — 25000003 PHARM REV CODE 250: Performed by: HOSPITALIST

## 2025-01-11 PROCEDURE — 99900035 HC TECH TIME PER 15 MIN (STAT)

## 2025-01-11 PROCEDURE — 99900031 HC PATIENT EDUCATION (STAT)

## 2025-01-11 PROCEDURE — 27000221 HC OXYGEN, UP TO 24 HOURS

## 2025-01-11 PROCEDURE — 36415 COLL VENOUS BLD VENIPUNCTURE: CPT | Performed by: HOSPITALIST

## 2025-01-11 PROCEDURE — 25000242 PHARM REV CODE 250 ALT 637 W/ HCPCS: Performed by: NURSE PRACTITIONER

## 2025-01-11 PROCEDURE — S4991 NICOTINE PATCH NONLEGEND: HCPCS

## 2025-01-11 PROCEDURE — 85025 COMPLETE CBC W/AUTO DIFF WBC: CPT | Performed by: HOSPITALIST

## 2025-01-11 PROCEDURE — 80048 BASIC METABOLIC PNL TOTAL CA: CPT | Performed by: HOSPITALIST

## 2025-01-11 PROCEDURE — 63600175 PHARM REV CODE 636 W HCPCS: Mod: JZ,TB

## 2025-01-11 PROCEDURE — 25000003 PHARM REV CODE 250

## 2025-01-11 PROCEDURE — 94760 N-INVAS EAR/PLS OXIMETRY 1: CPT

## 2025-01-11 RX ORDER — LEVOFLOXACIN 750 MG/1
750 TABLET ORAL DAILY
Qty: 5 TABLET | Refills: 0 | Status: SHIPPED | OUTPATIENT
Start: 2025-01-12 | End: 2025-01-17

## 2025-01-11 RX ORDER — PREDNISONE 10 MG/1
TABLET ORAL
Qty: 35 TABLET | Refills: 0 | Status: SHIPPED | OUTPATIENT
Start: 2025-01-11 | End: 2025-01-26

## 2025-01-11 RX ORDER — BENZONATATE 100 MG/1
200 CAPSULE ORAL 3 TIMES DAILY
Qty: 60 CAPSULE | Refills: 0 | Status: SHIPPED | OUTPATIENT
Start: 2025-01-11 | End: 2025-01-21

## 2025-01-11 RX ADMIN — CLOPIDOGREL BISULFATE 75 MG: 75 TABLET ORAL at 09:01

## 2025-01-11 RX ADMIN — LEVOTHYROXINE SODIUM 125 MCG: 125 TABLET ORAL at 05:01

## 2025-01-11 RX ADMIN — LOSARTAN POTASSIUM 50 MG: 50 TABLET, FILM COATED ORAL at 09:01

## 2025-01-11 RX ADMIN — METHYLPREDNISOLONE SODIUM SUCCINATE 60 MG: 40 INJECTION, POWDER, FOR SOLUTION INTRAMUSCULAR; INTRAVENOUS at 09:01

## 2025-01-11 RX ADMIN — MUPIROCIN: 20 OINTMENT TOPICAL at 09:01

## 2025-01-11 RX ADMIN — IPRATROPIUM BROMIDE AND ALBUTEROL SULFATE 3 ML: .5; 3 SOLUTION RESPIRATORY (INHALATION) at 07:01

## 2025-01-11 RX ADMIN — NICOTINE 1 PATCH: 14 PATCH TRANSDERMAL at 09:01

## 2025-01-11 RX ADMIN — LEVOFLOXACIN 750 MG: 500 TABLET, FILM COATED ORAL at 09:01

## 2025-01-11 RX ADMIN — CARVEDILOL 6.25 MG: 3.12 TABLET, FILM COATED ORAL at 09:01

## 2025-01-11 NOTE — NURSING
Patient discharged home with home health. Patient in stable condition. Home care instructions and follow up instructions given. Patient instructed to  new medications from pharmacy. Patient received oxygen tank delivered from [employer] and instructions for use were provided. All questions answered.

## 2025-01-11 NOTE — PLAN OF CARE
01/11/25 1215   Final Note   Anticipated Discharge Disposition Home-Health   Hospital Resources/Appts/Education Provided Post-Acute resouces added to AVS   Post-Acute Status   Post-Acute Authorization Home Health   Home Health Status Set-up Complete/Auth obtained   Discharge Delays None known at this time     Patient currently on services with [facility]. AVS faxed to [phone]. Marcia Tate notified of dc.

## 2025-01-11 NOTE — CARE UPDATE
[id number] Spoke with Dr Suggs who will state pt's rhino virus has resolved in her note. Informed Crystal with mike

## 2025-01-12 VITALS
BODY MASS INDEX: 24.1 KG/M2 | WEIGHT: 141.13 LBS | HEART RATE: 59 BPM | DIASTOLIC BLOOD PRESSURE: 71 MMHG | HEIGHT: 64 IN | OXYGEN SATURATION: 95 % | RESPIRATION RATE: 20 BRPM | SYSTOLIC BLOOD PRESSURE: 135 MMHG | TEMPERATURE: 98 F

## 2025-01-13 ENCOUNTER — HOSPITAL ENCOUNTER (EMERGENCY)
Facility: HOSPITAL | Age: 80
Discharge: HOME | End: 2025-01-13
Attending: EMERGENCY MEDICINE
Payer: MEDICARE

## 2025-01-13 VITALS
RESPIRATION RATE: 22 BRPM | TEMPERATURE: 98 F | OXYGEN SATURATION: 100 % | HEIGHT: 64 IN | HEART RATE: 71 BPM | WEIGHT: 133 LBS | SYSTOLIC BLOOD PRESSURE: 155 MMHG | BODY MASS INDEX: 22.71 KG/M2 | DIASTOLIC BLOOD PRESSURE: 73 MMHG

## 2025-01-13 DIAGNOSIS — J44.9 CHRONIC OBSTRUCTIVE PULMONARY DISEASE, UNSPECIFIED COPD TYPE: Primary | ICD-10-CM

## 2025-01-13 DIAGNOSIS — R06.02 SOB (SHORTNESS OF BREATH): ICD-10-CM

## 2025-01-13 LAB
ALBUMIN SERPL-MCNC: 2.7 G/DL (ref 3.4–4.8)
ALBUMIN/GLOB SERPL: 0.8 RATIO (ref 1.1–2)
ALLENS TEST BLOOD GAS (OHS): ABNORMAL
ALP SERPL-CCNC: 58 UNIT/L (ref 40–150)
ALT SERPL-CCNC: 16 UNIT/L (ref 0–55)
ANION GAP SERPL CALC-SCNC: 6 MEQ/L
AST SERPL-CCNC: 18 UNIT/L (ref 5–34)
BASE EXCESS BLD CALC-SCNC: 6.3 MMOL/L (ref -2–2)
BILIRUB SERPL-MCNC: 0.3 MG/DL
BLOOD GAS SAMPLE TYPE (OHS): ABNORMAL
BNP BLD-MCNC: 111.5 PG/ML
BUN SERPL-MCNC: 22.2 MG/DL (ref 9.8–20.1)
CA-I BLD-SCNC: 1.15 MMOL/L (ref 1.12–1.23)
CALCIUM SERPL-MCNC: 8.8 MG/DL (ref 8.4–10.2)
CHLORIDE SERPL-SCNC: 93 MMOL/L (ref 98–107)
CO2 BLDA-SCNC: 31.9 MMOL/L
CO2 SERPL-SCNC: 32 MMOL/L (ref 23–31)
COHGB MFR BLDA: 1.4 % (ref 0.5–1.5)
CREAT SERPL-MCNC: 0.78 MG/DL (ref 0.55–1.02)
CREAT/UREA NIT SERPL: 28
DRAWN BY BLOOD GAS (OHS): ABNORMAL
ERYTHROCYTE [DISTWIDTH] IN BLOOD BY AUTOMATED COUNT: 17.2 % (ref 11.5–17)
FLUAV AG UPPER RESP QL IA.RAPID: NOT DETECTED
FLUBV AG UPPER RESP QL IA.RAPID: NOT DETECTED
GFR SERPLBLD CREATININE-BSD FMLA CKD-EPI: >60 ML/MIN/1.73/M2
GLOBULIN SER-MCNC: 3.3 GM/DL (ref 2.4–3.5)
GLUCOSE SERPL-MCNC: 77 MG/DL (ref 82–115)
GROUP & RH: NORMAL
HCO3 BLDA-SCNC: 30.6 MMOL/L (ref 22–26)
HCT VFR BLD AUTO: 37.4 % (ref 37–47)
HGB BLD-MCNC: 12 G/DL (ref 12–16)
INDIRECT COOMBS: NORMAL
LPM (OHS): 2
MCH RBC QN AUTO: 26.5 PG (ref 27–31)
MCHC RBC AUTO-ENTMCNC: 32.1 G/DL (ref 33–36)
MCV RBC AUTO: 82.6 FL (ref 80–94)
METHGB MFR BLDA: 0.9 % (ref 0.4–1.5)
NRBC BLD AUTO-RTO: 0 %
O2 HB BLOOD GAS (OHS): 91.5 % (ref 94–97)
OHS QRS DURATION: 82 MS
OHS QTC CALCULATION: 406 MS
OXYGEN DEVICE BLOOD GAS (OHS): ABNORMAL
OXYHGB MFR BLDA: 11.8 G/DL (ref 12–16)
PCO2 BLDA: 42 MMHG (ref 35–45)
PH BLDA: 7.47 [PH] (ref 7.35–7.45)
PLATELET # BLD AUTO: 374 X10(3)/MCL (ref 130–400)
PMV BLD AUTO: 8.5 FL (ref 7.4–10.4)
PO2 BLDA: 58 MMHG (ref 80–100)
POTASSIUM BLOOD GAS (OHS): 4.3 MMOL/L (ref 3.5–5)
POTASSIUM SERPL-SCNC: 4.7 MMOL/L (ref 3.5–5.1)
PROT SERPL-MCNC: 6 GM/DL (ref 5.8–7.6)
RBC # BLD AUTO: 4.53 X10(6)/MCL (ref 4.2–5.4)
RSV A 5' UTR RNA NPH QL NAA+PROBE: NOT DETECTED
SAMPLE SITE BLOOD GAS (OHS): ABNORMAL
SAO2 % BLDA: 91.5 %
SARS-COV-2 RNA RESP QL NAA+PROBE: NOT DETECTED
SODIUM BLOOD GAS (OHS): 126 MMOL/L (ref 137–145)
SODIUM SERPL-SCNC: 131 MMOL/L (ref 136–145)
SPECIMEN OUTDATE: NORMAL
WBC # BLD AUTO: 11.69 X10(3)/MCL (ref 4.5–11.5)

## 2025-01-13 PROCEDURE — 99900035 HC TECH TIME PER 15 MIN (STAT)

## 2025-01-13 PROCEDURE — 99900031 HC PATIENT EDUCATION (STAT)

## 2025-01-13 PROCEDURE — 99285 EMERGENCY DEPT VISIT HI MDM: CPT | Mod: 25

## 2025-01-13 PROCEDURE — 85027 COMPLETE CBC AUTOMATED: CPT

## 2025-01-13 PROCEDURE — 86901 BLOOD TYPING SEROLOGIC RH(D): CPT | Performed by: NURSE PRACTITIONER

## 2025-01-13 PROCEDURE — 82803 BLOOD GASES ANY COMBINATION: CPT

## 2025-01-13 PROCEDURE — 83880 ASSAY OF NATRIURETIC PEPTIDE: CPT | Performed by: NURSE PRACTITIONER

## 2025-01-13 PROCEDURE — 36600 WITHDRAWAL OF ARTERIAL BLOOD: CPT

## 2025-01-13 PROCEDURE — 25000003 PHARM REV CODE 250

## 2025-01-13 PROCEDURE — 0241U COVID/RSV/FLU A&B PCR: CPT | Performed by: NURSE PRACTITIONER

## 2025-01-13 PROCEDURE — 25000242 PHARM REV CODE 250 ALT 637 W/ HCPCS

## 2025-01-13 PROCEDURE — 94640 AIRWAY INHALATION TREATMENT: CPT

## 2025-01-13 PROCEDURE — 93010 ELECTROCARDIOGRAM REPORT: CPT | Mod: ,,, | Performed by: STUDENT IN AN ORGANIZED HEALTH CARE EDUCATION/TRAINING PROGRAM

## 2025-01-13 PROCEDURE — 93005 ELECTROCARDIOGRAM TRACING: CPT

## 2025-01-13 PROCEDURE — 80053 COMPREHEN METABOLIC PANEL: CPT | Performed by: NURSE PRACTITIONER

## 2025-01-13 RX ORDER — IPRATROPIUM BROMIDE AND ALBUTEROL SULFATE 2.5; .5 MG/3ML; MG/3ML
3 SOLUTION RESPIRATORY (INHALATION)
Status: COMPLETED | OUTPATIENT
Start: 2025-01-13 | End: 2025-01-13

## 2025-01-13 RX ORDER — ALBUTEROL SULFATE 90 UG/1
2 INHALANT RESPIRATORY (INHALATION) EVERY 6 HOURS PRN
Qty: 90 G | Refills: 4 | Status: SHIPPED | OUTPATIENT
Start: 2025-01-13

## 2025-01-13 RX ORDER — LEVOFLOXACIN 250 MG/1
750 TABLET ORAL ONCE
Status: COMPLETED | OUTPATIENT
Start: 2025-01-13 | End: 2025-01-13

## 2025-01-13 RX ORDER — HYDROXYZINE PAMOATE 25 MG/1
25 CAPSULE ORAL
Status: COMPLETED | OUTPATIENT
Start: 2025-01-13 | End: 2025-01-13

## 2025-01-13 RX ADMIN — LEVOFLOXACIN 750 MG: 250 TABLET, FILM COATED ORAL at 03:01

## 2025-01-13 RX ADMIN — IPRATROPIUM BROMIDE AND ALBUTEROL SULFATE 3 ML: 2.5; .5 SOLUTION RESPIRATORY (INHALATION) at 02:01

## 2025-01-13 RX ADMIN — HYDROXYZINE PAMOATE 25 MG: 25 CAPSULE ORAL at 02:01

## 2025-01-13 NOTE — ED PROVIDER NOTES
Encounter Date: 1/13/2025       History     Chief Complaint   Patient presents with    Shortness of Breath     POV, ED WC. Reports she was discharged on Saturday for pneumonia admission; shortness of breath returned this afternoon. States her pharmacy was closed when she was discharged Saturday and she was just able to pick it up today but has not yet taken a dose. Wears 2 LPM NC since discharge from inpatient admission for pneumonia. Denies chest pain, edema, fever at home. GCS 15.      Patient is 79-year-old female with past medical history hyperlipidemia, PAD currently on Plavix, hypertension, COPD (chronically on 2L LFNC), lung cancer (received radiation every 3 weeks) who presents to the ED for evaluation on 01/13/2025 with primary complaint of shortness of breath.  Of note patient was recently admitted and treated for pneumonia, she was discharged on 01/11/2025.  At the time of her last admission she had been complaining of shortness of breath accompanied by productive cough yellow sputum.  At time of discharge she was provided with Benzonate for cough, prednisone taper over 5 days, and 5 day course of Levaquin.  She did not  these medications until today.  Patient is no longer coughing up yellow sputum.  She did not try to use any of her prescribed breathing treatments since her discharge.  Of note patient reports mild anxiety.      Review of patient's allergies indicates:   Allergen Reactions    Lisinopril Swelling     Past Medical History:   Diagnosis Date    Adenocarcinoma of lung     Anemia     Anxiety     Arthritis     COPD (chronic obstructive pulmonary disease)     Depression     HLD (hyperlipidemia)     Hypertension     Lung cancer     Secondary malignant neoplasm of lymph nodes     Secondary malignant neoplasm of right lung     Thyroid disease     lymph nodes malignant     Past Surgical History:   Procedure Laterality Date    BLADDER SURGERY      CHOLECYSTECTOMY      COLONOSCOPY  2018     COLONOSCOPY N/A 9/9/2024    Procedure: SIG;  Surgeon: Saurav Hutson MD;  Location: [facility] ENDOSCOPY;  Service: Gastroenterology;  Laterality: N/A;    ENDOBRONCHIAL ULTRASOUND N/A 11/16/2022    Procedure: ENDOBRONCHIAL ULTRASOUND (EBUS);  Surgeon: Anthony Hutson MD;  Location: [facility] BRONCHOSCOPY LAB;  Service: Pulmonary;  Laterality: N/A;    HYSTERECTOMY      KNEE SURGERY Bilateral     replaced knees    PARTIAL HIP ARTHROPLASTY Bilateral     SIGMOIDOSCOPY, WITH BIOPSY N/A 9/9/2024    Procedure: SIGMOIDOSCOPY, WITH BIOPSY;  Surgeon: Saurav Hutson MD;  Location: [facility] ENDOSCOPY;  Service: Gastroenterology;  Laterality: N/A;     Family History   Problem Relation Name Age of Onset    Lung cancer Mother      Lung cancer Brother       Social History     Tobacco Use    Smoking status: Every Day     Current packs/day: 0.25     Average packs/day: 0.3 packs/day for 66.0 years (16.5 ttl pk-yrs)     Types: Cigarettes     Start date: 1959    Smokeless tobacco: Never    Tobacco comments:     Patient states she is trying to quit smoking.   Substance Use Topics    Alcohol use: Not Currently    Drug use: Never     Review of Systems   Constitutional:  Negative for activity change, appetite change, diaphoresis and fever.   HENT:  Negative for congestion, ear pain, rhinorrhea and sore throat.    Respiratory:  Positive for shortness of breath and wheezing. Negative for cough.    Cardiovascular:  Negative for chest pain, palpitations and leg swelling.   Gastrointestinal:  Negative for abdominal distention, abdominal pain, diarrhea and vomiting.   Genitourinary:  Negative for decreased urine volume.   Skin:  Negative for rash.   Neurological:  Negative for headaches.   Psychiatric/Behavioral:  Negative for agitation.        Physical Exam     Initial Vitals [01/13/25 1316]   BP Pulse Resp Temp SpO2   130/72 84 (!) 34 97.7 °F (36.5 °C) (!) 94 %      MAP       --         Physical Exam    Constitutional: No distress.    HENT:   Head: Normocephalic and atraumatic.   Eyes: EOM are normal. Pupils are equal, round, and reactive to light.   Cardiovascular:  Normal rate and regular rhythm.           Pulmonary/Chest: No respiratory distress.   Saturating 100% on 3 L LFNC (on 3 L at home)   Abdominal: Abdomen is soft. Bowel sounds are normal. She exhibits no distension. There is no abdominal tenderness. There is no rebound.   Musculoskeletal:         General: No edema. Normal range of motion.     Neurological: She is oriented to person, place, and time.         ED Course   Procedures  Labs Reviewed   COMPREHENSIVE METABOLIC PANEL - Abnormal       Result Value    Sodium 131 (*)     Potassium 4.7      Chloride 93 (*)     CO2 32 (*)     Glucose 77 (*)     Blood Urea Nitrogen 22.2 (*)     Creatinine 0.78      Calcium 8.8      Protein Total 6.0      Albumin 2.7 (*)     Globulin 3.3      Albumin/Globulin Ratio 0.8 (*)     Bilirubin Total 0.3      ALP 58      ALT 16      AST 18      eGFR >60      Anion Gap 6.0      BUN/Creatinine Ratio 28     B-TYPE NATRIURETIC PEPTIDE - Abnormal    Natriuretic Peptide 111.5 (*)    CBC WITHOUT DIFFERENTIAL - Abnormal    WBC 11.69 (*)     RBC 4.53      Hgb 12.0      Hct 37.4      MCV 82.6      MCH 26.5 (*)     MCHC 32.1 (*)     RDW 17.2 (*)     Platelet 374      MPV 8.5      NRBC% 0.0     BLOOD GAS - Abnormal    Sample Type Arterial Blood      Sample site Right Brachial Artery      Drawn by KT RRT      pH, Blood gas 7.470 (*)     pCO2, Blood gas 42.0      pO2, Blood gas 58.0 (*)     Sodium, Blood Gas 126 (*)     Potassium, Blood Gas 4.3      Calcium Level Ionized 1.15      TOC2, Blood gas 31.9      Base Excess, Blood gas 6.30 (*)     sO2, Blood gas 91.5      HCO3, Blood gas 30.6 (*)     THb, Blood gas 11.8 (*)     O2 Hb, Blood Gas 91.5 (*)     CO Hgb 1.4      Met Hgb 0.9      Allens Test N/A      Oxygen Device, Blood gas Cannula      LPM 2.0     COVID/RSV/FLU A&B PCR - Normal    Influenza A PCR Not Detected       Influenza B PCR Not Detected      Respiratory Syncytial Virus PCR Not Detected      SARS-CoV-2 PCR Not Detected      Narrative:     The Xpert Xpress SARS-CoV-2/FLU/RSV plus is a rapid, multiplexed real-time PCR test intended for the simultaneous qualitative detection and differentiation of SARS-CoV-2, Influenza A, Influenza B, and respiratory syncytial virus (RSV) viral RNA in either nasopharyngeal swab or nasal swab specimens.         TYPE & SCREEN    Group & Rh A POS      Indirect Augustus GEL NEG      Specimen Outdate 01/16/2025 23:59            Imaging Results              X-Ray Chest 1 View (Final result)  Result time 01/13/25 13:36:29      Final result by Juan Taylor MD (01/13/25 13:36:29)                   Impression:      No acute cardiopulmonary process identified.      Electronically signed by: Juan Taylor  Date:    01/13/2025  Time:    13:36               Narrative:    EXAMINATION:  XR CHEST 1 VIEW    CLINICAL HISTORY:  sob;    TECHNIQUE:  One view    COMPARISON:  January 8, 2025..    FINDINGS:  Cardiopericardial silhouette is within normal limits.  Татьяна catheter terminates within the proximal superior vena cava.  Bilateral apical similar pleural thickening.  No acute dense focal or segmental consolidation, congestive process, pleural effusions or pneumothorax.                                       Medications   albuterol-ipratropium 2.5 mg-0.5 mg/3 mL nebulizer solution 3 mL (has no administration in time range)   levoFLOXacin tablet 750 mg (has no administration in time range)   hydrOXYzine pamoate capsule 25 mg (25 mg Oral Given 1/13/25 6643)     Medical Decision Making  Differential diagnosis included but not limited to pneumonia, viral respiratory infection, COPD exacerbation, bronchitis, anxiety.    In ED patient had stable vitals.  She is afebrile, with regular heart rate and rhythm and normotensive blood pressure readings.  Saturating 100% on 3L LFNC which she has for home use.  Patient had  not picked up her prescribed antibiotics or steroids until today since her discharge on 01/11/2025.  Chest x-ray, COVID/flu/RSV, CMP, CBC, BNP and ABG were ordered as part of evaluation. Chest x-ray showed no acute pulmonary process.  COVID, flu, RSV negative.  CBC largely unremarkable, WBC mildly elevated at 11.69; WBC over 14k at time of discharge 2 days ago.  CMP significant for Na of 131 (patient's baseline appears to be in low 130's), Cl decreased to 93, and CO2 elevated to 32.  Patient was given duoneb treatment and 1 time dose of Vistaril in ED which improved her shortness on breath.    Patient hemodynamically stable and is okay to be discharged home.  Educated patient that it is appropriate for her to continue her prescribed home oxygen and to use .  Advised patient to complete her discharge medications of Levaquin and prednisone.  Giving her 1 dose of Levaquin while in ED.        Amount and/or Complexity of Data Reviewed  External Data Reviewed: labs, radiology and notes.  Labs: ordered.    Risk  Prescription drug management.                                      Clinical Impression:  Final diagnoses:  [R06.02] SOB (shortness of breath)  [J44.9] Chronic obstructive pulmonary disease, unspecified COPD type (Primary)          ED Disposition Condition    Discharge Stable          ED Prescriptions       Medication Sig Dispense Start Date End Date Auth. Provider    albuterol (VENTOLIN HFA) 90 mcg/actuation inhaler Inhale 2 puffs into the lungs every 6 (six) hours as needed for Wheezing. Rescue 90 g 1/13/2025 -- Hong Salazar MD          Follow-up Information       Follow up With Specialties Details Why Contact Graham Mason NP Family Medicine Call in 2 days  [address] [facility] [address]  [phone]      [facility]  Emergency Dept Emergency Medicine  As needed, If symptoms worsen [address] [facility] [address] [phone]              Hong Salazar MD  Resident  01/13/25 3537

## 2025-01-13 NOTE — FIRST PROVIDER EVALUATION
Medical screening examination initiated.  I have conducted a focused provider triage encounter, findings are as follows:    Brief history of present illness:  78 y/o female presents with getting discharged Saturday with pneumonia and now feeling sob again. Wears oxygen at home 2L. Taking antibiotics     There were no vitals filed for this visit.    Pertinent physical exam:  alert, labored, on NC oxygen, in wheel chair     Brief workup plan:  EKG, labs, xray     Preliminary workup initiated; this workup will be continued and followed by the physician or advanced practice provider that is assigned to the patient when roomed.

## 2025-01-14 ENCOUNTER — PATIENT OUTREACH (OUTPATIENT)
Dept: ADMINISTRATIVE | Facility: CLINIC | Age: 80
End: 2025-01-14
Payer: MEDICARE

## 2025-01-14 LAB
BACTERIA BLD CULT: NORMAL
BACTERIA BLD CULT: NORMAL

## 2025-01-30 ENCOUNTER — OFFICE VISIT (OUTPATIENT)
Dept: HEMATOLOGY/ONCOLOGY | Facility: CLINIC | Age: 80
End: 2025-01-30
Payer: MEDICARE

## 2025-01-30 ENCOUNTER — HOSPITAL ENCOUNTER (EMERGENCY)
Facility: HOSPITAL | Age: 80
Discharge: HOME | End: 2025-01-31
Attending: EMERGENCY MEDICINE
Payer: MEDICARE

## 2025-01-30 VITALS
OXYGEN SATURATION: 95 % | BODY MASS INDEX: 28.24 KG/M2 | SYSTOLIC BLOOD PRESSURE: 131 MMHG | DIASTOLIC BLOOD PRESSURE: 68 MMHG | HEIGHT: 64 IN | WEIGHT: 165.38 LBS | HEART RATE: 70 BPM | TEMPERATURE: 97 F | RESPIRATION RATE: 15 BRPM

## 2025-01-30 DIAGNOSIS — E03.2 HYPOTHYROIDISM DUE TO DRUGS: ICD-10-CM

## 2025-01-30 DIAGNOSIS — C77.9 MALIGNANT NEOPLASM METASTATIC TO LYMPH NODES, UNSPECIFIED LYMPH NODE REGION: ICD-10-CM

## 2025-01-30 DIAGNOSIS — R06.02 SHORTNESS OF BREATH: ICD-10-CM

## 2025-01-30 DIAGNOSIS — C79.51 MALIGNANT NEOPLASM METASTATIC TO BONE: ICD-10-CM

## 2025-01-30 DIAGNOSIS — R60.0 PERIPHERAL EDEMA: Primary | ICD-10-CM

## 2025-01-30 DIAGNOSIS — R60.9 SWELLING: ICD-10-CM

## 2025-01-30 DIAGNOSIS — C34.90 ADENOCARCINOMA OF LUNG, STAGE 4, UNSPECIFIED LATERALITY: Primary | ICD-10-CM

## 2025-01-30 DIAGNOSIS — C34.90 ADENOCARCINOMA OF LUNG, UNSPECIFIED LATERALITY: ICD-10-CM

## 2025-01-30 LAB
ALBUMIN SERPL-MCNC: 2.7 G/DL (ref 3.4–4.8)
ALBUMIN/GLOB SERPL: 1 RATIO (ref 1.1–2)
ALP SERPL-CCNC: 52 UNIT/L (ref 40–150)
ALT SERPL-CCNC: 17 UNIT/L (ref 0–55)
ANION GAP SERPL CALC-SCNC: 8 MEQ/L
AST SERPL-CCNC: 18 UNIT/L (ref 5–34)
BASOPHILS # BLD AUTO: 0.02 X10(3)/MCL
BASOPHILS NFR BLD AUTO: 0.2 %
BILIRUB SERPL-MCNC: 0.4 MG/DL
BNP BLD-MCNC: 231.8 PG/ML
BUN SERPL-MCNC: 21.5 MG/DL (ref 9.8–20.1)
CALCIUM SERPL-MCNC: 8.2 MG/DL (ref 8.4–10.2)
CHLORIDE SERPL-SCNC: 95 MMOL/L (ref 98–107)
CO2 SERPL-SCNC: 28 MMOL/L (ref 23–31)
CREAT SERPL-MCNC: 0.77 MG/DL (ref 0.55–1.02)
CREAT/UREA NIT SERPL: 28
EOSINOPHIL # BLD AUTO: 0.14 X10(3)/MCL (ref 0–0.9)
EOSINOPHIL NFR BLD AUTO: 1.5 %
ERYTHROCYTE [DISTWIDTH] IN BLOOD BY AUTOMATED COUNT: 18.9 % (ref 11.5–17)
GFR SERPLBLD CREATININE-BSD FMLA CKD-EPI: >60 ML/MIN/1.73/M2
GLOBULIN SER-MCNC: 2.7 GM/DL (ref 2.4–3.5)
GLUCOSE SERPL-MCNC: 79 MG/DL (ref 82–115)
HCT VFR BLD AUTO: 28.3 % (ref 37–47)
HGB BLD-MCNC: 9.2 G/DL (ref 12–16)
IMM GRANULOCYTES # BLD AUTO: 0.29 X10(3)/MCL (ref 0–0.04)
IMM GRANULOCYTES NFR BLD AUTO: 3 %
LYMPHOCYTES # BLD AUTO: 1.28 X10(3)/MCL (ref 0.6–4.6)
LYMPHOCYTES NFR BLD AUTO: 13.4 %
MCH RBC QN AUTO: 27.1 PG (ref 27–31)
MCHC RBC AUTO-ENTMCNC: 32.5 G/DL (ref 33–36)
MCV RBC AUTO: 83.2 FL (ref 80–94)
MONOCYTES # BLD AUTO: 0.77 X10(3)/MCL (ref 0.1–1.3)
MONOCYTES NFR BLD AUTO: 8.1 %
NEUTROPHILS # BLD AUTO: 7.02 X10(3)/MCL (ref 2.1–9.2)
NEUTROPHILS NFR BLD AUTO: 73.8 %
NRBC BLD AUTO-RTO: 0 %
OHS QRS DURATION: 80 MS
OHS QTC CALCULATION: 397 MS
PLATELET # BLD AUTO: 306 X10(3)/MCL (ref 130–400)
PMV BLD AUTO: 9 FL (ref 7.4–10.4)
POTASSIUM SERPL-SCNC: 4.9 MMOL/L (ref 3.5–5.1)
PROT SERPL-MCNC: 5.4 GM/DL (ref 5.8–7.6)
RBC # BLD AUTO: 3.4 X10(6)/MCL (ref 4.2–5.4)
SODIUM SERPL-SCNC: 131 MMOL/L (ref 136–145)
TROPONIN I SERPL-MCNC: <0.01 NG/ML (ref 0–0.04)
WBC # BLD AUTO: 9.52 X10(3)/MCL (ref 4.5–11.5)

## 2025-01-30 PROCEDURE — 84484 ASSAY OF TROPONIN QUANT: CPT

## 2025-01-30 PROCEDURE — 99285 EMERGENCY DEPT VISIT HI MDM: CPT | Mod: 25,27

## 2025-01-30 PROCEDURE — 83880 ASSAY OF NATRIURETIC PEPTIDE: CPT

## 2025-01-30 PROCEDURE — 93010 ELECTROCARDIOGRAM REPORT: CPT | Mod: ,,, | Performed by: INTERNAL MEDICINE

## 2025-01-30 PROCEDURE — 93005 ELECTROCARDIOGRAM TRACING: CPT

## 2025-01-30 PROCEDURE — 99213 OFFICE O/P EST LOW 20 MIN: CPT | Mod: PBBFAC,25 | Performed by: INTERNAL MEDICINE

## 2025-01-30 PROCEDURE — 85025 COMPLETE CBC W/AUTO DIFF WBC: CPT

## 2025-01-30 PROCEDURE — 80053 COMPREHEN METABOLIC PANEL: CPT

## 2025-01-30 PROCEDURE — 99214 OFFICE O/P EST MOD 30 MIN: CPT | Mod: S$PBB,,, | Performed by: INTERNAL MEDICINE

## 2025-01-30 PROCEDURE — G2211 COMPLEX E/M VISIT ADD ON: HCPCS | Mod: S$PBB,,, | Performed by: INTERNAL MEDICINE

## 2025-01-30 PROCEDURE — 99999 PR PBB SHADOW E&M-EST. PATIENT-LVL III: CPT | Mod: PBBFAC,,, | Performed by: INTERNAL MEDICINE

## 2025-01-30 NOTE — PROGRESS NOTES
Subjective:       Patient ID: Pratibha Patel is a 79 y.o. female.    Chief Complaint: Follow-up (Patient reports fatigue )      PCP: Graham Nguyen NP  Cardiologist: Dr. Danny Sánchez  Referring Physician: Dr. Anthony Hutson  Endocrine: Dr. Werner  Radiation oncology: Dr. Boss in the past, now Dr. Mortensen.  ENT: Dr. 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 ? metastatic findings could not be biopsied. TTF negative/CK 7 +.    Diagnosed June 2014 EGFR mutation negative/ALK gene rearrangement negative. 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 Patient has reportedly been referred to ENT for resection and lymph node dissection (per patient report).   Subsequent open biopsy done by Dr. Rosales on 7/29/2019 confirmed the same.  Patient was unable to undergo definitive surgical resection for oligo metastatic disease.  Hoarseness and swallowing difficulties secondary to #2  Biopsy of the left lung lesion showed recurrent non-small cell lung cancer, Caris was sent and showed positivity for PD-L1, 2%.  No other targetable or actionable mutations present.    Current Treatment:   Combination chemo immunotherapy with carboplatin, pemetrexed, nivolumab, ipilimumab based off of checkmate 9LA. Cycle 1 day 1 given 12/20/2022.     Treatment History:  RT with weekly Carbo/Taxol started 7/2/14--Completed August 2014  Single agent 'Maintenance' Avastin q 3 weeks.  Started 4/9/15--last given September 21,  2016  Radiation + weekly Carbo/Taxol at 2AUC ----9/11/19-10/22/19  Patient underwent radiation from 12/17/2020 through 12/21/2020.  Radiation to the left and right lung per Dr. Robert Mortensen.    HPI:   Please see Oncology history in the diagnosis of adenocarcinoma of the lung, stage IV on the problem list for full detail.  Patient originally seen in 2014 with unintentional weight loss, shortness of breath, nonproductive cough.  Patient had imaging findings suggestive of lung cancer, bronchoscopy done in June of 2014 revealed moderately differentiated adenocarcinoma of the lung.  She originally underwent chemo radiation from July 2014 through August 2014.  She was placed on maintenance Avastin from April 2015 through September 2016. Patient then underwent a thyroid biopsy in June of 2019 that showed metastatic carcinoma favored to be adenocarcinoma lung primary.  She underwent surgical resection in July 2019, however full surgical dissection was not able to be done and the patient underwent open biopsy of right thyroid gland.  Pathology revealed poorly differentiated non-small cell carcinoma consistent with metastatic lung cancer.  NGS panel done at that time unremarkable.  She started chemoradiation with carbo Taxol in September 2019, completed in October 2019. Patient was doing well, imaging in October 2020 revealed an abnormality in the left lung, CT-guided lung biopsy on 11/03/2020 showed recurrent non-small cell lung cancer, Caris revealed PDL1 positivity.  She underwent SBRT from 12/17/2020 through 12/21/2020.  Patient then placed on surveillance imaging, most recently done on 07/05/2022 showing worsening disease in the right lung with a previous lesion that measured 0.6 cm and had an SUV of 1.0 now measuring 1.5 cm and an SUV of 8.4.  This was treated with SBRT in August of 2022.  PET/CT scan done on 11/8/2022 showed mild increased radiotracer activity within the left apical consolidation, SUV 5.5.  There was a  newly hypermetabolic subcentimeter subcarinal lymph node.  A 1 cm right lower lobe nodule was smaller, he right upper lobe nodule was slightly larger measuring 6 mm.  No sites of FDG avid metastatic disease in the neck, abdomen, or pelvis. Bronchoscopy done on 11/16/2022, pathology revealed malignant cells consistent with adenocarcinoma.  Planning on chemo immunotherapy with carboplatin, pemetrexed, nivolumab, ipilimumab based off of checkmate 9LA.  PET/CT on 07/25/2023 showed infectious or inflammatory changes in the right lower lobe with no other findings to suggest new or worsening disease.    PET/CT scan done on 10/12/2023 showed mixed response to therapy with decrease right hilar and left upper lobe consolidation uptake.  Subcarinal lymph node demonstrates significant increased uptake compared to the prior study.  Stable subcentimeter right upper lobe nodule demonstrates slight increased uptake compared to prior study    PET/CT scan done on 01/18/2024 showed mixed response to therapy with decrease in size of the right upper lobe nodule and maximum SUV of subcarinal lymph node.  There was right hilar uptake that was increased, however there was also patchy airspace opacities that could be infectious or inflammatory.    CT of the chest on 05/21/2024 showed overall stable disease with tiny centrilobular nodules throughout both lungs, similar to prior CTA.  Stable 0.9 cm nodule in the apical right upper lobe.    PET/CT scan done on 07/25/2024 showed unchanged mild FDG avid nodule in the posterior right lung apex, unchanged mild FDG avid left apical consolidative opacity.  There was a new paramediastinal consolidative opacity in the anterior right upper lobe and left upper lobe with mild FDG avidity that was symmetric and suggestive of a radiation port, malignancy felt less likely given symmetry.  There was indeterminate patchy consolidative opacity in the subpleural right lower lobe with mild FDG avidity slightly  increased in prominence from prior PET/CT.  There was similar mediastinal and hilar lymph nodes.    CT scan of the abdomen and pelvis done on 09/06/2024 showed fluid distended bowel in the level of the stomach through the rectum without transition point which may be related to enterocolitis or ileus.  There was infectious versus inflammatory bronchiolitis tree-in-bud nodularity in the lung bases.  Patient underwent flex sigmoidoscopy on 09/09/2024, there were some diverticulosis with no major abnormalities, random biopsies obtained.    Most recent PET/CT scan done on 10/28/2024 showed essentially stable disease.    CT chest done on 01/09/2025 showed slightly improved aeration of the lungs with a persistent area of nodular consolidation in the right lower lobe.    Interval History:   Patient presents to clinic for scheduled treatment visit.  Today, she states that she has some lower extremity swelling along with some pain in her legs.  For the past couple of weeks, she has required continuous oxygen, today she is on 2 L.  Her oxygen saturation was 95%.  She did recently get treated for pneumonia.  She also completed steroids due to a COPD exacerbation.    Past Medical History:   Diagnosis Date    Adenocarcinoma of lung     Anemia     Anxiety     Arthritis     COPD (chronic obstructive pulmonary disease)     Depression     HLD (hyperlipidemia)     Hypertension     Lung cancer     Secondary malignant neoplasm of lymph nodes     Secondary malignant neoplasm of right lung     Thyroid disease     lymph nodes malignant      Past Surgical History:   Procedure Laterality Date    BLADDER SURGERY      CHOLECYSTECTOMY      COLONOSCOPY  2018    COLONOSCOPY N/A 9/9/2024    Procedure: SIG;  Surgeon: Saurav Hutson MD;  Location: [facility] ENDOSCOPY;  Service: Gastroenterology;  Laterality: N/A;    ENDOBRONCHIAL ULTRASOUND N/A 11/16/2022    Procedure: ENDOBRONCHIAL ULTRASOUND (EBUS);  Surgeon: Anthony Hutson MD;  Location:  [facility] BRONCHOSCOPY LAB;  Service: Pulmonary;  Laterality: N/A;    HYSTERECTOMY      KNEE SURGERY Bilateral     replaced knees    PARTIAL HIP ARTHROPLASTY Bilateral     SIGMOIDOSCOPY, WITH BIOPSY N/A 9/9/2024    Procedure: SIGMOIDOSCOPY, WITH BIOPSY;  Surgeon: Saurav Hutson MD;  Location: [facility] ENDOSCOPY;  Service: Gastroenterology;  Laterality: N/A;     Social History     Socioeconomic History    Marital status:    Tobacco Use    Smoking status: Every Day     Current packs/day: 0.25     Average packs/day: 0.3 packs/day for 66.1 years (16.5 ttl pk-yrs)     Types: Cigarettes     Start date: 1959    Smokeless tobacco: Never    Tobacco comments:     Patient states she is trying to quit smoking.   Substance and Sexual Activity    Alcohol use: Not Currently    Drug use: Never     Social Drivers of Health     Financial Resource Strain: Patient Declined (1/10/2025)    Overall Financial Resource Strain (CARDIA)     Difficulty of Paying Living Expenses: Patient declined   Food Insecurity: Patient Declined (1/10/2025)    Hunger Vital Sign     Worried About Running Out of Food in the Last Year: Patient declined     Ran Out of Food in the Last Year: Patient declined   Transportation Needs: Patient Declined (1/10/2025)    TRANSPORTATION NEEDS     Transportation : Patient declined   Physical Activity: Sufficiently Active (9/9/2024)    Exercise Vital Sign     Days of Exercise per Week: 5 days     Minutes of Exercise per Session: 30 min   Stress: Patient Declined (1/10/2025)    [employer] - Occupational Stress Questionnaire     Feeling of Stress : Patient declined   Recent Concern: Stress - Stress Concern Present (11/28/2024)    [employer] - Occupational Stress Questionnaire     Feeling of Stress : To some extent   Housing Stability: Patient Declined (1/10/2025)    Housing Stability Vital Sign     Unable to Pay for Housing in the Last Year: Patient declined      Homeless in the Last Year: Patient declined      Family History   Problem Relation Name Age of Onset    Lung cancer Mother      Lung cancer Brother        Review of patient's allergies indicates:   Allergen Reactions    Lisinopril Swelling      Review of Systems   Constitutional:  Negative for chills, diaphoresis, fatigue, fever and unexpected weight change.   HENT:  Negative for nasal congestion, mouth sores, sinus pressure/congestion and sore throat.    Eyes:  Negative for pain and visual disturbance.   Respiratory:  Negative for cough, chest tightness and shortness of breath.    Cardiovascular:  Negative for chest pain, palpitations and leg swelling.   Gastrointestinal:  Negative for abdominal distention, abdominal pain, blood in stool, constipation and diarrhea.   Genitourinary:  Negative for dysuria, frequency and hematuria.   Musculoskeletal:  Negative for arthralgias and back pain.   Integumentary:  Negative for rash.   Neurological:  Negative for dizziness, weakness, numbness and headaches.   Hematological:  Negative for adenopathy.   Psychiatric/Behavioral:  Negative for confusion.          Objective:      Physical Exam  Vitals reviewed.   Constitutional:       General: She is not in acute distress.     Appearance: Normal appearance.   HENT:      Head: Normocephalic and atraumatic.      Nose: Nose normal.      Mouth/Throat:      Mouth: Mucous membranes are moist.   Eyes:      Extraocular Movements: Extraocular movements intact.      Conjunctiva/sclera: Conjunctivae normal.   Neck:      Comments: Radiation changes to the left supraclavicular area  Cardiovascular:      Rate and Rhythm: Normal rate and regular rhythm.      Pulses: Normal pulses.      Heart sounds: Normal heart sounds.   Pulmonary:      Effort: Pulmonary effort is normal.      Breath sounds: Examination of the right-lower field reveals decreased breath sounds. Examination of the left-lower field reveals decreased breath sounds. Decreased  breath sounds present.   Musculoskeletal:         General: No swelling. Normal range of motion.      Cervical back: Normal range of motion and neck supple.      Right lower leg: No edema.      Left lower leg: No edema.   Skin:     General: Skin is warm and dry.   Neurological:      General: No focal deficit present.      Mental Status: She is alert and oriented to person, place, and time. Mental status is at baseline.   Psychiatric:         Mood and Affect: Mood normal.         Behavior: Behavior normal.         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 ? metastatic findings could not be biopsied.    TTF negative/CK 7 +.    Diagnosed June 2014.  EGFR mutation negative/ALK gene rearrangement negative.  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 Patient has reportedly been referred to ENT for resection and lymph node dissection (per patient report). Subsequent open biopsy done by Dr. Rosales on 7/29/2019 confirmed the same.  Patient was unable to undergo definitive surgical resection for oligo metastatic disease.        Plan:       Patient's biopsy did return as recurrent non-small cell lung cancer in the left lung.  I feel that the right lung will be similar pathology.  She completed radiation with Dr. Mortensen on 12/24/2020.     Caris did reveal that PD-L1 was 2% positive suggesting benefit from pembrolizumab or nivolumab/ipilimumab.      PET/CT scan does show a new hypermetabolic subcentimeter subcarinal lymph node and increase in a left apical  consolidation.  The left apical consolidation is close to the aorta and I do not think would be amenable to biopsy, however this subcarinal lymph node may be amenable to bronchoscopy.      Bronchoscopy done on 11/16/2022 showed metastatic adenocarcinoma.  She is no longer a candidate for full dose SBRT.  We started with carboplatin, pemetrexed, nivolumab, and ipilimumab based on the CHECKMATE 9LA study.  Cycle 1 day 1 given 12/20/2022.    Patient did get admitted for diarrhea.  This subsided with steroids.  Immune related diarrhea secondary to immunotherapy is a possibility.      Most recent PET/CT scan done on 10/28/2024 showed essentially stable disease.    We had a discussion about immunotherapy in the possibility that led to her diarrhea.  The patient voiced understanding.  We discussed rechallenge with immunotherapy.  She stated that she would like to try to get back on treatment.  Re-started treatment on 11/08/2024.     She knows to call and let me know if she has more than 1 diarrhea bowel movement in a 24 hour period.  If she gets severe diarrhea again from immunotherapy, we would likely have to stop indefinitely.    She had a CT chest on 01/09/2025 that showed overall improved aeration of her lungs.    She was due for treatment today, however she will see her primary care provider, Graham Nguyen NP today.  We will hold treatment and I will discuss the case with NATHANAEL Nguyen.     RTC in 3 weeks with labs    Visit today included increased complexity associated with the care of the episodic problem lung cancer, addressing and managing the longitudinal care of the patient's lung cancer.       Enrrique Gudino II, MD

## 2025-01-31 VITALS
SYSTOLIC BLOOD PRESSURE: 163 MMHG | TEMPERATURE: 97 F | DIASTOLIC BLOOD PRESSURE: 72 MMHG | HEART RATE: 77 BPM | OXYGEN SATURATION: 95 % | RESPIRATION RATE: 15 BRPM

## 2025-01-31 NOTE — FIRST PROVIDER EVALUATION
Medical screening examination initiated.  I have conducted a focused provider triage encounter, findings are as follows:    Brief history of present illness:  79 year old female presents to ER with c/o bilateral lower extremity swelling and SOB x 4-5 days. Patient on 2L NC w/ hx of COPD. Patient has history of lung cancer, last chemo was last month     Vitals:    01/30/25 2005   BP: (!) 137/55   Pulse: 78   Resp: (!) 22   Temp: 97.4 °F (36.3 °C)   TempSrc: Oral   SpO2: 95%       Pertinent physical exam:  Awake and alert, NAD    Brief workup plan:  labs, EKG, cxr    Preliminary workup initiated; this workup will be continued and followed by the physician or advanced practice provider that is assigned to the patient when roomed.

## 2025-01-31 NOTE — ED PROVIDER NOTES
Encounter Date: 1/30/2025    SCRIBE #1 NOTE: I, Ryanne Werner, am scribing for, and in the presence of,  Sebastian Stein MD. I have scribed the following portions of the note - Other sections scribed: HPI, ROS, PE.       History     Chief Complaint   Patient presents with    Leg Swelling     Pt reports bilateral leg swelling beginning this weekend. Minimal relief from Lasix. Reports SOB normally on 3L at home, Hx COPD and lung cancer. Denies CP. Last chemo was a month ago.      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 The pt reports worsening lower extremity edema onset this weekend. She states she has had this before in the past but not to this severity. She is on 20 mg of Lasix daily and is compliant. She is on O2 chronically at home. She is not currently receiving chemotherapy.    The history is provided by the patient. No  was used.     Review of patient's allergies indicates:   Allergen Reactions    Lisinopril Swelling     Past Medical History:   Diagnosis Date    Adenocarcinoma of lung     Anemia     Anxiety     Arthritis     COPD (chronic obstructive pulmonary disease)     Depression     HLD (hyperlipidemia)     Hypertension     Lung cancer     Secondary malignant neoplasm of lymph nodes     Secondary malignant neoplasm of right lung     Thyroid disease     lymph nodes malignant     Past Surgical History:   Procedure Laterality Date    BLADDER SURGERY      CHOLECYSTECTOMY      COLONOSCOPY  2018    COLONOSCOPY N/A 9/9/2024    Procedure: SIG;  Surgeon: Saurav Hutson MD;  Location: [facility] ENDOSCOPY;  Service: Gastroenterology;  Laterality: N/A;    ENDOBRONCHIAL ULTRASOUND N/A 11/16/2022    Procedure: ENDOBRONCHIAL ULTRASOUND (EBUS);  Surgeon: Anthony Hutson MD;  Location: [facility] BRONCHOSCOPY LAB;  Service: Pulmonary;  Laterality: N/A;     HYSTERECTOMY      KNEE SURGERY Bilateral     replaced knees    PARTIAL HIP ARTHROPLASTY Bilateral     SIGMOIDOSCOPY, WITH BIOPSY N/A 9/9/2024    Procedure: SIGMOIDOSCOPY, WITH BIOPSY;  Surgeon: Saurav Hutsno MD;  Location: [facility] ENDOSCOPY;  Service: Gastroenterology;  Laterality: N/A;     Family History   Problem Relation Name Age of Onset    Lung cancer Mother      Lung cancer Brother       Social History     Tobacco Use    Smoking status: Every Day     Current packs/day: 0.25     Average packs/day: 0.3 packs/day for 66.1 years (16.5 ttl pk-yrs)     Types: Cigarettes     Start date: 1959    Smokeless tobacco: Never    Tobacco comments:     Patient states she is trying to quit smoking.   Substance Use Topics    Alcohol use: Not Currently    Drug use: Never     Review of Systems   Constitutional:  Negative for chills, fatigue and fever.   HENT:  Negative for congestion, ear discharge, ear pain, postnasal drip, rhinorrhea, sinus pressure, sneezing, sore throat and voice change.    Eyes:  Negative for discharge and itching.   Respiratory:  Negative for cough, shortness of breath and wheezing.    Cardiovascular:  Positive for leg swelling. Negative for chest pain and palpitations.   Gastrointestinal:  Negative for abdominal pain, constipation, diarrhea, nausea and vomiting.   Endocrine: Negative for polydipsia, polyphagia and polyuria.   Genitourinary:  Negative for dysuria, frequency, hematuria, urgency, vaginal bleeding, vaginal discharge and vaginal pain.   Musculoskeletal:  Negative for arthralgias and myalgias.   Skin:  Negative for rash and wound.   Neurological:  Negative for dizziness, seizures, syncope, weakness and numbness.   Hematological:  Negative for adenopathy. Does not bruise/bleed easily.   Psychiatric/Behavioral:  Negative for self-injury and suicidal ideas. The patient is not nervous/anxious.        Physical Exam     Initial Vitals [01/30/25 2005]   BP Pulse Resp Temp SpO2   (!) 137/55 78  (!) 22 97.4 °F (36.3 °C) 95 %      MAP       --         Physical Exam    Nursing note and vitals reviewed.  Constitutional: She appears well-developed and well-nourished.   HENT:   Head: Normocephalic and atraumatic.   Right Ear: External ear normal.   Left Ear: External ear normal.   Nose: Nose normal.   Eyes: Conjunctivae and EOM are normal. Pupils are equal, round, and reactive to light. Right eye exhibits no discharge. Left eye exhibits no discharge.   Neck:   Normal range of motion.  Cardiovascular:  Regular rhythm, S1 normal, S2 normal and normal heart sounds.     Exam reveals no gallop.       No murmur heard.  Pulmonary/Chest: Effort normal and breath sounds normal. No respiratory distress. She has no decreased breath sounds. She has no wheezes. She has no rhonchi. She has no rales.   Abdominal: She exhibits no distension.   Musculoskeletal:         General: Normal range of motion.      Cervical back: Normal range of motion.      Right lower le+ Pitting Edema present.      Left lower le+ Pitting Edema present.      Comments: No cellulitic changes.     Neurological: She is alert and oriented to person, place, and time.   Skin: Skin is dry. Capillary refill takes less than 2 seconds.         ED Course   Procedures  Labs Reviewed   B-TYPE NATRIURETIC PEPTIDE - Abnormal       Result Value    Natriuretic Peptide 231.8 (*)    COMPREHENSIVE METABOLIC PANEL - Abnormal    Sodium 131 (*)     Potassium 4.9      Chloride 95 (*)     CO2 28      Glucose 79 (*)     Blood Urea Nitrogen 21.5 (*)     Creatinine 0.77      Calcium 8.2 (*)     Protein Total 5.4 (*)     Albumin 2.7 (*)     Globulin 2.7      Albumin/Globulin Ratio 1.0 (*)     Bilirubin Total 0.4      ALP 52      ALT 17      AST 18      eGFR >60      Anion Gap 8.0      BUN/Creatinine Ratio 28     CBC WITH DIFFERENTIAL - Abnormal    WBC 9.52      RBC 3.40 (*)     Hgb 9.2 (*)     Hct 28.3 (*)     MCV 83.2      MCH 27.1      MCHC 32.5 (*)     RDW 18.9 (*)      Platelet 306      MPV 9.0      Neut % 73.8      Lymph % 13.4      Mono % 8.1      Eos % 1.5      Basophil % 0.2      Imm Grans % 3.0      Neut # 7.02      Lymph # 1.28      Mono # 0.77      Eos # 0.14      Baso # 0.02      Imm Gran # 0.29 (*)     NRBC% 0.0     TROPONIN I - Normal    Troponin-I <0.010     CBC W/ AUTO DIFFERENTIAL    Narrative:     The following orders were created for panel order CBC auto differential.  Procedure                               Abnormality         Status                     ---------                               -----------         ------                     CBC with Differential[[id number]]       Abnormal            Final result                 Please view results for these tests on the individual orders.        ECG Results              EKG 12-lead (Final result)        Collection Time Result Time QRS Duration OHS QTC Calculation    01/30/25 20:06:54 01/30/25 20:12:31 80 397                     Final result by Interface, Lab In [facility] (01/30/25 20:12:35)                   Narrative:    Test Reason : R06.02,    Vent. Rate :  75 BPM     Atrial Rate :  75 BPM     P-R Int : 138 ms          QRS Dur :  80 ms      QT Int : 356 ms       P-R-T Axes :  69  65  70 degrees    QTcB Int : 397 ms    Normal sinus rhythm  Cannot rule out Anterior infarct ,age undetermined  Abnormal ECG  When compared with ECG of 13-Jan-2025 13:18,  No significant change was found  Confirmed by Emi Carrillo ([id number]) on 1/30/2025 8:12:29 PM    Referred By:            Confirmed By: Emi Carrillo                                  Imaging Results              X-Ray Chest PA And Lateral (In process)                      Medications - No data to display  Medical Decision Making  Differential diagnosis include but are not limited to:  Peripheral edema, DVT     Amount and/or Complexity of Data Reviewed  Labs: ordered. Decision-making details documented in ED Course.     Details: Labs are stable  Radiology: ordered and  independent interpretation performed. Decision-making details documented in ED Course.  ECG/medicine tests: ordered and independent interpretation performed. Decision-making details documented in ED Course.  Discussion of management or test interpretation with external provider(s): Ultrasound bilateral lower extremities negative for DVT.  Will instruct patient to double her Lasix dose for the next 5 days.            Scribe Attestation:   Scribe #1: I performed the above scribed service and the documentation accurately describes the services I performed. I attest to the accuracy of the note.    Attending Attestation:           Physician Attestation for Scribe:  Physician Attestation Statement for Scribe #1: I, Sebastian Stein MD, reviewed documentation, as scribed by Ryanne Werner in my presence, and it is both accurate and complete.             ED Course as of 01/31/25 0448 Fri Jan 31, 2025 0424 Ultrasound of bilateral lower extremities negative for DVT [KG]      ED Course User Index  [KG] Sebastian Stein MD                           Clinical Impression:  Final diagnoses:  [R06.02] Shortness of breath  [R60.9] Swelling  [R60.0] Peripheral edema (Primary)          ED Disposition Condition    Discharge Stable          ED Prescriptions    None       Follow-up Information       Follow up With Specialties Details Why Contact Info    Graham Nguyen, NP Family Medicine In 3 days  [address] [address]  [phone]               Sebastian Stein MD  01/31/25 0438       Sebastina Stein MD  01/31/25 8168

## 2025-01-31 NOTE — DISCHARGE INSTRUCTIONS
Double your dose of Lasix for the next 5 days as discussed.  Take 40 mg a day instead of 20 mg.  Also elevate your lower extremities as discussed.

## 2025-02-20 ENCOUNTER — OFFICE VISIT (OUTPATIENT)
Dept: HEMATOLOGY/ONCOLOGY | Facility: CLINIC | Age: 80
End: 2025-02-20
Payer: MEDICARE

## 2025-02-20 ENCOUNTER — HOSPITAL ENCOUNTER (INPATIENT)
Facility: HOSPITAL | Age: 80
LOS: 4 days | Discharge: HOME | DRG: 178 | End: 2025-02-24
Attending: STUDENT IN AN ORGANIZED HEALTH CARE EDUCATION/TRAINING PROGRAM | Admitting: INTERNAL MEDICINE
Payer: MEDICARE

## 2025-02-20 ENCOUNTER — LAB VISIT (OUTPATIENT)
Dept: LAB | Facility: HOSPITAL | Age: 80
End: 2025-02-20
Attending: INTERNAL MEDICINE
Payer: MEDICARE

## 2025-02-20 VITALS
HEART RATE: 70 BPM | BODY MASS INDEX: 26.69 KG/M2 | TEMPERATURE: 97 F | RESPIRATION RATE: 18 BRPM | SYSTOLIC BLOOD PRESSURE: 196 MMHG | HEIGHT: 64 IN | DIASTOLIC BLOOD PRESSURE: 79 MMHG | OXYGEN SATURATION: 99 % | WEIGHT: 156.31 LBS

## 2025-02-20 DIAGNOSIS — C34.90 ADENOCARCINOMA OF LUNG, UNSPECIFIED LATERALITY: ICD-10-CM

## 2025-02-20 DIAGNOSIS — C77.9 MALIGNANT NEOPLASM METASTATIC TO LYMPH NODES, UNSPECIFIED LYMPH NODE REGION: ICD-10-CM

## 2025-02-20 DIAGNOSIS — D64.9 ANEMIA, UNSPECIFIED TYPE: Primary | ICD-10-CM

## 2025-02-20 DIAGNOSIS — C79.51 MALIGNANT NEOPLASM METASTATIC TO BONE: ICD-10-CM

## 2025-02-20 DIAGNOSIS — D64.9 ANEMIA, UNSPECIFIED TYPE: ICD-10-CM

## 2025-02-20 DIAGNOSIS — D64.9 ANEMIA: ICD-10-CM

## 2025-02-20 DIAGNOSIS — C34.90 ADENOCARCINOMA OF LUNG, STAGE 4, UNSPECIFIED LATERALITY: ICD-10-CM

## 2025-02-20 DIAGNOSIS — E03.2 HYPOTHYROIDISM DUE TO DRUGS: ICD-10-CM

## 2025-02-20 DIAGNOSIS — C34.90 ADENOCARCINOMA OF LUNG, UNSPECIFIED LATERALITY: Primary | ICD-10-CM

## 2025-02-20 DIAGNOSIS — R06.02 SOB (SHORTNESS OF BREATH): ICD-10-CM

## 2025-02-20 DIAGNOSIS — I50.9 CONGESTIVE HEART FAILURE, UNSPECIFIED HF CHRONICITY, UNSPECIFIED HEART FAILURE TYPE: ICD-10-CM

## 2025-02-20 DIAGNOSIS — R07.9 CHEST PAIN: ICD-10-CM

## 2025-02-20 LAB
ABO + RH BLD: NORMAL
ALBUMIN SERPL-MCNC: 2.5 G/DL (ref 3.4–4.8)
ALBUMIN SERPL-MCNC: 2.5 G/DL (ref 3.4–4.8)
ALBUMIN/GLOB SERPL: 0.7 RATIO (ref 1.1–2)
ALBUMIN/GLOB SERPL: 0.8 RATIO (ref 1.1–2)
ALP SERPL-CCNC: 82 UNIT/L (ref 40–150)
ALP SERPL-CCNC: 86 UNIT/L (ref 40–150)
ALT SERPL-CCNC: 10 UNIT/L (ref 0–55)
ALT SERPL-CCNC: 12 UNIT/L (ref 0–55)
ANION GAP SERPL CALC-SCNC: 8 MEQ/L
ANION GAP SERPL CALC-SCNC: 9 MEQ/L
AST SERPL-CCNC: 19 UNIT/L (ref 5–34)
AST SERPL-CCNC: 20 UNIT/L (ref 5–34)
BASOPHILS # BLD AUTO: 0.02 X10(3)/MCL
BASOPHILS # BLD AUTO: 0.06 X10(3)/MCL
BASOPHILS NFR BLD AUTO: 0.2 %
BASOPHILS NFR BLD AUTO: 0.7 %
BILIRUB SERPL-MCNC: 0.4 MG/DL
BILIRUB SERPL-MCNC: 0.5 MG/DL
BLD PROD TYP BPU: NORMAL
BLOOD UNIT EXPIRATION DATE: NORMAL
BLOOD UNIT TYPE CODE: 6200
BNP BLD-MCNC: 652.5 PG/ML
BUN SERPL-MCNC: 13.3 MG/DL (ref 9.8–20.1)
BUN SERPL-MCNC: 14 MG/DL (ref 9.8–20.1)
CALCIUM SERPL-MCNC: 8.5 MG/DL (ref 8.4–10.2)
CALCIUM SERPL-MCNC: 8.9 MG/DL (ref 8.4–10.2)
CHLORIDE SERPL-SCNC: 94 MMOL/L (ref 98–107)
CHLORIDE SERPL-SCNC: 95 MMOL/L (ref 98–107)
CO2 SERPL-SCNC: 34 MMOL/L (ref 23–31)
CO2 SERPL-SCNC: 35 MMOL/L (ref 23–31)
CORTIS SERPL-SCNC: 17.6 UG/DL
CREAT SERPL-MCNC: 0.85 MG/DL (ref 0.55–1.02)
CREAT SERPL-MCNC: 0.85 MG/DL (ref 0.55–1.02)
CREAT/UREA NIT SERPL: 16
CREAT/UREA NIT SERPL: 16
CROSSMATCH INTERPRETATION: NORMAL
CRP SERPL-MCNC: 104 MG/L
D DIMER PPP IA.FEU-MCNC: 3.43 UG/ML FEU (ref 0–0.5)
DIRECT COOMBS (DAT): NORMAL
DISPENSE STATUS: NORMAL
EOSINOPHIL # BLD AUTO: 0.09 X10(3)/MCL (ref 0–0.9)
EOSINOPHIL # BLD AUTO: 0.1 X10(3)/MCL (ref 0–0.9)
EOSINOPHIL NFR BLD AUTO: 1.1 %
EOSINOPHIL NFR BLD AUTO: 1.2 %
ERYTHROCYTE [DISTWIDTH] IN BLOOD BY AUTOMATED COUNT: 18 % (ref 11.5–17)
ERYTHROCYTE [DISTWIDTH] IN BLOOD BY AUTOMATED COUNT: 18.2 % (ref 11.5–17)
FERRITIN SERPL-MCNC: 78.46 NG/ML (ref 4.63–204)
FLUAV AG UPPER RESP QL IA.RAPID: NOT DETECTED
FLUBV AG UPPER RESP QL IA.RAPID: NOT DETECTED
FOLATE SERPL-MCNC: 12.8 NG/ML (ref 7–31.4)
GFR SERPLBLD CREATININE-BSD FMLA CKD-EPI: >60 ML/MIN/1.73/M2
GFR SERPLBLD CREATININE-BSD FMLA CKD-EPI: >60 ML/MIN/1.73/M2
GLOBULIN SER-MCNC: 3.2 GM/DL (ref 2.4–3.5)
GLOBULIN SER-MCNC: 3.8 GM/DL (ref 2.4–3.5)
GLUCOSE SERPL-MCNC: 88 MG/DL (ref 82–115)
GLUCOSE SERPL-MCNC: 92 MG/DL (ref 82–115)
GROUP & RH: NORMAL
HCT VFR BLD AUTO: 24.7 % (ref 37–47)
HCT VFR BLD AUTO: 25.3 % (ref 37–47)
HGB BLD-MCNC: 7.7 G/DL (ref 12–16)
HGB BLD-MCNC: 8 G/DL (ref 12–16)
IMM GRANULOCYTES # BLD AUTO: 0.08 X10(3)/MCL (ref 0–0.04)
IMM GRANULOCYTES # BLD AUTO: 0.1 X10(3)/MCL (ref 0–0.04)
IMM GRANULOCYTES NFR BLD AUTO: 1 %
IMM GRANULOCYTES NFR BLD AUTO: 1.2 %
INDIRECT COOMBS: NORMAL
IRON SATN MFR SERPL: 8 % (ref 20–50)
IRON SERPL-MCNC: 21 UG/DL (ref 50–170)
LYMPHOCYTES # BLD AUTO: 1.36 X10(3)/MCL (ref 0.6–4.6)
LYMPHOCYTES # BLD AUTO: 1.48 X10(3)/MCL (ref 0.6–4.6)
LYMPHOCYTES NFR BLD AUTO: 16.1 %
LYMPHOCYTES NFR BLD AUTO: 18 %
MAGNESIUM SERPL-MCNC: 1.9 MG/DL (ref 1.6–2.6)
MCH RBC QN AUTO: 25.6 PG (ref 27–31)
MCH RBC QN AUTO: 26 PG (ref 27–31)
MCHC RBC AUTO-ENTMCNC: 31.2 G/DL (ref 33–36)
MCHC RBC AUTO-ENTMCNC: 31.6 G/DL (ref 33–36)
MCV RBC AUTO: 80.8 FL (ref 80–94)
MCV RBC AUTO: 83.4 FL (ref 80–94)
MONOCYTES # BLD AUTO: 1.16 X10(3)/MCL (ref 0.1–1.3)
MONOCYTES # BLD AUTO: 1.33 X10(3)/MCL (ref 0.1–1.3)
MONOCYTES NFR BLD AUTO: 14.1 %
MONOCYTES NFR BLD AUTO: 15.7 %
NEUTROPHILS # BLD AUTO: 5.36 X10(3)/MCL (ref 2.1–9.2)
NEUTROPHILS # BLD AUTO: 5.56 X10(3)/MCL (ref 2.1–9.2)
NEUTROPHILS NFR BLD AUTO: 65 %
NEUTROPHILS NFR BLD AUTO: 65.7 %
NRBC BLD AUTO-RTO: 0 %
PLATELET # BLD AUTO: 463 X10(3)/MCL (ref 130–400)
PLATELET # BLD AUTO: 478 X10(3)/MCL (ref 130–400)
PMV BLD AUTO: 9 FL (ref 7.4–10.4)
PMV BLD AUTO: 9 FL (ref 7.4–10.4)
POTASSIUM SERPL-SCNC: 3.7 MMOL/L (ref 3.5–5.1)
POTASSIUM SERPL-SCNC: 3.8 MMOL/L (ref 3.5–5.1)
PROT SERPL-MCNC: 5.7 GM/DL (ref 5.8–7.6)
PROT SERPL-MCNC: 6.3 GM/DL (ref 5.8–7.6)
RBC # BLD AUTO: 2.96 X10(6)/MCL (ref 4.2–5.4)
RBC # BLD AUTO: 3.13 X10(6)/MCL (ref 4.2–5.4)
RET# (OHS): 0.07 X10E6/UL (ref 0.02–0.08)
RETICULOCYTE COUNT AUTOMATED (OLG): 2.4 % (ref 1.1–2.1)
RSV A 5' UTR RNA NPH QL NAA+PROBE: NOT DETECTED
SARS-COV-2 RNA RESP QL NAA+PROBE: DETECTED
SODIUM SERPL-SCNC: 137 MMOL/L (ref 136–145)
SODIUM SERPL-SCNC: 138 MMOL/L (ref 136–145)
SPECIMEN OUTDATE: NORMAL
TIBC SERPL-MCNC: 229 UG/DL (ref 70–310)
TIBC SERPL-MCNC: 250 UG/DL (ref 250–450)
TRANSFERRIN SERPL-MCNC: 236 MG/DL (ref 173–360)
TROPONIN I SERPL-MCNC: <0.01 NG/ML (ref 0–0.04)
TSH SERPL-ACNC: 13.56 UIU/ML (ref 0.35–4.94)
UNIT NUMBER: NORMAL
VIT B12 SERPL-MCNC: 763 PG/ML (ref 213–816)
WBC # BLD AUTO: 8.24 X10(3)/MCL (ref 4.5–11.5)
WBC # BLD AUTO: 8.46 X10(3)/MCL (ref 4.5–11.5)

## 2025-02-20 PROCEDURE — 83550 IRON BINDING TEST: CPT | Performed by: STUDENT IN AN ORGANIZED HEALTH CARE EDUCATION/TRAINING PROGRAM

## 2025-02-20 PROCEDURE — 84484 ASSAY OF TROPONIN QUANT: CPT | Performed by: STUDENT IN AN ORGANIZED HEALTH CARE EDUCATION/TRAINING PROGRAM

## 2025-02-20 PROCEDURE — 99214 OFFICE O/P EST MOD 30 MIN: CPT | Mod: PBBFAC,25 | Performed by: NURSE PRACTITIONER

## 2025-02-20 PROCEDURE — 82607 VITAMIN B-12: CPT | Performed by: STUDENT IN AN ORGANIZED HEALTH CARE EDUCATION/TRAINING PROGRAM

## 2025-02-20 PROCEDURE — XW033E5 INTRODUCTION OF REMDESIVIR ANTI-INFECTIVE INTO PERIPHERAL VEIN, PERCUTANEOUS APPROACH, NEW TECHNOLOGY GROUP 5: ICD-10-PCS | Performed by: INTERNAL MEDICINE

## 2025-02-20 PROCEDURE — 82728 ASSAY OF FERRITIN: CPT | Performed by: NURSE PRACTITIONER

## 2025-02-20 PROCEDURE — 86923 COMPATIBILITY TEST ELECTRIC: CPT | Performed by: STUDENT IN AN ORGANIZED HEALTH CARE EDUCATION/TRAINING PROGRAM

## 2025-02-20 PROCEDURE — 11000001 HC ACUTE MED/SURG PRIVATE ROOM

## 2025-02-20 PROCEDURE — 86850 RBC ANTIBODY SCREEN: CPT | Performed by: NURSE PRACTITIONER

## 2025-02-20 PROCEDURE — 99999 PR PBB SHADOW E&M-EST. PATIENT-LVL IV: CPT | Mod: PBBFAC,,, | Performed by: NURSE PRACTITIONER

## 2025-02-20 PROCEDURE — 85025 COMPLETE CBC W/AUTO DIFF WBC: CPT

## 2025-02-20 PROCEDURE — 30233N1 TRANSFUSION OF NONAUTOLOGOUS RED BLOOD CELLS INTO PERIPHERAL VEIN, PERCUTANEOUS APPROACH: ICD-10-PCS | Performed by: INTERNAL MEDICINE

## 2025-02-20 PROCEDURE — 84443 ASSAY THYROID STIM HORMONE: CPT

## 2025-02-20 PROCEDURE — 99285 EMERGENCY DEPT VISIT HI MDM: CPT | Mod: 25,27

## 2025-02-20 PROCEDURE — 63600175 PHARM REV CODE 636 W HCPCS: Performed by: NURSE PRACTITIONER

## 2025-02-20 PROCEDURE — 27000207 HC ISOLATION

## 2025-02-20 PROCEDURE — 36415 COLL VENOUS BLD VENIPUNCTURE: CPT

## 2025-02-20 PROCEDURE — 0241U COVID/RSV/FLU A&B PCR: CPT | Performed by: STUDENT IN AN ORGANIZED HEALTH CARE EDUCATION/TRAINING PROGRAM

## 2025-02-20 PROCEDURE — 83735 ASSAY OF MAGNESIUM: CPT | Performed by: STUDENT IN AN ORGANIZED HEALTH CARE EDUCATION/TRAINING PROGRAM

## 2025-02-20 PROCEDURE — 80053 COMPREHEN METABOLIC PANEL: CPT

## 2025-02-20 PROCEDURE — 36415 COLL VENOUS BLD VENIPUNCTURE: CPT | Mod: 91

## 2025-02-20 PROCEDURE — 25000003 PHARM REV CODE 250: Performed by: NURSE PRACTITIONER

## 2025-02-20 PROCEDURE — 85045 AUTOMATED RETICULOCYTE COUNT: CPT | Performed by: STUDENT IN AN ORGANIZED HEALTH CARE EDUCATION/TRAINING PROGRAM

## 2025-02-20 PROCEDURE — 99215 OFFICE O/P EST HI 40 MIN: CPT | Mod: S$PBB,,, | Performed by: NURSE PRACTITIONER

## 2025-02-20 PROCEDURE — P9016 RBC LEUKOCYTES REDUCED: HCPCS | Performed by: STUDENT IN AN ORGANIZED HEALTH CARE EDUCATION/TRAINING PROGRAM

## 2025-02-20 PROCEDURE — 86880 COOMBS TEST DIRECT: CPT | Performed by: STUDENT IN AN ORGANIZED HEALTH CARE EDUCATION/TRAINING PROGRAM

## 2025-02-20 PROCEDURE — G2211 COMPLEX E/M VISIT ADD ON: HCPCS | Mod: S$PBB,,, | Performed by: NURSE PRACTITIONER

## 2025-02-20 PROCEDURE — 82746 ASSAY OF FOLIC ACID SERUM: CPT | Performed by: STUDENT IN AN ORGANIZED HEALTH CARE EDUCATION/TRAINING PROGRAM

## 2025-02-20 PROCEDURE — 36415 COLL VENOUS BLD VENIPUNCTURE: CPT | Performed by: NURSE PRACTITIONER

## 2025-02-20 PROCEDURE — 36430 TRANSFUSION BLD/BLD COMPNT: CPT

## 2025-02-20 PROCEDURE — 82533 TOTAL CORTISOL: CPT

## 2025-02-20 PROCEDURE — 21400001 HC TELEMETRY ROOM

## 2025-02-20 PROCEDURE — 63600175 PHARM REV CODE 636 W HCPCS: Performed by: STUDENT IN AN ORGANIZED HEALTH CARE EDUCATION/TRAINING PROGRAM

## 2025-02-20 PROCEDURE — 86140 C-REACTIVE PROTEIN: CPT | Performed by: NURSE PRACTITIONER

## 2025-02-20 PROCEDURE — 25500020 PHARM REV CODE 255: Performed by: INTERNAL MEDICINE

## 2025-02-20 PROCEDURE — 85379 FIBRIN DEGRADATION QUANT: CPT | Performed by: NURSE PRACTITIONER

## 2025-02-20 PROCEDURE — 83880 ASSAY OF NATRIURETIC PEPTIDE: CPT | Performed by: STUDENT IN AN ORGANIZED HEALTH CARE EDUCATION/TRAINING PROGRAM

## 2025-02-20 RX ORDER — FUROSEMIDE 10 MG/ML
40 INJECTION INTRAMUSCULAR; INTRAVENOUS
Status: COMPLETED | OUTPATIENT
Start: 2025-02-20 | End: 2025-02-20

## 2025-02-20 RX ORDER — HYDROCODONE BITARTRATE AND ACETAMINOPHEN 500; 5 MG/1; MG/1
TABLET ORAL
Status: DISCONTINUED | OUTPATIENT
Start: 2025-02-20 | End: 2025-02-24 | Stop reason: HOSPADM

## 2025-02-20 RX ORDER — DIPHENHYDRAMINE HCL 25 MG
25 CAPSULE ORAL ONCE
Status: CANCELLED | OUTPATIENT
Start: 2025-02-20 | End: 2025-02-20

## 2025-02-20 RX ORDER — CARVEDILOL 3.12 MG/1
6.25 TABLET ORAL DAILY
Status: DISCONTINUED | OUTPATIENT
Start: 2025-02-21 | End: 2025-02-24 | Stop reason: HOSPADM

## 2025-02-20 RX ORDER — FENTANYL CITRATE 50 UG/ML
50 INJECTION, SOLUTION INTRAMUSCULAR; INTRAVENOUS
Refills: 0 | Status: DISCONTINUED | OUTPATIENT
Start: 2025-02-20 | End: 2025-02-20

## 2025-02-20 RX ORDER — GUAIFENESIN AND DEXTROMETHORPHAN HYDROBROMIDE 10; 100 MG/5ML; MG/5ML
5 SYRUP ORAL EVERY 4 HOURS PRN
Status: DISCONTINUED | OUTPATIENT
Start: 2025-02-20 | End: 2025-02-24 | Stop reason: HOSPADM

## 2025-02-20 RX ORDER — DEXAMETHASONE SODIUM PHOSPHATE 4 MG/ML
6 INJECTION, SOLUTION INTRA-ARTICULAR; INTRALESIONAL; INTRAMUSCULAR; INTRAVENOUS; SOFT TISSUE EVERY 24 HOURS
Status: DISCONTINUED | OUTPATIENT
Start: 2025-02-20 | End: 2025-02-24 | Stop reason: HOSPADM

## 2025-02-20 RX ORDER — DIPHENHYDRAMINE HCL 25 MG
25 CAPSULE ORAL ONCE
Status: COMPLETED | OUTPATIENT
Start: 2025-02-20 | End: 2025-02-20

## 2025-02-20 RX ORDER — BENZONATATE 100 MG/1
100 CAPSULE ORAL 3 TIMES DAILY PRN
Status: DISCONTINUED | OUTPATIENT
Start: 2025-02-20 | End: 2025-02-24 | Stop reason: HOSPADM

## 2025-02-20 RX ORDER — ACETAMINOPHEN 325 MG/1
650 TABLET ORAL ONCE
Status: CANCELLED | OUTPATIENT
Start: 2025-02-20 | End: 2025-02-20

## 2025-02-20 RX ORDER — HYDROCODONE BITARTRATE AND ACETAMINOPHEN 500; 5 MG/1; MG/1
TABLET ORAL ONCE
Status: DISCONTINUED | OUTPATIENT
Start: 2025-02-20 | End: 2025-02-24 | Stop reason: HOSPADM

## 2025-02-20 RX ORDER — ATORVASTATIN CALCIUM 10 MG/1
20 TABLET, FILM COATED ORAL DAILY
Status: DISCONTINUED | OUTPATIENT
Start: 2025-02-21 | End: 2025-02-24 | Stop reason: HOSPADM

## 2025-02-20 RX ORDER — ACETAMINOPHEN 325 MG/1
650 TABLET ORAL ONCE
Status: COMPLETED | OUTPATIENT
Start: 2025-02-20 | End: 2025-02-20

## 2025-02-20 RX ORDER — ACETAMINOPHEN 325 MG/1
650 TABLET ORAL EVERY 4 HOURS PRN
Status: DISCONTINUED | OUTPATIENT
Start: 2025-02-20 | End: 2025-02-24 | Stop reason: HOSPADM

## 2025-02-20 RX ORDER — ACETAMINOPHEN 325 MG/1
650 TABLET ORAL EVERY 8 HOURS PRN
Status: DISCONTINUED | OUTPATIENT
Start: 2025-02-20 | End: 2025-02-24 | Stop reason: HOSPADM

## 2025-02-20 RX ORDER — HYDROCODONE BITARTRATE AND ACETAMINOPHEN 500; 5 MG/1; MG/1
TABLET ORAL ONCE
Status: CANCELLED | OUTPATIENT
Start: 2025-02-20 | End: 2025-02-20

## 2025-02-20 RX ORDER — ASPIRIN 81 MG/1
81 TABLET ORAL DAILY
COMMUNITY

## 2025-02-20 RX ORDER — LEVOTHYROXINE SODIUM 125 UG/1
125 TABLET ORAL
Status: DISCONTINUED | OUTPATIENT
Start: 2025-02-21 | End: 2025-02-24 | Stop reason: HOSPADM

## 2025-02-20 RX ORDER — IPRATROPIUM BROMIDE AND ALBUTEROL SULFATE 2.5; .5 MG/3ML; MG/3ML
3 SOLUTION RESPIRATORY (INHALATION) EVERY 4 HOURS PRN
Status: DISCONTINUED | OUTPATIENT
Start: 2025-02-21 | End: 2025-02-24 | Stop reason: HOSPADM

## 2025-02-20 RX ORDER — ONDANSETRON HYDROCHLORIDE 2 MG/ML
4 INJECTION, SOLUTION INTRAVENOUS EVERY 8 HOURS PRN
Status: DISCONTINUED | OUTPATIENT
Start: 2025-02-20 | End: 2025-02-24 | Stop reason: HOSPADM

## 2025-02-20 RX ORDER — MUPIROCIN 20 MG/G
OINTMENT TOPICAL 2 TIMES DAILY
Status: DISCONTINUED | OUTPATIENT
Start: 2025-02-20 | End: 2025-02-24 | Stop reason: HOSPADM

## 2025-02-20 RX ORDER — LOSARTAN POTASSIUM 50 MG/1
50 TABLET ORAL DAILY
Status: DISCONTINUED | OUTPATIENT
Start: 2025-02-21 | End: 2025-02-22

## 2025-02-20 RX ADMIN — ACETAMINOPHEN 650 MG: 325 TABLET, FILM COATED ORAL at 07:02

## 2025-02-20 RX ADMIN — DIPHENHYDRAMINE HYDROCHLORIDE 25 MG: 25 CAPSULE ORAL at 07:02

## 2025-02-20 RX ADMIN — DEXAMETHASONE SODIUM PHOSPHATE 6 MG: 4 INJECTION, SOLUTION INTRA-ARTICULAR; INTRALESIONAL; INTRAMUSCULAR; INTRAVENOUS; SOFT TISSUE at 10:02

## 2025-02-20 RX ADMIN — FUROSEMIDE 40 MG: 10 INJECTION, SOLUTION INTRAMUSCULAR; INTRAVENOUS at 05:02

## 2025-02-20 RX ADMIN — IOHEXOL 100 ML: 350 INJECTION, SOLUTION INTRAVENOUS at 11:02

## 2025-02-20 RX ADMIN — REMDESIVIR 200 MG: 100 INJECTION, POWDER, LYOPHILIZED, FOR SOLUTION INTRAVENOUS at 10:02

## 2025-02-20 NOTE — ED PROVIDER NOTES
Encounter Date: 2/20/2025    SCRIBE #1 NOTE: IDaphney, am scribing for, and in the presence of,  Shon New IV, MD. I have scribed the following portions of the note - the EKG reading. Other sections scribed: HPI, ROS, PE.       History     Chief Complaint   Patient presents with    Abnormal Labs     Sent here by Dr Mir for low H&H and hypertension while at Dr office. 196/77 in office. Lung cancer pt on immunotherapy. 2L nc at home. Pt denies complaints at this time.      The patient is a 79 year old female with hx of anemia, adenocarcinoma of lung, COPD, HLD, HTN, and thyroid disease presenting to the ED due to abnormal labs. The patient was seen at her oncologist office and presented with low hemoglobin and hypertension. The patient complains of bilateral leg swelling. She denies SOB, chest pain, worsening cough, and dark stools. The patient is on 40 mg of plavix that was recently increased. The patient denies having an hx of blood transfusions. She was recently stopped on losartan and had an increase in dose of lexapro to 10mg. 2L, nasal cannula at home. The patient is on immunotherapy.    Oncologist: Enrrique Gudino II, MD     LBM: 02/20, normal bowel movement.     The history is provided by the patient and medical records.     Review of patient's allergies indicates:   Allergen Reactions    Lisinopril Swelling     Past Medical History:   Diagnosis Date    Adenocarcinoma of lung     Anemia     Anxiety     Arthritis     COPD (chronic obstructive pulmonary disease)     Depression     HLD (hyperlipidemia)     Hypertension     Lung cancer     Secondary malignant neoplasm of lymph nodes     Secondary malignant neoplasm of right lung     Thyroid disease     lymph nodes malignant     Past Surgical History:   Procedure Laterality Date    BLADDER SURGERY      CHOLECYSTECTOMY      COLONOSCOPY  2018    COLONOSCOPY N/A 9/9/2024    Procedure: SIG;  Surgeon: Saurav Hutson MD;  Location: [facility]  ENDOSCOPY;  Service: Gastroenterology;  Laterality: N/A;    ENDOBRONCHIAL ULTRASOUND N/A 11/16/2022    Procedure: ENDOBRONCHIAL ULTRASOUND (EBUS);  Surgeon: Anthony Hutson MD;  Location: [facility] BRONCHOSCOPY LAB;  Service: Pulmonary;  Laterality: N/A;    HYSTERECTOMY      KNEE SURGERY Bilateral     replaced knees    PARTIAL HIP ARTHROPLASTY Bilateral     SIGMOIDOSCOPY, WITH BIOPSY N/A 9/9/2024    Procedure: SIGMOIDOSCOPY, WITH BIOPSY;  Surgeon: Saurav Hutson MD;  Location: [facility] ENDOSCOPY;  Service: Gastroenterology;  Laterality: N/A;     Family History   Problem Relation Name Age of Onset    Lung cancer Mother      Lung cancer Brother       Social History[1]  Review of Systems   Constitutional:  Negative for chills and fever.   HENT:  Negative for congestion, rhinorrhea and sore throat.    Eyes:  Negative for visual disturbance.   Respiratory:  Negative for cough and shortness of breath.    Cardiovascular:  Positive for leg swelling (Bilateral leg swelling.). Negative for chest pain.   Gastrointestinal:  Negative for abdominal pain, blood in stool, nausea and vomiting.   Genitourinary:  Negative for dysuria, hematuria, vaginal bleeding and vaginal discharge.   Musculoskeletal:  Negative for joint swelling.   Skin:  Negative for rash.   Neurological:  Negative for weakness.   Psychiatric/Behavioral:  Negative for confusion.        Physical Exam     Initial Vitals   BP Pulse Resp Temp SpO2   02/20/25 1551 02/20/25 1549 02/20/25 1549 02/20/25 1549 02/20/25 1549   (!) 171/70 64 20 97.7 °F (36.5 °C) (!) 92 %      MAP       --                Physical Exam    Nursing note and vitals reviewed.  Constitutional: She is not diaphoretic. She appears ill (Chornically ill appearing.). No distress.   HENT:   Head: Normocephalic and atraumatic.   Neck: Neck supple.   Normal range of motion.  Cardiovascular:  Normal rate and regular rhythm.           No murmur heard.  Pulmonary/Chest: Breath sounds normal. No respiratory  distress.   2L nasal cannula, SAT well.   Mediport to right chest wall. Site is clean, dry, and intact.    Abdominal: Abdomen is soft. She exhibits no distension. There is no abdominal tenderness.   Genitourinary:    Genitourinary Comments: Hemoccult negative.   Chaperoned by Jumana Romero RN.       Musculoskeletal:      Cervical back: Normal range of motion and neck supple.      Comments: Trace edema to bilateral lower extremities.        Neurological: She is alert and oriented to person, place, and time. She has normal strength. No cranial nerve deficit or sensory deficit.   Skin: Skin is warm. Capillary refill takes less than 2 seconds. There is pallor.   Psychiatric: She has a normal mood and affect.         ED Course   Critical Care    Date/Time: 2/20/2025 6:08 PM    Performed by: Shon New IV, MD  Authorized by: Shon New IV, MD  Direct patient critical care time: 9 minutes  Additional history critical care time: 8 minutes  Ordering / reviewing critical care time: 7 minutes  Documentation critical care time: 8 minutes  Consulting other physicians critical care time: 6 minutes  Total critical care time (exclusive of procedural time) : 38 minutes  Critical care time was exclusive of separately billable procedures and treating other patients.  Critical care was time spent personally by me on the following activities: blood draw for specimens, development of treatment plan with patient or surrogate, discussions with consultants, evaluation of patient's response to treatment, interpretation of cardiac output measurements, examination of patient, obtaining history from patient or surrogate, ordering and performing treatments and interventions, ordering and review of laboratory studies, ordering and review of radiographic studies, review of old charts, re-evaluation of patient's condition and pulse oximetry.        Labs Reviewed   COMPREHENSIVE METABOLIC PANEL - Abnormal       Result Value    Sodium  137      Potassium 3.7      Chloride 95 (*)     CO2 34 (*)     Glucose 92      Blood Urea Nitrogen 13.3      Creatinine 0.85      Calcium 8.5      Protein Total 5.7 (*)     Albumin 2.5 (*)     Globulin 3.2      Albumin/Globulin Ratio 0.8 (*)     Bilirubin Total 0.5      ALP 86      ALT 12      AST 20      eGFR >60      Anion Gap 8.0      BUN/Creatinine Ratio 16     CBC WITH DIFFERENTIAL - Abnormal    WBC 8.24      RBC 3.13 (*)     Hgb 8.0 (*)     Hct 25.3 (*)     MCV 80.8      MCH 25.6 (*)     MCHC 31.6 (*)     RDW 18.2 (*)     Platelet 478 (*)     MPV 9.0      Neut % 65.0      Lymph % 18.0      Mono % 14.1      Eos % 1.2      Basophil % 0.7      Imm Grans % 1.0      Neut # 5.36      Lymph # 1.48      Mono # 1.16      Eos # 0.10      Baso # 0.06      Imm Gran # 0.08 (*)     NRBC% 0.0     B-TYPE NATRIURETIC PEPTIDE - Abnormal    Natriuretic Peptide 652.5 (*)    COVID/RSV/FLU A&B PCR - Abnormal    Influenza A PCR Not Detected      Influenza B PCR Not Detected      Respiratory Syncytial Virus PCR Not Detected      SARS-CoV-2 PCR Detected (*)     Narrative:     The Xpert Xpress SARS-CoV-2/FLU/RSV plus is a rapid, multiplexed real-time PCR test intended for the simultaneous qualitative detection and differentiation of SARS-CoV-2, Influenza A, Influenza B, and respiratory syncytial virus (RSV) viral RNA in either nasopharyngeal swab or nasal swab specimens.         MAGNESIUM - Normal    Magnesium Level 1.90     TROPONIN I - Normal    Troponin-I <0.010     CBC W/ AUTO DIFFERENTIAL    Narrative:     The following orders were created for panel order CBC auto differential.  Procedure                               Abnormality         Status                     ---------                               -----------         ------                     CBC with Differential[[id number]]       Abnormal            Final result                 Please view results for these tests on the individual orders.   FOLATE   RETICULOCYTES    VITAMIN B12   IRON AND TIBC   FERRITIN   OCCULT BLOOD,STOOL,DIAGNOSTIC (1-3)    Narrative:     The following orders were created for panel order Occult Blood, Stool, Diagnostic (1-3).  Procedure                               Abnormality         Status                     ---------                               -----------         ------                     Occult Blood, Stool 1st...[[id number]]                                                   Please view results for these tests on the individual orders.   OCCULT BLOOD, STOOL 1ST SPECIMEN   TYPE & SCREEN   DIRECT ANTIGLOBULIN TEST   PREPARE RBC SOFT    UNIT NUMBER [id number]      UNIT ABO/RH A POS      DISPENSE STATUS Selected      Unit Expiration [id number]      Product Code [id number]      Unit Blood Type Code 6200      CROSSMATCH INTERPRETATION Compatible       EKG Readings: (Independently Interpreted)   Initial Reading: No STEMI. Rhythm: Normal Sinus Rhythm. Heart Rate: 64. Ectopy: No Ectopy. Conduction: Normal. ST Segments: Normal ST Segments. T Waves: Normal. Clinical Impression: Normal Sinus Rhythm   1613.       Imaging Results              X-Ray Chest AP Portable (Final result)  Result time 02/20/25 16:29:39      Final result by Brody Silva MD (02/20/25 16:29:39)                   Impression:      Mild prominence of the interstitium, pulmonary vascular congestion versus infection      Electronically signed by: Brody Silva  Date:    02/20/2025  Time:    16:29               Narrative:    EXAMINATION:  XR CHEST AP PORTABLE    CLINICAL HISTORY:  Shortness of breath    COMPARISON:  30 January 2025    FINDINGS:  Frontal view of the chest was obtained. Stable right Port-A-Cath.  Heart is not significantly enlarged.  There is aortic atherosclerosis.  Stable biapical opacities.  Mild prominence of the interstitium.  No new focal consolidation or pneumothorax.                                       Medications   0.9%  NaCl infusion (for blood administration)  (has no administration in time range)   ondansetron injection 4 mg (has no administration in time range)   acetaminophen tablet 650 mg (has no administration in time range)   acetaminophen tablet 650 mg (has no administration in time range)   mupirocin 2 % ointment (has no administration in time range)   furosemide injection 40 mg (40 mg Intravenous Given 2/20/25 9095)     Medical Decision Making  79-year-old sent from oncology clinic for low hemoglobin  Per report patient pale short of breath hypertensive leg swelling  Patient is stable on her home O2 at this time chronically ill-appearing she is in no distress  She denies dark stools blood in stool  Will check labs reassess    Differential diagnosis includes but is not limited to:  Judging by the patient's chief complaint and pertinent history, the patient has the following possible differential diagnoses, including but not limited to the following.  Some of these are deemed to be lower likelihood and some more likely based on my physical exam and history combined with possible lab work and/or imaging studies.   Please see the pertinent studies, and refer to the HPI.  Some of these diagnoses will take further evaluation to fully rule out, perhaps as an outpatient and the patient was encouraged to follow up when discharged for more comprehensive evaluation.    ACS, pneumonia, COVID/Flu, congestive heart failure, asthma, COPD, pleural effusion, pulmonary edema, acute bronchitis, PE, pneumothorax, hemothorax, aortic dissection, electrolyte abnormalities, anemia, anxiety       Problems Addressed:  Anemia, unspecified type: acute illness or injury that poses a threat to life or bodily functions  Chest pain: acute illness or injury that poses a threat to life or bodily functions  Congestive heart failure, unspecified HF chronicity, unspecified heart failure type: chronic illness or injury  SOB (shortness of breath): acute illness or injury that poses a threat to life or  bodily functions    Amount and/or Complexity of Data Reviewed  Labs: ordered.  Radiology: ordered and independent interpretation performed.     Details: CXR - no obvious infiltrates, consolidations, pleural effusions, or pneumothorax.      ECG/medicine tests: ordered and independent interpretation performed.     Details: Initial Reading: No STEMI. Rhythm: Normal Sinus Rhythm. Heart Rate: 64. Ectopy: No Ectopy. Conduction: Normal. ST Segments: Normal ST Segments. T Waves: Normal. Clinical Impression: Normal Sinus Rhythm   1613.     Risk  Prescription drug management.  Decision regarding hospitalization.    Critical Care  Total time providing critical care: 36 minutes            Scribe Attestation:   Scribe #1: I performed the above scribed service and the documentation accurately describes the services I performed. I attest to the accuracy of the note.    Attending Attestation:           Physician Attestation for Scribe:  Physician Attestation Statement for Scribe #1: I, Shon New IV, MD, reviewed documentation, as scribed by Daphney Ruiz in my presence, and it is both accurate and complete.             ED Course as of 02/20/25 1808   Thu Feb 20, 2025   1623 79-year-old sent from oncology clinic for low hemoglobin  Per report patient pale short of breath hypertensive leg swelling  Patient is stable on her home O2 at this time chronically ill-appearing she is in no distress  She denies dark stools blood in stool  Will check labs reassess [AC]   1716 SARS-CoV2 (COVID-19) Qualitative PCR(!): Detected [AC]   1717 Paged Hospitalist.    [AS]   1722 BNP(!): 652.5 [AC]   1722 SARS-CoV2 (COVID-19) Qualitative PCR(!): Detected [AC]   1737 Admitted to Dr Coles [AC]      ED Course User Index  [AC] Shon New IV, MD  [AS] Daphney Ruiz                           Clinical Impression:  Final diagnoses:  [R07.9] Chest pain  [R06.02] SOB (shortness of breath)  [D64.9] Anemia, unspecified type (Primary)  [I50.9] Congestive  heart failure, unspecified HF chronicity, unspecified heart failure type          ED Disposition Condition    Admit                   [1]   Social History  Tobacco Use    Smoking status: Every Day     Current packs/day: 0.25     Average packs/day: 0.3 packs/day for 66.1 years (16.5 ttl pk-yrs)     Types: Cigarettes     Start date: 1959    Smokeless tobacco: Never    Tobacco comments:     Patient states she is trying to quit smoking.   Substance Use Topics    Alcohol use: Not Currently    Drug use: Never        Shon New IV, MD  02/20/25 4027

## 2025-02-20 NOTE — DISCHARGE INSTRUCTIONS
Thanks for letting use take care of you today! It is our goal to give you courteous care and to keep you comfortable and informed. If you have any questions before you leave I will be happy to try and answer them.     Advice after your visit:  Your visit in the emergency department is NOT definitive care - please follow-up with your primary care doctor and/or specialist within 1-2 days. If you do not have a primary care physician call [phone] to schedule an appointment. Please return if you have any worsening in your condition or if you have any other concerns.    Return to the emergency department if any worsening symptoms including fever, chest pain, difficulty breathing, weakness, numbness, tingling, nausea, vomiting, inability to eat, drink or take your medication, or any other new symptoms or concerns arise.      Please signup for MyChart as noted below in your paperwork to review all labwork, imaging results, and any other incidental findings from today's visit.     If you had radiology exams like an XRAY or CT in the emergency Department the interpreation on them may be preliminary - there may be less time sensitive findings on the reports please obtain these reports within 24 hours from the hospital or by using your out on your mobile phone to access records.  Bring these to your primary care doctor and/or specialist for further review of incidental findings.    Please review any LAB WORK from your visit today with your primary care physician.    If you were prescribed OPIATE PAIN MEDICATION - please understand of these medications can be addictive, you may fill less of the prescription was written for, you do not have to take the full prescription.  You may discard what you do not use.  Please seek help if you feel you are having problems with addiction.  Do not drive or operate heavy machinery if you are taking sedating medications.  Do not mix these medications with alcohol.      If you had a SPLINT  placed in the emergency department if you have severe pain numbness tingling or discoloration of year digits please remove the splint and return to the emergency department for further evaluation as this may represent a sign of compromise to the nerves or blood vessels due to swelling.    If you had SUTURES in the emergency department please have them removed in the prescribed time frame typically within 7-14 days.  You may shower but please do not bathe or swim.  Keep the wounds clean and dry and covered with a clean dressing.  Please return if he have any signs of infection like redness or drainage or pain at the suture site.    Please take the full course of  any ANTIBIOTICS you were prescribed - incomplete courses of antibiotics can cause resistance to antibiotics in the future which will make it difficult to treat any infections you may have.

## 2025-02-20 NOTE — PROGRESS NOTES
Subjective:       Patient ID: Pratibha Patel is a 79 y.o. female.    Chief Complaint: Follow-up (Patient reports weakness)      PCP: Graham Nguyen NP  Cardiologist: Dr. Danny Sánchez  Referring Physician: Dr. Anthony Hutson  Endocrine: Dr. Werner  Radiation oncology: Dr. Boss in the past, now Dr. Mortenesn.  ENT: Dr. 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 ? metastatic findings could not be biopsied. TTF negative/CK 7 +.    Diagnosed June 2014 EGFR mutation negative/ALK gene rearrangement negative. 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 Patient has reportedly been referred to ENT for resection and lymph node dissection (per patient report).   Subsequent open biopsy done by Dr. Rosales on 7/29/2019 confirmed the same.  Patient was unable to undergo definitive surgical resection for oligo metastatic disease.  Hoarseness and swallowing difficulties secondary to #2  Biopsy of the left lung lesion showed recurrent non-small cell lung cancer, Caris was sent and showed positivity for PD-L1, 2%.  No other targetable or actionable mutations present.    Current Treatment:   Combination chemo immunotherapy with carboplatin, pemetrexed, nivolumab, ipilimumab based off of checkmate 9LA. Cycle 1 day 1 given 12/20/2022.     Treatment History:  RT with weekly Carbo/Taxol started 7/2/14--Completed August 2014  Single agent 'Maintenance' Avastin q 3 weeks.  Started 4/9/15--last given September 21,  2016  Radiation + weekly Carbo/Taxol at 2AUC ----9/11/19-10/22/19  Patient underwent radiation from 12/17/2020 through 12/21/2020.  Radiation to the left and right lung per Dr. Robert Mortensen.    HPI:   Please see Oncology history in the diagnosis of adenocarcinoma of the lung, stage IV on the problem list for full detail.  Patient originally seen in 2014 with unintentional weight loss, shortness of breath, nonproductive cough.  Patient had imaging findings suggestive of lung cancer, bronchoscopy done in June of 2014 revealed moderately differentiated adenocarcinoma of the lung.  She originally underwent chemo radiation from July 2014 through August 2014.  She was placed on maintenance Avastin from April 2015 through September 2016. Patient then underwent a thyroid biopsy in June of 2019 that showed metastatic carcinoma favored to be adenocarcinoma lung primary.  She underwent surgical resection in July 2019, however full surgical dissection was not able to be done and the patient underwent open biopsy of right thyroid gland.  Pathology revealed poorly differentiated non-small cell carcinoma consistent with metastatic lung cancer.  NGS panel done at that time unremarkable.  She started chemoradiation with carbo Taxol in September 2019, completed in October 2019. Patient was doing well, imaging in October 2020 revealed an abnormality in the left lung, CT-guided lung biopsy on 11/03/2020 showed recurrent non-small cell lung cancer, Caris revealed PDL1 positivity.  She underwent SBRT from 12/17/2020 through 12/21/2020.  Patient then placed on surveillance imaging, most recently done on 07/05/2022 showing worsening disease in the right lung with a previous lesion that measured 0.6 cm and had an SUV of 1.0 now measuring 1.5 cm and an SUV of 8.4.  This was treated with SBRT in August of 2022.  PET/CT scan done on 11/8/2022 showed mild increased radiotracer activity within the left apical consolidation, SUV 5.5.  There was a  newly hypermetabolic subcentimeter subcarinal lymph node.  A 1 cm right lower lobe nodule was smaller, he right upper lobe nodule was slightly larger measuring 6 mm.  No sites of FDG avid metastatic disease in the neck, abdomen, or pelvis. Bronchoscopy done on 11/16/2022, pathology revealed malignant cells consistent with adenocarcinoma.  Planning on chemo immunotherapy with carboplatin, pemetrexed, nivolumab, ipilimumab based off of checkmate 9LA.  PET/CT on 07/25/2023 showed infectious or inflammatory changes in the right lower lobe with no other findings to suggest new or worsening disease.    PET/CT scan done on 10/12/2023 showed mixed response to therapy with decrease right hilar and left upper lobe consolidation uptake.  Subcarinal lymph node demonstrates significant increased uptake compared to the prior study.  Stable subcentimeter right upper lobe nodule demonstrates slight increased uptake compared to prior study    PET/CT scan done on 01/18/2024 showed mixed response to therapy with decrease in size of the right upper lobe nodule and maximum SUV of subcarinal lymph node.  There was right hilar uptake that was increased, however there was also patchy airspace opacities that could be infectious or inflammatory.    CT of the chest on 05/21/2024 showed overall stable disease with tiny centrilobular nodules throughout both lungs, similar to prior CTA.  Stable 0.9 cm nodule in the apical right upper lobe.    PET/CT scan done on 07/25/2024 showed unchanged mild FDG avid nodule in the posterior right lung apex, unchanged mild FDG avid left apical consolidative opacity.  There was a new paramediastinal consolidative opacity in the anterior right upper lobe and left upper lobe with mild FDG avidity that was symmetric and suggestive of a radiation port, malignancy felt less likely given symmetry.  There was indeterminate patchy consolidative opacity in the subpleural right lower lobe with mild FDG avidity slightly  increased in prominence from prior PET/CT.  There was similar mediastinal and hilar lymph nodes.    CT scan of the abdomen and pelvis done on 09/06/2024 showed fluid distended bowel in the level of the stomach through the rectum without transition point which may be related to enterocolitis or ileus.  There was infectious versus inflammatory bronchiolitis tree-in-bud nodularity in the lung bases.  Patient underwent flex sigmoidoscopy on 09/09/2024, there were some diverticulosis with no major abnormalities, random biopsies obtained.    Most recent PET/CT scan done on 10/28/2024 showed essentially stable disease.    CT chest done on 01/09/2025 showed slightly improved aeration of the lungs with a persistent area of nodular consolidation in the right lower lobe.    Interval History:   Patient presents to clinic for scheduled treatment visit, accompanied by her  and daughter. She does have generalized weakness and remains on continuous oxygen per nasal cannula.  She continues to have issues with lower extremity swelling though this has slightly improved.  She does not eat a lot at 1 time though she does snack throughout the day.  She denies any recent diarrhea.    Past Medical History:   Diagnosis Date    Adenocarcinoma of lung     Anemia     Anxiety     Arthritis     COPD (chronic obstructive pulmonary disease)     Depression     HLD (hyperlipidemia)     Hypertension     Lung cancer     Secondary malignant neoplasm of lymph nodes     Secondary malignant neoplasm of right lung     Thyroid disease     lymph nodes malignant      Past Surgical History:   Procedure Laterality Date    BLADDER SURGERY      CHOLECYSTECTOMY      COLONOSCOPY  2018    COLONOSCOPY N/A 9/9/2024    Procedure: SIG;  Surgeon: Saurav Hutson MD;  Location: [facility] ENDOSCOPY;  Service: Gastroenterology;  Laterality: N/A;    ENDOBRONCHIAL ULTRASOUND N/A 11/16/2022    Procedure: ENDOBRONCHIAL ULTRASOUND (EBUS);  Surgeon: Anthony Hutson  MD;  Location: [facility] BRONCHOSCOPY LAB;  Service: Pulmonary;  Laterality: N/A;    HYSTERECTOMY      KNEE SURGERY Bilateral     replaced knees    PARTIAL HIP ARTHROPLASTY Bilateral     SIGMOIDOSCOPY, WITH BIOPSY N/A 9/9/2024    Procedure: SIGMOIDOSCOPY, WITH BIOPSY;  Surgeon: Saurav Hutson MD;  Location: [facility] ENDOSCOPY;  Service: Gastroenterology;  Laterality: N/A;     Social History     Socioeconomic History    Marital status:    Tobacco Use    Smoking status: Every Day     Current packs/day: 0.25     Average packs/day: 0.3 packs/day for 66.1 years (16.5 ttl pk-yrs)     Types: Cigarettes     Start date: 1959    Smokeless tobacco: Never    Tobacco comments:     Patient states she is trying to quit smoking.   Substance and Sexual Activity    Alcohol use: Not Currently    Drug use: Never     Social Drivers of Health     Financial Resource Strain: Patient Declined (1/10/2025)    Overall Financial Resource Strain (CARDIA)     Difficulty of Paying Living Expenses: Patient declined   Food Insecurity: Patient Declined (1/10/2025)    Hunger Vital Sign     Worried About Running Out of Food in the Last Year: Patient declined     Ran Out of Food in the Last Year: Patient declined   Transportation Needs: Patient Declined (1/10/2025)    TRANSPORTATION NEEDS     Transportation : Patient declined   Physical Activity: Sufficiently Active (9/9/2024)    Exercise Vital Sign     Days of Exercise per Week: 5 days     Minutes of Exercise per Session: 30 min   Stress: Patient Declined (1/10/2025)    [place] - Occupational Stress Questionnaire     Feeling of Stress : Patient declined   Recent Concern: Stress - Stress Concern Present (11/28/2024)    [place] [employer] - Occupational Stress Questionnaire     Feeling of Stress : To some extent   Housing Stability: Patient Declined (1/10/2025)    Housing Stability Vital Sign     Unable to Pay for Housing in the Last Year:  Patient declined     Homeless in the Last Year: Patient declined      Family History   Problem Relation Name Age of Onset    Lung cancer Mother      Lung cancer Brother        Review of patient's allergies indicates:   Allergen Reactions    Lisinopril Swelling      Review of Systems   Constitutional:  Negative for chills, diaphoresis, fatigue, fever and unexpected weight change.   HENT:  Negative for nasal congestion, mouth sores, sinus pressure/congestion and sore throat.    Eyes:  Negative for pain and visual disturbance.   Respiratory:  Negative for cough, chest tightness and shortness of breath.    Cardiovascular:  Negative for chest pain, palpitations and leg swelling.   Gastrointestinal:  Negative for abdominal distention, abdominal pain, blood in stool, constipation and diarrhea.   Genitourinary:  Negative for dysuria, frequency and hematuria.   Musculoskeletal:  Negative for arthralgias and back pain.   Integumentary:  Negative for rash.   Neurological:  Negative for dizziness, weakness, numbness and headaches.   Hematological:  Negative for adenopathy.   Psychiatric/Behavioral:  Negative for confusion.          Objective:      Physical Exam  Vitals reviewed.   Constitutional:       General: She is not in acute distress.     Appearance: Normal appearance.   HENT:      Head: Normocephalic and atraumatic.      Nose: Nose normal.      Mouth/Throat:      Mouth: Mucous membranes are moist.   Eyes:      Extraocular Movements: Extraocular movements intact.      Conjunctiva/sclera: Conjunctivae normal.   Neck:      Comments: Radiation changes to the left supraclavicular area  Cardiovascular:      Rate and Rhythm: Normal rate and regular rhythm.      Pulses: Normal pulses.      Heart sounds: Normal heart sounds.   Pulmonary:      Effort: Pulmonary effort is normal.      Breath sounds: Examination of the right-lower field reveals decreased breath sounds. Examination of the left-lower field reveals decreased breath  sounds. Decreased breath sounds present.   Musculoskeletal:         General: No swelling. Normal range of motion.      Cervical back: Normal range of motion and neck supple.      Right lower leg: No edema.      Left lower leg: No edema.      Comments: Patient in wheelchair today   Skin:     General: Skin is warm and dry.      Coloration: Skin is pale.   Neurological:      General: No focal deficit present.      Mental Status: She is alert and oriented to person, place, and time. Mental status is at baseline.   Psychiatric:         Mood and Affect: Mood normal.         Behavior: Behavior normal.         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 ? metastatic findings could not be biopsied.    TTF negative/CK 7 +.    Diagnosed June 2014.  EGFR mutation negative/ALK gene rearrangement negative.  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 Patient has reportedly been referred to ENT for resection and lymph node dissection (per patient report). Subsequent open biopsy done by Dr. Rosales on 7/29/2019 confirmed the same.  Patient was unable to undergo definitive surgical resection for oligo metastatic disease.        Plan:       Patient's biopsy did return as recurrent non-small cell lung cancer in the left lung.  I feel that the right lung will be similar pathology.  She completed radiation with Dr. Mortensen on 12/24/2020.     Caris did reveal that PD-L1 was 2% positive suggesting benefit from pembrolizumab or nivolumab/ipilimumab.      PET/CT scan does show a new  hypermetabolic subcentimeter subcarinal lymph node and increase in a left apical consolidation.  The left apical consolidation is close to the aorta and I do not think would be amenable to biopsy, however this subcarinal lymph node may be amenable to bronchoscopy.      Bronchoscopy done on 11/16/2022 showed metastatic adenocarcinoma.  She is no longer a candidate for full dose SBRT.  We started with carboplatin, pemetrexed, nivolumab, and ipilimumab based on the CHECKMATE 9LA study.  Cycle 1 day 1 given 12/20/2022.    Patient did get admitted for diarrhea.  This subsided with steroids.  Immune related diarrhea secondary to immunotherapy is a possibility.      Most recent PET/CT scan done on 10/28/2024 showed essentially stable disease.    We had a discussion about immunotherapy in the possibility that led to her diarrhea.  The patient voiced understanding.  We discussed rechallenge with immunotherapy.  She stated that she would like to try to get back on treatment.  Re-started treatment on 11/08/2024.     She knows to call and let me know if she has more than 1 diarrhea bowel movement in a 24 hour period.  If she gets severe diarrhea again from immunotherapy, we would likely have to stop indefinitely.    She had a CT chest on 01/09/2025 that showed overall improved aeration of her lungs.    We will hold treatment this week  Hgb is 7.7 today and she is in a wheelchair.  She continues with lower extremity swelling and has a blood pressure 196/79 today.  She does need a unit of blood.  Recommended emergency room due to her hypertension, swelling, significant anemia.  Unsure of the cause of her anemia, not likely treatment related as she is on immunotherapy not chemotherapy.    She will need repeat scans prior to follow-up.    RTC     Visit today included increased complexity associated with the care of the episodic problem lung cancer, addressing and managing the longitudinal care of the patient's lung cancer.        Lauren Myrick, FNP-C

## 2025-02-20 NOTE — Clinical Note
Diagnosis: Anemia, unspecified type [[id number]]   Future Attending Provider: CHEPE FREY [[id number]]   Admit to which facility:: [facility] [[id number]]   Reason for IP Medical Treatment  (Clinical interventions that can only be accomplished in the IP setting? ) :: transfuse   Plans for Post-Acute care--if anticipated (pick the single best option):: A. No post acute care anticipated at this time   Special Needs:: No Special Needs [1]

## 2025-02-20 NOTE — FIRST PROVIDER EVALUATION
"Medical screening examination initiated.  I have conducted a focused provider triage encounter, findings are as follows:    Brief history of present illness:  79 year old female sent to ER by Dr. Gudino due to low H/H. Sent to ER for blood transfusion. Patient has hx of lung cancer on immunotherapy    Vitals:    02/20/25 1549 02/20/25 1551   BP:  (!) 171/70   Pulse: 64    Resp: 20    Temp: 97.7 °F (36.5 °C)    TempSrc: Oral    SpO2: (!) 92%    Weight: 74.8 kg (165 lb)    Height: 5' 7" (1.702 m)        Pertinent physical exam:  awake and alert, nad    Brief workup plan:  labs    Preliminary workup initiated; this workup will be continued and followed by the physician or advanced practice provider that is assigned to the patient when roomed.  "

## 2025-02-21 LAB
ALBUMIN SERPL-MCNC: 2.4 G/DL (ref 3.4–4.8)
ALBUMIN/GLOB SERPL: 0.8 RATIO (ref 1.1–2)
ALP SERPL-CCNC: 83 UNIT/L (ref 40–150)
ALT SERPL-CCNC: 11 UNIT/L (ref 0–55)
ANION GAP SERPL CALC-SCNC: 11 MEQ/L
AST SERPL-CCNC: 19 UNIT/L (ref 5–34)
B PERT.PT PRMT NPH QL NAA+NON-PROBE: NOT DETECTED
BASOPHILS # BLD AUTO: 0.02 X10(3)/MCL
BASOPHILS NFR BLD AUTO: 0.4 %
BILIRUB SERPL-MCNC: 0.5 MG/DL
BUN SERPL-MCNC: 13.8 MG/DL (ref 9.8–20.1)
C PNEUM DNA NPH QL NAA+NON-PROBE: NOT DETECTED
CALCIUM SERPL-MCNC: 8.4 MG/DL (ref 8.4–10.2)
CHLORIDE SERPL-SCNC: 97 MMOL/L (ref 98–107)
CO2 SERPL-SCNC: 29 MMOL/L (ref 23–31)
CREAT SERPL-MCNC: 0.83 MG/DL (ref 0.55–1.02)
CREAT/UREA NIT SERPL: 17
EOSINOPHIL # BLD AUTO: 0 X10(3)/MCL (ref 0–0.9)
EOSINOPHIL NFR BLD AUTO: 0 %
ERYTHROCYTE [DISTWIDTH] IN BLOOD BY AUTOMATED COUNT: 17.3 % (ref 11.5–17)
FLUAV H1 2009 PAN RNA NPH NAA+NON-PROBE: NORMAL
FLUAV H1 RNA NPH QL NAA+NON-PROBE: NORMAL
FLUAV H3 RNA NPH QL NAA+NON-PROBE: NORMAL
FLUAV RNA NPH QL NAA+NON-PROBE: NOT DETECTED
FLUAV RNA RESP QL NAA+PROBE: NORMAL
FLUBV RNA NPH QL NAA+NON-PROBE: NOT DETECTED
GFR SERPLBLD CREATININE-BSD FMLA CKD-EPI: >60 ML/MIN/1.73/M2
GLOBULIN SER-MCNC: 3.1 GM/DL (ref 2.4–3.5)
GLUCOSE SERPL-MCNC: 134 MG/DL (ref 82–115)
HADV DNA NPH QL NAA+NON-PROBE: NOT DETECTED
HCOV 229E RNA NPH QL NAA+NON-PROBE: NOT DETECTED
HCOV HKU1 RNA NPH QL NAA+NON-PROBE: NOT DETECTED
HCOV NL63 RNA NPH QL NAA+NON-PROBE: NOT DETECTED
HCOV OC43 RNA NPH QL NAA+NON-PROBE: NOT DETECTED
HCT VFR BLD AUTO: 27.8 % (ref 37–47)
HGB BLD-MCNC: 9.1 G/DL (ref 12–16)
HMPV RNA NPH QL NAA+NON-PROBE: NOT DETECTED
HPIV1 RNA NPH QL NAA+NON-PROBE: NOT DETECTED
HPIV2 RNA NPH QL NAA+NON-PROBE: NOT DETECTED
HPIV3 RNA NPH QL NAA+NON-PROBE: NOT DETECTED
HPIV4 RNA NPH QL NAA+NON-PROBE: NOT DETECTED
IMM GRANULOCYTES # BLD AUTO: 0.05 X10(3)/MCL (ref 0–0.04)
IMM GRANULOCYTES NFR BLD AUTO: 0.9 %
LYMPHOCYTES # BLD AUTO: 0.62 X10(3)/MCL (ref 0.6–4.6)
LYMPHOCYTES NFR BLD AUTO: 11.4 %
M PNEUMO DNA NPH QL NAA+NON-PROBE: NOT DETECTED
MCH RBC QN AUTO: 26.6 PG (ref 27–31)
MCHC RBC AUTO-ENTMCNC: 32.7 G/DL (ref 33–36)
MCV RBC AUTO: 81.3 FL (ref 80–94)
MONOCYTES # BLD AUTO: 0.06 X10(3)/MCL (ref 0.1–1.3)
MONOCYTES NFR BLD AUTO: 1.1 %
MRSA PCR SCRN (OHS): DETECTED
NEUTROPHILS # BLD AUTO: 4.71 X10(3)/MCL (ref 2.1–9.2)
NEUTROPHILS NFR BLD AUTO: 86.2 %
NRBC BLD AUTO-RTO: 0 %
PLATELET # BLD AUTO: 430 X10(3)/MCL (ref 130–400)
PMV BLD AUTO: 8.9 FL (ref 7.4–10.4)
POTASSIUM SERPL-SCNC: 4 MMOL/L (ref 3.5–5.1)
PROT SERPL-MCNC: 5.5 GM/DL (ref 5.8–7.6)
RBC # BLD AUTO: 3.42 X10(6)/MCL (ref 4.2–5.4)
RSV RNA NPH QL NAA+NON-PROBE: NOT DETECTED
RSV RNA NPH QL NAA+NON-PROBE: NOT DETECTED
RV+EV RNA NPH QL NAA+NON-PROBE: NOT DETECTED
SARS-COV-2 RNA RESP QL NAA+PROBE: NOT DETECTED
SODIUM SERPL-SCNC: 137 MMOL/L (ref 136–145)
T4 FREE SERPL-MCNC: 1.17 NG/DL (ref 0.7–1.48)
WBC # BLD AUTO: 5.46 X10(3)/MCL (ref 4.5–11.5)

## 2025-02-21 PROCEDURE — 80053 COMPREHEN METABOLIC PANEL: CPT | Performed by: NURSE PRACTITIONER

## 2025-02-21 PROCEDURE — 87641 MR-STAPH DNA AMP PROBE: CPT | Performed by: INTERNAL MEDICINE

## 2025-02-21 PROCEDURE — 27000221 HC OXYGEN, UP TO 24 HOURS

## 2025-02-21 PROCEDURE — 99900031 HC PATIENT EDUCATION (STAT)

## 2025-02-21 PROCEDURE — 11000001 HC ACUTE MED/SURG PRIVATE ROOM

## 2025-02-21 PROCEDURE — 94640 AIRWAY INHALATION TREATMENT: CPT

## 2025-02-21 PROCEDURE — 84439 ASSAY OF FREE THYROXINE: CPT | Performed by: INTERNAL MEDICINE

## 2025-02-21 PROCEDURE — 25000003 PHARM REV CODE 250: Performed by: INTERNAL MEDICINE

## 2025-02-21 PROCEDURE — 87798 DETECT AGENT NOS DNA AMP: CPT | Performed by: INTERNAL MEDICINE

## 2025-02-21 PROCEDURE — 36415 COLL VENOUS BLD VENIPUNCTURE: CPT | Performed by: NURSE PRACTITIONER

## 2025-02-21 PROCEDURE — 27000207 HC ISOLATION

## 2025-02-21 PROCEDURE — 63600175 PHARM REV CODE 636 W HCPCS: Performed by: NURSE PRACTITIONER

## 2025-02-21 PROCEDURE — 85025 COMPLETE CBC W/AUTO DIFF WBC: CPT | Performed by: NURSE PRACTITIONER

## 2025-02-21 PROCEDURE — 25000003 PHARM REV CODE 250: Performed by: NURSE PRACTITIONER

## 2025-02-21 PROCEDURE — 21400001 HC TELEMETRY ROOM

## 2025-02-21 PROCEDURE — 25000242 PHARM REV CODE 250 ALT 637 W/ HCPCS: Performed by: NURSE PRACTITIONER

## 2025-02-21 PROCEDURE — 63600175 PHARM REV CODE 636 W HCPCS: Performed by: INTERNAL MEDICINE

## 2025-02-21 PROCEDURE — 99900035 HC TECH TIME PER 15 MIN (STAT)

## 2025-02-21 PROCEDURE — 94760 N-INVAS EAR/PLS OXIMETRY 1: CPT

## 2025-02-21 RX ORDER — HYDRALAZINE HYDROCHLORIDE 20 MG/ML
10 INJECTION INTRAMUSCULAR; INTRAVENOUS EVERY 4 HOURS PRN
Status: DISCONTINUED | OUTPATIENT
Start: 2025-02-21 | End: 2025-02-24 | Stop reason: HOSPADM

## 2025-02-21 RX ADMIN — LOSARTAN POTASSIUM 50 MG: 50 TABLET, FILM COATED ORAL at 09:02

## 2025-02-21 RX ADMIN — MUPIROCIN: 20 OINTMENT TOPICAL at 09:02

## 2025-02-21 RX ADMIN — PIPERACILLIN SODIUM AND TAZOBACTAM SODIUM 4.5 G: 4; .5 INJECTION, POWDER, LYOPHILIZED, FOR SOLUTION INTRAVENOUS at 11:02

## 2025-02-21 RX ADMIN — REMDESIVIR 100 MG: 100 INJECTION, POWDER, LYOPHILIZED, FOR SOLUTION INTRAVENOUS at 08:02

## 2025-02-21 RX ADMIN — DOXYCYCLINE 100 MG: 100 INJECTION, POWDER, LYOPHILIZED, FOR SOLUTION INTRAVENOUS at 09:02

## 2025-02-21 RX ADMIN — BENZONATATE 100 MG: 100 CAPSULE ORAL at 09:02

## 2025-02-21 RX ADMIN — DOXYCYCLINE 100 MG: 100 INJECTION, POWDER, LYOPHILIZED, FOR SOLUTION INTRAVENOUS at 11:02

## 2025-02-21 RX ADMIN — DEXAMETHASONE SODIUM PHOSPHATE 6 MG: 4 INJECTION, SOLUTION INTRA-ARTICULAR; INTRALESIONAL; INTRAMUSCULAR; INTRAVENOUS; SOFT TISSUE at 08:02

## 2025-02-21 RX ADMIN — IPRATROPIUM BROMIDE AND ALBUTEROL SULFATE 3 ML: 2.5; .5 SOLUTION RESPIRATORY (INHALATION) at 12:02

## 2025-02-21 RX ADMIN — PIPERACILLIN SODIUM AND TAZOBACTAM SODIUM 4.5 G: 4; .5 INJECTION, POWDER, LYOPHILIZED, FOR SOLUTION INTRAVENOUS at 09:02

## 2025-02-21 RX ADMIN — SODIUM CHLORIDE 125 MG: 9 INJECTION, SOLUTION INTRAVENOUS at 09:02

## 2025-02-21 RX ADMIN — CARVEDILOL 6.25 MG: 3.12 TABLET, FILM COATED ORAL at 09:02

## 2025-02-21 RX ADMIN — BENZONATATE 100 MG: 100 CAPSULE ORAL at 12:02

## 2025-02-21 RX ADMIN — ATORVASTATIN CALCIUM 20 MG: 10 TABLET, FILM COATED ORAL at 09:02

## 2025-02-21 RX ADMIN — PIPERACILLIN SODIUM AND TAZOBACTAM SODIUM 4.5 G: 4; .5 INJECTION, POWDER, LYOPHILIZED, FOR SOLUTION INTRAVENOUS at 04:02

## 2025-02-21 NOTE — PLAN OF CARE
Problem: Adult Inpatient Plan of Care  Goal: Plan of Care Review  Outcome: Progressing  Goal: Patient-Specific Goal (Individualized)  Outcome: Progressing  Goal: Optimal Comfort and Wellbeing  Outcome: Progressing  Intervention: Monitor Pain and Promote Comfort  Flowsheets (Taken 2/21/2025 1756)  Pain Management Interventions:   around-the-clock dosing utilized   pillow support provided   position adjusted   quiet environment facilitated   relaxation techniques promoted  Intervention: Provide Person-Centered Care  Flowsheets (Taken 2/21/2025 1756)  Trust Relationship/Rapport:   care explained   choices provided

## 2025-02-21 NOTE — PROGRESS NOTES
Inpatient Nutrition Assessment    Admit Date: 2/20/2025   Total duration of encounter: 1 day   Patient Age: 79 y.o.    Nutrition Recommendation/Prescription     Continue heart healthy diet.    Communication of Recommendations: reviewed with patient and reviewed with family    Nutrition Assessment     Chart Review    Reason Seen: continuous nutrition monitoring and malnutrition screening tool (MST)    Malnutrition Screening Tool Results   Have you recently lost weight without trying?: Unsure  Have you been eating poorly because of a decreased appetite?: No   MST Score: 2   Diagnosis:  Anemia status post 1 unit of packed RBC   COVID-19 infection   Acute on chronic hypoxemic respiratory failure  Multilobar infiltrates concern about superimposed infection  Stage IV non-small-cell lung cancer on immunotherapy  Possible early new onset heart failure   Essential hypertension   Elevated TSH with normal free T4  Peripheral arterial disease  Hyperlipidemia   Anxiety    Relevant Medical History: stage IV metastatic lung cancer, COPD oxygen dependent 2L/NC, hypertension and hypothyroidism     Scheduled Medications:  0.9%  NaCl infusion (for blood administration), , Once  atorvastatin, 20 mg, Daily  carvediloL, 6.25 mg, Daily  dexAMETHasone injection, 6 mg, Daily  doxycycline IV (PEDS and ADULTS), 100 mg, Q12H  ferric gluconate (Ferrlecit) IVPB, 125 mg, Daily  levothyroxine, 125 mcg, Before breakfast  losartan, 50 mg, Daily  mupirocin, , BID  piperacillin-tazobactam (Zosyn) IV (PEDS and ADULTS) (extended infusion is not appropriate), 4.5 g, Q8H  remdesivir infusion, 100 mg, Daily    Continuous Infusions: none       PRN Medications:   0.9%  NaCl infusion (for blood administration), , Q24H PRN  acetaminophen, 650 mg, Q8H PRN  acetaminophen, 650 mg, Q4H PRN  albuterol-ipratropium, 3 mL, Q4H PRN  benzonatate, 100 mg, TID PRN  dextromethorphan-guaiFENesin  mg/5 ml, 5 mL, Q4H PRN  ondansetron, 4 mg, Q8H PRN    Calorie Containing  "IV Medications: no significant kcals from medications at this time    Recent Labs   Lab 02/20/25  1430 02/20/25  1600 02/20/25  1800 02/21/25  0523    137  --  137   K 3.8 3.7  --  4.0   CALCIUM 8.9 8.5  --  8.4   MG  --  1.90  --   --    CL 94* 95*  --  97*   CO2 35* 34*  --  29   BUN 14.0 13.3  --  13.8   CREATININE 0.85 0.85  --  0.83   EGFRNORACEVR >60 >60  --  >60   GLUCOSE 88 92  --  134*   BILITOT 0.4 0.5  --  0.5   ALKPHOS 82 86  --  83   ALT 10 12  --  11   AST 19 20  --  19   ALBUMIN 2.5* 2.5*  --  2.4*   CRP  --   --  104.00*  --    WBC 8.46 8.24  --  5.46   HGB 7.7* 8.0*  --  9.1*   HCT 24.7* 25.3*  --  27.8*     Nutrition Orders:  Diet Heart Healthy Standard Tray      Appetite/Oral Intake: good/% of meals  Factors Affecting Nutritional Intake: none identified  Social Needs Impacting Access to Food: none identified  Food/Cheondoism/Cultural Preferences: none reported  Food Allergies: none reported  Last Bowel Movement: 02/20/25  Wound(s): no pressure injuries documented at this time     Comments    2/21/25 Patient reports a good appetite, denies weight loss.    Anthropometrics    Height: 5' 6" (167.6 cm), Height Method: Stated  Last Weight: 72.7 kg (160 lb 4.4 oz) (02/21/25 0015), Weight Method: Bed Scale  BMI (Calculated): 25.9  BMI Classification: overweight (BMI 25-29.9)     Ideal Body Weight (IBW), Female: 130 lb     % Ideal Body Weight, Female (lb): 123.29 %                             Usual Weight Provided By: patient denies unintentional weight loss    Wt Readings from Last 5 Encounters:   02/21/25 72.7 kg (160 lb 4.4 oz)   02/20/25 70.9 kg (156 lb 4.9 oz)   01/30/25 75 kg (165 lb 5.5 oz)   01/13/25 60.3 kg (133 lb)   01/08/25 64 kg (141 lb 1.5 oz)     Weight Change(s) Since Admission: new admit  Wt Readings from Last 1 Encounters:   02/21/25 0015 72.7 kg (160 lb 4.4 oz)   02/20/25 1549 74.8 kg (165 lb)   Admit Weight: 74.8 kg (165 lb) (02/20/25 1549), Weight Method: Stated    Nutrition " Goals & Monitoring     Dietitian will monitor: food and beverage intake  Discharge planning: resume home regimen  Nutrition Risk/Follow-Up: low (follow-up in 5-7 days)   Please consult if re-assessment needed sooner.

## 2025-02-21 NOTE — H&P
[facility] Hospital Medicine - H&P Note    Patient Name: Pratibha Patel  : 1945  MRN: [id number]  PCP: Graham Nguyen NP  Admitting Physician:  T. Reyes, MD  Admission Class: IP- Inpatient   Code status: Full    Allergies   Lisinopril    Chief Complaint   Abnormal labs and elevated B/P     History of Present Illness   79 yr old female whose history includes stage IV metastatic lung cancer, COPD oxygen dependent 2L/NC, hypertension and hypothyroidism. At the time of my exam patient is AAO x 3. Reports had routine appointment today with her oncologist and outpatient labs showed a worsening anemia. Otherwise has been in usual state of health and denies any chest pain or worsening SOB. Home meds includes ASA 81 mg and plavix. Denies use of OTC NSAIDs. Unsure if she's ever had an EGD. Sigmoidoscopy - 24 - showed diverticulosis in the sigmoid and descending colon, single ulcer in distal rectum, otherwise normal examination. Denies noting any bloody or black stool.     VS on arrival: T 97.7, P 64, R 20, B/P 171/70, Sats 92% on 2L/NC. Initial labs: WBC 8.46, Hgb 7.7, Hct 24.7 (repeat 8.0 and 25.3 after one unit PRBCs), platelets 463, , and otherwise unremarkable.  Chest x-ray shows mild prominence of the interstitium, pulmonary vascular congestion versus infection. Meds given in ED include Lasix 40 mg IV, tylenol and benadryl.     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 Currently on immunotherapy.  COPD  Hypertension  Dyslipidemia  Hypothyroidism   Peripheral artery disease   Anxiety       Past Surgical History   Hysterectomy  Cholecystectomy  Bilateral knee  replacements  Bilateral hip replacements  Bilateral LE PTA/intervention - details unknown  Thyroid biopsy   Bladder suspension   Bronchoscopy/EBUS/biopsy   Sigmoidoscopy - 9/9/24 - diverticulosis in the sigmoid and descending colon, single ulcer in distal rectum, otherwise normal examination    Social History   Denies alcohol, tobacco or illicit drug use.     Screening for Social Drivers for health:  Patient screened for food insecurity, housing instability, transportation needs, utility difficulties, and interpersonal safety (select all that apply as identified as concern)  []Housing or Food  []Transportation Needs  []Utility Difficulties  []Interpersonal safety  [x]None        Family History   Reviewed and negative    Home Medications     Prior to Admission medications    Medication Sig Start Date End Date Taking? Authorizing Provider           carvediloL (COREG) 6.25 MG tablet Take 6.25 mg by mouth 2 (two) times daily. 6/19/22   Provider, Historical   clopidogreL (PLAVIX) 75 mg tablet PO daily 10/12/23   Provider, Historical   furosemide (LASIX) 20 MG tablet Take 1 tablet (20 mg total)  once daily. 11/30/24 1/8/25  Allen Barkley MD   levothyroxine (SYNTHROID) 125 MCG tablet Take 125 mcg by mouth before breakfast. 9/6/24   Provider, Historical   losartan (COZAAR) 50 MG tablet Take 1 tablet (50 mg total) by mouth once daily. 12/1/24 1/8/25  Allen Barkley MD   Aspirin 81 mg daily                simvastatin (ZOCOR) 40 MG tablet Take 40 mg by mouth every evening. 6/19/22   Provider, Historical        Physical Exam   Vital Signs  Temp:  [97.4 °F (36.3 °C)-98.1 °F (36.7 °C)]   Pulse:  [63-70]   Resp:  [18-21]   BP: (155-196)/(64-79)   SpO2:  [92 %-99 %]    General: Appears comfortable  HEENT: NC/AT  Neck:  No JVD  Chest: CTABL  CVS: Regular rhythm. Normal S1/S2.  Abdomen: nondistended, normoactive BS, soft and non-tender.  MSK: No obvious deformity or joint swelling  Skin: Warm and dry  Neuro: AAOx3, no focal  "neurological deficit  Psych: Cooperative    Labs     Recent Labs     02/20/25  1430 02/20/25  1600   WBC 8.46 8.24   RBC 2.96* 3.13*   HGB 7.7* 8.0*   HCT 24.7* 25.3*   MCV 83.4 80.8   MCH 26.0* 25.6*   MCHC 31.2* 31.6*   RDW 18.0* 18.2*   * 478*     Recent Labs     02/20/25  1800   IRON 21*   TIBC 250   LABIRON 8*   [id number] 763   RETICCNTAUTO 2.40*   RETABS 0.0696      Recent Labs     02/20/25  1430 02/20/25  1600    137   K 3.8 3.7   CO2 35* 34*   BUN 14.0 13.3   CREATININE 0.85 0.85   EGFRNORACEVR >60 >60   GLUCOSE 88 92   CALCIUM 8.9 8.5   MG  --  1.90   ALBUMIN 2.5* 2.5*   GLOBULIN 3.8* 3.2   ALKPHOS 82 86   ALT 10 12   AST 19 20   BILITOT 0.4 0.5   TSH 13.563*  --    BNP  --  652.5*     No results for input(s): "LACTIC" in the last 72 hours.  Recent Labs     02/20/25  1600   TROPONINI <0.010        Microbiology Results (last 7 days)       ** No results found for the last 168 hours. **           Imaging   X-Ray Chest AP Portable  Narrative: EXAMINATION:  XR CHEST AP PORTABLE    CLINICAL HISTORY:  Shortness of breath    COMPARISON:  30 January 2025    FINDINGS:  Frontal view of the chest was obtained. Stable right Port-A-Cath.  Heart is not significantly enlarged.  There is aortic atherosclerosis.  Stable biapical opacities.  Mild prominence of the interstitium.  No new focal consolidation or pneumothorax.  Impression: Mild prominence of the interstitium, pulmonary vascular congestion versus infection    Electronically signed by: Brody Silva  Date:    02/20/2025  Time:    16:29    ECHO  11/30/24      Left Ventricle: The left ventricle is normal in size. Normal wall thickness. Normal wall motion. There is normal systolic function with a visually estimated ejection fraction of 60 - 65%. There is normal diastolic function.    Right Ventricle: Normal right ventricular cavity size. Wall thickness is normal. Systolic function is normal.    Aortic Valve: The aortic valve is a trileaflet valve. There is " aortic valve sclerosis.    IVC/SVC: Normal venous pressure at 3 mmHg.  Assessment & Plan     Covid 19 infection - POA  - negative flu A and B   - Ferritin 78  -   - D - dimer  3.43 - CT chest with PE protocol pending   - Remdesivir per protocol  - Decadron 6 mg IV daily x 10 doses - transition to oral steroids if discharged home  - airborne and droplet precautions    Possible new onset heart failure   -   - ECHO 11/30/24 - showed a normal EF with no diastolic dysfunction  - repeat echo pending  - Lasix 40 mg given IV in the ED - we will hold off on further diuresis for now      Chronic hypoxemic respiratory failure secondary to COPD  - continue 2L/NC - not requiring increased oxygen supplement     Iron deficiency anemia   - s/p one unit PRBCs   - Ferrlicit 125 mg daily x 3 days - oral iron supplement at discharge   - hold ASA and Plavix for now  - stool for occult blood pending     Stage IV lung cancer   - under care of Dr. EVANGELISTA Gudino - immunotherapy     Hypertension   - home meds resumed    VTE Prophylaxis: SCDyo CANTU, Lashawn Manning, FNP-BC have discussed this patients case with Dr. Reyes who agrees with the diagnosis and treatment plan.

## 2025-02-21 NOTE — PLAN OF CARE
Problem: Adult Inpatient Plan of Care  Goal: Plan of Care Review  Outcome: Progressing  Flowsheets (Taken 2/21/2025 0040)  Plan of Care Reviewed With: patient  Goal: Patient-Specific Goal (Individualized)  Outcome: Progressing  Flowsheets (Taken 2/21/2025 0040)  Individualized Care Needs: monitor H/H, IV antivirals, oxygen therapy, wound care, GI c/s  Anxieties, Fears or Concerns: abnormal labs, COVID dx  Patient/Family-Specific Goals (Include Timeframe): d/c home at baseline ADLs  Goal: Absence of Hospital-Acquired Illness or Injury  Outcome: Progressing  Intervention: Identify and Manage Fall Risk  Flowsheets (Taken 2/21/2025 0040)  Safety Promotion/Fall Prevention:   assistive device/personal item within reach   Fall Risk reviewed with patient/family   Fall Risk signage in place   family expresses understanding of fall risk and prevention   family to remain at bedside   medications reviewed   high risk medications identified   instructed to call staff for mobility   nonskid shoes/socks when out of bed   patient expresses understanding of fall risk and prevention   pulse ox   room near unit station   side rails raised x 3  Intervention: Prevent Skin Injury  Flowsheets (Taken 2/21/2025 0040)  Device Skin Pressure Protection:   absorbent pad utilized/changed   tubing/devices free from skin contact  Intervention: Prevent and Manage VTE (Venous Thromboembolism) Risk  Flowsheets (Taken 2/21/2025 0040)  VTE Prevention/Management:   bleeding risk assessed   bleeding precautions maintained  Intervention: Prevent Infection  Flowsheets (Taken 2/21/2025 0040)  Infection Prevention: hand hygiene promoted  Goal: Optimal Comfort and Wellbeing  Outcome: Progressing  Intervention: Monitor Pain and Promote Comfort  Flowsheets (Taken 2/21/2025 0040)  Pain Management Interventions: pain management plan reviewed with patient/caregiver  Intervention: Provide Person-Centered Care  Flowsheets (Taken 2/21/2025 0040)  Trust  Relationship/Rapport:   care explained   questions answered  Goal: Readiness for Transition of Care  Outcome: Progressing     Problem: Wound  Goal: Absence of Infection Signs and Symptoms  Outcome: Progressing  Intervention: Prevent or Manage Infection  Flowsheets (Taken 2/21/2025 0040)  Isolation Precautions:   precautions maintained   contact   droplet  Goal: Skin Health and Integrity  Outcome: Progressing  Intervention: Optimize Skin Protection  Flowsheets (Taken 2/21/2025 0040)  Pressure Reduction Techniques:   frequent weight shift encouraged   pressure points protected  Pressure Reduction Devices:   heel offloading device utilized   positioning supports utilized   pressure-redistributing mattress utilized  Activity Management: Rolling - L1  Goal: Optimal Wound Healing  Outcome: Progressing  Intervention: Promote Wound Healing  Flowsheets (Taken 2/21/2025 0040)  Sleep/Rest Enhancement:   awakenings minimized   regular sleep/rest pattern promoted   room darkened     Problem: Fall Injury Risk  Goal: Absence of Fall and Fall-Related Injury  Outcome: Progressing  Intervention: Identify and Manage Contributors  Flowsheets (Taken 2/21/2025 0040)  Self-Care Promotion:   independence encouraged   BADL personal objects within reach   BADL personal routines maintained  Medication Review/Management:   medications reviewed   high-risk medications identified  Intervention: Promote Injury-Free Environment  Flowsheets (Taken 2/21/2025 0040)  Safety Promotion/Fall Prevention:   assistive device/personal item within reach   Fall Risk reviewed with patient/family   Fall Risk signage in place   family expresses understanding of fall risk and prevention   family to remain at bedside   medications reviewed   high risk medications identified   instructed to call staff for mobility   nonskid shoes/socks when out of bed   patient expresses understanding of fall risk and prevention   pulse ox   room near unit station   side rails  raised x 3     Problem: Skin Injury Risk Increased  Goal: Skin Health and Integrity  Outcome: Progressing  Intervention: Optimize Skin Protection  Flowsheets (Taken 2/21/2025 0040)  Pressure Reduction Techniques:   frequent weight shift encouraged   pressure points protected  Pressure Reduction Devices:   heel offloading device utilized   positioning supports utilized   pressure-redistributing mattress utilized  Activity Management: Rolling - L1     Problem: Pneumonia  Goal: Fluid Balance  Outcome: Met  Goal: Resolution of Infection Signs and Symptoms  Outcome: Met  Goal: Effective Oxygenation and Ventilation  Outcome: Met     Problem: Infection  Goal: Absence of Infection Signs and Symptoms  Outcome: Met     Problem: Wound  Goal: Optimal Coping  Outcome: Met  Goal: Optimal Functional Ability  Outcome: Met  Goal: Improved Oral Intake  Outcome: Met  Goal: Optimal Pain Control and Function  Outcome: Met

## 2025-02-21 NOTE — PROGRESS NOTES
[facility] Hospital Medicine Progress Note        Chief Complaint: Inpatient Follow-up for     HPI: 79 yr old female whose history includes stage IV metastatic lung cancer, COPD oxygen dependent 2L/NC, hypertension and hypothyroidism. At the time of my exam patient is AAO x 3. Reports had routine appointment today with her oncologist and outpatient labs showed a worsening anemia. Otherwise has been in usual state of health and denies any chest pain or worsening SOB. Home meds includes ASA 81 mg and plavix. Denies use of OTC NSAIDs. Unsure if she's ever had an EGD. Sigmoidoscopy - 9/9/24 - showed diverticulosis in the sigmoid and descending colon, single ulcer in distal rectum, otherwise normal examination. Denies noting any bloody or black stool.  Chest x-ray shows mild prominence of the interstitium, pulmonary vascular congestion versus infection. Meds given in ED include Lasix 40 mg IV, tylenol and benadryl.     Interval Hx:   Patient seen and examined this morning on 2 L nasal cannula family member bedside reports she is feeling much better then yesterday.  Patient's blood pressure has been on the higher side since last night denies any headache or any chest pain at this time  Case was discussed with patient's nurse and  on the floor.    Objective/physical exam:  General: In no acute distress, afebrile  Chest: Clear to auscultation bilaterally  Heart: RRR, Abdomen: Soft, nontender, BS +  MSK: Warm, no lower extremity edema, no clubbing or cyanosis  Neurologic: Alert and oriented x4,   VITAL SIGNS: 24 HRS MIN & MAX LAST   Temp  Min: 97.4 °F (36.3 °C)  Max: 98.1 °F (36.7 °C) 97.4 °F (36.3 °C)   BP  Min: 143/67  Max: 196/79 (!) 175/72   Pulse  Min: 62  Max: 70  62   Resp  Min: 16  Max: 21 18   SpO2  Min: 92 %  Max: 99 % (!) 94 %     I have reviewed the following labs:  Recent Labs   Lab 02/20/25  1430 02/20/25  1600 02/21/25  0523   WBC 8.46 8.24 5.46   RBC 2.96* 3.13*  3.42*   HGB 7.7* 8.0* 9.1*   HCT 24.7* 25.3* 27.8*   MCV 83.4 80.8 81.3   MCH 26.0* 25.6* 26.6*   MCHC 31.2* 31.6* 32.7*   RDW 18.0* 18.2* 17.3*   * 478* 430*   MPV 9.0 9.0 8.9     Recent Labs   Lab 02/20/25  1430 02/20/25  1600 02/21/25  0523    137 137   K 3.8 3.7 4.0   CL 94* 95* 97*   CO2 35* 34* 29   BUN 14.0 13.3 13.8   CREATININE 0.85 0.85 0.83   CALCIUM 8.9 8.5 8.4   MG  --  1.90  --    ALBUMIN 2.5* 2.5* 2.4*   ALKPHOS 82 86 83   ALT 10 12 11   AST 19 20 19   BILITOT 0.4 0.5 0.5     Microbiology Results (last 7 days)       ** No results found for the last 168 hours. **             See below for Radiology    Assessment/Plan:  Anemia status post 1 unit of packed RBC H&H came up appropriately   COVID-19 infection   Acute on chronic hypoxemic respiratory failure  Multilobar infiltrates concern about superimposed infection  Stage IV non-small-cell lung cancer on immunotherapy  Possible early new onset heart failure awaiting echo  Essential hypertension   Elevated TSH with normal free T4  Peripheral arterial disease  Hyperlipidemia   Anxiety    For anemia we have ordered occult blood, start on Protonix if occult blood positive then might consult GI for now aspirin Plavix on hold   Continue with oxygen support, continue with remdesivir and dexamethasone since patient is at very high risk for clinical deterioration considering history of cancer  Also chest x-ray shows concern about superimposed infection so I have started the patient on Zosyn and doxycycline will order MRSA PCR and Respiratory PCR panel  2D echo ordered await results  Home blood pressure medications have been resumed will see how blood pressure stays after the 1st dose of p.o. meds this morning   TSH was high but free T4 was normal will keep a close watch patient needs to follow up patient with PCP with repeat thyroid function test  Continue with oxygen support   Repeat blood work in a.m.  VTE prophylaxis:  SCD at this time since there  is concern about GI bleed    Patient condition:  Stable/Fair/Guarded/ Serious/ Critical    Anticipated discharge and Disposition:         All diagnosis and differential diagnosis have been reviewed; assessment and plan has been documented; I have personally reviewed the labs and test results that are presently available; I have reviewed the patients medication list; I have reviewed the consulting providers response and recommendations. I have reviewed or attempted to review medical records based upon their availability    All of the patient's questions have been  addressed and answered. Patient's is agreeable to the above stated plan. I will continue to monitor closely and make adjustments to medical management as needed.    Portions of this note dictated using EMR integrated voice recognition software, and may be subject to voice recognition errors not corrected at proofreading. Please contact writer for clarification if needed.   _____________________________________________________________________    Malnutrition Status:  Nutrition consulted. Most recent weight and BMI monitored-     Measurements:  Wt Readings from Last 1 Encounters:   02/21/25 72.7 kg (160 lb 4.4 oz)   Body mass index is 25.87 kg/m².    Patient has been screened and assessed by RD.    Malnutrition Type:  Context:    Level:      Malnutrition Characteristic Summary:       Interventions/Recommendations (treatment strategy):        Scheduled Med:   0.9%  NaCl infusion (for blood administration)   Intravenous Once    atorvastatin  20 mg Oral Daily    carvediloL  6.25 mg Oral Daily    dexAMETHasone injection  6 mg Intravenous Daily    doxycycline IV (PEDS and ADULTS)  100 mg Intravenous Q12H    ferric gluconate (Ferrlecit) IVPB  125 mg Intravenous Daily    levothyroxine  125 mcg Oral Before breakfast    losartan  50 mg Oral Daily    mupirocin   Nasal BID    piperacillin-tazobactam (Zosyn) IV (PEDS and ADULTS) (extended infusion is not appropriate)   4.5 g Intravenous Q8H    remdesivir infusion  100 mg Intravenous Daily      Continuous Infusions:     PRN Meds:    Current Facility-Administered Medications:     0.9%  NaCl infusion (for blood administration), , Intravenous, Q24H PRN    acetaminophen, 650 mg, Oral, Q8H PRN    acetaminophen, 650 mg, Oral, Q4H PRN    albuterol-ipratropium, 3 mL, Nebulization, Q4H PRN    benzonatate, 100 mg, Oral, TID PRN    dextromethorphan-guaiFENesin  mg/5 ml, 5 mL, Oral, Q4H PRN    ondansetron, 4 mg, Intravenous, Q8H PRN     Radiology:  I have personally reviewed the following imaging and agree with the radiologist.     CTA Chest Non-Coronary (PE Studies)  Narrative: EXAMINATION:  CTA CHEST NON CORONARY (PE STUDIES)    CLINICAL HISTORY:  Pulmonary embolism (PE) suspected, positive D-dimer;   chest pain.  Shortness of breath.  Anemia.    TECHNIQUE:  Helically acquired images with axial, sagittal and coronal reformations were obtained from the thoracic inlet to the lung bases followingthe IV administration of contrast.  CTA timed for evaluation of the pulmonary arteries.  MIP images were performed.    Automated tube current modulation, weight-based exposure dosing, and/or iterative reconstruction technique utilized to reach lowest reasonably achievable exposure rate.    DLP: 239 mGy*cm    COMPARISON:  CT chest 01/09/2025, CTA chest 11/28/2024    FINDINGS:  BASE OF NECK: Right internal jugular port terminates at the proximal SVC.    AORTA: Aortic atherosclerosis.    PULMONARY VASCULATURE: Pulmonary arteries enhance normally.  No evidence of pulmonary embolus.    HEART: Normal size. No effusion. There are coronary artery calcifications.    ALPHONSE/MEDIASTINUM: Small nonspecific mediastinal and hilar lymph nodes similar to previous.    AIRWAYS: Bronchial wall thickening, most pronounced in the lower lobes.  Endobronchial opacities at the left lower lobe.    LUNGS/PLEURA: There are multilobar nodular infiltrates and consolidative  opacities, progressed from prior.  Chronic biapical pleuroparenchymal scarring.  Trace pleural fluid bilaterally.    UPPER ABDOMEN: No abnormality of the partially imaged upper abdomen.    THORACIC SOFT TISSUES: Unremarkable.    BONES: Chronic sclerotic change at the left 2nd rib and 3rd rib.  Postop ACDF.  Impression: 1. No evidence of pulmonary embolus  2. Multilobar nodular infiltrates and consolidative opacities, progressed from prior.  This may be related to infection and/or neoplasm  3. Bronchial wall thickening and endobronchial opacities.  This may be related to secretions or aspiration.  4. The preliminary and final reports are concordant.    Electronically signed by: Kathleen Scott  Date:    02/21/2025  Time:    08:20      Joaquina Pichardo MD  Department of Hospital Medicine   [facility]   02/21/2025

## 2025-02-22 LAB
ALBUMIN SERPL-MCNC: 2.4 G/DL (ref 3.4–4.8)
ALBUMIN/GLOB SERPL: 0.6 RATIO (ref 1.1–2)
ALP SERPL-CCNC: 76 UNIT/L (ref 40–150)
ALT SERPL-CCNC: 11 UNIT/L (ref 0–55)
ANION GAP SERPL CALC-SCNC: 7 MEQ/L
APICAL FOUR CHAMBER EJECTION FRACTION: 60 %
APICAL TWO CHAMBER EJECTION FRACTION: 61 %
AST SERPL-CCNC: 20 UNIT/L (ref 5–34)
AV INDEX (PROSTH): 0.65
AV MEAN GRADIENT: 5 MMHG
AV PEAK GRADIENT: 10 MMHG
AV VALVE AREA BY VELOCITY RATIO: 1.6 CM²
AV VALVE AREA: 1.6 CM²
AV VELOCITY RATIO: 0.63
BASOPHILS # BLD AUTO: 0.01 X10(3)/MCL
BASOPHILS NFR BLD AUTO: 0.1 %
BILIRUB SERPL-MCNC: 0.4 MG/DL
BSA FOR ECHO PROCEDURE: 1.84 M2
BUN SERPL-MCNC: 19 MG/DL (ref 9.8–20.1)
CALCIUM SERPL-MCNC: 9.2 MG/DL (ref 8.4–10.2)
CHLORIDE SERPL-SCNC: 98 MMOL/L (ref 98–107)
CO2 SERPL-SCNC: 32 MMOL/L (ref 23–31)
COLOR STL: NORMAL
CONSISTENCY STL: NORMAL
CREAT SERPL-MCNC: 0.82 MG/DL (ref 0.55–1.02)
CREAT/UREA NIT SERPL: 23
CV ECHO LV RWT: 0.41 CM
DOP CALC AO PEAK VEL: 1.6 M/S
DOP CALC AO VTI: 40.8 CM
DOP CALC LVOT AREA: 2.5 CM2
DOP CALC LVOT DIAMETER: 1.8 CM
DOP CALC LVOT PEAK VEL: 1 M/S
DOP CALC LVOT STROKE VOLUME: 67.1 CM3
DOP CALC MV VTI: 39.7 CM
DOP CALCLVOT PEAK VEL VTI: 26.4 CM
E WAVE DECELERATION TIME: 195 MSEC
E/A RATIO: 1.3
E/E' RATIO: 15 M/S
ECHO LV POSTERIOR WALL: 0.9 CM (ref 0.6–1.1)
EOSINOPHIL # BLD AUTO: 0 X10(3)/MCL (ref 0–0.9)
EOSINOPHIL NFR BLD AUTO: 0 %
ERYTHROCYTE [DISTWIDTH] IN BLOOD BY AUTOMATED COUNT: 17.7 % (ref 11.5–17)
FRACTIONAL SHORTENING: 34.1 % (ref 28–44)
GFR SERPLBLD CREATININE-BSD FMLA CKD-EPI: >60 ML/MIN/1.73/M2
GLOBULIN SER-MCNC: 3.8 GM/DL (ref 2.4–3.5)
GLUCOSE SERPL-MCNC: 128 MG/DL (ref 82–115)
HCT VFR BLD AUTO: 29.7 % (ref 37–47)
HEMOCCULT SP1 STL QL: NEGATIVE
HGB BLD-MCNC: 9.4 G/DL (ref 12–16)
IMM GRANULOCYTES # BLD AUTO: 0.11 X10(3)/MCL (ref 0–0.04)
IMM GRANULOCYTES NFR BLD AUTO: 0.9 %
INTERVENTRICULAR SEPTUM: 1.1 CM (ref 0.6–1.1)
LEFT ATRIUM AREA SYSTOLIC (APICAL 2 CHAMBER): 17.8 CM2
LEFT ATRIUM AREA SYSTOLIC (APICAL 4 CHAMBER): 16.1 CM2
LEFT ATRIUM SIZE: 3.1 CM
LEFT ATRIUM VOLUME INDEX MOD: 27 ML/M2
LEFT ATRIUM VOLUME MOD: 50 ML
LEFT INTERNAL DIMENSION IN SYSTOLE: 2.9 CM (ref 2.1–4)
LEFT VENTRICLE DIASTOLIC VOLUME INDEX: 48.19 ML/M2
LEFT VENTRICLE DIASTOLIC VOLUME: 87.7 ML
LEFT VENTRICLE END DIASTOLIC VOLUME APICAL 2 CHAMBER: 67.4 ML
LEFT VENTRICLE END DIASTOLIC VOLUME APICAL 4 CHAMBER: 75.3 ML
LEFT VENTRICLE END SYSTOLIC VOLUME APICAL 2 CHAMBER: 50.7 ML
LEFT VENTRICLE END SYSTOLIC VOLUME APICAL 4 CHAMBER: 45.5 ML
LEFT VENTRICLE MASS INDEX: 81.2 G/M2
LEFT VENTRICLE SYSTOLIC VOLUME INDEX: 17.7 ML/M2
LEFT VENTRICLE SYSTOLIC VOLUME: 32.2 ML
LEFT VENTRICULAR INTERNAL DIMENSION IN DIASTOLE: 4.4 CM (ref 3.5–6)
LEFT VENTRICULAR MASS: 147.8 G
LV LATERAL E/E' RATIO: 13.1 M/S
LV SEPTAL E/E' RATIO: 17.5 M/S
LVED V (TEICH): 87.7 ML
LVES V (TEICH): 32.2 ML
LVOT MG: 2 MMHG
LVOT MV: 0.66 CM/S
LYMPHOCYTES # BLD AUTO: 0.85 X10(3)/MCL (ref 0.6–4.6)
LYMPHOCYTES NFR BLD AUTO: 6.9 %
MCH RBC QN AUTO: 26.1 PG (ref 27–31)
MCHC RBC AUTO-ENTMCNC: 31.6 G/DL (ref 33–36)
MCV RBC AUTO: 82.5 FL (ref 80–94)
MONOCYTES # BLD AUTO: 0.57 X10(3)/MCL (ref 0.1–1.3)
MONOCYTES NFR BLD AUTO: 4.6 %
MV MEAN GRADIENT: 2 MMHG
MV PEAK A VEL: 0.81 M/S
MV PEAK E VEL: 1.05 M/S
MV PEAK GRADIENT: 7 MMHG
MV VALVE AREA BY CONTINUITY EQUATION: 1.69 CM2
NEUTROPHILS # BLD AUTO: 10.82 X10(3)/MCL (ref 2.1–9.2)
NEUTROPHILS NFR BLD AUTO: 87.5 %
NRBC BLD AUTO-RTO: 0 %
OHS CV RV/LV RATIO: 0.66 CM
OHS LV EJECTION FRACTION SIMPSONS BIPLANE MOD: 61 %
PISA TR MAX VEL: 2.8 M/S
PLATELET # BLD AUTO: 500 X10(3)/MCL (ref 130–400)
PMV BLD AUTO: 9.4 FL (ref 7.4–10.4)
POTASSIUM SERPL-SCNC: 4.5 MMOL/L (ref 3.5–5.1)
PROT SERPL-MCNC: 6.2 GM/DL (ref 5.8–7.6)
RA MAJOR: 4.9 CM
RA WIDTH: 3.8 CM
RBC # BLD AUTO: 3.6 X10(6)/MCL (ref 4.2–5.4)
RIGHT VENTRICLE DIASTOLIC BASEL DIMENSION: 2.9 CM
RIGHT VENTRICULAR END-DIASTOLIC DIMENSION: 2.9 CM
SODIUM SERPL-SCNC: 137 MMOL/L (ref 136–145)
TDI LATERAL: 0.08 M/S
TDI SEPTAL: 0.06 M/S
TDI: 0.07 M/S
TR MAX PG: 31 MMHG
TRICUSPID ANNULAR PLANE SYSTOLIC EXCURSION: 2.21 CM
WBC # BLD AUTO: 12.36 X10(3)/MCL (ref 4.5–11.5)
Z-SCORE OF LEFT VENTRICULAR DIMENSION IN END DIASTOLE: -1.36
Z-SCORE OF LEFT VENTRICULAR DIMENSION IN END SYSTOLE: -0.56

## 2025-02-22 PROCEDURE — 25000003 PHARM REV CODE 250: Performed by: NURSE PRACTITIONER

## 2025-02-22 PROCEDURE — 80053 COMPREHEN METABOLIC PANEL: CPT | Performed by: INTERNAL MEDICINE

## 2025-02-22 PROCEDURE — 63600175 PHARM REV CODE 636 W HCPCS: Performed by: INTERNAL MEDICINE

## 2025-02-22 PROCEDURE — 21400001 HC TELEMETRY ROOM

## 2025-02-22 PROCEDURE — 63600175 PHARM REV CODE 636 W HCPCS: Performed by: NURSE PRACTITIONER

## 2025-02-22 PROCEDURE — 82272 OCCULT BLD FECES 1-3 TESTS: CPT | Performed by: NURSE PRACTITIONER

## 2025-02-22 PROCEDURE — 36415 COLL VENOUS BLD VENIPUNCTURE: CPT | Performed by: INTERNAL MEDICINE

## 2025-02-22 PROCEDURE — 25000003 PHARM REV CODE 250: Performed by: INTERNAL MEDICINE

## 2025-02-22 PROCEDURE — 27000207 HC ISOLATION

## 2025-02-22 PROCEDURE — 85025 COMPLETE CBC W/AUTO DIFF WBC: CPT | Performed by: INTERNAL MEDICINE

## 2025-02-22 RX ORDER — LOSARTAN POTASSIUM 50 MG/1
100 TABLET ORAL DAILY
Status: DISCONTINUED | OUTPATIENT
Start: 2025-02-23 | End: 2025-02-24 | Stop reason: HOSPADM

## 2025-02-22 RX ORDER — LOSARTAN POTASSIUM 50 MG/1
50 TABLET ORAL ONCE
Status: COMPLETED | OUTPATIENT
Start: 2025-02-22 | End: 2025-02-22

## 2025-02-22 RX ADMIN — LOSARTAN POTASSIUM 50 MG: 50 TABLET, FILM COATED ORAL at 09:02

## 2025-02-22 RX ADMIN — PIPERACILLIN SODIUM AND TAZOBACTAM SODIUM 4.5 G: 4; .5 INJECTION, POWDER, LYOPHILIZED, FOR SOLUTION INTRAVENOUS at 03:02

## 2025-02-22 RX ADMIN — SODIUM CHLORIDE 125 MG: 9 INJECTION, SOLUTION INTRAVENOUS at 08:02

## 2025-02-22 RX ADMIN — DEXAMETHASONE SODIUM PHOSPHATE 6 MG: 4 INJECTION, SOLUTION INTRA-ARTICULAR; INTRALESIONAL; INTRAMUSCULAR; INTRAVENOUS; SOFT TISSUE at 09:02

## 2025-02-22 RX ADMIN — REMDESIVIR 100 MG: 100 INJECTION, POWDER, LYOPHILIZED, FOR SOLUTION INTRAVENOUS at 10:02

## 2025-02-22 RX ADMIN — DOXYCYCLINE 100 MG: 100 INJECTION, POWDER, LYOPHILIZED, FOR SOLUTION INTRAVENOUS at 11:02

## 2025-02-22 RX ADMIN — ATORVASTATIN CALCIUM 20 MG: 10 TABLET, FILM COATED ORAL at 09:02

## 2025-02-22 RX ADMIN — PIPERACILLIN SODIUM AND TAZOBACTAM SODIUM 4.5 G: 4; .5 INJECTION, POWDER, LYOPHILIZED, FOR SOLUTION INTRAVENOUS at 08:02

## 2025-02-22 RX ADMIN — LEVOTHYROXINE SODIUM 125 MCG: 0.12 TABLET ORAL at 05:02

## 2025-02-22 RX ADMIN — MUPIROCIN: 20 OINTMENT TOPICAL at 08:02

## 2025-02-22 RX ADMIN — LOSARTAN POTASSIUM 50 MG: 50 TABLET, FILM COATED ORAL at 11:02

## 2025-02-22 RX ADMIN — CARVEDILOL 6.25 MG: 3.12 TABLET, FILM COATED ORAL at 09:02

## 2025-02-22 NOTE — PLAN OF CARE
Problem: Adult Inpatient Plan of Care  Goal: Plan of Care Review  Outcome: Progressing  Goal: Patient-Specific Goal (Individualized)  Outcome: Progressing     Problem: Fall Injury Risk  Goal: Absence of Fall and Fall-Related Injury  Outcome: Progressing  Intervention: Identify and Manage Contributors  Flowsheets (Taken 2/22/2025 1701)  Self-Care Promotion:   independence encouraged   BADL personal objects within reach  Medication Review/Management: medications reviewed  Intervention: Promote Injury-Free Environment  Flowsheets (Taken 2/22/2025 1701)  Safety Promotion/Fall Prevention:   assistive device/personal item within reach   Fall Risk signage in place   family expresses understanding of fall risk and prevention   family to remain at bedside   instructed to call staff for mobility   nonskid shoes/socks when out of bed     Problem: Skin Injury Risk Increased  Goal: Skin Health and Integrity  Outcome: Progressing  Intervention: Optimize Skin Protection  Flowsheets (Taken 2/22/2025 1701)  Pressure Reduction Techniques:   frequent weight shift encouraged   positioned off wounds   pressure points protected  Pressure Reduction Devices:   chair cushion utilized   positioning supports utilized  Skin Protection: incontinence pads utilized  Activity Management:   Ambulated to bathroom - L4   Up in chair - L3  Head of Bed (HOB) Positioning: HOB elevated

## 2025-02-22 NOTE — PROGRESS NOTES
[facility] Hospital Medicine Progress Note        Chief Complaint: Inpatient Follow-up for     HPI: 79 yr old female whose history includes stage IV metastatic lung cancer, COPD oxygen dependent 2L/NC, hypertension and hypothyroidism. At the time of my exam patient is AAO x 3. Reports had routine appointment today with her oncologist and outpatient labs showed a worsening anemia. Otherwise has been in usual state of health and denies any chest pain or worsening SOB. Home meds includes ASA 81 mg and plavix. Denies use of OTC NSAIDs. Unsure if she's ever had an EGD. Sigmoidoscopy - 9/9/24 - showed diverticulosis in the sigmoid and descending colon, single ulcer in distal rectum, otherwise normal examination. Denies noting any bloody or black stool.  Chest x-ray shows mild prominence of the interstitium, pulmonary vascular congestion versus infection. Meds given in ED include Lasix 40 mg IV, tylenol and benadryl.     Interval Hx:   Patient seen and examined this morning on 2 L nasal cannula blood pressure still high  Objective/physical exam:  General: In no acute distress, afebrile  Chest: Clear to auscultation bilaterally  Heart: RRR, Abdomen: Soft, nontender, BS +  MSK: Warm, no lower extremity edema, no clubbing or cyanosis  Neurologic: Alert and oriented x4,   VITAL SIGNS: 24 HRS MIN & MAX LAST   Temp  Min: 97 °F (36.1 °C)  Max: 97.5 °F (36.4 °C) 97.4 °F (36.3 °C)   BP  Min: 123/53  Max: 190/60 (!) 179/67   Pulse  Min: 60  Max: 80  60   Resp  Min: 18  Max: 18 18   SpO2  Min: 95 %  Max: 98 % 96 %     I have reviewed the following labs:  Recent Labs   Lab 02/20/25  1600 02/21/25  0523 02/22/25  0546   WBC 8.24 5.46 12.36*   RBC 3.13* 3.42* 3.60*   HGB 8.0* 9.1* 9.4*   HCT 25.3* 27.8* 29.7*   MCV 80.8 81.3 82.5   MCH 25.6* 26.6* 26.1*   MCHC 31.6* 32.7* 31.6*   RDW 18.2* 17.3* 17.7*   * 430* 500*   MPV 9.0 8.9 9.4     Recent Labs   Lab 02/20/25  1600 02/21/25  0523 02/22/25  0546     137 137   K 3.7 4.0 4.5   CL 95* 97* 98   CO2 34* 29 32*   BUN 13.3 13.8 19.0   CREATININE 0.85 0.83 0.82   CALCIUM 8.5 8.4 9.2   MG 1.90  --   --    ALBUMIN 2.5* 2.4* 2.4*   ALKPHOS 86 83 76   ALT 12 11 11   AST 20 19 20   BILITOT 0.5 0.5 0.4     Microbiology Results (last 7 days)       ** No results found for the last 168 hours. **             See below for Radiology    Assessment/Plan:  Anemia status post 1 unit of packed RBC H&H came up appropriately   COVID-19 infection   Acute on chronic hypoxemic respiratory failure  Multilobar infiltrates concern about superimposed infection  Stage IV non-small-cell lung cancer on immunotherapy  Possible early new onset heart failure awaiting echo  Essential hypertension   Elevated TSH with normal free T4  Peripheral arterial disease  Hyperlipidemia   Anxiety    For anemia we have ordered occult blood, start on Protonix i occult blood is still pending if  occult blood positive then might consult GI for now aspirin Plavix on hold   MRSA PCR panel was positive we are continuing with doxycycline and Zosyn , Respiratory PCR panel was negative  Also continuing with remdesivir and steroids  Continue with oxygen support, continue with remdesivir and dexamethasone since patient is at very high risk for clinical deterioration considering history of cancer  2D echo ordered ask the nursing staff to call echo   Home blood pressure medications have been resumed blood pressure still high increase the dose of losartan to 100 mg and continue with Coreg and continue with p.r.n. meds g   TSH was high but free T4 was normal will keep a close watch patient needs to follow up patient with PCP with repeat thyroid function test  Continue with oxygen support   Repeat blood work in a.m.        VTE prophylaxis:  SCD at this time since there is concern about GI bleed    Patient condition:  Stable/Fair/Guarded/ Serious/ Critical    Anticipated discharge and Disposition:         All diagnosis  and differential diagnosis have been reviewed; assessment and plan has been documented; I have personally reviewed the labs and test results that are presently available; I have reviewed the patients medication list; I have reviewed the consulting providers response and recommendations. I have reviewed or attempted to review medical records based upon their availability    All of the patient's questions have been  addressed and answered. Patient's is agreeable to the above stated plan. I will continue to monitor closely and make adjustments to medical management as needed.    Portions of this note dictated using EMR integrated voice recognition software, and may be subject to voice recognition errors not corrected at proofreading. Please contact writer for clarification if needed.   _____________________________________________________________________    Malnutrition Status:  Nutrition consulted. Most recent weight and BMI monitored-     Measurements:  Wt Readings from Last 1 Encounters:   02/21/25 72.7 kg (160 lb 4.4 oz)   Body mass index is 25.87 kg/m².    Patient has been screened and assessed by RD.    Malnutrition Type:  Context:    Level:      Malnutrition Characteristic Summary:       Interventions/Recommendations (treatment strategy):        Scheduled Med:   0.9%  NaCl infusion (for blood administration)   Intravenous Once    atorvastatin  20 mg Oral Daily    carvediloL  6.25 mg Oral Daily    dexAMETHasone injection  6 mg Intravenous Daily    doxycycline IV (PEDS and ADULTS)  100 mg Intravenous Q12H    ferric gluconate (Ferrlecit) IVPB  125 mg Intravenous Daily    levothyroxine  125 mcg Oral Before breakfast    losartan  50 mg Oral Daily    mupirocin   Nasal BID    piperacillin-tazobactam (Zosyn) IV (PEDS and ADULTS) (extended infusion is not appropriate)  4.5 g Intravenous Q8H      Continuous Infusions:     PRN Meds:    Current Facility-Administered Medications:     0.9%  NaCl infusion (for blood  administration), , Intravenous, Q24H PRN    acetaminophen, 650 mg, Oral, Q8H PRN    acetaminophen, 650 mg, Oral, Q4H PRN    albuterol-ipratropium, 3 mL, Nebulization, Q4H PRN    benzonatate, 100 mg, Oral, TID PRN    dextromethorphan-guaiFENesin  mg/5 ml, 5 mL, Oral, Q4H PRN    hydrALAZINE, 10 mg, Intravenous, Q4H PRN    ondansetron, 4 mg, Intravenous, Q8H PRN     Radiology:  I have personally reviewed the following imaging and agree with the radiologist.     CTA Chest Non-Coronary (PE Studies)  Narrative: EXAMINATION:  CTA CHEST NON CORONARY (PE STUDIES)    CLINICAL HISTORY:  Pulmonary embolism (PE) suspected, positive D-dimer;   chest pain.  Shortness of breath.  Anemia.    TECHNIQUE:  Helically acquired images with axial, sagittal and coronal reformations were obtained from the thoracic inlet to the lung bases followingthe IV administration of contrast.  CTA timed for evaluation of the pulmonary arteries.  MIP images were performed.    Automated tube current modulation, weight-based exposure dosing, and/or iterative reconstruction technique utilized to reach lowest reasonably achievable exposure rate.    DLP: 239 mGy*cm    COMPARISON:  CT chest 01/09/2025, CTA chest 11/28/2024    FINDINGS:  BASE OF NECK: Right internal jugular port terminates at the proximal SVC.    AORTA: Aortic atherosclerosis.    PULMONARY VASCULATURE: Pulmonary arteries enhance normally.  No evidence of pulmonary embolus.    HEART: Normal size. No effusion. There are coronary artery calcifications.    ALPHONSE/MEDIASTINUM: Small nonspecific mediastinal and hilar lymph nodes similar to previous.    AIRWAYS: Bronchial wall thickening, most pronounced in the lower lobes.  Endobronchial opacities at the left lower lobe.    LUNGS/PLEURA: There are multilobar nodular infiltrates and consolidative opacities, progressed from prior.  Chronic biapical pleuroparenchymal scarring.  Trace pleural fluid bilaterally.    UPPER ABDOMEN: No abnormality of the  partially imaged upper abdomen.    THORACIC SOFT TISSUES: Unremarkable.    BONES: Chronic sclerotic change at the left 2nd rib and 3rd rib.  Postop ACDF.  Impression: 1. No evidence of pulmonary embolus  2. Multilobar nodular infiltrates and consolidative opacities, progressed from prior.  This may be related to infection and/or neoplasm  3. Bronchial wall thickening and endobronchial opacities.  This may be related to secretions or aspiration.  4. The preliminary and final reports are concordant.    Electronically signed by: Kathleen Scott  Date:    02/21/2025  Time:    08:20      Joaquina Pichardo MD  Department of Hospital Medicine   [facility]   02/22/2025

## 2025-02-23 LAB
ANION GAP SERPL CALC-SCNC: 10 MEQ/L
BASOPHILS # BLD AUTO: 0.01 X10(3)/MCL
BASOPHILS NFR BLD AUTO: 0.1 %
BUN SERPL-MCNC: 23.1 MG/DL (ref 9.8–20.1)
CALCIUM SERPL-MCNC: 8.4 MG/DL (ref 8.4–10.2)
CHLORIDE SERPL-SCNC: 96 MMOL/L (ref 98–107)
CO2 SERPL-SCNC: 30 MMOL/L (ref 23–31)
CREAT SERPL-MCNC: 0.95 MG/DL (ref 0.55–1.02)
CREAT/UREA NIT SERPL: 24
EOSINOPHIL # BLD AUTO: 0.01 X10(3)/MCL (ref 0–0.9)
EOSINOPHIL NFR BLD AUTO: 0.1 %
ERYTHROCYTE [DISTWIDTH] IN BLOOD BY AUTOMATED COUNT: 18.2 % (ref 11.5–17)
GFR SERPLBLD CREATININE-BSD FMLA CKD-EPI: >60 ML/MIN/1.73/M2
GLUCOSE SERPL-MCNC: 106 MG/DL (ref 82–115)
HCT VFR BLD AUTO: 29.9 % (ref 37–47)
HGB BLD-MCNC: 9.5 G/DL (ref 12–16)
IMM GRANULOCYTES # BLD AUTO: 0.16 X10(3)/MCL (ref 0–0.04)
IMM GRANULOCYTES NFR BLD AUTO: 1.2 %
LYMPHOCYTES # BLD AUTO: 1.01 X10(3)/MCL (ref 0.6–4.6)
LYMPHOCYTES NFR BLD AUTO: 7.7 %
MCH RBC QN AUTO: 26.3 PG (ref 27–31)
MCHC RBC AUTO-ENTMCNC: 31.8 G/DL (ref 33–36)
MCV RBC AUTO: 82.8 FL (ref 80–94)
MONOCYTES # BLD AUTO: 0.68 X10(3)/MCL (ref 0.1–1.3)
MONOCYTES NFR BLD AUTO: 5.2 %
NEUTROPHILS # BLD AUTO: 11.25 X10(3)/MCL (ref 2.1–9.2)
NEUTROPHILS NFR BLD AUTO: 85.7 %
NRBC BLD AUTO-RTO: 0 %
PLATELET # BLD AUTO: 506 X10(3)/MCL (ref 130–400)
PMV BLD AUTO: 8.9 FL (ref 7.4–10.4)
POTASSIUM SERPL-SCNC: 4.1 MMOL/L (ref 3.5–5.1)
RBC # BLD AUTO: 3.61 X10(6)/MCL (ref 4.2–5.4)
SODIUM SERPL-SCNC: 136 MMOL/L (ref 136–145)
WBC # BLD AUTO: 13.12 X10(3)/MCL (ref 4.5–11.5)

## 2025-02-23 PROCEDURE — 94760 N-INVAS EAR/PLS OXIMETRY 1: CPT

## 2025-02-23 PROCEDURE — 63600175 PHARM REV CODE 636 W HCPCS: Performed by: NURSE PRACTITIONER

## 2025-02-23 PROCEDURE — 27000207 HC ISOLATION

## 2025-02-23 PROCEDURE — C1751 CATH, INF, PER/CENT/MIDLINE: HCPCS

## 2025-02-23 PROCEDURE — 25000242 PHARM REV CODE 250 ALT 637 W/ HCPCS: Performed by: NURSE PRACTITIONER

## 2025-02-23 PROCEDURE — 36415 COLL VENOUS BLD VENIPUNCTURE: CPT | Performed by: INTERNAL MEDICINE

## 2025-02-23 PROCEDURE — 80048 BASIC METABOLIC PNL TOTAL CA: CPT | Performed by: INTERNAL MEDICINE

## 2025-02-23 PROCEDURE — 02HV33Z INSERTION OF INFUSION DEVICE INTO SUPERIOR VENA CAVA, PERCUTANEOUS APPROACH: ICD-10-PCS | Performed by: INTERNAL MEDICINE

## 2025-02-23 PROCEDURE — 63600175 PHARM REV CODE 636 W HCPCS: Performed by: INTERNAL MEDICINE

## 2025-02-23 PROCEDURE — 85025 COMPLETE CBC W/AUTO DIFF WBC: CPT | Performed by: INTERNAL MEDICINE

## 2025-02-23 PROCEDURE — 25000003 PHARM REV CODE 250: Performed by: INTERNAL MEDICINE

## 2025-02-23 PROCEDURE — 27000221 HC OXYGEN, UP TO 24 HOURS

## 2025-02-23 PROCEDURE — 36410 VNPNXR 3YR/> PHY/QHP DX/THER: CPT

## 2025-02-23 PROCEDURE — 25000003 PHARM REV CODE 250: Performed by: NURSE PRACTITIONER

## 2025-02-23 PROCEDURE — 99900035 HC TECH TIME PER 15 MIN (STAT)

## 2025-02-23 PROCEDURE — 21400001 HC TELEMETRY ROOM

## 2025-02-23 PROCEDURE — 99900031 HC PATIENT EDUCATION (STAT)

## 2025-02-23 PROCEDURE — 94640 AIRWAY INHALATION TREATMENT: CPT

## 2025-02-23 RX ORDER — ASPIRIN 81 MG/1
81 TABLET ORAL DAILY
Status: DISCONTINUED | OUTPATIENT
Start: 2025-02-23 | End: 2025-02-24 | Stop reason: HOSPADM

## 2025-02-23 RX ORDER — CLOPIDOGREL BISULFATE 75 MG/1
75 TABLET ORAL DAILY
Status: DISCONTINUED | OUTPATIENT
Start: 2025-02-23 | End: 2025-02-24 | Stop reason: HOSPADM

## 2025-02-23 RX ORDER — SODIUM CHLORIDE 0.9 % (FLUSH) 0.9 %
10 SYRINGE (ML) INJECTION EVERY 12 HOURS PRN
Status: DISCONTINUED | OUTPATIENT
Start: 2025-02-23 | End: 2025-02-24 | Stop reason: HOSPADM

## 2025-02-23 RX ADMIN — IPRATROPIUM BROMIDE AND ALBUTEROL SULFATE 3 ML: 2.5; .5 SOLUTION RESPIRATORY (INHALATION) at 08:02

## 2025-02-23 RX ADMIN — PIPERACILLIN SODIUM AND TAZOBACTAM SODIUM 4.5 G: 4; .5 INJECTION, POWDER, LYOPHILIZED, FOR SOLUTION INTRAVENOUS at 04:02

## 2025-02-23 RX ADMIN — LEVOTHYROXINE SODIUM 125 MCG: 0.12 TABLET ORAL at 05:02

## 2025-02-23 RX ADMIN — DOXYCYCLINE 100 MG: 100 INJECTION, POWDER, LYOPHILIZED, FOR SOLUTION INTRAVENOUS at 10:02

## 2025-02-23 RX ADMIN — BENZONATATE 100 MG: 100 CAPSULE ORAL at 08:02

## 2025-02-23 RX ADMIN — DEXAMETHASONE SODIUM PHOSPHATE 6 MG: 4 INJECTION, SOLUTION INTRA-ARTICULAR; INTRALESIONAL; INTRAMUSCULAR; INTRAVENOUS; SOFT TISSUE at 09:02

## 2025-02-23 RX ADMIN — MUPIROCIN: 20 OINTMENT TOPICAL at 09:02

## 2025-02-23 RX ADMIN — PIPERACILLIN SODIUM AND TAZOBACTAM SODIUM 4.5 G: 4; .5 INJECTION, POWDER, LYOPHILIZED, FOR SOLUTION INTRAVENOUS at 08:02

## 2025-02-23 RX ADMIN — HYDRALAZINE HYDROCHLORIDE 10 MG: 20 INJECTION INTRAMUSCULAR; INTRAVENOUS at 04:02

## 2025-02-23 RX ADMIN — LOSARTAN POTASSIUM 100 MG: 50 TABLET, FILM COATED ORAL at 08:02

## 2025-02-23 RX ADMIN — ASPIRIN 81 MG: 81 TABLET, COATED ORAL at 02:02

## 2025-02-23 RX ADMIN — ATORVASTATIN CALCIUM 20 MG: 10 TABLET, FILM COATED ORAL at 08:02

## 2025-02-23 RX ADMIN — MUPIROCIN: 20 OINTMENT TOPICAL at 08:02

## 2025-02-23 RX ADMIN — PIPERACILLIN SODIUM AND TAZOBACTAM SODIUM 4.5 G: 4; .5 INJECTION, POWDER, LYOPHILIZED, FOR SOLUTION INTRAVENOUS at 01:02

## 2025-02-23 RX ADMIN — SODIUM CHLORIDE 125 MG: 9 INJECTION, SOLUTION INTRAVENOUS at 08:02

## 2025-02-23 RX ADMIN — CARVEDILOL 6.25 MG: 3.12 TABLET, FILM COATED ORAL at 08:02

## 2025-02-23 RX ADMIN — CLOPIDOGREL BISULFATE 75 MG: 75 TABLET ORAL at 02:02

## 2025-02-23 NOTE — PLAN OF CARE
Problem: Adult Inpatient Plan of Care  Goal: Plan of Care Review  Outcome: Progressing  Flowsheets (Taken 2/22/2025 2345)  Plan of Care Reviewed With:   patient   family  Goal: Patient-Specific Goal (Individualized)  Outcome: Progressing  Flowsheets (Taken 2/22/2025 2345)  Individualized Care Needs: monitor H/H, IV abx, oxygen therapy, wound care  Anxieties, Fears or Concerns: none verbalized  Patient/Family-Specific Goals (Include Timeframe): d/c home at baseline ADLs  Goal: Absence of Hospital-Acquired Illness or Injury  Outcome: Progressing  Intervention: Identify and Manage Fall Risk  Flowsheets (Taken 2/22/2025 2345)  Safety Promotion/Fall Prevention:   assistive device/personal item within reach   Fall Risk reviewed with patient/family   Fall Risk signage in place   family to remain at bedside   instructed to call staff for mobility   nonskid shoes/socks when out of bed   patient expresses understanding of fall risk and prevention   pulse ox   room near unit station   side rails raised x 3  Intervention: Prevent Skin Injury  Flowsheets (Taken 2/22/2025 2345)  Body Position: position changed independently  Skin Protection: incontinence pads utilized  Device Skin Pressure Protection:   absorbent pad utilized/changed   tubing/devices free from skin contact  Intervention: Prevent and Manage VTE (Venous Thromboembolism) Risk  Flowsheets (Taken 2/22/2025 2345)  VTE Prevention/Management:   ambulation promoted   bleeding risk assessed   bleeding precautions maintained  Intervention: Prevent Infection  Flowsheets (Taken 2/22/2025 2345)  Infection Prevention:   hand hygiene promoted   equipment surfaces disinfected  Goal: Optimal Comfort and Wellbeing  Outcome: Progressing  Intervention: Monitor Pain and Promote Comfort  Flowsheets (Taken 2/22/2025 2345)  Pain Management Interventions: pain management plan reviewed with patient/caregiver  Intervention: Provide Person-Centered Care  Flowsheets (Taken 2/22/2025  2345)  Trust Relationship/Rapport:   care explained   questions answered  Goal: Readiness for Transition of Care  Outcome: Progressing     Problem: Wound  Goal: Absence of Infection Signs and Symptoms  Outcome: Progressing  Intervention: Prevent or Manage Infection  Flowsheets (Taken 2/22/2025 2345)  Isolation Precautions:   precautions discontinued   contact   droplet  Goal: Skin Health and Integrity  Outcome: Progressing  Intervention: Optimize Skin Protection  Flowsheets (Taken 2/22/2025 2345)  Pressure Reduction Techniques:   frequent weight shift encouraged   pressure points protected  Pressure Reduction Devices: pressure-redistributing mattress utilized  Skin Protection: incontinence pads utilized  Activity Management: Rolling - L1  Head of Bed (HOB) Positioning: HOB elevated  Goal: Optimal Wound Healing  Outcome: Progressing  Intervention: Promote Wound Healing  Flowsheets (Taken 2/22/2025 2345)  Sleep/Rest Enhancement:   noise level reduced   room darkened     Problem: Fall Injury Risk  Goal: Absence of Fall and Fall-Related Injury  Outcome: Progressing  Intervention: Identify and Manage Contributors  Flowsheets (Taken 2/22/2025 2345)  Self-Care Promotion:   independence encouraged   BADL personal objects within reach   BADL personal routines maintained  Medication Review/Management:   medications reviewed   high-risk medications identified  Intervention: Promote Injury-Free Environment  Flowsheets (Taken 2/22/2025 2345)  Safety Promotion/Fall Prevention:   assistive device/personal item within reach   Fall Risk reviewed with patient/family   Fall Risk signage in place   family to remain at bedside   instructed to call staff for mobility   nonskid shoes/socks when out of bed   patient expresses understanding of fall risk and prevention   pulse ox   room near unit station   side rails raised x 3     Problem: Skin Injury Risk Increased  Goal: Skin Health and Integrity  Outcome: Progressing  Intervention:  Optimize Skin Protection  Flowsheets (Taken 2/22/2025 9929)  Pressure Reduction Techniques:   frequent weight shift encouraged   pressure points protected  Pressure Reduction Devices: pressure-redistributing mattress utilized  Skin Protection: incontinence pads utilized  Activity Management: Rolling - L1  Head of Bed (HOB) Positioning: HOB elevated

## 2025-02-23 NOTE — PROCEDURES
"Pratibha Patel is a 79 y.o. female patient.    Temp: 97.4 °F (36.3 °C) (02/22/25 2348)  Pulse: (!) 50 (02/22/25 2348)  Resp: 20 (02/22/25 2348)  BP: (!) 144/63 (02/22/25 2348)  SpO2: 95 % (02/22/25 2348)  Weight: 72.7 kg (160 lb 4.4 oz) (02/21/25 0015)  Height: 5' 6" (167.6 cm) (02/21/25 0015)    PICC  Date/Time: 2/23/2025 12:20 AM  Consent Done: Yes  Time out: Immediately prior to procedure a time out was called to verify the correct patient, procedure, equipment, support staff and site/side marked as required  Indications: vascular access  Anesthesia: local infiltration  Local anesthetic: lidocaine 1% without epinephrine  Anesthetic Total (mL): 3  Preparation: skin prepped with ChloraPrep  Skin prep agent dried: skin prep agent completely dried prior to procedure  Sterile barriers: all five maximum sterile barriers used - cap, mask, sterile gown, sterile gloves, and large sterile sheet  Hand hygiene: hand hygiene performed prior to central venous catheter insertion  Location details: right brachial  Catheter type: single lumen  Catheter size: 4 Fr  Catheter Length: 15cm    Ultrasound guidance: yes  Vessel Caliber: large and patent, compressibility normal  Needle advanced into vessel with real time Ultrasound guidance.  Guidewire confirmed in vessel.  Image recorded and saved.  Sterile sheath used.  Number of attempts: 1  Post-procedure: blood return through all ports and sterile dressing applied    Complications: none          Name brittney sosa  2/23/2025    "

## 2025-02-23 NOTE — PROGRESS NOTES
[facility] Hospital Medicine Progress Note        Chief Complaint: Inpatient Follow-up for     HPI: 79 yr old female whose history includes stage IV metastatic lung cancer, COPD oxygen dependent 2L/NC, hypertension and hypothyroidism. At the time of my exam patient is AAO x 3. Reports had routine appointment today with her oncologist and outpatient labs showed a worsening anemia. Otherwise has been in usual state of health and denies any chest pain or worsening SOB. Home meds includes ASA 81 mg and plavix. Denies use of OTC NSAIDs. Unsure if she's ever had an EGD. Sigmoidoscopy - 9/9/24 - showed diverticulosis in the sigmoid and descending colon, single ulcer in distal rectum, otherwise normal examination. Denies noting any bloody or black stool.  Chest x-ray shows mild prominence of the interstitium, pulmonary vascular congestion versus infection. Meds given in ED include Lasix 40 mg IV, tylenol and benadryl.     Interval Hx:   Patient seen and examined this morning on 2 L nasal cannula patient's oxygen saturation dropped to 88%     Objective/physical exam:  General: In no acute distress, afebrile  Chest: Clear to auscultation bilaterally  Heart: RRR, Abdomen: Soft, nontender, BS +  MSK: Warm, no lower extremity edema, no clubbing or cyanosis  Neurologic: Alert and oriented x4,   VITAL SIGNS: 24 HRS MIN & MAX LAST   Temp  Min: 97.3 °F (36.3 °C)  Max: 97.6 °F (36.4 °C) 97.3 °F (36.3 °C)   BP  Min: 126/65  Max: 182/71 131/74   Pulse  Min: 50  Max: 73  (!) 59   Resp  Min: 18  Max: 20 18   SpO2  Min: 88 %  Max: 97 % (!) 88 %     I have reviewed the following labs:  Recent Labs   Lab 02/21/25  0523 02/22/25  0546 02/23/25  0527   WBC 5.46 12.36* 13.12*   RBC 3.42* 3.60* 3.61*   HGB 9.1* 9.4* 9.5*   HCT 27.8* 29.7* 29.9*   MCV 81.3 82.5 82.8   MCH 26.6* 26.1* 26.3*   MCHC 32.7* 31.6* 31.8*   RDW 17.3* 17.7* 18.2*   * 500* 506*   MPV 8.9 9.4 8.9     Recent Labs   Lab 02/20/25  1600  02/21/25  0523 02/22/25  0546 02/23/25  0527    137 137 136   K 3.7 4.0 4.5 4.1   CL 95* 97* 98 96*   CO2 34* 29 32* 30   BUN 13.3 13.8 19.0 23.1*   CREATININE 0.85 0.83 0.82 0.95   CALCIUM 8.5 8.4 9.2 8.4   MG 1.90  --   --   --    ALBUMIN 2.5* 2.4* 2.4*  --    ALKPHOS 86 83 76  --    ALT 12 11 11  --    AST 20 19 20  --    BILITOT 0.5 0.5 0.4  --      Microbiology Results (last 7 days)       ** No results found for the last 168 hours. **             See below for Radiology    Assessment/Plan:  Anemia status post 1 unit of packed RBC H&H came up appropriately   COVID-19 infection   Acute on chronic hypoxemic respiratory failure  Multilobar infiltrates concern about superimposed infection  Stage IV non-small-cell lung cancer on immunotherapy  Possible early new onset heart failure awaiting echo  Essential hypertension   Elevated TSH with normal free T4  Peripheral arterial disease  Hyperlipidemia   Anxiety    One occult blood was negative I have ordered 2 more samples  Oxygen saturation dropped to 88% on 2 L  I will switch to p.o. Augmentin and doxycycline today  Patient completed remdesivir continues to be on dexamethasone  Will resume aspirin and Plavix  MRSA PCR panel was positive , Respiratory PCR panel was negative  2D echo ordered ask the nursing staff to call echo   Home blood pressure medications have been resumed   Blood pressure was better we increase the dose of losartan continue with Coreg  2D echo shows normal EF no valvular abnormality TSH was high but free T4 was normal will keep a close watch patient needs to follow up patient with PCP with repeat thyroid function test  Continue with oxygen support   Repeat blood work in a.m.    Potential discharge tomorrow if occult blood 2nd and 3rd sample is negative, patient's oxygen saturation is staying stable and H&H is staying stable after we resume aspirin and Plavix    VTE prophylaxis:  SCD at this time since there is concern about GI bleed    Patient  condition:  Stable/Fair/Guarded/ Serious/ Critical    Anticipated discharge and Disposition:         All diagnosis and differential diagnosis have been reviewed; assessment and plan has been documented; I have personally reviewed the labs and test results that are presently available; I have reviewed the patients medication list; I have reviewed the consulting providers response and recommendations. I have reviewed or attempted to review medical records based upon their availability    All of the patient's questions have been  addressed and answered. Patient's is agreeable to the above stated plan. I will continue to monitor closely and make adjustments to medical management as needed.    Portions of this note dictated using EMR integrated voice recognition software, and may be subject to voice recognition errors not corrected at proofreading. Please contact writer for clarification if needed.   _____________________________________________________________________    Malnutrition Status:  Nutrition consulted. Most recent weight and BMI monitored-     Measurements:  Wt Readings from Last 1 Encounters:   02/21/25 72.7 kg (160 lb 4.4 oz)   Body mass index is 25.87 kg/m².    Patient has been screened and assessed by RD.    Malnutrition Type:  Context:    Level:      Malnutrition Characteristic Summary:       Interventions/Recommendations (treatment strategy):        Scheduled Med:   0.9%  NaCl infusion (for blood administration)   Intravenous Once    atorvastatin  20 mg Oral Daily    carvediloL  6.25 mg Oral Daily    dexAMETHasone injection  6 mg Intravenous Daily    doxycycline IV (PEDS and ADULTS)  100 mg Intravenous Q12H    levothyroxine  125 mcg Oral Before breakfast    losartan  100 mg Oral Daily    mupirocin   Nasal BID    piperacillin-tazobactam (Zosyn) IV (PEDS and ADULTS) (extended infusion is not appropriate)  4.5 g Intravenous Q8H      Continuous Infusions:     PRN Meds:    Current Facility-Administered  Medications:     0.9%  NaCl infusion (for blood administration), , Intravenous, Q24H PRN    acetaminophen, 650 mg, Oral, Q8H PRN    acetaminophen, 650 mg, Oral, Q4H PRN    albuterol-ipratropium, 3 mL, Nebulization, Q4H PRN    benzonatate, 100 mg, Oral, TID PRN    dextromethorphan-guaiFENesin  mg/5 ml, 5 mL, Oral, Q4H PRN    hydrALAZINE, 10 mg, Intravenous, Q4H PRN    ondansetron, 4 mg, Intravenous, Q8H PRN    Flushing PICC/Midline Protocol, , , Until Discontinued **AND** sodium chloride 0.9%, 10 mL, Intravenous, Q12H PRN     Radiology:  I have personally reviewed the following imaging and agree with the radiologist.     Echo    Left Ventricle: The left ventricle is normal in size. Normal wall   thickness. There is normal systolic function with a visually estimated   ejection fraction of 55 - 60%. There is normal diastolic function.    Right Ventricle: Normal right ventricular cavity size. Wall thickness   is normal. Systolic function is normal.    Aortic Valve: The aortic valve is structurally normal.    Mitral Valve: The mitral valve is structurally normal.    Pericardium: There is no pericardial effusion.      Joaquina Pichardo MD  Department of Hospital Medicine   [facility]   02/23/2025

## 2025-02-23 NOTE — PLAN OF CARE
Problem: Adult Inpatient Plan of Care  Goal: Plan of Care Review  Outcome: Progressing  Goal: Patient-Specific Goal (Individualized)  Outcome: Progressing  Goal: Absence of Hospital-Acquired Illness or Injury  Outcome: Progressing  Intervention: Identify and Manage Fall Risk  Flowsheets (Taken 2/23/2025 1114)  Safety Promotion/Fall Prevention: assistive device/personal item within reach  Intervention: Prevent Skin Injury  Flowsheets (Taken 2/23/2025 1114)  Body Position: position changed independently  Skin Protection: incontinence pads utilized  Device Skin Pressure Protection: absorbent pad utilized/changed  Intervention: Prevent and Manage VTE (Venous Thromboembolism) Risk  Flowsheets (Taken 2/23/2025 1114)  VTE Prevention/Management: remove, assess skin, and reapply sequential compression device  Intervention: Prevent Infection  Flowsheets (Taken 2/23/2025 1114)  Infection Prevention:   cohorting utilized   equipment surfaces disinfected   hand hygiene promoted

## 2025-02-24 VITALS
OXYGEN SATURATION: 97 % | HEIGHT: 66 IN | WEIGHT: 160.25 LBS | RESPIRATION RATE: 19 BRPM | DIASTOLIC BLOOD PRESSURE: 58 MMHG | HEART RATE: 62 BPM | BODY MASS INDEX: 25.75 KG/M2 | SYSTOLIC BLOOD PRESSURE: 131 MMHG | TEMPERATURE: 97 F

## 2025-02-24 DIAGNOSIS — C79.51 MALIGNANT NEOPLASM METASTATIC TO BONE: ICD-10-CM

## 2025-02-24 DIAGNOSIS — C34.90 ADENOCARCINOMA OF LUNG, UNSPECIFIED LATERALITY: Primary | ICD-10-CM

## 2025-02-24 DIAGNOSIS — E03.2 HYPOTHYROIDISM DUE TO DRUGS: ICD-10-CM

## 2025-02-24 DIAGNOSIS — C77.9 MALIGNANT NEOPLASM METASTATIC TO LYMPH NODES, UNSPECIFIED LYMPH NODE REGION: ICD-10-CM

## 2025-02-24 PROBLEM — C78.00 MALIGNANT NEOPLASM METASTATIC TO LUNG: Status: RESOLVED | Noted: 2023-01-09 | Resolved: 2025-02-24

## 2025-02-24 LAB
ANION GAP SERPL CALC-SCNC: 8 MEQ/L
BASOPHILS # BLD AUTO: 0.02 X10(3)/MCL
BASOPHILS NFR BLD AUTO: 0.2 %
BUN SERPL-MCNC: 28 MG/DL (ref 9.8–20.1)
CALCIUM SERPL-MCNC: 8.2 MG/DL (ref 8.4–10.2)
CHLORIDE SERPL-SCNC: 96 MMOL/L (ref 98–107)
CO2 SERPL-SCNC: 27 MMOL/L (ref 23–31)
COLOR STL: NORMAL
CONSISTENCY STL: NORMAL
CREAT SERPL-MCNC: 0.95 MG/DL (ref 0.55–1.02)
CREAT/UREA NIT SERPL: 29
EOSINOPHIL # BLD AUTO: 0 X10(3)/MCL (ref 0–0.9)
EOSINOPHIL NFR BLD AUTO: 0 %
ERYTHROCYTE [DISTWIDTH] IN BLOOD BY AUTOMATED COUNT: 18.8 % (ref 11.5–17)
GFR SERPLBLD CREATININE-BSD FMLA CKD-EPI: >60 ML/MIN/1.73/M2
GLUCOSE SERPL-MCNC: 115 MG/DL (ref 82–115)
HCT VFR BLD AUTO: 30.2 % (ref 37–47)
HEMOCCULT SP2 STL QL: NEGATIVE
HGB BLD-MCNC: 9.5 G/DL (ref 12–16)
IMM GRANULOCYTES # BLD AUTO: 0.16 X10(3)/MCL (ref 0–0.04)
IMM GRANULOCYTES NFR BLD AUTO: 1.4 %
LYMPHOCYTES # BLD AUTO: 1.07 X10(3)/MCL (ref 0.6–4.6)
LYMPHOCYTES NFR BLD AUTO: 9.1 %
MCH RBC QN AUTO: 26.5 PG (ref 27–31)
MCHC RBC AUTO-ENTMCNC: 31.5 G/DL (ref 33–36)
MCV RBC AUTO: 84.1 FL (ref 80–94)
MONOCYTES # BLD AUTO: 0.78 X10(3)/MCL (ref 0.1–1.3)
MONOCYTES NFR BLD AUTO: 6.7 %
NEUTROPHILS # BLD AUTO: 9.69 X10(3)/MCL (ref 2.1–9.2)
NEUTROPHILS NFR BLD AUTO: 82.6 %
NRBC BLD AUTO-RTO: 0.3 %
PLATELET # BLD AUTO: 512 X10(3)/MCL (ref 130–400)
PMV BLD AUTO: 9.1 FL (ref 7.4–10.4)
POTASSIUM SERPL-SCNC: 4.4 MMOL/L (ref 3.5–5.1)
RBC # BLD AUTO: 3.59 X10(6)/MCL (ref 4.2–5.4)
SODIUM SERPL-SCNC: 131 MMOL/L (ref 136–145)
WBC # BLD AUTO: 11.72 X10(3)/MCL (ref 4.5–11.5)

## 2025-02-24 PROCEDURE — 94760 N-INVAS EAR/PLS OXIMETRY 1: CPT

## 2025-02-24 PROCEDURE — 25000003 PHARM REV CODE 250: Performed by: INTERNAL MEDICINE

## 2025-02-24 PROCEDURE — 25000003 PHARM REV CODE 250: Performed by: NURSE PRACTITIONER

## 2025-02-24 PROCEDURE — 80048 BASIC METABOLIC PNL TOTAL CA: CPT | Performed by: INTERNAL MEDICINE

## 2025-02-24 PROCEDURE — 99900031 HC PATIENT EDUCATION (STAT)

## 2025-02-24 PROCEDURE — 25000242 PHARM REV CODE 250 ALT 637 W/ HCPCS: Performed by: NURSE PRACTITIONER

## 2025-02-24 PROCEDURE — 63600175 PHARM REV CODE 636 W HCPCS: Performed by: NURSE PRACTITIONER

## 2025-02-24 PROCEDURE — 94640 AIRWAY INHALATION TREATMENT: CPT

## 2025-02-24 PROCEDURE — 94761 N-INVAS EAR/PLS OXIMETRY MLT: CPT

## 2025-02-24 PROCEDURE — 27000221 HC OXYGEN, UP TO 24 HOURS

## 2025-02-24 PROCEDURE — 85025 COMPLETE CBC W/AUTO DIFF WBC: CPT | Performed by: INTERNAL MEDICINE

## 2025-02-24 PROCEDURE — 63600175 PHARM REV CODE 636 W HCPCS: Performed by: INTERNAL MEDICINE

## 2025-02-24 PROCEDURE — 99900035 HC TECH TIME PER 15 MIN (STAT)

## 2025-02-24 PROCEDURE — 82272 OCCULT BLD FECES 1-3 TESTS: CPT | Performed by: NURSE PRACTITIONER

## 2025-02-24 PROCEDURE — 36415 COLL VENOUS BLD VENIPUNCTURE: CPT | Performed by: INTERNAL MEDICINE

## 2025-02-24 RX ORDER — LOSARTAN POTASSIUM 100 MG/1
100 TABLET ORAL DAILY
Qty: 30 TABLET | Refills: 0 | Status: SHIPPED | OUTPATIENT
Start: 2025-02-25 | End: 2025-03-27

## 2025-02-24 RX ORDER — FUROSEMIDE 10 MG/ML
20 INJECTION INTRAMUSCULAR; INTRAVENOUS ONCE
Status: COMPLETED | OUTPATIENT
Start: 2025-02-24 | End: 2025-02-24

## 2025-02-24 RX ORDER — AMOXICILLIN AND CLAVULANATE POTASSIUM 875; 125 MG/1; MG/1
1 TABLET, FILM COATED ORAL 2 TIMES DAILY
Qty: 6 TABLET | Refills: 0 | Status: SHIPPED | OUTPATIENT
Start: 2025-02-24 | End: 2025-02-27

## 2025-02-24 RX ORDER — DOXYCYCLINE 100 MG/1
100 CAPSULE ORAL EVERY 12 HOURS
Qty: 6 CAPSULE | Refills: 0 | Status: SHIPPED | OUTPATIENT
Start: 2025-02-24 | End: 2025-02-27

## 2025-02-24 RX ADMIN — CARVEDILOL 6.25 MG: 3.12 TABLET, FILM COATED ORAL at 09:02

## 2025-02-24 RX ADMIN — MUPIROCIN: 20 OINTMENT TOPICAL at 09:02

## 2025-02-24 RX ADMIN — ASPIRIN 81 MG: 81 TABLET, COATED ORAL at 09:02

## 2025-02-24 RX ADMIN — LOSARTAN POTASSIUM 100 MG: 50 TABLET, FILM COATED ORAL at 09:02

## 2025-02-24 RX ADMIN — ATORVASTATIN CALCIUM 20 MG: 10 TABLET, FILM COATED ORAL at 09:02

## 2025-02-24 RX ADMIN — DEXAMETHASONE SODIUM PHOSPHATE 6 MG: 4 INJECTION, SOLUTION INTRA-ARTICULAR; INTRALESIONAL; INTRAMUSCULAR; INTRAVENOUS; SOFT TISSUE at 09:02

## 2025-02-24 RX ADMIN — LEVOTHYROXINE SODIUM 125 MCG: 0.12 TABLET ORAL at 05:02

## 2025-02-24 RX ADMIN — PIPERACILLIN SODIUM AND TAZOBACTAM SODIUM 4.5 G: 4; .5 INJECTION, POWDER, LYOPHILIZED, FOR SOLUTION INTRAVENOUS at 12:02

## 2025-02-24 RX ADMIN — CLOPIDOGREL BISULFATE 75 MG: 75 TABLET ORAL at 09:02

## 2025-02-24 RX ADMIN — IPRATROPIUM BROMIDE AND ALBUTEROL SULFATE 3 ML: 2.5; .5 SOLUTION RESPIRATORY (INHALATION) at 04:02

## 2025-02-24 RX ADMIN — FUROSEMIDE 20 MG: 10 INJECTION, SOLUTION INTRAMUSCULAR; INTRAVENOUS at 05:02

## 2025-02-24 NOTE — PHYSICIAN QUERY
Due to the conflicting clinical picture, please clinically validate the diagnosis. If validated, please provide additional clinical support for the diagnosis.  The respiratory condition has been ruled out and other diagnosis ruled in (please specify) Chronic Hypoxic respiratory failure

## 2025-02-24 NOTE — PLAN OF CARE
Problem: Adult Inpatient Plan of Care  Goal: Plan of Care Review  Outcome: Met  Flowsheets (Taken 2/24/2025 0220)  Plan of Care Reviewed With:   patient   spouse  Goal: Patient-Specific Goal (Individualized)  Outcome: Met  Flowsheets (Taken 2/24/2025 0220)  Individualized Care Needs: monitor H/H, IV abx, oxygen therapy, wound care  Anxieties, Fears or Concerns: none verbalized  Patient/Family-Specific Goals (Include Timeframe): d/c home at baseline ADLs today (2/24)  Goal: Absence of Hospital-Acquired Illness or Injury  Outcome: Met  Intervention: Identify and Manage Fall Risk  Flowsheets (Taken 2/24/2025 0220)  Safety Promotion/Fall Prevention:   assistive device/personal item within reach   Fall Risk reviewed with patient/family   Fall Risk signage in place   family to remain at bedside   medications reviewed   high risk medications identified   instructed to call staff for mobility   nonskid shoes/socks when out of bed   patient expresses understanding of fall risk and prevention   pulse ox   room near unit station   side rails raised x 2  Intervention: Prevent Skin Injury  Flowsheets (Taken 2/24/2025 0220)  Body Position: position changed independently  Skin Protection: incontinence pads utilized  Device Skin Pressure Protection:   absorbent pad utilized/changed   tubing/devices free from skin contact  Intervention: Prevent and Manage VTE (Venous Thromboembolism) Risk  Flowsheets (Taken 2/24/2025 0220)  VTE Prevention/Management:   ambulation promoted   bleeding risk assessed   bleeding precautions maintained  Intervention: Prevent Infection  Flowsheets (Taken 2/24/2025 0220)  Infection Prevention:   equipment surfaces disinfected   hand hygiene promoted  Goal: Optimal Comfort and Wellbeing  Outcome: Met  Intervention: Monitor Pain and Promote Comfort  Flowsheets (Taken 2/24/2025 0220)  Pain Management Interventions:   pain management plan reviewed with patient/caregiver   care clustered  Intervention: Provide  Person-Centered Care  Flowsheets (Taken 2/24/2025 0220)  Trust Relationship/Rapport:   care explained   questions answered  Goal: Readiness for Transition of Care  Outcome: Met     Problem: Wound  Goal: Absence of Infection Signs and Symptoms  Outcome: Met  Intervention: Prevent or Manage Infection  Flowsheets (Taken 2/24/2025 0220)  Isolation Precautions:   precautions maintained   contact   droplet  Goal: Skin Health and Integrity  Outcome: Met  Intervention: Optimize Skin Protection  Flowsheets (Taken 2/24/2025 0220)  Pressure Reduction Techniques:   frequent weight shift encouraged   pressure points protected  Pressure Reduction Devices:   positioning supports utilized   pressure-redistributing mattress utilized  Skin Protection: incontinence pads utilized  Activity Management: Rolling - L1  Head of Bed (HOB) Positioning: HOB elevated  Goal: Optimal Wound Healing  Outcome: Met  Intervention: Promote Wound Healing  Flowsheets (Taken 2/24/2025 0220)  Sleep/Rest Enhancement:   noise level reduced   room darkened     Problem: Fall Injury Risk  Goal: Absence of Fall and Fall-Related Injury  Outcome: Met  Intervention: Identify and Manage Contributors  Flowsheets (Taken 2/24/2025 0220)  Self-Care Promotion:   independence encouraged   BADL personal objects within reach   BADL personal routines maintained  Medication Review/Management:   medications reviewed   high-risk medications identified  Intervention: Promote Injury-Free Environment  Flowsheets (Taken 2/24/2025 0220)  Safety Promotion/Fall Prevention:   assistive device/personal item within reach   Fall Risk reviewed with patient/family   Fall Risk signage in place   family to remain at bedside   medications reviewed   high risk medications identified   instructed to call staff for mobility   nonskid shoes/socks when out of bed   patient expresses understanding of fall risk and prevention   pulse ox   room near unit station   side rails raised x 2     Problem:  Skin Injury Risk Increased  Goal: Skin Health and Integrity  Outcome: Met  Intervention: Optimize Skin Protection  Flowsheets (Taken 2/24/2025 0220)  Pressure Reduction Techniques:   frequent weight shift encouraged   pressure points protected  Pressure Reduction Devices:   positioning supports utilized   pressure-redistributing mattress utilized  Skin Protection: incontinence pads utilized  Activity Management: Rolling - L1  Head of Bed (HOB) Positioning: HOB elevated

## 2025-02-24 NOTE — PT/OT/SLP PROGRESS
Attempted PT eval, however pt already ambulating and transferring in room with RW and CNA. Discussed pt with , and he states she is at her baseline with her mobility. They have all needed equipment at home, and he is always there to physically assist pt in transfers and gait w RW or w/c. They report no acute PT needs at this time. Screened and d/c.

## 2025-02-24 NOTE — DISCHARGE SUMMARY
[facility] Hospital Medicine Discharge Summary    Admit Date: 2/20/2025  Discharge Date and Time: 2/24/20259:57 AM  Admitting Physician: MARIAJOSE Team  Discharging Physician: Joaquina Pichardo MD.  Primary Care Physician: Graham Nguyen NP  Consults: None    Discharge Diagnoses:  Anemia status post 1 unit of packed RBC H&H came up appropriately   COVID-19 infection   Acute on chronic hypoxemic respiratory failure  Multilobar infiltrates concern about superimposed infection  Stage IV non-small-cell lung cancer on immunotherapy  Possible early new onset heart failure awaiting echo  Essential hypertension   Elevated TSH with normal free T4  Peripheral arterial disease  Hyperlipidemia   Anxiety    Hospital Course:   79 yr old female whose history includes stage IV metastatic lung cancer, COPD oxygen dependent 2L/NC, hypertension and hypothyroidism. At the time of my exam patient is AAO x 3. Reports had routine appointment today with her oncologist and outpatient labs showed a worsening anemia. Otherwise has been in usual state of health and denies any chest pain or worsening SOB. Home meds includes ASA 81 mg and plavix. Denies use of OTC NSAIDs. Unsure if she's ever had an EGD. Sigmoidoscopy - 9/9/24 - showed diverticulosis in the sigmoid and descending colon, single ulcer in distal rectum, otherwise normal examination. Denies noting any bloody or black stool.  Chest x-ray shows mild prominence of the interstitium, pulmonary vascular congestion versus infection. Meds given in ED include Lasix 40 mg IV, tylenol and benadryl.  Patient was given 1 unit of packed RBC H&H came up appropriately patient was started on remdesivir and dexamethasone and chest x-ray also showed multilobar infiltrates considering patient was immunocompromised so patient was started on antibiotics Respiratory PCR MRSA PCR was negative home blood pressure medications were resumed 2D echo was ordered that showed no  valvular abnormality EF was normal.  Antibiotics were switched to p.o. patient worked with PT and OT was discharged home in stable condition  Pt was seen and examined on the day of discharge  Vitals:  VITAL SIGNS: 24 HRS MIN & MAX LAST   Temp  Min: 96.7 °F (35.9 °C)  Max: 97.5 °F (36.4 °C) 96.7 °F (35.9 °C)   BP  Min: 118/56  Max: 156/68 (!) 131/58   Pulse  Min: 56  Max: 87  62   Resp  Min: 18  Max: 100 19   SpO2  Min: 88 %  Max: 98 % 97 %       Physical Exam:  General: In no acute distress, afebrile  Chest: Clear to auscultation bilaterally  Heart: RRR, Abdomen: Soft, nontender, BS +  MSK: Warm, no lower extremity edema, no clubbing or cyanosis  Neurologic: Alert and oriented x4,     Procedures Performed: No admission procedures for hospital encounter.     Significant Diagnostic Studies: See Full reports for all details    Recent Labs   Lab 02/22/25  0546 02/23/25  0527 02/24/25  0449   WBC 12.36* 13.12* 11.72*   RBC 3.60* 3.61* 3.59*   HGB 9.4* 9.5* 9.5*   HCT 29.7* 29.9* 30.2*   MCV 82.5 82.8 84.1   MCH 26.1* 26.3* 26.5*   MCHC 31.6* 31.8* 31.5*   RDW 17.7* 18.2* 18.8*   * 506* 512*   MPV 9.4 8.9 9.1       Recent Labs   Lab 02/20/25  1600 02/21/25  0523 02/22/25  0546 02/23/25  0527 02/24/25  0449    137 137 136 131*   K 3.7 4.0 4.5 4.1 4.4   CL 95* 97* 98 96* 96*   CO2 34* 29 32* 30 27   BUN 13.3 13.8 19.0 23.1* 28.0*   CREATININE 0.85 0.83 0.82 0.95 0.95   CALCIUM 8.5 8.4 9.2 8.4 8.2*   MG 1.90  --   --   --   --    ALBUMIN 2.5* 2.4* 2.4*  --   --    ALKPHOS 86 83 76  --   --    ALT 12 11 11  --   --    AST 20 19 20  --   --    BILITOT 0.5 0.5 0.4  --   --         Microbiology Results (last 7 days)       ** No results found for the last 168 hours. **             Echo    Left Ventricle: The left ventricle is normal in size. Normal wall   thickness. There is normal systolic function with a visually estimated   ejection fraction of 55 - 60%. There is normal diastolic function.    Right Ventricle:  Normal right ventricular cavity size. Wall thickness   is normal. Systolic function is normal.    Aortic Valve: The aortic valve is structurally normal.    Mitral Valve: The mitral valve is structurally normal.    Pericardium: There is no pericardial effusion.         Medication List        START taking these medications      amoxicillin-clavulanate 875-125mg 875-125 mg per tablet  Commonly known as: AUGMENTIN  Take 1 tablet by mouth 2 (two) times daily. for 3 days     doxycycline 100 MG Cap  Commonly known as: VIBRAMYCIN  Take 1 capsule (100 mg total) by mouth every 12 (twelve) hours. for 3 days            CHANGE how you take these medications      losartan 100 MG tablet  Commonly known as: COZAAR  Take 1 tablet (100 mg total) by mouth once daily.  Start taking on: February 25, 2025  What changed:   medication strength  how much to take            CONTINUE taking these medications      aspirin 81 MG EC tablet  Commonly known as: ECOTRIN     carvediloL 6.25 MG tablet  Commonly known as: COREG     clopidogreL 75 mg tablet  Commonly known as: PLAVIX     furosemide 20 MG tablet  Commonly known as: LASIX  Take 1 tablet (20 mg total) by mouth once daily.     levothyroxine 125 MCG tablet  Commonly known as: SYNTHROID     simvastatin 40 MG tablet  Commonly known as: ZOCOR               Where to Get Your Medications        These medications were sent to [facility] [place] [address] [address] [address]      Phone: [phone]   amoxicillin-clavulanate 875-125mg 875-125 mg per tablet  doxycycline 100 MG Cap  losartan 100 MG tablet          Explained in detail to the patient about the discharge plan, medications, and follow-up visits. Pt understands and agrees with the treatment plan  Discharge Disposition: Home or Self Care   Discharged Condition: stable  Diet-   Dietary Orders (From admission, onward)       Start     Ordered    02/21/25 0952  Diet Heart Healthy  Standard Tray  Diet effective now        Question:  Tray type:  Answer:  Standard Tray    02/21/25 0952                   Medications Per DC med rec  Activities as tolerated   Follow-up Information       [facility] - Emergency Dept. Go to .    Specialty: Emergency Medicine  Why: If symptoms worsen  Contact information:  [address] [facility] [address] [phone]             Mc Ricardo NP. Go on 3/3/2025.    Specialty: Family Medicine  Why: Follow up appointment on 3-3 @ 10:15 am  Contact information:  [address] [place] [address] [address] [place] [place] [address]  [phone]               Graham Nguyen NP Follow up in 1 week(s).    Specialty: Family Medicine  Contact information:  [address] [place] [address]/[facility] [address]  [phone]                           For further questions contact hospitalist office    Discharge time 33 minutes    For worsening symptoms, chest pain, shortness of breath, increased abdominal pain, high grade fever, stroke or stroke like symptoms, immediately go to the nearest Emergency Room or call 911 as soon as possible.      Joaquina Hannah M.D, on 2/24/2025. at 9:57 AM.

## 2025-02-25 ENCOUNTER — PATIENT OUTREACH (OUTPATIENT)
Dept: ADMINISTRATIVE | Facility: CLINIC | Age: 80
End: 2025-02-25
Payer: MEDICARE

## 2025-02-25 NOTE — PROGRESS NOTES
C3 nurse spoke with Pratibha Patel for a TCC post hospital discharge follow up call. The patient has a scheduled HOS appointment with Graham Nguyen NP on 3/3/25 @ 10:15.

## 2025-03-20 ENCOUNTER — HOSPITAL ENCOUNTER (OUTPATIENT)
Dept: RADIOLOGY | Facility: HOSPITAL | Age: 80
Discharge: HOME | End: 2025-03-20
Attending: NURSE PRACTITIONER
Payer: MEDICARE

## 2025-03-20 DIAGNOSIS — C77.9 MALIGNANT NEOPLASM METASTATIC TO LYMPH NODES, UNSPECIFIED LYMPH NODE REGION: ICD-10-CM

## 2025-03-20 DIAGNOSIS — C79.51 MALIGNANT NEOPLASM METASTATIC TO BONE: ICD-10-CM

## 2025-03-20 DIAGNOSIS — E03.2 HYPOTHYROIDISM DUE TO DRUGS: ICD-10-CM

## 2025-03-20 DIAGNOSIS — C34.90 ADENOCARCINOMA OF LUNG, UNSPECIFIED LATERALITY: ICD-10-CM

## 2025-03-20 PROCEDURE — 78815 PET IMAGE W/CT SKULL-THIGH: CPT | Mod: TC

## 2025-03-20 PROCEDURE — A9552 F18 FDG: HCPCS | Performed by: NURSE PRACTITIONER

## 2025-03-20 RX ORDER — FLUDEOXYGLUCOSE F18 500 MCI/ML
10 INJECTION INTRAVENOUS
Status: COMPLETED | OUTPATIENT
Start: 2025-03-20 | End: 2025-03-20

## 2025-03-20 RX ADMIN — FLUDEOXYGLUCOSE F-18 11 MILLICURIE: 500 INJECTION INTRAVENOUS at 09:03

## 2025-03-21 NOTE — PROGRESS NOTES
Subjective:       Patient ID: Pratibha Patel is a 80 y.o. female.    Chief Complaint: Follow-up (Pt reports fatigue )      PCP: Graham Nguyen NP  Cardiologist: Dr. Danny Sánchez  Referring Physician: Dr. Anthony Hutson  Endocrine: Dr. Werner  Radiation oncology: Dr. Boss in the past, now Dr. Mortensen.  ENT: Dr. 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 ? metastatic findings could not be biopsied. TTF negative/CK 7 +.    Diagnosed June 2014 EGFR mutation negative/ALK gene rearrangement negative. 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 Patient has reportedly been referred to ENT for resection and lymph node dissection (per patient report).   Subsequent open biopsy done by Dr. Rosales on 7/29/2019 confirmed the same.  Patient was unable to undergo definitive surgical resection for oligo metastatic disease.  Hoarseness and swallowing difficulties secondary to #2  Biopsy of the left lung lesion showed recurrent non-small cell lung cancer, Caris was sent and showed positivity for PD-L1, 2%.  No other targetable or actionable mutations present.    Current Treatment:   Combination chemo immunotherapy with carboplatin, pemetrexed, nivolumab, ipilimumab based off of checkmate 9LA. Cycle 1 day 1 given 12/20/2022.  Cycle 15 day 22 given on 12/20/2024.  Been on hold since.     Treatment History:  RT with weekly Carbo/Taxol started 7/2/14--Completed August 2014  Single agent 'Maintenance' Avastin q 3  weeks.  Started 4/9/15--last given September 21, 2016  Radiation + weekly Carbo/Taxol at 2AUC ----9/11/19-10/22/19  Patient underwent radiation from 12/17/2020 through 12/21/2020.  Radiation to the left and right lung per Dr. Robert Mortensen.    HPI:   Please see Oncology history in the diagnosis of adenocarcinoma of the lung, stage IV on the problem list for full detail.  Patient originally seen in 2014 with unintentional weight loss, shortness of breath, nonproductive cough.  Patient had imaging findings suggestive of lung cancer, bronchoscopy done in June of 2014 revealed moderately differentiated adenocarcinoma of the lung.  She originally underwent chemo radiation from July 2014 through August 2014.  She was placed on maintenance Avastin from April 2015 through September 2016. Patient then underwent a thyroid biopsy in June of 2019 that showed metastatic carcinoma favored to be adenocarcinoma lung primary.  She underwent surgical resection in July 2019, however full surgical dissection was not able to be done and the patient underwent open biopsy of right thyroid gland.  Pathology revealed poorly differentiated non-small cell carcinoma consistent with metastatic lung cancer.  NGS panel done at that time unremarkable.  She started chemoradiation with carbo Taxol in September 2019, completed in October 2019. Patient was doing well, imaging in October 2020 revealed an abnormality in the left lung, CT-guided lung biopsy on 11/03/2020 showed recurrent non-small cell lung cancer, Caris revealed PDL1 positivity.  She underwent SBRT from 12/17/2020 through 12/21/2020.  Patient then placed on surveillance imaging, most recently done on 07/05/2022 showing worsening disease in the right lung with a previous lesion that measured 0.6 cm and had an SUV of 1.0 now measuring 1.5 cm and an SUV of 8.4.  This was treated with SBRT in August of 2022.  PET/CT scan done on 11/8/2022 showed mild increased radiotracer activity within the  left apical consolidation, SUV 5.5.  There was a newly hypermetabolic subcentimeter subcarinal lymph node.  A 1 cm right lower lobe nodule was smaller, he right upper lobe nodule was slightly larger measuring 6 mm.  No sites of FDG avid metastatic disease in the neck, abdomen, or pelvis. Bronchoscopy done on 11/16/2022, pathology revealed malignant cells consistent with adenocarcinoma.  Planning on chemo immunotherapy with carboplatin, pemetrexed, nivolumab, ipilimumab based off of checkmate 9LA.  PET/CT on 07/25/2023 showed infectious or inflammatory changes in the right lower lobe with no other findings to suggest new or worsening disease.    PET/CT scan done on 10/12/2023 showed mixed response to therapy with decrease right hilar and left upper lobe consolidation uptake.  Subcarinal lymph node demonstrates significant increased uptake compared to the prior study.  Stable subcentimeter right upper lobe nodule demonstrates slight increased uptake compared to prior study    PET/CT scan done on 01/18/2024 showed mixed response to therapy with decrease in size of the right upper lobe nodule and maximum SUV of subcarinal lymph node.  There was right hilar uptake that was increased, however there was also patchy airspace opacities that could be infectious or inflammatory.    CT of the chest on 05/21/2024 showed overall stable disease with tiny centrilobular nodules throughout both lungs, similar to prior CTA.  Stable 0.9 cm nodule in the apical right upper lobe.    PET/CT scan done on 07/25/2024 showed unchanged mild FDG avid nodule in the posterior right lung apex, unchanged mild FDG avid left apical consolidative opacity.  There was a new paramediastinal consolidative opacity in the anterior right upper lobe and left upper lobe with mild FDG avidity that was symmetric and suggestive of a radiation port, malignancy felt less likely given symmetry.  There was indeterminate patchy consolidative opacity in the subpleural  right lower lobe with mild FDG avidity slightly increased in prominence from prior PET/CT.  There was similar mediastinal and hilar lymph nodes.    CT scan of the abdomen and pelvis done on 09/06/2024 showed fluid distended bowel in the level of the stomach through the rectum without transition point which may be related to enterocolitis or ileus.  There was infectious versus inflammatory bronchiolitis tree-in-bud nodularity in the lung bases.  Patient underwent flex sigmoidoscopy on 09/09/2024, there were some diverticulosis with no major abnormalities, random biopsies obtained.    Most recent PET/CT scan done on 10/28/2024 showed essentially stable disease.    CT chest done on 01/09/2025 showed slightly improved aeration of the lungs with a persistent area of nodular consolidation in the right lower lobe.    PET/CT scan done on 03/20/2025 showed similar findings with no overt progression of disease.    Interval History:   Patient presents to clinic to discuss scan results, accompanied by her .  Overall, she is doing okay.  She states that she walks around in her house.  She is in a wheelchair today.  She does have some mild weakness, remains on oxygen, none of this is getting worse.      Past Medical History:   Diagnosis Date    Adenocarcinoma of lung     Anemia     Anxiety     Arthritis     COPD (chronic obstructive pulmonary disease)     Depression     HLD (hyperlipidemia)     Hypertension     Lung cancer     Secondary malignant neoplasm of lymph nodes     Secondary malignant neoplasm of right lung     Thyroid disease     lymph nodes malignant      Past Surgical History:   Procedure Laterality Date    BLADDER SURGERY      CHOLECYSTECTOMY      COLONOSCOPY  2018    COLONOSCOPY N/A 9/9/2024    Procedure: SIG;  Surgeon: Saurav Hutson MD;  Location: [facility] ENDOSCOPY;  Service: Gastroenterology;  Laterality: N/A;    ENDOBRONCHIAL ULTRASOUND N/A 11/16/2022    Procedure: ENDOBRONCHIAL ULTRASOUND (EBUS);   Surgeon: Anthony Hutson MD;  Location: [facility] BRONCHOSCOPY LAB;  Service: Pulmonary;  Laterality: N/A;    HYSTERECTOMY      KNEE SURGERY Bilateral     replaced knees    PARTIAL HIP ARTHROPLASTY Bilateral     SIGMOIDOSCOPY, WITH BIOPSY N/A 9/9/2024    Procedure: SIGMOIDOSCOPY, WITH BIOPSY;  Surgeon: Saurav Hutson MD;  Location: [facility] ENDOSCOPY;  Service: Gastroenterology;  Laterality: N/A;     Social History     Socioeconomic History    Marital status:    Tobacco Use    Smoking status: Every Day     Current packs/day: 0.25     Average packs/day: 0.3 packs/day for 66.2 years (16.6 ttl pk-yrs)     Types: Cigarettes     Start date: 1959    Smokeless tobacco: Never    Tobacco comments:     Patient states she is trying to quit smoking.   Substance and Sexual Activity    Alcohol use: Not Currently    Drug use: Never     Social Drivers of Health     Financial Resource Strain: Low Risk  (2/21/2025)    Overall Financial Resource Strain (CARDIA)     Difficulty of Paying Living Expenses: Not hard at all   Food Insecurity: No Food Insecurity (2/21/2025)    Hunger Vital Sign     Worried About Running Out of Food in the Last Year: Never true     Ran Out of Food in the Last Year: Never true   Transportation Needs: Patient Declined (1/10/2025)    TRANSPORTATION NEEDS     Transportation : Patient declined   Physical Activity: Sufficiently Active (9/9/2024)    Exercise Vital Sign     Days of Exercise per Week: 5 days     Minutes of Exercise per Session: 30 min   Stress: No Stress Concern Present (2/21/2025)    [employer] - Occupational Stress Questionnaire     Feeling of Stress : Not at all   Recent Concern: Stress - Stress Concern Present (11/28/2024)    [employer] - Occupational Stress Questionnaire     Feeling of Stress : To some extent   Housing Stability: Low Risk  (2/21/2025)    Housing Stability Vital Sign     Unable to Pay for Housing in the Last  Year: No     Homeless in the Last Year: No      Family History   Problem Relation Name Age of Onset    Lung cancer Mother      Lung cancer Brother        Review of patient's allergies indicates:   Allergen Reactions    Lisinopril Swelling      Review of Systems   Constitutional:  Negative for chills, diaphoresis, fatigue, fever and unexpected weight change.   HENT:  Negative for nasal congestion, mouth sores, sinus pressure/congestion and sore throat.    Eyes:  Negative for pain and visual disturbance.   Respiratory:  Negative for cough, chest tightness and shortness of breath.    Cardiovascular:  Negative for chest pain, palpitations and leg swelling.   Gastrointestinal:  Negative for abdominal distention, abdominal pain, blood in stool, constipation and diarrhea.   Genitourinary:  Negative for dysuria, frequency and hematuria.   Musculoskeletal:  Negative for arthralgias and back pain.   Integumentary:  Negative for rash.   Neurological:  Negative for dizziness, weakness, numbness and headaches.   Hematological:  Negative for adenopathy.   Psychiatric/Behavioral:  Negative for confusion.          Objective:      Physical Exam  Vitals reviewed.   Constitutional:       General: She is not in acute distress.     Appearance: Normal appearance.   HENT:      Head: Normocephalic and atraumatic.      Nose: Nose normal.      Mouth/Throat:      Mouth: Mucous membranes are moist.   Eyes:      Extraocular Movements: Extraocular movements intact.      Conjunctiva/sclera: Conjunctivae normal.   Neck:      Comments: Radiation changes to the left supraclavicular area  Cardiovascular:      Rate and Rhythm: Normal rate and regular rhythm.      Pulses: Normal pulses.      Heart sounds: Normal heart sounds.   Pulmonary:      Effort: Pulmonary effort is normal.      Breath sounds: Examination of the right-lower field reveals decreased breath sounds. Examination of the left-lower field reveals decreased breath sounds. Decreased breath  sounds present.   Musculoskeletal:         General: No swelling. Normal range of motion.      Cervical back: Normal range of motion and neck supple.      Right lower leg: No edema.      Left lower leg: No edema.      Comments: Patient in wheelchair today   Skin:     General: Skin is warm and dry.      Coloration: Skin is pale.   Neurological:      General: No focal deficit present.      Mental Status: She is alert and oriented to person, place, and time. Mental status is at baseline.   Psychiatric:         Mood and Affect: Mood normal.         Behavior: Behavior normal.         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 ? metastatic findings could not be biopsied.    TTF negative/CK 7 +.    Diagnosed June 2014.  EGFR mutation negative/ALK gene rearrangement negative.  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 Patient has reportedly been referred to ENT for resection and lymph node dissection (per patient report). Subsequent open biopsy done by Dr. Rosales on 7/29/2019 confirmed the same.  Patient was unable to undergo definitive surgical resection for oligo metastatic disease.        Plan:       Patient's biopsy did return as recurrent non-small cell lung cancer in the left lung.  I feel that the right lung will be similar pathology.  She completed radiation with Dr. Mortensen on 12/24/2020.     Caris did reveal that PD-L1 was 2% positive suggesting benefit from pembrolizumab or nivolumab/ipilimumab.      PET/CT scan does show a new hypermetabolic  subcentimeter subcarinal lymph node and increase in a left apical consolidation.  The left apical consolidation is close to the aorta and I do not think would be amenable to biopsy, however this subcarinal lymph node may be amenable to bronchoscopy.      Bronchoscopy done on 11/16/2022 showed metastatic adenocarcinoma.  She is no longer a candidate for full dose SBRT.  We started with carboplatin, pemetrexed, nivolumab, and ipilimumab based on the CHECKMATE 9LA study.  Cycle 1 day 1 given 12/20/2022.    Patient did get admitted for diarrhea.  This subsided with steroids.  Immune related diarrhea secondary to immunotherapy is a possibility.      PET/CT scan done on 10/28/2024 showed essentially stable disease.    We had a discussion about immunotherapy in the possibility that led to her diarrhea.  The patient voiced understanding.  We discussed rechallenge with immunotherapy.  She stated that she would like to try to get back on treatment.  Re-started treatment on 11/08/2024.     She received cycle 15 day 22 on 12/20/2024.  We then started holding her medication for various reasons.    She had a CT chest on 01/09/2025 that showed overall improved aeration of her lungs.    PET/CT scan done on 03/20/2025 showed no overt progression of disease.    Continue to hold treatment.    Return to clinic in 3 months with a PET/CT scan.    Visit today included increased complexity associated with the care of the episodic problem lung cancer, addressing and managing the longitudinal care of the patient's lung cancer.     Enrrique Gudino II, MD

## 2025-03-24 ENCOUNTER — OFFICE VISIT (OUTPATIENT)
Dept: HEMATOLOGY/ONCOLOGY | Facility: CLINIC | Age: 80
End: 2025-03-24
Payer: MEDICARE

## 2025-03-24 VITALS
TEMPERATURE: 98 F | HEIGHT: 66 IN | RESPIRATION RATE: 18 BRPM | HEART RATE: 64 BPM | DIASTOLIC BLOOD PRESSURE: 56 MMHG | WEIGHT: 147 LBS | SYSTOLIC BLOOD PRESSURE: 106 MMHG | OXYGEN SATURATION: 99 % | BODY MASS INDEX: 23.63 KG/M2

## 2025-03-24 DIAGNOSIS — E03.2 HYPOTHYROIDISM DUE TO DRUGS: ICD-10-CM

## 2025-03-24 DIAGNOSIS — C34.90 ADENOCARCINOMA OF LUNG, UNSPECIFIED LATERALITY: ICD-10-CM

## 2025-03-24 DIAGNOSIS — C77.9 MALIGNANT NEOPLASM METASTATIC TO LYMPH NODES, UNSPECIFIED LYMPH NODE REGION: ICD-10-CM

## 2025-03-24 DIAGNOSIS — C79.51 MALIGNANT NEOPLASM METASTATIC TO BONE: ICD-10-CM

## 2025-03-24 PROCEDURE — 99999 PR PBB SHADOW E&M-EST. PATIENT-LVL IV: CPT | Mod: PBBFAC,,, | Performed by: INTERNAL MEDICINE

## 2025-03-24 PROCEDURE — 99214 OFFICE O/P EST MOD 30 MIN: CPT | Mod: S$PBB,,, | Performed by: INTERNAL MEDICINE

## 2025-03-24 PROCEDURE — 99214 OFFICE O/P EST MOD 30 MIN: CPT | Mod: PBBFAC | Performed by: INTERNAL MEDICINE

## 2025-03-24 PROCEDURE — G2211 COMPLEX E/M VISIT ADD ON: HCPCS | Mod: S$PBB,,, | Performed by: INTERNAL MEDICINE

## 2025-03-24 RX ORDER — SODIUM CHLORIDE 0.9 % (FLUSH) 0.9 %
10 SYRINGE (ML) INJECTION
OUTPATIENT
Start: 2025-06-24

## 2025-03-24 RX ORDER — HEPARIN 100 UNIT/ML
500 SYRINGE INTRAVENOUS
OUTPATIENT
Start: 2025-06-24

## 2025-03-24 RX ORDER — BENZONATATE 100 MG/1
200 CAPSULE ORAL
COMMUNITY
Start: 2025-03-21

## 2025-06-23 ENCOUNTER — HOSPITAL ENCOUNTER (OUTPATIENT)
Dept: RADIOLOGY | Facility: HOSPITAL | Age: 80
Discharge: HOME | End: 2025-06-23
Attending: INTERNAL MEDICINE
Payer: MEDICARE

## 2025-06-23 DIAGNOSIS — E03.2 HYPOTHYROIDISM DUE TO DRUGS: ICD-10-CM

## 2025-06-23 DIAGNOSIS — C34.90 ADENOCARCINOMA OF LUNG, UNSPECIFIED LATERALITY: ICD-10-CM

## 2025-06-23 DIAGNOSIS — C77.9 MALIGNANT NEOPLASM METASTATIC TO LYMPH NODES, UNSPECIFIED LYMPH NODE REGION: ICD-10-CM

## 2025-06-23 DIAGNOSIS — C79.51 MALIGNANT NEOPLASM METASTATIC TO BONE: ICD-10-CM

## 2025-06-23 PROCEDURE — 78815 PET IMAGE W/CT SKULL-THIGH: CPT | Mod: TC

## 2025-06-23 PROCEDURE — A9552 F18 FDG: HCPCS | Performed by: INTERNAL MEDICINE

## 2025-06-23 RX ORDER — FLUDEOXYGLUCOSE F18 500 MCI/ML
10 INJECTION INTRAVENOUS
Status: COMPLETED | OUTPATIENT
Start: 2025-06-23 | End: 2025-06-23

## 2025-06-23 RX ADMIN — FLUDEOXYGLUCOSE F-18 10.1 MILLICURIE: 500 INJECTION INTRAVENOUS at 08:06

## 2025-06-24 NOTE — PROGRESS NOTES
Subjective:       Patient ID: Pratibha Patel is a 80 y.o. female.    Chief Complaint: Follow-up (Pt has no concerns today)      PCP: Graham Nguyen NP  Cardiologist: Dr. Danny Sánchez  Referring Physician: Dr. Anthony Hutson  Endocrine: Dr. Werner  Radiation oncology: Dr. Boss in the past, now Dr. Mortensen.  ENT: Dr. 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 ? metastatic findings could not be biopsied. TTF negative/CK 7 +.    Diagnosed June 2014 EGFR mutation negative/ALK gene rearrangement negative. 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 Patient has reportedly been referred to ENT for resection and lymph node dissection (per patient report).   Subsequent open biopsy done by Dr. Rosales on 7/29/2019 confirmed the same.  Patient was unable to undergo definitive surgical resection for oligo metastatic disease.  Hoarseness and swallowing difficulties secondary to #2  Biopsy of the left lung lesion showed recurrent non-small cell lung cancer, Caris was sent and showed positivity for PD-L1, 2%.  No other targetable or actionable mutations present.    Current Treatment:   Combination chemo immunotherapy with carboplatin, pemetrexed, nivolumab, ipilimumab based off of checkmate 9LA. Cycle 1 day 1 given 12/20/2022.  Cycle 15 day 22 given on 12/20/2024.  Been on hold since.     Treatment History:  RT with weekly Carbo/Taxol started 7/2/14--Completed August 2014  Single agent 'Maintenance'  Avastin q 3 weeks.  Started 4/9/15--last given September 21, 2016  Radiation + weekly Carbo/Taxol at 2AUC ----9/11/19-10/22/19  Patient underwent radiation from 12/17/2020 through 12/21/2020.  Radiation to the left and right lung per Dr. Robert Mortensen.    HPI:   Please see Oncology history in the diagnosis of adenocarcinoma of the lung, stage IV on the problem list for full detail.  Patient originally seen in 2014 with unintentional weight loss, shortness of breath, nonproductive cough.  Patient had imaging findings suggestive of lung cancer, bronchoscopy done in June of 2014 revealed moderately differentiated adenocarcinoma of the lung.  She originally underwent chemo radiation from July 2014 through August 2014.  She was placed on maintenance Avastin from April 2015 through September 2016. Patient then underwent a thyroid biopsy in June of 2019 that showed metastatic carcinoma favored to be adenocarcinoma lung primary.  She underwent surgical resection in July 2019, however full surgical dissection was not able to be done and the patient underwent open biopsy of right thyroid gland.  Pathology revealed poorly differentiated non-small cell carcinoma consistent with metastatic lung cancer.  NGS panel done at that time unremarkable.  She started chemoradiation with carbo Taxol in September 2019, completed in October 2019. Patient was doing well, imaging in October 2020 revealed an abnormality in the left lung, CT-guided lung biopsy on 11/03/2020 showed recurrent non-small cell lung cancer, Caris revealed PDL1 positivity.  She underwent SBRT from 12/17/2020 through 12/21/2020.  Patient then placed on surveillance imaging, most recently done on 07/05/2022 showing worsening disease in the right lung with a previous lesion that measured 0.6 cm and had an SUV of 1.0 now measuring 1.5 cm and an SUV of 8.4.  This was treated with SBRT in August of 2022.  PET/CT scan done on 11/8/2022 showed mild increased radiotracer activity  within the left apical consolidation, SUV 5.5.  There was a newly hypermetabolic subcentimeter subcarinal lymph node.  A 1 cm right lower lobe nodule was smaller, he right upper lobe nodule was slightly larger measuring 6 mm.  No sites of FDG avid metastatic disease in the neck, abdomen, or pelvis. Bronchoscopy done on 11/16/2022, pathology revealed malignant cells consistent with adenocarcinoma.  Planning on chemo immunotherapy with carboplatin, pemetrexed, nivolumab, ipilimumab based off of checkmate 9LA.  PET/CT on 07/25/2023 showed infectious or inflammatory changes in the right lower lobe with no other findings to suggest new or worsening disease.    PET/CT scan done on 10/12/2023 showed mixed response to therapy with decrease right hilar and left upper lobe consolidation uptake.  Subcarinal lymph node demonstrates significant increased uptake compared to the prior study.  Stable subcentimeter right upper lobe nodule demonstrates slight increased uptake compared to prior study    PET/CT scan done on 01/18/2024 showed mixed response to therapy with decrease in size of the right upper lobe nodule and maximum SUV of subcarinal lymph node.  There was right hilar uptake that was increased, however there was also patchy airspace opacities that could be infectious or inflammatory.    CT of the chest on 05/21/2024 showed overall stable disease with tiny centrilobular nodules throughout both lungs, similar to prior CTA.  Stable 0.9 cm nodule in the apical right upper lobe.    PET/CT scan done on 07/25/2024 showed unchanged mild FDG avid nodule in the posterior right lung apex, unchanged mild FDG avid left apical consolidative opacity.  There was a new paramediastinal consolidative opacity in the anterior right upper lobe and left upper lobe with mild FDG avidity that was symmetric and suggestive of a radiation port, malignancy felt less likely given symmetry.  There was indeterminate patchy consolidative opacity in the  subpleural right lower lobe with mild FDG avidity slightly increased in prominence from prior PET/CT.  There was similar mediastinal and hilar lymph nodes.    CT scan of the abdomen and pelvis done on 09/06/2024 showed fluid distended bowel in the level of the stomach through the rectum without transition point which may be related to enterocolitis or ileus.  There was infectious versus inflammatory bronchiolitis tree-in-bud nodularity in the lung bases.  Patient underwent flex sigmoidoscopy on 09/09/2024, there were some diverticulosis with no major abnormalities, random biopsies obtained.    Most recent PET/CT scan done on 10/28/2024 showed essentially stable disease.    CT chest done on 01/09/2025 showed slightly improved aeration of the lungs with a persistent area of nodular consolidation in the right lower lobe.    Most recent PET/CT scan on 06/23/2025 showed no detrimental change.    Interval History:   Patient presents to clinic to discuss scan results, accompanied by her . Overall, she is doing well with no new issues to discuss today. She is in a wheelchair today.  She continues on oxygen.      Past Medical History:   Diagnosis Date    Adenocarcinoma of lung     Anemia     Anxiety     Arthritis     COPD (chronic obstructive pulmonary disease)     Depression     HLD (hyperlipidemia)     Hypertension     Lung cancer     Secondary malignant neoplasm of lymph nodes     Secondary malignant neoplasm of right lung     Thyroid disease     lymph nodes malignant      Past Surgical History:   Procedure Laterality Date    BLADDER SURGERY      CHOLECYSTECTOMY      COLONOSCOPY  2018    COLONOSCOPY N/A 9/9/2024    Procedure: SIG;  Surgeon: Saurav Hutson MD;  Location: [facility] ENDOSCOPY;  Service: Gastroenterology;  Laterality: N/A;    ENDOBRONCHIAL ULTRASOUND N/A 11/16/2022    Procedure: ENDOBRONCHIAL ULTRASOUND (EBUS);  Surgeon: Anthony Hutson MD;  Location: [facility] BRONCHOSCOPY LAB;  Service: Pulmonary;   Laterality: N/A;    HYSTERECTOMY      KNEE SURGERY Bilateral     replaced knees    PARTIAL HIP ARTHROPLASTY Bilateral     SIGMOIDOSCOPY, WITH BIOPSY N/A 9/9/2024    Procedure: SIGMOIDOSCOPY, WITH BIOPSY;  Surgeon: Saurav Hutson MD;  Location: [facility] ENDOSCOPY;  Service: Gastroenterology;  Laterality: N/A;     Social History     Socioeconomic History    Marital status:    Tobacco Use    Smoking status: Every Day     Current packs/day: 0.25     Average packs/day: 0.3 packs/day for 66.5 years (16.6 ttl pk-yrs)     Types: Cigarettes     Start date: 1959    Smokeless tobacco: Never    Tobacco comments:     Patient states she is trying to quit smoking.   Substance and Sexual Activity    Alcohol use: Not Currently    Drug use: Never     Social Drivers of Health     Financial Resource Strain: Low Risk  (2/21/2025)    Overall Financial Resource Strain (CARDIA)     Difficulty of Paying Living Expenses: Not hard at all   Food Insecurity: No Food Insecurity (2/21/2025)    Hunger Vital Sign     Worried About Running Out of Food in the Last Year: Never true     Ran Out of Food in the Last Year: Never true   Transportation Needs: Patient Declined (1/10/2025)    TRANSPORTATION NEEDS     Transportation : Patient declined   Physical Activity: Sufficiently Active (9/9/2024)    Exercise Vital Sign     Days of Exercise per Week: 5 days     Minutes of Exercise per Session: 30 min   Stress: No Stress Concern Present (2/21/2025)    [employer] - Occupational Stress Questionnaire     Feeling of Stress : Not at all   Recent Concern: Stress - Stress Concern Present (11/28/2024)    [employer] - Occupational Stress Questionnaire     Feeling of Stress : To some extent   Housing Stability: Low Risk  (2/21/2025)    Housing Stability Vital Sign     Unable to Pay for Housing in the Last Year: No     Homeless in the Last Year: No      Family History   Problem Relation Name  Age of Onset    Lung cancer Mother      Lung cancer Brother        Review of patient's allergies indicates:   Allergen Reactions    Lisinopril Swelling      Review of Systems   Constitutional:  Negative for chills, diaphoresis, fatigue, fever and unexpected weight change.   HENT:  Negative for nasal congestion, mouth sores, sinus pressure/congestion and sore throat.    Eyes:  Negative for pain and visual disturbance.   Respiratory:  Negative for cough, chest tightness and shortness of breath.    Cardiovascular:  Negative for chest pain, palpitations and leg swelling.   Gastrointestinal:  Negative for abdominal distention, abdominal pain, blood in stool, constipation and diarrhea.   Genitourinary:  Negative for dysuria, frequency and hematuria.   Musculoskeletal:  Negative for arthralgias and back pain.   Integumentary:  Negative for rash.   Neurological:  Negative for dizziness, weakness, numbness and headaches.   Hematological:  Negative for adenopathy.   Psychiatric/Behavioral:  Negative for confusion.          Objective:      Physical Exam  Vitals reviewed.   Constitutional:       General: She is not in acute distress.     Appearance: Normal appearance.   HENT:      Head: Normocephalic and atraumatic.      Nose: Nose normal.      Mouth/Throat:      Mouth: Mucous membranes are moist.   Eyes:      Extraocular Movements: Extraocular movements intact.      Conjunctiva/sclera: Conjunctivae normal.   Neck:      Comments: Radiation changes to the left supraclavicular area  Cardiovascular:      Rate and Rhythm: Normal rate and regular rhythm.      Pulses: Normal pulses.      Heart sounds: Normal heart sounds.   Pulmonary:      Effort: Pulmonary effort is normal.      Breath sounds: Examination of the right-lower field reveals decreased breath sounds. Examination of the left-lower field reveals decreased breath sounds. Decreased breath sounds present.   Musculoskeletal:         General: No swelling. Normal range of  motion.      Cervical back: Normal range of motion and neck supple.      Right lower leg: No edema.      Left lower leg: No edema.      Comments: Patient in wheelchair today   Skin:     General: Skin is warm and dry.      Coloration: Skin is pale.   Neurological:      General: No focal deficit present.      Mental Status: She is alert and oriented to person, place, and time. Mental status is at baseline.   Psychiatric:         Mood and Affect: Mood normal.         Behavior: Behavior normal.         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 ? metastatic findings could not be biopsied.    TTF negative/CK 7 +.    Diagnosed June 2014.  EGFR mutation negative/ALK gene rearrangement negative.  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 Patient has reportedly been referred to ENT for resection and lymph node dissection (per patient report). Subsequent open biopsy done by Dr. Rosales on 7/29/2019 confirmed the same.  Patient was unable to undergo definitive surgical resection for oligo metastatic disease.        Plan:       Patient's biopsy did return as recurrent non-small cell lung cancer in the left lung.  I feel that the right lung will be similar pathology.  She completed radiation with Dr. Mortensen on 12/24/2020.     Caris did reveal that PD-L1 was 2% positive suggesting benefit from pembrolizumab or nivolumab/ipilimumab.      PET/CT scan does show a new hypermetabolic subcentimeter subcarinal lymph node and increase in a left apical consolidation.  The left  apical consolidation is close to the aorta and I do not think would be amenable to biopsy, however this subcarinal lymph node may be amenable to bronchoscopy.      Bronchoscopy done on 11/16/2022 showed metastatic adenocarcinoma.  She is no longer a candidate for full dose SBRT.  We started with carboplatin, pemetrexed, nivolumab, and ipilimumab based on the CHECKMATE 9LA study.  Cycle 1 day 1 given 12/20/2022.    Patient did get admitted for diarrhea.  This subsided with steroids.  Immune related diarrhea secondary to immunotherapy is a possibility.      PET/CT scan done on 10/28/2024 showed essentially stable disease.    We had a discussion about immunotherapy in the possibility that led to her diarrhea.  The patient voiced understanding.  We discussed rechallenge with immunotherapy.  She stated that she would like to try to get back on treatment.  Re-started treatment on 11/08/2024.     She received cycle 15 day 22 on 12/20/2024.  We then started holding her medication for various reasons.    Most recent PET/CT scan on 06/23/2025 showed no detrimental change.    Continue to hold treatment.    Return to clinic in 3 months with a PET/CT scan.    Visit today included increased complexity associated with the care of the episodic problem lung cancer, addressing and managing the longitudinal care of the patient's lung cancer.     Enrrique Gudino II, MD

## 2025-06-30 ENCOUNTER — OFFICE VISIT (OUTPATIENT)
Dept: HEMATOLOGY/ONCOLOGY | Facility: CLINIC | Age: 80
End: 2025-06-30
Payer: MEDICARE

## 2025-06-30 VITALS
DIASTOLIC BLOOD PRESSURE: 58 MMHG | RESPIRATION RATE: 15 BRPM | HEART RATE: 69 BPM | BODY MASS INDEX: 24.59 KG/M2 | SYSTOLIC BLOOD PRESSURE: 101 MMHG | OXYGEN SATURATION: 95 % | WEIGHT: 153 LBS | HEIGHT: 66 IN

## 2025-06-30 DIAGNOSIS — C77.9 MALIGNANT NEOPLASM METASTATIC TO LYMPH NODES, UNSPECIFIED LYMPH NODE REGION: ICD-10-CM

## 2025-06-30 DIAGNOSIS — C34.90 ADENOCARCINOMA OF LUNG, UNSPECIFIED LATERALITY: Primary | ICD-10-CM

## 2025-06-30 DIAGNOSIS — C79.51 MALIGNANT NEOPLASM METASTATIC TO BONE: ICD-10-CM

## 2025-06-30 DIAGNOSIS — E03.2 HYPOTHYROIDISM DUE TO DRUGS: ICD-10-CM

## 2025-06-30 DIAGNOSIS — C34.90 ADENOCARCINOMA OF LUNG, STAGE 4, UNSPECIFIED LATERALITY: ICD-10-CM

## 2025-06-30 PROCEDURE — G2211 COMPLEX E/M VISIT ADD ON: HCPCS | Mod: ,,, | Performed by: INTERNAL MEDICINE

## 2025-06-30 PROCEDURE — 99213 OFFICE O/P EST LOW 20 MIN: CPT | Mod: PBBFAC | Performed by: INTERNAL MEDICINE

## 2025-06-30 PROCEDURE — 99214 OFFICE O/P EST MOD 30 MIN: CPT | Mod: S$PBB,,, | Performed by: INTERNAL MEDICINE

## 2025-06-30 PROCEDURE — 99999 PR PBB SHADOW E&M-EST. PATIENT-LVL III: CPT | Mod: PBBFAC,,, | Performed by: INTERNAL MEDICINE

## 2025-06-30 RX ORDER — ESCITALOPRAM OXALATE 10 MG/1
10 TABLET ORAL
COMMUNITY
Start: 2025-06-03

## 2025-07-07 ENCOUNTER — INFUSION (OUTPATIENT)
Dept: INFUSION THERAPY | Facility: HOSPITAL | Age: 80
End: 2025-07-07
Attending: INTERNAL MEDICINE
Payer: MEDICARE

## 2025-07-07 VITALS
OXYGEN SATURATION: 99 % | RESPIRATION RATE: 16 BRPM | SYSTOLIC BLOOD PRESSURE: 111 MMHG | DIASTOLIC BLOOD PRESSURE: 58 MMHG | HEART RATE: 64 BPM | TEMPERATURE: 98 F

## 2025-07-07 DIAGNOSIS — C34.90 ADENOCARCINOMA OF LUNG, STAGE 4, UNSPECIFIED LATERALITY: Primary | ICD-10-CM

## 2025-07-07 PROCEDURE — 63600175 PHARM REV CODE 636 W HCPCS: Performed by: INTERNAL MEDICINE

## 2025-07-07 PROCEDURE — 25000003 PHARM REV CODE 250: Performed by: INTERNAL MEDICINE

## 2025-07-07 PROCEDURE — A4216 STERILE WATER/SALINE, 10 ML: HCPCS | Performed by: INTERNAL MEDICINE

## 2025-07-07 PROCEDURE — 96523 IRRIG DRUG DELIVERY DEVICE: CPT

## 2025-07-07 RX ORDER — SODIUM CHLORIDE 0.9 % (FLUSH) 0.9 %
10 SYRINGE (ML) INJECTION
OUTPATIENT
Start: 2025-10-06

## 2025-07-07 RX ORDER — HEPARIN 100 UNIT/ML
500 SYRINGE INTRAVENOUS
Status: DISCONTINUED | OUTPATIENT
Start: 2025-07-07 | End: 2025-07-07 | Stop reason: HOSPADM

## 2025-07-07 RX ORDER — SODIUM CHLORIDE 0.9 % (FLUSH) 0.9 %
10 SYRINGE (ML) INJECTION
Status: DISCONTINUED | OUTPATIENT
Start: 2025-07-07 | End: 2025-07-07 | Stop reason: HOSPADM

## 2025-07-07 RX ORDER — HEPARIN 100 UNIT/ML
500 SYRINGE INTRAVENOUS
OUTPATIENT
Start: 2025-10-06

## 2025-07-07 RX ADMIN — SODIUM CHLORIDE, PRESERVATIVE FREE 10 ML: 5 INJECTION INTRAVENOUS at 09:07

## 2025-07-07 RX ADMIN — HEPARIN 500 UNITS: 100 SYRINGE at 09:07

## 2025-07-07 NOTE — PLAN OF CARE
MPF performed per sterile technique (Q3M); tolerated well; next appointments discussed with patient; discharged home in stable condition.

## 2025-08-05 ENCOUNTER — HOSPITAL ENCOUNTER (INPATIENT)
Facility: HOSPITAL | Age: 80
LOS: 12 days | Discharge: HOME | DRG: 178 | End: 2025-08-18
Attending: STUDENT IN AN ORGANIZED HEALTH CARE EDUCATION/TRAINING PROGRAM | Admitting: INTERNAL MEDICINE
Payer: MEDICARE

## 2025-08-05 DIAGNOSIS — R04.2 HEMOPTYSIS: Primary | ICD-10-CM

## 2025-08-05 DIAGNOSIS — C34.90 ADENOCARCINOMA OF LUNG, UNSPECIFIED LATERALITY: ICD-10-CM

## 2025-08-05 DIAGNOSIS — C77.9 MALIGNANT NEOPLASM METASTATIC TO LYMPH NODES, UNSPECIFIED LYMPH NODE REGION: ICD-10-CM

## 2025-08-05 DIAGNOSIS — E44.0 MODERATE MALNUTRITION: ICD-10-CM

## 2025-08-05 DIAGNOSIS — C73 MALIGNANT NEOPLASM OF THYROID GLAND: ICD-10-CM

## 2025-08-05 DIAGNOSIS — R06.02 SHORTNESS OF BREATH: ICD-10-CM

## 2025-08-05 DIAGNOSIS — Z93.1 PEG (PERCUTANEOUS ENDOSCOPIC GASTROSTOMY) STATUS: ICD-10-CM

## 2025-08-05 DIAGNOSIS — J96.01 ACUTE RESPIRATORY FAILURE WITH HYPOXIA: ICD-10-CM

## 2025-08-05 DIAGNOSIS — R79.89 ELEVATED BRAIN NATRIURETIC PEPTIDE (BNP) LEVEL: ICD-10-CM

## 2025-08-05 DIAGNOSIS — C34.90 ADENOCARCINOMA OF LUNG, STAGE 4, UNSPECIFIED LATERALITY: ICD-10-CM

## 2025-08-05 DIAGNOSIS — R13.10 DYSPHAGIA, UNSPECIFIED TYPE: ICD-10-CM

## 2025-08-05 DIAGNOSIS — R07.9 CHEST PAIN: ICD-10-CM

## 2025-08-05 LAB
BASOPHILS # BLD AUTO: 0.05 X10(3)/MCL
BASOPHILS NFR BLD AUTO: 0.6 %
EOSINOPHIL # BLD AUTO: 0.2 X10(3)/MCL (ref 0–0.9)
EOSINOPHIL NFR BLD AUTO: 2.5 %
ERYTHROCYTE [DISTWIDTH] IN BLOOD BY AUTOMATED COUNT: 14.6 % (ref 11.5–17)
HCT VFR BLD AUTO: 29 % (ref 37–47)
HGB BLD-MCNC: 9.1 G/DL (ref 12–16)
IMM GRANULOCYTES # BLD AUTO: 0.02 X10(3)/MCL (ref 0–0.04)
IMM GRANULOCYTES NFR BLD AUTO: 0.2 %
LYMPHOCYTES # BLD AUTO: 1.72 X10(3)/MCL (ref 0.6–4.6)
LYMPHOCYTES NFR BLD AUTO: 21.5 %
MCH RBC QN AUTO: 27.9 PG (ref 27–31)
MCHC RBC AUTO-ENTMCNC: 31.4 G/DL (ref 33–36)
MCV RBC AUTO: 89 FL (ref 80–94)
MONOCYTES # BLD AUTO: 1.06 X10(3)/MCL (ref 0.1–1.3)
MONOCYTES NFR BLD AUTO: 13.2 %
NEUTROPHILS # BLD AUTO: 4.96 X10(3)/MCL (ref 2.1–9.2)
NEUTROPHILS NFR BLD AUTO: 62 %
NRBC BLD AUTO-RTO: 0 %
PLATELET # BLD AUTO: 266 X10(3)/MCL (ref 130–400)
PMV BLD AUTO: 9.3 FL (ref 7.4–10.4)
RBC # BLD AUTO: 3.26 X10(6)/MCL (ref 4.2–5.4)
WBC # BLD AUTO: 8.01 X10(3)/MCL (ref 4.5–11.5)

## 2025-08-05 PROCEDURE — 93010 ELECTROCARDIOGRAM REPORT: CPT | Mod: ,,, | Performed by: INTERNAL MEDICINE

## 2025-08-05 PROCEDURE — 80053 COMPREHEN METABOLIC PANEL: CPT | Performed by: EMERGENCY MEDICINE

## 2025-08-05 PROCEDURE — 85025 COMPLETE CBC W/AUTO DIFF WBC: CPT | Performed by: EMERGENCY MEDICINE

## 2025-08-06 PROBLEM — R04.2 HEMOPTYSIS: Status: ACTIVE | Noted: 2025-08-06

## 2025-08-06 LAB
ALBUMIN SERPL-MCNC: 3.1 G/DL (ref 3.4–4.8)
ALBUMIN SERPL-MCNC: 3.1 G/DL (ref 3.4–4.8)
ALBUMIN/GLOB SERPL: 0.8 RATIO (ref 1.1–2)
ALBUMIN/GLOB SERPL: 0.8 RATIO (ref 1.1–2)
ALP SERPL-CCNC: 72 UNIT/L (ref 40–150)
ALP SERPL-CCNC: 74 UNIT/L (ref 40–150)
ALT SERPL-CCNC: 6 UNIT/L (ref 0–55)
ALT SERPL-CCNC: 8 UNIT/L (ref 0–55)
ANION GAP SERPL CALC-SCNC: 10 MEQ/L
ANION GAP SERPL CALC-SCNC: 8 MEQ/L
APTT PPP: 29.4 SECONDS (ref 23.2–33.7)
AST SERPL-CCNC: 16 UNIT/L (ref 11–45)
AST SERPL-CCNC: 16 UNIT/L (ref 11–45)
AV INDEX (PROSTH): 0.89
AV MEAN GRADIENT: 6 MMHG
AV PEAK GRADIENT: 12 MMHG
AV VALVE AREA BY VELOCITY RATIO: 1.6 CM²
AV VALVE AREA: 1.6 CM²
AV VELOCITY RATIO: 0.88
BASOPHILS # BLD AUTO: 0.05 X10(3)/MCL
BASOPHILS NFR BLD AUTO: 0.5 %
BILIRUB SERPL-MCNC: 0.3 MG/DL
BILIRUB SERPL-MCNC: <0.5 MG/DL
BSA FOR ECHO PROCEDURE: 1.83 M2
BUN SERPL-MCNC: 18.4 MG/DL (ref 9.8–20.1)
BUN SERPL-MCNC: 19.7 MG/DL (ref 9.8–20.1)
CALCIUM SERPL-MCNC: 9.2 MG/DL (ref 8.4–10.2)
CALCIUM SERPL-MCNC: 9.3 MG/DL (ref 8.4–10.2)
CHLORIDE SERPL-SCNC: 96 MMOL/L (ref 98–107)
CHLORIDE SERPL-SCNC: 99 MMOL/L (ref 98–107)
CO2 SERPL-SCNC: 27 MMOL/L (ref 23–31)
CO2 SERPL-SCNC: 34 MMOL/L (ref 23–31)
CREAT SERPL-MCNC: 0.7 MG/DL (ref 0.55–1.02)
CREAT SERPL-MCNC: 0.74 MG/DL (ref 0.55–1.02)
CREAT/UREA NIT SERPL: 26
CREAT/UREA NIT SERPL: 27
CV ECHO LV RWT: 0.78 CM
DOP CALC AO PEAK VEL: 1.7 M/S
DOP CALC AO VTI: 42.1 CM
DOP CALC LVOT AREA: 1.8 CM2
DOP CALC LVOT DIAMETER: 1.5 CM
DOP CALC LVOT PEAK VEL: 1.5 M/S
DOP CALC MV VTI: 32.4 CM
DOP CALCLVOT PEAK VEL VTI: 37.4 CM
E WAVE DECELERATION TIME: 222 MSEC
E/A RATIO: 0.93
E/E' RATIO: 12 M/S
ECHO LV POSTERIOR WALL: 1.4 CM (ref 0.6–1.1)
EOSINOPHIL # BLD AUTO: 0.04 X10(3)/MCL (ref 0–0.9)
EOSINOPHIL NFR BLD AUTO: 0.4 %
ERYTHROCYTE [DISTWIDTH] IN BLOOD BY AUTOMATED COUNT: 14.7 % (ref 11.5–17)
FERRITIN SERPL-MCNC: 138.04 NG/ML (ref 4.63–204)
FRACTIONAL SHORTENING: 30.6 % (ref 28–44)
GFR SERPLBLD CREATININE-BSD FMLA CKD-EPI: >60 ML/MIN/1.73/M2
GFR SERPLBLD CREATININE-BSD FMLA CKD-EPI: >60 ML/MIN/1.73/M2
GLOBAL LONGITUIDAL STRAIN: 11 %
GLOBULIN SER-MCNC: 4 GM/DL (ref 2.4–3.5)
GLOBULIN SER-MCNC: 4.1 GM/DL (ref 2.4–3.5)
GLUCOSE SERPL-MCNC: 125 MG/DL (ref 82–115)
GLUCOSE SERPL-MCNC: 93 MG/DL (ref 82–115)
HCT VFR BLD AUTO: 30.9 % (ref 37–47)
HGB BLD-MCNC: 9.8 G/DL (ref 12–16)
IMM GRANULOCYTES # BLD AUTO: 0.03 X10(3)/MCL (ref 0–0.04)
IMM GRANULOCYTES NFR BLD AUTO: 0.3 %
INR PPP: 1.1
INTERVENTRICULAR SEPTUM: 1.6 CM (ref 0.6–1.1)
IRON SATN MFR SERPL: 19 % (ref 20–50)
IRON SERPL-MCNC: 43 UG/DL (ref 50–170)
LEFT ATRIUM AREA SYSTOLIC (APICAL 2 CHAMBER): 17.2 CM2
LEFT ATRIUM AREA SYSTOLIC (APICAL 4 CHAMBER): 21.3 CM2
LEFT ATRIUM SIZE: 2.7 CM
LEFT ATRIUM VOLUME INDEX MOD: 28 ML/M2
LEFT ATRIUM VOLUME MOD: 51 ML
LEFT INTERNAL DIMENSION IN SYSTOLE: 2.5 CM (ref 2.1–4)
LEFT VENTRICLE DIASTOLIC VOLUME INDEX: 29.83 ML/M2
LEFT VENTRICLE DIASTOLIC VOLUME: 54 ML
LEFT VENTRICLE END SYSTOLIC VOLUME APICAL 2 CHAMBER: 41.7 ML
LEFT VENTRICLE END SYSTOLIC VOLUME APICAL 4 CHAMBER: 56.6 ML
LEFT VENTRICLE MASS INDEX: 111 G/M2
LEFT VENTRICLE SYSTOLIC VOLUME INDEX: 12.2 ML/M2
LEFT VENTRICLE SYSTOLIC VOLUME: 22 ML
LEFT VENTRICULAR INTERNAL DIMENSION IN DIASTOLE: 3.6 CM (ref 3.5–6)
LEFT VENTRICULAR MASS: 201 G
LV LATERAL E/E' RATIO: 10 M/S
LV SEPTAL E/E' RATIO: 14.3 M/S
LVED V (TEICH): 54.4 ML
LVES V (TEICH): 22.3 ML
LVOT MG: 4 MMHG
LVOT MV: 0.96 CM/S
LYMPHOCYTES # BLD AUTO: 1.37 X10(3)/MCL (ref 0.6–4.6)
LYMPHOCYTES NFR BLD AUTO: 14.4 %
MAGNESIUM SERPL-MCNC: 1.7 MG/DL (ref 1.6–2.6)
MCH RBC QN AUTO: 28.1 PG (ref 27–31)
MCHC RBC AUTO-ENTMCNC: 31.7 G/DL (ref 33–36)
MCV RBC AUTO: 88.5 FL (ref 80–94)
MONOCYTES # BLD AUTO: 0.51 X10(3)/MCL (ref 0.1–1.3)
MONOCYTES NFR BLD AUTO: 5.4 %
MV MEAN GRADIENT: 3 MMHG
MV PEAK A VEL: 1.07 M/S
MV PEAK E VEL: 1 M/S
MV PEAK GRADIENT: 5 MMHG
MV VALVE AREA BY CONTINUITY EQUATION: 2.04 CM2
NEUTROPHILS # BLD AUTO: 7.49 X10(3)/MCL (ref 2.1–9.2)
NEUTROPHILS NFR BLD AUTO: 79 %
NRBC BLD AUTO-RTO: 0 %
NT-PROBNP SERPL-MCNC: 1397 PG/ML
OHS CV CPX PATIENT HEIGHT IN: 66
OHS QRS DURATION: 90 MS
OHS QTC CALCULATION: 418 MS
PISA TR MAX VEL: 2.6 M/S
PLATELET # BLD AUTO: 251 X10(3)/MCL (ref 130–400)
PMV BLD AUTO: 8.9 FL (ref 7.4–10.4)
POTASSIUM SERPL-SCNC: 4 MMOL/L (ref 3.5–5.1)
POTASSIUM SERPL-SCNC: 4.4 MMOL/L (ref 3.5–5.1)
PROT SERPL-MCNC: 7.1 GM/DL (ref 5.8–7.6)
PROT SERPL-MCNC: 7.2 GM/DL (ref 5.8–7.6)
PROTHROMBIN TIME: 13.9 SECONDS (ref 12.5–14.5)
RA PRESSURE ESTIMATED: 3 MMHG
RBC # BLD AUTO: 3.49 X10(6)/MCL (ref 4.2–5.4)
RV TB RVSP: 6 MMHG
RV TISSUE DOPPLER FREE WALL SYSTOLIC VELOCITY 1 (APICAL 4 CHAMBER VIEW): 14.5 CM/S
SODIUM SERPL-SCNC: 136 MMOL/L (ref 136–145)
SODIUM SERPL-SCNC: 138 MMOL/L (ref 136–145)
TDI LATERAL: 0.1 M/S
TDI SEPTAL: 0.07 M/S
TDI: 0.09 M/S
TIBC SERPL-MCNC: 181 UG/DL (ref 70–310)
TIBC SERPL-MCNC: 224 UG/DL (ref 250–450)
TR MAX PG: 26 MMHG
TRANSFERRIN SERPL-MCNC: 200 MG/DL
TRICUSPID ANNULAR PLANE SYSTOLIC EXCURSION: 3 CM
TROPONIN I SERPL HS-MCNC: 132 NG/L
TROPONIN I SERPL HS-MCNC: 141 NG/L
TROPONIN I SERPL HS-MCNC: 163 NG/L
TROPONIN I SERPL HS-MCNC: 164 NG/L
TROPONIN I SERPL HS-MCNC: 30 NG/L
TV REST PULMONARY ARTERY PRESSURE: 30 MMHG
VIT B12 SERPL-MCNC: 589 PG/ML (ref 213–816)
WBC # BLD AUTO: 9.49 X10(3)/MCL (ref 4.5–11.5)
Z-SCORE OF LEFT VENTRICULAR DIMENSION IN END DIASTOLE: -3.3
Z-SCORE OF LEFT VENTRICULAR DIMENSION IN END SYSTOLE: -1.68

## 2025-08-06 PROCEDURE — 84484 ASSAY OF TROPONIN QUANT: CPT | Performed by: INTERNAL MEDICINE

## 2025-08-06 PROCEDURE — 96375 TX/PRO/DX INJ NEW DRUG ADDON: CPT

## 2025-08-06 PROCEDURE — 27100092 HC HIGH FLOW DELIVERY CANNULA

## 2025-08-06 PROCEDURE — 85025 COMPLETE CBC W/AUTO DIFF WBC: CPT | Performed by: INTERNAL MEDICINE

## 2025-08-06 PROCEDURE — 99900035 HC TECH TIME PER 15 MIN (STAT)

## 2025-08-06 PROCEDURE — 36415 COLL VENOUS BLD VENIPUNCTURE: CPT | Performed by: NURSE PRACTITIONER

## 2025-08-06 PROCEDURE — 63600175 PHARM REV CODE 636 W HCPCS

## 2025-08-06 PROCEDURE — 99900031 HC PATIENT EDUCATION (STAT)

## 2025-08-06 PROCEDURE — 82607 VITAMIN B-12: CPT

## 2025-08-06 PROCEDURE — 25500020 PHARM REV CODE 255: Performed by: STUDENT IN AN ORGANIZED HEALTH CARE EDUCATION/TRAINING PROGRAM

## 2025-08-06 PROCEDURE — 27000249 HC VAPOTHERM CIRCUIT

## 2025-08-06 PROCEDURE — 94760 N-INVAS EAR/PLS OXIMETRY 1: CPT

## 2025-08-06 PROCEDURE — 25000242 PHARM REV CODE 250 ALT 637 W/ HCPCS: Performed by: INTERNAL MEDICINE

## 2025-08-06 PROCEDURE — 25000242 PHARM REV CODE 250 ALT 637 W/ HCPCS: Performed by: STUDENT IN AN ORGANIZED HEALTH CARE EDUCATION/TRAINING PROGRAM

## 2025-08-06 PROCEDURE — 27000221 HC OXYGEN, UP TO 24 HOURS

## 2025-08-06 PROCEDURE — 85730 THROMBOPLASTIN TIME PARTIAL: CPT

## 2025-08-06 PROCEDURE — 94640 AIRWAY INHALATION TREATMENT: CPT

## 2025-08-06 PROCEDURE — 21400001 HC TELEMETRY ROOM

## 2025-08-06 PROCEDURE — 84484 ASSAY OF TROPONIN QUANT: CPT | Performed by: STUDENT IN AN ORGANIZED HEALTH CARE EDUCATION/TRAINING PROGRAM

## 2025-08-06 PROCEDURE — 25000003 PHARM REV CODE 250: Performed by: STUDENT IN AN ORGANIZED HEALTH CARE EDUCATION/TRAINING PROGRAM

## 2025-08-06 PROCEDURE — 5A0935A ASSISTANCE WITH RESPIRATORY VENTILATION, LESS THAN 24 CONSECUTIVE HOURS, HIGH NASAL FLOW/VELOCITY: ICD-10-PCS | Performed by: STUDENT IN AN ORGANIZED HEALTH CARE EDUCATION/TRAINING PROGRAM

## 2025-08-06 PROCEDURE — 25000003 PHARM REV CODE 250: Performed by: INTERNAL MEDICINE

## 2025-08-06 PROCEDURE — 94799 UNLISTED PULMONARY SVC/PX: CPT

## 2025-08-06 PROCEDURE — 85610 PROTHROMBIN TIME: CPT

## 2025-08-06 PROCEDURE — 94761 N-INVAS EAR/PLS OXIMETRY MLT: CPT

## 2025-08-06 PROCEDURE — 83540 ASSAY OF IRON: CPT

## 2025-08-06 PROCEDURE — 96374 THER/PROPH/DIAG INJ IV PUSH: CPT

## 2025-08-06 PROCEDURE — 99285 EMERGENCY DEPT VISIT HI MDM: CPT | Mod: 25

## 2025-08-06 PROCEDURE — 99222 1ST HOSP IP/OBS MODERATE 55: CPT | Mod: ,,, | Performed by: INTERNAL MEDICINE

## 2025-08-06 PROCEDURE — 82728 ASSAY OF FERRITIN: CPT

## 2025-08-06 PROCEDURE — 63600175 PHARM REV CODE 636 W HCPCS: Mod: JZ | Performed by: INTERNAL MEDICINE

## 2025-08-06 PROCEDURE — 63600175 PHARM REV CODE 636 W HCPCS: Mod: JZ,TB | Performed by: INTERNAL MEDICINE

## 2025-08-06 PROCEDURE — 83880 ASSAY OF NATRIURETIC PEPTIDE: CPT | Performed by: STUDENT IN AN ORGANIZED HEALTH CARE EDUCATION/TRAINING PROGRAM

## 2025-08-06 PROCEDURE — 94644 CONT INHLJ TX 1ST HOUR: CPT

## 2025-08-06 PROCEDURE — 27100171 HC OXYGEN HIGH FLOW UP TO 24 HOURS

## 2025-08-06 PROCEDURE — 80053 COMPREHEN METABOLIC PANEL: CPT | Performed by: INTERNAL MEDICINE

## 2025-08-06 PROCEDURE — 83735 ASSAY OF MAGNESIUM: CPT | Performed by: INTERNAL MEDICINE

## 2025-08-06 PROCEDURE — 84484 ASSAY OF TROPONIN QUANT: CPT | Performed by: NURSE PRACTITIONER

## 2025-08-06 PROCEDURE — 63600175 PHARM REV CODE 636 W HCPCS: Performed by: STUDENT IN AN ORGANIZED HEALTH CARE EDUCATION/TRAINING PROGRAM

## 2025-08-06 RX ORDER — FUROSEMIDE 10 MG/ML
40 INJECTION INTRAMUSCULAR; INTRAVENOUS EVERY 12 HOURS
Status: DISCONTINUED | OUTPATIENT
Start: 2025-08-06 | End: 2025-08-07

## 2025-08-06 RX ORDER — POLYETHYLENE GLYCOL 3350 17 G/17G
17 POWDER, FOR SOLUTION ORAL DAILY PRN
Status: DISCONTINUED | OUTPATIENT
Start: 2025-08-06 | End: 2025-08-15

## 2025-08-06 RX ORDER — TAMSULOSIN HYDROCHLORIDE 0.4 MG/1
CAPSULE ORAL DAILY
COMMUNITY
End: 2025-08-06 | Stop reason: CLARIF

## 2025-08-06 RX ORDER — ALBUTEROL SULFATE 0.83 MG/ML
7.5 SOLUTION RESPIRATORY (INHALATION)
Status: COMPLETED | OUTPATIENT
Start: 2025-08-06 | End: 2025-08-06

## 2025-08-06 RX ORDER — LOSARTAN POTASSIUM 100 MG/1
100 TABLET ORAL DAILY
COMMUNITY

## 2025-08-06 RX ORDER — HYDRALAZINE HYDROCHLORIDE 20 MG/ML
10 INJECTION INTRAMUSCULAR; INTRAVENOUS
Status: COMPLETED | OUTPATIENT
Start: 2025-08-06 | End: 2025-08-06

## 2025-08-06 RX ORDER — SIMETHICONE 80 MG
1 TABLET,CHEWABLE ORAL 4 TIMES DAILY PRN
Status: DISCONTINUED | OUTPATIENT
Start: 2025-08-06 | End: 2025-08-18 | Stop reason: HOSPADM

## 2025-08-06 RX ORDER — ONDANSETRON HYDROCHLORIDE 2 MG/ML
4 INJECTION, SOLUTION INTRAVENOUS
Status: COMPLETED | OUTPATIENT
Start: 2025-08-06 | End: 2025-08-06

## 2025-08-06 RX ORDER — GLUCAGON 1 MG
1 KIT INJECTION
Status: DISCONTINUED | OUTPATIENT
Start: 2025-08-06 | End: 2025-08-18 | Stop reason: HOSPADM

## 2025-08-06 RX ORDER — SODIUM CHLORIDE 0.9 % (FLUSH) 0.9 %
10 SYRINGE (ML) INJECTION EVERY 12 HOURS PRN
Status: DISCONTINUED | OUTPATIENT
Start: 2025-08-06 | End: 2025-08-18 | Stop reason: HOSPADM

## 2025-08-06 RX ORDER — ACETAMINOPHEN 500 MG
1000 TABLET ORAL EVERY 8 HOURS PRN
Status: DISCONTINUED | OUTPATIENT
Start: 2025-08-06 | End: 2025-08-18 | Stop reason: HOSPADM

## 2025-08-06 RX ORDER — LOSARTAN POTASSIUM 50 MG/1
50 TABLET ORAL DAILY
Status: DISCONTINUED | OUTPATIENT
Start: 2025-08-06 | End: 2025-08-09

## 2025-08-06 RX ORDER — PANTOPRAZOLE SODIUM 40 MG/1
40 TABLET, DELAYED RELEASE ORAL DAILY
COMMUNITY
End: 2025-08-06 | Stop reason: CLARIF

## 2025-08-06 RX ORDER — NALOXONE HCL 0.4 MG/ML
0.02 VIAL (ML) INJECTION
Status: DISCONTINUED | OUTPATIENT
Start: 2025-08-06 | End: 2025-08-18 | Stop reason: HOSPADM

## 2025-08-06 RX ORDER — ALUMINUM HYDROXIDE, MAGNESIUM HYDROXIDE, AND SIMETHICONE 1200; 120; 1200 MG/30ML; MG/30ML; MG/30ML
30 SUSPENSION ORAL 4 TIMES DAILY PRN
Status: DISCONTINUED | OUTPATIENT
Start: 2025-08-06 | End: 2025-08-18 | Stop reason: HOSPADM

## 2025-08-06 RX ORDER — HYDRALAZINE HYDROCHLORIDE 20 MG/ML
20 INJECTION INTRAMUSCULAR; INTRAVENOUS
Status: DISCONTINUED | OUTPATIENT
Start: 2025-08-06 | End: 2025-08-06

## 2025-08-06 RX ORDER — ESCITALOPRAM OXALATE 10 MG/1
10 TABLET ORAL DAILY
Status: DISCONTINUED | OUTPATIENT
Start: 2025-08-06 | End: 2025-08-09

## 2025-08-06 RX ORDER — ATORVASTATIN CALCIUM 10 MG/1
10 TABLET, FILM COATED ORAL DAILY
COMMUNITY
End: 2025-08-06 | Stop reason: CLARIF

## 2025-08-06 RX ORDER — LEVOTHYROXINE SODIUM 125 UG/1
125 TABLET ORAL
COMMUNITY

## 2025-08-06 RX ORDER — LEVOTHYROXINE SODIUM 125 UG/1
125 TABLET ORAL
Status: DISCONTINUED | OUTPATIENT
Start: 2025-08-07 | End: 2025-08-09

## 2025-08-06 RX ORDER — ACETAMINOPHEN 325 MG/1
650 TABLET ORAL EVERY 4 HOURS PRN
Status: DISCONTINUED | OUTPATIENT
Start: 2025-08-06 | End: 2025-08-18 | Stop reason: HOSPADM

## 2025-08-06 RX ORDER — ALBUTEROL SULFATE 0.83 MG/ML
2.5 SOLUTION RESPIRATORY (INHALATION) EVERY 4 HOURS PRN
Status: DISCONTINUED | OUTPATIENT
Start: 2025-08-06 | End: 2025-08-06

## 2025-08-06 RX ORDER — FUROSEMIDE 20 MG/1
20 TABLET ORAL 2 TIMES DAILY
COMMUNITY

## 2025-08-06 RX ORDER — IPRATROPIUM BROMIDE AND ALBUTEROL SULFATE 2.5; .5 MG/3ML; MG/3ML
3 SOLUTION RESPIRATORY (INHALATION) EVERY 4 HOURS
Status: DISCONTINUED | OUTPATIENT
Start: 2025-08-06 | End: 2025-08-06

## 2025-08-06 RX ORDER — ATORVASTATIN CALCIUM 40 MG/1
40 TABLET, FILM COATED ORAL NIGHTLY
Status: DISCONTINUED | OUTPATIENT
Start: 2025-08-06 | End: 2025-08-09

## 2025-08-06 RX ORDER — IBUPROFEN 200 MG
24 TABLET ORAL
Status: DISCONTINUED | OUTPATIENT
Start: 2025-08-06 | End: 2025-08-18 | Stop reason: HOSPADM

## 2025-08-06 RX ORDER — ESCITALOPRAM OXALATE 10 MG/1
10 TABLET ORAL DAILY
Status: ON HOLD | COMMUNITY
End: 2025-08-18 | Stop reason: HOSPADM

## 2025-08-06 RX ORDER — IBUPROFEN 200 MG
16 TABLET ORAL
Status: DISCONTINUED | OUTPATIENT
Start: 2025-08-06 | End: 2025-08-18 | Stop reason: HOSPADM

## 2025-08-06 RX ORDER — ASPIRIN 81 MG/1
81 TABLET ORAL DAILY
COMMUNITY

## 2025-08-06 RX ORDER — METHYLPREDNISOLONE SOD SUCC 125 MG
125 VIAL (EA) INJECTION
Status: COMPLETED | OUTPATIENT
Start: 2025-08-06 | End: 2025-08-06

## 2025-08-06 RX ORDER — IPRATROPIUM BROMIDE AND ALBUTEROL SULFATE 2.5; .5 MG/3ML; MG/3ML
3 SOLUTION RESPIRATORY (INHALATION) EVERY 4 HOURS
Status: DISCONTINUED | OUTPATIENT
Start: 2025-08-06 | End: 2025-08-18 | Stop reason: HOSPADM

## 2025-08-06 RX ORDER — MUPIROCIN 20 MG/G
OINTMENT TOPICAL 2 TIMES DAILY
Status: DISPENSED | OUTPATIENT
Start: 2025-08-08 | End: 2025-08-13

## 2025-08-06 RX ADMIN — METHYLPREDNISOLONE SODIUM SUCCINATE 125 MG: 125 INJECTION, POWDER, FOR SOLUTION INTRAMUSCULAR; INTRAVENOUS at 03:08

## 2025-08-06 RX ADMIN — FUROSEMIDE 40 MG: 10 INJECTION, SOLUTION INTRAMUSCULAR; INTRAVENOUS at 09:08

## 2025-08-06 RX ADMIN — ESCITALOPRAM OXALATE 10 MG: 10 TABLET ORAL at 02:08

## 2025-08-06 RX ADMIN — IPRATROPIUM BROMIDE AND ALBUTEROL SULFATE 3 ML: .5; 3 SOLUTION RESPIRATORY (INHALATION) at 07:08

## 2025-08-06 RX ADMIN — ALBUTEROL SULFATE 7.5 MG: 2.5 SOLUTION RESPIRATORY (INHALATION) at 03:08

## 2025-08-06 RX ADMIN — HYDRALAZINE HYDROCHLORIDE 10 MG: 20 INJECTION INTRAMUSCULAR; INTRAVENOUS at 02:08

## 2025-08-06 RX ADMIN — TRANEXAMIC ACID 500 MG: 100 INJECTION, SOLUTION INTRAVENOUS at 01:08

## 2025-08-06 RX ADMIN — IOHEXOL 100 ML: 350 INJECTION, SOLUTION INTRAVENOUS at 01:08

## 2025-08-06 RX ADMIN — IPRATROPIUM BROMIDE AND ALBUTEROL SULFATE 3 ML: .5; 3 SOLUTION RESPIRATORY (INHALATION) at 08:08

## 2025-08-06 RX ADMIN — METHYLPREDNISOLONE SODIUM SUCCINATE 40 MG: 40 INJECTION, POWDER, FOR SOLUTION INTRAMUSCULAR; INTRAVENOUS at 09:08

## 2025-08-06 RX ADMIN — ATORVASTATIN CALCIUM 40 MG: 40 TABLET, FILM COATED ORAL at 09:08

## 2025-08-06 RX ADMIN — FUROSEMIDE 40 MG: 10 INJECTION, SOLUTION INTRAMUSCULAR; INTRAVENOUS at 05:08

## 2025-08-06 RX ADMIN — IPRATROPIUM BROMIDE AND ALBUTEROL SULFATE 3 ML: .5; 3 SOLUTION RESPIRATORY (INHALATION) at 11:08

## 2025-08-06 RX ADMIN — ONDANSETRON 4 MG: 2 INJECTION INTRAMUSCULAR; INTRAVENOUS at 01:08

## 2025-08-06 RX ADMIN — LOSARTAN POTASSIUM 50 MG: 50 TABLET, FILM COATED ORAL at 02:08

## 2025-08-06 RX ADMIN — IPRATROPIUM BROMIDE AND ALBUTEROL SULFATE 3 ML: .5; 3 SOLUTION RESPIRATORY (INHALATION) at 03:08

## 2025-08-06 RX ADMIN — METHYLPREDNISOLONE SODIUM SUCCINATE 40 MG: 40 INJECTION, POWDER, FOR SOLUTION INTRAMUSCULAR; INTRAVENOUS at 08:08

## 2025-08-07 LAB
ALBUMIN SERPL-MCNC: 3 G/DL (ref 3.4–4.8)
ALBUMIN/GLOB SERPL: 0.8 RATIO (ref 1.1–2)
ALP SERPL-CCNC: 65 UNIT/L (ref 40–150)
ALT SERPL-CCNC: 8 UNIT/L (ref 0–55)
ANION GAP SERPL CALC-SCNC: 7 MEQ/L
AST SERPL-CCNC: 15 UNIT/L (ref 11–45)
B PERT.PT PRMT NPH QL NAA+NON-PROBE: NOT DETECTED
BASOPHILS # BLD AUTO: 0.01 X10(3)/MCL
BASOPHILS NFR BLD AUTO: 0.1 %
BILIRUB SERPL-MCNC: 0.4 MG/DL
BUN SERPL-MCNC: 29.5 MG/DL (ref 9.8–20.1)
C PNEUM DNA NPH QL NAA+NON-PROBE: NOT DETECTED
CALCIUM SERPL-MCNC: 9.2 MG/DL (ref 8.4–10.2)
CHLORIDE SERPL-SCNC: 96 MMOL/L (ref 98–107)
CO2 SERPL-SCNC: 34 MMOL/L (ref 23–31)
CREAT SERPL-MCNC: 0.87 MG/DL (ref 0.55–1.02)
CREAT/UREA NIT SERPL: 34
EOSINOPHIL # BLD AUTO: 0 X10(3)/MCL (ref 0–0.9)
EOSINOPHIL NFR BLD AUTO: 0 %
ERYTHROCYTE [DISTWIDTH] IN BLOOD BY AUTOMATED COUNT: 14.6 % (ref 11.5–17)
FOLATE SERPL-MCNC: 7.6 NG/ML (ref 7–31.4)
GFR SERPLBLD CREATININE-BSD FMLA CKD-EPI: >60 ML/MIN/1.73/M2
GLOBULIN SER-MCNC: 3.8 GM/DL (ref 2.4–3.5)
GLUCOSE SERPL-MCNC: 155 MG/DL (ref 82–115)
HADV DNA NPH QL NAA+NON-PROBE: NOT DETECTED
HCOV 229E RNA NPH QL NAA+NON-PROBE: NOT DETECTED
HCOV HKU1 RNA NPH QL NAA+NON-PROBE: NOT DETECTED
HCOV NL63 RNA NPH QL NAA+NON-PROBE: NOT DETECTED
HCOV OC43 RNA NPH QL NAA+NON-PROBE: NOT DETECTED
HCT VFR BLD AUTO: 27.7 % (ref 37–47)
HGB BLD-MCNC: 9 G/DL (ref 12–16)
HMPV RNA NPH QL NAA+NON-PROBE: NOT DETECTED
HPIV1 RNA NPH QL NAA+NON-PROBE: NOT DETECTED
HPIV2 RNA NPH QL NAA+NON-PROBE: NOT DETECTED
HPIV3 RNA NPH QL NAA+NON-PROBE: NOT DETECTED
HPIV4 RNA NPH QL NAA+NON-PROBE: NOT DETECTED
IMM GRANULOCYTES # BLD AUTO: 0.04 X10(3)/MCL (ref 0–0.04)
IMM GRANULOCYTES NFR BLD AUTO: 0.4 %
LYMPHOCYTES # BLD AUTO: 0.82 X10(3)/MCL (ref 0.6–4.6)
LYMPHOCYTES NFR BLD AUTO: 8.3 %
M PNEUMO DNA NPH QL NAA+NON-PROBE: NOT DETECTED
MAGNESIUM SERPL-MCNC: 1.8 MG/DL (ref 1.6–2.6)
MCH RBC QN AUTO: 28.2 PG (ref 27–31)
MCHC RBC AUTO-ENTMCNC: 32.5 G/DL (ref 33–36)
MCV RBC AUTO: 86.8 FL (ref 80–94)
MONOCYTES # BLD AUTO: 0.24 X10(3)/MCL (ref 0.1–1.3)
MONOCYTES NFR BLD AUTO: 2.4 %
NEUTROPHILS # BLD AUTO: 8.82 X10(3)/MCL (ref 2.1–9.2)
NEUTROPHILS NFR BLD AUTO: 88.8 %
NRBC BLD AUTO-RTO: 0 %
PLATELET # BLD AUTO: 222 X10(3)/MCL (ref 130–400)
PMV BLD AUTO: 9.1 FL (ref 7.4–10.4)
POTASSIUM SERPL-SCNC: 4.2 MMOL/L (ref 3.5–5.1)
PROT SERPL-MCNC: 6.8 GM/DL (ref 5.8–7.6)
RBC # BLD AUTO: 3.19 X10(6)/MCL (ref 4.2–5.4)
RSV RNA NPH QL NAA+NON-PROBE: NOT DETECTED
RV+EV RNA NPH QL NAA+NON-PROBE: NOT DETECTED
SODIUM SERPL-SCNC: 137 MMOL/L (ref 136–145)
WBC # BLD AUTO: 9.93 X10(3)/MCL (ref 4.5–11.5)

## 2025-08-07 PROCEDURE — 25000003 PHARM REV CODE 250: Performed by: STUDENT IN AN ORGANIZED HEALTH CARE EDUCATION/TRAINING PROGRAM

## 2025-08-07 PROCEDURE — A9698 NON-RAD CONTRAST MATERIALNOC: HCPCS | Performed by: STUDENT IN AN ORGANIZED HEALTH CARE EDUCATION/TRAINING PROGRAM

## 2025-08-07 PROCEDURE — 87070 CULTURE OTHR SPECIMN AEROBIC: CPT | Performed by: STUDENT IN AN ORGANIZED HEALTH CARE EDUCATION/TRAINING PROGRAM

## 2025-08-07 PROCEDURE — 63700000 PHARM REV CODE 250 ALT 637 W/O HCPCS: Performed by: STUDENT IN AN ORGANIZED HEALTH CARE EDUCATION/TRAINING PROGRAM

## 2025-08-07 PROCEDURE — 80053 COMPREHEN METABOLIC PANEL: CPT | Performed by: INTERNAL MEDICINE

## 2025-08-07 PROCEDURE — 83735 ASSAY OF MAGNESIUM: CPT | Performed by: INTERNAL MEDICINE

## 2025-08-07 PROCEDURE — 92610 EVALUATE SWALLOWING FUNCTION: CPT

## 2025-08-07 PROCEDURE — 99900035 HC TECH TIME PER 15 MIN (STAT)

## 2025-08-07 PROCEDURE — 25500020 PHARM REV CODE 255: Performed by: STUDENT IN AN ORGANIZED HEALTH CARE EDUCATION/TRAINING PROGRAM

## 2025-08-07 PROCEDURE — 94640 AIRWAY INHALATION TREATMENT: CPT

## 2025-08-07 PROCEDURE — 87632 RESP VIRUS 6-11 TARGETS: CPT | Performed by: STUDENT IN AN ORGANIZED HEALTH CARE EDUCATION/TRAINING PROGRAM

## 2025-08-07 PROCEDURE — 25000003 PHARM REV CODE 250: Performed by: INTERNAL MEDICINE

## 2025-08-07 PROCEDURE — 94761 N-INVAS EAR/PLS OXIMETRY MLT: CPT

## 2025-08-07 PROCEDURE — 92611 MOTION FLUOROSCOPY/SWALLOW: CPT

## 2025-08-07 PROCEDURE — 94760 N-INVAS EAR/PLS OXIMETRY 1: CPT

## 2025-08-07 PROCEDURE — 85025 COMPLETE CBC W/AUTO DIFF WBC: CPT | Performed by: INTERNAL MEDICINE

## 2025-08-07 PROCEDURE — 27000221 HC OXYGEN, UP TO 24 HOURS

## 2025-08-07 PROCEDURE — 99900031 HC PATIENT EDUCATION (STAT)

## 2025-08-07 PROCEDURE — 63600175 PHARM REV CODE 636 W HCPCS: Mod: JZ | Performed by: INTERNAL MEDICINE

## 2025-08-07 PROCEDURE — 82746 ASSAY OF FOLIC ACID SERUM: CPT

## 2025-08-07 PROCEDURE — 36415 COLL VENOUS BLD VENIPUNCTURE: CPT | Performed by: INTERNAL MEDICINE

## 2025-08-07 PROCEDURE — 21400001 HC TELEMETRY ROOM

## 2025-08-07 PROCEDURE — 63600175 PHARM REV CODE 636 W HCPCS

## 2025-08-07 PROCEDURE — 25000242 PHARM REV CODE 250 ALT 637 W/ HCPCS: Performed by: INTERNAL MEDICINE

## 2025-08-07 RX ORDER — AMOXICILLIN AND CLAVULANATE POTASSIUM 875; 125 MG/1; MG/1
1 TABLET, FILM COATED ORAL EVERY 12 HOURS
Status: DISCONTINUED | OUTPATIENT
Start: 2025-08-07 | End: 2025-08-09

## 2025-08-07 RX ORDER — AZITHROMYCIN 250 MG/1
500 TABLET, FILM COATED ORAL DAILY
Status: DISCONTINUED | OUTPATIENT
Start: 2025-08-07 | End: 2025-08-09

## 2025-08-07 RX ADMIN — IPRATROPIUM BROMIDE AND ALBUTEROL SULFATE 3 ML: .5; 3 SOLUTION RESPIRATORY (INHALATION) at 07:08

## 2025-08-07 RX ADMIN — IPRATROPIUM BROMIDE AND ALBUTEROL SULFATE 3 ML: .5; 3 SOLUTION RESPIRATORY (INHALATION) at 08:08

## 2025-08-07 RX ADMIN — LOSARTAN POTASSIUM 50 MG: 50 TABLET, FILM COATED ORAL at 08:08

## 2025-08-07 RX ADMIN — IPRATROPIUM BROMIDE AND ALBUTEROL SULFATE 3 ML: .5; 3 SOLUTION RESPIRATORY (INHALATION) at 01:08

## 2025-08-07 RX ADMIN — FUROSEMIDE 40 MG: 10 INJECTION, SOLUTION INTRAMUSCULAR; INTRAVENOUS at 08:08

## 2025-08-07 RX ADMIN — IPRATROPIUM BROMIDE AND ALBUTEROL SULFATE 3 ML: .5; 3 SOLUTION RESPIRATORY (INHALATION) at 03:08

## 2025-08-07 RX ADMIN — LEVOTHYROXINE SODIUM 125 MCG: 0.12 TABLET ORAL at 05:08

## 2025-08-07 RX ADMIN — ESCITALOPRAM OXALATE 10 MG: 10 TABLET ORAL at 08:08

## 2025-08-07 RX ADMIN — IPRATROPIUM BROMIDE AND ALBUTEROL SULFATE 3 ML: .5; 3 SOLUTION RESPIRATORY (INHALATION) at 04:08

## 2025-08-07 RX ADMIN — AZITHROMYCIN DIHYDRATE 500 MG: 250 TABLET ORAL at 08:08

## 2025-08-07 RX ADMIN — METHYLPREDNISOLONE SODIUM SUCCINATE 40 MG: 40 INJECTION, POWDER, FOR SOLUTION INTRAMUSCULAR; INTRAVENOUS at 08:08

## 2025-08-07 RX ADMIN — BARIUM SULFATE 10 ML: 0.81 POWDER, FOR SUSPENSION ORAL at 02:08

## 2025-08-07 RX ADMIN — IPRATROPIUM BROMIDE AND ALBUTEROL SULFATE 3 ML: .5; 3 SOLUTION RESPIRATORY (INHALATION) at 12:08

## 2025-08-07 RX ADMIN — AMOXICILLIN AND CLAVULANATE POTASSIUM 1 TABLET: 875; 125 TABLET, FILM COATED ORAL at 08:08

## 2025-08-08 LAB
ANION GAP SERPL CALC-SCNC: 11 MEQ/L
BASOPHILS # BLD AUTO: 0.02 X10(3)/MCL
BASOPHILS NFR BLD AUTO: 0.1 %
BUN SERPL-MCNC: 36.4 MG/DL (ref 9.8–20.1)
CALCIUM SERPL-MCNC: 8.9 MG/DL (ref 8.4–10.2)
CHLORIDE SERPL-SCNC: 96 MMOL/L (ref 98–107)
CO2 SERPL-SCNC: 31 MMOL/L (ref 23–31)
CREAT SERPL-MCNC: 0.89 MG/DL (ref 0.55–1.02)
CREAT/UREA NIT SERPL: 41
EOSINOPHIL # BLD AUTO: 0 X10(3)/MCL (ref 0–0.9)
EOSINOPHIL NFR BLD AUTO: 0 %
ERYTHROCYTE [DISTWIDTH] IN BLOOD BY AUTOMATED COUNT: 15.2 % (ref 11.5–17)
GFR SERPLBLD CREATININE-BSD FMLA CKD-EPI: >60 ML/MIN/1.73/M2
GLUCOSE SERPL-MCNC: 140 MG/DL (ref 82–115)
HCT VFR BLD AUTO: 27 % (ref 37–47)
HGB BLD-MCNC: 8.8 G/DL (ref 12–16)
IMM GRANULOCYTES # BLD AUTO: 0.1 X10(3)/MCL (ref 0–0.04)
IMM GRANULOCYTES NFR BLD AUTO: 0.7 %
LYMPHOCYTES # BLD AUTO: 0.85 X10(3)/MCL (ref 0.6–4.6)
LYMPHOCYTES NFR BLD AUTO: 6 %
MCH RBC QN AUTO: 28.1 PG (ref 27–31)
MCHC RBC AUTO-ENTMCNC: 32.6 G/DL (ref 33–36)
MCV RBC AUTO: 86.3 FL (ref 80–94)
MONOCYTES # BLD AUTO: 0.45 X10(3)/MCL (ref 0.1–1.3)
MONOCYTES NFR BLD AUTO: 3.2 %
NEUTROPHILS # BLD AUTO: 12.66 X10(3)/MCL (ref 2.1–9.2)
NEUTROPHILS NFR BLD AUTO: 90 %
NRBC BLD AUTO-RTO: 0 %
PLATELET # BLD AUTO: 253 X10(3)/MCL (ref 130–400)
PMV BLD AUTO: 9.4 FL (ref 7.4–10.4)
POTASSIUM SERPL-SCNC: 3.8 MMOL/L (ref 3.5–5.1)
RBC # BLD AUTO: 3.13 X10(6)/MCL (ref 4.2–5.4)
SODIUM SERPL-SCNC: 138 MMOL/L (ref 136–145)
WBC # BLD AUTO: 14.08 X10(3)/MCL (ref 4.5–11.5)

## 2025-08-08 PROCEDURE — 85025 COMPLETE CBC W/AUTO DIFF WBC: CPT | Performed by: INTERNAL MEDICINE

## 2025-08-08 PROCEDURE — 21400001 HC TELEMETRY ROOM

## 2025-08-08 PROCEDURE — 25000003 PHARM REV CODE 250: Performed by: INTERNAL MEDICINE

## 2025-08-08 PROCEDURE — 94640 AIRWAY INHALATION TREATMENT: CPT

## 2025-08-08 PROCEDURE — 99900031 HC PATIENT EDUCATION (STAT)

## 2025-08-08 PROCEDURE — 80048 BASIC METABOLIC PNL TOTAL CA: CPT | Performed by: INTERNAL MEDICINE

## 2025-08-08 PROCEDURE — 25000242 PHARM REV CODE 250 ALT 637 W/ HCPCS: Performed by: INTERNAL MEDICINE

## 2025-08-08 PROCEDURE — 27000221 HC OXYGEN, UP TO 24 HOURS

## 2025-08-08 PROCEDURE — 94761 N-INVAS EAR/PLS OXIMETRY MLT: CPT

## 2025-08-08 PROCEDURE — 92526 ORAL FUNCTION THERAPY: CPT

## 2025-08-08 PROCEDURE — 99900035 HC TECH TIME PER 15 MIN (STAT)

## 2025-08-08 PROCEDURE — 36415 COLL VENOUS BLD VENIPUNCTURE: CPT | Performed by: INTERNAL MEDICINE

## 2025-08-08 PROCEDURE — 94760 N-INVAS EAR/PLS OXIMETRY 1: CPT

## 2025-08-08 RX ADMIN — MUPIROCIN: 20 OINTMENT TOPICAL at 09:08

## 2025-08-08 RX ADMIN — IPRATROPIUM BROMIDE AND ALBUTEROL SULFATE 3 ML: .5; 3 SOLUTION RESPIRATORY (INHALATION) at 04:08

## 2025-08-08 RX ADMIN — LEUCINE, PHENYLALANINE, LYSINE, METHIONINE, ISOLEUCINE, VALINE, HISTIDINE, THREONINE, TRYPTOPHAN, ALANINE, GLYCINE, ARGININE, PROLINE, SERINE, TYROSINE, SODIUM ACETATE, DIBASIC POTASSIUM PHOSPHATE, MAGNESIUM CHLORIDE, SODIUM CHLORIDE, CALCIUM CHLORIDE, DEXTROSE
880; 489; 33; 5; 438; 204; 255; 311; 247; 51; 170; 238; 261; 289; 213; 297; 77; 179; 77; 17; 247 INJECTION INTRAVENOUS at 01:08

## 2025-08-08 RX ADMIN — IPRATROPIUM BROMIDE AND ALBUTEROL SULFATE 3 ML: .5; 3 SOLUTION RESPIRATORY (INHALATION) at 12:08

## 2025-08-08 RX ADMIN — IPRATROPIUM BROMIDE AND ALBUTEROL SULFATE 3 ML: .5; 3 SOLUTION RESPIRATORY (INHALATION) at 08:08

## 2025-08-08 RX ADMIN — MUPIROCIN: 20 OINTMENT TOPICAL at 06:08

## 2025-08-09 PROBLEM — E44.0 MODERATE MALNUTRITION: Status: ACTIVE | Noted: 2025-08-09

## 2025-08-09 LAB
ANION GAP SERPL CALC-SCNC: 8 MEQ/L
BACTERIA SPEC CULT: NORMAL
BASOPHILS # BLD AUTO: 0.01 X10(3)/MCL
BASOPHILS NFR BLD AUTO: 0.1 %
BUN SERPL-MCNC: 36.8 MG/DL (ref 9.8–20.1)
CALCIUM SERPL-MCNC: 8.8 MG/DL (ref 8.4–10.2)
CHLORIDE SERPL-SCNC: 101 MMOL/L (ref 98–107)
CO2 SERPL-SCNC: 33 MMOL/L (ref 23–31)
CREAT SERPL-MCNC: 0.72 MG/DL (ref 0.55–1.02)
CREAT/UREA NIT SERPL: 51
EOSINOPHIL # BLD AUTO: 0.01 X10(3)/MCL (ref 0–0.9)
EOSINOPHIL NFR BLD AUTO: 0.1 %
ERYTHROCYTE [DISTWIDTH] IN BLOOD BY AUTOMATED COUNT: 15.2 % (ref 11.5–17)
GFR SERPLBLD CREATININE-BSD FMLA CKD-EPI: >60 ML/MIN/1.73/M2
GLUCOSE SERPL-MCNC: 95 MG/DL (ref 82–115)
GRAM STN SPEC: NORMAL
HCT VFR BLD AUTO: 28.2 % (ref 37–47)
HGB BLD-MCNC: 8.9 G/DL (ref 12–16)
IMM GRANULOCYTES # BLD AUTO: 0.04 X10(3)/MCL (ref 0–0.04)
IMM GRANULOCYTES NFR BLD AUTO: 0.3 %
LYMPHOCYTES # BLD AUTO: 2.06 X10(3)/MCL (ref 0.6–4.6)
LYMPHOCYTES NFR BLD AUTO: 15.2 %
MCH RBC QN AUTO: 27.7 PG (ref 27–31)
MCHC RBC AUTO-ENTMCNC: 31.6 G/DL (ref 33–36)
MCV RBC AUTO: 87.9 FL (ref 80–94)
MONOCYTES # BLD AUTO: 1.5 X10(3)/MCL (ref 0.1–1.3)
MONOCYTES NFR BLD AUTO: 11.1 %
NEUTROPHILS # BLD AUTO: 9.92 X10(3)/MCL (ref 2.1–9.2)
NEUTROPHILS NFR BLD AUTO: 73.2 %
NRBC BLD AUTO-RTO: 0 %
PLATELET # BLD AUTO: 253 X10(3)/MCL (ref 130–400)
PMV BLD AUTO: 9 FL (ref 7.4–10.4)
POTASSIUM SERPL-SCNC: 3.5 MMOL/L (ref 3.5–5.1)
RBC # BLD AUTO: 3.21 X10(6)/MCL (ref 4.2–5.4)
SODIUM SERPL-SCNC: 142 MMOL/L (ref 136–145)
WBC # BLD AUTO: 13.54 X10(3)/MCL (ref 4.5–11.5)

## 2025-08-09 PROCEDURE — 21400001 HC TELEMETRY ROOM

## 2025-08-09 PROCEDURE — 99900031 HC PATIENT EDUCATION (STAT)

## 2025-08-09 PROCEDURE — 27000221 HC OXYGEN, UP TO 24 HOURS

## 2025-08-09 PROCEDURE — 94761 N-INVAS EAR/PLS OXIMETRY MLT: CPT

## 2025-08-09 PROCEDURE — 25000242 PHARM REV CODE 250 ALT 637 W/ HCPCS: Performed by: INTERNAL MEDICINE

## 2025-08-09 PROCEDURE — 94640 AIRWAY INHALATION TREATMENT: CPT

## 2025-08-09 PROCEDURE — 63600175 PHARM REV CODE 636 W HCPCS: Performed by: INTERNAL MEDICINE

## 2025-08-09 PROCEDURE — 85025 COMPLETE CBC W/AUTO DIFF WBC: CPT | Performed by: INTERNAL MEDICINE

## 2025-08-09 PROCEDURE — 99900035 HC TECH TIME PER 15 MIN (STAT)

## 2025-08-09 PROCEDURE — 25000003 PHARM REV CODE 250: Performed by: INTERNAL MEDICINE

## 2025-08-09 PROCEDURE — 36415 COLL VENOUS BLD VENIPUNCTURE: CPT | Performed by: INTERNAL MEDICINE

## 2025-08-09 PROCEDURE — 80048 BASIC METABOLIC PNL TOTAL CA: CPT | Performed by: INTERNAL MEDICINE

## 2025-08-09 RX ORDER — HYDRALAZINE HYDROCHLORIDE 20 MG/ML
10 INJECTION INTRAMUSCULAR; INTRAVENOUS EVERY 6 HOURS PRN
Status: DISCONTINUED | OUTPATIENT
Start: 2025-08-09 | End: 2025-08-18 | Stop reason: HOSPADM

## 2025-08-09 RX ORDER — LEVOTHYROXINE SODIUM 20 UG/ML
70 INJECTION, SOLUTION INTRAVENOUS DAILY
Status: DISCONTINUED | OUTPATIENT
Start: 2025-08-10 | End: 2025-08-15

## 2025-08-09 RX ADMIN — IPRATROPIUM BROMIDE AND ALBUTEROL SULFATE 3 ML: .5; 3 SOLUTION RESPIRATORY (INHALATION) at 11:08

## 2025-08-09 RX ADMIN — PIPERACILLIN SODIUM AND TAZOBACTAM SODIUM 4.5 G: 4; .5 INJECTION, POWDER, LYOPHILIZED, FOR SOLUTION INTRAVENOUS at 05:08

## 2025-08-09 RX ADMIN — IPRATROPIUM BROMIDE AND ALBUTEROL SULFATE 3 ML: .5; 3 SOLUTION RESPIRATORY (INHALATION) at 08:08

## 2025-08-09 RX ADMIN — IPRATROPIUM BROMIDE AND ALBUTEROL SULFATE 3 ML: .5; 3 SOLUTION RESPIRATORY (INHALATION) at 07:08

## 2025-08-09 RX ADMIN — LEUCINE, PHENYLALANINE, LYSINE, METHIONINE, ISOLEUCINE, VALINE, HISTIDINE, THREONINE, TRYPTOPHAN, ALANINE, GLYCINE, ARGININE, PROLINE, SERINE, TYROSINE, SODIUM ACETATE, DIBASIC POTASSIUM PHOSPHATE, MAGNESIUM CHLORIDE, SODIUM CHLORIDE, CALCIUM CHLORIDE, DEXTROSE
880; 489; 33; 5; 438; 204; 255; 311; 247; 51; 170; 238; 261; 289; 213; 297; 77; 179; 77; 17; 247 INJECTION INTRAVENOUS at 04:08

## 2025-08-09 RX ADMIN — LEUCINE, PHENYLALANINE, LYSINE, METHIONINE, ISOLEUCINE, VALINE, HISTIDINE, THREONINE, TRYPTOPHAN, ALANINE, GLYCINE, ARGININE, PROLINE, SERINE, TYROSINE, SODIUM ACETATE, DIBASIC POTASSIUM PHOSPHATE, MAGNESIUM CHLORIDE, SODIUM CHLORIDE, CALCIUM CHLORIDE, DEXTROSE
880; 489; 33; 5; 438; 204; 255; 311; 247; 51; 170; 238; 261; 289; 213; 297; 77; 179; 77; 17; 247 INJECTION INTRAVENOUS at 03:08

## 2025-08-09 RX ADMIN — IPRATROPIUM BROMIDE AND ALBUTEROL SULFATE 3 ML: .5; 3 SOLUTION RESPIRATORY (INHALATION) at 03:08

## 2025-08-09 RX ADMIN — HYDRALAZINE HYDROCHLORIDE 10 MG: 20 INJECTION INTRAMUSCULAR; INTRAVENOUS at 09:08

## 2025-08-09 RX ADMIN — IPRATROPIUM BROMIDE AND ALBUTEROL SULFATE 3 ML: .5; 3 SOLUTION RESPIRATORY (INHALATION) at 12:08

## 2025-08-10 PROCEDURE — 94799 UNLISTED PULMONARY SVC/PX: CPT

## 2025-08-10 PROCEDURE — 27000221 HC OXYGEN, UP TO 24 HOURS

## 2025-08-10 PROCEDURE — 99900031 HC PATIENT EDUCATION (STAT)

## 2025-08-10 PROCEDURE — 63600175 PHARM REV CODE 636 W HCPCS: Performed by: INTERNAL MEDICINE

## 2025-08-10 PROCEDURE — 94640 AIRWAY INHALATION TREATMENT: CPT

## 2025-08-10 PROCEDURE — 21400001 HC TELEMETRY ROOM

## 2025-08-10 PROCEDURE — 94760 N-INVAS EAR/PLS OXIMETRY 1: CPT

## 2025-08-10 PROCEDURE — 25000242 PHARM REV CODE 250 ALT 637 W/ HCPCS: Performed by: INTERNAL MEDICINE

## 2025-08-10 PROCEDURE — 99900035 HC TECH TIME PER 15 MIN (STAT)

## 2025-08-10 PROCEDURE — 25000003 PHARM REV CODE 250: Performed by: INTERNAL MEDICINE

## 2025-08-10 PROCEDURE — 94761 N-INVAS EAR/PLS OXIMETRY MLT: CPT

## 2025-08-10 RX ADMIN — IPRATROPIUM BROMIDE AND ALBUTEROL SULFATE 3 ML: .5; 3 SOLUTION RESPIRATORY (INHALATION) at 04:08

## 2025-08-10 RX ADMIN — MUPIROCIN: 20 OINTMENT TOPICAL at 10:08

## 2025-08-10 RX ADMIN — LEVOTHYROXINE SODIUM 70 MCG: 20 INJECTION, SOLUTION INTRAVENOUS at 10:08

## 2025-08-10 RX ADMIN — MUPIROCIN: 20 OINTMENT TOPICAL at 08:08

## 2025-08-10 RX ADMIN — PIPERACILLIN SODIUM AND TAZOBACTAM SODIUM 4.5 G: 4; .5 INJECTION, POWDER, LYOPHILIZED, FOR SOLUTION INTRAVENOUS at 10:08

## 2025-08-10 RX ADMIN — PIPERACILLIN SODIUM AND TAZOBACTAM SODIUM 4.5 G: 4; .5 INJECTION, POWDER, LYOPHILIZED, FOR SOLUTION INTRAVENOUS at 05:08

## 2025-08-10 RX ADMIN — HYDRALAZINE HYDROCHLORIDE 10 MG: 20 INJECTION INTRAMUSCULAR; INTRAVENOUS at 06:08

## 2025-08-10 RX ADMIN — IPRATROPIUM BROMIDE AND ALBUTEROL SULFATE 3 ML: .5; 3 SOLUTION RESPIRATORY (INHALATION) at 12:08

## 2025-08-10 RX ADMIN — IPRATROPIUM BROMIDE AND ALBUTEROL SULFATE 3 ML: .5; 3 SOLUTION RESPIRATORY (INHALATION) at 08:08

## 2025-08-10 RX ADMIN — IPRATROPIUM BROMIDE AND ALBUTEROL SULFATE 3 ML: .5; 3 SOLUTION RESPIRATORY (INHALATION) at 07:08

## 2025-08-10 RX ADMIN — IPRATROPIUM BROMIDE AND ALBUTEROL SULFATE 3 ML: .5; 3 SOLUTION RESPIRATORY (INHALATION) at 01:08

## 2025-08-10 RX ADMIN — PIPERACILLIN SODIUM AND TAZOBACTAM SODIUM 4.5 G: 4; .5 INJECTION, POWDER, LYOPHILIZED, FOR SOLUTION INTRAVENOUS at 02:08

## 2025-08-10 RX ADMIN — LEUCINE, PHENYLALANINE, LYSINE, METHIONINE, ISOLEUCINE, VALINE, HISTIDINE, THREONINE, TRYPTOPHAN, ALANINE, GLYCINE, ARGININE, PROLINE, SERINE, TYROSINE, SODIUM ACETATE, DIBASIC POTASSIUM PHOSPHATE, MAGNESIUM CHLORIDE, SODIUM CHLORIDE, CALCIUM CHLORIDE, DEXTROSE
880; 489; 33; 5; 438; 204; 255; 311; 247; 51; 170; 238; 261; 289; 213; 297; 77; 179; 77; 17; 247 INJECTION INTRAVENOUS at 01:08

## 2025-08-11 ENCOUNTER — ANESTHESIA EVENT (OUTPATIENT)
Dept: ENDOSCOPY | Facility: HOSPITAL | Age: 80
End: 2025-08-11
Payer: MEDICARE

## 2025-08-11 ENCOUNTER — ANESTHESIA (OUTPATIENT)
Dept: ENDOSCOPY | Facility: HOSPITAL | Age: 80
End: 2025-08-11
Payer: MEDICARE

## 2025-08-11 PROCEDURE — 94799 UNLISTED PULMONARY SVC/PX: CPT

## 2025-08-11 PROCEDURE — 37000009 HC ANESTHESIA EA ADD 15 MINS: Performed by: INTERNAL MEDICINE

## 2025-08-11 PROCEDURE — C1769 GUIDE WIRE: HCPCS | Performed by: INTERNAL MEDICINE

## 2025-08-11 PROCEDURE — 27000221 HC OXYGEN, UP TO 24 HOURS

## 2025-08-11 PROCEDURE — 25000242 PHARM REV CODE 250 ALT 637 W/ HCPCS: Performed by: INTERNAL MEDICINE

## 2025-08-11 PROCEDURE — 99900031 HC PATIENT EDUCATION (STAT)

## 2025-08-11 PROCEDURE — 25000003 PHARM REV CODE 250

## 2025-08-11 PROCEDURE — 94760 N-INVAS EAR/PLS OXIMETRY 1: CPT

## 2025-08-11 PROCEDURE — 37000008 HC ANESTHESIA 1ST 15 MINUTES: Performed by: INTERNAL MEDICINE

## 2025-08-11 PROCEDURE — 63600175 PHARM REV CODE 636 W HCPCS: Performed by: INTERNAL MEDICINE

## 2025-08-11 PROCEDURE — C1751 CATH, INF, PER/CENT/MIDLINE: HCPCS

## 2025-08-11 PROCEDURE — 63600175 PHARM REV CODE 636 W HCPCS

## 2025-08-11 PROCEDURE — 94640 AIRWAY INHALATION TREATMENT: CPT

## 2025-08-11 PROCEDURE — 43248 EGD GUIDE WIRE INSERTION: CPT | Performed by: INTERNAL MEDICINE

## 2025-08-11 PROCEDURE — 0D718ZZ DILATION OF UPPER ESOPHAGUS, VIA NATURAL OR ARTIFICIAL OPENING ENDOSCOPIC: ICD-10-PCS | Performed by: INTERNAL MEDICINE

## 2025-08-11 PROCEDURE — 21400001 HC TELEMETRY ROOM

## 2025-08-11 PROCEDURE — 94761 N-INVAS EAR/PLS OXIMETRY MLT: CPT

## 2025-08-11 PROCEDURE — 36410 VNPNXR 3YR/> PHY/QHP DX/THER: CPT

## 2025-08-11 PROCEDURE — 99900035 HC TECH TIME PER 15 MIN (STAT)

## 2025-08-11 PROCEDURE — 25000003 PHARM REV CODE 250: Performed by: INTERNAL MEDICINE

## 2025-08-11 RX ORDER — LIDOCAINE HYDROCHLORIDE 20 MG/ML
INJECTION INTRAVENOUS
Status: DISCONTINUED | OUTPATIENT
Start: 2025-08-11 | End: 2025-08-11

## 2025-08-11 RX ORDER — PROPOFOL 10 MG/ML
VIAL (ML) INTRAVENOUS
Status: DISPENSED
Start: 2025-08-11 | End: 2025-08-12

## 2025-08-11 RX ORDER — PROPOFOL 10 MG/ML
VIAL (ML) INTRAVENOUS
Status: DISCONTINUED | OUTPATIENT
Start: 2025-08-11 | End: 2025-08-11

## 2025-08-11 RX ORDER — SODIUM CHLORIDE 0.9 % (FLUSH) 0.9 %
10 SYRINGE (ML) INJECTION EVERY 12 HOURS PRN
Status: DISCONTINUED | OUTPATIENT
Start: 2025-08-11 | End: 2025-08-18 | Stop reason: HOSPADM

## 2025-08-11 RX ORDER — LIDOCAINE HYDROCHLORIDE 20 MG/ML
INJECTION, SOLUTION EPIDURAL; INFILTRATION; INTRACAUDAL; PERINEURAL
Status: DISPENSED
Start: 2025-08-11 | End: 2025-08-12

## 2025-08-11 RX ADMIN — IPRATROPIUM BROMIDE AND ALBUTEROL SULFATE 3 ML: .5; 3 SOLUTION RESPIRATORY (INHALATION) at 01:08

## 2025-08-11 RX ADMIN — PIPERACILLIN SODIUM AND TAZOBACTAM SODIUM 4.5 G: 4; .5 INJECTION, POWDER, LYOPHILIZED, FOR SOLUTION INTRAVENOUS at 10:08

## 2025-08-11 RX ADMIN — PROPOFOL 50 MG: 10 INJECTION, EMULSION INTRAVENOUS at 02:08

## 2025-08-11 RX ADMIN — IPRATROPIUM BROMIDE AND ALBUTEROL SULFATE 3 ML: .5; 3 SOLUTION RESPIRATORY (INHALATION) at 04:08

## 2025-08-11 RX ADMIN — PROPOFOL 50 MG: 10 INJECTION, EMULSION INTRAVENOUS at 01:08

## 2025-08-11 RX ADMIN — PIPERACILLIN SODIUM AND TAZOBACTAM SODIUM 4.5 G: 4; .5 INJECTION, POWDER, LYOPHILIZED, FOR SOLUTION INTRAVENOUS at 03:08

## 2025-08-11 RX ADMIN — SODIUM CHLORIDE: 9 INJECTION, SOLUTION INTRAVENOUS at 01:08

## 2025-08-11 RX ADMIN — IPRATROPIUM BROMIDE AND ALBUTEROL SULFATE 3 ML: .5; 3 SOLUTION RESPIRATORY (INHALATION) at 05:08

## 2025-08-11 RX ADMIN — LEUCINE, PHENYLALANINE, LYSINE, METHIONINE, ISOLEUCINE, VALINE, HISTIDINE, THREONINE, TRYPTOPHAN, ALANINE, GLYCINE, ARGININE, PROLINE, SERINE, TYROSINE, SODIUM ACETATE, DIBASIC POTASSIUM PHOSPHATE, MAGNESIUM CHLORIDE, SODIUM CHLORIDE, CALCIUM CHLORIDE, DEXTROSE
880; 489; 33; 5; 438; 204; 255; 311; 247; 51; 170; 238; 261; 289; 213; 297; 77; 179; 77; 17; 247 INJECTION INTRAVENOUS at 04:08

## 2025-08-11 RX ADMIN — LEVOTHYROXINE SODIUM 70 MCG: 20 INJECTION, SOLUTION INTRAVENOUS at 08:08

## 2025-08-11 RX ADMIN — IPRATROPIUM BROMIDE AND ALBUTEROL SULFATE 3 ML: .5; 3 SOLUTION RESPIRATORY (INHALATION) at 09:08

## 2025-08-11 RX ADMIN — PIPERACILLIN SODIUM AND TAZOBACTAM SODIUM 4.5 G: 4; .5 INJECTION, POWDER, LYOPHILIZED, FOR SOLUTION INTRAVENOUS at 06:08

## 2025-08-11 RX ADMIN — MUPIROCIN: 20 OINTMENT TOPICAL at 08:08

## 2025-08-11 RX ADMIN — LIDOCAINE HYDROCHLORIDE 100 MG: 20 INJECTION INTRAVENOUS at 01:08

## 2025-08-12 LAB
ANION GAP SERPL CALC-SCNC: 7 MEQ/L
BASOPHILS # BLD AUTO: 0.02 X10(3)/MCL
BASOPHILS NFR BLD AUTO: 0.2 %
BUN SERPL-MCNC: 27 MG/DL (ref 9.8–20.1)
CALCIUM SERPL-MCNC: 8.7 MG/DL (ref 8.4–10.2)
CHLORIDE SERPL-SCNC: 111 MMOL/L (ref 98–107)
CO2 SERPL-SCNC: 24 MMOL/L (ref 23–31)
CREAT SERPL-MCNC: 0.72 MG/DL (ref 0.55–1.02)
CREAT/UREA NIT SERPL: 38
EOSINOPHIL # BLD AUTO: 0.47 X10(3)/MCL (ref 0–0.9)
EOSINOPHIL NFR BLD AUTO: 3.7 %
ERYTHROCYTE [DISTWIDTH] IN BLOOD BY AUTOMATED COUNT: 15.4 % (ref 11.5–17)
GFR SERPLBLD CREATININE-BSD FMLA CKD-EPI: >60 ML/MIN/1.73/M2
GLUCOSE SERPL-MCNC: 96 MG/DL (ref 82–115)
HCT VFR BLD AUTO: 30.8 % (ref 37–47)
HGB BLD-MCNC: 9.8 G/DL (ref 12–16)
IMM GRANULOCYTES # BLD AUTO: 0.04 X10(3)/MCL (ref 0–0.04)
IMM GRANULOCYTES NFR BLD AUTO: 0.3 %
LYMPHOCYTES # BLD AUTO: 1.82 X10(3)/MCL (ref 0.6–4.6)
LYMPHOCYTES NFR BLD AUTO: 14.5 %
MCH RBC QN AUTO: 28.2 PG (ref 27–31)
MCHC RBC AUTO-ENTMCNC: 31.8 G/DL (ref 33–36)
MCV RBC AUTO: 88.8 FL (ref 80–94)
MONOCYTES # BLD AUTO: 0.96 X10(3)/MCL (ref 0.1–1.3)
MONOCYTES NFR BLD AUTO: 7.6 %
NEUTROPHILS # BLD AUTO: 9.28 X10(3)/MCL (ref 2.1–9.2)
NEUTROPHILS NFR BLD AUTO: 73.7 %
NRBC BLD AUTO-RTO: 0 %
PLATELET # BLD AUTO: 251 X10(3)/MCL (ref 130–400)
PMV BLD AUTO: 9.1 FL (ref 7.4–10.4)
POTASSIUM SERPL-SCNC: 4 MMOL/L (ref 3.5–5.1)
RBC # BLD AUTO: 3.47 X10(6)/MCL (ref 4.2–5.4)
SODIUM SERPL-SCNC: 142 MMOL/L (ref 136–145)
WBC # BLD AUTO: 12.59 X10(3)/MCL (ref 4.5–11.5)

## 2025-08-12 PROCEDURE — 99900035 HC TECH TIME PER 15 MIN (STAT)

## 2025-08-12 PROCEDURE — 21400001 HC TELEMETRY ROOM

## 2025-08-12 PROCEDURE — 25000003 PHARM REV CODE 250: Performed by: INTERNAL MEDICINE

## 2025-08-12 PROCEDURE — 85025 COMPLETE CBC W/AUTO DIFF WBC: CPT | Performed by: INTERNAL MEDICINE

## 2025-08-12 PROCEDURE — 99900031 HC PATIENT EDUCATION (STAT)

## 2025-08-12 PROCEDURE — 63600175 PHARM REV CODE 636 W HCPCS: Performed by: INTERNAL MEDICINE

## 2025-08-12 PROCEDURE — 94760 N-INVAS EAR/PLS OXIMETRY 1: CPT

## 2025-08-12 PROCEDURE — 27000221 HC OXYGEN, UP TO 24 HOURS

## 2025-08-12 PROCEDURE — 94799 UNLISTED PULMONARY SVC/PX: CPT

## 2025-08-12 PROCEDURE — 25000242 PHARM REV CODE 250 ALT 637 W/ HCPCS: Performed by: INTERNAL MEDICINE

## 2025-08-12 PROCEDURE — 80048 BASIC METABOLIC PNL TOTAL CA: CPT | Performed by: INTERNAL MEDICINE

## 2025-08-12 PROCEDURE — 92611 MOTION FLUOROSCOPY/SWALLOW: CPT

## 2025-08-12 PROCEDURE — 36415 COLL VENOUS BLD VENIPUNCTURE: CPT | Performed by: INTERNAL MEDICINE

## 2025-08-12 PROCEDURE — 94761 N-INVAS EAR/PLS OXIMETRY MLT: CPT

## 2025-08-12 PROCEDURE — 94640 AIRWAY INHALATION TREATMENT: CPT

## 2025-08-12 RX ORDER — BISACODYL 10 MG/1
10 SUPPOSITORY RECTAL ONCE
Status: DISCONTINUED | OUTPATIENT
Start: 2025-08-12 | End: 2025-08-18 | Stop reason: HOSPADM

## 2025-08-12 RX ADMIN — MUPIROCIN: 20 OINTMENT TOPICAL at 08:08

## 2025-08-12 RX ADMIN — PIPERACILLIN SODIUM AND TAZOBACTAM SODIUM 4.5 G: 4; .5 INJECTION, POWDER, LYOPHILIZED, FOR SOLUTION INTRAVENOUS at 01:08

## 2025-08-12 RX ADMIN — PIPERACILLIN SODIUM AND TAZOBACTAM SODIUM 4.5 G: 4; .5 INJECTION, POWDER, LYOPHILIZED, FOR SOLUTION INTRAVENOUS at 10:08

## 2025-08-12 RX ADMIN — IPRATROPIUM BROMIDE AND ALBUTEROL SULFATE 3 ML: .5; 3 SOLUTION RESPIRATORY (INHALATION) at 11:08

## 2025-08-12 RX ADMIN — PIPERACILLIN SODIUM AND TAZOBACTAM SODIUM 4.5 G: 4; .5 INJECTION, POWDER, LYOPHILIZED, FOR SOLUTION INTRAVENOUS at 06:08

## 2025-08-12 RX ADMIN — LEUCINE, PHENYLALANINE, LYSINE, METHIONINE, ISOLEUCINE, VALINE, HISTIDINE, THREONINE, TRYPTOPHAN, ALANINE, GLYCINE, ARGININE, PROLINE, SERINE, TYROSINE, SODIUM ACETATE, DIBASIC POTASSIUM PHOSPHATE, MAGNESIUM CHLORIDE, SODIUM CHLORIDE, CALCIUM CHLORIDE, DEXTROSE
880; 489; 33; 5; 438; 204; 255; 311; 247; 51; 170; 238; 261; 289; 213; 297; 77; 179; 77; 17; 247 INJECTION INTRAVENOUS at 05:08

## 2025-08-12 RX ADMIN — IPRATROPIUM BROMIDE AND ALBUTEROL SULFATE 3 ML: .5; 3 SOLUTION RESPIRATORY (INHALATION) at 07:08

## 2025-08-12 RX ADMIN — IPRATROPIUM BROMIDE AND ALBUTEROL SULFATE 3 ML: .5; 3 SOLUTION RESPIRATORY (INHALATION) at 08:08

## 2025-08-12 RX ADMIN — IPRATROPIUM BROMIDE AND ALBUTEROL SULFATE 3 ML: .5; 3 SOLUTION RESPIRATORY (INHALATION) at 05:08

## 2025-08-12 RX ADMIN — LEVOTHYROXINE SODIUM 70 MCG: 20 INJECTION, SOLUTION INTRAVENOUS at 08:08

## 2025-08-12 RX ADMIN — MUPIROCIN: 20 OINTMENT TOPICAL at 10:08

## 2025-08-12 RX ADMIN — IPRATROPIUM BROMIDE AND ALBUTEROL SULFATE 3 ML: .5; 3 SOLUTION RESPIRATORY (INHALATION) at 04:08

## 2025-08-13 ENCOUNTER — ANESTHESIA EVENT (OUTPATIENT)
Dept: ENDOSCOPY | Facility: HOSPITAL | Age: 80
End: 2025-08-13
Payer: MEDICARE

## 2025-08-13 ENCOUNTER — ANESTHESIA (OUTPATIENT)
Dept: ENDOSCOPY | Facility: HOSPITAL | Age: 80
End: 2025-08-13
Payer: MEDICARE

## 2025-08-13 LAB
ALBUMIN SERPL-MCNC: 2.8 G/DL (ref 3.4–4.8)
ALBUMIN/GLOB SERPL: 0.8 RATIO (ref 1.1–2)
ALP SERPL-CCNC: 52 UNIT/L (ref 40–150)
ALT SERPL-CCNC: 9 UNIT/L (ref 0–55)
ANION GAP SERPL CALC-SCNC: 9 MEQ/L
AST SERPL-CCNC: 13 UNIT/L (ref 11–45)
BASOPHILS # BLD AUTO: 0.03 X10(3)/MCL
BASOPHILS NFR BLD AUTO: 0.3 %
BILIRUB SERPL-MCNC: 0.7 MG/DL
BUN SERPL-MCNC: 24.7 MG/DL (ref 9.8–20.1)
CALCIUM SERPL-MCNC: 8.7 MG/DL (ref 8.4–10.2)
CHLORIDE SERPL-SCNC: 111 MMOL/L (ref 98–107)
CO2 SERPL-SCNC: 23 MMOL/L (ref 23–31)
CREAT SERPL-MCNC: 0.69 MG/DL (ref 0.55–1.02)
CREAT/UREA NIT SERPL: 36
EOSINOPHIL # BLD AUTO: 0.48 X10(3)/MCL (ref 0–0.9)
EOSINOPHIL NFR BLD AUTO: 4.5 %
ERYTHROCYTE [DISTWIDTH] IN BLOOD BY AUTOMATED COUNT: 15.3 % (ref 11.5–17)
GFR SERPLBLD CREATININE-BSD FMLA CKD-EPI: >60 ML/MIN/1.73/M2
GLOBULIN SER-MCNC: 3.3 GM/DL (ref 2.4–3.5)
GLUCOSE SERPL-MCNC: 103 MG/DL (ref 82–115)
HCT VFR BLD AUTO: 28.9 % (ref 37–47)
HGB BLD-MCNC: 9.2 G/DL (ref 12–16)
IMM GRANULOCYTES # BLD AUTO: 0.05 X10(3)/MCL (ref 0–0.04)
IMM GRANULOCYTES NFR BLD AUTO: 0.5 %
INR PPP: 1.1
LYMPHOCYTES # BLD AUTO: 1.55 X10(3)/MCL (ref 0.6–4.6)
LYMPHOCYTES NFR BLD AUTO: 14.5 %
MCH RBC QN AUTO: 28.4 PG (ref 27–31)
MCHC RBC AUTO-ENTMCNC: 31.8 G/DL (ref 33–36)
MCV RBC AUTO: 89.2 FL (ref 80–94)
MONOCYTES # BLD AUTO: 0.94 X10(3)/MCL (ref 0.1–1.3)
MONOCYTES NFR BLD AUTO: 8.8 %
NEUTROPHILS # BLD AUTO: 7.64 X10(3)/MCL (ref 2.1–9.2)
NEUTROPHILS NFR BLD AUTO: 71.4 %
NRBC BLD AUTO-RTO: 0 %
PLATELET # BLD AUTO: 228 X10(3)/MCL (ref 130–400)
PMV BLD AUTO: 9.6 FL (ref 7.4–10.4)
POTASSIUM SERPL-SCNC: 3.8 MMOL/L (ref 3.5–5.1)
PROT SERPL-MCNC: 6.1 GM/DL (ref 5.8–7.6)
PROTHROMBIN TIME: 14.5 SECONDS (ref 12.5–14.5)
RBC # BLD AUTO: 3.24 X10(6)/MCL (ref 4.2–5.4)
SODIUM SERPL-SCNC: 143 MMOL/L (ref 136–145)
WBC # BLD AUTO: 10.69 X10(3)/MCL (ref 4.5–11.5)

## 2025-08-13 PROCEDURE — 25000003 PHARM REV CODE 250

## 2025-08-13 PROCEDURE — 63600175 PHARM REV CODE 636 W HCPCS: Performed by: INTERNAL MEDICINE

## 2025-08-13 PROCEDURE — 27201423 OPTIME MED/SURG SUP & DEVICES STERILE SUPPLY: Performed by: INTERNAL MEDICINE

## 2025-08-13 PROCEDURE — 99900035 HC TECH TIME PER 15 MIN (STAT)

## 2025-08-13 PROCEDURE — 36415 COLL VENOUS BLD VENIPUNCTURE: CPT

## 2025-08-13 PROCEDURE — 43246 EGD PLACE GASTROSTOMY TUBE: CPT | Performed by: INTERNAL MEDICINE

## 2025-08-13 PROCEDURE — 94640 AIRWAY INHALATION TREATMENT: CPT

## 2025-08-13 PROCEDURE — 80053 COMPREHEN METABOLIC PANEL: CPT

## 2025-08-13 PROCEDURE — 63600175 PHARM REV CODE 636 W HCPCS

## 2025-08-13 PROCEDURE — 25000242 PHARM REV CODE 250 ALT 637 W/ HCPCS: Performed by: INTERNAL MEDICINE

## 2025-08-13 PROCEDURE — 25000242 PHARM REV CODE 250 ALT 637 W/ HCPCS

## 2025-08-13 PROCEDURE — 21400001 HC TELEMETRY ROOM

## 2025-08-13 PROCEDURE — 85610 PROTHROMBIN TIME: CPT

## 2025-08-13 PROCEDURE — 94799 UNLISTED PULMONARY SVC/PX: CPT

## 2025-08-13 PROCEDURE — 37000009 HC ANESTHESIA EA ADD 15 MINS: Performed by: INTERNAL MEDICINE

## 2025-08-13 PROCEDURE — 37000008 HC ANESTHESIA 1ST 15 MINUTES: Performed by: INTERNAL MEDICINE

## 2025-08-13 PROCEDURE — 94761 N-INVAS EAR/PLS OXIMETRY MLT: CPT

## 2025-08-13 PROCEDURE — 27000221 HC OXYGEN, UP TO 24 HOURS

## 2025-08-13 PROCEDURE — 25000003 PHARM REV CODE 250: Performed by: INTERNAL MEDICINE

## 2025-08-13 PROCEDURE — 99900031 HC PATIENT EDUCATION (STAT)

## 2025-08-13 PROCEDURE — 85025 COMPLETE CBC W/AUTO DIFF WBC: CPT

## 2025-08-13 PROCEDURE — 0DH63UZ INSERTION OF FEEDING DEVICE INTO STOMACH, PERCUTANEOUS APPROACH: ICD-10-PCS | Performed by: INTERNAL MEDICINE

## 2025-08-13 RX ORDER — SODIUM CHLORIDE, SODIUM GLUCONATE, SODIUM ACETATE, POTASSIUM CHLORIDE AND MAGNESIUM CHLORIDE 30; 37; 368; 526; 502 MG/100ML; MG/100ML; MG/100ML; MG/100ML; MG/100ML
INJECTION, SOLUTION INTRAVENOUS CONTINUOUS
Status: DISCONTINUED | OUTPATIENT
Start: 2025-08-13 | End: 2025-08-18 | Stop reason: HOSPADM

## 2025-08-13 RX ORDER — PHENYLEPHRINE HCL IN 0.9% NACL 1 MG/10 ML
SYRINGE (ML) INTRAVENOUS
Status: DISCONTINUED | OUTPATIENT
Start: 2025-08-13 | End: 2025-08-13

## 2025-08-13 RX ORDER — LIDOCAINE HYDROCHLORIDE 20 MG/ML
INJECTION INTRAVENOUS
Status: DISCONTINUED | OUTPATIENT
Start: 2025-08-13 | End: 2025-08-13

## 2025-08-13 RX ORDER — PROPOFOL 10 MG/ML
INJECTION, EMULSION INTRAVENOUS CONTINUOUS PRN
Status: DISCONTINUED | OUTPATIENT
Start: 2025-08-13 | End: 2025-08-13

## 2025-08-13 RX ORDER — IPRATROPIUM BROMIDE AND ALBUTEROL SULFATE 2.5; .5 MG/3ML; MG/3ML
3 SOLUTION RESPIRATORY (INHALATION) ONCE
OUTPATIENT
Start: 2025-08-13 | End: 2025-08-13

## 2025-08-13 RX ORDER — ONDANSETRON HYDROCHLORIDE 2 MG/ML
4 INJECTION, SOLUTION INTRAVENOUS DAILY PRN
OUTPATIENT
Start: 2025-08-13

## 2025-08-13 RX ORDER — PROPOFOL 10 MG/ML
VIAL (ML) INTRAVENOUS
Status: COMPLETED
Start: 2025-08-13 | End: 2025-08-13

## 2025-08-13 RX ORDER — PROPOFOL 10 MG/ML
VIAL (ML) INTRAVENOUS
Status: DISCONTINUED | OUTPATIENT
Start: 2025-08-13 | End: 2025-08-13

## 2025-08-13 RX ORDER — IPRATROPIUM BROMIDE AND ALBUTEROL SULFATE 2.5; .5 MG/3ML; MG/3ML
SOLUTION RESPIRATORY (INHALATION)
Status: COMPLETED
Start: 2025-08-13 | End: 2025-08-13

## 2025-08-13 RX ADMIN — PROPOFOL 50 MG: 10 INJECTION, EMULSION INTRAVENOUS at 12:08

## 2025-08-13 RX ADMIN — IPRATROPIUM BROMIDE AND ALBUTEROL SULFATE 3 ML: .5; 3 SOLUTION RESPIRATORY (INHALATION) at 04:08

## 2025-08-13 RX ADMIN — SODIUM CHLORIDE: 9 INJECTION, SOLUTION INTRAVENOUS at 12:08

## 2025-08-13 RX ADMIN — Medication 100 MCG: at 12:08

## 2025-08-13 RX ADMIN — PIPERACILLIN SODIUM AND TAZOBACTAM SODIUM 4.5 G: 4; .5 INJECTION, POWDER, LYOPHILIZED, FOR SOLUTION INTRAVENOUS at 07:08

## 2025-08-13 RX ADMIN — PROPOFOL 125 MCG/KG/MIN: 10 INJECTION, EMULSION INTRAVENOUS at 12:08

## 2025-08-13 RX ADMIN — IPRATROPIUM BROMIDE AND ALBUTEROL SULFATE 3 ML: .5; 3 SOLUTION RESPIRATORY (INHALATION) at 03:08

## 2025-08-13 RX ADMIN — PIPERACILLIN SODIUM AND TAZOBACTAM SODIUM 4.5 G: 4; .5 INJECTION, POWDER, LYOPHILIZED, FOR SOLUTION INTRAVENOUS at 02:08

## 2025-08-13 RX ADMIN — IPRATROPIUM BROMIDE AND ALBUTEROL SULFATE 3 ML: .5; 3 SOLUTION RESPIRATORY (INHALATION) at 08:08

## 2025-08-13 RX ADMIN — LIDOCAINE HYDROCHLORIDE 50 MG: 20 INJECTION INTRAVENOUS at 12:08

## 2025-08-13 RX ADMIN — IPRATROPIUM BROMIDE AND ALBUTEROL SULFATE 3 ML: .5; 3 SOLUTION RESPIRATORY (INHALATION) at 07:08

## 2025-08-13 RX ADMIN — IPRATROPIUM BROMIDE AND ALBUTEROL SULFATE 3 ML: .5; 3 SOLUTION RESPIRATORY (INHALATION) at 09:08

## 2025-08-13 RX ADMIN — PROPOFOL 30 MG: 10 INJECTION, EMULSION INTRAVENOUS at 12:08

## 2025-08-13 RX ADMIN — LEUCINE, PHENYLALANINE, LYSINE, METHIONINE, ISOLEUCINE, VALINE, HISTIDINE, THREONINE, TRYPTOPHAN, ALANINE, GLYCINE, ARGININE, PROLINE, SERINE, TYROSINE, SODIUM ACETATE, DIBASIC POTASSIUM PHOSPHATE, MAGNESIUM CHLORIDE, SODIUM CHLORIDE, CALCIUM CHLORIDE, DEXTROSE
880; 489; 33; 5; 438; 204; 255; 311; 247; 51; 170; 238; 261; 289; 213; 297; 77; 179; 77; 17; 247 INJECTION INTRAVENOUS at 09:08

## 2025-08-14 PROCEDURE — 99900031 HC PATIENT EDUCATION (STAT)

## 2025-08-14 PROCEDURE — 94640 AIRWAY INHALATION TREATMENT: CPT

## 2025-08-14 PROCEDURE — 99900035 HC TECH TIME PER 15 MIN (STAT)

## 2025-08-14 PROCEDURE — 94761 N-INVAS EAR/PLS OXIMETRY MLT: CPT

## 2025-08-14 PROCEDURE — 25000003 PHARM REV CODE 250: Performed by: INTERNAL MEDICINE

## 2025-08-14 PROCEDURE — 27000221 HC OXYGEN, UP TO 24 HOURS

## 2025-08-14 PROCEDURE — 63600175 PHARM REV CODE 636 W HCPCS

## 2025-08-14 PROCEDURE — 21400001 HC TELEMETRY ROOM

## 2025-08-14 PROCEDURE — 94760 N-INVAS EAR/PLS OXIMETRY 1: CPT

## 2025-08-14 PROCEDURE — 92526 ORAL FUNCTION THERAPY: CPT

## 2025-08-14 PROCEDURE — 63600175 PHARM REV CODE 636 W HCPCS: Performed by: INTERNAL MEDICINE

## 2025-08-14 PROCEDURE — 99232 SBSQ HOSP IP/OBS MODERATE 35: CPT | Mod: ,,, | Performed by: INTERNAL MEDICINE

## 2025-08-14 PROCEDURE — 25000242 PHARM REV CODE 250 ALT 637 W/ HCPCS: Performed by: INTERNAL MEDICINE

## 2025-08-14 RX ORDER — MORPHINE SULFATE 4 MG/ML
2 INJECTION, SOLUTION INTRAMUSCULAR; INTRAVENOUS EVERY 6 HOURS PRN
Refills: 0 | Status: DISCONTINUED | OUTPATIENT
Start: 2025-08-14 | End: 2025-08-15

## 2025-08-14 RX ADMIN — IPRATROPIUM BROMIDE AND ALBUTEROL SULFATE 3 ML: .5; 3 SOLUTION RESPIRATORY (INHALATION) at 11:08

## 2025-08-14 RX ADMIN — PIPERACILLIN SODIUM AND TAZOBACTAM SODIUM 4.5 G: 4; .5 INJECTION, POWDER, LYOPHILIZED, FOR SOLUTION INTRAVENOUS at 11:08

## 2025-08-14 RX ADMIN — PIPERACILLIN SODIUM AND TAZOBACTAM SODIUM 4.5 G: 4; .5 INJECTION, POWDER, LYOPHILIZED, FOR SOLUTION INTRAVENOUS at 01:08

## 2025-08-14 RX ADMIN — IPRATROPIUM BROMIDE AND ALBUTEROL SULFATE 3 ML: .5; 3 SOLUTION RESPIRATORY (INHALATION) at 12:08

## 2025-08-14 RX ADMIN — IPRATROPIUM BROMIDE AND ALBUTEROL SULFATE 3 ML: .5; 3 SOLUTION RESPIRATORY (INHALATION) at 08:08

## 2025-08-14 RX ADMIN — LEVOTHYROXINE SODIUM 70 MCG: 20 INJECTION, SOLUTION INTRAVENOUS at 08:08

## 2025-08-14 RX ADMIN — MORPHINE SULFATE 2 MG: 4 INJECTION, SOLUTION INTRAMUSCULAR; INTRAVENOUS at 01:08

## 2025-08-14 RX ADMIN — IPRATROPIUM BROMIDE AND ALBUTEROL SULFATE 3 ML: .5; 3 SOLUTION RESPIRATORY (INHALATION) at 03:08

## 2025-08-14 RX ADMIN — PIPERACILLIN SODIUM AND TAZOBACTAM SODIUM 4.5 G: 4; .5 INJECTION, POWDER, LYOPHILIZED, FOR SOLUTION INTRAVENOUS at 08:08

## 2025-08-15 LAB
ANION GAP SERPL CALC-SCNC: 7 MEQ/L
BASOPHILS # BLD AUTO: 0.03 X10(3)/MCL
BASOPHILS NFR BLD AUTO: 0.3 %
BUN SERPL-MCNC: 21.6 MG/DL (ref 9.8–20.1)
CALCIUM SERPL-MCNC: 9.2 MG/DL (ref 8.4–10.2)
CHLORIDE SERPL-SCNC: 109 MMOL/L (ref 98–107)
CO2 SERPL-SCNC: 27 MMOL/L (ref 23–31)
CREAT SERPL-MCNC: 0.77 MG/DL (ref 0.55–1.02)
CREAT/UREA NIT SERPL: 28
EOSINOPHIL # BLD AUTO: 0.28 X10(3)/MCL (ref 0–0.9)
EOSINOPHIL NFR BLD AUTO: 2.5 %
ERYTHROCYTE [DISTWIDTH] IN BLOOD BY AUTOMATED COUNT: 15.9 % (ref 11.5–17)
GFR SERPLBLD CREATININE-BSD FMLA CKD-EPI: >60 ML/MIN/1.73/M2
GLUCOSE SERPL-MCNC: 121 MG/DL (ref 82–115)
HCT VFR BLD AUTO: 28.6 % (ref 37–47)
HGB BLD-MCNC: 9.1 G/DL (ref 12–16)
IMM GRANULOCYTES # BLD AUTO: 0.06 X10(3)/MCL (ref 0–0.04)
IMM GRANULOCYTES NFR BLD AUTO: 0.5 %
LYMPHOCYTES # BLD AUTO: 1.4 X10(3)/MCL (ref 0.6–4.6)
LYMPHOCYTES NFR BLD AUTO: 12.6 %
MCH RBC QN AUTO: 28.3 PG (ref 27–31)
MCHC RBC AUTO-ENTMCNC: 31.8 G/DL (ref 33–36)
MCV RBC AUTO: 89.1 FL (ref 80–94)
MONOCYTES # BLD AUTO: 1.19 X10(3)/MCL (ref 0.1–1.3)
MONOCYTES NFR BLD AUTO: 10.7 %
NEUTROPHILS # BLD AUTO: 8.14 X10(3)/MCL (ref 2.1–9.2)
NEUTROPHILS NFR BLD AUTO: 73.4 %
NRBC BLD AUTO-RTO: 0 %
PLATELET # BLD AUTO: 195 X10(3)/MCL (ref 130–400)
PMV BLD AUTO: 9.6 FL (ref 7.4–10.4)
POTASSIUM SERPL-SCNC: 3.9 MMOL/L (ref 3.5–5.1)
RBC # BLD AUTO: 3.21 X10(6)/MCL (ref 4.2–5.4)
SODIUM SERPL-SCNC: 143 MMOL/L (ref 136–145)
WBC # BLD AUTO: 11.1 X10(3)/MCL (ref 4.5–11.5)

## 2025-08-15 PROCEDURE — 80048 BASIC METABOLIC PNL TOTAL CA: CPT | Performed by: INTERNAL MEDICINE

## 2025-08-15 PROCEDURE — 27000221 HC OXYGEN, UP TO 24 HOURS

## 2025-08-15 PROCEDURE — 25000003 PHARM REV CODE 250: Performed by: INTERNAL MEDICINE

## 2025-08-15 PROCEDURE — 63600175 PHARM REV CODE 636 W HCPCS: Performed by: INTERNAL MEDICINE

## 2025-08-15 PROCEDURE — 25000242 PHARM REV CODE 250 ALT 637 W/ HCPCS: Performed by: INTERNAL MEDICINE

## 2025-08-15 PROCEDURE — 99900031 HC PATIENT EDUCATION (STAT)

## 2025-08-15 PROCEDURE — 21400001 HC TELEMETRY ROOM

## 2025-08-15 PROCEDURE — 94640 AIRWAY INHALATION TREATMENT: CPT

## 2025-08-15 PROCEDURE — 99900035 HC TECH TIME PER 15 MIN (STAT)

## 2025-08-15 PROCEDURE — 92526 ORAL FUNCTION THERAPY: CPT

## 2025-08-15 PROCEDURE — 85025 COMPLETE CBC W/AUTO DIFF WBC: CPT | Performed by: INTERNAL MEDICINE

## 2025-08-15 PROCEDURE — 94761 N-INVAS EAR/PLS OXIMETRY MLT: CPT

## 2025-08-15 PROCEDURE — 94760 N-INVAS EAR/PLS OXIMETRY 1: CPT

## 2025-08-15 PROCEDURE — 36415 COLL VENOUS BLD VENIPUNCTURE: CPT | Performed by: INTERNAL MEDICINE

## 2025-08-15 RX ORDER — LEVOTHYROXINE SODIUM 125 UG/1
125 TABLET ORAL
Status: DISCONTINUED | OUTPATIENT
Start: 2025-08-15 | End: 2025-08-18 | Stop reason: HOSPADM

## 2025-08-15 RX ORDER — POLYETHYLENE GLYCOL 3350 17 G/17G
17 POWDER, FOR SOLUTION ORAL DAILY
Status: DISCONTINUED | OUTPATIENT
Start: 2025-08-16 | End: 2025-08-16

## 2025-08-15 RX ORDER — BISACODYL 10 MG/1
10 SUPPOSITORY RECTAL DAILY PRN
Status: DISCONTINUED | OUTPATIENT
Start: 2025-08-15 | End: 2025-08-18 | Stop reason: HOSPADM

## 2025-08-15 RX ADMIN — IPRATROPIUM BROMIDE AND ALBUTEROL SULFATE 3 ML: .5; 3 SOLUTION RESPIRATORY (INHALATION) at 08:08

## 2025-08-15 RX ADMIN — PIPERACILLIN SODIUM AND TAZOBACTAM SODIUM 4.5 G: 4; .5 INJECTION, POWDER, LYOPHILIZED, FOR SOLUTION INTRAVENOUS at 08:08

## 2025-08-15 RX ADMIN — LEVOTHYROXINE SODIUM 125 MCG: 0.12 TABLET ORAL at 06:08

## 2025-08-15 RX ADMIN — PIPERACILLIN SODIUM AND TAZOBACTAM SODIUM 4.5 G: 4; .5 INJECTION, POWDER, LYOPHILIZED, FOR SOLUTION INTRAVENOUS at 01:08

## 2025-08-15 RX ADMIN — IPRATROPIUM BROMIDE AND ALBUTEROL SULFATE 3 ML: .5; 3 SOLUTION RESPIRATORY (INHALATION) at 05:08

## 2025-08-15 RX ADMIN — IPRATROPIUM BROMIDE AND ALBUTEROL SULFATE 3 ML: .5; 3 SOLUTION RESPIRATORY (INHALATION) at 04:08

## 2025-08-15 RX ADMIN — BISACODYL 10 MG: 10 SUPPOSITORY RECTAL at 03:08

## 2025-08-15 RX ADMIN — PIPERACILLIN SODIUM AND TAZOBACTAM SODIUM 4.5 G: 4; .5 INJECTION, POWDER, LYOPHILIZED, FOR SOLUTION INTRAVENOUS at 05:08

## 2025-08-15 RX ADMIN — IPRATROPIUM BROMIDE AND ALBUTEROL SULFATE 3 ML: .5; 3 SOLUTION RESPIRATORY (INHALATION) at 12:08

## 2025-08-15 RX ADMIN — IPRATROPIUM BROMIDE AND ALBUTEROL SULFATE 3 ML: .5; 3 SOLUTION RESPIRATORY (INHALATION) at 07:08

## 2025-08-16 PROCEDURE — 25000242 PHARM REV CODE 250 ALT 637 W/ HCPCS: Performed by: INTERNAL MEDICINE

## 2025-08-16 PROCEDURE — 94640 AIRWAY INHALATION TREATMENT: CPT

## 2025-08-16 PROCEDURE — 27000221 HC OXYGEN, UP TO 24 HOURS

## 2025-08-16 PROCEDURE — 25000003 PHARM REV CODE 250: Performed by: INTERNAL MEDICINE

## 2025-08-16 PROCEDURE — 99900035 HC TECH TIME PER 15 MIN (STAT)

## 2025-08-16 PROCEDURE — 21400001 HC TELEMETRY ROOM

## 2025-08-16 PROCEDURE — 99900031 HC PATIENT EDUCATION (STAT)

## 2025-08-16 PROCEDURE — 94761 N-INVAS EAR/PLS OXIMETRY MLT: CPT

## 2025-08-16 PROCEDURE — 94760 N-INVAS EAR/PLS OXIMETRY 1: CPT

## 2025-08-16 PROCEDURE — 63600175 PHARM REV CODE 636 W HCPCS: Performed by: INTERNAL MEDICINE

## 2025-08-16 PROCEDURE — 94799 UNLISTED PULMONARY SVC/PX: CPT

## 2025-08-16 RX ORDER — POLYETHYLENE GLYCOL 3350 17 G/17G
17 POWDER, FOR SOLUTION ORAL 2 TIMES DAILY
Status: DISCONTINUED | OUTPATIENT
Start: 2025-08-16 | End: 2025-08-18 | Stop reason: HOSPADM

## 2025-08-16 RX ORDER — SYRING-NEEDL,DISP,INSUL,0.3 ML 29 G X1/2"
296 SYRINGE, EMPTY DISPOSABLE MISCELLANEOUS ONCE
Status: COMPLETED | OUTPATIENT
Start: 2025-08-16 | End: 2025-08-16

## 2025-08-16 RX ADMIN — POLYETHYLENE GLYCOL 3350 17 G: 17 POWDER, FOR SOLUTION ORAL at 08:08

## 2025-08-16 RX ADMIN — MAGNESIUM CITRATE 296 ML: 1.75 LIQUID ORAL at 02:08

## 2025-08-16 RX ADMIN — IPRATROPIUM BROMIDE AND ALBUTEROL SULFATE 3 ML: .5; 3 SOLUTION RESPIRATORY (INHALATION) at 03:08

## 2025-08-16 RX ADMIN — IPRATROPIUM BROMIDE AND ALBUTEROL SULFATE 3 ML: .5; 3 SOLUTION RESPIRATORY (INHALATION) at 08:08

## 2025-08-16 RX ADMIN — PIPERACILLIN SODIUM AND TAZOBACTAM SODIUM 4.5 G: 4; .5 INJECTION, POWDER, LYOPHILIZED, FOR SOLUTION INTRAVENOUS at 05:08

## 2025-08-16 RX ADMIN — PIPERACILLIN SODIUM AND TAZOBACTAM SODIUM 4.5 G: 4; .5 INJECTION, POWDER, LYOPHILIZED, FOR SOLUTION INTRAVENOUS at 12:08

## 2025-08-16 RX ADMIN — IPRATROPIUM BROMIDE AND ALBUTEROL SULFATE 3 ML: .5; 3 SOLUTION RESPIRATORY (INHALATION) at 12:08

## 2025-08-16 RX ADMIN — IPRATROPIUM BROMIDE AND ALBUTEROL SULFATE 3 ML: .5; 3 SOLUTION RESPIRATORY (INHALATION) at 04:08

## 2025-08-16 RX ADMIN — LEVOTHYROXINE SODIUM 125 MCG: 0.12 TABLET ORAL at 05:08

## 2025-08-16 RX ADMIN — POLYETHYLENE GLYCOL 3350 17 G: 17 POWDER, FOR SOLUTION ORAL at 09:08

## 2025-08-16 RX ADMIN — IPRATROPIUM BROMIDE AND ALBUTEROL SULFATE 3 ML: .5; 3 SOLUTION RESPIRATORY (INHALATION) at 11:08

## 2025-08-17 LAB
ERYTHROCYTE [DISTWIDTH] IN BLOOD BY AUTOMATED COUNT: 16.5 % (ref 11.5–17)
HCT VFR BLD AUTO: 27.7 % (ref 37–47)
HGB BLD-MCNC: 8.8 G/DL (ref 12–16)
MCH RBC QN AUTO: 28.3 PG (ref 27–31)
MCHC RBC AUTO-ENTMCNC: 31.8 G/DL (ref 33–36)
MCV RBC AUTO: 89.1 FL (ref 80–94)
NRBC BLD AUTO-RTO: 0 %
PLATELET # BLD AUTO: 193 X10(3)/MCL (ref 130–400)
PMV BLD AUTO: 9.8 FL (ref 7.4–10.4)
RBC # BLD AUTO: 3.11 X10(6)/MCL (ref 4.2–5.4)
WBC # BLD AUTO: 11.62 X10(3)/MCL (ref 4.5–11.5)

## 2025-08-17 PROCEDURE — 99900035 HC TECH TIME PER 15 MIN (STAT)

## 2025-08-17 PROCEDURE — 94640 AIRWAY INHALATION TREATMENT: CPT

## 2025-08-17 PROCEDURE — 94761 N-INVAS EAR/PLS OXIMETRY MLT: CPT

## 2025-08-17 PROCEDURE — 25000003 PHARM REV CODE 250: Performed by: INTERNAL MEDICINE

## 2025-08-17 PROCEDURE — 85027 COMPLETE CBC AUTOMATED: CPT | Performed by: INTERNAL MEDICINE

## 2025-08-17 PROCEDURE — 99900031 HC PATIENT EDUCATION (STAT)

## 2025-08-17 PROCEDURE — 94760 N-INVAS EAR/PLS OXIMETRY 1: CPT

## 2025-08-17 PROCEDURE — 21400001 HC TELEMETRY ROOM

## 2025-08-17 PROCEDURE — 27000221 HC OXYGEN, UP TO 24 HOURS

## 2025-08-17 PROCEDURE — 36415 COLL VENOUS BLD VENIPUNCTURE: CPT | Performed by: INTERNAL MEDICINE

## 2025-08-17 PROCEDURE — 25000242 PHARM REV CODE 250 ALT 637 W/ HCPCS: Performed by: INTERNAL MEDICINE

## 2025-08-17 RX ORDER — NAPROXEN SODIUM 220 MG/1
81 TABLET, FILM COATED ORAL DAILY
Status: DISCONTINUED | OUTPATIENT
Start: 2025-08-18 | End: 2025-08-18 | Stop reason: HOSPADM

## 2025-08-17 RX ORDER — DICLOFENAC SODIUM 10 MG/G
2 GEL TOPICAL 2 TIMES DAILY
Status: DISCONTINUED | OUTPATIENT
Start: 2025-08-17 | End: 2025-08-18 | Stop reason: HOSPADM

## 2025-08-17 RX ORDER — CLOPIDOGREL BISULFATE 75 MG/1
75 TABLET ORAL DAILY
Status: DISCONTINUED | OUTPATIENT
Start: 2025-08-17 | End: 2025-08-18 | Stop reason: HOSPADM

## 2025-08-17 RX ADMIN — IPRATROPIUM BROMIDE AND ALBUTEROL SULFATE 3 ML: .5; 3 SOLUTION RESPIRATORY (INHALATION) at 12:08

## 2025-08-17 RX ADMIN — IPRATROPIUM BROMIDE AND ALBUTEROL SULFATE 3 ML: .5; 3 SOLUTION RESPIRATORY (INHALATION) at 04:08

## 2025-08-17 RX ADMIN — LEVOTHYROXINE SODIUM 125 MCG: 0.12 TABLET ORAL at 06:08

## 2025-08-17 RX ADMIN — POLYETHYLENE GLYCOL 3350 17 G: 17 POWDER, FOR SOLUTION ORAL at 09:08

## 2025-08-17 RX ADMIN — DICLOFENAC SODIUM 2 G: 10 GEL TOPICAL at 08:08

## 2025-08-17 RX ADMIN — CLOPIDOGREL 75 MG: 75 TABLET ORAL at 08:08

## 2025-08-17 RX ADMIN — LINACLOTIDE 145 MCG: 145 CAPSULE, GELATIN COATED ORAL at 09:08

## 2025-08-17 RX ADMIN — IPRATROPIUM BROMIDE AND ALBUTEROL SULFATE 3 ML: .5; 3 SOLUTION RESPIRATORY (INHALATION) at 08:08

## 2025-08-17 RX ADMIN — IPRATROPIUM BROMIDE AND ALBUTEROL SULFATE 3 ML: .5; 3 SOLUTION RESPIRATORY (INHALATION) at 07:08

## 2025-08-18 VITALS
OXYGEN SATURATION: 98 % | WEIGHT: 142.63 LBS | HEIGHT: 66 IN | TEMPERATURE: 98 F | SYSTOLIC BLOOD PRESSURE: 120 MMHG | RESPIRATION RATE: 20 BRPM | BODY MASS INDEX: 22.92 KG/M2 | HEART RATE: 85 BPM | DIASTOLIC BLOOD PRESSURE: 66 MMHG

## 2025-08-18 PROBLEM — J96.01 ACUTE RESPIRATORY FAILURE WITH HYPOXIA: Status: RESOLVED | Noted: 2025-08-18 | Resolved: 2025-08-18

## 2025-08-18 PROBLEM — Z93.1 PEG (PERCUTANEOUS ENDOSCOPIC GASTROSTOMY) STATUS: Status: ACTIVE | Noted: 2025-08-18

## 2025-08-18 PROBLEM — J96.01 ACUTE RESPIRATORY FAILURE WITH HYPOXIA: Status: ACTIVE | Noted: 2025-08-18

## 2025-08-18 PROBLEM — R04.2 HEMOPTYSIS: Status: RESOLVED | Noted: 2025-08-06 | Resolved: 2025-08-18

## 2025-08-18 PROCEDURE — 25000003 PHARM REV CODE 250: Performed by: INTERNAL MEDICINE

## 2025-08-18 PROCEDURE — 94640 AIRWAY INHALATION TREATMENT: CPT

## 2025-08-18 PROCEDURE — 25000242 PHARM REV CODE 250 ALT 637 W/ HCPCS: Performed by: INTERNAL MEDICINE

## 2025-08-18 PROCEDURE — 94760 N-INVAS EAR/PLS OXIMETRY 1: CPT

## 2025-08-18 PROCEDURE — 27000221 HC OXYGEN, UP TO 24 HOURS

## 2025-08-18 PROCEDURE — 99900035 HC TECH TIME PER 15 MIN (STAT)

## 2025-08-18 PROCEDURE — 99900031 HC PATIENT EDUCATION (STAT)

## 2025-08-18 PROCEDURE — 94799 UNLISTED PULMONARY SVC/PX: CPT

## 2025-08-18 RX ORDER — DICLOFENAC SODIUM 10 MG/G
2 GEL TOPICAL 2 TIMES DAILY
Qty: 50 G | Refills: 0 | Status: SHIPPED | OUTPATIENT
Start: 2025-08-18

## 2025-08-18 RX ORDER — POLYETHYLENE GLYCOL 3350 17 G/17G
17 POWDER, FOR SOLUTION ORAL 2 TIMES DAILY
COMMUNITY
Start: 2025-08-18

## 2025-08-18 RX ADMIN — ASPIRIN 81 MG: 81 TABLET, CHEWABLE ORAL at 09:08

## 2025-08-18 RX ADMIN — DICLOFENAC SODIUM 2 G: 10 GEL TOPICAL at 09:08

## 2025-08-18 RX ADMIN — CLOPIDOGREL 75 MG: 75 TABLET ORAL at 09:08

## 2025-08-18 RX ADMIN — IPRATROPIUM BROMIDE AND ALBUTEROL SULFATE 3 ML: .5; 3 SOLUTION RESPIRATORY (INHALATION) at 04:08

## 2025-08-18 RX ADMIN — LINACLOTIDE 145 MCG: 145 CAPSULE, GELATIN COATED ORAL at 05:08

## 2025-08-18 RX ADMIN — IPRATROPIUM BROMIDE AND ALBUTEROL SULFATE 3 ML: .5; 3 SOLUTION RESPIRATORY (INHALATION) at 07:08

## 2025-08-18 RX ADMIN — LEVOTHYROXINE SODIUM 125 MCG: 0.12 TABLET ORAL at 05:08

## (undated) DEVICE — TIP SUCTION YANKAUER

## (undated) DEVICE — COLLECTION SPECIMEN NEPTUNE

## (undated) DEVICE — KIT CANIST SUCTION 1200CC

## (undated) DEVICE — CONTAINER FORMALIN PREFILLED

## (undated) DEVICE — SOL IRRI STRL WATER 1000ML

## (undated) DEVICE — GAUZE SPONGE 4X4 12PLY

## (undated) DEVICE — BINDER ABDOMINAL 10IN 27-48IN

## (undated) DEVICE — LMA I-GEL  4.0 ADULT MD 50-90

## (undated) DEVICE — ADAPTER DUAL NSL LUER M-M 7FT

## (undated) DEVICE — BLOCK BLOX BITE DENT RIM 54FR

## (undated) DEVICE — KIT PEG PULL SAFETY 24FR

## (undated) DEVICE — UNDERPAD PROTECT PLUS 17X24IN

## (undated) DEVICE — TUBING SUC MEDI-VAC CONN 6FT

## (undated) DEVICE — STOPCOCK DISCOFIX 3 WAY

## (undated) DEVICE — SYR SLIP TIP 10ML SHIELD

## (undated) DEVICE — CONTAINER SPECIMEN SCREW 4OZ

## (undated) DEVICE — WRAP STERILIZATION 45X45 DISP

## (undated) DEVICE — VALVE OLYMPUS SCOPE SUCTION

## (undated) DEVICE — SYR ONLY LEUR TIP 30CC

## (undated) DEVICE — ADAPTER SWIVEL

## (undated) DEVICE — CABLE EKG DL RBBN 3 LEAD DISP

## (undated) DEVICE — SOLIDIFIER BOTTLE 1500CC

## (undated) DEVICE — SYRINGE 0.9% NACL 10MIL PREFIL

## (undated) DEVICE — CONNECTOR TUBING OXYGEN

## (undated) DEVICE — ATOMIZER NASAL DRUG DEL NO SYR

## (undated) DEVICE — SYR DISP LL 5CC

## (undated) DEVICE — MANIFOLD 4 PORT

## (undated) DEVICE — ELECTRODE RED DOT EKG

## (undated) DEVICE — BRUSH CLEANING 1-1.5X950MM

## (undated) DEVICE — MASK SURG LVL 3 EYESHIELD LOOP

## (undated) DEVICE — SET LEADWIRE PINCH 3 LD 50IN

## (undated) DEVICE — BRUSH CLEANING EBUS SMALL

## (undated) DEVICE — CATH BRONCHOSCOPE F/BF

## (undated) DEVICE — SYR ONLY LUER LOCK 20CC

## (undated) DEVICE — NDL BIOPSY ASPIRATION 21G

## (undated) DEVICE — GUIDEWIRE SAFEGUIDE 210CM

## (undated) DEVICE — BOWL STERILE LARGE 32OZ

## (undated) DEVICE — KIT SURGICAL COLON .25 1.1OZ

## (undated) DEVICE — FORCEP BX LG CAP 2.8MMX240CM

## (undated) DEVICE — SOL IRR SOD CHL .9% POUR

## (undated) DEVICE — JELLY LUBE FOIL PK STER 2.7GR

## (undated) DEVICE — VALVE OLYMPUS SCOPE BIOPSY

## (undated) DEVICE — MASK FACE W/ EARLP ASTM LVL 1

## (undated) DEVICE — ADAPTER NUT NIPPLE

## (undated) DEVICE — TUBING AIRLIFE O2 VINYL 7FT

## (undated) DEVICE — TUBING O2 FEMALE CONN 13FT

## (undated) DEVICE — APPLICATOR STRL COT 2INNR 6IN

## (undated) DEVICE — LINER MEDI-VAC PPV FLTR 1500CC

## (undated) DEVICE — NEBULIZER INTIN UP-DRAFT II NE

## (undated) DEVICE — KIT CLN RIVITAL-OX ENDOSCP